# Patient Record
Sex: MALE | Race: WHITE | URBAN - METROPOLITAN AREA
[De-identification: names, ages, dates, MRNs, and addresses within clinical notes are randomized per-mention and may not be internally consistent; named-entity substitution may affect disease eponyms.]

---

## 2022-03-07 PROBLEM — Z00.00 ENCOUNTER FOR PREVENTIVE HEALTH EXAMINATION: Status: ACTIVE | Noted: 2022-03-07

## 2022-11-01 ENCOUNTER — EMERGENCY (EMERGENCY)
Facility: HOSPITAL | Age: 62
LOS: 0 days | Discharge: HOME | End: 2022-11-01
Attending: EMERGENCY MEDICINE
Payer: MEDICAID

## 2022-11-01 VITALS
HEART RATE: 90 BPM | HEIGHT: 70 IN | TEMPERATURE: 98 F | WEIGHT: 184.97 LBS | DIASTOLIC BLOOD PRESSURE: 69 MMHG | SYSTOLIC BLOOD PRESSURE: 110 MMHG | RESPIRATION RATE: 18 BRPM | OXYGEN SATURATION: 96 %

## 2022-11-01 VITALS
HEART RATE: 80 BPM | TEMPERATURE: 99 F | OXYGEN SATURATION: 95 % | SYSTOLIC BLOOD PRESSURE: 141 MMHG | RESPIRATION RATE: 18 BRPM | DIASTOLIC BLOOD PRESSURE: 80 MMHG

## 2022-11-01 DIAGNOSIS — Z90.49 ACQUIRED ABSENCE OF OTHER SPECIFIED PARTS OF DIGESTIVE TRACT: ICD-10-CM

## 2022-11-01 DIAGNOSIS — K57.30 DIVERTICULOSIS OF LARGE INTESTINE WITHOUT PERFORATION OR ABSCESS WITHOUT BLEEDING: ICD-10-CM

## 2022-11-01 DIAGNOSIS — K44.9 DIAPHRAGMATIC HERNIA WITHOUT OBSTRUCTION OR GANGRENE: ICD-10-CM

## 2022-11-01 DIAGNOSIS — M47.9 SPONDYLOSIS, UNSPECIFIED: ICD-10-CM

## 2022-11-01 DIAGNOSIS — Z79.4 LONG TERM (CURRENT) USE OF INSULIN: ICD-10-CM

## 2022-11-01 DIAGNOSIS — Z95.1 PRESENCE OF AORTOCORONARY BYPASS GRAFT: ICD-10-CM

## 2022-11-01 DIAGNOSIS — I51.7 CARDIOMEGALY: ICD-10-CM

## 2022-11-01 DIAGNOSIS — Z79.82 LONG TERM (CURRENT) USE OF ASPIRIN: ICD-10-CM

## 2022-11-01 DIAGNOSIS — L08.9 LOCAL INFECTION OF THE SKIN AND SUBCUTANEOUS TISSUE, UNSPECIFIED: ICD-10-CM

## 2022-11-01 DIAGNOSIS — I10 ESSENTIAL (PRIMARY) HYPERTENSION: ICD-10-CM

## 2022-11-01 DIAGNOSIS — N28.1 CYST OF KIDNEY, ACQUIRED: ICD-10-CM

## 2022-11-01 DIAGNOSIS — K40.90 UNILATERAL INGUINAL HERNIA, WITHOUT OBSTRUCTION OR GANGRENE, NOT SPECIFIED AS RECURRENT: ICD-10-CM

## 2022-11-01 DIAGNOSIS — E11.9 TYPE 2 DIABETES MELLITUS WITHOUT COMPLICATIONS: ICD-10-CM

## 2022-11-01 DIAGNOSIS — Z95.2 PRESENCE OF PROSTHETIC HEART VALVE: ICD-10-CM

## 2022-11-01 DIAGNOSIS — R11.2 NAUSEA WITH VOMITING, UNSPECIFIED: ICD-10-CM

## 2022-11-01 DIAGNOSIS — Z87.891 PERSONAL HISTORY OF NICOTINE DEPENDENCE: ICD-10-CM

## 2022-11-01 DIAGNOSIS — I25.10 ATHEROSCLEROTIC HEART DISEASE OF NATIVE CORONARY ARTERY WITHOUT ANGINA PECTORIS: ICD-10-CM

## 2022-11-01 DIAGNOSIS — R10.13 EPIGASTRIC PAIN: ICD-10-CM

## 2022-11-01 LAB
ALBUMIN SERPL ELPH-MCNC: 3.9 G/DL — SIGNIFICANT CHANGE UP (ref 3.3–5)
ALP SERPL-CCNC: 126 U/L — HIGH (ref 40–120)
ALT FLD-CCNC: 35 U/L — SIGNIFICANT CHANGE UP (ref 12–78)
ANION GAP SERPL CALC-SCNC: 7 MMOL/L — SIGNIFICANT CHANGE UP (ref 5–17)
APPEARANCE UR: CLEAR — SIGNIFICANT CHANGE UP
APTT BLD: 32.7 SEC — SIGNIFICANT CHANGE UP (ref 27.5–35.5)
AST SERPL-CCNC: 35 U/L — SIGNIFICANT CHANGE UP (ref 15–37)
BASOPHILS # BLD AUTO: 0.08 K/UL — SIGNIFICANT CHANGE UP (ref 0–0.2)
BASOPHILS NFR BLD AUTO: 0.6 % — SIGNIFICANT CHANGE UP (ref 0–2)
BILIRUB SERPL-MCNC: 0.4 MG/DL — SIGNIFICANT CHANGE UP (ref 0.2–1.2)
BILIRUB UR-MCNC: NEGATIVE — SIGNIFICANT CHANGE UP
BUN SERPL-MCNC: 29 MG/DL — HIGH (ref 7–23)
CALCIUM SERPL-MCNC: 9.7 MG/DL — SIGNIFICANT CHANGE UP (ref 8.5–10.1)
CHLORIDE SERPL-SCNC: 107 MMOL/L — SIGNIFICANT CHANGE UP (ref 96–108)
CO2 SERPL-SCNC: 25 MMOL/L — SIGNIFICANT CHANGE UP (ref 22–31)
COLOR SPEC: YELLOW — SIGNIFICANT CHANGE UP
CREAT SERPL-MCNC: 1.08 MG/DL — SIGNIFICANT CHANGE UP (ref 0.5–1.3)
DIFF PNL FLD: ABNORMAL
EGFR: 78 ML/MIN/1.73M2 — SIGNIFICANT CHANGE UP
EOSINOPHIL # BLD AUTO: 0.25 K/UL — SIGNIFICANT CHANGE UP (ref 0–0.5)
EOSINOPHIL NFR BLD AUTO: 1.9 % — SIGNIFICANT CHANGE UP (ref 0–6)
ETHANOL SERPL-MCNC: <10 MG/DL — SIGNIFICANT CHANGE UP (ref 0–10)
GLUCOSE SERPL-MCNC: 212 MG/DL — HIGH (ref 70–99)
GLUCOSE UR QL: NEGATIVE — SIGNIFICANT CHANGE UP
HCT VFR BLD CALC: 40.7 % — SIGNIFICANT CHANGE UP (ref 39–50)
HGB BLD-MCNC: 13.3 G/DL — SIGNIFICANT CHANGE UP (ref 13–17)
IMM GRANULOCYTES NFR BLD AUTO: 0.2 % — SIGNIFICANT CHANGE UP (ref 0–0.9)
INR BLD: 1.06 RATIO — SIGNIFICANT CHANGE UP (ref 0.88–1.16)
KETONES UR-MCNC: NEGATIVE — SIGNIFICANT CHANGE UP
LEUKOCYTE ESTERASE UR-ACNC: NEGATIVE — SIGNIFICANT CHANGE UP
LIDOCAIN IGE QN: 18 U/L — LOW (ref 73–393)
LYMPHOCYTES # BLD AUTO: 1.9 K/UL — SIGNIFICANT CHANGE UP (ref 1–3.3)
LYMPHOCYTES # BLD AUTO: 14.4 % — SIGNIFICANT CHANGE UP (ref 13–44)
MAGNESIUM SERPL-MCNC: 2 MG/DL — SIGNIFICANT CHANGE UP (ref 1.6–2.6)
MCHC RBC-ENTMCNC: 31.8 PG — SIGNIFICANT CHANGE UP (ref 27–34)
MCHC RBC-ENTMCNC: 32.7 GM/DL — SIGNIFICANT CHANGE UP (ref 32–36)
MCV RBC AUTO: 97.4 FL — SIGNIFICANT CHANGE UP (ref 80–100)
MONOCYTES # BLD AUTO: 0.7 K/UL — SIGNIFICANT CHANGE UP (ref 0–0.9)
MONOCYTES NFR BLD AUTO: 5.3 % — SIGNIFICANT CHANGE UP (ref 2–14)
NEUTROPHILS # BLD AUTO: 10.22 K/UL — HIGH (ref 1.8–7.4)
NEUTROPHILS NFR BLD AUTO: 77.6 % — HIGH (ref 43–77)
NITRITE UR-MCNC: NEGATIVE — SIGNIFICANT CHANGE UP
PH UR: 5 — SIGNIFICANT CHANGE UP (ref 5–8)
PLATELET # BLD AUTO: 255 K/UL — SIGNIFICANT CHANGE UP (ref 150–400)
POTASSIUM SERPL-MCNC: 5.7 MMOL/L — HIGH (ref 3.5–5.3)
POTASSIUM SERPL-SCNC: 5.7 MMOL/L — HIGH (ref 3.5–5.3)
PROT SERPL-MCNC: 8.3 GM/DL — SIGNIFICANT CHANGE UP (ref 6–8.3)
PROT UR-MCNC: 100
PROTHROM AB SERPL-ACNC: 12.3 SEC — SIGNIFICANT CHANGE UP (ref 10.5–13.4)
RBC # BLD: 4.18 M/UL — LOW (ref 4.2–5.8)
RBC # FLD: 13.2 % — SIGNIFICANT CHANGE UP (ref 10.3–14.5)
SODIUM SERPL-SCNC: 139 MMOL/L — SIGNIFICANT CHANGE UP (ref 135–145)
SP GR SPEC: 1.01 — SIGNIFICANT CHANGE UP (ref 1.01–1.02)
TROPONIN I, HIGH SENSITIVITY RESULT: 16.64 NG/L — SIGNIFICANT CHANGE UP
UROBILINOGEN FLD QL: 1
WBC # BLD: 13.18 K/UL — HIGH (ref 3.8–10.5)
WBC # FLD AUTO: 13.18 K/UL — HIGH (ref 3.8–10.5)

## 2022-11-01 PROCEDURE — 85025 COMPLETE CBC W/AUTO DIFF WBC: CPT

## 2022-11-01 PROCEDURE — 96374 THER/PROPH/DIAG INJ IV PUSH: CPT

## 2022-11-01 PROCEDURE — 99285 EMERGENCY DEPT VISIT HI MDM: CPT | Mod: 25

## 2022-11-01 PROCEDURE — 96376 TX/PRO/DX INJ SAME DRUG ADON: CPT

## 2022-11-01 PROCEDURE — 74177 CT ABD & PELVIS W/CONTRAST: CPT | Mod: 26,MA

## 2022-11-01 PROCEDURE — 96375 TX/PRO/DX INJ NEW DRUG ADDON: CPT

## 2022-11-01 PROCEDURE — 84484 ASSAY OF TROPONIN QUANT: CPT

## 2022-11-01 PROCEDURE — 80053 COMPREHEN METABOLIC PANEL: CPT

## 2022-11-01 PROCEDURE — 83690 ASSAY OF LIPASE: CPT

## 2022-11-01 PROCEDURE — 81001 URINALYSIS AUTO W/SCOPE: CPT

## 2022-11-01 PROCEDURE — 93971 EXTREMITY STUDY: CPT | Mod: RT

## 2022-11-01 PROCEDURE — 85610 PROTHROMBIN TIME: CPT

## 2022-11-01 PROCEDURE — 74177 CT ABD & PELVIS W/CONTRAST: CPT | Mod: MA

## 2022-11-01 PROCEDURE — 87086 URINE CULTURE/COLONY COUNT: CPT

## 2022-11-01 PROCEDURE — 99285 EMERGENCY DEPT VISIT HI MDM: CPT

## 2022-11-01 PROCEDURE — 83735 ASSAY OF MAGNESIUM: CPT

## 2022-11-01 PROCEDURE — 85730 THROMBOPLASTIN TIME PARTIAL: CPT

## 2022-11-01 PROCEDURE — 36415 COLL VENOUS BLD VENIPUNCTURE: CPT

## 2022-11-01 PROCEDURE — 80307 DRUG TEST PRSMV CHEM ANLYZR: CPT

## 2022-11-01 PROCEDURE — 93971 EXTREMITY STUDY: CPT | Mod: 26,RT

## 2022-11-01 PROCEDURE — 93005 ELECTROCARDIOGRAM TRACING: CPT

## 2022-11-01 PROCEDURE — 93010 ELECTROCARDIOGRAM REPORT: CPT

## 2022-11-01 RX ORDER — CEPHALEXIN 500 MG
1 CAPSULE ORAL
Qty: 28 | Refills: 0
Start: 2022-11-01

## 2022-11-01 RX ORDER — COLLAGENASE CLOSTRIDIUM HIST. 250 UNIT/G
0 OINTMENT (GRAM) TOPICAL
Qty: 0 | Refills: 0 | DISCHARGE

## 2022-11-01 RX ORDER — ONDANSETRON 8 MG/1
4 TABLET, FILM COATED ORAL ONCE
Refills: 0 | Status: COMPLETED | OUTPATIENT
Start: 2022-11-01 | End: 2022-11-01

## 2022-11-01 RX ORDER — ISOSORBIDE MONONITRATE 60 MG/1
0 TABLET, EXTENDED RELEASE ORAL
Qty: 0 | Refills: 0 | DISCHARGE

## 2022-11-01 RX ORDER — METOCLOPRAMIDE HCL 10 MG
0 TABLET ORAL
Qty: 0 | Refills: 0 | DISCHARGE

## 2022-11-01 RX ORDER — SODIUM CHLORIDE 9 MG/ML
1000 INJECTION INTRAMUSCULAR; INTRAVENOUS; SUBCUTANEOUS ONCE
Refills: 0 | Status: COMPLETED | OUTPATIENT
Start: 2022-11-01 | End: 2022-11-01

## 2022-11-01 RX ORDER — ONDANSETRON 8 MG/1
0 TABLET, FILM COATED ORAL
Qty: 0 | Refills: 0 | DISCHARGE

## 2022-11-01 RX ORDER — ASPIRIN/CALCIUM CARB/MAGNESIUM 324 MG
0 TABLET ORAL
Qty: 0 | Refills: 0 | DISCHARGE

## 2022-11-01 RX ORDER — INSULIN GLARGINE 100 [IU]/ML
0 INJECTION, SOLUTION SUBCUTANEOUS
Qty: 0 | Refills: 0 | DISCHARGE

## 2022-11-01 RX ORDER — MORPHINE SULFATE 50 MG/1
4 CAPSULE, EXTENDED RELEASE ORAL ONCE
Refills: 0 | Status: DISCONTINUED | OUTPATIENT
Start: 2022-11-01 | End: 2022-11-01

## 2022-11-01 RX ORDER — TAMSULOSIN HYDROCHLORIDE 0.4 MG/1
0 CAPSULE ORAL
Qty: 0 | Refills: 0 | DISCHARGE

## 2022-11-01 RX ORDER — HYDROMORPHONE HYDROCHLORIDE 2 MG/ML
1 INJECTION INTRAMUSCULAR; INTRAVENOUS; SUBCUTANEOUS ONCE
Refills: 0 | Status: DISCONTINUED | OUTPATIENT
Start: 2022-11-01 | End: 2022-11-01

## 2022-11-01 RX ORDER — LISINOPRIL 2.5 MG/1
0 TABLET ORAL
Qty: 0 | Refills: 0 | DISCHARGE

## 2022-11-01 RX ORDER — COLLAGENASE CLOSTRIDIUM HIST. 250 UNIT/G
1 OINTMENT (GRAM) TOPICAL
Qty: 0 | Refills: 0 | DISCHARGE

## 2022-11-01 RX ORDER — SIMVASTATIN 20 MG/1
0 TABLET, FILM COATED ORAL
Qty: 0 | Refills: 0 | DISCHARGE

## 2022-11-01 RX ORDER — SIMETHICONE 80 MG/1
0 TABLET, CHEWABLE ORAL
Qty: 0 | Refills: 0 | DISCHARGE

## 2022-11-01 RX ORDER — MAGNESIUM HYDROXIDE 400 MG/1
0 TABLET, CHEWABLE ORAL
Qty: 0 | Refills: 0 | DISCHARGE

## 2022-11-01 RX ORDER — INSULIN LISPRO 100/ML
0 VIAL (ML) SUBCUTANEOUS
Qty: 0 | Refills: 0 | DISCHARGE

## 2022-11-01 RX ORDER — ACETAMINOPHEN 500 MG
2 TABLET ORAL
Qty: 0 | Refills: 0 | DISCHARGE

## 2022-11-01 RX ADMIN — ONDANSETRON 4 MILLIGRAM(S): 8 TABLET, FILM COATED ORAL at 13:45

## 2022-11-01 RX ADMIN — SODIUM CHLORIDE 1000 MILLILITER(S): 9 INJECTION INTRAMUSCULAR; INTRAVENOUS; SUBCUTANEOUS at 16:22

## 2022-11-01 RX ADMIN — HYDROMORPHONE HYDROCHLORIDE 1 MILLIGRAM(S): 2 INJECTION INTRAMUSCULAR; INTRAVENOUS; SUBCUTANEOUS at 16:22

## 2022-11-01 RX ADMIN — HYDROMORPHONE HYDROCHLORIDE 1 MILLIGRAM(S): 2 INJECTION INTRAMUSCULAR; INTRAVENOUS; SUBCUTANEOUS at 13:45

## 2022-11-01 NOTE — ED ADULT TRIAGE NOTE - CHIEF COMPLAINT QUOTE
BIBEMS from [place], epigastric abdominal pain x3 weeks, + N/V, denies diarrhea. hx triple bypass, DM, HTN. denies CP at this time.

## 2022-11-01 NOTE — ED ADULT NURSE NOTE - BOWEL SOUNDS LUQ
Lactation consult completed.    Weight: 2930 g(Filed from Delivery Summary) (03/06/21 1944)    2930 g (03/06/21 2025)    0%     Mom states breastfeeding is going well. Mom feels her infant is latching, sucking and swallowing well. Reviewed verbally with mom positioning and latching of infant.    Mom is also supplementing with formula because she feels she doesn't have enough milk. Educated mom on supply and demand and how formula supplementation decreases breast milk supply. Educated mom on benefits of exclusive breastfeeding and discouraged further formula supplementation.      Hand expression taught and mom was surprised by colostrum supply and how easily it was expressed.     Mom's Medication reviewed yes    Breast pump yes    Infant's feeding log reviewed with no lactation concerns.    Breastfeeding plan of care:    Mom encouraged to continue to breastfeed infant every 2-3  hours from start of feedings or 8 to 12 times a day. Mom will offer both breast and continue to stimulate infant for more efficient feedings.     Breastfeeding educational material given and explained to Mom. Mom verbalized understanding of breastfeeding education. Mom has been encouraged  to call for any questions, concerns or assistance if needed.  All of mom's questions have been answered at this time.     20 mins       hypoactive

## 2022-11-01 NOTE — ED PROVIDER NOTE - OBJECTIVE STATEMENT
61 y/o male with PMHx of triple bypass, smoker, DM, HTN BIBEMS from [place] c/o intermittent gradually worsening epigastric abd pain x3 weeks, nausea and vomiting, acutely worsened since yesterday. No consistent precipitant reported. described as a sharp, stabbing pain. denies chest pain, SOB, fever. +Nausea and vomiting x1 each morning. Denies alcohol and drug use, lives in a rehab facility. Has a GI appt with Dr. Gabriel Nov 14. 61 y/o male with PMHx of triple bypass, smoker, DM, HTN BIBEMS from [place] c/o intermittent gradually worsening epigastric abd pain x3 weeks, nausea and vomiting, acutely worsened since yesterday, as well as redness to R anterior shin at site of abrasion. No consistent precipitant reported. described as a sharp, stabbing pain. denies chest pain, SOB, fever. +Nausea and vomiting x1 each morning. Denies alcohol and drug use, lives in a rehab facility. Has a GI appt with Dr. Gabriel Nov 14.

## 2022-11-01 NOTE — ED PROVIDER NOTE - PROVIDER TOKENS
PROVIDER:[TOKEN:[[id number]],FOLLOWUP:[Urgent]],PROVIDER:[TOKEN:[[id number]],FOLLOWUP:[Urgent]]

## 2022-11-01 NOTE — ED PROVIDER NOTE - PHYSICAL EXAMINATION
PA NOTE: GEN: AOX3, NAD. HEENT: Throat clear. Airway is patent. EYES: PERRLA. EOMI. Head: NC/AT. NECK: Supple, No JVD. FROM. C-spine non-tender. CV:S1S2, RRR, LUNGS: Non-labored breathing, no tachypnea. O2sat 100% RA. CTA b/l. No w/r/r. CHEST: Equal chest expansion and rise. No deformity. ABD: Soft, +Mild epigastric tenderness. No rebound, no guarding. No CVAT. EXT: No e/c/c. 2+ distal pulses. RLE: +Small open wound distal shin. DSD placed. Slight erythema distal right shin area. No warmth. No tenderness. 2+ distal pulses. LLE: s/p BKA. SKIN: No rashes. NEURO: No focal deficits. CN II-XII intact. FROM. 5/5 motor and sensory. ~Fred Ruiz PA-C

## 2022-11-01 NOTE — ED PROVIDER NOTE - PATIENT PORTAL LINK FT
You can access the FollowMyHealth Patient Portal offered by [facility] by registering at the following website: [email or web address]. By joining [facility]’s FollowMyHealth portal, you will also be able to view your health information using other applications (apps) compatible with our system.

## 2022-11-01 NOTE — ED PROVIDER NOTE - NSFOLLOWUPINSTRUCTIONS_ED_ALL_ED_FT
Abdominal Pain, Adult      Pain in the abdomen (abdominal pain) can be caused by many things. Often, abdominal pain is not serious and it gets better with no treatment or by being treated at home. However, sometimes abdominal pain is serious.    Your health care provider will ask questions about your medical history and do a physical exam to try to determine the cause of your abdominal pain.      Follow these instructions at home:    Medicines     •Take over-the-counter and prescription medicines only as told by your health care provider.      • Do not take a laxative unless told by your health care provider.        General instructions      •Watch your condition for any changes.      •Drink enough fluid to keep your urine pale yellow.      •Keep all follow-up visits as told by your health care provider. This is important.        Contact a health care provider if:    •Your abdominal pain changes or gets worse.      •You are not hungry or you lose weight without trying.      •You are constipated or have diarrhea for more than 2–3 days.      •You have pain when you urinate or have a bowel movement.      •Your abdominal pain wakes you up at night.      •Your pain gets worse with meals, after eating, or with certain foods.      •You are vomiting and cannot keep anything down.      •You have a fever.      •You have blood in your urine.        Get help right away if:    •Your pain does not go away as soon as your health care provider told you to expect.      •You cannot stop vomiting.      •Your pain is only in areas of the abdomen, such as the right side or the left lower portion of the abdomen. Pain on the right side could be caused by appendicitis.      •You have bloody or black stools, or stools that look like tar.      •You have severe pain, cramping, or bloating in your abdomen.    •You have signs of dehydration, such as:  •Dark urine, very little urine, or no urine.      •Cracked lips.      •Dry mouth.      •Sunken eyes.      •Sleepiness.      •Weakness.        •You have trouble breathing or chest pain.        Summary    •Often, abdominal pain is not serious and it gets better with no treatment or by being treated at home. However, sometimes abdominal pain is serious.      •Watch your condition for any changes.      •Take over-the-counter and prescription medicines only as told by your health care provider.      •Contact a health care provider if your abdominal pain changes or gets worse.      •Get help right away if you have severe pain, cramping, or bloating in your abdomen.      This information is not intended to replace advice given to you by your health care provider. Make sure you discuss any questions you have with your health care provider.                                                                                      Renal Mass       A renal mass is an abnormal growth in the kidney. It may be found while performing an MRI, CT scan, or ultrasound to evaluate other problems of the abdomen. A renal mass that is cancerous (malignant) may grow or spread quickly. Others are not cancerous (benign).    Renal masses include:•Tumors. These may be malignant or benign.  •The most common type of kidney cancer in adults is renal cell carcinoma. In children, the most common type of kidney cancer is Wilms tumor.      •The most common benign tumors of the kidney include renal adenomas, oncocytomas, and angiomyolipoma (AML).        •Cysts. These are fluid-filled sacs that form on or in the kidney.        What are the causes?    Certain types of cancers, infections, or injuries can cause a renal mass. It is not always known what causes a cyst to develop in or on the kidney.      What are the signs or symptoms?    Often, a renal mass does not cause any signs or symptoms; most kidney cysts do not cause symptoms.      How is this diagnosed?    Your health care provider may recommend tests to diagnose the cause of your renal mass. These tests may be done if a renal mass is found:  •Physical exam.      •Blood tests.      •Urine tests.      •Imaging tests, such as ultrasound, CT scan, or MRI.      •Biopsy. This is a small sample that is removed from the renal mass and tested in a lab.      The exact tests and how often they are done will depend on:  •The size and appearance of the renal mass.      •Risk factors or medical conditions that increase your risk for problems.      •Any symptoms associated with the renal mass, or concerns that you have about it.      Tests and physical exams may be done once, or they may be done regularly for a period of time. Tests and exams that are done regularly will help monitor whether the mass is growing and beginning to cause problems.      How is this treated?    Treatment is not always needed for this condition. Your health care provider may recommend careful monitoring and regular tests and exams. Treatment will depend on the cause of the mass.    Treatment for a cancerous renal mass may include surgical removal, chemotherapy, radiation, or immunotherapy.    Most kidney cysts do not need to be treated.      Follow these instructions at home:    What you need to do at home will depend on the cause of the mass. Follow the instructions that your health care provider gives to you. In general:  •Take over-the-counter and prescription medicines only as told by your health care provider.      •If you were prescribed an antibiotic medicine, take it as told by your health care provider. Do not stop taking the antibiotic even if you start to feel better.      •Follow any restrictions that are given to you by your health care provider.    •Keep all follow-up visits. This is important.  •You may need to see your health care provider once or twice a year to have CT scans and ultrasounds. These tests will show if your renal mass has changed or grown.          Contact a health care provider if you:    •Have pain in your side or back (flank pain).      •Have a fever.      •Feel full soon after eating.      •Have pain or swelling in the abdomen.      •Lose weight.        Get help right away if:    •Your pain gets worse.      •There is blood in your urine.      •You cannot urinate.      •You have chest pain.      •You have trouble breathing.      These symptoms may represent a serious problem that is an emergency. Do not wait to see if the symptoms will go away. Get medical help right away. Call your local emergency services (911 in the [place]       Summary    •A renal mass is an abnormal growth in the kidney. It may be cancerous (malignant) and grow or spread quickly, or it may not be cancerous (benign). Renal masses often do not have any signs or symptoms.      •Renal masses may be found while performing an MRI, CT scan, or ultrasound for other problems of the abdomen.      •Your health care provider may recommend that you have tests to diagnose the cause of your renal mass. These may include a physical exam, blood tests, urine tests, imaging, or a biopsy.      •Treatment is not always needed for this condition. Careful monitoring may be recommended.      This information is not intended to replace advice given to you by your health care provider. Make sure you discuss any questions you have with your health care provider.                                           Cellulitis, Adult       Cellulitis is a skin infection. The infected area is usually warm, red, swollen, and tender. This condition occurs most often in the arms and lower legs. The infection can travel to the muscles, blood, and underlying tissue and become serious. It is very important to get treated for this condition.      What are the causes?    Cellulitis is caused by bacteria. The bacteria enter through a break in the skin, such as a cut, burn, insect bite, open sore, or crack.      What increases the risk?    This condition is more likely to occur in people who:  •Have a weak body defense system (immune system).      •Have open wounds on the skin, such as cuts, burns, bites, and scrapes. Bacteria can enter the body through these open wounds.      •Are older than 60 years of age.      •Have diabetes.      •Have a type of long-lasting (chronic) liver disease (cirrhosis) or kidney disease.      •Are obese.    •Have a skin condition such as:  •Itchy rash (eczema).      •Slow movement of blood in the veins (venous stasis).      •Fluid buildup below the skin (edema).        •Have had radiation therapy.      •Use IV drugs.        What are the signs or symptoms?    Symptoms of this condition include:  •Redness, streaking, or spotting on the skin.      •Swollen area of the skin.      •Tenderness or pain when an area of the skin is touched.      •Warm skin.      •A fever.      •Chills.      •Blisters.        How is this diagnosed?    This condition is diagnosed based on a medical history and physical exam. You may also have tests, including:  •Blood tests.      •Imaging tests.        How is this treated?    Treatment for this condition may include:  •Medicines, such as antibiotic medicines or medicines to treat allergies (antihistamines).      •Supportive care, such as rest and application of cold or warm cloths (compresses) to the skin.      •Hospital care, if the condition is severe.      The infection usually starts to get better within 1–2 days of treatment.      Follow these instructions at home:     Medicines     •Take over-the-counter and prescription medicines only as told by your health care provider.      •If you were prescribed an antibiotic medicine, take it as told by your health care provider. Do not stop taking the antibiotic even if you start to feel better.      General instructions     •Drink enough fluid to keep your urine pale yellow.      • Do not touch or rub the infected area.      •Raise (elevate) the infected area above the level of your heart while you are sitting or lying down.      •Apply warm or cold compresses to the affected area as told by your health care provider.      •Keep all follow-up visits as told by your health care provider. This is important. These visits let your health care provider make sure a more serious infection is not developing.        Contact a health care provider if:    •You have a fever.      •Your symptoms do not begin to improve within 1–2 days of starting treatment.      •Your bone or joint underneath the infected area becomes painful after the skin has healed.      •Your infection returns in the same area or another area.      •You notice a swollen bump in the infected area.      •You develop new symptoms.      •You have a general ill feeling (malaise) with muscle aches and pains.        Get help right away if:    •Your symptoms get worse.      •You feel very sleepy.      •You develop vomiting or diarrhea that persists.      •You notice red streaks coming from the infected area.      •Your red area gets larger or turns dark in color.      These symptoms may represent a serious problem that is an emergency. Do not wait to see if the symptoms will go away. Get medical help right away. Call your local emergency services (911 in the [place] Do not drive yourself to the hospital.       Summary    •Cellulitis is a skin infection. This condition occurs most often in the arms and lower legs.      •Treatment for this condition may include medicines, such as antibiotic medicines or antihistamines.      •Take over-the-counter and prescription medicines only as told by your health care provider. If you were prescribed an antibiotic medicine, do not stop taking the antibiotic even if you start to feel better.      •Contact a health care provider if your symptoms do not begin to improve within 1–2 days of starting treatment or your symptoms get worse.      •Keep all follow-up visits as told by your health care provider. This is important. These visits let your health care provider make sure that a more serious infection is not developing.      This information is not intended to replace advice given to you by your health care provider. Make sure you discuss any questions you have with your health care provider.

## 2022-11-01 NOTE — ED PROVIDER NOTE - CARE PLAN
Principal Discharge DX:	Abdominal pain  Secondary Diagnosis:	Local skin infection  Secondary Diagnosis:	Renal cyst   1

## 2022-11-01 NOTE — ED PROVIDER NOTE - ATTENDING APP SHARED VISIT CONTRIBUTION OF CARE
ERASMO Lujan MD, ED Attending physician:  This was a shared visit with RAGHU.  I reviewed and verified the documentation and independently performed the documented history/exam/mdm.

## 2022-11-01 NOTE — ED PROVIDER NOTE - PROGRESS NOTE DETAILS
PA: Patient is a 61 y/o male with PMHx of CABG, DM, HTN BIBEMS from [place] c/o intermittent gradually worsening epigastric abd pain x3 weeks, nausea and vomiting since yesterday. DENIES chest pain, SOB, fever. Denies alcohol and drug use, lives in a rehab facility. Has a GI appt with Dr. Gabriel Nov 14. ~Fred Ruiz PA-C PA note: CT NEG for acute findings, All labwork/radiology results including incidental renal cyst discussed in detail with patient. Patient re-examined and re-evaluated. Patient feels much better at this time. ED evaluation, Diagnosis and management discussed with the patient in detail. Workup results discussed with ED attending, OK to dc home. Close GI, Vascular & PMD follow up encouraged, aftercare to assist with scheduling appointment ASAP. Strict ED return instructions discussed in detail and patient given the opportunity to ask any questions about their discharge diagnosis and instructions. Patient verbalized understanding. ~ Fred Ruiz PA-C

## 2022-11-01 NOTE — ED ADULT NURSE NOTE - OBJECTIVE STATEMENT
Patient states he has abdominal epigastric pain for 3 weeks.  Papers from [place] state he was sent for evaluation of right leg cellulitis.    Patient states he has vascular surgeon appointment tomorrow.

## 2022-11-01 NOTE — PHARMACOTHERAPY INTERVENTION NOTE - COMMENTS
Medication reconciliation completed.  Reviewed Medication list and confirmed med allergies with list from Care Facility "[facility]"; confirmed with Dr. First Medjosi.

## 2022-11-01 NOTE — ED PROVIDER NOTE - CARE PROVIDER_API CALL
Vic Hi (MD)  Gastroenterology; Internal Medicine  [address], [address]  [place] [address]  Phone: [phone]  Fax: [phone]  Follow Up Time: Urgent    Jean-Paul Varghese (DO)  Nephrology  [address], [address]  [place] [address]  Phone: [phone]  Fax: [phone]  Follow Up Time: Urgent

## 2022-11-01 NOTE — ED ADULT NURSE NOTE - CHIEF COMPLAINT QUOTE
BIBEMS from [place], epigastric abdominal pain x3 weeks, + N/V, denies diarrhea. hx triple bypass, DM, HTN. denies CP at this time.

## 2022-11-01 NOTE — ED PROVIDER NOTE - SKIN, MLM
+abrasions anterior right shin, no definite cellulitis +abrasions anterior right shin, + associated slight erythema but no definite cellulitis, no tactile warmth nor active discharge

## 2022-11-01 NOTE — ED ADULT NURSE REASSESSMENT NOTE - NS ED NURSE REASSESS COMMENT FT1
Received report from JEANNIE Shelton. Pt waiting for transport back to [facility]. VS stable. No s/s of distress.

## 2022-11-01 NOTE — ED ADULT NURSE REASSESSMENT NOTE - NS ED NURSE REASSESS COMMENT FT1
Patient to be discharged back to [facility].  Transportation being arranged at this time.  Will continue to monitor.

## 2022-11-01 NOTE — ED PROVIDER NOTE - CLINICAL SUMMARY MEDICAL DECISION MAKING FREE TEXT BOX
61 y/o white male with PMHx of triple bypass, smoker, DM, HTN BIBEMS from [facility] c/o intermittent gradually worsening epigastric abd pain x3 weeks, +nausea and +vomitingx1 QAM, acutely worsened since yesterday. +epigastric greater than mid abd tenderness, no guarding, rebound or mass. Plan for labs including lipase, urine, CT abd/pelvis, RLE venous doppler, IV fluid, IV pain medication. Observe and reassess. 63 y/o white male with PMHx of triple bypass, smoker, DM, HTN BIBEMS from [facility] c/o intermittent gradually worsening epigastric abd pain x3 weeks, +nausea and +vomitingx1 QAM, acutely worsened since yesterday. +epigastric greater than mid abd tenderness, no guarding, rebound or mass.  Also mild redness around non-acute R ant. shin abrasion.   Plan for labs including lipase, urine, CT abd/pelvis, RLE venous doppler, IV fluid, IV pain medication. Observe and reassess.

## 2022-11-03 LAB
CULTURE RESULTS: SIGNIFICANT CHANGE UP
SPECIMEN SOURCE: SIGNIFICANT CHANGE UP

## 2022-12-01 ENCOUNTER — INPATIENT (INPATIENT)
Facility: HOSPITAL | Age: 62
LOS: 25 days | Discharge: NURSING, REHAB OR LONG-TERM CARE | DRG: 253 | End: 2022-12-27
Attending: INTERNAL MEDICINE | Admitting: HOSPITALIST
Payer: MEDICARE

## 2022-12-01 VITALS
OXYGEN SATURATION: 90 % | SYSTOLIC BLOOD PRESSURE: 146 MMHG | HEART RATE: 125 BPM | RESPIRATION RATE: 17 BRPM | HEIGHT: 70 IN | TEMPERATURE: 101 F | DIASTOLIC BLOOD PRESSURE: 94 MMHG

## 2022-12-01 DIAGNOSIS — I34.2 NONRHEUMATIC MITRAL (VALVE) STENOSIS: ICD-10-CM

## 2022-12-01 DIAGNOSIS — Z79.891 LONG TERM (CURRENT) USE OF OPIATE ANALGESIC: ICD-10-CM

## 2022-12-01 DIAGNOSIS — L76.32 POSTPROCEDURAL HEMATOMA OF SKIN AND SUBCUTANEOUS TISSUE FOLLOWING OTHER PROCEDURE: ICD-10-CM

## 2022-12-01 DIAGNOSIS — I36.1 NONRHEUMATIC TRICUSPID (VALVE) INSUFFICIENCY: ICD-10-CM
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
## 2022-12-01 DIAGNOSIS — I27.20 PULMONARY HYPERTENSION, UNSPECIFIED: ICD-10-CM

## 2022-12-01 DIAGNOSIS — Z79.4 LONG TERM (CURRENT) USE OF INSULIN: ICD-10-CM

## 2022-12-01 DIAGNOSIS — Z89.512 ACQUIRED ABSENCE OF LEFT LEG BELOW KNEE: ICD-10-CM

## 2022-12-01 DIAGNOSIS — L03.115 CELLULITIS OF RIGHT LOWER LIMB: ICD-10-CM

## 2022-12-01 DIAGNOSIS — K21.9 GASTRO-ESOPHAGEAL REFLUX DISEASE WITHOUT ESOPHAGITIS: ICD-10-CM

## 2022-12-01 DIAGNOSIS — N17.9 ACUTE KIDNEY FAILURE, UNSPECIFIED: ICD-10-CM

## 2022-12-01 DIAGNOSIS — Z95.1 PRESENCE OF AORTOCORONARY BYPASS GRAFT: ICD-10-CM
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
## 2022-12-01 DIAGNOSIS — E11.621 TYPE 2 DIABETES MELLITUS WITH FOOT ULCER: ICD-10-CM

## 2022-12-01 DIAGNOSIS — E78.5 HYPERLIPIDEMIA, UNSPECIFIED: ICD-10-CM

## 2022-12-01 DIAGNOSIS — I25.10 ATHEROSCLEROTIC HEART DISEASE OF NATIVE CORONARY ARTERY WITHOUT ANGINA PECTORIS: ICD-10-CM

## 2022-12-01 DIAGNOSIS — I10 ESSENTIAL (PRIMARY) HYPERTENSION: ICD-10-CM

## 2022-12-01 DIAGNOSIS — E11.52 TYPE 2 DIABETES MELLITUS WITH DIABETIC PERIPHERAL ANGIOPATHY WITH GANGRENE: ICD-10-CM

## 2022-12-01 DIAGNOSIS — E11.65 TYPE 2 DIABETES MELLITUS WITH HYPERGLYCEMIA: ICD-10-CM

## 2022-12-01 DIAGNOSIS — Z79.82 LONG TERM (CURRENT) USE OF ASPIRIN: ICD-10-CM

## 2022-12-01 DIAGNOSIS — I24.8 OTHER FORMS OF ACUTE ISCHEMIC HEART DISEASE: ICD-10-CM

## 2022-12-01 DIAGNOSIS — N40.0 BENIGN PROSTATIC HYPERPLASIA WITHOUT LOWER URINARY TRACT SYMPTOMS: ICD-10-CM

## 2022-12-01 DIAGNOSIS — L13.8 OTHER SPECIFIED BULLOUS DISORDERS: ICD-10-CM

## 2022-12-01 DIAGNOSIS — K76.1 CHRONIC PASSIVE CONGESTION OF LIVER: ICD-10-CM

## 2022-12-01 DIAGNOSIS — E11.42 TYPE 2 DIABETES MELLITUS WITH DIABETIC POLYNEUROPATHY: ICD-10-CM

## 2022-12-01 DIAGNOSIS — R50.9 FEVER, UNSPECIFIED: ICD-10-CM

## 2022-12-01 LAB
ADD ON TEST-SPECIMEN IN LAB: SIGNIFICANT CHANGE UP
ADD ON TEST-SPECIMEN IN LAB: SIGNIFICANT CHANGE UP
ALBUMIN SERPL ELPH-MCNC: 3.1 G/DL — LOW (ref 3.3–5)
ALP SERPL-CCNC: 160 U/L — HIGH (ref 40–120)
ALT FLD-CCNC: 48 U/L — SIGNIFICANT CHANGE UP (ref 12–78)
ANION GAP SERPL CALC-SCNC: 6 MMOL/L — SIGNIFICANT CHANGE UP (ref 5–17)
APTT BLD: 34.9 SEC — SIGNIFICANT CHANGE UP (ref 27.5–35.5)
AST SERPL-CCNC: 30 U/L — SIGNIFICANT CHANGE UP (ref 15–37)
BASOPHILS # BLD AUTO: 0.04 K/UL — SIGNIFICANT CHANGE UP (ref 0–0.2)
BASOPHILS NFR BLD AUTO: 0.2 % — SIGNIFICANT CHANGE UP (ref 0–2)
BILIRUB SERPL-MCNC: 1 MG/DL — SIGNIFICANT CHANGE UP (ref 0.2–1.2)
BUN SERPL-MCNC: 23 MG/DL — SIGNIFICANT CHANGE UP (ref 7–23)
CALCIUM SERPL-MCNC: 9.2 MG/DL — SIGNIFICANT CHANGE UP (ref 8.5–10.1)
CHLORIDE SERPL-SCNC: 106 MMOL/L — SIGNIFICANT CHANGE UP (ref 96–108)
CO2 SERPL-SCNC: 27 MMOL/L — SIGNIFICANT CHANGE UP (ref 22–31)
CREAT SERPL-MCNC: 1 MG/DL — SIGNIFICANT CHANGE UP (ref 0.5–1.3)
EGFR: 86 ML/MIN/1.73M2 — SIGNIFICANT CHANGE UP
EOSINOPHIL # BLD AUTO: 0 K/UL — SIGNIFICANT CHANGE UP (ref 0–0.5)
EOSINOPHIL NFR BLD AUTO: 0 % — SIGNIFICANT CHANGE UP (ref 0–6)
GLUCOSE SERPL-MCNC: 100 MG/DL — HIGH (ref 70–99)
HCT VFR BLD CALC: 32.8 % — LOW (ref 39–50)
HGB BLD-MCNC: 10.9 G/DL — LOW (ref 13–17)
IMM GRANULOCYTES NFR BLD AUTO: 0.6 % — SIGNIFICANT CHANGE UP (ref 0–0.9)
INR BLD: 1.37 RATIO — HIGH (ref 0.88–1.16)
LACTATE SERPL-SCNC: 1.3 MMOL/L — SIGNIFICANT CHANGE UP (ref 0.7–2)
LIDOCAIN IGE QN: 21 U/L — LOW (ref 73–393)
LYMPHOCYTES # BLD AUTO: 0.77 K/UL — LOW (ref 1–3.3)
LYMPHOCYTES # BLD AUTO: 4.3 % — LOW (ref 13–44)
MCHC RBC-ENTMCNC: 32.5 PG — SIGNIFICANT CHANGE UP (ref 27–34)
MCHC RBC-ENTMCNC: 33.2 GM/DL — SIGNIFICANT CHANGE UP (ref 32–36)
MCV RBC AUTO: 97.9 FL — SIGNIFICANT CHANGE UP (ref 80–100)
MONOCYTES # BLD AUTO: 1.37 K/UL — HIGH (ref 0–0.9)
MONOCYTES NFR BLD AUTO: 7.7 % — SIGNIFICANT CHANGE UP (ref 2–14)
NEUTROPHILS # BLD AUTO: 15.48 K/UL — HIGH (ref 1.8–7.4)
NEUTROPHILS NFR BLD AUTO: 87.2 % — HIGH (ref 43–77)
PLATELET # BLD AUTO: 270 K/UL — SIGNIFICANT CHANGE UP (ref 150–400)
POTASSIUM SERPL-MCNC: 4.1 MMOL/L — SIGNIFICANT CHANGE UP (ref 3.5–5.3)
POTASSIUM SERPL-SCNC: 4.1 MMOL/L — SIGNIFICANT CHANGE UP (ref 3.5–5.3)
PROT SERPL-MCNC: 7.5 GM/DL — SIGNIFICANT CHANGE UP (ref 6–8.3)
PROTHROM AB SERPL-ACNC: 15.9 SEC — HIGH (ref 10.5–13.4)
RAPID RVP RESULT: SIGNIFICANT CHANGE UP
RBC # BLD: 3.35 M/UL — LOW (ref 4.2–5.8)
RBC # FLD: 13.2 % — SIGNIFICANT CHANGE UP (ref 10.3–14.5)
SARS-COV-2 RNA SPEC QL NAA+PROBE: SIGNIFICANT CHANGE UP
SODIUM SERPL-SCNC: 139 MMOL/L — SIGNIFICANT CHANGE UP (ref 135–145)
WBC # BLD: 17.77 K/UL — HIGH (ref 3.8–10.5)
WBC # FLD AUTO: 17.77 K/UL — HIGH (ref 3.8–10.5)

## 2022-12-01 PROCEDURE — C1887: CPT

## 2022-12-01 PROCEDURE — 85652 RBC SED RATE AUTOMATED: CPT

## 2022-12-01 PROCEDURE — 85025 COMPLETE CBC W/AUTO DIFF WBC: CPT

## 2022-12-01 PROCEDURE — 97162 PT EVAL MOD COMPLEX 30 MIN: CPT | Mod: GP

## 2022-12-01 PROCEDURE — 93017 CV STRESS TEST TRACING ONLY: CPT

## 2022-12-01 PROCEDURE — 93325 DOPPLER ECHO COLOR FLOW MAPG: CPT

## 2022-12-01 PROCEDURE — 99285 EMERGENCY DEPT VISIT HI MDM: CPT

## 2022-12-01 PROCEDURE — C1768: CPT

## 2022-12-01 PROCEDURE — 83036 HEMOGLOBIN GLYCOSYLATED A1C: CPT

## 2022-12-01 PROCEDURE — A9500: CPT

## 2022-12-01 PROCEDURE — 93880 EXTRACRANIAL BILAT STUDY: CPT

## 2022-12-01 PROCEDURE — 93306 TTE W/DOPPLER COMPLETE: CPT

## 2022-12-01 PROCEDURE — P9040: CPT

## 2022-12-01 PROCEDURE — 86901 BLOOD TYPING SEROLOGIC RH(D): CPT

## 2022-12-01 PROCEDURE — 71045 X-RAY EXAM CHEST 1 VIEW: CPT | Mod: 26

## 2022-12-01 PROCEDURE — 82803 BLOOD GASES ANY COMBINATION: CPT

## 2022-12-01 PROCEDURE — 71045 X-RAY EXAM CHEST 1 VIEW: CPT

## 2022-12-01 PROCEDURE — 93926 LOWER EXTREMITY STUDY: CPT | Mod: 26,RT

## 2022-12-01 PROCEDURE — 76000 FLUOROSCOPY <1 HR PHYS/QHP: CPT

## 2022-12-01 PROCEDURE — 36430 TRANSFUSION BLD/BLD COMPNT: CPT

## 2022-12-01 PROCEDURE — 80053 COMPREHEN METABOLIC PANEL: CPT

## 2022-12-01 PROCEDURE — 78452 HT MUSCLE IMAGE SPECT MULT: CPT

## 2022-12-01 PROCEDURE — 36415 COLL VENOUS BLD VENIPUNCTURE: CPT

## 2022-12-01 PROCEDURE — C1769: CPT

## 2022-12-01 PROCEDURE — C1760: CPT

## 2022-12-01 PROCEDURE — 73630 X-RAY EXAM OF FOOT: CPT | Mod: 26,RT

## 2022-12-01 PROCEDURE — 84100 ASSAY OF PHOSPHORUS: CPT

## 2022-12-01 PROCEDURE — 74177 CT ABD & PELVIS W/CONTRAST: CPT

## 2022-12-01 PROCEDURE — 93923 UPR/LXTR ART STDY 3+ LVLS: CPT

## 2022-12-01 PROCEDURE — 84484 ASSAY OF TROPONIN QUANT: CPT

## 2022-12-01 PROCEDURE — 93010 ELECTROCARDIOGRAM REPORT: CPT

## 2022-12-01 PROCEDURE — 93312 ECHO TRANSESOPHAGEAL: CPT

## 2022-12-01 PROCEDURE — 83880 ASSAY OF NATRIURETIC PEPTIDE: CPT

## 2022-12-01 PROCEDURE — 73718 MRI LOWER EXTREMITY W/O DYE: CPT | Mod: RT

## 2022-12-01 PROCEDURE — 73721 MRI JNT OF LWR EXTRE W/O DYE: CPT | Mod: RT

## 2022-12-01 PROCEDURE — 80048 BASIC METABOLIC PNL TOTAL CA: CPT

## 2022-12-01 PROCEDURE — 85610 PROTHROMBIN TIME: CPT

## 2022-12-01 PROCEDURE — 82962 GLUCOSE BLOOD TEST: CPT

## 2022-12-01 PROCEDURE — C1889: CPT

## 2022-12-01 PROCEDURE — 86923 COMPATIBILITY TEST ELECTRIC: CPT

## 2022-12-01 PROCEDURE — 86803 HEPATITIS C AB TEST: CPT

## 2022-12-01 PROCEDURE — 86850 RBC ANTIBODY SCREEN: CPT

## 2022-12-01 PROCEDURE — 86140 C-REACTIVE PROTEIN: CPT

## 2022-12-01 PROCEDURE — 93461 R&L HRT ART/VENTRICLE ANGIO: CPT

## 2022-12-01 PROCEDURE — 85027 COMPLETE CBC AUTOMATED: CPT

## 2022-12-01 PROCEDURE — 81001 URINALYSIS AUTO W/SCOPE: CPT

## 2022-12-01 PROCEDURE — 93970 EXTREMITY STUDY: CPT

## 2022-12-01 PROCEDURE — 93320 DOPPLER ECHO COMPLETE: CPT

## 2022-12-01 PROCEDURE — 74177 CT ABD & PELVIS W/CONTRAST: CPT | Mod: 26,MA

## 2022-12-01 PROCEDURE — 82810 BLOOD GASES O2 SAT ONLY: CPT

## 2022-12-01 PROCEDURE — 97530 THERAPEUTIC ACTIVITIES: CPT | Mod: GP

## 2022-12-01 PROCEDURE — P9016: CPT

## 2022-12-01 PROCEDURE — C1894: CPT

## 2022-12-01 PROCEDURE — 83735 ASSAY OF MAGNESIUM: CPT

## 2022-12-01 PROCEDURE — 87635 SARS-COV-2 COVID-19 AMP PRB: CPT

## 2022-12-01 PROCEDURE — 86900 BLOOD TYPING SEROLOGIC ABO: CPT

## 2022-12-01 PROCEDURE — 85730 THROMBOPLASTIN TIME PARTIAL: CPT

## 2022-12-01 RX ORDER — VANCOMYCIN HCL 1 G
1000 VIAL (EA) INTRAVENOUS ONCE
Refills: 0 | Status: COMPLETED | OUTPATIENT
Start: 2022-12-01 | End: 2022-12-01

## 2022-12-01 RX ORDER — ONDANSETRON 8 MG/1
4 TABLET, FILM COATED ORAL ONCE
Refills: 0 | Status: COMPLETED | OUTPATIENT
Start: 2022-12-01 | End: 2022-12-01

## 2022-12-01 RX ORDER — PIPERACILLIN AND TAZOBACTAM 4; .5 G/20ML; G/20ML
3.38 INJECTION, POWDER, LYOPHILIZED, FOR SOLUTION INTRAVENOUS ONCE
Refills: 0 | Status: COMPLETED | OUTPATIENT
Start: 2022-12-01 | End: 2022-12-01

## 2022-12-01 RX ORDER — ACETAMINOPHEN 500 MG
650 TABLET ORAL EVERY 6 HOURS
Refills: 0 | Status: DISCONTINUED | OUTPATIENT
Start: 2022-12-01 | End: 2022-12-27

## 2022-12-01 RX ORDER — ACETAMINOPHEN 500 MG
1000 TABLET ORAL ONCE
Refills: 0 | Status: COMPLETED | OUTPATIENT
Start: 2022-12-01 | End: 2022-12-01

## 2022-12-01 RX ORDER — HYDROMORPHONE HYDROCHLORIDE 2 MG/ML
1 INJECTION INTRAMUSCULAR; INTRAVENOUS; SUBCUTANEOUS ONCE
Refills: 0 | Status: DISCONTINUED | OUTPATIENT
Start: 2022-12-01 | End: 2022-12-01

## 2022-12-01 RX ORDER — SODIUM CHLORIDE 9 MG/ML
2200 INJECTION, SOLUTION INTRAVENOUS ONCE
Refills: 0 | Status: COMPLETED | OUTPATIENT
Start: 2022-12-01 | End: 2022-12-01

## 2022-12-01 RX ORDER — ASPIRIN/CALCIUM CARB/MAGNESIUM 324 MG
325 TABLET ORAL ONCE
Refills: 0 | Status: COMPLETED | OUTPATIENT
Start: 2022-12-01 | End: 2022-12-01

## 2022-12-01 RX ADMIN — Medication 325 MILLIGRAM(S): at 22:45

## 2022-12-01 RX ADMIN — ONDANSETRON 4 MILLIGRAM(S): 8 TABLET, FILM COATED ORAL at 20:31

## 2022-12-01 RX ADMIN — HYDROMORPHONE HYDROCHLORIDE 1 MILLIGRAM(S): 2 INJECTION INTRAMUSCULAR; INTRAVENOUS; SUBCUTANEOUS at 20:31

## 2022-12-01 RX ADMIN — SODIUM CHLORIDE 2200 MILLILITER(S): 9 INJECTION, SOLUTION INTRAVENOUS at 19:34

## 2022-12-01 RX ADMIN — PIPERACILLIN AND TAZOBACTAM 200 GRAM(S): 4; .5 INJECTION, POWDER, LYOPHILIZED, FOR SOLUTION INTRAVENOUS at 20:37

## 2022-12-01 RX ADMIN — Medication 1000 MILLIGRAM(S): at 19:34

## 2022-12-01 RX ADMIN — Medication 250 MILLIGRAM(S): at 22:44

## 2022-12-01 NOTE — ED PROVIDER NOTE - NSICDXPASTMEDICALHX_GEN_ALL_CORE_FT
PAST MEDICAL HISTORY:  DM (diabetes mellitus)     HTN (hypertension)       Anemia     GERD (gastroesophageal reflux disease)     S/P BKA (below knee amputation), left      PAST MEDICAL HISTORY:  DM (diabetes mellitus)     HTN (hypertension)       Anemia     CAD (coronary artery disease)     GERD (gastroesophageal reflux disease)     S/P BKA (below knee amputation), left

## 2022-12-01 NOTE — ED PROVIDER NOTE - OBJECTIVE STATEMENT
61 y/o male PMHx of BKA, DM, HTN, anemia, and GERD, Full code with molst BIBEMS from [facility] c/o severe abdominal pain since this morning. Pt reports vomiting. Pt denies diarrhea. Pt reports he has not been eating or drinking. 63 y/o male PMHx of BKA, DM, HTN, CAD, anemia, and GERD, Full code with molst BIBEMS from [facility] c/o severe abdominal pain since this morning. Pt reports vomiting. Pt denies diarrhea. Pt reports he has not been eating or drinking. 61 y/o male PMHx of BKA, DM, HTN, CAD, anemia, and GERD, Full code with molst BIBEMS from [facility] c/o severe abdominal pain since this morning. Pt reports vomiting. Pt denies diarrhea. Pt reports he has not been eating or drinking. Pt reports he has noticed redness of RLE over last week and black area on bottom of foot. States he was scheduled for angiogram tomorrow with Dr. Dickson. 63 y/o male PMHx of PVD s/p left BKA, DM, HTN, CAD, anemia, and GERD, Full code with molst BIBEMS from [facility] c/o severe abdominal pain since this morning. Pt reports vomiting. Pt denies diarrhea. Pt reports he has not been eating or drinking. Pt reports he has noticed redness of RLE over last week and black area on bottom of foot. States he was scheduled for angiogram tomorrow with Dr. Dickson who has been following patient for continued PVD of right lower extremity.

## 2022-12-01 NOTE — ED ADULT NURSE NOTE - NSICDXPASTMEDICALHX_GEN_ALL_CORE_FT
PAST MEDICAL HISTORY:  DM (diabetes mellitus)     HTN (hypertension)       Anemia     GERD (gastroesophageal reflux disease)     S/P BKA (below knee amputation), left

## 2022-12-01 NOTE — ED PROVIDER NOTE - PHYSICAL EXAMINATION
Physical Exam:  Gen: NAD, non-toxic appearing, able to ambulate without assistance  Head: NCAT  HEENT: EOMI, PEERLA, normal conjunctiva, tongue midline. +oral mucosa dry.  Lung: CTAB, no respiratory distress, no wheezes/rhonchi/rales B/L, speaking in full sentences  CV: +Tachycardic, regular rhythm. No murmurs, rubs or gallops, distal pulses 2+ b/l  Abd: soft, no distention, no guarding, no rigidity, no rebound tenderness. +epigastric TTP.  MSK: ROM normal in UE/LE. +LLE amputation  Skin: Warm, well perfused, no rash. +LLE venous stasis, dermatitis and erythema, cool to touch.  Psych: normal affect, calm Physical Exam:  Gen: NAD, non-toxic appearing, able to ambulate without assistance  Head: NCAT  HEENT: EOMI, PEERLA, normal conjunctiva, tongue midline. +oral mucosa dry.  Lung: CTAB, no respiratory distress, no wheezes/rhonchi/rales B/L, speaking in full sentences  CV: +Tachycardic, regular rhythm. No murmurs, rubs or gallops, distal pulses 2+ b/l  Abd: soft, no distention, no guarding, no rigidity, no rebound tenderness. +epigastric TTP.  MSK: ROM normal in UE/LE. +LLE amputation  Skin: Warm, well perfused, no rash. +RLE dermatitis and erythema, cool to touch. Necrotic area on plantar surface, no discharge.  Psych: normal affect, calm Physical Exam:  Gen: NAD, non-toxic appearing, able to ambulate without assistance  Head: NCAT  HEENT: EOMI, PEERLA, normal conjunctiva, tongue midline. +oral mucosa dry.  Lung: CTAB, no respiratory distress, no wheezes/rhonchi/rales B/L, speaking in full sentences  CV: +Tachycardic, regular rhythm. No murmurs, rubs or gallops, unable to palpate DP/PT RLE, femoral and popliteal palpated  Abd: soft, no distention, no guarding, no rigidity, no rebound tenderness. +epigastric TTP.  MSK: ROM normal in UE/LE. +LLE amputation, RLE unable to flex/extend toes (patient states is chronic 2/2 neuropathy)  Skin: Warm, well perfused, no rash. +RLE dermatitis and erythema, cool to touch. Necrotic area on plantar surface, no discharge  Psych: normal affect, calm

## 2022-12-01 NOTE — CONSULT NOTE ADULT - SUBJECTIVE AND OBJECTIVE BOX
Date of Consult: 12/1/22  Chief Complaint :63 y/o male PMHx of PVD s/p left BKA, DM, HTN, CAD, anemia, and GERD, Full code with molst BIBEMS from [facility] c/o severe abdominal pain since this morning. Pt reports vomiting. Pt denies diarrhea. Pt reports he has not been eating or drinking. Pt reports he has noticed redness of RLE over last week and black area on bottom of foot. States he was scheduled for angiogram tomorrow with Dr. Dickson who has been following patient for continued PVD of right lower extremity.  Podiatry consulted for unstagable wound to Right 4/5th metatarsal head. Patient denies any pain to the area. 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 Patient says he noticed redness/erythema to his right lower extremity. Patient also says that he has narrowing of his blood vessels to his right leg, and that there is decreased blood flow to his right foot.     REVIEW OF SYSTEMS: All other review of systems is negative unless indicated above    PMH: HTN (hypertension)    DM (diabetes mellitus)      PSH:    Allergies:No Known Allergies    MEDICATIONS  (STANDING):    MEDICATIONS  (PRN):      Vitals  T(F): 100.5 (12-01-22 @ 18:01), Max: 100.5 (12-01-22 @ 18:01)  HR: 110 (12-01-22 @ 19:39) (110 - 125)  BP: 134/75 (12-01-22 @ 19:39) (134/75 - 146/94)  RR: 18 (12-01-22 @ 19:39) (17 - 18)  SpO2: 95% (12-01-22 @ 19:39) (90% - 95%)  Wt(kg): --      Physical Exam:   Constitutiona: NAD, alert;  Derm:  Skin warm, dry and supple bilateral.     Erythema noted grossly to right foot and lower leg  Scaly skin noted grossly to right foot  Unstagable wound noted to the right 4th/5th metatarsal head measuring approximately 3x3 cm, no pus, no malodor, no pain on palpation, negative fluctance.   Vascular: Dorsalis Pedis and Posterior Tibial pulses non palpable.  Capillary re-fill time less then 3 seconds digits 1-5 bilateral.   Neuro: Protective sensation absent to the level of the digits bilateral.  MSK: Muscle strength 4/5 in all major muscle groups of Right lower extremity.  Below knee amputation to left lower extremity         Labs:                          10.9   17.77 )-----------( 270      ( 01 Dec 2022 19:11 )             32.8     WBC Trend  17.77<H> Date (12-01 @ 19:11)      Chem  12-01    139  |  106  |  23  ----------------------------<  100<H>  4.1   |  27  |  1.00    Ca    9.2      01 Dec 2022 19:11    TPro  7.5  /  Alb  3.1<L>  /  TBili  1.0  /  DBili  x   /  AST  30  /  ALT  48  /  AlkPhos  160<H>  12-01    Rad/EKG

## 2022-12-01 NOTE — ED PROVIDER NOTE - CLINICAL SUMMARY MEDICAL DECISION MAKING FREE TEXT BOX
Elevated WBC, ? cystitis on CT, pending UA.  Periportal edema, hepatic edema, hepatitis panel added.  US arterial RLE resulted, podiatry evaluated for necrotic eschar, and vascular surgery to consult.  Given ASA for demand ischemia in setting of infection, Vancomycin, Zosyn, admit to medicine.

## 2022-12-01 NOTE — CONSULT NOTE ADULT - ASSESSMENT
Assesment: 63 y/o male see in the ED for the following   - Right unstagable wound to the 4th/5th metatarsal head  - Erythema to right lower extremity potentially cellulitis  -Neuropathy to right foot  - Below knee amputation LLE  - PAD/PVD  -Difficulty with ambulation      P:   Chart reviewed and Patient evaluated; discussed treatment and plan with Dr. Jez Mauricio  Discussed diagnosis and treatment with patient  Stable unstagable wound at the 4th/5th metatarsal head, no pus, no malodor, no pain, will re-ascess tommorow am,   Applied  dry sterile dressing to right foot  X-rays reviewed : no soft tissue emphysema or osseous fx, awaiting official results  Continue with IV antibiotics As Per ID/ED/MED  Reccomend ID consult for RLE cellulitis  Reccomend Vascular surgery consult  Patient demonstrated verbal understanding of all interventions and tolerated interventions well without any complications.   Podiatry will follow while in house.

## 2022-12-01 NOTE — ED ADULT NURSE NOTE - OBJECTIVE STATEMENT
Pt brought in by ambulance from [facility] for abdominal pain. Pt states that he started having the abdominal pain this am. Pt states that it is epigastric in nature. pt with hx GERD. Last BM yesterday. Pt denies any urinary symptoms. HX: left BKA

## 2022-12-01 NOTE — ED ADULT NURSE REASSESSMENT NOTE - NS ED NURSE REASSESS COMMENT FT1
patient resting in bed on monitor offers no compliants at this time.  Podiatry at bedside, vital signs remain stable.

## 2022-12-01 NOTE — ED PROVIDER NOTE - PROGRESS NOTE DETAILS
Maura Tompkins:  Periportal edema, small b/l pleural effusions, cirrhotic appearance of liver, denies alcohol use. Elevated BMP to 15,000. 700 ccs given of lactated ringers. Will hold any additional fluids for now unless unstable. Maura Tompkins:  Spoke to podiatry. Given necrotic foot wound x1 week with hx of PVD, will evaluate X-rays and consult on pt as inpatient in the morning. Podiatry evaluated patient in ED, will follow.  US shows know flow in right PT or DP.  Will consult vascular surgery, plan admission medicine. Augusto Tompkins D.O.

## 2022-12-02 DIAGNOSIS — R77.8 OTHER SPECIFIED ABNORMALITIES OF PLASMA PROTEINS: ICD-10-CM

## 2022-12-02 DIAGNOSIS — I25.10 ATHEROSCLEROTIC HEART DISEASE OF NATIVE CORONARY ARTERY WITHOUT ANGINA PECTORIS: ICD-10-CM

## 2022-12-02 DIAGNOSIS — Z95.1 PRESENCE OF AORTOCORONARY BYPASS GRAFT: Chronic | ICD-10-CM

## 2022-12-02 DIAGNOSIS — I27.20 PULMONARY HYPERTENSION, UNSPECIFIED: ICD-10-CM

## 2022-12-02 DIAGNOSIS — Z89.619 ACQUIRED ABSENCE OF UNSPECIFIED LEG ABOVE KNEE: Chronic | ICD-10-CM

## 2022-12-02 DIAGNOSIS — I05.9 RHEUMATIC MITRAL VALVE DISEASE, UNSPECIFIED: ICD-10-CM

## 2022-12-02 PROBLEM — E11.9 TYPE 2 DIABETES MELLITUS WITHOUT COMPLICATIONS: Chronic | Status: ACTIVE | Noted: 2022-11-07

## 2022-12-02 PROBLEM — I10 ESSENTIAL (PRIMARY) HYPERTENSION: Chronic | Status: ACTIVE | Noted: 2022-11-07

## 2022-12-02 LAB
A1C WITH ESTIMATED AVERAGE GLUCOSE RESULT: 7.7 % — HIGH (ref 4–5.6)
ALBUMIN SERPL ELPH-MCNC: 2.7 G/DL — LOW (ref 3.3–5)
ALP SERPL-CCNC: 134 U/L — HIGH (ref 40–120)
ALT FLD-CCNC: 37 U/L — SIGNIFICANT CHANGE UP (ref 12–78)
ANION GAP SERPL CALC-SCNC: 8 MMOL/L — SIGNIFICANT CHANGE UP (ref 5–17)
APPEARANCE UR: CLEAR — SIGNIFICANT CHANGE UP
AST SERPL-CCNC: 18 U/L — SIGNIFICANT CHANGE UP (ref 15–37)
BILIRUB SERPL-MCNC: 0.8 MG/DL — SIGNIFICANT CHANGE UP (ref 0.2–1.2)
BILIRUB UR-MCNC: NEGATIVE — SIGNIFICANT CHANGE UP
BUN SERPL-MCNC: 31 MG/DL — HIGH (ref 7–23)
CALCIUM SERPL-MCNC: 9.3 MG/DL — SIGNIFICANT CHANGE UP (ref 8.5–10.1)
CHLORIDE SERPL-SCNC: 108 MMOL/L — SIGNIFICANT CHANGE UP (ref 96–108)
CO2 SERPL-SCNC: 24 MMOL/L — SIGNIFICANT CHANGE UP (ref 22–31)
COLOR SPEC: YELLOW — SIGNIFICANT CHANGE UP
CREAT SERPL-MCNC: 1.2 MG/DL — SIGNIFICANT CHANGE UP (ref 0.5–1.3)
DIFF PNL FLD: ABNORMAL
EGFR: 69 ML/MIN/1.73M2 — SIGNIFICANT CHANGE UP
ESTIMATED AVERAGE GLUCOSE: 174 MG/DL — HIGH (ref 68–114)
GLUCOSE SERPL-MCNC: 113 MG/DL — HIGH (ref 70–99)
GLUCOSE UR QL: NEGATIVE — SIGNIFICANT CHANGE UP
HAV IGM SER-ACNC: SIGNIFICANT CHANGE UP
HBV CORE IGM SER-ACNC: SIGNIFICANT CHANGE UP
HBV SURFACE AG SER-ACNC: SIGNIFICANT CHANGE UP
HCT VFR BLD CALC: 31.1 % — LOW (ref 39–50)
HCV AB S/CO SERPL IA: 0.11 S/CO — SIGNIFICANT CHANGE UP (ref 0–0.99)
HCV AB S/CO SERPL IA: 0.14 S/CO — SIGNIFICANT CHANGE UP (ref 0–0.99)
HCV AB SERPL-IMP: SIGNIFICANT CHANGE UP
HCV AB SERPL-IMP: SIGNIFICANT CHANGE UP
HGB BLD-MCNC: 10.1 G/DL — LOW (ref 13–17)
KETONES UR-MCNC: NEGATIVE — SIGNIFICANT CHANGE UP
LEUKOCYTE ESTERASE UR-ACNC: ABNORMAL
MCHC RBC-ENTMCNC: 32.5 GM/DL — SIGNIFICANT CHANGE UP (ref 32–36)
MCHC RBC-ENTMCNC: 32.8 PG — SIGNIFICANT CHANGE UP (ref 27–34)
MCV RBC AUTO: 101 FL — HIGH (ref 80–100)
NITRITE UR-MCNC: NEGATIVE — SIGNIFICANT CHANGE UP
PH UR: 5 — SIGNIFICANT CHANGE UP (ref 5–8)
PLATELET # BLD AUTO: 233 K/UL — SIGNIFICANT CHANGE UP (ref 150–400)
POTASSIUM SERPL-MCNC: 4.1 MMOL/L — SIGNIFICANT CHANGE UP (ref 3.5–5.3)
POTASSIUM SERPL-SCNC: 4.1 MMOL/L — SIGNIFICANT CHANGE UP (ref 3.5–5.3)
PROT SERPL-MCNC: 6.9 GM/DL — SIGNIFICANT CHANGE UP (ref 6–8.3)
PROT UR-MCNC: 150 MG/DL
RBC # BLD: 3.08 M/UL — LOW (ref 4.2–5.8)
RBC # FLD: 13.3 % — SIGNIFICANT CHANGE UP (ref 10.3–14.5)
SODIUM SERPL-SCNC: 140 MMOL/L — SIGNIFICANT CHANGE UP (ref 135–145)
SP GR SPEC: 1.02 — SIGNIFICANT CHANGE UP (ref 1.01–1.02)
TROPONIN I, HIGH SENSITIVITY RESULT: 201.3 NG/L — HIGH
TROPONIN I, HIGH SENSITIVITY RESULT: 364.23 NG/L — HIGH
UROBILINOGEN FLD QL: 1
WBC # BLD: 12.49 K/UL — HIGH (ref 3.8–10.5)
WBC # FLD AUTO: 12.49 K/UL — HIGH (ref 3.8–10.5)

## 2022-12-02 PROCEDURE — 12345: CPT | Mod: NC

## 2022-12-02 PROCEDURE — 99223 1ST HOSP IP/OBS HIGH 75: CPT

## 2022-12-02 PROCEDURE — 99222 1ST HOSP IP/OBS MODERATE 55: CPT

## 2022-12-02 PROCEDURE — 93306 TTE W/DOPPLER COMPLETE: CPT | Mod: 26

## 2022-12-02 RX ORDER — PANTOPRAZOLE SODIUM 20 MG/1
40 TABLET, DELAYED RELEASE ORAL
Refills: 0 | Status: DISCONTINUED | OUTPATIENT
Start: 2022-12-02 | End: 2022-12-20

## 2022-12-02 RX ORDER — LANOLIN ALCOHOL/MO/W.PET/CERES
3 CREAM (GRAM) TOPICAL AT BEDTIME
Refills: 0 | Status: DISCONTINUED | OUTPATIENT
Start: 2022-12-02 | End: 2022-12-27

## 2022-12-02 RX ORDER — PIPERACILLIN AND TAZOBACTAM 4; .5 G/20ML; G/20ML
3.38 INJECTION, POWDER, LYOPHILIZED, FOR SOLUTION INTRAVENOUS EVERY 8 HOURS
Refills: 0 | Status: COMPLETED | OUTPATIENT
Start: 2022-12-02 | End: 2022-12-08

## 2022-12-02 RX ORDER — GABAPENTIN 400 MG/1
1 CAPSULE ORAL
Qty: 0 | Refills: 0 | DISCHARGE

## 2022-12-02 RX ORDER — MAGNESIUM HYDROXIDE 400 MG/1
30 TABLET, CHEWABLE ORAL EVERY 8 HOURS
Refills: 0 | Status: DISCONTINUED | OUTPATIENT
Start: 2022-12-02 | End: 2022-12-27

## 2022-12-02 RX ORDER — INSULIN LISPRO 100/ML
10 VIAL (ML) SUBCUTANEOUS
Qty: 0 | Refills: 0 | DISCHARGE

## 2022-12-02 RX ORDER — SIMVASTATIN 20 MG/1
10 TABLET, FILM COATED ORAL AT BEDTIME
Refills: 0 | Status: DISCONTINUED | OUTPATIENT
Start: 2022-12-02 | End: 2022-12-27

## 2022-12-02 RX ORDER — GABAPENTIN 400 MG/1
600 CAPSULE ORAL AT BEDTIME
Refills: 0 | Status: DISCONTINUED | OUTPATIENT
Start: 2022-12-02 | End: 2022-12-27

## 2022-12-02 RX ORDER — GLUCAGON INJECTION, SOLUTION 0.5 MG/.1ML
1 INJECTION, SOLUTION SUBCUTANEOUS ONCE
Refills: 0 | Status: DISCONTINUED | OUTPATIENT
Start: 2022-12-02 | End: 2022-12-24

## 2022-12-02 RX ORDER — LISINOPRIL 2.5 MG/1
5 TABLET ORAL DAILY
Refills: 0 | Status: DISCONTINUED | OUTPATIENT
Start: 2022-12-02 | End: 2022-12-21

## 2022-12-02 RX ORDER — FAMOTIDINE 10 MG/ML
40 INJECTION INTRAVENOUS AT BEDTIME
Refills: 0 | Status: DISCONTINUED | OUTPATIENT
Start: 2022-12-02 | End: 2022-12-27

## 2022-12-02 RX ORDER — DEXTROSE 50 % IN WATER 50 %
25 SYRINGE (ML) INTRAVENOUS ONCE
Refills: 0 | Status: DISCONTINUED | OUTPATIENT
Start: 2022-12-02 | End: 2022-12-24

## 2022-12-02 RX ORDER — GABAPENTIN 400 MG/1
300 CAPSULE ORAL
Refills: 0 | Status: DISCONTINUED | OUTPATIENT
Start: 2022-12-02 | End: 2022-12-27

## 2022-12-02 RX ORDER — COLLAGENASE CLOSTRIDIUM HIST. 250 UNIT/G
1 OINTMENT (GRAM) TOPICAL
Qty: 0 | Refills: 0 | DISCHARGE

## 2022-12-02 RX ORDER — TAMSULOSIN HYDROCHLORIDE 0.4 MG/1
0.8 CAPSULE ORAL AT BEDTIME
Refills: 0 | Status: DISCONTINUED | OUTPATIENT
Start: 2022-12-02 | End: 2022-12-27

## 2022-12-02 RX ORDER — ONDANSETRON 8 MG/1
4 TABLET, FILM COATED ORAL EVERY 8 HOURS
Refills: 0 | Status: DISCONTINUED | OUTPATIENT
Start: 2022-12-02 | End: 2022-12-27

## 2022-12-02 RX ORDER — HYDROMORPHONE HYDROCHLORIDE 2 MG/ML
1 INJECTION INTRAMUSCULAR; INTRAVENOUS; SUBCUTANEOUS EVERY 4 HOURS
Refills: 0 | Status: DISCONTINUED | OUTPATIENT
Start: 2022-12-02 | End: 2022-12-05

## 2022-12-02 RX ORDER — INSULIN LISPRO 100/ML
VIAL (ML) SUBCUTANEOUS
Refills: 0 | Status: DISCONTINUED | OUTPATIENT
Start: 2022-12-02 | End: 2022-12-20

## 2022-12-02 RX ORDER — METOCLOPRAMIDE HCL 10 MG
10 TABLET ORAL DAILY
Refills: 0 | Status: DISCONTINUED | OUTPATIENT
Start: 2022-12-02 | End: 2022-12-27

## 2022-12-02 RX ORDER — DEXTROSE 50 % IN WATER 50 %
15 SYRINGE (ML) INTRAVENOUS ONCE
Refills: 0 | Status: DISCONTINUED | OUTPATIENT
Start: 2022-12-02 | End: 2022-12-24

## 2022-12-02 RX ORDER — ASPIRIN/CALCIUM CARB/MAGNESIUM 324 MG
81 TABLET ORAL DAILY
Refills: 0 | Status: DISCONTINUED | OUTPATIENT
Start: 2022-12-02 | End: 2022-12-27

## 2022-12-02 RX ORDER — ISOSORBIDE MONONITRATE 60 MG/1
30 TABLET, EXTENDED RELEASE ORAL DAILY
Refills: 0 | Status: DISCONTINUED | OUTPATIENT
Start: 2022-12-02 | End: 2022-12-27

## 2022-12-02 RX ORDER — INSULIN GLARGINE 100 [IU]/ML
30 INJECTION, SOLUTION SUBCUTANEOUS AT BEDTIME
Refills: 0 | Status: DISCONTINUED | OUTPATIENT
Start: 2022-12-02 | End: 2022-12-18

## 2022-12-02 RX ORDER — SENNA PLUS 8.6 MG/1
2 TABLET ORAL AT BEDTIME
Refills: 0 | Status: DISCONTINUED | OUTPATIENT
Start: 2022-12-02 | End: 2022-12-27

## 2022-12-02 RX ORDER — FAMOTIDINE 10 MG/ML
0 INJECTION INTRAVENOUS
Qty: 0 | Refills: 0 | DISCHARGE

## 2022-12-02 RX ORDER — SODIUM CHLORIDE 9 MG/ML
1000 INJECTION, SOLUTION INTRAVENOUS
Refills: 0 | Status: DISCONTINUED | OUTPATIENT
Start: 2022-12-02 | End: 2022-12-24

## 2022-12-02 RX ORDER — DEXTROSE 50 % IN WATER 50 %
12.5 SYRINGE (ML) INTRAVENOUS ONCE
Refills: 0 | Status: DISCONTINUED | OUTPATIENT
Start: 2022-12-02 | End: 2022-12-24

## 2022-12-02 RX ORDER — AMLODIPINE BESYLATE 2.5 MG/1
5 TABLET ORAL DAILY
Refills: 0 | Status: DISCONTINUED | OUTPATIENT
Start: 2022-12-02 | End: 2022-12-27

## 2022-12-02 RX ADMIN — HYDROMORPHONE HYDROCHLORIDE 1 MILLIGRAM(S): 2 INJECTION INTRAMUSCULAR; INTRAVENOUS; SUBCUTANEOUS at 23:50

## 2022-12-02 RX ADMIN — Medication 2: at 18:00

## 2022-12-02 RX ADMIN — Medication 1000 MILLIGRAM(S): at 00:18

## 2022-12-02 RX ADMIN — PIPERACILLIN AND TAZOBACTAM 25 GRAM(S): 4; .5 INJECTION, POWDER, LYOPHILIZED, FOR SOLUTION INTRAVENOUS at 23:13

## 2022-12-02 RX ADMIN — Medication 1 TABLET(S): at 11:14

## 2022-12-02 RX ADMIN — ISOSORBIDE MONONITRATE 30 MILLIGRAM(S): 60 TABLET, EXTENDED RELEASE ORAL at 11:15

## 2022-12-02 RX ADMIN — HYDROMORPHONE HYDROCHLORIDE 1 MILLIGRAM(S): 2 INJECTION INTRAMUSCULAR; INTRAVENOUS; SUBCUTANEOUS at 05:11

## 2022-12-02 RX ADMIN — PIPERACILLIN AND TAZOBACTAM 3.38 GRAM(S): 4; .5 INJECTION, POWDER, LYOPHILIZED, FOR SOLUTION INTRAVENOUS at 00:18

## 2022-12-02 RX ADMIN — PIPERACILLIN AND TAZOBACTAM 25 GRAM(S): 4; .5 INJECTION, POWDER, LYOPHILIZED, FOR SOLUTION INTRAVENOUS at 13:45

## 2022-12-02 RX ADMIN — GABAPENTIN 600 MILLIGRAM(S): 400 CAPSULE ORAL at 23:11

## 2022-12-02 RX ADMIN — HYDROMORPHONE HYDROCHLORIDE 1 MILLIGRAM(S): 2 INJECTION INTRAMUSCULAR; INTRAVENOUS; SUBCUTANEOUS at 08:53

## 2022-12-02 RX ADMIN — Medication 81 MILLIGRAM(S): at 11:15

## 2022-12-02 RX ADMIN — GABAPENTIN 300 MILLIGRAM(S): 400 CAPSULE ORAL at 13:45

## 2022-12-02 RX ADMIN — GABAPENTIN 300 MILLIGRAM(S): 400 CAPSULE ORAL at 05:11

## 2022-12-02 RX ADMIN — HYDROMORPHONE HYDROCHLORIDE 1 MILLIGRAM(S): 2 INJECTION INTRAMUSCULAR; INTRAVENOUS; SUBCUTANEOUS at 13:51

## 2022-12-02 RX ADMIN — FAMOTIDINE 40 MILLIGRAM(S): 10 INJECTION INTRAVENOUS at 23:12

## 2022-12-02 RX ADMIN — HYDROMORPHONE HYDROCHLORIDE 1 MILLIGRAM(S): 2 INJECTION INTRAMUSCULAR; INTRAVENOUS; SUBCUTANEOUS at 18:59

## 2022-12-02 RX ADMIN — AMLODIPINE BESYLATE 5 MILLIGRAM(S): 2.5 TABLET ORAL at 11:14

## 2022-12-02 RX ADMIN — PANTOPRAZOLE SODIUM 40 MILLIGRAM(S): 20 TABLET, DELAYED RELEASE ORAL at 11:15

## 2022-12-02 RX ADMIN — TAMSULOSIN HYDROCHLORIDE 0.8 MILLIGRAM(S): 0.4 CAPSULE ORAL at 23:12

## 2022-12-02 RX ADMIN — HYDROMORPHONE HYDROCHLORIDE 1 MILLIGRAM(S): 2 INJECTION INTRAMUSCULAR; INTRAVENOUS; SUBCUTANEOUS at 23:37

## 2022-12-02 RX ADMIN — SODIUM CHLORIDE 2200 MILLILITER(S): 9 INJECTION, SOLUTION INTRAVENOUS at 00:27

## 2022-12-02 RX ADMIN — PIPERACILLIN AND TAZOBACTAM 25 GRAM(S): 4; .5 INJECTION, POWDER, LYOPHILIZED, FOR SOLUTION INTRAVENOUS at 05:12

## 2022-12-02 RX ADMIN — HYDROMORPHONE HYDROCHLORIDE 1 MILLIGRAM(S): 2 INJECTION INTRAMUSCULAR; INTRAVENOUS; SUBCUTANEOUS at 00:18

## 2022-12-02 RX ADMIN — SIMVASTATIN 10 MILLIGRAM(S): 20 TABLET, FILM COATED ORAL at 23:12

## 2022-12-02 NOTE — PROGRESS NOTE ADULT - ATTENDING COMMENTS
I agree with the assessment and plan I agree with the assessment and plan  Patient notes blister forming at the area of concern, now stable eschar without undermining  Leukocytosis trending down  LE erythema secondary to PVD along with faint pedal pulses and eschar formation right submetatarsal area  No purulence, no ascending streaking erythema noted  Recommend vascular evaluation  No acute podiatric intervention at this time as wound is stable and wbc trending down. Imaging negative. Monitor demarcation of eschar and plan for outpatient debridement once cleared by vascular  Recommend outpatient [facility] follow up

## 2022-12-02 NOTE — PROGRESS NOTE ADULT - SUBJECTIVE AND OBJECTIVE BOX
Reason for Admission: Elevated troponin, PAD and Cellulitis  History of Present Illness:     Patient is a 61 y/o/m with extensive past medical history - [facility] due to severe epigastric area abdominal pain since yesterday morning. Patient reported vomiting. Patient denies diarrhea. He has no active pain during my evaluation. Patient reported he has not been eating or drinking regularly. He has also noticed redness of RLE over last week and black area on bottom of foot. Patient denies any chest pain, any SOB. He had 3 vessel CBAG done, in 2016. He also had Lower leg amputation done in Feb, 2022, since he was leaving in Rehab facility.       Medical progress: Feels better this am. Denies any HA, CP, SOB. Still with some abdominal pain (this is chronic in nature -  worse now that he has not been eating). Labs and vitals reviewed /  imaging reviewed.   Complaints: epigastric dyscomfort  State of mind: normal  - pending GI input.    REVIEW OF SYSTEMS:  General: NAD, hemodynamically stable   HEENT:  Eyes:  No visual loss, blurred vision   SKIN:  No rash or itching.  CARDIOVASCULAR:  No chest pain, chest pressure or chest discomfort. No palpitations or edema.  RESPIRATORY:  No shortness of breath, cough or sputum.  GASTROINTESTINAL:  No anorexia, nausea, vomiting or diarrhea. No abdominal pain or blood.  NEUROLOGICAL:  No headache, dizziness, syncope, paralysis, ataxia, numbness or tingling in the extremities. No change in bowel or bladder control.  MUSCULOSKELETAL:  (L) lower extremity amputation  HEMATOLOGIC:  No anemia, bleeding or bruising.  LYMPHATICS:  No enlarged nodes. No history of splenectomy.  ENDOCRINOLOGIC:  No reports of sweating, cold or heat intolerance. No polyuria or polydipsia.  ALLERGIES:  No history of asthma, hives, eczema or rhinitis.    Physical Exam:   GENERAL APPEARANCE: Very sick, deconditioned, frail, chronically sick  T(C): 36.7 (12-02-22 @ 04:31), Max: 38.1 (12-01-22 @ 18:01)  HR: 70 (12-02-22 @ 09:00) (62 - 125)  BP: 109/71 (12-02-22 @ 09:00) (109/71 - 161/98)  RR: 11 (12-02-22 @ 09:00) (10 - 19)  SpO2: 97% (12-02-22 @ 08:40) (90% - 98%)  HEENT:  Head is normocephalic    Skin:  Warm and dry without any rash   NECK:  Supple without lymphadenopathy.   HEART:  Regular rate and rhythm. normal S1 and S2, No M/R/G  LUNGS:  Good ins/exp effort, no W/R/R/C  ABDOMEN:  Soft, nontender, nondistended with good bowel sounds heard  EXTREMITIES:  (L) lower extremity above the knee amputation /  (R) foot non-healing ulcer /  redness / cool to touch.   NEUROLOGICAL:  no focal deficits /  normal mentation    Labs:  CBC Full  -  ( 02 Dec 2022 07:48 )  WBC Count : 12.49 K/uL  RBC Count : 3.08 M/uL  Hemoglobin : 10.1 g/dL  Hematocrit : 31.1 %  Platelet Count - Automated : 233 K/uL      PT/INR - ( 01 Dec 2022 19:11 )   PT: 15.9 sec;   INR: 1.37 ratio         PTT - ( 01 Dec 2022 19:11 )  PTT:34.9 sec    12-02    140  |  108  |  31<H>  ----------------------------<  113<H>  4.1   |  24  |  1.20    Ca    9.3      02 Dec 2022 07:48    TPro  6.9  /  Alb  2.7<L>  /  TBili  0.8  /  DBili  x   /  AST  18  /  ALT  37  /  AlkPhos  134<H>  12-02      ECHO:  Thick-walled appearance of the urinary bladder which may be related to   underdistention versus cystitis. Correlation with urinalysis is advised.    Bilateral pleural effusions.    Periportal edema, nonspecific. This may be related to IV fluid   resuscitation or other causes of third spacing of fluid. Hepatic edema   such as seen with acute hepatitis may also be considered.      1.1 cm left upper pole indeterminate renal lesion again appreciated.   Further correlation with targeted sonography versus renal protocol MRI or   CT can be obtained on a nonemergent basis.      # RLE Cellulitis  # Peripheral vascular disease   # Lower extremity wound  # Right unstagable wound to the 4th/5th metatarsal head  - Erythema to right lower extremity potentially cellulitis  - Neuropathy to right foot  - Below knee amputation LLE  - Arterial doppler: No appreciable flow detected in the posterior tibial and peroneal arteries.  - Difficulty with ambulation  - Started on IV zosyn / vancomycin  - follow ID and Podiatry consults    # Elevated troponin  - Currently no active chest pain /  no acute ischemic changes  - significant elevation of BNP  - CT: Bilateral pleural effusion  - pending echo read  - no s/s of volume overload now     # Cirrhosis of Liver  - Nodular contour of the inferior liver, which can be seen with cirrhosis. Correlate clinically.  - follow GI consult    # Renal lesion  -can follow up in outpatient clinic for this    # DM  - on ISS and lantus  - follow Dxt

## 2022-12-02 NOTE — CONSULT NOTE ADULT - ASSESSMENT
62 year old male with history GERD presenting with vomiting intermittent epigastric pain and cirrhosis on CT    Imp: GERD & etoh-cirrhosis    MELD 12    No overt bleeding. HH stable. Would recommend Carafate and PPI if no relief would recommend outpatient nonemergent EGD  Regarding cirrhosis- would recommend outpatient management surveillance, and screening with Dr. Anderson. Patient in agreement. 62 year old male with history GERD presenting with cellulitis and newly diagnosed cirrhosis on CT    Imp: GERD & etoh-cirrhosis    MELD 12    No overt bleeding. HH stable. LIkely AM gerd.  Would recommend Carafate and PPI if no relief would recommend outpatient nonemergent EGD which he needs for his cirrhosis anyway.   Regarding cirrhosis- would recommend outpatient management surveillance, and screening with Dr. Anderson. Patient in agreement.

## 2022-12-02 NOTE — H&P ADULT - ASSESSMENT
A/P:    1.  RLE Cellulitis  -started on IV Antibiotics  -follow cultures  -follow ID and Podiatry consults    2.  Peripheral vascular disease   -decreased pulse in Right foot and right leg  -follow vascular surgery consult    3.  Elevated troponin  Elevated BNP  Small pleural effusion   -no active chest pain  -no acute EKG change  -follow repeat troponin  -follow Echo  -follow cardiology consult  -no s/s of volume overload now     4.  Cirrhosis of Liver  -follow GI consult    5.  Renal lesion  -can follow up in outpatient clinic for this    6.  DM  -on ISS and lantus  -follow Dxt    7.  SCD for DVT ppx    8.  Code status: Full code. MOLST form is in the chart.

## 2022-12-02 NOTE — PROGRESS NOTE ADULT - ASSESSMENT
Assesment: 61 y/o male see in the ED for the following   - Right unstagable wound to the 4th/5th metatarsal head  - Erythema to right lower extremity potentially cellulitis  -Neuropathy to right foot  - Below knee amputation LLE  - PAD/PVD  -Difficulty with ambulation      P:   Chart reviewed and Patient evaluated; discussed treatment and plan with Dr. Jez Mauricio  Discussed diagnosis and treatment with patient  X-rays reviewed : no soft tissue emphysema or osseous fx, awaiting official results  WC: betadine paint and allyvan pad  Continue with IV antibiotics As Per ID/ED/MED  All additional care by Med appreciated  Recommend ID consult for RLE cellulitis  Recommend Vascular surgery consult  Stable unstageable wound at the plantar 4th/5th metatarsal head, no pus, no malodor, no pain. Wound related to poor circulation of R foot. Recommend for area to fully demarcate at this time.  Patient demonstrated verbal understanding of all interventions and tolerated interventions well without any complications.   Podiatry will follow while in house.   Assesment: 61 y/o male see in the ED for the following   - Right unstagable wound to the 4th/5th metatarsal head  - Erythema to RLE possible PAD vs Cellulitis   - Neuropathy to right foot  - Below knee amputation LLE  - PAD/PVD  -Difficulty with ambulation      P:   Chart reviewed and Patient evaluated; discussed treatment and plan with Dr. Jez Mauricio  Discussed diagnosis and treatment with patient  X-rays reviewed : no soft tissue emphysema or osseous fx, awaiting official results  WC: betadine paint and allyvan pad  Continue with IV antibiotics As Per ID/ED/MED  All additional care by Med appreciated  Recommend ID consult for RLE cellulitis  Recommend Vascular surgery consult  Stable unstageable wound at the plantar 4th/5th metatarsal head, no pus, no malodor, no pain. Wound related to poor circulation of R foot. Recommend for area to fully demarcate at this time.  Patient demonstrated verbal understanding of all interventions and tolerated interventions well without any complications.   Podiatry will follow while in house.

## 2022-12-02 NOTE — CONSULT NOTE ADULT - SUBJECTIVE AND OBJECTIVE BOX
Patient is a 62y old  Male who presents with a chief complaint of Elevated troponin, PAD and Cellulitis (02 Dec 2022 12:26)    HPI:  61 y/o Male with past medical history of cad, diabetes, hypertension, gerd, pvd, s/p left BKA, s/p CABG admitted from [facility] on 12/1 for evaluation of epigastric pain of about one day duration; also noted with right lower extremity redness and pain with ulcers on the anterior shin as well as the sole of the right foot. Patient is mostly a poor historian and history per medical record.       PMH: as above  PSH: as above  Meds: per reconciliation sheet, noted below  MEDICATIONS  (STANDING):  amLODIPine   Tablet 5 milliGRAM(s) Oral daily  aspirin  chewable 81 milliGRAM(s) Oral daily  dextrose 5%. 1000 milliLiter(s) (100 mL/Hr) IV Continuous <Continuous>  dextrose 5%. 1000 milliLiter(s) (50 mL/Hr) IV Continuous <Continuous>  dextrose 50% Injectable 25 Gram(s) IV Push once  dextrose 50% Injectable 12.5 Gram(s) IV Push once  dextrose 50% Injectable 25 Gram(s) IV Push once  famotidine    Tablet 40 milliGRAM(s) Oral at bedtime  gabapentin 300 milliGRAM(s) Oral two times a day  gabapentin 600 milliGRAM(s) Oral at bedtime  glucagon  Injectable 1 milliGRAM(s) IntraMuscular once  insulin glargine Injectable (LANTUS) 30 Unit(s) SubCutaneous at bedtime  insulin lispro (ADMELOG) corrective regimen sliding scale   SubCutaneous three times a day before meals  isosorbide   mononitrate ER Tablet (IMDUR) 30 milliGRAM(s) Oral daily  lisinopril 5 milliGRAM(s) Oral daily  metoclopramide 10 milliGRAM(s) Oral daily  multivitamin 1 Tablet(s) Oral daily  pantoprazole    Tablet 40 milliGRAM(s) Oral before breakfast  piperacillin/tazobactam IVPB.. 3.375 Gram(s) IV Intermittent every 8 hours  senna 2 Tablet(s) Oral at bedtime  simvastatin 10 milliGRAM(s) Oral at bedtime  tamsulosin 0.8 milliGRAM(s) Oral at bedtime    MEDICATIONS  (PRN):  acetaminophen     Tablet .. 650 milliGRAM(s) Oral every 6 hours PRN Temp greater or equal to 38C (100.4F), Mild Pain (1 - 3)  aluminum hydroxide/magnesium hydroxide/simethicone Suspension 30 milliLiter(s) Oral every 4 hours PRN Dyspepsia  dextrose Oral Gel 15 Gram(s) Oral once PRN Blood Glucose LESS THAN 70 milliGRAM(s)/deciliter  HYDROmorphone  Injectable 1 milliGRAM(s) IV Push every 4 hours PRN Severe Pain (7 - 10)  magnesium hydroxide Suspension 30 milliLiter(s) Oral every 8 hours PRN Constipation  melatonin 3 milliGRAM(s) Oral at bedtime PRN Insomnia  ondansetron Injectable 4 milliGRAM(s) IV Push every 8 hours PRN Nausea and/or Vomiting    Allergies    No Known Allergies    Intolerances      Social: no smoking, no alcohol, no illegal drugs; no recent travel, no exposure to TB  FAMILY HISTORY:  No pertinent family history in first degree relatives       ROS unable to obtain secondary to patient medical condition     Vital Signs Last 24 Hrs  T(C): 36.1 (02 Dec 2022 10:01), Max: 38.1 (01 Dec 2022 18:01)  T(F): 97 (02 Dec 2022 10:01), Max: 100.5 (01 Dec 2022 18:01)  HR: 73 (02 Dec 2022 12:00) (62 - 125)  BP: 112/71 (02 Dec 2022 12:00) (109/71 - 161/98)  BP(mean): 81 (02 Dec 2022 12:00) (78 - 81)  RR: 12 (02 Dec 2022 12:00) (10 - 19)  SpO2: 97% (02 Dec 2022 08:40) (90% - 98%)    Parameters below as of 02 Dec 2022 04:31  Patient On (Oxygen Delivery Method): room air      Daily Height in cm: 177.8 (01 Dec 2022 18:01)    Daily     PE:    Constitutional: frail looking  HEENT: NC/AT, EOMI, PERRLA, conjunctivae clear; ears and nose atraumatic; pharynx clear  Neck: supple; thyroid not palpable  Back: no tenderness  Respiratory: respiratory effort normal; clear to auscultation  Cardiovascular: S1S2 regular, no murmurs  Abdomen: soft, not tender, not distended, positive BS; no liver or spleen organomegaly  Genitourinary: no suprapubic tenderness  Musculoskeletal: no muscle tenderness, left BKA; right lower extremity with erythema from pretibia to foot with ulcers on anterior shin and on sole of the foot  Neurological/ Psychiatric: AxOx3, judgement and insight normal;  moving all extremities  Skin: no rashes; no palpable lesions    Labs: all available labs reviewed                        10.1 12.49 )-----------( 233      ( 02 Dec 2022 07:48 )             31.1     12-02    140  |  108  |  31<H>  ----------------------------<  113<H>  4.1   |  24  |  1.20    Ca    9.3      02 Dec 2022 07:48    TPro  6.9  /  Alb  2.7<L>  /  TBili  0.8  /  DBili  x   /  AST  18  /  ALT  37  /  AlkPhos  134<H>  12-02     LIVER FUNCTIONS - ( 02 Dec 2022 07:48 )  Alb: 2.7 g/dL / Pro: 6.9 gm/dL / ALK PHOS: 134 U/L / ALT: 37 U/L / AST: 18 U/L / GGT: x           < from: Xray Foot AP + Lateral + Oblique, Right (12.01.22 @ 22:01) >  ACC: [id number] EXAM:  XR FOOT COMP MIN 3 VIEWS RT                          PROCEDURE DATE:  12/01/2022          INTERPRETATION:  History: 62-year-old male, necrotic area in the foot.    Three views of the right foot without comparison demonstrate soft tissue   emphysema at the plantar aspect of the midfoot and calcaneus.    No gross cortical destruction, fracture or dislocation.    IMPRESSION:  Soft tissue emphysema at the plantar aspect of the mid foot/calcaneous.   If there is continued clinical concern follow-up MRI can be ordered    < end of copied text >        Radiology: all available radiological tests reviewed    Advanced directives addressed: full resuscitation Patient is a 62y old  Male who presents with a chief complaint of Elevated troponin, PAD and Cellulitis (02 Dec 2022 12:26)    HPI:  61 y/o Male with past medical history of cad, diabetes, hypertension, gerd, pvd, s/p left BKA, s/p CABG admitted from [facility] on 12/1 for evaluation of epigastric pain of about one day duration; also noted with right lower extremity redness and pain with ulcers on the anterior shin as well as the sole of the right foot. Patient is mostly a poor historian and history per medical record.       PMH: as above  PSH: as above  Meds: per reconciliation sheet, noted below  MEDICATIONS  (STANDING):  amLODIPine   Tablet 5 milliGRAM(s) Oral daily  aspirin  chewable 81 milliGRAM(s) Oral daily  dextrose 5%. 1000 milliLiter(s) (100 mL/Hr) IV Continuous <Continuous>  dextrose 5%. 1000 milliLiter(s) (50 mL/Hr) IV Continuous <Continuous>  dextrose 50% Injectable 25 Gram(s) IV Push once  dextrose 50% Injectable 12.5 Gram(s) IV Push once  dextrose 50% Injectable 25 Gram(s) IV Push once  famotidine    Tablet 40 milliGRAM(s) Oral at bedtime  gabapentin 300 milliGRAM(s) Oral two times a day  gabapentin 600 milliGRAM(s) Oral at bedtime  glucagon  Injectable 1 milliGRAM(s) IntraMuscular once  insulin glargine Injectable (LANTUS) 30 Unit(s) SubCutaneous at bedtime  insulin lispro (ADMELOG) corrective regimen sliding scale   SubCutaneous three times a day before meals  isosorbide   mononitrate ER Tablet (IMDUR) 30 milliGRAM(s) Oral daily  lisinopril 5 milliGRAM(s) Oral daily  metoclopramide 10 milliGRAM(s) Oral daily  multivitamin 1 Tablet(s) Oral daily  pantoprazole    Tablet 40 milliGRAM(s) Oral before breakfast  piperacillin/tazobactam IVPB.. 3.375 Gram(s) IV Intermittent every 8 hours  senna 2 Tablet(s) Oral at bedtime  simvastatin 10 milliGRAM(s) Oral at bedtime  tamsulosin 0.8 milliGRAM(s) Oral at bedtime    MEDICATIONS  (PRN):  acetaminophen     Tablet .. 650 milliGRAM(s) Oral every 6 hours PRN Temp greater or equal to 38C (100.4F), Mild Pain (1 - 3)  aluminum hydroxide/magnesium hydroxide/simethicone Suspension 30 milliLiter(s) Oral every 4 hours PRN Dyspepsia  dextrose Oral Gel 15 Gram(s) Oral once PRN Blood Glucose LESS THAN 70 milliGRAM(s)/deciliter  HYDROmorphone  Injectable 1 milliGRAM(s) IV Push every 4 hours PRN Severe Pain (7 - 10)  magnesium hydroxide Suspension 30 milliLiter(s) Oral every 8 hours PRN Constipation  melatonin 3 milliGRAM(s) Oral at bedtime PRN Insomnia  ondansetron Injectable 4 milliGRAM(s) IV Push every 8 hours PRN Nausea and/or Vomiting    Allergies    No Known Allergies    Intolerances      Social: no smoking, no alcohol, no illegal drugs; no recent travel, no exposure to TB  FAMILY HISTORY:  No pertinent family history in first degree relatives       ROS unable to obtain secondary to patient medical condition     Vital Signs Last 24 Hrs  T(C): 36.1 (02 Dec 2022 10:01), Max: 38.1 (01 Dec 2022 18:01)  T(F): 97 (02 Dec 2022 10:01), Max: 100.5 (01 Dec 2022 18:01)  HR: 73 (02 Dec 2022 12:00) (62 - 125)  BP: 112/71 (02 Dec 2022 12:00) (109/71 - 161/98)  BP(mean): 81 (02 Dec 2022 12:00) (78 - 81)  RR: 12 (02 Dec 2022 12:00) (10 - 19)  SpO2: 97% (02 Dec 2022 08:40) (90% - 98%)    Parameters below as of 02 Dec 2022 04:31  Patient On (Oxygen Delivery Method): room air      Daily Height in cm: 177.8 (01 Dec 2022 18:01)    Daily     PE:    Constitutional: frail looking  HEENT: NC/AT, EOMI, PERRLA, conjunctivae clear; ears and nose atraumatic; pharynx clear  Neck: supple; thyroid not palpable  Back: no tenderness  Respiratory: respiratory effort normal; clear to auscultation  Cardiovascular: S1S2 regular, no murmurs  Abdomen: soft, not tender, not distended, positive BS; no liver or spleen organomegaly  Genitourinary: no suprapubic tenderness  Musculoskeletal: no muscle tenderness, left BKA; right lower extremity with erythema from pretibia to foot with ulcers on anterior shin and on sole of the foot, eschar on sole of foot over fifth MTP  Neurological/ Psychiatric: AxOx3, judgement and insight normal;  moving all extremities  Skin: no rashes; no palpable lesions    Labs: all available labs reviewed                        10.1   12.49 )-----------( 233      ( 02 Dec 2022 07:48 )             31.1     12-02    140  |  108  |  31<H>  ----------------------------<  113<H>  4.1   |  24  |  1.20    Ca    9.3      02 Dec 2022 07:48    TPro  6.9  /  Alb  2.7<L>  /  TBili  0.8  /  DBili  x   /  AST  18  /  ALT  37  /  AlkPhos  134<H>  12-02     LIVER FUNCTIONS - ( 02 Dec 2022 07:48 )  Alb: 2.7 g/dL / Pro: 6.9 gm/dL / ALK PHOS: 134 U/L / ALT: 37 U/L / AST: 18 U/L / GGT: x           < from: Xray Foot AP + Lateral + Oblique, Right (12.01.22 @ 22:01) >  ACC: [id number] EXAM:  XR FOOT COMP MIN 3 VIEWS RT                          PROCEDURE DATE:  12/01/2022          INTERPRETATION:  History: 62-year-old male, necrotic area in the foot.    Three views of the right foot without comparison demonstrate soft tissue   emphysema at the plantar aspect of the midfoot and calcaneus.    No gross cortical destruction, fracture or dislocation.    IMPRESSION:  Soft tissue emphysema at the plantar aspect of the mid foot/calcaneous.   If there is continued clinical concern follow-up MRI can be ordered    < end of copied text >        Radiology: all available radiological tests reviewed    Advanced directives addressed: full resuscitation

## 2022-12-02 NOTE — CONSULT NOTE ADULT - SUBJECTIVE AND OBJECTIVE BOX
CHIEF COMPLAINT: Patient is a 62y old  Male who presents with a chief complaint of Elevated troponin, PAD and Cellulitis (02 Dec 2022 10:14)    HPI:  61 y/o man with a history of CAD s/p CABG and MV repair (2016 - [place]), HTN, DM, GERD, L BKA, who was transferred from [facility] for the evaluation of abdominal discomfort and cellulitis.  Cardiology consult requested for elevated troponin.  Mr. Roberts has no cardiac symptoms -- he has not been experiencing angina or dyspnea.  He walks during PT and denies exertional chest discomfort.  He does not see a cardiologist in the outpatient setting.      PAST MEDICAL & SURGICAL HISTORY:  HTN (hypertension)  DM (diabetes mellitus)  Anemia  GERD (gastroesophageal reflux disease)  S/P BKA (below knee amputation), left  CAD (coronary artery disease)  S/P CABG x 3  Status post amputation of leg    SOCIAL HISTORY:   Smoking: Nonsmoker    FAMILY HISTORY: No pertinent family history in first degree relatives    MEDICATIONS  (STANDING):  amLODIPine   Tablet 5 milliGRAM(s) Oral daily  aspirin  chewable 81 milliGRAM(s) Oral daily  famotidine    Tablet 40 milliGRAM(s) Oral at bedtime  gabapentin 300 milliGRAM(s) Oral two times a day  gabapentin 600 milliGRAM(s) Oral at bedtime  glucagon  Injectable 1 milliGRAM(s) IntraMuscular once  insulin glargine Injectable (LANTUS) 30 Unit(s) SubCutaneous at bedtime  insulin lispro (ADMELOG) corrective regimen sliding scale   SubCutaneous three times a day before meals  isosorbide   mononitrate ER Tablet (IMDUR) 30 milliGRAM(s) Oral daily  lisinopril 5 milliGRAM(s) Oral daily  metoclopramide 10 milliGRAM(s) Oral daily  multivitamin 1 Tablet(s) Oral daily  pantoprazole    Tablet 40 milliGRAM(s) Oral before breakfast  piperacillin/tazobactam IVPB.. 3.375 Gram(s) IV Intermittent every 8 hours  senna 2 Tablet(s) Oral at bedtime  simvastatin 10 milliGRAM(s) Oral at bedtime  tamsulosin 0.8 milliGRAM(s) Oral at bedtime    MEDICATIONS  (PRN):  acetaminophen     Tablet .. 650 milliGRAM(s) Oral every 6 hours PRN Temp greater or equal to 38C (100.4F), Mild Pain (1 - 3)  aluminum hydroxide/magnesium hydroxide/simethicone Suspension 30 milliLiter(s) Oral every 4 hours PRN Dyspepsia  dextrose Oral Gel 15 Gram(s) Oral once PRN Blood Glucose LESS THAN 70 milliGRAM(s)/deciliter  HYDROmorphone  Injectable 1 milliGRAM(s) IV Push every 4 hours PRN Severe Pain (7 - 10)  magnesium hydroxide Suspension 30 milliLiter(s) Oral every 8 hours PRN Constipation  melatonin 3 milliGRAM(s) Oral at bedtime PRN Insomnia  ondansetron Injectable 4 milliGRAM(s) IV Push every 8 hours PRN Nausea and/or Vomiting    Allergies:  No Known Allergies    REVIEW OF SYSTEMS:  CONSTITUTIONAL: No weakness  Eyes: No visual changes  NECK: No pain or stiffness  RESPIRATORY: No cough, wheezing, hemoptysis; No shortness of breath  CARDIOVASCULAR: No chest pain or palpitations  GASTROINTESTINAL: No abdominal pain. No nausea, vomiting, or hematemesis; No diarrhea or constipation. No melena or hematochezia.  GENITOURINARY: No dysuria, frequency or hematuria  NEUROLOGICAL: No numbness.  SKIN: + change in color of skin  All other review of systems is negative unless indicated above    VITAL SIGNS:   Vital Signs Last 24 Hrs  T(C): 36.7 (02 Dec 2022 04:31), Max: 38.1 (01 Dec 2022 18:01)  T(F): 98 (02 Dec 2022 04:31), Max: 100.5 (01 Dec 2022 18:01)  HR: 69 (02 Dec 2022 11:00) (62 - 125)  BP: 112/68 (02 Dec 2022 11:00) (109/71 - 161/98)  BP(mean): 78 (02 Dec 2022 11:00) (78 - 80)  RR: 14 (02 Dec 2022 11:00) (10 - 19)  SpO2: 97% (02 Dec 2022 08:40) (90% - 98%)    PHYSICAL EXAM:  Constitutional: NAD, awake and alert  HEENT:  EOMI,  Pupils round, No oral cyanosis.  Pulmonary: Non-labored, breath sounds are clear bilaterally, No wheezing, rales or rhonchi  Cardiovascular: S1 and S2, regular rate and rhythm, + Murmur  Gastrointestinal: Bowel Sounds present, soft, nontender.   Lymph: No peripheral edema. No cervical lymphadenopathy.  Neurological: Alert, no focal deficits  Skin: No rash.  R foot bandaged.  L BKA  Psych:  Mood & affect appropriate    LABS:                      10.9   17.77 )-----------( 270      ( 01 Dec 2022 19:11 )             32.8     140    |  108    |  31     ----------------------------<  113    4.1     |  24     |  1.20     PT/INR - ( 01 Dec 2022 19:11 )   PT: 15.9 sec;   INR: 1.37 ratio    PTT - ( 01 Dec 2022 19:11 )  PTT:34.9 sec    Pro Bnp [id number]    TroponinI hsT: 201.30, 364.23, 345.99    TTE Echo Complete w/o Contrast w/ Doppler (12.02.22 @ 08:46):   Endocardium is not well visualized, however, overall left ventricular systolic function appears normal. Mild concentric left ventricular hypertrophy. Septal flattening is seen; this finding is consistent with right heart pressure / volume overload. Estimated left ventricular ejection fraction is 55-60%.   The right ventricle exhibits mild dilation, mild diffuse hypokinesis, and mild depression of contractility.   Severe mitral stenosis. Mild mitral regurgitation.   Mild to moderate tricuspid valve regurgitation.   Severe pulmonary hypertension.    ECG: SR, nonspecific ST/T abn

## 2022-12-02 NOTE — CONSULT NOTE ADULT - SUBJECTIVE AND OBJECTIVE BOX
Patient is a 62y old  Male who presents with a chief complaint of Elevated troponin, PAD and Cellulitis    HPI:  This is a 62 year old male from [facility] due to severe epigastric area abdominal pain since yesterday morning. Patient reported vomiting. Patient denies diarrhea. He has no active pain during my evaluation. Patient reported he has not been eating or drinking regularly. He has also noticed redness of RLE over last week and black area on bottom of foot. Patient denies any chest pain, any SOB. He had 3 vessel CABG done, in 2016. He also had Lower leg amputation done in Feb 2022 and has been residing in rehab since.   Seen at bedside this morning. GI consulted regarding finding of cirrhosis on CT. Patient reports feeling comfortable now. Denies any abdominal pain, vomiting, epigastric pain. Does admit that in the morning upon awakening he finds he has epigastric discomfort with nausea and "feels unsettled." No aggravating factors identified. Alleviated with time "by lunch it's gone." Does have history GERD. Denies any s/s of GI bleeding including melena, hematochezia, or hematemesis. No recent EGD/colon. Regarding cirrhosis. Does admit to heavy etoh use in past. Has not had etoh in 10-15 years. Denies any hepatology management.     PAST MEDICAL & SURGICAL HISTORY:  HTN (hypertension)      DM (diabetes mellitus)      Anemia      GERD (gastroesophageal reflux disease)      S/P BKA (below knee amputation), left      CAD (coronary artery disease)      S/P CABG x 3      Status post amputation of leg    MEDICATIONS  (STANDING):  amLODIPine   Tablet 5 milliGRAM(s) Oral daily  aspirin  chewable 81 milliGRAM(s) Oral daily  dextrose 5%. 1000 milliLiter(s) (100 mL/Hr) IV Continuous <Continuous>  dextrose 5%. 1000 milliLiter(s) (50 mL/Hr) IV Continuous <Continuous>  dextrose 50% Injectable 25 Gram(s) IV Push once  dextrose 50% Injectable 12.5 Gram(s) IV Push once  dextrose 50% Injectable 25 Gram(s) IV Push once  famotidine    Tablet 40 milliGRAM(s) Oral at bedtime  gabapentin 300 milliGRAM(s) Oral two times a day  gabapentin 600 milliGRAM(s) Oral at bedtime  glucagon  Injectable 1 milliGRAM(s) IntraMuscular once  insulin glargine Injectable (LANTUS) 30 Unit(s) SubCutaneous at bedtime  insulin lispro (ADMELOG) corrective regimen sliding scale   SubCutaneous three times a day before meals  isosorbide   mononitrate ER Tablet (IMDUR) 30 milliGRAM(s) Oral daily  lisinopril 5 milliGRAM(s) Oral daily  metoclopramide 10 milliGRAM(s) Oral daily  multivitamin 1 Tablet(s) Oral daily  pantoprazole    Tablet 40 milliGRAM(s) Oral before breakfast  piperacillin/tazobactam IVPB.. 3.375 Gram(s) IV Intermittent every 8 hours  senna 2 Tablet(s) Oral at bedtime  simvastatin 10 milliGRAM(s) Oral at bedtime  tamsulosin 0.8 milliGRAM(s) Oral at bedtime    MEDICATIONS  (PRN):  acetaminophen     Tablet .. 650 milliGRAM(s) Oral every 6 hours PRN Temp greater or equal to 38C (100.4F), Mild Pain (1 - 3)  aluminum hydroxide/magnesium hydroxide/simethicone Suspension 30 milliLiter(s) Oral every 4 hours PRN Dyspepsia  dextrose Oral Gel 15 Gram(s) Oral once PRN Blood Glucose LESS THAN 70 milliGRAM(s)/deciliter  HYDROmorphone  Injectable 1 milliGRAM(s) IV Push every 4 hours PRN Severe Pain (7 - 10)  magnesium hydroxide Suspension 30 milliLiter(s) Oral every 8 hours PRN Constipation  melatonin 3 milliGRAM(s) Oral at bedtime PRN Insomnia  ondansetron Injectable 4 milliGRAM(s) IV Push every 8 hours PRN Nausea and/or Vomiting      Allergies    No Known Allergies    Intolerances        SOCIAL HISTORY:    FAMILY HISTORY:  No pertinent family history in first degree relatives    REVIEW OF SYSTEMS:    CONSTITUTIONAL: No weakness, fevers or chills  EYES/ENT: No visual changes;  No vertigo or throat pain   NECK: No pain or stiffness  RESPIRATORY: No cough, wheezing, hemoptysis; No shortness of breath  CARDIOVASCULAR: No chest pain or palpitations  GASTROINTESTINAL: See HPI  GENITOURINARY: No dysuria, frequency or hematuria  NEUROLOGICAL: No numbness or weakness  SKIN: No itching, burning, rashes, or lesions   PSYCH: Normal mood and affect  All other review of systems is negative unless indicated above.    Vital Signs Last 24 Hrs  T(C): 36.1 (02 Dec 2022 10:01), Max: 38.1 (01 Dec 2022 18:01)  T(F): 97 (02 Dec 2022 10:01), Max: 100.5 (01 Dec 2022 18:01)  HR: 73 (02 Dec 2022 12:00) (62 - 125)  BP: 112/71 (02 Dec 2022 12:00) (109/71 - 161/98)  BP(mean): 81 (02 Dec 2022 12:00) (78 - 81)  RR: 12 (02 Dec 2022 12:00) (10 - 19)  SpO2: 97% (02 Dec 2022 08:40) (90% - 98%)    Parameters below as of 02 Dec 2022 04:31  Patient On (Oxygen Delivery Method): room air        PHYSICAL EXAM:    Constitutional: No acute distress, well-developed, non-toxic appearing  HEENT: masked, good phonation, not icteric  Neck: supple, no lymphadenopathy  Respiratory: clear to ascultation bilaterally, no wheezing  Cardiovascular: S1 and S2, regular rate and rhythm, no murmurs rubs or gallops  Gastrointestinal: soft, non-tender, non-distended, +bowel sounds, no rebound or guarding, no surgical scars, no drains  Extremities: No peripheral edema, no cyanosis or clubbing  Vascular: 2+ peripheral pulses, no venous stasis  Neurological: A/O x 3, no focal deficits, no asterixis  Psychiatric: Normal mood, normal affect  Skin: No rashes, not jaundiced    LABS:                        10.1   12.49 )-----------( 233      ( 02 Dec 2022 07:48 )             31.1     12-02    140  |  108  |  31<H>  ----------------------------<  113<H>  4.1   |  24  |  1.20    Ca    9.3      02 Dec 2022 07:48    TPro  6.9  /  Alb  2.7<L>  /  TBili  0.8  /  DBili  x   /  AST  18  /  ALT  37  /  AlkPhos  134<H>  12-02    PT/INR - ( 01 Dec 2022 19:11 )   PT: 15.9 sec;   INR: 1.37 ratio         PTT - ( 01 Dec 2022 19:11 )  PTT:34.9 sec  LIVER FUNCTIONS - ( 02 Dec 2022 07:48 )  Alb: 2.7 g/dL / Pro: 6.9 gm/dL / ALK PHOS: 134 U/L / ALT: 37 U/L / AST: 18 U/L / GGT: x             RADIOLOGY & ADDITIONAL STUDIES:  Nodular contour of the inferior liver, which can be seen with cirrhosis.   Correlate clinically. Patient is a 62y old  Male who presents with a chief complaint of Elevated troponin, PAD and Cellulitis    62 year old man with CABG, LLE amputation, living in [facility], former ETOH last drink 10 years ago, admitted for cellulitis. Consulted for cirrhosis.     Patient states that he noticed redness and swelling over his RLE in the last week. It was worsening and painful so he was sent to the hospital. In terms of GI, currently has no abdominal pain. Does say that he feels nuasea and has some epigastric discomfort, not pain, in the mornings that go away by the afternoon. He denies any weight loss. Has a good appetite. No post prandial issues. No change in BM's. No overt signs of GI bleeding like hematemesis, melena, hematochezia. No history of IVDA. Last drink 10 years ago. Has never been told he has a liver issue. No hematemesis, melena, or hematochezia.       PAST MEDICAL & SURGICAL HISTORY:  HTN (hypertension)      DM (diabetes mellitus)      Anemia      GERD (gastroesophageal reflux disease)      S/P BKA (below knee amputation), left      CAD (coronary artery disease)      S/P CABG x 3          MEDICATIONS  (STANDING):  amLODIPine   Tablet 5 milliGRAM(s) Oral daily  aspirin  chewable 81 milliGRAM(s) Oral daily  dextrose 5%. 1000 milliLiter(s) (100 mL/Hr) IV Continuous <Continuous>  dextrose 5%. 1000 milliLiter(s) (50 mL/Hr) IV Continuous <Continuous>  dextrose 50% Injectable 25 Gram(s) IV Push once  dextrose 50% Injectable 12.5 Gram(s) IV Push once  dextrose 50% Injectable 25 Gram(s) IV Push once  famotidine    Tablet 40 milliGRAM(s) Oral at bedtime  gabapentin 300 milliGRAM(s) Oral two times a day  gabapentin 600 milliGRAM(s) Oral at bedtime  glucagon  Injectable 1 milliGRAM(s) IntraMuscular once  insulin glargine Injectable (LANTUS) 30 Unit(s) SubCutaneous at bedtime  insulin lispro (ADMELOG) corrective regimen sliding scale   SubCutaneous three times a day before meals  isosorbide   mononitrate ER Tablet (IMDUR) 30 milliGRAM(s) Oral daily  lisinopril 5 milliGRAM(s) Oral daily  metoclopramide 10 milliGRAM(s) Oral daily  multivitamin 1 Tablet(s) Oral daily  pantoprazole    Tablet 40 milliGRAM(s) Oral before breakfast  piperacillin/tazobactam IVPB.. 3.375 Gram(s) IV Intermittent every 8 hours  senna 2 Tablet(s) Oral at bedtime  simvastatin 10 milliGRAM(s) Oral at bedtime  tamsulosin 0.8 milliGRAM(s) Oral at bedtime    MEDICATIONS  (PRN):  acetaminophen     Tablet .. 650 milliGRAM(s) Oral every 6 hours PRN Temp greater or equal to 38C (100.4F), Mild Pain (1 - 3)  aluminum hydroxide/magnesium hydroxide/simethicone Suspension 30 milliLiter(s) Oral every 4 hours PRN Dyspepsia  dextrose Oral Gel 15 Gram(s) Oral once PRN Blood Glucose LESS THAN 70 milliGRAM(s)/deciliter  HYDROmorphone  Injectable 1 milliGRAM(s) IV Push every 4 hours PRN Severe Pain (7 - 10)  magnesium hydroxide Suspension 30 milliLiter(s) Oral every 8 hours PRN Constipation  melatonin 3 milliGRAM(s) Oral at bedtime PRN Insomnia  ondansetron Injectable 4 milliGRAM(s) IV Push every 8 hours PRN Nausea and/or Vomiting      Allergies    No Known Allergies    Intolerances      SOCIAL HISTORY:  no smoking, no drugs, former ETOH last drink 10 years ago    FAMILY HISTORY:  no colon or stomach cancer    REVIEW OF SYSTEMS:    CONSTITUTIONAL: No weakness, fevers or chills  EYES/ENT: No visual changes;  No vertigo or throat pain   NECK: No pain or stiffness  RESPIRATORY: No cough, wheezing, hemoptysis; No shortness of breath  CARDIOVASCULAR: No chest pain or palpitations  GASTROINTESTINAL: See HPI  GENITOURINARY: No dysuria, frequency or hematuria  NEUROLOGICAL: No numbness or weakness  SKIN: No itching, burning, rashes, or lesions   PSYCH: Normal mood and affect  All other review of systems is negative unless indicated above.    Vital Signs Last 24 Hrs  T(C): 36.1 (02 Dec 2022 10:01), Max: 38.1 (01 Dec 2022 18:01)  T(F): 97 (02 Dec 2022 10:01), Max: 100.5 (01 Dec 2022 18:01)  HR: 73 (02 Dec 2022 12:00) (62 - 125)  BP: 112/71 (02 Dec 2022 12:00) (109/71 - 161/98)  BP(mean): 81 (02 Dec 2022 12:00) (78 - 81)  RR: 12 (02 Dec 2022 12:00) (10 - 19)  SpO2: 97% (02 Dec 2022 08:40) (90% - 98%)    Parameters below as of 02 Dec 2022 04:31  Patient On (Oxygen Delivery Method): room air        PHYSICAL EXAM:    Constitutional: No acute distress, well-developed, non-toxic appearing  HEENT: masked, good phonation, not icteric  Neck: supple, no lymphadenopathy  Respiratory: clear to ascultation bilaterally, no wheezing  Cardiovascular: S1 and S2, regular rate and rhythm, no murmurs rubs or gallops  Gastrointestinal: soft, non-tender, non-distended, +bowel sounds, no rebound or guarding, no surgical scars, no drains  Extremities: No peripheral edema, no cyanosis or clubbing, L below knee ampuation, RLE cellulitis  Vascular: 2+ peripheral pulses, no venous stasis  Neurological: A/O x 3, no focal deficits, no asterixis  Psychiatric: Normal mood, normal affect  Skin: No rashes, not jaundiced    LABS:                        10.1   12.49 )-----------( 233      ( 02 Dec 2022 07:48 )             31.1     12-02    140  |  108  |  31<H>  ----------------------------<  113<H>  4.1   |  24  |  1.20    Ca    9.3      02 Dec 2022 07:48    TPro  6.9  /  Alb  2.7<L>  /  TBili  0.8  /  DBili  x   /  AST  18  /  ALT  37  /  AlkPhos  134<H>  12-02    PT/INR - ( 01 Dec 2022 19:11 )   PT: 15.9 sec;   INR: 1.37 ratio         PTT - ( 01 Dec 2022 19:11 )  PTT:34.9 sec  LIVER FUNCTIONS - ( 02 Dec 2022 07:48 )  Alb: 2.7 g/dL / Pro: 6.9 gm/dL / ALK PHOS: 134 U/L / ALT: 37 U/L / AST: 18 U/L / GGT: x             RADIOLOGY & ADDITIONAL STUDIES:  Nodular contour of the inferior liver, which can be seen with cirrhosis.   Correlate clinically.

## 2022-12-02 NOTE — H&P ADULT - HISTORY OF PRESENT ILLNESS
63 y/o Male BIBEMS from [facility] due to severe epigastric area abdominal pain since yesterday morning. Patient reported vomiting. Patient denies diarrhea. He has no active pain during my evaluation. Patient reported he has not been eating or drinking regularly. He has also noticed redness of RLE over last week and black area on bottom of foot. Patient denies any chest pain, any SOB. He had 3 vessel CBAG done, in 2016. He also had Lower leg amputation done in Feb, 2022, since he was leaving in Rehab facility.

## 2022-12-02 NOTE — CONSULT NOTE ADULT - PROBLEM SELECTOR RECOMMENDATION 9
Elevated troponin in the setting of infectious process, known CAD, and severe pulmonary HTN -- suspect non-ischemic myocardial injury.      * Patient can be transferred to a medical bed for continuation of present care.

## 2022-12-02 NOTE — CONSULT NOTE ADULT - PROBLEM SELECTOR RECOMMENDATION 4
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  Would check SPO2 with ambulation.

## 2022-12-02 NOTE — CONSULT NOTE ADULT - ASSESSMENT
62yoM with multiple comorbidities p/w RLE plantar foot wound    Plan:  Arterial duplex reviewed  Will plan for possible Angiogram next week; date TBD  Podiatry for local wound care  Medical management as per primary team    Plan discussed with Dr. Walters

## 2022-12-02 NOTE — PROGRESS NOTE ADULT - SUBJECTIVE AND OBJECTIVE BOX
Date of Service: 12/2/22  Chief Complaint :63 y/o male PMHx of PVD s/p left BKA, DM, HTN, CAD, anemia, and GERD, Full code with molst BIBEMS from [facility] c/o severe abdominal pain since this morning. Pt reports vomiting. Pt denies diarrhea. Pt reports he has not been eating or drinking. Pt reports he has noticed redness of RLE over last week and black area on bottom of foot. States he was scheduled for angiogram tomorrow with Dr. Dickson who has been following patient for continued PVD of right lower extremity.  Podiatry consulted for unstagable wound to Right 4/5th metatarsal head. Patient denies any pain to the area. 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 Patient says he noticed redness/erythema to his right lower extremity. Patient also says that he has narrowing of his blood vessels to his right leg, and that there is decreased blood flow to his right foot.     12/02/22: Pt seen by podiatry team with attending present. Patient resting comfortably at bedside, NAD and pleasant. No additional pedal complaints reported at this time.     REVIEW OF SYSTEMS: All other review of systems is negative unless indicated above    PMH: HTN (hypertension)    DM (diabetes mellitus)      PSH:    Allergies:No Known Allergies    MEDICATIONS  (STANDING):  amLODIPine   Tablet 5 milliGRAM(s) Oral daily  aspirin  chewable 81 milliGRAM(s) Oral daily  dextrose 5%. 1000 milliLiter(s) (100 mL/Hr) IV Continuous <Continuous>  dextrose 5%. 1000 milliLiter(s) (50 mL/Hr) IV Continuous <Continuous>  dextrose 50% Injectable 25 Gram(s) IV Push once  dextrose 50% Injectable 12.5 Gram(s) IV Push once  dextrose 50% Injectable 25 Gram(s) IV Push once  famotidine    Tablet 40 milliGRAM(s) Oral at bedtime  gabapentin 300 milliGRAM(s) Oral two times a day  gabapentin 600 milliGRAM(s) Oral at bedtime  glucagon  Injectable 1 milliGRAM(s) IntraMuscular once  insulin glargine Injectable (LANTUS) 30 Unit(s) SubCutaneous at bedtime  insulin lispro (ADMELOG) corrective regimen sliding scale   SubCutaneous three times a day before meals  isosorbide   mononitrate ER Tablet (IMDUR) 30 milliGRAM(s) Oral daily  lisinopril 5 milliGRAM(s) Oral daily  metoclopramide 10 milliGRAM(s) Oral daily  multivitamin 1 Tablet(s) Oral daily  pantoprazole    Tablet 40 milliGRAM(s) Oral before breakfast  piperacillin/tazobactam IVPB.. 3.375 Gram(s) IV Intermittent every 8 hours  senna 2 Tablet(s) Oral at bedtime  simvastatin 10 milliGRAM(s) Oral at bedtime  tamsulosin 0.8 milliGRAM(s) Oral at bedtime    MEDICATIONS  (PRN):  acetaminophen     Tablet .. 650 milliGRAM(s) Oral every 6 hours PRN Temp greater or equal to 38C (100.4F), Mild Pain (1 - 3)  aluminum hydroxide/magnesium hydroxide/simethicone Suspension 30 milliLiter(s) Oral every 4 hours PRN Dyspepsia  dextrose Oral Gel 15 Gram(s) Oral once PRN Blood Glucose LESS THAN 70 milliGRAM(s)/deciliter  HYDROmorphone  Injectable 1 milliGRAM(s) IV Push every 4 hours PRN Severe Pain (7 - 10)  magnesium hydroxide Suspension 30 milliLiter(s) Oral every 8 hours PRN Constipation  melatonin 3 milliGRAM(s) Oral at bedtime PRN Insomnia  ondansetron Injectable 4 milliGRAM(s) IV Push every 8 hours PRN Nausea and/or Vomiting      Vital Signs Last 24 Hrs  T(C): 36.7 (02 Dec 2022 04:31), Max: 38.1 (01 Dec 2022 18:01)  T(F): 98 (02 Dec 2022 04:31), Max: 100.5 (01 Dec 2022 18:01)  HR: 70 (02 Dec 2022 09:00) (62 - 125)  BP: 109/71 (02 Dec 2022 09:00) (109/71 - 161/98)  BP(mean): 80 (02 Dec 2022 09:00) (79 - 80)  RR: 11 (02 Dec 2022 09:00) (10 - 19)  SpO2: 97% (02 Dec 2022 08:40) (90% - 98%)    Parameters below as of 02 Dec 2022 04:31  Patient On (Oxygen Delivery Method): room air          Physical Exam:   Constitutiona: NAD, alert;  Derm:  Skin warm, dry and supple bilateral.     Erythema noted grossly to right foot and lower leg  Scaly skin noted grossly to right foot  Unstagable wound noted to the right 4th/5th metatarsal head measuring approximately 3x3 cm, no pus, no malodor, no pain on palpation, negative fluctance. Slight indication of bluish hue discoloration towards Right hallux noted.   Vascular: Dorsalis Pedis and Posterior Tibial pulses non palpable.  Capillary re-fill time less then 3 seconds digits 1-5 bilateral.   Neuro: Protective sensation absent to the level of the digits bilateral.  MSK: Muscle strength 4/5 in all major muscle groups of Right lower extremity.  Below knee amputation to left lower extremity         Labs:                        10.1   12.49 )-----------( 233      ( 02 Dec 2022 07:48 )             31.1     12-02    140  |  108  |  31<H>  ----------------------------<  113<H>  4.1   |  24  |  1.20    Ca    9.3      02 Dec 2022 07:48    TPro  6.9  /  Alb  2.7<L>  /  TBili  0.8  /  DBili  x   /  AST  18  /  ALT  37  /  AlkPhos  134<H>  12-02      Rad/EKG

## 2022-12-02 NOTE — CONSULT NOTE ADULT - ASSESSMENT
61 y/o Male with past medical history of cad, diabetes, hypertension, gerd, pvd, s/p left BKA, s/p CABG admitted from snf on 12/1 for evaluation of epigastric pain of about one day duration; also noted with right lower extremity redness and pain with ulcers on the anterior shin as well as the sole of the right foot. Patient is mostly a poor historian and history per medical record.     1. Patient admitted with right lower extremity cellulitis and PVD; also noted with leukocytosis most likely reactive to infection  - follow up cultures   - serial cbc and monitor temperature   - reviewed prior medical records to evaluate for resistant or atypical pathogens   - iv hydration and supportive care   - wound care per podiatry  - given the XRay results consideration for MRI  - will continue the zosyn as ordered  - will add doxycycline to cover resistant pathogens  - podiatry evaluation in progress  2. other issues: per medicine

## 2022-12-02 NOTE — H&P ADULT - NSICDXPASTMEDICALHX_GEN_ALL_CORE_FT
PAST MEDICAL HISTORY:  Anemia     CAD (coronary artery disease)     DM (diabetes mellitus)     GERD (gastroesophageal reflux disease)     HTN (hypertension)     S/P BKA (below knee amputation), left

## 2022-12-02 NOTE — ED ADULT NURSE REASSESSMENT NOTE - NS ED NURSE REASSESS COMMENT FT1
patient awaiting bed upstairs, Pt c/o right foot pain associated with abdominal pain awaiting orders to be verified by pharmacy

## 2022-12-02 NOTE — CONSULT NOTE ADULT - NS ATTEND AMEND GEN_ALL_CORE FT
62 year old man with CABG, LLE amputation, living in [facility], former ETOH last drink 10 years ago, admitted for cellulitis. Consulted for cirrhosis.     Outpatient work up and management of cirrhosis. Will arrange appointment with Dr. Anderson.   For Am symptoms, can start PPI, carafate. If not helping can change to PM PPI.   Will need outpatient EGD either way.

## 2022-12-02 NOTE — PHARMACOTHERAPY INTERVENTION NOTE - COMMENTS
Medication history complete, reviewed medications with provided med list from  [facility] and confirmed with doctor first med hx.

## 2022-12-02 NOTE — H&P ADULT - NSHPPHYSICALEXAM_GEN_ALL_CORE
T(C): 36.7 (12-02-22 @ 04:31), Max: 38.1 (12-01-22 @ 18:01)  HR: 62 (12-02-22 @ 04:31) (62 - 125)  BP: 161/98 (12-02-22 @ 04:31) (134/75 - 161/98)  RR: 16 (12-02-22 @ 04:31) (16 - 18)  SpO2: 98% (12-02-22 @ 04:31) (90% - 98%)    CONSTITUTIONAL: Well groomed, no apparent distress  EYES: PERRLA and symmetric, EOMI, No conjunctival or scleral injection, non-icteric  ENMT: Oral mucosa with moist membranes. Normal dentition; no pharyngeal injection or exudates             NECK: Supple, symmetric and without tracheal deviation   RESP: No respiratory distress, no use of accessory muscles; CTA b/l, no WRR  CV: RRR, +S1S2, no MRG; no JVD; no peripheral edema  GI: Soft, NT, ND, no rebound, no guarding; no palpable masses; no hepatosplenomegaly; no hernia palpated  LYMPH: No cervical LAD or tenderness; no axillary LAD or tenderness; no inguinal LAD or tenderness  MSK: LLE amputated, RLE: decrease pulse in Right foot and right leg. Erythema noted grossly to right foot and lower leg.   NEURO: CN II-XII intact; normal reflexes in upper and lower extremities, sensation intact in upper and lower extremities b/l to light touch   PSYCH: Appropriate insight/judgment; A+O x 3, mood and affect appropriate, recent/remote memory intact

## 2022-12-02 NOTE — CONSULT NOTE ADULT - SUBJECTIVE AND OBJECTIVE BOX
63 y/o Male BIBEMS from [facility] due to severe epigastric area abdominal pain since yesterday morning. Patient reported vomiting. Patient denies diarrhea. He has no active pain during my evaluation. Patient reported he has not been eating or drinking regularly. He has also noticed redness of RLE over last week and black area on bottom of foot. Patient denies any chest pain, any SOB. He had 3 vessel CBAG done, in 2016. He also had Lower leg amputation done in Feb, 2022, since he was leaving in Rehab facility.     Vitals:  T(C): 36.3 (12-02 @ 20:30), Max: 36.7 (12-01 @ 23:38)  HR: 66 (12-02 @ 20:30) (62 - 93)  BP: 123/73 (12-02 @ 20:30) (109/71 - 161/98)  RR: 18 (12-02 @ 20:30) (10 - 24)  SpO2: 94% (12-02 @ 20:30) (94% - 98%)    12-02 @ 07:01  -  12-02 @ 22:53  --------------------------------------------------------  IN:    IV PiggyBack: 100 mL    Oral Fluid: 1080 mL  Total IN: 1180 mL    OUT:    Voided (mL): 600 mL  Total OUT: 600 mL    Total NET: 580 mL          Physical Exam:  General: AAOx3, Well developed, NAD  Chest: Normal respiratory effort  Heart: RRR  Abdomen: Soft, NTND, no masses  Neuro/Psych: No localized deficits. Normal speech, normal tone  Skin: Normal, no rashes, no lesions noted.   Extremities: S/p LLE BKA; RLE Warm, small wounds in variable stages of healing. Plantar foot wound with eschar under 4th/5th digit.Palpable femoral/popliteal; Non palpable DP/PT, Dopplerable DP, non dopplerable PT    12-02 @ 07:48                    10.1  CBC: 12.49>)-------(<233                     31.1                 140 | 108 | 31    CMP:  ----------------------< 113               4.1 | 24 | 1.20                      Ca:9.3  Phos:-  Mg:-               0.8|      |18        LFTs:  ------|134|-----             -|      |-  12-01 @ 19:11                    10.9  CBC: 17.77>)-------(<270                     32.8                 139 | 106 | 23    CMP:  ----------------------< 100               4.1 | 27 | 1.00                      Ca:9.2  Phos:-  Mg:-               1.0|      |30        LFTs:  ------|160|-----             -|      |-      Current Inpatient Medications:  acetaminophen     Tablet .. 650 milliGRAM(s) Oral every 6 hours PRN  aluminum hydroxide/magnesium hydroxide/simethicone Suspension 30 milliLiter(s) Oral every 4 hours PRN  amLODIPine   Tablet 5 milliGRAM(s) Oral daily  aspirin  chewable 81 milliGRAM(s) Oral daily  < from: US Duplex Arterial Lower Ext Ltd, Right (12.01.22 @ 21:40) >    ACC: [id number] EXAM:  US DPLX LWR EXT ARTS LTD RT                          PROCEDURE DATE:  12/01/2022          INTERPRETATION:  History:  Right lower extremity erythema    Technique: Gray scale, color Doppler ultrasound, and spectral analysis of   the right lower extremity arteries    Comparison: None    Findings: Benign-appearing hyperplastic lymph node in the left inguinal   area.    Atherosclerotic plaque is seen along the length of the right lower   extremity arteries.    RIGHT:  Common femoral artery: Patent with normal velocities and triphasic   waveforms.  Proximal superficial femoral artery: Patent with normal velocities and   triphasic waveforms.  Mid superficial femoral artery: Patent with normal velocities and   biphasic waveforms.  Distal superficial femoral artery: Patent with normal velocities and   biphasic waveforms.  Popliteal artery: Patent with normal velocities and biphasic waveforms.  Anterior tibial artery: Patent with monophasic waveforms.  Posterior tibial artery: No appreciable flow detected.  Peroneal artery: No appreciable flow detected.  Dorsalis pedis artery: Not visualized.    IMPRESSION:    No appreciable flow detected in the posterior tibial and peroneal   arteries.    --- End of Report ---            DARION PEREZ MD; Attending Radiologist  This document has been electronically signed. Dec  1 2022 11:05PM    < end of copied text >  dextrose 5%. 1000 milliLiter(s) (100 mL/Hr) IV Continuous <Continuous>  dextrose 5%. 1000 milliLiter(s) (50 mL/Hr) IV Continuous <Continuous>  dextrose 50% Injectable 25 Gram(s) IV Push once  dextrose 50% Injectable 12.5 Gram(s) IV Push once  dextrose 50% Injectable 25 Gram(s) IV Push once  dextrose Oral Gel 15 Gram(s) Oral once PRN  famotidine    Tablet 40 milliGRAM(s) Oral at bedtime  gabapentin 300 milliGRAM(s) Oral two times a day  gabapentin 600 milliGRAM(s) Oral at bedtime  glucagon  Injectable 1 milliGRAM(s) IntraMuscular once  HYDROmorphone  Injectable 1 milliGRAM(s) IV Push every 4 hours PRN  insulin glargine Injectable (LANTUS) 30 Unit(s) SubCutaneous at bedtime  insulin lispro (ADMELOG) corrective regimen sliding scale   SubCutaneous three times a day before meals  isosorbide   mononitrate ER Tablet (IMDUR) 30 milliGRAM(s) Oral daily  lisinopril 5 milliGRAM(s) Oral daily  magnesium hydroxide Suspension 30 milliLiter(s) Oral every 8 hours PRN  melatonin 3 milliGRAM(s) Oral at bedtime PRN  metoclopramide 10 milliGRAM(s) Oral daily  multivitamin 1 Tablet(s) Oral daily  ondansetron Injectable 4 milliGRAM(s) IV Push every 8 hours PRN  pantoprazole    Tablet 40 milliGRAM(s) Oral before breakfast  piperacillin/tazobactam IVPB.. 3.375 Gram(s) IV Intermittent every 8 hours  senna 2 Tablet(s) Oral at bedtime  simvastatin 10 milliGRAM(s) Oral at bedtime  tamsulosin 0.8 milliGRAM(s) Oral at bedtime

## 2022-12-02 NOTE — CONSULT NOTE ADULT - PROBLEM SELECTOR RECOMMENDATION 3
Past MV repair but now with severely elevated transmitral gradient -- can pursue work-up once recovered from infectious process; patient denies symptoms at present functional status; old op-report from [facility] would be helpful for future management.

## 2022-12-03 LAB
ANION GAP SERPL CALC-SCNC: 5 MMOL/L — SIGNIFICANT CHANGE UP (ref 5–17)
BILIRUB SERPL-MCNC: 0.5 MG/DL — SIGNIFICANT CHANGE UP (ref 0.2–1.2)
BUN SERPL-MCNC: 38 MG/DL — HIGH (ref 7–23)
CALCIUM SERPL-MCNC: 8.7 MG/DL — SIGNIFICANT CHANGE UP (ref 8.5–10.1)
CHLORIDE SERPL-SCNC: 106 MMOL/L — SIGNIFICANT CHANGE UP (ref 96–108)
CO2 SERPL-SCNC: 27 MMOL/L — SIGNIFICANT CHANGE UP (ref 22–31)
CREAT SERPL-MCNC: 1.23 MG/DL — SIGNIFICANT CHANGE UP (ref 0.5–1.3)
EGFR: 67 ML/MIN/1.73M2 — SIGNIFICANT CHANGE UP
GLUCOSE SERPL-MCNC: 138 MG/DL — HIGH (ref 70–99)
HCT VFR BLD CALC: 30.4 % — LOW (ref 39–50)
HGB BLD-MCNC: 9.8 G/DL — LOW (ref 13–17)
INR BLD: 1.18 RATIO — HIGH (ref 0.88–1.16)
MCHC RBC-ENTMCNC: 32.2 GM/DL — SIGNIFICANT CHANGE UP (ref 32–36)
MCHC RBC-ENTMCNC: 32.2 PG — SIGNIFICANT CHANGE UP (ref 27–34)
MCV RBC AUTO: 100 FL — SIGNIFICANT CHANGE UP (ref 80–100)
MELD SCORE WITH DIALYSIS: 22 POINTS — SIGNIFICANT CHANGE UP
MELD SCORE WITHOUT DIALYSIS: 10 POINTS — SIGNIFICANT CHANGE UP
PLATELET # BLD AUTO: 247 K/UL — SIGNIFICANT CHANGE UP (ref 150–400)
POTASSIUM SERPL-MCNC: 3.9 MMOL/L — SIGNIFICANT CHANGE UP (ref 3.5–5.3)
POTASSIUM SERPL-SCNC: 3.9 MMOL/L — SIGNIFICANT CHANGE UP (ref 3.5–5.3)
PROTHROM AB SERPL-ACNC: 13.7 SEC — HIGH (ref 10.5–13.4)
RBC # BLD: 3.04 M/UL — LOW (ref 4.2–5.8)
RBC # FLD: 13.1 % — SIGNIFICANT CHANGE UP (ref 10.3–14.5)
SODIUM SERPL-SCNC: 138 MMOL/L — SIGNIFICANT CHANGE UP (ref 135–145)
WBC # BLD: 10.86 K/UL — HIGH (ref 3.8–10.5)
WBC # FLD AUTO: 10.86 K/UL — HIGH (ref 3.8–10.5)

## 2022-12-03 PROCEDURE — 73718 MRI LOWER EXTREMITY W/O DYE: CPT | Mod: 26,RT

## 2022-12-03 PROCEDURE — 99254 IP/OBS CNSLTJ NEW/EST MOD 60: CPT

## 2022-12-03 PROCEDURE — 99233 SBSQ HOSP IP/OBS HIGH 50: CPT

## 2022-12-03 PROCEDURE — 73721 MRI JNT OF LWR EXTRE W/O DYE: CPT | Mod: 26,RT

## 2022-12-03 RX ORDER — SUCRALFATE 1 G
1 TABLET ORAL
Refills: 0 | Status: DISCONTINUED | OUTPATIENT
Start: 2022-12-03 | End: 2022-12-27

## 2022-12-03 RX ADMIN — PANTOPRAZOLE SODIUM 40 MILLIGRAM(S): 20 TABLET, DELAYED RELEASE ORAL at 06:31

## 2022-12-03 RX ADMIN — TAMSULOSIN HYDROCHLORIDE 0.8 MILLIGRAM(S): 0.4 CAPSULE ORAL at 21:38

## 2022-12-03 RX ADMIN — PIPERACILLIN AND TAZOBACTAM 25 GRAM(S): 4; .5 INJECTION, POWDER, LYOPHILIZED, FOR SOLUTION INTRAVENOUS at 06:31

## 2022-12-03 RX ADMIN — SIMVASTATIN 10 MILLIGRAM(S): 20 TABLET, FILM COATED ORAL at 21:37

## 2022-12-03 RX ADMIN — Medication 10 MILLIGRAM(S): at 11:03

## 2022-12-03 RX ADMIN — Medication 650 MILLIGRAM(S): at 02:00

## 2022-12-03 RX ADMIN — GABAPENTIN 300 MILLIGRAM(S): 400 CAPSULE ORAL at 06:31

## 2022-12-03 RX ADMIN — Medication 1 TABLET(S): at 11:02

## 2022-12-03 RX ADMIN — Medication 650 MILLIGRAM(S): at 01:00

## 2022-12-03 RX ADMIN — Medication 1 GRAM(S): at 11:03

## 2022-12-03 RX ADMIN — HYDROMORPHONE HYDROCHLORIDE 1 MILLIGRAM(S): 2 INJECTION INTRAMUSCULAR; INTRAVENOUS; SUBCUTANEOUS at 07:45

## 2022-12-03 RX ADMIN — SENNA PLUS 2 TABLET(S): 8.6 TABLET ORAL at 21:37

## 2022-12-03 RX ADMIN — INSULIN GLARGINE 30 UNIT(S): 100 INJECTION, SOLUTION SUBCUTANEOUS at 21:38

## 2022-12-03 RX ADMIN — HYDROMORPHONE HYDROCHLORIDE 1 MILLIGRAM(S): 2 INJECTION INTRAMUSCULAR; INTRAVENOUS; SUBCUTANEOUS at 16:46

## 2022-12-03 RX ADMIN — Medication 100 MILLIGRAM(S): at 21:36

## 2022-12-03 RX ADMIN — PIPERACILLIN AND TAZOBACTAM 25 GRAM(S): 4; .5 INJECTION, POWDER, LYOPHILIZED, FOR SOLUTION INTRAVENOUS at 13:52

## 2022-12-03 RX ADMIN — HYDROMORPHONE HYDROCHLORIDE 1 MILLIGRAM(S): 2 INJECTION INTRAMUSCULAR; INTRAVENOUS; SUBCUTANEOUS at 15:46

## 2022-12-03 RX ADMIN — HYDROMORPHONE HYDROCHLORIDE 1 MILLIGRAM(S): 2 INJECTION INTRAMUSCULAR; INTRAVENOUS; SUBCUTANEOUS at 06:51

## 2022-12-03 RX ADMIN — Medication 81 MILLIGRAM(S): at 11:02

## 2022-12-03 RX ADMIN — HYDROMORPHONE HYDROCHLORIDE 1 MILLIGRAM(S): 2 INJECTION INTRAMUSCULAR; INTRAVENOUS; SUBCUTANEOUS at 20:03

## 2022-12-03 RX ADMIN — INSULIN GLARGINE 30 UNIT(S): 100 INJECTION, SOLUTION SUBCUTANEOUS at 00:56

## 2022-12-03 RX ADMIN — HYDROMORPHONE HYDROCHLORIDE 1 MILLIGRAM(S): 2 INJECTION INTRAMUSCULAR; INTRAVENOUS; SUBCUTANEOUS at 12:01

## 2022-12-03 RX ADMIN — Medication 1 GRAM(S): at 23:05

## 2022-12-03 RX ADMIN — Medication 100 MILLIGRAM(S): at 11:02

## 2022-12-03 RX ADMIN — LISINOPRIL 5 MILLIGRAM(S): 2.5 TABLET ORAL at 11:02

## 2022-12-03 RX ADMIN — Medication 4: at 17:10

## 2022-12-03 RX ADMIN — FAMOTIDINE 40 MILLIGRAM(S): 10 INJECTION INTRAVENOUS at 21:36

## 2022-12-03 RX ADMIN — HYDROMORPHONE HYDROCHLORIDE 1 MILLIGRAM(S): 2 INJECTION INTRAMUSCULAR; INTRAVENOUS; SUBCUTANEOUS at 20:20

## 2022-12-03 RX ADMIN — Medication 1 GRAM(S): at 17:10

## 2022-12-03 RX ADMIN — GABAPENTIN 600 MILLIGRAM(S): 400 CAPSULE ORAL at 21:36

## 2022-12-03 RX ADMIN — HYDROMORPHONE HYDROCHLORIDE 1 MILLIGRAM(S): 2 INJECTION INTRAMUSCULAR; INTRAVENOUS; SUBCUTANEOUS at 11:01

## 2022-12-03 RX ADMIN — ISOSORBIDE MONONITRATE 30 MILLIGRAM(S): 60 TABLET, EXTENDED RELEASE ORAL at 11:02

## 2022-12-03 RX ADMIN — GABAPENTIN 300 MILLIGRAM(S): 400 CAPSULE ORAL at 13:51

## 2022-12-03 RX ADMIN — PIPERACILLIN AND TAZOBACTAM 25 GRAM(S): 4; .5 INJECTION, POWDER, LYOPHILIZED, FOR SOLUTION INTRAVENOUS at 21:38

## 2022-12-03 RX ADMIN — AMLODIPINE BESYLATE 5 MILLIGRAM(S): 2.5 TABLET ORAL at 11:02

## 2022-12-03 RX ADMIN — Medication 2: at 12:35

## 2022-12-03 NOTE — PROGRESS NOTE ADULT - SUBJECTIVE AND OBJECTIVE BOX
Date of Service: 12/3/22  Chief Complaint :63 y/o male PMHx of PVD s/p left BKA, DM, HTN, CAD, anemia, and GERD, Full code with molst BIBEMS from [facility] c/o severe abdominal pain since this morning. Pt reports vomiting. Pt denies diarrhea. Pt reports he has not been eating or drinking. Pt reports he has noticed redness of RLE over last week and black area on bottom of foot. States he was scheduled for angiogram tomorrow with Dr. Dickson who has been following patient for continued PVD of right lower extremity.  Podiatry consulted for unstagable wound to Right 4/5th metatarsal head. Patient denies any pain to the area. 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 Patient says he noticed redness/erythema to his right lower extremity. Patient also says that he has narrowing of his blood vessels to his right leg, and that there is decreased blood flow to his right foot.     12/03/22: Pt seen by podiatry team. Patient resting comfortably at bedside, NAD and pleasant. No additional pedal complaints reported at this time.     REVIEW OF SYSTEMS: All other review of systems is negative unless indicated above    PMH: HTN (hypertension)    DM (diabetes mellitus)      PSH:    Allergies:No Known Allergies    MEDICATIONS  (STANDING):  amLODIPine   Tablet 5 milliGRAM(s) Oral daily  aspirin  chewable 81 milliGRAM(s) Oral daily  dextrose 5%. 1000 milliLiter(s) (100 mL/Hr) IV Continuous <Continuous>  dextrose 5%. 1000 milliLiter(s) (50 mL/Hr) IV Continuous <Continuous>  dextrose 50% Injectable 25 Gram(s) IV Push once  dextrose 50% Injectable 12.5 Gram(s) IV Push once  dextrose 50% Injectable 25 Gram(s) IV Push once  doxycycline monohydrate Capsule 100 milliGRAM(s) Oral every 12 hours  famotidine    Tablet 40 milliGRAM(s) Oral at bedtime  gabapentin 300 milliGRAM(s) Oral two times a day  gabapentin 600 milliGRAM(s) Oral at bedtime  glucagon  Injectable 1 milliGRAM(s) IntraMuscular once  insulin glargine Injectable (LANTUS) 30 Unit(s) SubCutaneous at bedtime  insulin lispro (ADMELOG) corrective regimen sliding scale   SubCutaneous three times a day before meals  isosorbide   mononitrate ER Tablet (IMDUR) 30 milliGRAM(s) Oral daily  lisinopril 5 milliGRAM(s) Oral daily  metoclopramide 10 milliGRAM(s) Oral daily  multivitamin 1 Tablet(s) Oral daily  pantoprazole    Tablet 40 milliGRAM(s) Oral before breakfast  piperacillin/tazobactam IVPB.. 3.375 Gram(s) IV Intermittent every 8 hours  senna 2 Tablet(s) Oral at bedtime  simvastatin 10 milliGRAM(s) Oral at bedtime  sucralfate suspension 1 Gram(s) Oral four times a day  tamsulosin 0.8 milliGRAM(s) Oral at bedtime    MEDICATIONS  (PRN):  acetaminophen     Tablet .. 650 milliGRAM(s) Oral every 6 hours PRN Temp greater or equal to 38C (100.4F), Mild Pain (1 - 3)  aluminum hydroxide/magnesium hydroxide/simethicone Suspension 30 milliLiter(s) Oral every 4 hours PRN Dyspepsia  dextrose Oral Gel 15 Gram(s) Oral once PRN Blood Glucose LESS THAN 70 milliGRAM(s)/deciliter  HYDROmorphone  Injectable 1 milliGRAM(s) IV Push every 4 hours PRN Severe Pain (7 - 10)  magnesium hydroxide Suspension 30 milliLiter(s) Oral every 8 hours PRN Constipation  melatonin 3 milliGRAM(s) Oral at bedtime PRN Insomnia  ondansetron Injectable 4 milliGRAM(s) IV Push every 8 hours PRN Nausea and/or Vomiting      Vital Signs Last 24 Hrs  T(C): 36.7 (03 Dec 2022 10:20), Max: 37.4 (03 Dec 2022 01:00)  T(F): 98.1 (03 Dec 2022 10:20), Max: 99.4 (03 Dec 2022 01:00)  HR: 79 (03 Dec 2022 10:20) (66 - 127)  BP: 138/79 (03 Dec 2022 10:20) (123/73 - 142/82)  BP(mean): 87 (02 Dec 2022 18:00) (84 - 87)  RR: 18 (03 Dec 2022 10:20) (18 - 24)  SpO2: 99% (03 Dec 2022 10:20) (94% - 99%)    Parameters below as of 03 Dec 2022 10:20  Patient On (Oxygen Delivery Method): room air      Physical Exam:   Constitutiona: NAD, alert;  Derm:  Skin warm, dry and supple bilateral.     Erythema noted grossly to right foot and lower leg  Scaly skin noted grossly to right foot  Unstagable wound noted to the right 4th/5th metatarsal head measuring approximately 3x3 cm, no pus, no malodor, no pain on palpation, negative fluctance. Slight indication of bluish hue discoloration towards Right hallux noted.   Vascular: Dorsalis Pedis and Posterior Tibial pulses non palpable.  Capillary re-fill time less then 3 seconds digits 1-5 bilateral.   Neuro: Protective sensation absent to the level of the digits bilateral.  MSK: Muscle strength 4/5 in all major muscle groups of Right lower extremity.  Below knee amputation to left lower extremity         Labs:                        9.8    10.86 )-----------( 247      ( 03 Dec 2022 07:04 )             30.4     12-03    138  |  106  |  38<H>  ----------------------------<  138<H>  3.9   |  27  |  1.23    Ca    8.7      03 Dec 2022 07:04    TPro  x   /  Alb  x   /  TBili  0.5  /  DBili  x   /  AST  x   /  ALT  x   /  AlkPhos  x   12-03        Rad/EKG     < from: Xray Foot AP + Lateral + Oblique, Right (12.01.22 @ 22:01) >  INTERPRETATION:  History: 62-year-old male, necrotic area in the foot.    Three views of the right foot without comparison demonstrate soft tissue   emphysema at the plantar aspect of the midfoot and calcaneus.    No gross cortical destruction, fracture or dislocation.    IMPRESSION:  Soft tissue emphysema at the plantar aspect of the mid foot/calcaneous.   If there is continued clinical concern follow-up MRI can be ordered    < end of copied text >  < from: MR Foot No Cont, Right (12.03.22 @ 12:13) >  IMPRESSION:  1.  There is no evidence for osteomyelitis.    < end of copied text >  < from: US Duplex Arterial Lower Ext Ltd, Right (12.01.22 @ 21:40) >  IMPRESSION:    No appreciable flow detected in the posterior tibial and peroneal   arteries.    < end of copied text >

## 2022-12-03 NOTE — PROGRESS NOTE ADULT - ASSESSMENT
Assesment: 63 y/o male see in the ED for the following   - Right eschar wound to the 4th/5th submetatarsal head, stable  - Erythema to RLE possible PAD vs Cellulitis   - Neuropathy to right foot  - Below knee amputation LLE  - PAD/PVD  - Difficulty with ambulation      P:   Chart reviewed and Patient evaluated; discussed treatment and plan with Dr. Jez Mauricio  Discussed diagnosis and treatment with patient  X-rays reviewed : no soft tissue emphysema or osseous fx, awaiting official results  WC: betadine paint and allyvan pad  Continue with IV antibiotics As Per ID/MED  All additional care by Med appreciated  ID consult and recommendations appreciated.  Vascular consult appreciated: Will plan for possible Angiogram next week; date TBD.  Pt notes blister forming at plantar R foot, now stable unstageable wound at the plantar 4th/5th metatarsal head, no pus, no malodor, no pain. Wound related to poor circulation of R foot. Recommend for area to fully demarcate at this time.  Erythema to RLE 2/2 PVD.  Podiatry will follow with wound care. However, recommend outpatient follow up at [facility].   Patient demonstrated verbal understanding of all interventions and tolerated interventions well without any complications.   Podiatry will follow while in house.   Assesment: 61 y/o male see in the ED for the following   - Right eschar wound to the 4th/5th submetatarsal head, stable  - Erythema to RLE possible PAD vs Cellulitis   - Neuropathy to right foot  - Below knee amputation LLE  - PAD/PVD  - Difficulty with ambulation      P:   Chart reviewed and Patient evaluated; discussed treatment and plan with Dr. Jez Mauricio  Discussed diagnosis and treatment with patient  X-rays reviewed : no soft tissue emphysema or osseous fx, awaiting official results  WC: betadine paint and allyvan pad  Continue with IV antibiotics As Per ID/MED  All additional care by Med appreciated  ID consult and recommendations appreciated.  Vascular consult appreciated: Will plan for possible Angiogram next week; date TBD.  Pt notes blister forming at plantar R foot, now stable unstageable wound at the plantar 4th/5th metatarsal head, no pus, no malodor, no pain. Wound related to poor circulation of R foot. Recommend for area to fully demarcate at this time.  Erythema to RLE 2/2 PVD.  No acute podiatric intervention at this time as wound is stable. Podiatry will monitor demarcation of eschar and plan for outpatient debridement once cleared by vascular. Outpatient follow up at [facility] recommended.   Patient demonstrated verbal understanding of all interventions and tolerated interventions well without any complications.   Podiatry will follow while in house.   Assesment: 61 y/o male see in the ED for the following   - Right eschar wound to the 4th/5th submetatarsal head, stable  - Erythema to RLE possible PAD vs Cellulitis   - Neuropathy to right foot  - Below knee amputation LLE  - PAD/PVD  - Difficulty with ambulation      P:   Chart reviewed and Patient evaluated; discussed treatment and plan with Dr. Jez Mauricio  Discussed diagnosis and treatment with patient  X-rays reviewed : no soft tissue emphysema or osseous fx, awaiting official results  WC: betadine paint and allyvan pad  Continue with IV antibiotics As Per ID/MED  All additional care by Med appreciated  ID consult and recommendations appreciated.  Vascular consult appreciated: Will plan for possible Angiogram next week; date TBD.  Pt notes blister forming at plantar R foot, now stable eschar at the plantar 4th/5th metatarsal head, no pus, no malodor, no pain. Wound related to poor circulation of R foot. Recommend for area to fully demarcate at this time.  Erythema to RLE 2/2 PVD.  No acute podiatric intervention at this time as wound is stable. Podiatry will monitor demarcation of eschar and plan for outpatient debridement once cleared by vascular. Outpatient follow up at [facility] recommended.   Patient demonstrated verbal understanding of all interventions and tolerated interventions well without any complications.   Podiatry will follow while in house.

## 2022-12-03 NOTE — PROGRESS NOTE ADULT - SUBJECTIVE AND OBJECTIVE BOX
Chief Complaint: RLE swelling and redness, epigastric discomfort    Interval Hx: Patient seen and examined earlier today. No new complaints. No significant changes. Continues to have RLE swelling and redness, slightly improved compared to presentation. On empiric antibiotics. Workup ongoing to r/o osteomyelitis and to assess for extent of peripheral vascular disease in that extremity. As for his epigastric discomfort, it is mild to moderate, started on carafate today, continued on PPI, anticipated that he will eventually need an upper endoscopy to evaluate that further and also as routine appropriate testing in the setting of his cirrhosis.     ROS: Multi system review is comprehensively negative x 10 systems except as above    Vitals:  T(F): 98.1 (03 Dec 2022 10:20), Max: 99.4 (03 Dec 2022 01:00)  HR: 79 (03 Dec 2022 10:20) (66 - 127)  BP: 138/79 (03 Dec 2022 10:20) (123/73 - 142/82)  RR: 18 (03 Dec 2022 10:20) (18 - 18)  SpO2: 99% (03 Dec 2022 10:20) (94% - 99%)    Exam:  Gen: No acute distress  HEENT: NCAT PERRL EOMI MMM  Neck: Supple no JVD no LAD  CVS: s1 s2 normal RRR  Chest: Normal resp effort, lungs CTA B/L  Abd: +Bs, soft NT ND   Ext: L BKA. RLE with mild erythema from upper shin down to foot, small superficial wounds on shin, large open wound on plantar surface of foot at the foot pad.   Skin: As described in Ext exam. Some additional flaking skin on R foot  Neuro: A+OX3, no focal deficits, sensation intake to RLE  Mood: Calm, pleasant    Labs:                        9.8    10.86 )-----------( 247                   30.4       138  |  106  |  38  ----------------------<  138  3.9   |  27  |  1.23    Ca    8.7          Troponin 364 --> 201    Urinalysis   Color: Yellow / Appearance: Clear / S.020 / pH: x  Gluc: x / Ketone: Negative  / Bili: Negative / Urobili: 1   Blood: x / Protein: 150 mg/dL / Nitrite: Negative   Leuk Esterase: Trace / RBC: 6-10 /HPF / WBC 6-10 /HPF   Sq Epi: x / Non Sq Epi: Few / Bacteria: Negative    Micro:  Bld cx x 2 : Negative to date  COVID, Flu, RSV PCR : Negative    Imaging:  MRI RLE, R foot : There is no evidence for osteomyelitis.    RLE arterial duplex : No appreciable flow detected in the posterior tibial and peroneal   arteries.    R foot XR : Soft tissue emphysema at the plantar aspect of the mid foot/calcaneous.     Cardiac Testing:  TTE :  Endocardium is not well visualized, however, overall left ventricular systolic function appears normal. Mild concentric left ventricular hypertrophy. Septal flattening is seen; this finding is consistent with right heart pressure / volume overload. Estimated left ventricular ejection fraction is 55-60%. The right ventricle exhibits mild dilation, mild diffuse hypokinesis, and mild depression of contractility. Severe mitral stenosis. Mild mitral regurgitation. Mild to moderate tricuspid valve regurgitation. Severe pulmonary hypertension.    Meds:  MEDICATIONS  (STANDING):  amLODIPine   Tablet 5 milliGRAM(s) Oral daily  aspirin  chewable 81 milliGRAM(s) Oral daily  doxycycline monohydrate Capsule 100 milliGRAM(s) Oral every 12 hours  famotidine    Tablet 40 milliGRAM(s) Oral at bedtime  gabapentin 300 milliGRAM(s) Oral two times a day  gabapentin 600 milliGRAM(s) Oral at bedtime  insulin glargine Injectable (LANTUS) 30 Unit(s) SubCutaneous at bedtime  insulin lispro (ADMELOG) corrective regimen sliding scale   SubCutaneous three times a day before meals  isosorbide   mononitrate ER Tablet (IMDUR) 30 milliGRAM(s) Oral daily  lisinopril 5 milliGRAM(s) Oral daily  metoclopramide 10 milliGRAM(s) Oral daily  multivitamin 1 Tablet(s) Oral daily  pantoprazole    Tablet 40 milliGRAM(s) Oral before breakfast  piperacillin/tazobactam IVPB.. 3.375 Gram(s) IV Intermittent every 8 hours  senna 2 Tablet(s) Oral at bedtime  simvastatin 10 milliGRAM(s) Oral at bedtime  sucralfate suspension 1 Gram(s) Oral four times a day  tamsulosin 0.8 milliGRAM(s) Oral at bedtime    MEDICATIONS  (PRN):  acetaminophen     Tablet .. 650 milliGRAM(s) Oral every 6 hours PRN Temp greater or equal to 38C (100.4F), Mild Pain (1 - 3)  aluminum hydroxide/magnesium hydroxide/simethicone Suspension 30 milliLiter(s) Oral every 4 hours PRN Dyspepsia  dextrose Oral Gel 15 Gram(s) Oral once PRN Blood Glucose LESS THAN 70 milliGRAM(s)/deciliter  HYDROmorphone  Injectable 1 milliGRAM(s) IV Push every 4 hours PRN Severe Pain (7 - 10)  magnesium hydroxide Suspension 30 milliLiter(s) Oral every 8 hours PRN Constipation  melatonin 3 milliGRAM(s) Oral at bedtime PRN Insomnia  ondansetron Injectable 4 milliGRAM(s) IV Push every 8 hours PRN Nausea and/or Vomiting

## 2022-12-03 NOTE — PROGRESS NOTE ADULT - ASSESSMENT
61 yo man with DM, HTN, CAD, 3v CABG, hx of PAD, L BKA, GERD, past alcohol use disorder, cirrhosis, chronic anemia, sent from [place] for worsening RLE swelling and redness, also complaints of epigastric discomfort. In the ED, exam was consistent with cellulitis of the RLE, non healing wound on the plantar surface of the R foot. Elevated inflammatory markers. Patient was placed on antibiotics and admitted to Medicine.     RLE skin and soft tissue infection in setting of non healing R foot ulcer, DM and PAD  Appreciate input from Podiatry, Vascular Surgery and ID. MRI RLE today negative for osteomyelitis. Circulation to the RLE is compromised as suggested by RLE arterial duplex. May not have appropriate healing and benefit of antibiotics if circulation is poor.   - Continue on empiric antibiotics for now  - For possible angiogram this coming week, f/u with Vascular Surgery  - Appreciate input from Podiatry. Blister forming at plantar R foot, now stable eschar at the plantar 4th/5th metatarsal head, no pus, no malodor, no pain. Wound related to poor circulation of R foot. No acute podiatric intervention at this time as wound is stable. Podiatry will monitor demarcation of eschar and plan for outpatient debridement once cleared by vascular. Outpatient follow up at [facility] recommended.     Epigastric discomfort  DDx GERD, esophagitis, gastritis, peptic ulcer. Appreciate input from GI. Started on carafate. Continued on PPI. Patient will eventually need EGD, timing TBD.   - Continue carafate and PPI  - EGD planning,     HTN  BP controlled  - Continue amlodipine and lisinopril and imdur    CAD  Remote hx of CABG. No angina or CHF sx.  - Continue aspirin, statin, imdur    Elevated troponin  Appreciate input from Cardiology. Likely myocardial demand ischemia without infarction. No angina or CHF sx.   - Continue medical management    Mitral valve disease  Past MV repair but now with severely elevated transmitral gradient -- can pursue work-up once recovered from infectious process; patient denies symptoms at present functional status; old op-report from [place] would be helpful for future management.  - Try to obtain old op report    Pulmonary HTN  Severe pulmonary HTN - possibly from MV disease; no prior TTE and no outpatient cardiac care.  - Check SPO2 with ambulation.    DM  a1c 7.7. Currently on lantus and lispro here  - Continue lantus and lispro  - Monitor glucose tid ac and hs    BPH  Stable  - Continue Flomax

## 2022-12-04 LAB
ANION GAP SERPL CALC-SCNC: 5 MMOL/L — SIGNIFICANT CHANGE UP (ref 5–17)
BASOPHILS # BLD AUTO: 0.04 K/UL — SIGNIFICANT CHANGE UP (ref 0–0.2)
BASOPHILS NFR BLD AUTO: 0.4 % — SIGNIFICANT CHANGE UP (ref 0–2)
BUN SERPL-MCNC: 39 MG/DL — HIGH (ref 7–23)
CALCIUM SERPL-MCNC: 8.6 MG/DL — SIGNIFICANT CHANGE UP (ref 8.5–10.1)
CHLORIDE SERPL-SCNC: 110 MMOL/L — HIGH (ref 96–108)
CO2 SERPL-SCNC: 24 MMOL/L — SIGNIFICANT CHANGE UP (ref 22–31)
CREAT SERPL-MCNC: 1.34 MG/DL — HIGH (ref 0.5–1.3)
CRP SERPL-MCNC: 71 MG/L — HIGH
EGFR: 60 ML/MIN/1.73M2 — SIGNIFICANT CHANGE UP
EOSINOPHIL # BLD AUTO: 0.2 K/UL — SIGNIFICANT CHANGE UP (ref 0–0.5)
EOSINOPHIL NFR BLD AUTO: 2.1 % — SIGNIFICANT CHANGE UP (ref 0–6)
ERYTHROCYTE [SEDIMENTATION RATE] IN BLOOD: 89 MM/HR — HIGH (ref 0–20)
GLUCOSE SERPL-MCNC: 176 MG/DL — HIGH (ref 70–99)
HCT VFR BLD CALC: 29.6 % — LOW (ref 39–50)
HGB BLD-MCNC: 9.5 G/DL — LOW (ref 13–17)
IMM GRANULOCYTES NFR BLD AUTO: 0.3 % — SIGNIFICANT CHANGE UP (ref 0–0.9)
LYMPHOCYTES # BLD AUTO: 1.36 K/UL — SIGNIFICANT CHANGE UP (ref 1–3.3)
LYMPHOCYTES # BLD AUTO: 14.6 % — SIGNIFICANT CHANGE UP (ref 13–44)
MCHC RBC-ENTMCNC: 32 PG — SIGNIFICANT CHANGE UP (ref 27–34)
MCHC RBC-ENTMCNC: 32.1 GM/DL — SIGNIFICANT CHANGE UP (ref 32–36)
MCV RBC AUTO: 99.7 FL — SIGNIFICANT CHANGE UP (ref 80–100)
MONOCYTES # BLD AUTO: 1.06 K/UL — HIGH (ref 0–0.9)
MONOCYTES NFR BLD AUTO: 11.4 % — SIGNIFICANT CHANGE UP (ref 2–14)
NEUTROPHILS # BLD AUTO: 6.62 K/UL — SIGNIFICANT CHANGE UP (ref 1.8–7.4)
NEUTROPHILS NFR BLD AUTO: 71.2 % — SIGNIFICANT CHANGE UP (ref 43–77)
PLATELET # BLD AUTO: 284 K/UL — SIGNIFICANT CHANGE UP (ref 150–400)
POTASSIUM SERPL-MCNC: 4 MMOL/L — SIGNIFICANT CHANGE UP (ref 3.5–5.3)
POTASSIUM SERPL-SCNC: 4 MMOL/L — SIGNIFICANT CHANGE UP (ref 3.5–5.3)
RBC # BLD: 2.97 M/UL — LOW (ref 4.2–5.8)
RBC # FLD: 13.1 % — SIGNIFICANT CHANGE UP (ref 10.3–14.5)
SODIUM SERPL-SCNC: 139 MMOL/L — SIGNIFICANT CHANGE UP (ref 135–145)
WBC # BLD: 9.31 K/UL — SIGNIFICANT CHANGE UP (ref 3.8–10.5)
WBC # FLD AUTO: 9.31 K/UL — SIGNIFICANT CHANGE UP (ref 3.8–10.5)

## 2022-12-04 PROCEDURE — 99233 SBSQ HOSP IP/OBS HIGH 50: CPT

## 2022-12-04 PROCEDURE — 99232 SBSQ HOSP IP/OBS MODERATE 35: CPT

## 2022-12-04 RX ORDER — ENOXAPARIN SODIUM 100 MG/ML
40 INJECTION SUBCUTANEOUS EVERY 24 HOURS
Refills: 0 | Status: DISCONTINUED | OUTPATIENT
Start: 2022-12-04 | End: 2022-12-05

## 2022-12-04 RX ORDER — SODIUM CHLORIDE 9 MG/ML
1000 INJECTION INTRAMUSCULAR; INTRAVENOUS; SUBCUTANEOUS
Refills: 0 | Status: DISCONTINUED | OUTPATIENT
Start: 2022-12-04 | End: 2022-12-08

## 2022-12-04 RX ADMIN — SODIUM CHLORIDE 100 MILLILITER(S): 9 INJECTION INTRAMUSCULAR; INTRAVENOUS; SUBCUTANEOUS at 13:10

## 2022-12-04 RX ADMIN — HYDROMORPHONE HYDROCHLORIDE 1 MILLIGRAM(S): 2 INJECTION INTRAMUSCULAR; INTRAVENOUS; SUBCUTANEOUS at 17:06

## 2022-12-04 RX ADMIN — FAMOTIDINE 40 MILLIGRAM(S): 10 INJECTION INTRAVENOUS at 21:37

## 2022-12-04 RX ADMIN — GABAPENTIN 600 MILLIGRAM(S): 400 CAPSULE ORAL at 21:37

## 2022-12-04 RX ADMIN — HYDROMORPHONE HYDROCHLORIDE 1 MILLIGRAM(S): 2 INJECTION INTRAMUSCULAR; INTRAVENOUS; SUBCUTANEOUS at 21:36

## 2022-12-04 RX ADMIN — LISINOPRIL 5 MILLIGRAM(S): 2.5 TABLET ORAL at 10:53

## 2022-12-04 RX ADMIN — Medication 1 TABLET(S): at 10:53

## 2022-12-04 RX ADMIN — Medication 2: at 12:17

## 2022-12-04 RX ADMIN — PIPERACILLIN AND TAZOBACTAM 25 GRAM(S): 4; .5 INJECTION, POWDER, LYOPHILIZED, FOR SOLUTION INTRAVENOUS at 05:48

## 2022-12-04 RX ADMIN — Medication 100 MILLIGRAM(S): at 21:38

## 2022-12-04 RX ADMIN — INSULIN GLARGINE 30 UNIT(S): 100 INJECTION, SOLUTION SUBCUTANEOUS at 21:36

## 2022-12-04 RX ADMIN — Medication 1 GRAM(S): at 17:03

## 2022-12-04 RX ADMIN — HYDROMORPHONE HYDROCHLORIDE 1 MILLIGRAM(S): 2 INJECTION INTRAMUSCULAR; INTRAVENOUS; SUBCUTANEOUS at 21:50

## 2022-12-04 RX ADMIN — HYDROMORPHONE HYDROCHLORIDE 1 MILLIGRAM(S): 2 INJECTION INTRAMUSCULAR; INTRAVENOUS; SUBCUTANEOUS at 11:51

## 2022-12-04 RX ADMIN — Medication 1 GRAM(S): at 10:52

## 2022-12-04 RX ADMIN — Medication 1 GRAM(S): at 05:48

## 2022-12-04 RX ADMIN — Medication 2: at 17:06

## 2022-12-04 RX ADMIN — SIMVASTATIN 10 MILLIGRAM(S): 20 TABLET, FILM COATED ORAL at 21:37

## 2022-12-04 RX ADMIN — HYDROMORPHONE HYDROCHLORIDE 1 MILLIGRAM(S): 2 INJECTION INTRAMUSCULAR; INTRAVENOUS; SUBCUTANEOUS at 06:00

## 2022-12-04 RX ADMIN — PANTOPRAZOLE SODIUM 40 MILLIGRAM(S): 20 TABLET, DELAYED RELEASE ORAL at 05:47

## 2022-12-04 RX ADMIN — Medication 81 MILLIGRAM(S): at 10:52

## 2022-12-04 RX ADMIN — HYDROMORPHONE HYDROCHLORIDE 1 MILLIGRAM(S): 2 INJECTION INTRAMUSCULAR; INTRAVENOUS; SUBCUTANEOUS at 05:47

## 2022-12-04 RX ADMIN — GABAPENTIN 300 MILLIGRAM(S): 400 CAPSULE ORAL at 05:47

## 2022-12-04 RX ADMIN — ENOXAPARIN SODIUM 40 MILLIGRAM(S): 100 INJECTION SUBCUTANEOUS at 17:03

## 2022-12-04 RX ADMIN — GABAPENTIN 300 MILLIGRAM(S): 400 CAPSULE ORAL at 13:10

## 2022-12-04 RX ADMIN — ISOSORBIDE MONONITRATE 30 MILLIGRAM(S): 60 TABLET, EXTENDED RELEASE ORAL at 10:52

## 2022-12-04 RX ADMIN — HYDROMORPHONE HYDROCHLORIDE 1 MILLIGRAM(S): 2 INJECTION INTRAMUSCULAR; INTRAVENOUS; SUBCUTANEOUS at 18:05

## 2022-12-04 RX ADMIN — HYDROMORPHONE HYDROCHLORIDE 1 MILLIGRAM(S): 2 INJECTION INTRAMUSCULAR; INTRAVENOUS; SUBCUTANEOUS at 10:51

## 2022-12-04 RX ADMIN — Medication 10 MILLIGRAM(S): at 10:53

## 2022-12-04 RX ADMIN — Medication 100 MILLIGRAM(S): at 10:53

## 2022-12-04 RX ADMIN — AMLODIPINE BESYLATE 5 MILLIGRAM(S): 2.5 TABLET ORAL at 10:52

## 2022-12-04 RX ADMIN — PIPERACILLIN AND TAZOBACTAM 25 GRAM(S): 4; .5 INJECTION, POWDER, LYOPHILIZED, FOR SOLUTION INTRAVENOUS at 21:35

## 2022-12-04 RX ADMIN — TAMSULOSIN HYDROCHLORIDE 0.8 MILLIGRAM(S): 0.4 CAPSULE ORAL at 21:38

## 2022-12-04 RX ADMIN — Medication 2: at 08:20

## 2022-12-04 RX ADMIN — PIPERACILLIN AND TAZOBACTAM 25 GRAM(S): 4; .5 INJECTION, POWDER, LYOPHILIZED, FOR SOLUTION INTRAVENOUS at 13:10

## 2022-12-04 NOTE — PROGRESS NOTE ADULT - SUBJECTIVE AND OBJECTIVE BOX
CHIEF COMPLAINT: Patient is a 62y old  Male who presents with a chief complaint of Elevated troponin, PAD and Cellulitis (02 Dec 2022 10:14)    HPI:  61 y/o man with a history of CAD s/p CABG and MV repair (2016 - [place]), HTN, DM, GERD, L BKA, who was transferred from [facility] for the evaluation of abdominal discomfort and cellulitis.  Cardiology consult requested for elevated troponin.  Mr. Roberts has no cardiac symptoms -- he has not been experiencing angina or dyspnea.  He walks during PT and denies exertional chest discomfort.  He does not see a cardiologist in the outpatient setting.    12/4/22 Comfortable in bed with CC of SOB No CP Tele SR     PAST MEDICAL & SURGICAL HISTORY:  HTN (hypertension)  DM (diabetes mellitus)  Anemia  GERD (gastroesophageal reflux disease)  S/P BKA (below knee amputation), left  CAD (coronary artery disease)  S/P CABG x 3  Status post amputation of leg    SOCIAL HISTORY:   Smoking: Nonsmoker    FAMILY HISTORY: No pertinent family history in first degree relatives    MEDICATIONS  (STANDING):  amLODIPine   Tablet 5 milliGRAM(s) Oral daily  aspirin  chewable 81 milliGRAM(s) Oral daily  dextrose 5%. 1000 milliLiter(s) (100 mL/Hr) IV Continuous <Continuous>  dextrose 5%. 1000 milliLiter(s) (50 mL/Hr) IV Continuous <Continuous>  dextrose 50% Injectable 25 Gram(s) IV Push once  dextrose 50% Injectable 12.5 Gram(s) IV Push once  dextrose 50% Injectable 25 Gram(s) IV Push once  doxycycline monohydrate Capsule 100 milliGRAM(s) Oral every 12 hours  famotidine    Tablet 40 milliGRAM(s) Oral at bedtime  gabapentin 300 milliGRAM(s) Oral two times a day  gabapentin 600 milliGRAM(s) Oral at bedtime  glucagon  Injectable 1 milliGRAM(s) IntraMuscular once  insulin glargine Injectable (LANTUS) 30 Unit(s) SubCutaneous at bedtime  insulin lispro (ADMELOG) corrective regimen sliding scale   SubCutaneous three times a day before meals  isosorbide   mononitrate ER Tablet (IMDUR) 30 milliGRAM(s) Oral daily  lisinopril 5 milliGRAM(s) Oral daily  metoclopramide 10 milliGRAM(s) Oral daily  multivitamin 1 Tablet(s) Oral daily  pantoprazole    Tablet 40 milliGRAM(s) Oral before breakfast  piperacillin/tazobactam IVPB.. 3.375 Gram(s) IV Intermittent every 8 hours  senna 2 Tablet(s) Oral at bedtime  simvastatin 10 milliGRAM(s) Oral at bedtime  sucralfate suspension 1 Gram(s) Oral four times a day  tamsulosin 0.8 milliGRAM(s) Oral at bedtime    MEDICATIONS  (PRN):  acetaminophen     Tablet .. 650 milliGRAM(s) Oral every 6 hours PRN Temp greater or equal to 38C (100.4F), Mild Pain (1 - 3)  aluminum hydroxide/magnesium hydroxide/simethicone Suspension 30 milliLiter(s) Oral every 4 hours PRN Dyspepsia  dextrose Oral Gel 15 Gram(s) Oral once PRN Blood Glucose LESS THAN 70 milliGRAM(s)/deciliter  HYDROmorphone  Injectable 1 milliGRAM(s) IV Push every 4 hours PRN Severe Pain (7 - 10)  magnesium hydroxide Suspension 30 milliLiter(s) Oral every 8 hours PRN Constipation  melatonin 3 milliGRAM(s) Oral at bedtime PRN Insomnia  ondansetron Injectable 4 milliGRAM(s) IV Push every 8 hours PRN Nausea and/or Vomiting      Allergies:  No Known Allergies    Vital Signs Last 24 Hrs  T(C): 36.9 (04 Dec 2022 08:07), Max: 37 (03 Dec 2022 19:53)  T(F): 98.5 (04 Dec 2022 08:07), Max: 98.6 (03 Dec 2022 19:53)  HR: 85 (04 Dec 2022 08:07) (79 - 85)  BP: 141/79 (04 Dec 2022 08:07) (116/55 - 141/79)  BP(mean): 70 (03 Dec 2022 19:53) (70 - 70)  RR: 18 (04 Dec 2022 08:07) (18 - 18)  SpO2: 99% (04 Dec 2022 08:07) (98% - 99%)    Parameters below as of 04 Dec 2022 08:07  Patient On (Oxygen Delivery Method): room air      PHYSICAL EXAM:  Constitutional: NAD, awake and alert  HEENT:  EOMI,  Pupils round, No oral cyanosis.  Pulmonary: Non-labored, breath sounds are clear bilaterally  Cardiovascular: S1 and S2, regular rate and rhythm, + Murmur  Gastrointestinal: Bowel Sounds present, soft, nontender.   Lymph: 1+ RLE Edema L BKA  Neurological: Alert, no focal deficits  Psych:  Mood & affect appropriate    LABS:                      10.9   17.77 )-----------( 270      ( 01 Dec 2022 19:11 )             32.8     140    |  108    |  31     ----------------------------<  113    4.1     |  24     |  1.20     PT/INR - ( 01 Dec 2022 19:11 )   PT: 15.9 sec;   INR: 1.37 ratio    PTT - ( 01 Dec 2022 19:11 )  PTT:34.9 sec    Pro Bnp [id number]    TroponinI hsT: 201.30, 364.23, 345.99    TTE Echo Complete w/o Contrast w/ Doppler (12.02.22 @ 08:46):   Endocardium is not well visualized, however, overall left ventricular systolic function appears normal. Mild concentric left ventricular hypertrophy. Septal flattening is seen; this finding is consistent with right heart pressure / volume overload. Estimated left ventricular ejection fraction is 55-60%.   The right ventricle exhibits mild dilation, mild diffuse hypokinesis, and mild depression of contractility.   Severe mitral stenosis. Mild mitral regurgitation.   Mild to moderate tricuspid valve regurgitation.   Severe pulmonary hypertension.    ECG: SR, nonspecific ST/T abn

## 2022-12-04 NOTE — PROGRESS NOTE ADULT - SUBJECTIVE AND OBJECTIVE BOX
Chief Complaint: RLE swelling and redness, epigastric discomfort    Interval Hx: Patient seen and examined earlier today. No new complaints. No significant changes. Continues to have RLE swelling and redness, slightly improved compared to presentation. On empiric antibiotics. MRI negative for osteomyelitis. Workup ongoing to further evaluate the extent of his peripheral vascular disease in that extremity. As for his epigastric discomfort, it is mild to moderate, being treated with Carafate and PPI. Anticipated that he will eventually need an upper endoscopy to evaluate that epigastric discomfort and also as routine appropriate testing in the setting of his cirrhosis.     ROS: Multi system review is comprehensively negative x 10 systems except as above    Vitals:  T(F): 98.5 (04 Dec 2022 08:07), Max: 98.6 (03 Dec 2022 19:53)  HR: 85 (04 Dec 2022 08:07) (79 - 85)  BP: 141/79 (04 Dec 2022 08:07) (116/55 - 141/79)  RR: 18 (04 Dec 2022 08:07) (18 - 18)  SpO2: 99% (04 Dec 2022 08:07) (98% - 99%) on room air    Exam:  Gen: No acute distress  HEENT: NCAT PERRL EOMI MMM  Neck: Supple no JVD no LAD  CVS: S1 S2 normal RRR  Chest: Normal resp effort, lungs CTA B/L  Abd: +BS, soft NT ND   Ext: L BKA. RLE with mild erythema from upper shin down to foot, small superficial wounds on shin, large open wound on plantar surface of foot at the foot pad.   Skin: As described in extremity exam. Some additional flaking skin on R foot  Neuro: A+OX3, no focal deficits, sensation intake to RLE  Mood: Calm, pleasant    Labs:                              9.5    9.31 )-----------( 284              29.6       139  |  110  |  39  -----------------------< 176  4.0   |   24   |  1.34    Ca 8.6    PT: 13.7 sec;   INR: 1.18 ratio      Troponin 364 --> 201   Lactate 1.3     A1c 7.7      ESR 89    Urinalysis 12/2: Clear, ket-, prot 150, N-, LE trace, RBC 6-10, WBC 6-10, bact-    Micro:  Bld cx x 2 12/1: Negative to date  COVID, Flu, RSV PCR 12/1: Negative    Imaging:  MRI RLE, R foot 12/2: There is no evidence for osteomyelitis.    RLE arterial duplex 12/1: No appreciable flow detected in the posterior tibial and peroneal arteries.    R foot XR 12/1: Soft tissue emphysema at the plantar aspect of the mid foot/calcaneous.     Cardiac Testing:  TTE 12/2:  Endocardium is not well visualized, however, overall left ventricular systolic function appears normal. Mild concentric left ventricular hypertrophy. Septal flattening is seen; this finding is consistent with right heart pressure / volume overload. Estimated left ventricular ejection fraction is 55-60%. The right ventricle exhibits mild dilation, mild diffuse hypokinesis, and mild depression of contractility. Severe mitral stenosis. Mild mitral regurgitation. Mild to moderate tricuspid valve regurgitation. Severe pulmonary hypertension.    Meds:  MEDICATIONS  (STANDING):  amLODIPine   Tablet 5 milliGRAM(s) Oral daily  aspirin  chewable 81 milliGRAM(s) Oral daily  doxycycline monohydrate Capsule 100 milliGRAM(s) Oral every 12 hours  enoxaparin Injectable 40 milliGRAM(s) SubCutaneous every 24 hours  famotidine    Tablet 40 milliGRAM(s) Oral at bedtime  gabapentin 300 milliGRAM(s) Oral two times a day  gabapentin 600 milliGRAM(s) Oral at bedtime  insulin glargine Injectable (LANTUS) 30 Unit(s) SubCutaneous at bedtime  insulin lispro (ADMELOG) corrective regimen sliding scale   SubCutaneous three times a day before meals  isosorbide   mononitrate ER Tablet (IMDUR) 30 milliGRAM(s) Oral daily  lisinopril 5 milliGRAM(s) Oral daily  metoclopramide 10 milliGRAM(s) Oral daily  multivitamin 1 Tablet(s) Oral daily  pantoprazole    Tablet 40 milliGRAM(s) Oral before breakfast  piperacillin/tazobactam IVPB.. 3.375 Gram(s) IV Intermittent every 8 hours  senna 2 Tablet(s) Oral at bedtime  simvastatin 10 milliGRAM(s) Oral at bedtime  sodium chloride 0.9%. 1000 milliLiter(s) (100 mL/Hr) IV Continuous <Continuous>  sucralfate suspension 1 Gram(s) Oral four times a day  tamsulosin 0.8 milliGRAM(s) Oral at bedtime    MEDICATIONS  (PRN):  acetaminophen     Tablet .. 650 milliGRAM(s) Oral every 6 hours PRN Temp greater or equal to 38C (100.4F), Mild Pain (1 - 3)  aluminum hydroxide/magnesium hydroxide/simethicone Suspension 30 milliLiter(s) Oral every 4 hours PRN Dyspepsia  dextrose Oral Gel 15 Gram(s) Oral once PRN Blood Glucose LESS THAN 70 milliGRAM(s)/deciliter  HYDROmorphone  Injectable 1 milliGRAM(s) IV Push every 4 hours PRN Severe Pain (7 - 10)  magnesium hydroxide Suspension 30 milliLiter(s) Oral every 8 hours PRN Constipation  melatonin 3 milliGRAM(s) Oral at bedtime PRN Insomnia  ondansetron Injectable 4 milliGRAM(s) IV Push every 8 hours PRN Nausea and/or Vomiting

## 2022-12-04 NOTE — PROGRESS NOTE ADULT - PROBLEM SELECTOR PLAN 3
Past MV repair but now with severely elevated transmitral gradient, severe PHTN  pursue work-up once recovered from infectious process with Structural Heart Disease consult Dr Wilson  R&L heart Cath/ALEX Past MV repair but now with severely elevated transmitral gradient, severe PHTN  pursue work-up once recovered from infectious process with Structural Heart Disease consult Dr Wilson -- will eventually need R&L heart Cath/ALEX

## 2022-12-04 NOTE — PROGRESS NOTE ADULT - ASSESSMENT
61 yo man with DM, HTN, CAD, 3v CABG, hx of PAD, L BKA, GERD, past alcohol use disorder, cirrhosis, chronic anemia, sent from [place] for worsening RLE swelling and redness, also complaints of epigastric discomfort. In the ED, exam was consistent with cellulitis of the RLE, non healing wound on the plantar surface of the R foot. Elevated inflammatory markers. Patient was placed on antibiotics and admitted to Medicine.     RLE skin and soft tissue infection in setting of non healing R foot ulcer, DM and PAD  Appreciate input from Podiatry, Vascular Surgery and ID. MRI RLE negative for osteomyelitis. Circulation to the RLE is compromised as suggested by RLE arterial duplex. May not have appropriate healing and benefit of antibiotics if circulation is poor.   - Continue on empiric antibiotics for now  - For LE angiogram this coming week, f/u with Vascular Surgery  - Appreciate input from Podiatry. Blister forming at plantar R foot, now stable eschar at the plantar 4th/5th metatarsal head, no pus, no malodor, no pain. Wound related to poor circulation of R foot. No acute podiatric intervention at this time as wound is stable. Podiatry will monitor demarcation of eschar and plan for outpatient debridement once cleared by vascular. Outpatient follow up at [facility] recommended.     Epigastric discomfort  DDx GERD, esophagitis, gastritis, peptic ulcer. Appreciate input from GI. Started on carafate. PPI. Patient will eventually need EGD, timing TBD.   - Continue Carafate and PPI  - EGD planning,     HTN  BP controlled  - Continue amlodipine and lisinopril and Imdur    CAD  Remote hx of CABG. No angina or CHF sx.  - Continue aspirin, statin, Imdur    Elevated troponin  Appreciate input from Cardiology. Likely myocardial demand ischemia without infarction. No angina or CHF sx. LVEF WNL.   - Continue medical management    Mitral valve disease  Past MV repair but now with severely elevated transmitral gradient, severe pHTN. Plan to pursue work-up once recovered from infectious process.   - F/u with Structural Heart Disease Dr. Wilson  - Will likely eventually need L and R heart cath, ALEX    Pulmonary HTN  Severe pulmonary HTN - possibly from MV disease; no prior TTE and no outpatient cardiac care available for reviewed.  - Check SPO2 with ambulation.    DM  A1c 7.7. Currently on Lantus and Lispro here  - Continue Lantus and Lispro  - Monitor glucose TID AC and HS    BPH  Stable  - Continue Flomax      Code Status: Full  DVT px: LMWH  Dispo: To be determined pending further clinical assessment 61 yo man with DM, HTN, CAD, 3v CABG, hx of PAD, L BKA, GERD, past alcohol use disorder, cirrhosis, chronic anemia, sent from [place] for worsening RLE swelling and redness, also complaints of epigastric discomfort. In the ED, exam was consistent with cellulitis of the RLE, non healing wound on the plantar surface of the R foot. Elevated inflammatory markers. Patient was placed on antibiotics and admitted to Medicine.     RLE skin and soft tissue infection in setting of non healing R foot ulcer, DM and PAD  Appreciate input from Podiatry, Vascular Surgery and ID. MRI RLE negative for osteomyelitis. Circulation to the RLE is compromised as suggested by RLE arterial duplex. May not have appropriate healing and benefit of antibiotics if circulation is poor.   - Continue on empiric antibiotics for now  - Continue aspirin, statin  - For LE angiogram this coming week, f/u with Vascular Surgery  - Appreciate input from Podiatry. Blister forming at plantar R foot, now stable eschar at the plantar 4th/5th metatarsal head, no pus, no malodor, no pain. Wound related to poor circulation of R foot. No acute podiatric intervention at this time as wound is stable. Podiatry will monitor demarcation of eschar and plan for outpatient debridement once cleared by vascular. Outpatient follow up at [facility] recommended.     Epigastric discomfort  DDx GERD, esophagitis, gastritis, peptic ulcer. Appreciate input from GI. Started on carafate. PPI. Patient will eventually need EGD, timing TBD.   - Continue Carafate and PPI  - EGD planning,     HTN  BP controlled  - Continue amlodipine and lisinopril and Imdur    CAD  Remote hx of CABG. No angina or CHF sx.  - Continue aspirin, statin, Imdur    Elevated troponin  Appreciate input from Cardiology. Likely myocardial demand ischemia without infarction. No angina or CHF sx. LVEF WNL.   - Continue medical management    Mitral valve disease  Past MV repair but now with severely elevated transmitral gradient, severe pHTN. Plan to pursue work-up once recovered from infectious process.   - F/u with Structural Heart Disease Dr. Wilson  - Will likely eventually need L and R heart cath, ALEX    Pulmonary HTN  Severe pulmonary HTN - possibly from MV disease; no prior TTE and no outpatient cardiac care available for reviewed.  - Check SPO2 with ambulation.    DM  A1c 7.7. Currently on Lantus and Lispro here  - Continue Lantus and Lispro  - Monitor glucose TID AC and HS    BPH  Stable  - Continue Flomax      Code Status: Full  DVT px: LMWH  Dispo: To be determined pending further clinical assessment

## 2022-12-04 NOTE — PROGRESS NOTE ADULT - ASSESSMENT
62yoM with multiple comorbidities p/w RLE plantar foot wound    Plan:  Arterial duplex reviewed  Will plan for possible Angiogram this week; date TBD  Podiatry for local wound care  Medical management as per primary team    Plan discussed with Dr. Walters

## 2022-12-04 NOTE — PROGRESS NOTE ADULT - SUBJECTIVE AND OBJECTIVE BOX
SURGERY DAILY PROGRESS NOTE:     Subjective:  Patient seen and examined at bedside during am rounds. AVSS. Denies any fevers, chills, n/v/d, chest pain or shortness of breath    Objective:    MEDICATIONS  (STANDING):  amLODIPine   Tablet 5 milliGRAM(s) Oral daily  aspirin  chewable 81 milliGRAM(s) Oral daily  dextrose 5%. 1000 milliLiter(s) (50 mL/Hr) IV Continuous <Continuous>  dextrose 5%. 1000 milliLiter(s) (100 mL/Hr) IV Continuous <Continuous>  dextrose 50% Injectable 25 Gram(s) IV Push once  dextrose 50% Injectable 12.5 Gram(s) IV Push once  dextrose 50% Injectable 25 Gram(s) IV Push once  doxycycline monohydrate Capsule 100 milliGRAM(s) Oral every 12 hours  famotidine    Tablet 40 milliGRAM(s) Oral at bedtime  gabapentin 300 milliGRAM(s) Oral two times a day  gabapentin 600 milliGRAM(s) Oral at bedtime  glucagon  Injectable 1 milliGRAM(s) IntraMuscular once  insulin glargine Injectable (LANTUS) 30 Unit(s) SubCutaneous at bedtime  insulin lispro (ADMELOG) corrective regimen sliding scale   SubCutaneous three times a day before meals  isosorbide   mononitrate ER Tablet (IMDUR) 30 milliGRAM(s) Oral daily  lisinopril 5 milliGRAM(s) Oral daily  metoclopramide 10 milliGRAM(s) Oral daily  multivitamin 1 Tablet(s) Oral daily  pantoprazole    Tablet 40 milliGRAM(s) Oral before breakfast  piperacillin/tazobactam IVPB.. 3.375 Gram(s) IV Intermittent every 8 hours  senna 2 Tablet(s) Oral at bedtime  simvastatin 10 milliGRAM(s) Oral at bedtime  sucralfate suspension 1 Gram(s) Oral four times a day  tamsulosin 0.8 milliGRAM(s) Oral at bedtime    MEDICATIONS  (PRN):  acetaminophen     Tablet .. 650 milliGRAM(s) Oral every 6 hours PRN Temp greater or equal to 38C (100.4F), Mild Pain (1 - 3)  aluminum hydroxide/magnesium hydroxide/simethicone Suspension 30 milliLiter(s) Oral every 4 hours PRN Dyspepsia  dextrose Oral Gel 15 Gram(s) Oral once PRN Blood Glucose LESS THAN 70 milliGRAM(s)/deciliter  HYDROmorphone  Injectable 1 milliGRAM(s) IV Push every 4 hours PRN Severe Pain (7 - 10)  magnesium hydroxide Suspension 30 milliLiter(s) Oral every 8 hours PRN Constipation  melatonin 3 milliGRAM(s) Oral at bedtime PRN Insomnia  ondansetron Injectable 4 milliGRAM(s) IV Push every 8 hours PRN Nausea and/or Vomiting      Vital Signs Last 24 Hrs  T(C): 36.9 (04 Dec 2022 08:07), Max: 37 (03 Dec 2022 19:53)  T(F): 98.5 (04 Dec 2022 08:07), Max: 98.6 (03 Dec 2022 19:53)  HR: 85 (04 Dec 2022 08:07) (79 - 85)  BP: 141/79 (04 Dec 2022 08:07) (116/55 - 141/79)  BP(mean): 70 (03 Dec 2022 19:53) (70 - 70)  RR: 18 (04 Dec 2022 08:07) (18 - 18)  SpO2: 99% (04 Dec 2022 08:07) (98% - 99%)    Parameters below as of 04 Dec 2022 08:07  Patient On (Oxygen Delivery Method): room air          PHYSICAL EXAM   General: AAOx3, Well developed, NAD  Chest: Normal respiratory effort  Heart: RRR  Abdomen: Soft, NTND, no masses  Neuro/Psych: No localized deficits. Normal speech, normal tone  Skin: Normal, no rashes, no lesions noted.   Extremities: S/p LLE BKA; RLE Warm, small wounds in variable stages of healing. Plantar foot wound with eschar under 4th/5th digit.Palpable femoral/popliteal; Non palpable DP/PT, Dopplerable DP, non dopplerable PT      I&O's Detail    03 Dec 2022 07:01  -  04 Dec 2022 07:00  --------------------------------------------------------  IN:  Total IN: 0 mL    OUT:    Voided (mL): 250 mL  Total OUT: 250 mL    Total NET: -250 mL      04 Dec 2022 07:01  -  04 Dec 2022 11:05  --------------------------------------------------------  IN:  Total IN: 0 mL    OUT:    Voided (mL): 100 mL  Total OUT: 100 mL    Total NET: -100 mL          Daily     Daily Weight in k.9 (04 Dec 2022 05:37)    LABS:                        9.5    9.31  )-----------( 284      ( 04 Dec 2022 06:57 )             29.6     12    139  |  110<H>  |  39<H>  ----------------------------<  176<H>  4.0   |  24  |  1.34<H>    Ca    8.6      04 Dec 2022 06:57    TPro  x   /  Alb  x   /  TBili  0.5  /  DBili  x   /  AST  x   /  ALT  x   /  AlkPhos  x   1203    PT/INR - ( 03 Dec 2022 07:04 )   PT: 13.7 sec;   INR: 1.18 ratio           Urinalysis Basic - ( 02 Dec 2022 16:00 )    Color: Yellow / Appearance: Clear / S.020 / pH: x  Gluc: x / Ketone: Negative  / Bili: Negative / Urobili: 1   Blood: x / Protein: 150 mg/dL / Nitrite: Negative   Leuk Esterase: Trace / RBC: 6-10 /HPF / WBC 6-10 /HPF   Sq Epi: x / Non Sq Epi: Few / Bacteria: Negative        RADIOLOGY & ADDITIONAL STUDIES:  < from: MR Lower Ext Non-joint No Cont, Right (22 @ 12:22) >  SOFT TISSUES    Musculature:  There is mild generalized muscle atrophy and increased T2   signal of the lower leg musculature. More severe atrophy involves the   intrinsic muscles of the foot. Changes suggest diabetic neuropathy.    Subcutaneous Tissues:  Mild subcutaneous edema is present. Cutaneous   wound appears to be present along the plantar lateral forefoot.    NEUROVASCULAR STRUCTURES    Tarsal Tunnel:  Preserved.    IMPRESSION:  1.  There is no evidence for osteomyelitis.      ASSESSMENT/PLAN:

## 2022-12-04 NOTE — PROGRESS NOTE ADULT - SUBJECTIVE AND OBJECTIVE BOX
Date of Service: 12/4/22  Chief Complaint :61 y/o male PMHx of PVD s/p left BKA, DM, HTN, CAD, anemia, and GERD, Full code with molst BIBEMS from [facility] c/o severe abdominal pain since this morning. Pt reports vomiting. Pt denies diarrhea. Pt reports he has not been eating or drinking. Pt reports he has noticed redness of RLE over last week and black area on bottom of foot. States he was scheduled for angiogram tomorrow with Dr. Dickson who has been following patient for continued PVD of right lower extremity.  Podiatry consulted for unstagable wound to Right 4/5th metatarsal head. Patient denies any pain to the area. 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 Patient says he noticed redness/erythema to his right lower extremity. Patient also says that he has narrowing of his blood vessels to his right leg, and that there is decreased blood flow to his right foot.     12/04/22: Pt seen by podiatry team. Patient resting comfortably at bedside, NAD and pleasant. No additional pedal complaints reported at this time.     REVIEW OF SYSTEMS: All other review of systems is negative unless indicated above    PMH: HTN (hypertension)    DM (diabetes mellitus)      PSH:    Allergies:No Known Allergies    MEDICATIONS  (STANDING):  amLODIPine   Tablet 5 milliGRAM(s) Oral daily  aspirin  chewable 81 milliGRAM(s) Oral daily  dextrose 5%. 1000 milliLiter(s) (100 mL/Hr) IV Continuous <Continuous>  dextrose 5%. 1000 milliLiter(s) (50 mL/Hr) IV Continuous <Continuous>  dextrose 50% Injectable 25 Gram(s) IV Push once  dextrose 50% Injectable 12.5 Gram(s) IV Push once  dextrose 50% Injectable 25 Gram(s) IV Push once  doxycycline monohydrate Capsule 100 milliGRAM(s) Oral every 12 hours  famotidine    Tablet 40 milliGRAM(s) Oral at bedtime  gabapentin 300 milliGRAM(s) Oral two times a day  gabapentin 600 milliGRAM(s) Oral at bedtime  glucagon  Injectable 1 milliGRAM(s) IntraMuscular once  insulin glargine Injectable (LANTUS) 30 Unit(s) SubCutaneous at bedtime  insulin lispro (ADMELOG) corrective regimen sliding scale   SubCutaneous three times a day before meals  isosorbide   mononitrate ER Tablet (IMDUR) 30 milliGRAM(s) Oral daily  lisinopril 5 milliGRAM(s) Oral daily  metoclopramide 10 milliGRAM(s) Oral daily  multivitamin 1 Tablet(s) Oral daily  pantoprazole    Tablet 40 milliGRAM(s) Oral before breakfast  piperacillin/tazobactam IVPB.. 3.375 Gram(s) IV Intermittent every 8 hours  senna 2 Tablet(s) Oral at bedtime  simvastatin 10 milliGRAM(s) Oral at bedtime  sucralfate suspension 1 Gram(s) Oral four times a day  tamsulosin 0.8 milliGRAM(s) Oral at bedtime    MEDICATIONS  (PRN):  acetaminophen     Tablet .. 650 milliGRAM(s) Oral every 6 hours PRN Temp greater or equal to 38C (100.4F), Mild Pain (1 - 3)  aluminum hydroxide/magnesium hydroxide/simethicone Suspension 30 milliLiter(s) Oral every 4 hours PRN Dyspepsia  dextrose Oral Gel 15 Gram(s) Oral once PRN Blood Glucose LESS THAN 70 milliGRAM(s)/deciliter  HYDROmorphone  Injectable 1 milliGRAM(s) IV Push every 4 hours PRN Severe Pain (7 - 10)  magnesium hydroxide Suspension 30 milliLiter(s) Oral every 8 hours PRN Constipation  melatonin 3 milliGRAM(s) Oral at bedtime PRN Insomnia  ondansetron Injectable 4 milliGRAM(s) IV Push every 8 hours PRN Nausea and/or Vomiting      Vital Signs Last 24 Hrs  T(C): 36.9 (04 Dec 2022 08:07), Max: 37 (03 Dec 2022 19:53)  T(F): 98.5 (04 Dec 2022 08:07), Max: 98.6 (03 Dec 2022 19:53)  HR: 85 (04 Dec 2022 08:07) (79 - 85)  BP: 141/79 (04 Dec 2022 08:07) (116/55 - 141/79)  BP(mean): 70 (03 Dec 2022 19:53) (70 - 70)  RR: 18 (04 Dec 2022 08:07) (18 - 18)  SpO2: 99% (04 Dec 2022 08:07) (98% - 99%)    Parameters below as of 04 Dec 2022 08:07  Patient On (Oxygen Delivery Method): room air          Physical Exam:   Constitutiona: NAD, alert;  Derm:  Skin warm, dry and supple bilateral.     Erythema noted grossly to right foot and lower leg  Scaly skin noted grossly to right foot  Dry eschar wound noted to the right 4th/5th metatarsal head measuring approximately 3x3 cm, no pus, no malodor, no pain on palpation, negative fluctuance. Indication of bluish cyanotic discoloration from Right mid leg to Right foot digits 1-5. Dry small eschar lesions to Right distal toes 2-3.    Vascular: Dorsalis Pedis and Posterior Tibial pulses non palpable.  Capillary re-fill time less then 3 seconds digits 1-5 bilateral.   Neuro: Protective sensation absent to the level of the digits bilateral.  MSK: Muscle strength 4/5 in all major muscle groups of Right lower extremity.  Below knee amputation to left lower extremity         Labs:                        9.5    9.31  )-----------( 284      ( 04 Dec 2022 06:57 )             29.6     12-04    139  |  110<H>  |  39<H>  ----------------------------<  176<H>  4.0   |  24  |  1.34<H>    Ca    8.6      04 Dec 2022 06:57    TPro  x   /  Alb  x   /  TBili  0.5  /  DBili  x   /  AST  x   /  ALT  x   /  AlkPhos  x   12-03      Rad/EKG     < from: Xray Foot AP + Lateral + Oblique, Right (12.01.22 @ 22:01) >  INTERPRETATION:  History: 62-year-old male, necrotic area in the foot.    Three views of the right foot without comparison demonstrate soft tissue   emphysema at the plantar aspect of the midfoot and calcaneus.    No gross cortical destruction, fracture or dislocation.    IMPRESSION:  Soft tissue emphysema at the plantar aspect of the mid foot/calcaneous.   If there is continued clinical concern follow-up MRI can be ordered    < end of copied text >  < from: MR Foot No Cont, Right (12.03.22 @ 12:13) >  IMPRESSION:  1.  There is no evidence for osteomyelitis.    < end of copied text >  < from: US Duplex Arterial Lower Ext Ltd, Right (12.01.22 @ 21:40) >  IMPRESSION:    No appreciable flow detected in the posterior tibial and peroneal   arteries.    < end of copied text >

## 2022-12-04 NOTE — PROGRESS NOTE ADULT - ASSESSMENT
Assesment: 61 y/o male see in the ED for the following   - Right eschar wound to the 4th/5th submetatarsal head, stable  - Erythema with cyanotic appearance to RLE possible PAD  - Neuropathy to right foot  - Below knee amputation LLE  - PAD/PVD  - Difficulty with ambulation      P:   Chart reviewed and Patient evaluated; discussed treatment and plan with Dr. Jez Mauricio  Discussed diagnosis and treatment with patient  X-rays reviewed : no soft tissue emphysema or osseous fx, awaiting official results  WC: betadine paint and allyvan pad  Continue with IV antibiotics As Per ID/MED  All additional care by Med appreciated  ID consult and recommendations appreciated.  Vascular consult appreciated: Will plan for possible Angiogram next week; date TBD.  Pt notes blister forming at plantar R foot, now stable eschar at the plantar 4th/5th metatarsal head, no pus, no malodor, no pain. Wound related to poor circulation of R foot. Recommend for area to fully demarcate at this time.  Erythema to RLE 2/2 PVD.  No acute podiatric intervention at this time as dry eschar wound is stable. Podiatry will monitor demarcation of eschar and plan for outpatient debridement once cleared by vascular. Outpatient follow up at [facility] recommended.   Patient demonstrated verbal understanding of all interventions and tolerated interventions well without any complications.   Podiatry will follow while in house.

## 2022-12-04 NOTE — PROGRESS NOTE ADULT - PROBLEM SELECTOR PLAN 1
Elevated troponin in the setting of infectious process, known CAD, and severe pulmonary HTN  Troponin trending down  Patient W/O CP  Echo NL LV FX EF 55%  -- suspect non-ischemic myocardial injury.  Can D/C tele

## 2022-12-05 PROCEDURE — 93923 UPR/LXTR ART STDY 3+ LVLS: CPT | Mod: 26

## 2022-12-05 PROCEDURE — 99232 SBSQ HOSP IP/OBS MODERATE 35: CPT

## 2022-12-05 PROCEDURE — 99233 SBSQ HOSP IP/OBS HIGH 50: CPT

## 2022-12-05 RX ORDER — FUROSEMIDE 40 MG
40 TABLET ORAL DAILY
Refills: 0 | Status: DISCONTINUED | OUTPATIENT
Start: 2022-12-05 | End: 2022-12-18

## 2022-12-05 RX ORDER — ENOXAPARIN SODIUM 100 MG/ML
40 INJECTION SUBCUTANEOUS EVERY 24 HOURS
Refills: 0 | Status: DISCONTINUED | OUTPATIENT
Start: 2022-12-07 | End: 2022-12-19

## 2022-12-05 RX ORDER — HYDROMORPHONE HYDROCHLORIDE 2 MG/ML
1 INJECTION INTRAMUSCULAR; INTRAVENOUS; SUBCUTANEOUS EVERY 4 HOURS
Refills: 0 | Status: DISCONTINUED | OUTPATIENT
Start: 2022-12-05 | End: 2022-12-11

## 2022-12-05 RX ORDER — POLYETHYLENE GLYCOL 3350 17 G/17G
17 POWDER, FOR SOLUTION ORAL DAILY
Refills: 0 | Status: DISCONTINUED | OUTPATIENT
Start: 2022-12-05 | End: 2022-12-27

## 2022-12-05 RX ORDER — ACETAMINOPHEN 500 MG
975 TABLET ORAL EVERY 8 HOURS
Refills: 0 | Status: COMPLETED | OUTPATIENT
Start: 2022-12-05 | End: 2022-12-05

## 2022-12-05 RX ORDER — HYDROMORPHONE HYDROCHLORIDE 2 MG/ML
1.5 INJECTION INTRAMUSCULAR; INTRAVENOUS; SUBCUTANEOUS EVERY 4 HOURS
Refills: 0 | Status: DISCONTINUED | OUTPATIENT
Start: 2022-12-05 | End: 2022-12-06

## 2022-12-05 RX ADMIN — Medication 1 GRAM(S): at 16:55

## 2022-12-05 RX ADMIN — Medication 1 GRAM(S): at 05:12

## 2022-12-05 RX ADMIN — Medication 975 MILLIGRAM(S): at 12:36

## 2022-12-05 RX ADMIN — GABAPENTIN 600 MILLIGRAM(S): 400 CAPSULE ORAL at 21:59

## 2022-12-05 RX ADMIN — Medication 10 MILLIGRAM(S): at 10:00

## 2022-12-05 RX ADMIN — Medication 1 GRAM(S): at 00:25

## 2022-12-05 RX ADMIN — Medication 100 MILLIGRAM(S): at 10:00

## 2022-12-05 RX ADMIN — HYDROMORPHONE HYDROCHLORIDE 1 MILLIGRAM(S): 2 INJECTION INTRAMUSCULAR; INTRAVENOUS; SUBCUTANEOUS at 05:07

## 2022-12-05 RX ADMIN — HYDROMORPHONE HYDROCHLORIDE 1 MILLIGRAM(S): 2 INJECTION INTRAMUSCULAR; INTRAVENOUS; SUBCUTANEOUS at 22:56

## 2022-12-05 RX ADMIN — SIMVASTATIN 10 MILLIGRAM(S): 20 TABLET, FILM COATED ORAL at 22:00

## 2022-12-05 RX ADMIN — PANTOPRAZOLE SODIUM 40 MILLIGRAM(S): 20 TABLET, DELAYED RELEASE ORAL at 05:11

## 2022-12-05 RX ADMIN — PIPERACILLIN AND TAZOBACTAM 25 GRAM(S): 4; .5 INJECTION, POWDER, LYOPHILIZED, FOR SOLUTION INTRAVENOUS at 13:01

## 2022-12-05 RX ADMIN — Medication 2: at 16:57

## 2022-12-05 RX ADMIN — Medication 30 MILLILITER(S): at 05:25

## 2022-12-05 RX ADMIN — Medication 1 GRAM(S): at 10:44

## 2022-12-05 RX ADMIN — GABAPENTIN 300 MILLIGRAM(S): 400 CAPSULE ORAL at 13:01

## 2022-12-05 RX ADMIN — GABAPENTIN 300 MILLIGRAM(S): 400 CAPSULE ORAL at 05:11

## 2022-12-05 RX ADMIN — ISOSORBIDE MONONITRATE 30 MILLIGRAM(S): 60 TABLET, EXTENDED RELEASE ORAL at 10:00

## 2022-12-05 RX ADMIN — INSULIN GLARGINE 30 UNIT(S): 100 INJECTION, SOLUTION SUBCUTANEOUS at 22:03

## 2022-12-05 RX ADMIN — AMLODIPINE BESYLATE 5 MILLIGRAM(S): 2.5 TABLET ORAL at 10:00

## 2022-12-05 RX ADMIN — Medication 1 TABLET(S): at 09:59

## 2022-12-05 RX ADMIN — HYDROMORPHONE HYDROCHLORIDE 1.5 MILLIGRAM(S): 2 INJECTION INTRAMUSCULAR; INTRAVENOUS; SUBCUTANEOUS at 14:56

## 2022-12-05 RX ADMIN — HYDROMORPHONE HYDROCHLORIDE 1 MILLIGRAM(S): 2 INJECTION INTRAMUSCULAR; INTRAVENOUS; SUBCUTANEOUS at 10:50

## 2022-12-05 RX ADMIN — TAMSULOSIN HYDROCHLORIDE 0.8 MILLIGRAM(S): 0.4 CAPSULE ORAL at 22:02

## 2022-12-05 RX ADMIN — LISINOPRIL 5 MILLIGRAM(S): 2.5 TABLET ORAL at 09:59

## 2022-12-05 RX ADMIN — Medication 100 MILLIGRAM(S): at 22:01

## 2022-12-05 RX ADMIN — PIPERACILLIN AND TAZOBACTAM 25 GRAM(S): 4; .5 INJECTION, POWDER, LYOPHILIZED, FOR SOLUTION INTRAVENOUS at 05:12

## 2022-12-05 RX ADMIN — HYDROMORPHONE HYDROCHLORIDE 1 MILLIGRAM(S): 2 INJECTION INTRAMUSCULAR; INTRAVENOUS; SUBCUTANEOUS at 05:30

## 2022-12-05 RX ADMIN — Medication 975 MILLIGRAM(S): at 12:28

## 2022-12-05 RX ADMIN — ENOXAPARIN SODIUM 40 MILLIGRAM(S): 100 INJECTION SUBCUTANEOUS at 16:55

## 2022-12-05 RX ADMIN — PIPERACILLIN AND TAZOBACTAM 25 GRAM(S): 4; .5 INJECTION, POWDER, LYOPHILIZED, FOR SOLUTION INTRAVENOUS at 21:59

## 2022-12-05 RX ADMIN — Medication 975 MILLIGRAM(S): at 22:05

## 2022-12-05 RX ADMIN — HYDROMORPHONE HYDROCHLORIDE 1.5 MILLIGRAM(S): 2 INJECTION INTRAMUSCULAR; INTRAVENOUS; SUBCUTANEOUS at 13:56

## 2022-12-05 RX ADMIN — Medication 1 GRAM(S): at 22:59

## 2022-12-05 RX ADMIN — Medication 81 MILLIGRAM(S): at 09:59

## 2022-12-05 RX ADMIN — HYDROMORPHONE HYDROCHLORIDE 1 MILLIGRAM(S): 2 INJECTION INTRAMUSCULAR; INTRAVENOUS; SUBCUTANEOUS at 09:57

## 2022-12-05 RX ADMIN — FAMOTIDINE 40 MILLIGRAM(S): 10 INJECTION INTRAVENOUS at 22:02

## 2022-12-05 NOTE — PROGRESS NOTE ADULT - ASSESSMENT
61 y/o Male with past medical history of cad, diabetes, hypertension, gerd, pvd, s/p left BKA, s/p CABG admitted from [facility] on 12/1 for evaluation of epigastric pain of about one day duration; also noted with right lower extremity redness and pain with ulcers on the anterior shin as well as the sole of the right foot. Patient is mostly a poor historian and history per medical record.     1. Patient admitted with right lower extremity cellulitis and PVD; also noted with leukocytosis most likely reactive to infection  - follow up cultures   - serial cbc and monitor temperature   - reviewed prior medical records to evaluate for resistant or atypical pathogens   - iv hydration and supportive care   - wound care per podiatry  - given the XRay results consideration for MRI  - day #4 zosyn and doxycycline  - tolerating antibiotics without rashes or side effects   - podiatry evaluation in progress  2. other issues: per medicine

## 2022-12-05 NOTE — PROGRESS NOTE ADULT - SUBJECTIVE AND OBJECTIVE BOX
Date of service: 12-05-22 @ 16:28    Patient lying in bed; afebrile, somnolent        ROS: unable to obtain secondary to patient medical condition     MEDICATIONS  (STANDING):  acetaminophen     Tablet .. 975 milliGRAM(s) Oral every 8 hours  amLODIPine   Tablet 5 milliGRAM(s) Oral daily  aspirin  chewable 81 milliGRAM(s) Oral daily  dextrose 5%. 1000 milliLiter(s) (50 mL/Hr) IV Continuous <Continuous>  dextrose 5%. 1000 milliLiter(s) (100 mL/Hr) IV Continuous <Continuous>  dextrose 50% Injectable 25 Gram(s) IV Push once  dextrose 50% Injectable 12.5 Gram(s) IV Push once  dextrose 50% Injectable 25 Gram(s) IV Push once  doxycycline monohydrate Capsule 100 milliGRAM(s) Oral every 12 hours  enoxaparin Injectable 40 milliGRAM(s) SubCutaneous every 24 hours  famotidine    Tablet 40 milliGRAM(s) Oral at bedtime  furosemide    Tablet 40 milliGRAM(s) Oral daily  gabapentin 300 milliGRAM(s) Oral two times a day  gabapentin 600 milliGRAM(s) Oral at bedtime  glucagon  Injectable 1 milliGRAM(s) IntraMuscular once  insulin glargine Injectable (LANTUS) 30 Unit(s) SubCutaneous at bedtime  insulin lispro (ADMELOG) corrective regimen sliding scale   SubCutaneous three times a day before meals  isosorbide   mononitrate ER Tablet (IMDUR) 30 milliGRAM(s) Oral daily  lisinopril 5 milliGRAM(s) Oral daily  metoclopramide 10 milliGRAM(s) Oral daily  multivitamin 1 Tablet(s) Oral daily  pantoprazole    Tablet 40 milliGRAM(s) Oral before breakfast  piperacillin/tazobactam IVPB.. 3.375 Gram(s) IV Intermittent every 8 hours  polyethylene glycol 3350 17 Gram(s) Oral daily  senna 2 Tablet(s) Oral at bedtime  simvastatin 10 milliGRAM(s) Oral at bedtime  sodium chloride 0.9%. 1000 milliLiter(s) (100 mL/Hr) IV Continuous <Continuous>  sucralfate suspension 1 Gram(s) Oral four times a day  tamsulosin 0.8 milliGRAM(s) Oral at bedtime    MEDICATIONS  (PRN):  acetaminophen     Tablet .. 650 milliGRAM(s) Oral every 6 hours PRN Temp greater or equal to 38C (100.4F), Mild Pain (1 - 3)  aluminum hydroxide/magnesium hydroxide/simethicone Suspension 30 milliLiter(s) Oral every 4 hours PRN Dyspepsia  dextrose Oral Gel 15 Gram(s) Oral once PRN Blood Glucose LESS THAN 70 milliGRAM(s)/deciliter  HYDROmorphone  Injectable 1 milliGRAM(s) IV Push every 4 hours PRN Moderate Pain (4 - 6)  HYDROmorphone  Injectable 1.5 milliGRAM(s) IV Push every 4 hours PRN Severe Pain (7 - 10)  magnesium hydroxide Suspension 30 milliLiter(s) Oral every 8 hours PRN Constipation  melatonin 3 milliGRAM(s) Oral at bedtime PRN Insomnia  ondansetron Injectable 4 milliGRAM(s) IV Push every 8 hours PRN Nausea and/or Vomiting      Vital Signs Last 24 Hrs  T(C): 36.9 (05 Dec 2022 13:00), Max: 37.1 (04 Dec 2022 21:28)  T(F): 98.5 (05 Dec 2022 13:00), Max: 98.7 (04 Dec 2022 21:28)  HR: 79 (05 Dec 2022 13:00) (79 - 84)  BP: 137/64 (05 Dec 2022 13:00) (120/67 - 148/69)  BP(mean): --  RR: 18 (05 Dec 2022 13:00) (18 - 18)  SpO2: 97% (05 Dec 2022 13:00) (96% - 97%)    Parameters below as of 05 Dec 2022 13:00  Patient On (Oxygen Delivery Method): room air            Physical Exam:          Constitutional: frail looking  HEENT: NC/AT, EOMI, PERRLA, conjunctivae clear; ears and nose atraumatic; pharynx clear  Neck: supple; thyroid not palpable  Back: no tenderness  Respiratory: respiratory effort normal; clear to auscultation  Cardiovascular: S1S2 regular, no murmurs  Abdomen: soft, not tender, not distended, positive BS; no liver or spleen organomegaly  Genitourinary: no suprapubic tenderness  Musculoskeletal: no muscle tenderness, left BKA; right lower extremity with erythema from pretibia to foot with ulcers on anterior shin and on sole of the foot, eschar on sole of foot over fifth MTP  Neurological/ Psychiatric: AxOx3, judgement and insight normal;  moving all extremities  Skin: no rashes; no palpable lesions    Labs: all available labs reviewed                         Labs:                        9.5    9.31  )-----------( 284      ( 04 Dec 2022 06:57 )             29.6     12-04    139  |  110<H>  |  39<H>  ----------------------------<  176<H>  4.0   |  24  |  1.34<H>    Ca    8.6      04 Dec 2022 06:57             Cultures:       Culture - Blood (collected 12-01-22 @ 20:13)  Source: .Blood None  Preliminary Report (12-03-22 @ 02:03):    No growth to date.    Culture - Blood (collected 12-01-22 @ 19:11)  Source: .Blood None  Preliminary Report (12-03-22 @ 02:03):    No growth to date.      C-Reactive Protein, Serum: 71 mg/L (12-04-22 @ 06:57)  C-Reactive Protein, Serum: 127 mg/L (12-01-22 @ 21:45)            < from: Xray Foot AP + Lateral + Oblique, Right (12.01.22 @ 22:01) >  ACC: [id number] EXAM:  XR FOOT COMP MIN 3 VIEWS RT                          PROCEDURE DATE:  12/01/2022          INTERPRETATION:  History: 62-year-old male, necrotic area in the foot.    Three views of the right foot without comparison demonstrate soft tissue   emphysema at the plantar aspect of the midfoot and calcaneus.    No gross cortical destruction, fracture or dislocation.    IMPRESSION:  Soft tissue emphysema at the plantar aspect of the mid foot/calcaneous.   If there is continued clinical concern follow-up MRI can be ordered    < end of copied text >        Radiology: all available radiological tests reviewed    Advanced directives addressed: full resuscitation

## 2022-12-05 NOTE — PROGRESS NOTE ADULT - SUBJECTIVE AND OBJECTIVE BOX
CHIEF COMPLAINT: Patient is a 62y old  Male who presents with a chief complaint of Elevated troponin, PAD and Cellulitis (02 Dec 2022 10:14)    HPI:  63 y/o man with a history of CAD s/p CABG and MV repair (2016 - [place]), HTN, DM, GERD, L BKA, who was transferred from [facility] for the evaluation of abdominal discomfort and cellulitis.  Cardiology consult requested for elevated troponin.  Mr. Roberts has no cardiac symptoms -- he has not been experiencing angina or dyspnea.  He walks during PT and denies exertional chest discomfort.  He does not see a cardiologist in the outpatient setting.    12/4/22 Comfortable in bed with CC of SOB No CP Tele SR   12/5/22 Semirecumbent in bed no CP HR 70-80 BPM    PAST MEDICAL & SURGICAL HISTORY:  HTN (hypertension)  DM (diabetes mellitus)  Anemia  GERD (gastroesophageal reflux disease)  S/P BKA (below knee amputation), left  CAD (coronary artery disease)  S/P CABG x 3  Status post amputation of leg    SOCIAL HISTORY:   Smoking: Nonsmoker    FAMILY HISTORY: No pertinent family history in first degree relatives      MEDICATIONS  (STANDING):  amLODIPine   Tablet 5 milliGRAM(s) Oral daily  aspirin  chewable 81 milliGRAM(s) Oral daily  dextrose 5%. 1000 milliLiter(s) (50 mL/Hr) IV Continuous <Continuous>  dextrose 5%. 1000 milliLiter(s) (100 mL/Hr) IV Continuous <Continuous>  dextrose 50% Injectable 25 Gram(s) IV Push once  dextrose 50% Injectable 12.5 Gram(s) IV Push once  dextrose 50% Injectable 25 Gram(s) IV Push once  doxycycline monohydrate Capsule 100 milliGRAM(s) Oral every 12 hours  enoxaparin Injectable 40 milliGRAM(s) SubCutaneous every 24 hours  famotidine    Tablet 40 milliGRAM(s) Oral at bedtime  gabapentin 300 milliGRAM(s) Oral two times a day  gabapentin 600 milliGRAM(s) Oral at bedtime  glucagon  Injectable 1 milliGRAM(s) IntraMuscular once  insulin glargine Injectable (LANTUS) 30 Unit(s) SubCutaneous at bedtime  insulin lispro (ADMELOG) corrective regimen sliding scale   SubCutaneous three times a day before meals  isosorbide   mononitrate ER Tablet (IMDUR) 30 milliGRAM(s) Oral daily  lisinopril 5 milliGRAM(s) Oral daily  metoclopramide 10 milliGRAM(s) Oral daily  multivitamin 1 Tablet(s) Oral daily  pantoprazole    Tablet 40 milliGRAM(s) Oral before breakfast  piperacillin/tazobactam IVPB.. 3.375 Gram(s) IV Intermittent every 8 hours  senna 2 Tablet(s) Oral at bedtime  simvastatin 10 milliGRAM(s) Oral at bedtime  sodium chloride 0.9%. 1000 milliLiter(s) (100 mL/Hr) IV Continuous <Continuous>  sucralfate suspension 1 Gram(s) Oral four times a day  tamsulosin 0.8 milliGRAM(s) Oral at bedtime    MEDICATIONS  (PRN):  acetaminophen     Tablet .. 650 milliGRAM(s) Oral every 6 hours PRN Temp greater or equal to 38C (100.4F), Mild Pain (1 - 3)  aluminum hydroxide/magnesium hydroxide/simethicone Suspension 30 milliLiter(s) Oral every 4 hours PRN Dyspepsia  dextrose Oral Gel 15 Gram(s) Oral once PRN Blood Glucose LESS THAN 70 milliGRAM(s)/deciliter  HYDROmorphone  Injectable 1 milliGRAM(s) IV Push every 4 hours PRN Severe Pain (7 - 10)  magnesium hydroxide Suspension 30 milliLiter(s) Oral every 8 hours PRN Constipation  melatonin 3 milliGRAM(s) Oral at bedtime PRN Insomnia  ondansetron Injectable 4 milliGRAM(s) IV Push every 8 hours PRN Nausea and/or Vomiting        Allergies:  No Known Allergies    Vital Signs Last 24 Hrs  T(C): 37.1 (04 Dec 2022 21:28), Max: 37.1 (04 Dec 2022 21:28)  T(F): 98.7 (04 Dec 2022 21:28), Max: 98.7 (04 Dec 2022 21:28)  HR: 83 (05 Dec 2022 05:02) (74 - 84)  BP: 148/69 (05 Dec 2022 05:02) (120/67 - 148/69)  BP(mean): --  RR: 18 (04 Dec 2022 21:28) (18 - 18)  SpO2: 96% (04 Dec 2022 21:28) (96% - 97%)    Parameters below as of 04 Dec 2022 21:28  Patient On (Oxygen Delivery Method): room air      PHYSICAL EXAM:  Constitutional: NAD, awake and alert  HEENT:  EOMI,  Pupils round, No oral cyanosis.  Pulmonary: Non-labored, breath sounds are clear bilaterally  Cardiovascular: S1 and S2, regular rate and rhythm, + Murmur  Gastrointestinal: Bowel Sounds present, soft, nontender.   Lymph: 1+ RLE Edema L BKA  Neurological: Alert, no focal deficits  Psych:  Mood & affect appropriate    LABS:                      10.9   17.77 )-----------( 270      ( 01 Dec 2022 19:11 )             32.8     140    |  108    |  31     ----------------------------<  113    4.1     |  24     |  1.20     PT/INR - ( 01 Dec 2022 19:11 )   PT: 15.9 sec;   INR: 1.37 ratio    PTT - ( 01 Dec 2022 19:11 )  PTT:34.9 sec    Pro Bnp [id number]    TroponinI hsT: 201.30, 364.23, 345.99    TTE Echo Complete w/o Contrast w/ Doppler (12.02.22 @ 08:46):   Endocardium is not well visualized, however, overall left ventricular systolic function appears normal. Mild concentric left ventricular hypertrophy. Septal flattening is seen; this finding is consistent with right heart pressure / volume overload. Estimated left ventricular ejection fraction is 55-60%.   The right ventricle exhibits mild dilation, mild diffuse hypokinesis, and mild depression of contractility.   Severe mitral stenosis. Mild mitral regurgitation.   Mild to moderate tricuspid valve regurgitation.   Severe pulmonary hypertension.    ECG: SR, nonspecific ST/T abn

## 2022-12-05 NOTE — PROGRESS NOTE ADULT - PROBLEM SELECTOR PLAN 3
Past MV repair but now with severely elevated transmitral gradient, severe PHTN  pursue work-up once recovered from infectious process with Structural Heart Disease consult Dr Wilson -- will eventually need R&L heart Cath/ALEX Past MV repair but now with severely elevated transmitral gradient, severe PHTN  pursue work-up once recovered from infectious process with Structural Heart Disease consult Dr Wilson -- will eventually need R&L heart Cath/ALEX  Weight increased + JVD will begin Lasix 40 MG QD  Daily I&O/Weight trend renal values Past MV repair but now with severely elevated transmitral gradient, severe PHTN,   pursue work-up once recovered from infectious process with Structural Heart Disease consult Dr Wilson -- will eventually need R&L heart Cath/ALEX  Weight increased, Prior BNP elevated will give lasix/ monitor daily weight/trend renal values

## 2022-12-05 NOTE — PROGRESS NOTE ADULT - ASSESSMENT
A 62 year old male with history of PVD, IHD, MV repair. admitted with right leg cellulitis and was found to have significant stenosis of his RLE vasculature  Needs a right LE angiogram    Plan:  Keep NPO tonight after midnight for angio tomorrow  Please obtain preop labs including type and screen and updated covid test  Patient added on for tomorrow at 08:45 am    Plan discussed with Dr Walters

## 2022-12-05 NOTE — PROGRESS NOTE ADULT - ASSESSMENT
63 yo man with DM, HTN, CAD, 3v CABG, hx of PAD, L BKA, GERD, past alcohol use disorder, cirrhosis, chronic anemia, sent from [place] for worsening RLE swelling and redness, also complaints of epigastric discomfort. In the ED, exam was consistent with cellulitis of the RLE, non healing wound on the plantar surface of the R foot. Elevated inflammatory markers. Patient was placed on antibiotics and admitted to Medicine.     RLE skin and soft tissue infection in setting of non healing R foot ulcer, DM and PAD  Appreciate input from Podiatry, Vascular Surgery and ID. MRI RLE negative for osteomyelitis. Circulation to the RLE is compromised as suggested by RLE arterial duplex. May not have appropriate healing and benefit of antibiotics if circulation is poor.   - Continue on empiric antibiotics for now  - Continue aspirin, statin  - Patient is in acceptable medical condition for LE angiogram tomorrow with Vascular Surgery. No need for further cardiac testing prior to the angiogram but he will need further cardiac testing prior to hospital discharge (see below)  - Appreciate input from Podiatry. Blister forming at plantar R foot, now stable eschar at the plantar 4th/5th metatarsal head, no pus, no malodor, no pain. Wound related to poor circulation of R foot. No acute podiatric intervention at this time as wound is stable. Podiatry will monitor demarcation of eschar and plan for outpatient debridement once cleared by vascular. Outpatient follow up at [facility] recommended.     Epigastric discomfort  DDx GERD, esophagitis, gastritis, peptic ulcer. Appreciate input from GI. Started on Carafate. PPI. Patient will eventually need EGD, timing TBD.   - Continue Carafate and PPI  - EGD planning,     HTN  BP controlled  - Continue amlodipine and lisinopril and Imdur    CAD  Remote hx of CABG. No angina or CHF sx.  - Continue aspirin, statin, Imdur    Elevated troponin  Appreciate input from Cardiology. Likely myocardial demand ischemia without infarction. No angina or CHF sx. LVEF WNL.   - Continue medical management    Mitral valve disease  Past MV repair but now with severely elevated transmitral gradient, severe pHTN. Plan to pursue work-up once recovered from infectious process.  Will likely eventually need L and R heart cath, ALEX, possibly prior to hospital discharge   - Will f/u with Structural Heart Disease Dr. Wilson    Pulmonary HTN  Severe pulmonary HTN - possibly from MV disease; no prior TTE and no outpatient cardiac care available for reviewed.  - Check SPO2 with ambulation once clinically improved    DM  A1c 7.7. Currently on Lantus and Lispro here  - Continue Lantus and Lispro  - Monitor glucose TID AC and HS    BPH  Stable  - Continue Flomax      Code Status: Full  DVT px: LMWH  Dispo: To be determined pending further clinical assessment

## 2022-12-05 NOTE — PROGRESS NOTE ADULT - SUBJECTIVE AND OBJECTIVE BOX
Date of Service: 12/5/22  Chief Complaint :63 y/o male PMHx of PVD s/p left BKA, DM, HTN, CAD, anemia, and GERD, Full code with mol BIBEMS from [facility] c/o severe abdominal pain since this morning. Pt reports vomiting. Pt denies diarrhea. Pt reports he has not been eating or drinking. Pt reports he has noticed redness of RLE over last week and black area on bottom of foot. States he was scheduled for angiogram tomorrow with Dr. Dickson who has been following patient for continued PVD of right lower extremity.  Podiatry consulted for unstagable wound to Right 4/5th metatarsal head. Patient denies any pain to the area. 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 Patient says he noticed redness/erythema to his right lower extremity. Patient also says that he has narrowing of his blood vessels to his right leg, and that there is decreased blood flow to his right foot.     12/5/22: Pt seen by podiatry team. Patient does not report any additional pedal complaints reported at this time.     REVIEW OF SYSTEMS: All other review of systems is negative unless indicated above    PMH: HTN (hypertension)    DM (diabetes mellitus)      PSH:    Allergies:No Known Allergies    MEDICATIONS  (STANDING):  amLODIPine   Tablet 5 milliGRAM(s) Oral daily  aspirin  chewable 81 milliGRAM(s) Oral daily  dextrose 5%. 1000 milliLiter(s) (100 mL/Hr) IV Continuous <Continuous>  dextrose 5%. 1000 milliLiter(s) (50 mL/Hr) IV Continuous <Continuous>  dextrose 50% Injectable 25 Gram(s) IV Push once  dextrose 50% Injectable 12.5 Gram(s) IV Push once  dextrose 50% Injectable 25 Gram(s) IV Push once  doxycycline monohydrate Capsule 100 milliGRAM(s) Oral every 12 hours  enoxaparin Injectable 40 milliGRAM(s) SubCutaneous every 24 hours  famotidine    Tablet 40 milliGRAM(s) Oral at bedtime  gabapentin 300 milliGRAM(s) Oral two times a day  gabapentin 600 milliGRAM(s) Oral at bedtime  glucagon  Injectable 1 milliGRAM(s) IntraMuscular once  insulin glargine Injectable (LANTUS) 30 Unit(s) SubCutaneous at bedtime  insulin lispro (ADMELOG) corrective regimen sliding scale   SubCutaneous three times a day before meals  isosorbide   mononitrate ER Tablet (IMDUR) 30 milliGRAM(s) Oral daily  lisinopril 5 milliGRAM(s) Oral daily  metoclopramide 10 milliGRAM(s) Oral daily  multivitamin 1 Tablet(s) Oral daily  pantoprazole    Tablet 40 milliGRAM(s) Oral before breakfast  piperacillin/tazobactam IVPB.. 3.375 Gram(s) IV Intermittent every 8 hours  senna 2 Tablet(s) Oral at bedtime  simvastatin 10 milliGRAM(s) Oral at bedtime  sodium chloride 0.9%. 1000 milliLiter(s) (100 mL/Hr) IV Continuous <Continuous>  sucralfate suspension 1 Gram(s) Oral four times a day  tamsulosin 0.8 milliGRAM(s) Oral at bedtime    MEDICATIONS  (PRN):  acetaminophen     Tablet .. 650 milliGRAM(s) Oral every 6 hours PRN Temp greater or equal to 38C (100.4F), Mild Pain (1 - 3)  aluminum hydroxide/magnesium hydroxide/simethicone Suspension 30 milliLiter(s) Oral every 4 hours PRN Dyspepsia  dextrose Oral Gel 15 Gram(s) Oral once PRN Blood Glucose LESS THAN 70 milliGRAM(s)/deciliter  HYDROmorphone  Injectable 1 milliGRAM(s) IV Push every 4 hours PRN Severe Pain (7 - 10)  magnesium hydroxide Suspension 30 milliLiter(s) Oral every 8 hours PRN Constipation  melatonin 3 milliGRAM(s) Oral at bedtime PRN Insomnia  ondansetron Injectable 4 milliGRAM(s) IV Push every 8 hours PRN Nausea and/or Vomiting       Vital Signs Last 24 Hrs  T(C): 37.1 (04 Dec 2022 21:28), Max: 37.1 (04 Dec 2022 21:28)  T(F): 98.7 (04 Dec 2022 21:28), Max: 98.7 (04 Dec 2022 21:28)  HR: 83 (05 Dec 2022 05:02) (74 - 84)  BP: 148/69 (05 Dec 2022 05:02) (120/67 - 148/69)  BP(mean): --  RR: 18 (04 Dec 2022 21:28) (18 - 18)  SpO2: 96% (04 Dec 2022 21:28) (96% - 97%)    Parameters below as of 04 Dec 2022 21:28  Patient On (Oxygen Delivery Method): room air     Physical Exam:   Constitutiona: NAD, alert;  Derm:  Skin warm, dry and supple bilateral.     Erythema noted grossly to right foot and lower leg  Scaly skin noted grossly to right foot  Dry eschar wound noted to the right 4th/5th metatarsal head measuring approximately 3x3 cm, no pus, no malodor, no pain on palpation, negative fluctuance. Indication of bluish cyanotic discoloration from Right mid leg to Right foot digits 1-5. Dry small eschar lesions to Right distal toes 2-3.    Vascular: Dorsalis Pedis and Posterior Tibial pulses non palpable.  Capillary re-fill time less then 3 seconds digits 1-5 bilateral.   Neuro: Protective sensation absent to the level of the digits bilateral.  MSK: Muscle strength 4/5 in all major muscle groups of Right lower extremity.  Below knee amputation to left lower extremity         Labs:                        9.5    9.31  )-----------( 284      ( 04 Dec 2022 06:57 )             29.6     12-04    139  |  110<H>  |  39<H>  ----------------------------<  176<H>  4.0   |  24  |  1.34<H>    Ca    8.6      04 Dec 2022 06:57    TPro  x   /  Alb  x   /  TBili  0.5  /  DBili  x   /  AST  x   /  ALT  x   /  AlkPhos  x   12-03      Rad/EKG     < from: Xray Foot AP + Lateral + Oblique, Right (12.01.22 @ 22:01) >  INTERPRETATION:  History: 62-year-old male, necrotic area in the foot.    Three views of the right foot without comparison demonstrate soft tissue   emphysema at the plantar aspect of the midfoot and calcaneus.    No gross cortical destruction, fracture or dislocation.    IMPRESSION:  Soft tissue emphysema at the plantar aspect of the mid foot/calcaneous.   If there is continued clinical concern follow-up MRI can be ordered    < end of copied text >  < from: MR Foot No Cont, Right (12.03.22 @ 12:13) >  IMPRESSION:  1.  There is no evidence for osteomyelitis.    < end of copied text >  < from: US Duplex Arterial Lower Ext Ltd, Right (12.01.22 @ 21:40) >  IMPRESSION:    No appreciable flow detected in the posterior tibial and peroneal   arteries.    < end of copied text >

## 2022-12-05 NOTE — PROGRESS NOTE ADULT - SUBJECTIVE AND OBJECTIVE BOX
Chief Complaint: RLE swelling and redness, epigastric discomfort    Interval Hx: Patient seen and examined earlier today. No new complaints. No significant changes. Continues to have RLE swelling and redness, slightly improved compared to presentation. Continues to have RLE pain complaints, only partially relieved with hydromorphone 1mg IV q4h prn. He remains on empiric antibiotics. MRI negative for osteomyelitis. Workup ongoing to further evaluate the extent of his peripheral vascular disease in that extremity. He is planned for LE angiogram tomorrow. As for his epigastric discomfort, it is mild-to-moderate, being treated with Carafate and PPI. Anticipated that he will eventually need an upper endoscopy to evaluate that epigastric discomfort and also as routine appropriate testing in the setting of his cirrhosis.     ROS: Multi system review is comprehensively negative x 10 systems except as above    Vitals:  T(F): 98.5 (05 Dec 2022 13:00), Max: 98.7 (04 Dec 2022 21:28)  HR: 79 (05 Dec 2022 13:00) (79 - 84)  BP: 137/64 (05 Dec 2022 13:00) (120/67 - 148/69)  RR: 18 (05 Dec 2022 13:00) (18 - 18)  SpO2: 97% (05 Dec 2022 13:00) (96% - 97%) on room air    Exam:  Gen: No acute distress  HEENT: NCAT PERRL EOMI MMM  Neck: Supple no JVD no LAD  CVS: S1 S2 normal RRR  Chest: Normal resp effort, lungs CTA B/L  Abd: +BS, soft NT ND   Ext: L BKA. RLE with mild erythema from upper shin down to foot, small superficial wounds on shin, large open wound on plantar surface of foot at the foot pad.   Skin: As described in extremity exam. Some additional flaking skin on R foot  Neuro: A+OX3, no focal deficits, sensation intake to RLE  Mood: Calm, pleasant    Labs:                              9.5    9.31 )-----------( 284              29.6       139  |  110  |  39  -----------------------< 176  4.0   |   24   |  1.34    Ca 8.6    PT: 13.7 sec;   INR: 1.18 ratio      Troponin 364 --> 201   Lactate 1.3     A1c 7.7      ESR 89    Urinalysis 12/2: Clear, ket-, prot 150, N-, LE trace, RBC 6-10, WBC 6-10, bact-    Micro:  Bld cx x 2 12/1: Negative to date  COVID, Flu, RSV PCR 12/1: Negative    Imaging:  MRI RLE, R foot 12/2: There is no evidence for osteomyelitis.    RLE arterial duplex 12/1: No appreciable flow detected in the posterior tibial and peroneal arteries.    R foot XR 12/1: Soft tissue emphysema at the plantar aspect of the mid foot/calcaneous.     Cardiac Testing:  TTE 12/2:  Endocardium is not well visualized, however, overall left ventricular systolic function appears normal. Mild concentric left ventricular hypertrophy. Septal flattening is seen; this finding is consistent with right heart pressure / volume overload. Estimated left ventricular ejection fraction is 55-60%. The right ventricle exhibits mild dilation, mild diffuse hypokinesis, and mild depression of contractility. Severe mitral stenosis. Mild mitral regurgitation. Mild to moderate tricuspid valve regurgitation. Severe pulmonary hypertension.    Meds:  MEDICATIONS  (STANDING):  acetaminophen     Tablet .. 975 milliGRAM(s) Oral every 8 hours  amLODIPine   Tablet 5 milliGRAM(s) Oral daily  aspirin  chewable 81 milliGRAM(s) Oral daily  doxycycline monohydrate Capsule 100 milliGRAM(s) Oral every 12 hours  enoxaparin Injectable 40 milliGRAM(s) SubCutaneous every 24 hours  famotidine    Tablet 40 milliGRAM(s) Oral at bedtime  furosemide    Tablet 40 milliGRAM(s) Oral daily  gabapentin 300 milliGRAM(s) Oral two times a day  gabapentin 600 milliGRAM(s) Oral at bedtime  insulin glargine Injectable (LANTUS) 30 Unit(s) SubCutaneous at bedtime  insulin lispro (ADMELOG) corrective regimen sliding scale   SubCutaneous three times a day before meals  isosorbide   mononitrate ER Tablet (IMDUR) 30 milliGRAM(s) Oral daily  lisinopril 5 milliGRAM(s) Oral daily  metoclopramide 10 milliGRAM(s) Oral daily  multivitamin 1 Tablet(s) Oral daily  pantoprazole    Tablet 40 milliGRAM(s) Oral before breakfast  piperacillin/tazobactam IVPB.. 3.375 Gram(s) IV Intermittent every 8 hours  polyethylene glycol 3350 17 Gram(s) Oral daily  senna 2 Tablet(s) Oral at bedtime  simvastatin 10 milliGRAM(s) Oral at bedtime  sodium chloride 0.9%. 1000 milliLiter(s) (100 mL/Hr) IV Continuous <Continuous>  sucralfate suspension 1 Gram(s) Oral four times a day  tamsulosin 0.8 milliGRAM(s) Oral at bedtime    MEDICATIONS  (PRN):  acetaminophen     Tablet .. 650 milliGRAM(s) Oral every 6 hours PRN Temp greater or equal to 38C (100.4F), Mild Pain (1 - 3)  aluminum hydroxide/magnesium hydroxide/simethicone Suspension 30 milliLiter(s) Oral every 4 hours PRN Dyspepsia  dextrose Oral Gel 15 Gram(s) Oral once PRN Blood Glucose LESS THAN 70 milliGRAM(s)/deciliter  HYDROmorphone  Injectable 1 milliGRAM(s) IV Push every 4 hours PRN Moderate Pain (4 - 6)  HYDROmorphone  Injectable 1.5 milliGRAM(s) IV Push every 4 hours PRN Severe Pain (7 - 10)  magnesium hydroxide Suspension 30 milliLiter(s) Oral every 8 hours PRN Constipation  melatonin 3 milliGRAM(s) Oral at bedtime PRN Insomnia  ondansetron Injectable 4 milliGRAM(s) IV Push every 8 hours PRN Nausea and/or Vomiting

## 2022-12-05 NOTE — PROGRESS NOTE ADULT - ASSESSMENT
Assesment: 63 y/o male see in the ED for the following   - Right eschar wound to the 4th/5th submetatarsal head, stable  - Erythema with cyanotic appearance to RLE possible PAD  - Neuropathy to right foot  - Below knee amputation LLE  - PAD/PVD  - Difficulty with ambulation      Plan:   Chart reviewed and Patient evaluated; discussed treatment and plan with Dr. Jez Mauricio  Discussed diagnosis and treatment with patient  X-rays reviewed : no soft tissue emphysema or osseous fx   WC: betadine paint and allyvan pad  Continue with IV antibiotics As Per ID/MED  All additional care by Med appreciated  ID consult and recommendations appreciated.  Vascular consult appreciated: Will plan for possible Angiogram next week; date TBD.  Pt notes blister forming at plantar R foot, now stable eschar at the plantar 4th/5th metatarsal head, no pus, no malodor, no pain. Wound related to poor circulation of R foot. Recommend for area to fully demarcate at this time.  Erythema to RLE 2/2 PVD.  MRI shows no evidence of osteomyelitis  No acute podiatric intervention at this time as dry eschar wound is stable. Podiatry will monitor demarcation of eschar and plan for outpatient debridement once cleared by vascular.   Outpatient follow up at [facility] recommended.   Patient demonstrated verbal understanding of all interventions and tolerated interventions well without any complications.   Podiatry will follow while in house.

## 2022-12-05 NOTE — PROGRESS NOTE ADULT - SUBJECTIVE AND OBJECTIVE BOX
Patient was seen and examined at the bedside this morning  No acute events overnight  Pain is better controlled  No nausea or vomiting  Tolerating diet     O/E:  T(C): 36.9 (12-05-22 @ 13:00), Max: 37.1 (12-04-22 @ 21:28)  HR: 79 (12-05-22 @ 13:00) (74 - 84)  BP: 137/64 (12-05-22 @ 13:00) (120/67 - 148/69)  RR: 18 (12-05-22 @ 13:00) (18 - 18)  SpO2: 97% (12-05-22 @ 13:00) (96% - 97%)  Alert, conscious, oriented  No pallor, jaundice or cyanosis  Not in pain or distress  Chest clear, equal air entry bilaterally  Abdomen soft and lax, no tenderness  Extremities: No swelling or tenderness. Right leg less erythematous and less swollen    12-04-22 @ 07:01  -  12-05-22 @ 07:00  --------------------------------------------------------  IN: 0 mL / OUT: 500 mL / NET: -500 mL    12-05-22 @ 07:01  -  12-05-22 @ 13:42  --------------------------------------------------------  IN: 0 mL / OUT: 350 mL / NET: -350 mL                            9.5    9.31  )-----------( 284      ( 04 Dec 2022 06:57 )             29.6   12-04    139  |  110<H>  |  39<H>  ----------------------------<  176<H>  4.0   |  24  |  1.34<H>    Ca    8.6      04 Dec 2022 06:57    MEDICATIONS  (STANDING):  acetaminophen     Tablet .. 975 milliGRAM(s) Oral every 8 hours  amLODIPine   Tablet 5 milliGRAM(s) Oral daily  aspirin  chewable 81 milliGRAM(s) Oral daily  dextrose 5%. 1000 milliLiter(s) (50 mL/Hr) IV Continuous <Continuous>  dextrose 5%. 1000 milliLiter(s) (100 mL/Hr) IV Continuous <Continuous>  dextrose 50% Injectable 25 Gram(s) IV Push once  dextrose 50% Injectable 12.5 Gram(s) IV Push once  dextrose 50% Injectable 25 Gram(s) IV Push once  doxycycline monohydrate Capsule 100 milliGRAM(s) Oral every 12 hours  enoxaparin Injectable 40 milliGRAM(s) SubCutaneous every 24 hours  famotidine    Tablet 40 milliGRAM(s) Oral at bedtime  gabapentin 300 milliGRAM(s) Oral two times a day  gabapentin 600 milliGRAM(s) Oral at bedtime  glucagon  Injectable 1 milliGRAM(s) IntraMuscular once  insulin glargine Injectable (LANTUS) 30 Unit(s) SubCutaneous at bedtime  insulin lispro (ADMELOG) corrective regimen sliding scale   SubCutaneous three times a day before meals  isosorbide   mononitrate ER Tablet (IMDUR) 30 milliGRAM(s) Oral daily  lisinopril 5 milliGRAM(s) Oral daily  metoclopramide 10 milliGRAM(s) Oral daily  multivitamin 1 Tablet(s) Oral daily  pantoprazole    Tablet 40 milliGRAM(s) Oral before breakfast  piperacillin/tazobactam IVPB.. 3.375 Gram(s) IV Intermittent every 8 hours  polyethylene glycol 3350 17 Gram(s) Oral daily  senna 2 Tablet(s) Oral at bedtime  simvastatin 10 milliGRAM(s) Oral at bedtime  sodium chloride 0.9%. 1000 milliLiter(s) (100 mL/Hr) IV Continuous <Continuous>  sucralfate suspension 1 Gram(s) Oral four times a day  tamsulosin 0.8 milliGRAM(s) Oral at bedtime    MEDICATIONS  (PRN):  acetaminophen     Tablet .. 650 milliGRAM(s) Oral every 6 hours PRN Temp greater or equal to 38C (100.4F), Mild Pain (1 - 3)  aluminum hydroxide/magnesium hydroxide/simethicone Suspension 30 milliLiter(s) Oral every 4 hours PRN Dyspepsia  dextrose Oral Gel 15 Gram(s) Oral once PRN Blood Glucose LESS THAN 70 milliGRAM(s)/deciliter  HYDROmorphone  Injectable 0.8 milliGRAM(s) IV Push every 4 hours PRN Moderate Pain (4 - 6)  HYDROmorphone  Injectable 1.4 milliGRAM(s) IV Push every 4 hours PRN Severe Pain (7 - 10)  magnesium hydroxide Suspension 30 milliLiter(s) Oral every 8 hours PRN Constipation  melatonin 3 milliGRAM(s) Oral at bedtime PRN Insomnia  ondansetron Injectable 4 milliGRAM(s) IV Push every 8 hours PRN Nausea and/or Vomiting

## 2022-12-06 ENCOUNTER — APPOINTMENT (OUTPATIENT)
Dept: VASCULAR SURGERY | Facility: HOSPITAL | Age: 62
End: 2022-12-06
Payer: MEDICAID

## 2022-12-06 ENCOUNTER — TRANSCRIPTION ENCOUNTER (OUTPATIENT)
Age: 62
End: 2022-12-06

## 2022-12-06 LAB
ANION GAP SERPL CALC-SCNC: 5 MMOL/L — SIGNIFICANT CHANGE UP (ref 5–17)
BASOPHILS # BLD AUTO: 0.06 K/UL — SIGNIFICANT CHANGE UP (ref 0–0.2)
BASOPHILS NFR BLD AUTO: 0.5 % — SIGNIFICANT CHANGE UP (ref 0–2)
BUN SERPL-MCNC: 33 MG/DL — HIGH (ref 7–23)
CALCIUM SERPL-MCNC: 9.2 MG/DL — SIGNIFICANT CHANGE UP (ref 8.5–10.1)
CHLORIDE SERPL-SCNC: 112 MMOL/L — HIGH (ref 96–108)
CO2 SERPL-SCNC: 22 MMOL/L — SIGNIFICANT CHANGE UP (ref 22–31)
CREAT SERPL-MCNC: 1.29 MG/DL — SIGNIFICANT CHANGE UP (ref 0.5–1.3)
EGFR: 63 ML/MIN/1.73M2 — SIGNIFICANT CHANGE UP
EOSINOPHIL # BLD AUTO: 0.24 K/UL — SIGNIFICANT CHANGE UP (ref 0–0.5)
EOSINOPHIL NFR BLD AUTO: 1.8 % — SIGNIFICANT CHANGE UP (ref 0–6)
GLUCOSE SERPL-MCNC: 156 MG/DL — HIGH (ref 70–99)
HCT VFR BLD CALC: 32.6 % — LOW (ref 39–50)
HGB BLD-MCNC: 10.4 G/DL — LOW (ref 13–17)
IMM GRANULOCYTES NFR BLD AUTO: 0.7 % — SIGNIFICANT CHANGE UP (ref 0–0.9)
LYMPHOCYTES # BLD AUTO: 1.56 K/UL — SIGNIFICANT CHANGE UP (ref 1–3.3)
LYMPHOCYTES # BLD AUTO: 11.7 % — LOW (ref 13–44)
MCHC RBC-ENTMCNC: 31.9 GM/DL — LOW (ref 32–36)
MCHC RBC-ENTMCNC: 32.9 PG — SIGNIFICANT CHANGE UP (ref 27–34)
MCV RBC AUTO: 103.2 FL — HIGH (ref 80–100)
MONOCYTES # BLD AUTO: 1.28 K/UL — HIGH (ref 0–0.9)
MONOCYTES NFR BLD AUTO: 9.6 % — SIGNIFICANT CHANGE UP (ref 2–14)
NEUTROPHILS # BLD AUTO: 10.1 K/UL — HIGH (ref 1.8–7.4)
NEUTROPHILS NFR BLD AUTO: 75.7 % — SIGNIFICANT CHANGE UP (ref 43–77)
PLATELET # BLD AUTO: 302 K/UL — SIGNIFICANT CHANGE UP (ref 150–400)
POTASSIUM SERPL-MCNC: 3.8 MMOL/L — SIGNIFICANT CHANGE UP (ref 3.5–5.3)
POTASSIUM SERPL-SCNC: 3.8 MMOL/L — SIGNIFICANT CHANGE UP (ref 3.5–5.3)
RBC # BLD: 3.16 M/UL — LOW (ref 4.2–5.8)
RBC # FLD: 13.2 % — SIGNIFICANT CHANGE UP (ref 10.3–14.5)
SARS-COV-2 RNA SPEC QL NAA+PROBE: SIGNIFICANT CHANGE UP
SODIUM SERPL-SCNC: 139 MMOL/L — SIGNIFICANT CHANGE UP (ref 135–145)
WBC # BLD: 13.33 K/UL — HIGH (ref 3.8–10.5)
WBC # FLD AUTO: 13.33 K/UL — HIGH (ref 3.8–10.5)

## 2022-12-06 PROCEDURE — 99223 1ST HOSP IP/OBS HIGH 75: CPT

## 2022-12-06 PROCEDURE — 99232 SBSQ HOSP IP/OBS MODERATE 35: CPT

## 2022-12-06 PROCEDURE — XXXXX: CPT | Mod: 1L

## 2022-12-06 RX ORDER — HYDROMORPHONE HYDROCHLORIDE 2 MG/ML
1.5 INJECTION INTRAMUSCULAR; INTRAVENOUS; SUBCUTANEOUS EVERY 4 HOURS
Refills: 0 | Status: DISCONTINUED | OUTPATIENT
Start: 2022-12-06 | End: 2022-12-13

## 2022-12-06 RX ORDER — SODIUM CHLORIDE 9 MG/ML
1000 INJECTION INTRAMUSCULAR; INTRAVENOUS; SUBCUTANEOUS
Refills: 0 | Status: DISCONTINUED | OUTPATIENT
Start: 2022-12-06 | End: 2022-12-08

## 2022-12-06 RX ORDER — HYDROMORPHONE HYDROCHLORIDE 2 MG/ML
0.5 INJECTION INTRAMUSCULAR; INTRAVENOUS; SUBCUTANEOUS
Refills: 0 | Status: DISCONTINUED | OUTPATIENT
Start: 2022-12-06 | End: 2022-12-06

## 2022-12-06 RX ORDER — ONDANSETRON 8 MG/1
4 TABLET, FILM COATED ORAL ONCE
Refills: 0 | Status: DISCONTINUED | OUTPATIENT
Start: 2022-12-06 | End: 2022-12-06

## 2022-12-06 RX ADMIN — Medication 1 TABLET(S): at 13:58

## 2022-12-06 RX ADMIN — LISINOPRIL 5 MILLIGRAM(S): 2.5 TABLET ORAL at 13:58

## 2022-12-06 RX ADMIN — Medication 81 MILLIGRAM(S): at 13:57

## 2022-12-06 RX ADMIN — GABAPENTIN 300 MILLIGRAM(S): 400 CAPSULE ORAL at 14:10

## 2022-12-06 RX ADMIN — Medication 10 MILLIGRAM(S): at 14:10

## 2022-12-06 RX ADMIN — ISOSORBIDE MONONITRATE 30 MILLIGRAM(S): 60 TABLET, EXTENDED RELEASE ORAL at 13:59

## 2022-12-06 RX ADMIN — Medication 100 MILLIGRAM(S): at 21:29

## 2022-12-06 RX ADMIN — FAMOTIDINE 40 MILLIGRAM(S): 10 INJECTION INTRAVENOUS at 21:27

## 2022-12-06 RX ADMIN — HYDROMORPHONE HYDROCHLORIDE 1.5 MILLIGRAM(S): 2 INJECTION INTRAMUSCULAR; INTRAVENOUS; SUBCUTANEOUS at 20:41

## 2022-12-06 RX ADMIN — Medication 1 GRAM(S): at 15:04

## 2022-12-06 RX ADMIN — INSULIN GLARGINE 30 UNIT(S): 100 INJECTION, SOLUTION SUBCUTANEOUS at 21:28

## 2022-12-06 RX ADMIN — Medication 1 GRAM(S): at 21:27

## 2022-12-06 RX ADMIN — GABAPENTIN 300 MILLIGRAM(S): 400 CAPSULE ORAL at 06:42

## 2022-12-06 RX ADMIN — Medication 2: at 06:41

## 2022-12-06 RX ADMIN — HYDROMORPHONE HYDROCHLORIDE 0.5 MILLIGRAM(S): 2 INJECTION INTRAMUSCULAR; INTRAVENOUS; SUBCUTANEOUS at 11:06

## 2022-12-06 RX ADMIN — HYDROMORPHONE HYDROCHLORIDE 1.5 MILLIGRAM(S): 2 INJECTION INTRAMUSCULAR; INTRAVENOUS; SUBCUTANEOUS at 15:25

## 2022-12-06 RX ADMIN — PANTOPRAZOLE SODIUM 40 MILLIGRAM(S): 20 TABLET, DELAYED RELEASE ORAL at 06:42

## 2022-12-06 RX ADMIN — HYDROMORPHONE HYDROCHLORIDE 1.5 MILLIGRAM(S): 2 INJECTION INTRAMUSCULAR; INTRAVENOUS; SUBCUTANEOUS at 22:51

## 2022-12-06 RX ADMIN — HYDROMORPHONE HYDROCHLORIDE 0.5 MILLIGRAM(S): 2 INJECTION INTRAMUSCULAR; INTRAVENOUS; SUBCUTANEOUS at 10:36

## 2022-12-06 RX ADMIN — GABAPENTIN 600 MILLIGRAM(S): 400 CAPSULE ORAL at 21:27

## 2022-12-06 RX ADMIN — PIPERACILLIN AND TAZOBACTAM 25 GRAM(S): 4; .5 INJECTION, POWDER, LYOPHILIZED, FOR SOLUTION INTRAVENOUS at 14:10

## 2022-12-06 RX ADMIN — SODIUM CHLORIDE 100 MILLILITER(S): 9 INJECTION INTRAMUSCULAR; INTRAVENOUS; SUBCUTANEOUS at 14:55

## 2022-12-06 RX ADMIN — HYDROMORPHONE HYDROCHLORIDE 1.5 MILLIGRAM(S): 2 INJECTION INTRAMUSCULAR; INTRAVENOUS; SUBCUTANEOUS at 14:50

## 2022-12-06 RX ADMIN — Medication 1 GRAM(S): at 18:00

## 2022-12-06 RX ADMIN — TAMSULOSIN HYDROCHLORIDE 0.8 MILLIGRAM(S): 0.4 CAPSULE ORAL at 21:27

## 2022-12-06 RX ADMIN — PIPERACILLIN AND TAZOBACTAM 25 GRAM(S): 4; .5 INJECTION, POWDER, LYOPHILIZED, FOR SOLUTION INTRAVENOUS at 21:26

## 2022-12-06 RX ADMIN — AMLODIPINE BESYLATE 5 MILLIGRAM(S): 2.5 TABLET ORAL at 13:58

## 2022-12-06 RX ADMIN — Medication 40 MILLIGRAM(S): at 13:58

## 2022-12-06 RX ADMIN — SIMVASTATIN 10 MILLIGRAM(S): 20 TABLET, FILM COATED ORAL at 21:29

## 2022-12-06 RX ADMIN — SENNA PLUS 2 TABLET(S): 8.6 TABLET ORAL at 21:27

## 2022-12-06 RX ADMIN — Medication 3 MILLIGRAM(S): at 21:27

## 2022-12-06 RX ADMIN — HYDROMORPHONE HYDROCHLORIDE 0.5 MILLIGRAM(S): 2 INJECTION INTRAMUSCULAR; INTRAVENOUS; SUBCUTANEOUS at 11:45

## 2022-12-06 RX ADMIN — Medication 100 MILLIGRAM(S): at 13:57

## 2022-12-06 NOTE — CONSULT NOTE ADULT - SUBJECTIVE AND OBJECTIVE BOX
Date of Consult:    CHIEF COMPLAINT:    HISTORY OF PRESENT ILLNESS:      Allergies    No Known Allergies    Intolerances    	    MEDICATIONS:  amLODIPine   Tablet 5 milliGRAM(s) Oral daily  aspirin  chewable 81 milliGRAM(s) Oral daily  furosemide    Tablet 40 milliGRAM(s) Oral daily  isosorbide   mononitrate ER Tablet (IMDUR) 30 milliGRAM(s) Oral daily  lisinopril 5 milliGRAM(s) Oral daily    doxycycline monohydrate Capsule 100 milliGRAM(s) Oral every 12 hours  piperacillin/tazobactam IVPB.. 3.375 Gram(s) IV Intermittent every 8 hours      acetaminophen     Tablet .. 650 milliGRAM(s) Oral every 6 hours PRN  gabapentin 300 milliGRAM(s) Oral two times a day  gabapentin 600 milliGRAM(s) Oral at bedtime  HYDROmorphone  Injectable 1 milliGRAM(s) IV Push every 4 hours PRN  HYDROmorphone  Injectable 1.5 milliGRAM(s) IV Push every 4 hours PRN  melatonin 3 milliGRAM(s) Oral at bedtime PRN  ondansetron Injectable 4 milliGRAM(s) IV Push every 8 hours PRN    aluminum hydroxide/magnesium hydroxide/simethicone Suspension 30 milliLiter(s) Oral every 4 hours PRN  famotidine    Tablet 40 milliGRAM(s) Oral at bedtime  magnesium hydroxide Suspension 30 milliLiter(s) Oral every 8 hours PRN  metoclopramide 10 milliGRAM(s) Oral daily  pantoprazole    Tablet 40 milliGRAM(s) Oral before breakfast  polyethylene glycol 3350 17 Gram(s) Oral daily  senna 2 Tablet(s) Oral at bedtime  sucralfate suspension 1 Gram(s) Oral four times a day    dextrose 50% Injectable 25 Gram(s) IV Push once  dextrose 50% Injectable 12.5 Gram(s) IV Push once  dextrose 50% Injectable 25 Gram(s) IV Push once  dextrose Oral Gel 15 Gram(s) Oral once PRN  glucagon  Injectable 1 milliGRAM(s) IntraMuscular once  insulin glargine Injectable (LANTUS) 30 Unit(s) SubCutaneous at bedtime  insulin lispro (ADMELOG) corrective regimen sliding scale   SubCutaneous three times a day before meals  simvastatin 10 milliGRAM(s) Oral at bedtime    dextrose 5%. 1000 milliLiter(s) IV Continuous <Continuous>  dextrose 5%. 1000 milliLiter(s) IV Continuous <Continuous>  multivitamin 1 Tablet(s) Oral daily  sodium chloride 0.9%. 1000 milliLiter(s) IV Continuous <Continuous>  sodium chloride 0.9%. 1000 milliLiter(s) IV Continuous <Continuous>  tamsulosin 0.8 milliGRAM(s) Oral at bedtime      PAST MEDICAL & SURGICAL HISTORY:  HTN (hypertension)      DM (diabetes mellitus)      Anemia      GERD (gastroesophageal reflux disease)      S/P BKA (below knee amputation), left      CAD (coronary artery disease)      S/P CABG x 3      Status post amputation of leg          FAMILY HISTORY:  No pertinent family history in first degree relatives        SOCIAL HISTORY:    [ ] Non-smoker  [ ] Smoker  [ ] Alcohol      REVIEW OF SYSTEMS:  See HPI. Otherwise, 10 point ROS done and otherwise negative.    PHYSICAL EXAM:  T(C): 37.2 (12-06-22 @ 16:11), Max: 37.2 (12-06-22 @ 16:11)  HR: 72 (12-06-22 @ 16:11) (69 - 82)  BP: 111/56 (12-06-22 @ 16:11) (106/53 - 151/75)  RR: 18 (12-06-22 @ 16:11) (14 - 19)  SpO2: 98% (12-06-22 @ 16:11) (95% - 100%)  Wt(kg): --  I&O's Summary    05 Dec 2022 07:01  -  06 Dec 2022 07:00  --------------------------------------------------------  IN: 0 mL / OUT: 350 mL / NET: -350 mL    06 Dec 2022 07:01  -  06 Dec 2022 18:00  --------------------------------------------------------  IN: 600 mL / OUT: 0 mL / NET: 600 mL        Appearance: Normal	  HEENT:   Normal oral mucosa, PERRL, EOMI	  Lymphatic: No lymphadenopathy  Cardiovascular: Normal S1 S2, No JVD, No murmurs, No edema  Respiratory: Lungs clear to auscultation	  Psychiatry: A & O x 3, Mood & affect appropriate  Gastrointestinal:  Soft, Non-tender, + BS	  Skin: No rashes, No ecchymoses, No cyanosis	  Neurologic: Non-focal  Extremities: Normal range of motion, No clubbing, cyanosis or edema  Vascular: Peripheral pulses palpable 2+ bilaterally        LABS:	 	    CBC Full  -  ( 06 Dec 2022 07:50 )  WBC Count : 13.33 K/uL  Hemoglobin : 10.4 g/dL  Hematocrit : 32.6 %  Platelet Count - Automated : 302 K/uL  Mean Cell Volume : 103.2 fl  Mean Cell Hemoglobin : 32.9 pg  Mean Cell Hemoglobin Concentration : 31.9 gm/dL  Auto Neutrophil # : 10.10 K/uL  Auto Lymphocyte # : 1.56 K/uL  Auto Monocyte # : 1.28 K/uL  Auto Eosinophil # : 0.24 K/uL  Auto Basophil # : 0.06 K/uL  Auto Neutrophil % : 75.7 %  Auto Lymphocyte % : 11.7 %  Auto Monocyte % : 9.6 %  Auto Eosinophil % : 1.8 %  Auto Basophil % : 0.5 %    12-06    139  |  112<H>  |  33<H>  ----------------------------<  156<H>  3.8   |  22  |  1.29    Ca    9.2      06 Dec 2022 07:50        proBNP:   Lipid Profile:   HgA1c:   TSH:       CARDIAC MARKERS:            TELEMETRY: 	    ECG:  	  RADIOLOGY:  OTHER: 	    PREVIOUS DIAGNOSTIC TESTING:    [ ] Echocardiogram:  [ ]  Catheterization:  [ ] Stress Test:  	  	  ASSESSMENT/PLAN: 	    Germán Wilson MD     Date of Consult: 12/6/2022    CHIEF COMPLAINT: shortness of breath     HISTORY OF PRESENT ILLNESS:  63 y/o man with a history of CAD s/p CABG and MV repair (2016 - [place]), HTN, DM, GERD, L BKA, who was transferred from rehab for the evaluation of abdominal discomfort and cellulitis.  He was found to have severe mitral stenosis for which structural heart service was consulted. He denies any angina but does have dyspnea on exertion which has been limiting his physical therapy. He is post R leg angiogram which showed poor distal run off which is same leg as his cellulitis.     TTE reviewed with MV gradient of 13-14 with thicken leaflets, high calcium burden and mild MR.     Allergies    No Known Allergies    Intolerances    	    MEDICATIONS:  amLODIPine   Tablet 5 milliGRAM(s) Oral daily  aspirin  chewable 81 milliGRAM(s) Oral daily  furosemide    Tablet 40 milliGRAM(s) Oral daily  isosorbide   mononitrate ER Tablet (IMDUR) 30 milliGRAM(s) Oral daily  lisinopril 5 milliGRAM(s) Oral daily  doxycycline monohydrate Capsule 100 milliGRAM(s) Oral every 12 hours  piperacillin/tazobactam IVPB.. 3.375 Gram(s) IV Intermittent every 8 hours  acetaminophen     Tablet .. 650 milliGRAM(s) Oral every 6 hours PRN  gabapentin 300 milliGRAM(s) Oral two times a day  gabapentin 600 milliGRAM(s) Oral at bedtime  HYDROmorphone  Injectable 1 milliGRAM(s) IV Push every 4 hours PRN  HYDROmorphone  Injectable 1.5 milliGRAM(s) IV Push every 4 hours PRN  melatonin 3 milliGRAM(s) Oral at bedtime PRN  ondansetron Injectable 4 milliGRAM(s) IV Push every 8 hours PRN    aluminum hydroxide/magnesium hydroxide/simethicone Suspension 30 milliLiter(s) Oral every 4 hours PRN  famotidine    Tablet 40 milliGRAM(s) Oral at bedtime  magnesium hydroxide Suspension 30 milliLiter(s) Oral every 8 hours PRN  metoclopramide 10 milliGRAM(s) Oral daily  pantoprazole    Tablet 40 milliGRAM(s) Oral before breakfast  polyethylene glycol 3350 17 Gram(s) Oral daily  senna 2 Tablet(s) Oral at bedtime  sucralfate suspension 1 Gram(s) Oral four times a day  dextrose 50% Injectable 25 Gram(s) IV Push once  dextrose 50% Injectable 12.5 Gram(s) IV Push once  dextrose 50% Injectable 25 Gram(s) IV Push once  dextrose Oral Gel 15 Gram(s) Oral once PRN  glucagon  Injectable 1 milliGRAM(s) IntraMuscular once  insulin glargine Injectable (LANTUS) 30 Unit(s) SubCutaneous at bedtime  insulin lispro (ADMELOG) corrective regimen sliding scale   SubCutaneous three times a day before meals  simvastatin 10 milliGRAM(s) Oral at bedtime  dextrose 5%. 1000 milliLiter(s) IV Continuous <Continuous>  dextrose 5%. 1000 milliLiter(s) IV Continuous <Continuous>  multivitamin 1 Tablet(s) Oral daily  sodium chloride 0.9%. 1000 milliLiter(s) IV Continuous <Continuous>  sodium chloride 0.9%. 1000 milliLiter(s) IV Continuous <Continuous>  tamsulosin 0.8 milliGRAM(s) Oral at bedtime      PAST MEDICAL & SURGICAL HISTORY:  HTN (hypertension)  DM (diabetes mellitus)  Anemia  GERD (gastroesophageal reflux disease)  S/P BKA (below knee amputation), left  CAD (coronary artery disease)  S/P CABG x 3  Status post amputation of leg    FAMILY HISTORY:  No pertinent family history in first degree relatives      SOCIAL HISTORY:    [ ] Non-smoker  [ ] Smoker  [ ] Alcohol      REVIEW OF SYSTEMS:  See HPI. Otherwise, 10 point ROS done and otherwise negative.    PHYSICAL EXAM:  T(C): 37.2 (12-06-22 @ 16:11), Max: 37.2 (12-06-22 @ 16:11)  HR: 72 (12-06-22 @ 16:11) (69 - 82)  BP: 111/56 (12-06-22 @ 16:11) (106/53 - 151/75)  RR: 18 (12-06-22 @ 16:11) (14 - 19)  SpO2: 98% (12-06-22 @ 16:11) (95% - 100%)  Wt(kg): --  I&O's Summary    05 Dec 2022 07:01  -  06 Dec 2022 07:00  --------------------------------------------------------  IN: 0 mL / OUT: 350 mL / NET: -350 mL    06 Dec 2022 07:01  -  06 Dec 2022 18:00  --------------------------------------------------------  IN: 600 mL / OUT: 0 mL / NET: 600 mL        Appearance: Normal	  HEENT:   Normal oral mucosa, PERRL, EOMI	  Lymphatic: No lymphadenopathy  Cardiovascular: Normal S1 S2, No JVD, No murmurs, No edema  Respiratory: Lungs clear to auscultation	  Psychiatry: A & O x 3, Mood & affect appropriate  Gastrointestinal:  Soft, Non-tender, + BS	  Skin: No rashes, No ecchymoses, No cyanosis	  Neurologic: Non-focal  Extremities: Normal range of motion, No clubbing, cyanosis or edema  Vascular: Peripheral pulses palpable 2+ bilaterally      LABS:	 	    CBC Full  -  ( 06 Dec 2022 07:50 )  WBC Count : 13.33 K/uL  Hemoglobin : 10.4 g/dL  Hematocrit : 32.6 %  Platelet Count - Automated : 302 K/uL  Mean Cell Volume : 103.2 fl  Mean Cell Hemoglobin : 32.9 pg  Mean Cell Hemoglobin Concentration : 31.9 gm/dL  Auto Neutrophil # : 10.10 K/uL  Auto Lymphocyte # : 1.56 K/uL  Auto Monocyte # : 1.28 K/uL  Auto Eosinophil # : 0.24 K/uL  Auto Basophil # : 0.06 K/uL  Auto Neutrophil % : 75.7 %  Auto Lymphocyte % : 11.7 %  Auto Monocyte % : 9.6 %  Auto Eosinophil % : 1.8 %  Auto Basophil % : 0.5 %    12-06    139  |  112<H>  |  33<H>  ----------------------------<  156<H>  3.8   |  22  |  1.29    Ca    9.2      06 Dec 2022 07:50        proBNP:   Lipid Profile:   HgA1c:   TSH:       CARDIAC MARKERS:    TELEMETRY: 	    ECG:  	  RADIOLOGY:  OTHER: 	    PREVIOUS DIAGNOSTIC TESTING:    [ ] Echocardiogram:  [ ]  Catheterization:  [ ] Stress Test:  	  	  ASSESSMENT/PLAN: 	   Date of Consult: 12/6/2022    CHIEF COMPLAINT: shortness of breath     HISTORY OF PRESENT ILLNESS:  63 y/o man with a history of CAD s/p CABG and MV repair (2016 - [place]), HTN, DM, GERD, L BKA, who was transferred from rehab for the evaluation of abdominal discomfort and cellulitis.  He was found to have severe mitral stenosis for which structural heart service was consulted. He denies any angina but does have dyspnea on exertion which has been limiting his physical therapy. He is post R leg angiogram which showed poor distal run off which is same leg as his cellulitis.     TTE reviewed with MV gradient of 13-14 with thicken leaflets, high calcium burden and mild MR.     Allergies    No Known Allergies    Intolerances      MEDICATIONS:  amLODIPine   Tablet 5 milliGRAM(s) Oral daily  aspirin  chewable 81 milliGRAM(s) Oral daily  furosemide    Tablet 40 milliGRAM(s) Oral daily  isosorbide   mononitrate ER Tablet (IMDUR) 30 milliGRAM(s) Oral daily  lisinopril 5 milliGRAM(s) Oral daily  doxycycline monohydrate Capsule 100 milliGRAM(s) Oral every 12 hours  piperacillin/tazobactam IVPB.. 3.375 Gram(s) IV Intermittent every 8 hours  acetaminophen     Tablet .. 650 milliGRAM(s) Oral every 6 hours PRN  gabapentin 300 milliGRAM(s) Oral two times a day  gabapentin 600 milliGRAM(s) Oral at bedtime  HYDROmorphone  Injectable 1 milliGRAM(s) IV Push every 4 hours PRN  HYDROmorphone  Injectable 1.5 milliGRAM(s) IV Push every 4 hours PRN  melatonin 3 milliGRAM(s) Oral at bedtime PRN  ondansetron Injectable 4 milliGRAM(s) IV Push every 8 hours PRN    aluminum hydroxide/magnesium hydroxide/simethicone Suspension 30 milliLiter(s) Oral every 4 hours PRN  famotidine    Tablet 40 milliGRAM(s) Oral at bedtime  magnesium hydroxide Suspension 30 milliLiter(s) Oral every 8 hours PRN  metoclopramide 10 milliGRAM(s) Oral daily  pantoprazole    Tablet 40 milliGRAM(s) Oral before breakfast  polyethylene glycol 3350 17 Gram(s) Oral daily  senna 2 Tablet(s) Oral at bedtime  sucralfate suspension 1 Gram(s) Oral four times a day  dextrose 50% Injectable 25 Gram(s) IV Push once  dextrose 50% Injectable 12.5 Gram(s) IV Push once  dextrose 50% Injectable 25 Gram(s) IV Push once  dextrose Oral Gel 15 Gram(s) Oral once PRN  glucagon  Injectable 1 milliGRAM(s) IntraMuscular once  insulin glargine Injectable (LANTUS) 30 Unit(s) SubCutaneous at bedtime  insulin lispro (ADMELOG) corrective regimen sliding scale   SubCutaneous three times a day before meals  simvastatin 10 milliGRAM(s) Oral at bedtime  dextrose 5%. 1000 milliLiter(s) IV Continuous <Continuous>  dextrose 5%. 1000 milliLiter(s) IV Continuous <Continuous>  multivitamin 1 Tablet(s) Oral daily  sodium chloride 0.9%. 1000 milliLiter(s) IV Continuous <Continuous>  sodium chloride 0.9%. 1000 milliLiter(s) IV Continuous <Continuous>  tamsulosin 0.8 milliGRAM(s) Oral at bedtime      PAST MEDICAL & SURGICAL HISTORY:  HTN (hypertension)  DM (diabetes mellitus)  Anemia  GERD (gastroesophageal reflux disease)  S/P BKA (below knee amputation), left  CAD (coronary artery disease)  S/P CABG x 3  Status post amputation of leg    FAMILY HISTORY:  No pertinent family history in first degree relatives      SOCIAL HISTORY:    [ ] Non-smoker  [ ] Smoker  [ ] Alcohol      REVIEW OF SYSTEMS:  See HPI. Otherwise, 10 point ROS done and otherwise negative.    PHYSICAL EXAM:  T(C): 37.2 (12-06-22 @ 16:11), Max: 37.2 (12-06-22 @ 16:11)  HR: 72 (12-06-22 @ 16:11) (69 - 82)  BP: 111/56 (12-06-22 @ 16:11) (106/53 - 151/75)  RR: 18 (12-06-22 @ 16:11) (14 - 19)  SpO2: 98% (12-06-22 @ 16:11) (95% - 100%)  Wt(kg): --  I&O's Summary    05 Dec 2022 07:01  -  06 Dec 2022 07:00  --------------------------------------------------------  IN: 0 mL / OUT: 350 mL / NET: -350 mL    06 Dec 2022 07:01  -  06 Dec 2022 18:00  --------------------------------------------------------  IN: 600 mL / OUT: 0 mL / NET: 600 mL        Appearance: Normal	  HEENT:   Normal oral mucosa, PERRL, EOMI	  Lymphatic: No lymphadenopathy  Cardiovascular: Normal S1 S2, No JVD, No murmurs, No edema  Respiratory: Lungs clear to auscultation	  Psychiatry: A & O x 3, Mood & affect appropriate  Gastrointestinal:  Soft, Non-tender, + BS	  Skin: No rashes, No ecchymoses, No cyanosis	  Neurologic: Non-focal  Extremities: Normal range of motion, No clubbing, cyanosis or edema  Vascular: Peripheral pulses palpable 2+ bilaterally      LABS:	 	    CBC Full  -  ( 06 Dec 2022 07:50 )  WBC Count : 13.33 K/uL  Hemoglobin : 10.4 g/dL  Hematocrit : 32.6 %  Platelet Count - Automated : 302 K/uL  Mean Cell Volume : 103.2 fl  Mean Cell Hemoglobin : 32.9 pg  Mean Cell Hemoglobin Concentration : 31.9 gm/dL  Auto Neutrophil # : 10.10 K/uL  Auto Lymphocyte # : 1.56 K/uL  Auto Monocyte # : 1.28 K/uL  Auto Eosinophil # : 0.24 K/uL  Auto Basophil # : 0.06 K/uL  Auto Neutrophil % : 75.7 %  Auto Lymphocyte % : 11.7 %  Auto Monocyte % : 9.6 %  Auto Eosinophil % : 1.8 %  Auto Basophil % : 0.5 %    12-06    139  |  112<H>  |  33<H>  ----------------------------<  156<H>  3.8   |  22  |  1.29    Ca    9.2      06 Dec 2022 07:50        proBNP:   Lipid Profile:   HgA1c:   TSH:       CARDIAC MARKERS:    TELEMETRY: 	    ECG:  	  RADIOLOGY:  OTHER: 	    PREVIOUS DIAGNOSTIC TESTING:    [ ] Echocardiogram:     Endocardium is not well visualized, however, overall left ventricular   systolic function appears normal. Mild concentric left ventricular   hypertrophy. Septal flattening is seen; this finding is consistent with   right heart pressure / volume overload. Estimated left ventricular   ejection fraction is 55-60%.   The right ventricle exhibits mild dilation, mild diffuse hypokinesis, and   mild depression of contractility.   Severe mitral stenosis. Mild mitral regurgitation.   Mild to moderate tricuspid valve regurgitation.   Severe pulmonary hypertension.  [ ]  Catheterization:  [ ] Stress Test:  	  	  ASSESSMENT/PLAN:

## 2022-12-06 NOTE — PROGRESS NOTE ADULT - ASSESSMENT
63 yo man with DM, HTN, CAD, 3v CABG, hx of PAD, L BKA, GERD, past alcohol use disorder, cirrhosis, chronic anemia, sent from [place] for worsening RLE swelling and redness, also complaints of epigastric discomfort. In the ED, exam was consistent with cellulitis of the RLE, non healing wound on the plantar surface of the R foot. Elevated inflammatory markers. Patient was placed on antibiotics and admitted to Medicine.     RLE skin and soft tissue infection in setting of non healing R foot ulcer, DM and PAD  Appreciate input from Podiatry, Vascular Surgery and ID. MRI RLE negative for osteomyelitis. Circulation to the RLE is compromised as suggested by RLE arterial duplex. May not have appropriate healing and benefit of antibiotics if circulation is poor.   - Continue on empiric antibiotics for now  - Continue aspirin, statin  -RLE angio today  - Appreciate input from Podiatry. Blister forming at plantar R foot, now stable eschar at the plantar 4th/5th metatarsal head, no pus, no malodor, no pain. Wound related to poor circulation of R foot. No acute podiatric intervention at this time as wound is stable. Podiatry will monitor demarcation of eschar and plan for outpatient debridement once cleared by vascular. Outpatient follow up at [facility] recommended.     Epigastric discomfort  DDx GERD, esophagitis, gastritis, peptic ulcer. Appreciate input from GI. Started on Carafate. PPI. Patient will eventually need EGD, timing TBD.   - Continue Carafate and PPI  - EGD planning,     HTN  BP controlled  - Continue amlodipine and lisinopril and Imdur    CAD  Remote hx of CABG. No angina or CHF sx.  - Continue aspirin, statin, Imdur    Elevated troponin  Appreciate input from Cardiology. Likely myocardial demand ischemia without infarction. No angina or CHF sx. LVEF WNL.   - Continue medical management    Mitral valve disease  Past MV repair but now with severely elevated transmitral gradient, severe pHTN. Plan to pursue work-up once recovered from infectious process.  Will likely eventually need L and R heart cath, ALEX, possibly prior to hospital discharge   - Will f/u with [facility] Dr. Wilson    Pulmonary HTN  Severe pulmonary HTN - possibly from MV disease; no prior TTE and no outpatient cardiac care available for reviewed.  - Check SPO2 with ambulation once clinically improved    DM  A1c 7.7. Currently on Lantus and Lispro here  - Continue Lantus and Lispro  - Monitor glucose TID AC and HS    BPH  Stable  - Continue Flomax      Code Status: Full  DVT px: LMWH  Dispo: To be determined pending further clinical assessment

## 2022-12-06 NOTE — PROGRESS NOTE ADULT - SUBJECTIVE AND OBJECTIVE BOX
Chief Complaint: RLE swelling and redness, epigastric discomfort    Interval Hx: Patient seen and examined earlier today. No new complaints. No significant changes. Continues to have RLE swelling and redness, slightly improved compared to presentation. Continues to have RLE pain complaints, only partially relieved with hydromorphone 1mg IV q4h prn. He remains on empiric antibiotics. MRI negative for osteomyelitis. Workup ongoing to further evaluate the extent of his peripheral vascular disease in that extremity. He is planned for LE angiogram tomorrow. As for his epigastric discomfort, it is mild-to-moderate, being treated with Carafate and PPI. Anticipated that he will eventually need an upper endoscopy to evaluate that epigastric discomfort and also as routine appropriate testing in the setting of his cirrhosis.   12/06/22: RLE angio today. No new issues.    ROS: Multi system review is comprehensively negative x 10 systems except as above    Vitals:    Vital Signs Last 24 Hrs  T(C): 36.8 (06 Dec 2022 13:22), Max: 36.8 (06 Dec 2022 07:35)  T(F): 98.3 (06 Dec 2022 13:22), Max: 98.3 (06 Dec 2022 07:35)  HR: 75 (06 Dec 2022 13:22) (69 - 82)  BP: 135/66 (06 Dec 2022 13:22) (106/53 - 151/75)  BP(mean): --  RR: 19 (06 Dec 2022 13:22) (14 - 19)  SpO2: 95% (06 Dec 2022 13:22) (95% - 100%)    Parameters below as of 06 Dec 2022 13:22  Patient On (Oxygen Delivery Method): room air        Exam:  Gen: No acute distress  HEENT: NCAT PERRL EOMI MMM  Neck: Supple no JVD no LAD  CVS: S1 S2 normal RRR  Chest: Normal resp effort, lungs CTA B/L  Abd: +BS, soft NT ND   Ext: L BKA. RLE with mild erythema from upper shin down to foot, small superficial wounds on shin, large open wound on plantar surface of foot at the foot pad.   Skin: As described in extremity exam. Some additional flaking skin on R foot  Neuro: A+OX3, no focal deficits, sensation intake to RLE  Mood: Calm, pleasant    Labs:                                                  10.4   13.33 )-----------( 302      ( 06 Dec 2022 07:50 )             32.6     06 Dec 2022 07:50    139    |  112    |  33     ----------------------------<  156    3.8     |  22     |  1.29     Ca    9.2        06 Dec 2022 07:50          CAPILLARY BLOOD GLUCOSE      POCT Blood Glucose.: 127 mg/dL (06 Dec 2022 10:20)  POCT Blood Glucose.: 176 mg/dL (06 Dec 2022 06:39)  POCT Blood Glucose.: 146 mg/dL (05 Dec 2022 21:11)  POCT Blood Glucose.: 152 mg/dL (05 Dec 2022 16:55)            Micro:  Bld cx x 2 12/1: Negative to date  COVID, Flu, RSV PCR 12/1: Negative    Imaging:  MRI RLE, R foot 12/2: There is no evidence for osteomyelitis.    RLE arterial duplex 12/1: No appreciable flow detected in the posterior tibial and peroneal arteries.    R foot XR 12/1: Soft tissue emphysema at the plantar aspect of the mid foot/calcaneous.     Cardiac Testing:  TTE 12/2:  Endocardium is not well visualized, however, overall left ventricular systolic function appears normal. Mild concentric left ventricular hypertrophy. Septal flattening is seen; this finding is consistent with right heart pressure / volume overload. Estimated left ventricular ejection fraction is 55-60%. The right ventricle exhibits mild dilation, mild diffuse hypokinesis, and mild depression of contractility. Severe mitral stenosis. Mild mitral regurgitation. Mild to moderate tricuspid valve regurgitation. Severe pulmonary hypertension.    Meds:  MEDICATIONS  (STANDING):  acetaminophen     Tablet .. 975 milliGRAM(s) Oral every 8 hours  amLODIPine   Tablet 5 milliGRAM(s) Oral daily  aspirin  chewable 81 milliGRAM(s) Oral daily  doxycycline monohydrate Capsule 100 milliGRAM(s) Oral every 12 hours  enoxaparin Injectable 40 milliGRAM(s) SubCutaneous every 24 hours  famotidine    Tablet 40 milliGRAM(s) Oral at bedtime  furosemide    Tablet 40 milliGRAM(s) Oral daily  gabapentin 300 milliGRAM(s) Oral two times a day  gabapentin 600 milliGRAM(s) Oral at bedtime  insulin glargine Injectable (LANTUS) 30 Unit(s) SubCutaneous at bedtime  insulin lispro (ADMELOG) corrective regimen sliding scale   SubCutaneous three times a day before meals  isosorbide   mononitrate ER Tablet (IMDUR) 30 milliGRAM(s) Oral daily  lisinopril 5 milliGRAM(s) Oral daily  metoclopramide 10 milliGRAM(s) Oral daily  multivitamin 1 Tablet(s) Oral daily  pantoprazole    Tablet 40 milliGRAM(s) Oral before breakfast  piperacillin/tazobactam IVPB.. 3.375 Gram(s) IV Intermittent every 8 hours  polyethylene glycol 3350 17 Gram(s) Oral daily  senna 2 Tablet(s) Oral at bedtime  simvastatin 10 milliGRAM(s) Oral at bedtime  sodium chloride 0.9%. 1000 milliLiter(s) (100 mL/Hr) IV Continuous <Continuous>  sucralfate suspension 1 Gram(s) Oral four times a day  tamsulosin 0.8 milliGRAM(s) Oral at bedtime    MEDICATIONS  (PRN):  acetaminophen     Tablet .. 650 milliGRAM(s) Oral every 6 hours PRN Temp greater or equal to 38C (100.4F), Mild Pain (1 - 3)  aluminum hydroxide/magnesium hydroxide/simethicone Suspension 30 milliLiter(s) Oral every 4 hours PRN Dyspepsia  dextrose Oral Gel 15 Gram(s) Oral once PRN Blood Glucose LESS THAN 70 milliGRAM(s)/deciliter  HYDROmorphone  Injectable 1 milliGRAM(s) IV Push every 4 hours PRN Moderate Pain (4 - 6)  HYDROmorphone  Injectable 1.5 milliGRAM(s) IV Push every 4 hours PRN Severe Pain (7 - 10)  magnesium hydroxide Suspension 30 milliLiter(s) Oral every 8 hours PRN Constipation  melatonin 3 milliGRAM(s) Oral at bedtime PRN Insomnia  ondansetron Injectable 4 milliGRAM(s) IV Push every 8 hours PRN Nausea and/or Vomiting

## 2022-12-06 NOTE — PROGRESS NOTE ADULT - SUBJECTIVE AND OBJECTIVE BOX
CHIEF COMPLAINT: Patient is a 62y old  Male who presents with a chief complaint of Elevated troponin, PAD and Cellulitis (02 Dec 2022 10:14)    HPI:  63 y/o man with a history of CAD s/p CABG and MV repair (2016 - [place]), HTN, DM, GERD, L BKA, who was transferred from [facility] for the evaluation of abdominal discomfort and cellulitis.  Cardiology consult requested for elevated troponin.  Mr. Roberts has no cardiac symptoms -- he has not been experiencing angina or dyspnea.  He walks during PT and denies exertional chest discomfort.  He does not see a cardiologist in the outpatient setting.    12/4/22 Comfortable in bed with CC of SOB No CP Tele SR   12/5/22 Semirecumbent in bed no CP HR 70-80 BPM    12/6/22 patient is feeling ok , did have RLE angiogram today results pending  no chest pain or shortness of breath at rest    PAST MEDICAL & SURGICAL HISTORY:  HTN (hypertension)  DM (diabetes mellitus)  Anemia  GERD (gastroesophageal reflux disease)  S/P BKA (below knee amputation), left  CAD (coronary artery disease)  S/P CABG x 3  Status post amputation of leg    SOCIAL HISTORY:   Smoking: Nonsmoker    FAMILY HISTORY: No pertinent family history in first degree relatives    MEDICATIONS  (STANDING):  amLODIPine   Tablet 5 milliGRAM(s) Oral daily  aspirin  chewable 81 milliGRAM(s) Oral daily  dextrose 5%. 1000 milliLiter(s) (100 mL/Hr) IV Continuous <Continuous>  dextrose 5%. 1000 milliLiter(s) (50 mL/Hr) IV Continuous <Continuous>  dextrose 50% Injectable 25 Gram(s) IV Push once  dextrose 50% Injectable 12.5 Gram(s) IV Push once  dextrose 50% Injectable 25 Gram(s) IV Push once  doxycycline monohydrate Capsule 100 milliGRAM(s) Oral every 12 hours  famotidine    Tablet 40 milliGRAM(s) Oral at bedtime  furosemide    Tablet 40 milliGRAM(s) Oral daily  gabapentin 300 milliGRAM(s) Oral two times a day  gabapentin 600 milliGRAM(s) Oral at bedtime  glucagon  Injectable 1 milliGRAM(s) IntraMuscular once  insulin glargine Injectable (LANTUS) 30 Unit(s) SubCutaneous at bedtime  insulin lispro (ADMELOG) corrective regimen sliding scale   SubCutaneous three times a day before meals  isosorbide   mononitrate ER Tablet (IMDUR) 30 milliGRAM(s) Oral daily  lisinopril 5 milliGRAM(s) Oral daily  metoclopramide 10 milliGRAM(s) Oral daily  multivitamin 1 Tablet(s) Oral daily  pantoprazole    Tablet 40 milliGRAM(s) Oral before breakfast  piperacillin/tazobactam IVPB.. 3.375 Gram(s) IV Intermittent every 8 hours  polyethylene glycol 3350 17 Gram(s) Oral daily  senna 2 Tablet(s) Oral at bedtime  simvastatin 10 milliGRAM(s) Oral at bedtime  sodium chloride 0.9%. 1000 milliLiter(s) (100 mL/Hr) IV Continuous <Continuous>  sodium chloride 0.9%. 1000 milliLiter(s) (100 mL/Hr) IV Continuous <Continuous>  sucralfate suspension 1 Gram(s) Oral four times a day  tamsulosin 0.8 milliGRAM(s) Oral at bedtime    MEDICATIONS  (PRN):  acetaminophen     Tablet .. 650 milliGRAM(s) Oral every 6 hours PRN Temp greater or equal to 38C (100.4F), Mild Pain (1 - 3)  aluminum hydroxide/magnesium hydroxide/simethicone Suspension 30 milliLiter(s) Oral every 4 hours PRN Dyspepsia  dextrose Oral Gel 15 Gram(s) Oral once PRN Blood Glucose LESS THAN 70 milliGRAM(s)/deciliter  HYDROmorphone  Injectable 1 milliGRAM(s) IV Push every 4 hours PRN Moderate Pain (4 - 6)  HYDROmorphone  Injectable 1.5 milliGRAM(s) IV Push every 4 hours PRN Severe Pain (7 - 10)  HYDROmorphone  Injectable 0.5 milliGRAM(s) IV Push every 10 minutes PRN Moderate Pain (4 - 6)  magnesium hydroxide Suspension 30 milliLiter(s) Oral every 8 hours PRN Constipation  melatonin 3 milliGRAM(s) Oral at bedtime PRN Insomnia  ondansetron Injectable 4 milliGRAM(s) IV Push every 8 hours PRN Nausea and/or Vomiting  ondansetron Injectable 4 milliGRAM(s) IV Push once PRN Nausea and/or Vomiting      Vital Signs Last 24 Hrs  T(C): 36.7 (06 Dec 2022 11:30), Max: 36.8 (06 Dec 2022 07:35)  T(F): 98.1 (06 Dec 2022 11:30), Max: 98.3 (06 Dec 2022 07:35)  HR: 70 (06 Dec 2022 11:45) (69 - 82)  BP: 130/66 (06 Dec 2022 11:45) (106/53 - 151/75)  BP(mean): --  RR: 18 (06 Dec 2022 11:45) (14 - 18)  SpO2: 99% (06 Dec 2022 11:45) (95% - 100%)    Parameters below as of 06 Dec 2022 11:30  Patient On (Oxygen Delivery Method): nasal cannula  O2 Flow (L/min): 2      I&O's Summary    05 Dec 2022 07:01  -  06 Dec 2022 07:00  --------------------------------------------------------  IN: 0 mL / OUT: 350 mL / NET: -350 mL    06 Dec 2022 07:01  -  06 Dec 2022 13:04  --------------------------------------------------------  IN: 600 mL / OUT: 0 mL / NET: 600 mL        PHYSICAL EXAM:  Constitutional: NAD, awake and alert  HEENT:  EOMI,  Pupils round, No oral cyanosis.  Pulmonary: Non-labored, breath sounds are clear bilaterally  Cardiovascular: S1 and S2, regular rate and rhythm, + Murmur  Gastrointestinal: Bowel Sounds present, soft, nontender.   Lymph: 1+ RLE Edema L BKA  Neurological: Alert, no focal deficits  Psych:  Mood & affect appropriate    LABS:                                     10.4   13.33 )-----------( 302      ( 06 Dec 2022 07:50 )             32.6     12-06    139  |  112<H>  |  33<H>  ----------------------------<  156<H>  3.8   |  22  |  1.29    Ca    9.2      06 Dec 2022 07:50      TroponinI hsT: 201.30, 364.23, 345.99    TTE Echo Complete w/o Contrast w/ Doppler (12.02.22 @ 08:46):   Endocardium is not well visualized, however, overall left ventricular systolic function appears normal. Mild concentric left ventricular hypertrophy. Septal flattening is seen; this finding is consistent with right heart pressure / volume overload. Estimated left ventricular ejection fraction is 55-60%.   The right ventricle exhibits mild dilation, mild diffuse hypokinesis, and mild depression of contractility.   Severe mitral stenosis. Mild mitral regurgitation.   Mild to moderate tricuspid valve regurgitation.   Severe pulmonary hypertension.    ECG: SR, nonspecific ST/T abn

## 2022-12-06 NOTE — CONSULT NOTE ADULT - ASSESSMENT
61 y/o man with a history of CAD s/p CABG and MV repair (2016 - [place]), HTN, DM, GERD, L BKA,  61 y/o man with a history of CAD s/p CABG and MV repair (2016 - [place]), HTN, DM, GERD, L BKA, who has severe mitral stenosis with high gradient at normal HR. This is post repair may be related to annuloplasty size vs. pannus ingrowth, leaflet fibrosis, and/or leaflet calcification. He is stable at rest and given his poor leg circulation and cellulitis, that will be the mainstay of primary focus for now. Once clear from an infectious standpoint, I agree with his candidacy for mitral valve intervention by structural heart. He will eventually need ALEX/L & R once infection has abated with vascular issues addressed. In meantime, would obtain operative report from [place] to detail valve repair as well as CABG anatomy. Findings relayed to patient and patient's cardiology team. Structural heart to follow.     Thank you for allowing me to participate in the care of your patient. Feel free to contact me if any clinical issues arise via contact information below or MS Teams.    Germán Wilson MD, FACC, [facility]   Director, Interventional Cardiology, [address], [address] [place] [address], [address] Office: [phone]  [place] Office: [phone]  Email: [email or web address]

## 2022-12-06 NOTE — PROGRESS NOTE ADULT - PROBLEM SELECTOR PLAN 3
Past MV repair but now with severely elevated transmitral gradient, severe PHTN,   pursue work-up once recovered from infectious process with Structural Heart Disease consult Dr Wilson -- will eventually need R&L heart Cath/ALEX      Weight increased, Prior BNP elevated will give lasix/ monitor daily weight/trend renal values

## 2022-12-06 NOTE — PROGRESS NOTE ADULT - PROBLEM SELECTOR PLAN 1
Elevated troponin in the setting of infectious process, known CAD, and severe pulmonary HTN  Troponin trending down  Patient W/O CP  Echo NL LV FX EF 55%  -- suspect non-ischemic myocardial injury.

## 2022-12-07 LAB
ANION GAP SERPL CALC-SCNC: 2 MMOL/L — LOW (ref 5–17)
BUN SERPL-MCNC: 25 MG/DL — HIGH (ref 7–23)
CALCIUM SERPL-MCNC: 9 MG/DL — SIGNIFICANT CHANGE UP (ref 8.5–10.1)
CHLORIDE SERPL-SCNC: 113 MMOL/L — HIGH (ref 96–108)
CO2 SERPL-SCNC: 26 MMOL/L — SIGNIFICANT CHANGE UP (ref 22–31)
CREAT SERPL-MCNC: 1.16 MG/DL — SIGNIFICANT CHANGE UP (ref 0.5–1.3)
CULTURE RESULTS: SIGNIFICANT CHANGE UP
CULTURE RESULTS: SIGNIFICANT CHANGE UP
EGFR: 72 ML/MIN/1.73M2 — SIGNIFICANT CHANGE UP
GLUCOSE SERPL-MCNC: 117 MG/DL — HIGH (ref 70–99)
HCT VFR BLD CALC: 28.4 % — LOW (ref 39–50)
HGB BLD-MCNC: 8.9 G/DL — LOW (ref 13–17)
MCHC RBC-ENTMCNC: 31.3 GM/DL — LOW (ref 32–36)
MCHC RBC-ENTMCNC: 31.6 PG — SIGNIFICANT CHANGE UP (ref 27–34)
MCV RBC AUTO: 100.7 FL — HIGH (ref 80–100)
PLATELET # BLD AUTO: 341 K/UL — SIGNIFICANT CHANGE UP (ref 150–400)
POTASSIUM SERPL-MCNC: 4.2 MMOL/L — SIGNIFICANT CHANGE UP (ref 3.5–5.3)
POTASSIUM SERPL-SCNC: 4.2 MMOL/L — SIGNIFICANT CHANGE UP (ref 3.5–5.3)
RBC # BLD: 2.82 M/UL — LOW (ref 4.2–5.8)
RBC # FLD: 13.3 % — SIGNIFICANT CHANGE UP (ref 10.3–14.5)
SODIUM SERPL-SCNC: 141 MMOL/L — SIGNIFICANT CHANGE UP (ref 135–145)
SPECIMEN SOURCE: SIGNIFICANT CHANGE UP
SPECIMEN SOURCE: SIGNIFICANT CHANGE UP
WBC # BLD: 15.27 K/UL — HIGH (ref 3.8–10.5)
WBC # FLD AUTO: 15.27 K/UL — HIGH (ref 3.8–10.5)

## 2022-12-07 PROCEDURE — 99232 SBSQ HOSP IP/OBS MODERATE 35: CPT

## 2022-12-07 PROCEDURE — 71045 X-RAY EXAM CHEST 1 VIEW: CPT | Mod: 26

## 2022-12-07 RX ORDER — HYDROMORPHONE HYDROCHLORIDE 2 MG/ML
0.5 INJECTION INTRAMUSCULAR; INTRAVENOUS; SUBCUTANEOUS ONCE
Refills: 0 | Status: DISCONTINUED | OUTPATIENT
Start: 2022-12-07 | End: 2022-12-07

## 2022-12-07 RX ADMIN — HYDROMORPHONE HYDROCHLORIDE 0.5 MILLIGRAM(S): 2 INJECTION INTRAMUSCULAR; INTRAVENOUS; SUBCUTANEOUS at 13:53

## 2022-12-07 RX ADMIN — Medication 2: at 11:49

## 2022-12-07 RX ADMIN — Medication 100 MILLIGRAM(S): at 10:43

## 2022-12-07 RX ADMIN — Medication 1 GRAM(S): at 17:04

## 2022-12-07 RX ADMIN — SENNA PLUS 2 TABLET(S): 8.6 TABLET ORAL at 21:46

## 2022-12-07 RX ADMIN — ENOXAPARIN SODIUM 40 MILLIGRAM(S): 100 INJECTION SUBCUTANEOUS at 10:44

## 2022-12-07 RX ADMIN — PIPERACILLIN AND TAZOBACTAM 25 GRAM(S): 4; .5 INJECTION, POWDER, LYOPHILIZED, FOR SOLUTION INTRAVENOUS at 05:40

## 2022-12-07 RX ADMIN — HYDROMORPHONE HYDROCHLORIDE 1.5 MILLIGRAM(S): 2 INJECTION INTRAMUSCULAR; INTRAVENOUS; SUBCUTANEOUS at 17:36

## 2022-12-07 RX ADMIN — PIPERACILLIN AND TAZOBACTAM 25 GRAM(S): 4; .5 INJECTION, POWDER, LYOPHILIZED, FOR SOLUTION INTRAVENOUS at 13:23

## 2022-12-07 RX ADMIN — HYDROMORPHONE HYDROCHLORIDE 1 MILLIGRAM(S): 2 INJECTION INTRAMUSCULAR; INTRAVENOUS; SUBCUTANEOUS at 13:20

## 2022-12-07 RX ADMIN — HYDROMORPHONE HYDROCHLORIDE 1.5 MILLIGRAM(S): 2 INJECTION INTRAMUSCULAR; INTRAVENOUS; SUBCUTANEOUS at 22:08

## 2022-12-07 RX ADMIN — LISINOPRIL 5 MILLIGRAM(S): 2.5 TABLET ORAL at 10:42

## 2022-12-07 RX ADMIN — TAMSULOSIN HYDROCHLORIDE 0.8 MILLIGRAM(S): 0.4 CAPSULE ORAL at 21:47

## 2022-12-07 RX ADMIN — HYDROMORPHONE HYDROCHLORIDE 1 MILLIGRAM(S): 2 INJECTION INTRAMUSCULAR; INTRAVENOUS; SUBCUTANEOUS at 09:10

## 2022-12-07 RX ADMIN — Medication 40 MILLIGRAM(S): at 10:42

## 2022-12-07 RX ADMIN — Medication 2: at 17:01

## 2022-12-07 RX ADMIN — HYDROMORPHONE HYDROCHLORIDE 1 MILLIGRAM(S): 2 INJECTION INTRAMUSCULAR; INTRAVENOUS; SUBCUTANEOUS at 04:15

## 2022-12-07 RX ADMIN — HYDROMORPHONE HYDROCHLORIDE 0.5 MILLIGRAM(S): 2 INJECTION INTRAMUSCULAR; INTRAVENOUS; SUBCUTANEOUS at 16:41

## 2022-12-07 RX ADMIN — HYDROMORPHONE HYDROCHLORIDE 1 MILLIGRAM(S): 2 INJECTION INTRAMUSCULAR; INTRAVENOUS; SUBCUTANEOUS at 08:28

## 2022-12-07 RX ADMIN — Medication 100 MILLIGRAM(S): at 21:47

## 2022-12-07 RX ADMIN — Medication 10 MILLIGRAM(S): at 10:51

## 2022-12-07 RX ADMIN — GABAPENTIN 600 MILLIGRAM(S): 400 CAPSULE ORAL at 21:47

## 2022-12-07 RX ADMIN — FAMOTIDINE 40 MILLIGRAM(S): 10 INJECTION INTRAVENOUS at 21:47

## 2022-12-07 RX ADMIN — GABAPENTIN 300 MILLIGRAM(S): 400 CAPSULE ORAL at 05:41

## 2022-12-07 RX ADMIN — Medication 81 MILLIGRAM(S): at 10:41

## 2022-12-07 RX ADMIN — SODIUM CHLORIDE 100 MILLILITER(S): 9 INJECTION INTRAMUSCULAR; INTRAVENOUS; SUBCUTANEOUS at 17:05

## 2022-12-07 RX ADMIN — Medication 1 GRAM(S): at 21:48

## 2022-12-07 RX ADMIN — GABAPENTIN 300 MILLIGRAM(S): 400 CAPSULE ORAL at 13:21

## 2022-12-07 RX ADMIN — PANTOPRAZOLE SODIUM 40 MILLIGRAM(S): 20 TABLET, DELAYED RELEASE ORAL at 05:41

## 2022-12-07 RX ADMIN — PIPERACILLIN AND TAZOBACTAM 25 GRAM(S): 4; .5 INJECTION, POWDER, LYOPHILIZED, FOR SOLUTION INTRAVENOUS at 21:48

## 2022-12-07 RX ADMIN — SODIUM CHLORIDE 100 MILLILITER(S): 9 INJECTION INTRAMUSCULAR; INTRAVENOUS; SUBCUTANEOUS at 05:40

## 2022-12-07 RX ADMIN — Medication 1 GRAM(S): at 05:41

## 2022-12-07 RX ADMIN — HYDROMORPHONE HYDROCHLORIDE 1.5 MILLIGRAM(S): 2 INJECTION INTRAMUSCULAR; INTRAVENOUS; SUBCUTANEOUS at 18:00

## 2022-12-07 RX ADMIN — HYDROMORPHONE HYDROCHLORIDE 1.5 MILLIGRAM(S): 2 INJECTION INTRAMUSCULAR; INTRAVENOUS; SUBCUTANEOUS at 22:47

## 2022-12-07 RX ADMIN — HYDROMORPHONE HYDROCHLORIDE 1 MILLIGRAM(S): 2 INJECTION INTRAMUSCULAR; INTRAVENOUS; SUBCUTANEOUS at 14:00

## 2022-12-07 RX ADMIN — SIMVASTATIN 10 MILLIGRAM(S): 20 TABLET, FILM COATED ORAL at 21:47

## 2022-12-07 RX ADMIN — AMLODIPINE BESYLATE 5 MILLIGRAM(S): 2.5 TABLET ORAL at 10:42

## 2022-12-07 RX ADMIN — Medication 1 GRAM(S): at 11:53

## 2022-12-07 RX ADMIN — INSULIN GLARGINE 30 UNIT(S): 100 INJECTION, SOLUTION SUBCUTANEOUS at 21:46

## 2022-12-07 RX ADMIN — Medication 1 TABLET(S): at 10:41

## 2022-12-07 RX ADMIN — HYDROMORPHONE HYDROCHLORIDE 1 MILLIGRAM(S): 2 INJECTION INTRAMUSCULAR; INTRAVENOUS; SUBCUTANEOUS at 04:30

## 2022-12-07 RX ADMIN — ISOSORBIDE MONONITRATE 30 MILLIGRAM(S): 60 TABLET, EXTENDED RELEASE ORAL at 10:42

## 2022-12-07 NOTE — PROGRESS NOTE ADULT - SUBJECTIVE AND OBJECTIVE BOX
Date of Service: 12/7/22  Chief Complaint :61 y/o male PMHx of PVD s/p left BKA, DM, HTN, CAD, anemia, and GERD, Full code with molst BIBEMS from [facility] c/o severe abdominal pain since this morning. Pt reports vomiting. Pt denies diarrhea. Pt reports he has not been eating or drinking. Pt reports he has noticed redness of RLE over last week and black area on bottom of foot. States he was scheduled for angiogram tomorrow with Dr. Dickson who has been following patient for continued PVD of right lower extremity.  Podiatry consulted for unstagable wound to Right 4/5th metatarsal head. Patient denies any pain to the area. 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 Patient says he noticed redness/erythema to his right lower extremity. Patient also says that he has narrowing of his blood vessels to his right leg, and that there is decreased blood flow to his right foot.     12/7/22: Pt seen by podiatry team. Patient does not report any additional pedal complaints reported at this time.         PMH: HTN (hypertension)    DM (diabetes mellitus)      PSH:    Allergies:No Known Allergies    MEDICATIONS  (STANDING):  amLODIPine   Tablet 5 milliGRAM(s) Oral daily  aspirin  chewable 81 milliGRAM(s) Oral daily  dextrose 5%. 1000 milliLiter(s) (100 mL/Hr) IV Continuous <Continuous>  dextrose 5%. 1000 milliLiter(s) (50 mL/Hr) IV Continuous <Continuous>  dextrose 50% Injectable 25 Gram(s) IV Push once  dextrose 50% Injectable 12.5 Gram(s) IV Push once  dextrose 50% Injectable 25 Gram(s) IV Push once  doxycycline monohydrate Capsule 100 milliGRAM(s) Oral every 12 hours  enoxaparin Injectable 40 milliGRAM(s) SubCutaneous every 24 hours  famotidine    Tablet 40 milliGRAM(s) Oral at bedtime  furosemide    Tablet 40 milliGRAM(s) Oral daily  gabapentin 300 milliGRAM(s) Oral two times a day  gabapentin 600 milliGRAM(s) Oral at bedtime  glucagon  Injectable 1 milliGRAM(s) IntraMuscular once  insulin glargine Injectable (LANTUS) 30 Unit(s) SubCutaneous at bedtime  insulin lispro (ADMELOG) corrective regimen sliding scale   SubCutaneous three times a day before meals  isosorbide   mononitrate ER Tablet (IMDUR) 30 milliGRAM(s) Oral daily  lisinopril 5 milliGRAM(s) Oral daily  metoclopramide 10 milliGRAM(s) Oral daily  multivitamin 1 Tablet(s) Oral daily  pantoprazole    Tablet 40 milliGRAM(s) Oral before breakfast  piperacillin/tazobactam IVPB.. 3.375 Gram(s) IV Intermittent every 8 hours  polyethylene glycol 3350 17 Gram(s) Oral daily  senna 2 Tablet(s) Oral at bedtime  simvastatin 10 milliGRAM(s) Oral at bedtime  sodium chloride 0.9%. 1000 milliLiter(s) (100 mL/Hr) IV Continuous <Continuous>  sodium chloride 0.9%. 1000 milliLiter(s) (100 mL/Hr) IV Continuous <Continuous>  sucralfate suspension 1 Gram(s) Oral four times a day  tamsulosin 0.8 milliGRAM(s) Oral at bedtime    MEDICATIONS  (PRN):  acetaminophen     Tablet .. 650 milliGRAM(s) Oral every 6 hours PRN Temp greater or equal to 38C (100.4F), Mild Pain (1 - 3)  aluminum hydroxide/magnesium hydroxide/simethicone Suspension 30 milliLiter(s) Oral every 4 hours PRN Dyspepsia  dextrose Oral Gel 15 Gram(s) Oral once PRN Blood Glucose LESS THAN 70 milliGRAM(s)/deciliter  HYDROmorphone  Injectable 1.5 milliGRAM(s) IV Push every 4 hours PRN Severe Pain (7 - 10)  HYDROmorphone  Injectable 1 milliGRAM(s) IV Push every 4 hours PRN Moderate Pain (4 - 6)  magnesium hydroxide Suspension 30 milliLiter(s) Oral every 8 hours PRN Constipation  melatonin 3 milliGRAM(s) Oral at bedtime PRN Insomnia  ondansetron Injectable 4 milliGRAM(s) IV Push every 8 hours PRN Nausea and/or Vomiting         Vital Signs Last 24 Hrs  T(C): 37.3 (06 Dec 2022 20:44), Max: 37.3 (06 Dec 2022 20:44)  T(F): 99.2 (06 Dec 2022 20:44), Max: 99.2 (06 Dec 2022 20:44)  HR: 80 (06 Dec 2022 20:44) (69 - 80)  BP: 120/59 (06 Dec 2022 20:44) (111/56 - 151/75)  BP(mean): 75 (06 Dec 2022 20:44) (75 - 75)  RR: 16 (06 Dec 2022 20:44) (14 - 19)  SpO2: 96% (06 Dec 2022 20:44) (95% - 100%)    Parameters below as of 06 Dec 2022 20:44  Patient On (Oxygen Delivery Method): room air         Physical Exam:   Constitutiona: NAD, alert;  Derm:  Skin warm, dry and supple bilateral.     Erythema noted grossly to right foot and lower leg  Scaly skin noted grossly to right foot  Dry eschar wound noted to the right 4th/5th metatarsal head measuring approximately 3x3 cm, no pus, no malodor, no pain on palpation, negative fluctuance. Indication of bluish cyanotic discoloration from Right mid leg to Right foot digits 1-5. Dry small eschar lesions to Right distal toes 2-3.    Vascular: Dorsalis Pedis and Posterior Tibial pulses non palpable.  Capillary re-fill time less then 3 seconds digits 1-5 bilateral.   Neuro: Protective sensation absent to the level of the digits bilateral.  MSK: Muscle strength 4/5 in all major muscle groups of Right lower extremity.  Below knee amputation to left lower extremity         Labs:                        8.9    15.27 )-----------( 341      ( 07 Dec 2022 07:42 )             28.4     12-06    139  |  112<H>  |  33<H>  ----------------------------<  156<H>  3.8   |  22  |  1.29    Ca    9.2      06 Dec 2022 07:50                       Rad/EKG     < from: Xray Foot AP + Lateral + Oblique, Right (12.01.22 @ 22:01) >  INTERPRETATION:  History: 62-year-old male, necrotic area in the foot.    Three views of the right foot without comparison demonstrate soft tissue   emphysema at the plantar aspect of the midfoot and calcaneus.    No gross cortical destruction, fracture or dislocation.    IMPRESSION:  Soft tissue emphysema at the plantar aspect of the mid foot/calcaneous.   If there is continued clinical concern follow-up MRI can be ordered    < end of copied text >  < from: MR Foot No Cont, Right (12.03.22 @ 12:13) >  IMPRESSION:  1.  There is no evidence for osteomyelitis.    < end of copied text >  < from: US Duplex Arterial Lower Ext Ltd, Right (12.01.22 @ 21:40) >  IMPRESSION:    No appreciable flow detected in the posterior tibial and peroneal   arteries.    < end of copied text >   Date of Service: 12/8/22  Chief Complaint :61 y/o male PMHx of PVD s/p left BKA, DM, HTN, CAD, anemia, and GERD, Full code with molst BIBEMS from [facility] c/o severe abdominal pain since this morning. Pt reports vomiting. Pt denies diarrhea. Pt reports he has not been eating or drinking. Pt reports he has noticed redness of RLE over last week and black area on bottom of foot. States he was scheduled for angiogram tomorrow with Dr. Dickson who has been following patient for continued PVD of right lower extremity.  Podiatry consulted for unstagable wound to Right 4/5th metatarsal head. Patient denies any pain to the area. 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 Patient says he noticed redness/erythema to his right lower extremity. Patient also says that he has narrowing of his blood vessels to his right leg, and that there is decreased blood flow to his right foot.     12/8/22: Pt seen by podiatry team w/ attending present. Patient does not report any additional pedal complaints reported at this time.         PMH: HTN (hypertension)    DM (diabetes mellitus)      PSH:    Allergies:No Known Allergies    MEDICATIONS  (STANDING):  amLODIPine   Tablet 5 milliGRAM(s) Oral daily  aspirin  chewable 81 milliGRAM(s) Oral daily  dextrose 5%. 1000 milliLiter(s) (100 mL/Hr) IV Continuous <Continuous>  dextrose 5%. 1000 milliLiter(s) (50 mL/Hr) IV Continuous <Continuous>  dextrose 50% Injectable 25 Gram(s) IV Push once  dextrose 50% Injectable 12.5 Gram(s) IV Push once  dextrose 50% Injectable 25 Gram(s) IV Push once  doxycycline monohydrate Capsule 100 milliGRAM(s) Oral every 12 hours  enoxaparin Injectable 40 milliGRAM(s) SubCutaneous every 24 hours  famotidine    Tablet 40 milliGRAM(s) Oral at bedtime  furosemide    Tablet 40 milliGRAM(s) Oral daily  gabapentin 300 milliGRAM(s) Oral two times a day  gabapentin 600 milliGRAM(s) Oral at bedtime  glucagon  Injectable 1 milliGRAM(s) IntraMuscular once  insulin glargine Injectable (LANTUS) 30 Unit(s) SubCutaneous at bedtime  insulin lispro (ADMELOG) corrective regimen sliding scale   SubCutaneous three times a day before meals  isosorbide   mononitrate ER Tablet (IMDUR) 30 milliGRAM(s) Oral daily  lisinopril 5 milliGRAM(s) Oral daily  metoclopramide 10 milliGRAM(s) Oral daily  multivitamin 1 Tablet(s) Oral daily  pantoprazole    Tablet 40 milliGRAM(s) Oral before breakfast  piperacillin/tazobactam IVPB.. 3.375 Gram(s) IV Intermittent every 8 hours  polyethylene glycol 3350 17 Gram(s) Oral daily  senna 2 Tablet(s) Oral at bedtime  simvastatin 10 milliGRAM(s) Oral at bedtime  sodium chloride 0.9%. 1000 milliLiter(s) (100 mL/Hr) IV Continuous <Continuous>  sodium chloride 0.9%. 1000 milliLiter(s) (100 mL/Hr) IV Continuous <Continuous>  sucralfate suspension 1 Gram(s) Oral four times a day  tamsulosin 0.8 milliGRAM(s) Oral at bedtime    MEDICATIONS  (PRN):  acetaminophen     Tablet .. 650 milliGRAM(s) Oral every 6 hours PRN Temp greater or equal to 38C (100.4F), Mild Pain (1 - 3)  aluminum hydroxide/magnesium hydroxide/simethicone Suspension 30 milliLiter(s) Oral every 4 hours PRN Dyspepsia  dextrose Oral Gel 15 Gram(s) Oral once PRN Blood Glucose LESS THAN 70 milliGRAM(s)/deciliter  HYDROmorphone  Injectable 1.5 milliGRAM(s) IV Push every 4 hours PRN Severe Pain (7 - 10)  HYDROmorphone  Injectable 1 milliGRAM(s) IV Push every 4 hours PRN Moderate Pain (4 - 6)  magnesium hydroxide Suspension 30 milliLiter(s) Oral every 8 hours PRN Constipation  melatonin 3 milliGRAM(s) Oral at bedtime PRN Insomnia  ondansetron Injectable 4 milliGRAM(s) IV Push every 8 hours PRN Nausea and/or Vomiting         Vital Signs Last 24 Hrs  T(C): 37.3 (06 Dec 2022 20:44), Max: 37.3 (06 Dec 2022 20:44)  T(F): 99.2 (06 Dec 2022 20:44), Max: 99.2 (06 Dec 2022 20:44)  HR: 80 (06 Dec 2022 20:44) (69 - 80)  BP: 120/59 (06 Dec 2022 20:44) (111/56 - 151/75)  BP(mean): 75 (06 Dec 2022 20:44) (75 - 75)  RR: 16 (06 Dec 2022 20:44) (14 - 19)  SpO2: 96% (06 Dec 2022 20:44) (95% - 100%)    Parameters below as of 06 Dec 2022 20:44  Patient On (Oxygen Delivery Method): room air         Physical Exam:   Constitutiona: NAD, alert;  Derm:  Skin warm, dry and supple bilateral.     Erythema noted grossly to right foot and lower leg  Scaly skin noted grossly to right foot  Dry eschar wound noted to the right 4th/5th metatarsal head measuring approximately 3x3 cm, no pus, no malodor, no pain on palpation, negative fluctuance. Indication of bluish cyanotic discoloration from Right mid leg to Right foot digits 1-5. Dry small eschar lesions to Right distal toes 2-3.    Vascular: Dorsalis Pedis and Posterior Tibial pulses non palpable.  Capillary re-fill time less then 3 seconds digits 1-5 bilateral.   Neuro: Protective sensation absent to the level of the digits bilateral.  MSK: Muscle strength 4/5 in all major muscle groups of Right lower extremity.  Below knee amputation to left lower extremity         Labs:                        8.9    15.27 )-----------( 341      ( 07 Dec 2022 07:42 )             28.4     12-06    139  |  112<H>  |  33<H>  ----------------------------<  156<H>  3.8   |  22  |  1.29    Ca    9.2      06 Dec 2022 07:50                       Rad/EKG     < from: Xray Foot AP + Lateral + Oblique, Right (12.01.22 @ 22:01) >  INTERPRETATION:  History: 62-year-old male, necrotic area in the foot.    Three views of the right foot without comparison demonstrate soft tissue   emphysema at the plantar aspect of the midfoot and calcaneus.    No gross cortical destruction, fracture or dislocation.    IMPRESSION:  Soft tissue emphysema at the plantar aspect of the mid foot/calcaneous.   If there is continued clinical concern follow-up MRI can be ordered    < end of copied text >  < from: MR Foot No Cont, Right (12.03.22 @ 12:13) >  IMPRESSION:  1.  There is no evidence for osteomyelitis.    < end of copied text >  < from: US Duplex Arterial Lower Ext Ltd, Right (12.01.22 @ 21:40) >  IMPRESSION:    No appreciable flow detected in the posterior tibial and peroneal   arteries.    < end of copied text >

## 2022-12-07 NOTE — PROGRESS NOTE ADULT - PROBLEM SELECTOR PLAN 3
Past MV repair but now with severely elevated transmitral gradient, severe PHTN,   pursue work-up once recovered from infectious process with Structural Heart Disease Dr Wilosn will eventually need R&L heart Cath/ALEX    Weight increased, Prior BNP elevated will continue  lasix/ monitor daily weight/trend renal values  Repeat BNP and obtain CXR  Daily weights ordered

## 2022-12-07 NOTE — PROGRESS NOTE ADULT - ASSESSMENT
A 62 year old male with history of PVD, IHD, MV repair. admitted with right leg cellulitis and was found to have significant stenosis of his RLE vasculature    Plan:  s/p POD 1 RLE angio. Pt will need femoral distal bypass   Pt needs medical and cardio clearance for OR procedure of femoral distal bypass, TBD pending clearances.   Podiatry for local wound care  Medical management as per primary team    Plan discussed with Dr Walters

## 2022-12-07 NOTE — PROGRESS NOTE ADULT - SUBJECTIVE AND OBJECTIVE BOX
CHIEF COMPLAINT: Patient is a 62y old  Male who presents with a chief complaint of Elevated troponin, PAD and Cellulitis (02 Dec 2022 10:14)    HPI:  61 y/o man with a history of CAD s/p CABG and MV repair (2016 - [place]), HTN, DM, GERD, L BKA, who was transferred from [facility] for the evaluation of abdominal discomfort and cellulitis.  Cardiology consult requested for elevated troponin.  Mr. Roberts has no cardiac symptoms -- he has not been experiencing angina or dyspnea.  He walks during PT and denies exertional chest discomfort.  He does not see a cardiologist in the outpatient setting.    12/4/22 Comfortable in bed with CC of SOB No CP Tele SR   12/5/22 Semirecumbent in bed no CP HR 70-80 BPM    12/6/22 patient is feeling ok , did have RLE angiogram today results pending  no chest pain or shortness of breath at rest  12/7/22 feeling well no new complaints No CP/SOB    PAST MEDICAL & SURGICAL HISTORY:  HTN (hypertension)  DM (diabetes mellitus)  Anemia  GERD (gastroesophageal reflux disease)  S/P BKA (below knee amputation), left  CAD (coronary artery disease)  S/P CABG x 3  Status post amputation of leg    SOCIAL HISTORY:   Smoking: Nonsmoker    FAMILY HISTORY: No pertinent family history in first degree relatives    MEDICATIONS  (STANDING):  amLODIPine   Tablet 5 milliGRAM(s) Oral daily  aspirin  chewable 81 milliGRAM(s) Oral daily  dextrose 5%. 1000 milliLiter(s) (50 mL/Hr) IV Continuous <Continuous>  dextrose 5%. 1000 milliLiter(s) (100 mL/Hr) IV Continuous <Continuous>  dextrose 50% Injectable 25 Gram(s) IV Push once  dextrose 50% Injectable 12.5 Gram(s) IV Push once  dextrose 50% Injectable 25 Gram(s) IV Push once  doxycycline monohydrate Capsule 100 milliGRAM(s) Oral every 12 hours  enoxaparin Injectable 40 milliGRAM(s) SubCutaneous every 24 hours  famotidine    Tablet 40 milliGRAM(s) Oral at bedtime  furosemide    Tablet 40 milliGRAM(s) Oral daily  gabapentin 300 milliGRAM(s) Oral two times a day  gabapentin 600 milliGRAM(s) Oral at bedtime  glucagon  Injectable 1 milliGRAM(s) IntraMuscular once  insulin glargine Injectable (LANTUS) 30 Unit(s) SubCutaneous at bedtime  insulin lispro (ADMELOG) corrective regimen sliding scale   SubCutaneous three times a day before meals  isosorbide   mononitrate ER Tablet (IMDUR) 30 milliGRAM(s) Oral daily  lisinopril 5 milliGRAM(s) Oral daily  metoclopramide 10 milliGRAM(s) Oral daily  multivitamin 1 Tablet(s) Oral daily  pantoprazole    Tablet 40 milliGRAM(s) Oral before breakfast  piperacillin/tazobactam IVPB.. 3.375 Gram(s) IV Intermittent every 8 hours  polyethylene glycol 3350 17 Gram(s) Oral daily  senna 2 Tablet(s) Oral at bedtime  simvastatin 10 milliGRAM(s) Oral at bedtime  sodium chloride 0.9%. 1000 milliLiter(s) (100 mL/Hr) IV Continuous <Continuous>  sodium chloride 0.9%. 1000 milliLiter(s) (100 mL/Hr) IV Continuous <Continuous>  sucralfate suspension 1 Gram(s) Oral four times a day  tamsulosin 0.8 milliGRAM(s) Oral at bedtime    MEDICATIONS  (PRN):  acetaminophen     Tablet .. 650 milliGRAM(s) Oral every 6 hours PRN Temp greater or equal to 38C (100.4F), Mild Pain (1 - 3)  aluminum hydroxide/magnesium hydroxide/simethicone Suspension 30 milliLiter(s) Oral every 4 hours PRN Dyspepsia  dextrose Oral Gel 15 Gram(s) Oral once PRN Blood Glucose LESS THAN 70 milliGRAM(s)/deciliter  HYDROmorphone  Injectable 1 milliGRAM(s) IV Push every 4 hours PRN Moderate Pain (4 - 6)  HYDROmorphone  Injectable 1.5 milliGRAM(s) IV Push every 4 hours PRN Severe Pain (7 - 10)  magnesium hydroxide Suspension 30 milliLiter(s) Oral every 8 hours PRN Constipation  melatonin 3 milliGRAM(s) Oral at bedtime PRN Insomnia  ondansetron Injectable 4 milliGRAM(s) IV Push every 8 hours PRN Nausea and/or Vomiting        Vital Signs Last 24 Hrs  T(C): 36.8 (07 Dec 2022 08:58), Max: 37.3 (06 Dec 2022 20:44)  T(F): 98.3 (07 Dec 2022 08:58), Max: 99.2 (06 Dec 2022 20:44)  HR: 75 (07 Dec 2022 08:58) (75 - 80)  BP: 124/55 (07 Dec 2022 08:58) (120/59 - 124/55)  BP(mean): 75 (06 Dec 2022 20:44) (75 - 75)  RR: 18 (07 Dec 2022 08:58) (16 - 18)  SpO2: 96% (07 Dec 2022 08:58) (96% - 96%)    Parameters below as of 07 Dec 2022 08:58  Patient On (Oxygen Delivery Method): room air              PHYSICAL EXAM:  Constitutional: NAD, awake and alert  HEENT:  EOMI,  Pupils round, No oral cyanosis.  Pulmonary: Non-labored, breath sounds are clear bilaterally  Cardiovascular: S1 and S2, regular, + Murmur  Gastrointestinal: Abd soft, nontender.   Lymph: 1+ RLE Edema L BKA  Neurological: Alert, no focal deficits  Psych:  Mood & affect appropriate    LABS:                                     10.4   13.33 )-----------( 302      ( 06 Dec 2022 07:50 )             32.6     12-06    139  |  112<H>  |  33<H>  ----------------------------<  156<H>  3.8   |  22  |  1.29    Ca    9.2      06 Dec 2022 07:50      TroponinI hsT: 201.30, 364.23, 345.99    TTE Echo Complete w/o Contrast w/ Doppler (12.02.22 @ 08:46):   Endocardium is not well visualized, however, overall left ventricular systolic function appears normal. Mild concentric left ventricular hypertrophy. Septal flattening is seen; this finding is consistent with right heart pressure / volume overload. Estimated left ventricular ejection fraction is 55-60%.   The right ventricle exhibits mild dilation, mild diffuse hypokinesis, and mild depression of contractility.   Severe mitral stenosis. Mild mitral regurgitation.   Mild to moderate tricuspid valve regurgitation.   Severe pulmonary hypertension.    ECG: SR, nonspecific ST/T abn

## 2022-12-07 NOTE — PROGRESS NOTE ADULT - SUBJECTIVE AND OBJECTIVE BOX
Chief Complaint: RLE swelling and redness, epigastric discomfort    Interval Hx: Patient seen and examined earlier today. No new complaints. No significant changes. Continues to have RLE swelling and redness, slightly improved compared to presentation. Continues to have RLE pain complaints, only partially relieved with hydromorphone 1mg IV q4h prn. He remains on empiric antibiotics. MRI negative for osteomyelitis. Workup ongoing to further evaluate the extent of his peripheral vascular disease in that extremity. He is planned for LE angiogram tomorrow. As for his epigastric discomfort, it is mild-to-moderate, being treated with Carafate and PPI. Anticipated that he will eventually need an upper endoscopy to evaluate that epigastric discomfort and also as routine appropriate testing in the setting of his cirrhosis.   12/06/22: RLE angio today. No new issues.  12/07/22: Patient seen and examined. Complaining of right LE pain. Discussed with patient regarding management plan.     ROS: Multi system review is comprehensively negative x 10 systems except as above    Vitals:    Vital Signs Last 24 Hrs  T(C): 36.8 (07 Dec 2022 08:58), Max: 37.3 (06 Dec 2022 20:44)  T(F): 98.3 (07 Dec 2022 08:58), Max: 99.2 (06 Dec 2022 20:44)  HR: 75 (07 Dec 2022 08:58) (72 - 80)  BP: 124/55 (07 Dec 2022 08:58) (111/56 - 124/55)  BP(mean): 75 (06 Dec 2022 20:44) (75 - 75)  RR: 18 (07 Dec 2022 08:58) (16 - 18)  SpO2: 96% (07 Dec 2022 08:58) (96% - 98%)    Parameters below as of 07 Dec 2022 08:58  Patient On (Oxygen Delivery Method): room air            Exam:  Gen: No acute distress  HEENT: NCAT PERRL EOMI MMM  Neck: Supple no JVD no LAD  CVS: S1 S2 normal RRR  Chest: Normal resp effort, lungs CTA B/L  Abd: +BS, soft NT ND   Ext: L BKA. RLE with mild erythema from upper shin down to foot, small superficial wounds on shin, large open wound on plantar surface of foot at the foot pad.   Skin: As described in extremity exam. Some additional flaking skin on R foot  Neuro: A+OX3, no focal deficits, sensation intake to RLE  Mood: Calm, pleasant        Labs:                                                           8.9    15.27 )-----------( 341      ( 07 Dec 2022 07:42 )             28.4     07 Dec 2022 07:42    141    |  113    |  25     ----------------------------<  117    4.2     |  26     |  1.16     Ca    9.0        07 Dec 2022 07:42          CAPILLARY BLOOD GLUCOSE      POCT Blood Glucose.: 157 mg/dL (07 Dec 2022 11:36)  POCT Blood Glucose.: 121 mg/dL (07 Dec 2022 08:05)  POCT Blood Glucose.: 188 mg/dL (06 Dec 2022 20:49)  POCT Blood Glucose.: 150 mg/dL (06 Dec 2022 16:59)                Micro:  Bld cx x 2 12/1: Negative to date  COVID, Flu, RSV PCR 12/1: Negative    Imaging:  MRI RLE, R foot 12/2: There is no evidence for osteomyelitis.    RLE arterial duplex 12/1: No appreciable flow detected in the posterior tibial and peroneal arteries.    R foot XR 12/1: Soft tissue emphysema at the plantar aspect of the mid foot/calcaneous.     Cardiac Testing:  TTE 12/2:  Endocardium is not well visualized, however, overall left ventricular systolic function appears normal. Mild concentric left ventricular hypertrophy. Septal flattening is seen; this finding is consistent with right heart pressure / volume overload. Estimated left ventricular ejection fraction is 55-60%. The right ventricle exhibits mild dilation, mild diffuse hypokinesis, and mild depression of contractility. Severe mitral stenosis. Mild mitral regurgitation. Mild to moderate tricuspid valve regurgitation. Severe pulmonary hypertension.    Meds:  MEDICATIONS  (STANDING):  acetaminophen     Tablet .. 975 milliGRAM(s) Oral every 8 hours  amLODIPine   Tablet 5 milliGRAM(s) Oral daily  aspirin  chewable 81 milliGRAM(s) Oral daily  doxycycline monohydrate Capsule 100 milliGRAM(s) Oral every 12 hours  enoxaparin Injectable 40 milliGRAM(s) SubCutaneous every 24 hours  famotidine    Tablet 40 milliGRAM(s) Oral at bedtime  furosemide    Tablet 40 milliGRAM(s) Oral daily  gabapentin 300 milliGRAM(s) Oral two times a day  gabapentin 600 milliGRAM(s) Oral at bedtime  insulin glargine Injectable (LANTUS) 30 Unit(s) SubCutaneous at bedtime  insulin lispro (ADMELOG) corrective regimen sliding scale   SubCutaneous three times a day before meals  isosorbide   mononitrate ER Tablet (IMDUR) 30 milliGRAM(s) Oral daily  lisinopril 5 milliGRAM(s) Oral daily  metoclopramide 10 milliGRAM(s) Oral daily  multivitamin 1 Tablet(s) Oral daily  pantoprazole    Tablet 40 milliGRAM(s) Oral before breakfast  piperacillin/tazobactam IVPB.. 3.375 Gram(s) IV Intermittent every 8 hours  polyethylene glycol 3350 17 Gram(s) Oral daily  senna 2 Tablet(s) Oral at bedtime  simvastatin 10 milliGRAM(s) Oral at bedtime  sodium chloride 0.9%. 1000 milliLiter(s) (100 mL/Hr) IV Continuous <Continuous>  sucralfate suspension 1 Gram(s) Oral four times a day  tamsulosin 0.8 milliGRAM(s) Oral at bedtime    MEDICATIONS  (PRN):  acetaminophen     Tablet .. 650 milliGRAM(s) Oral every 6 hours PRN Temp greater or equal to 38C (100.4F), Mild Pain (1 - 3)  aluminum hydroxide/magnesium hydroxide/simethicone Suspension 30 milliLiter(s) Oral every 4 hours PRN Dyspepsia  dextrose Oral Gel 15 Gram(s) Oral once PRN Blood Glucose LESS THAN 70 milliGRAM(s)/deciliter  HYDROmorphone  Injectable 1 milliGRAM(s) IV Push every 4 hours PRN Moderate Pain (4 - 6)  HYDROmorphone  Injectable 1.5 milliGRAM(s) IV Push every 4 hours PRN Severe Pain (7 - 10)  magnesium hydroxide Suspension 30 milliLiter(s) Oral every 8 hours PRN Constipation  melatonin 3 milliGRAM(s) Oral at bedtime PRN Insomnia  ondansetron Injectable 4 milliGRAM(s) IV Push every 8 hours PRN Nausea and/or Vomiting

## 2022-12-07 NOTE — PROGRESS NOTE ADULT - ASSESSMENT
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 Jez Mauricio  Discussed diagnosis and treatment with patient  X-rays reviewed : no soft tissue emphysema or osseous fx   WC: betadine paint and allyvan pad  Continue with IV antibiotics As Per ID/MED  All additional care by Med appreciated  ID consult and recommendations appreciated.  Vascular consult appreciated: Angiogram perfomred  Pt notes blister forming at plantar R foot, now stable eschar at the plantar 4th/5th metatarsal head, no pus, no malodor, no pain. Wound related to poor circulation of R foot. Recommend for area to fully demarcate at this time.  Erythema to RLE 2/2 PVD.  MRI shows no evidence of osteomyelitis    No acute podiatric intervention at this time as dry eschar wound is stable. Podiatry will monitor demarcation of eschar and plan for outpatient debridement once cleared by vascular.   Outpatient follow up at [facility] recommended.   WBAT to R foot w/ sx shoe.     Patient demonstrated verbal understanding of all interventions and tolerated interventions well without any complications.   Stable from podiatric standpoint for dc, please dc when stable from all other specialities.       Wound care instructions to be applied daily to right foot  - Carefully remove dressings to right foot   - Apply betadine paint to right foot at the 4th/5th metatarsal heads   - Apply sterile gauze to area, and wrap with kerlix and ace.   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 Jez Mauricio  Discussed diagnosis and treatment with patient  X-rays reviewed : no soft tissue emphysema or osseous fx   WC: betadine paint and allyvan pad  Continue with IV antibiotics As Per ID/MED  All additional care by Med appreciated  ID consult and recommendations appreciated.  Vascular consult appreciated: Angiogram perfomred  Pt notes blister forming at plantar R foot, now stable eschar at the plantar 4th/5th metatarsal head, no pus, no malodor, no pain. Wound related to poor circulation of R foot. Recommend for area to fully demarcate at this time.  Erythema to RLE 2/2 PVD.  MRI shows no evidence of osteomyelitis    No acute podiatric intervention at this time as dry eschar wound is stable. Podiatry will monitor demarcation of eschar and plan for outpatient debridement once cleared by vascular.   Outpatient follow up at [facility] recommended.   WBAT to R foot w/ sx shoe.     Patient demonstrated verbal understanding of all interventions and tolerated interventions well without any complications.   Stable from podiatric standpoint for dc, please dc when stable from all other specialities.       Wound care instructions to be applied daily to right foot  - Carefully remove dressings to right foot   - Apply betadine paint to right foot at the 4th/5th metatarsal heads   - Apply sterile gauze to area, and wrap with kerlix and ace.   Thanks

## 2022-12-07 NOTE — PROGRESS NOTE ADULT - SUBJECTIVE AND OBJECTIVE BOX
Patient was seen and examined at the bedside this morning  No acute events overnight  Pain is better controlled  No nausea or vomiting  Tolerating diet     O/E:  Vital Signs Last 24 Hrs  T(C): 37.3 (06 Dec 2022 20:44), Max: 37.3 (06 Dec 2022 20:44)  T(F): 99.2 (06 Dec 2022 20:44), Max: 99.2 (06 Dec 2022 20:44)  HR: 80 (06 Dec 2022 20:44) (69 - 80)  BP: 120/59 (06 Dec 2022 20:44) (111/56 - 151/75)  BP(mean): 75 (06 Dec 2022 20:44) (75 - 75)  RR: 16 (06 Dec 2022 20:44) (14 - 19)  SpO2: 96% (06 Dec 2022 20:44) (95% - 100%)    Parameters below as of 06 Dec 2022 20:44  Patient On (Oxygen Delivery Method): room air      Alert, conscious, oriented  No pallor, jaundice or cyanosis  Not in pain or distress  Chest clear, equal air entry bilaterally  Abdomen soft and lax, no tenderness  Extremities: No swelling or tenderness. Right leg less erythematous and less swollen    I&O's Summary    06 Dec 2022 07:01  -  07 Dec 2022 07:00  --------------------------------------------------------  IN: 600 mL / OUT: 0 mL / NET: 600 mL                          8.9    15.27 )-----------( 341      ( 07 Dec 2022 07:42 )             28.4     12-06    139  |  112<H>  |  33<H>  ----------------------------<  156<H>  3.8   |  22  |  1.29    Ca    9.2      06 Dec 2022 07:50      MEDICATIONS  (STANDING):  amLODIPine   Tablet 5 milliGRAM(s) Oral daily  aspirin  chewable 81 milliGRAM(s) Oral daily  dextrose 5%. 1000 milliLiter(s) (100 mL/Hr) IV Continuous <Continuous>  dextrose 5%. 1000 milliLiter(s) (50 mL/Hr) IV Continuous <Continuous>  dextrose 50% Injectable 25 Gram(s) IV Push once  dextrose 50% Injectable 12.5 Gram(s) IV Push once  dextrose 50% Injectable 25 Gram(s) IV Push once  doxycycline monohydrate Capsule 100 milliGRAM(s) Oral every 12 hours  enoxaparin Injectable 40 milliGRAM(s) SubCutaneous every 24 hours  famotidine    Tablet 40 milliGRAM(s) Oral at bedtime  furosemide    Tablet 40 milliGRAM(s) Oral daily  gabapentin 300 milliGRAM(s) Oral two times a day  gabapentin 600 milliGRAM(s) Oral at bedtime  glucagon  Injectable 1 milliGRAM(s) IntraMuscular once  insulin glargine Injectable (LANTUS) 30 Unit(s) SubCutaneous at bedtime  insulin lispro (ADMELOG) corrective regimen sliding scale   SubCutaneous three times a day before meals  isosorbide   mononitrate ER Tablet (IMDUR) 30 milliGRAM(s) Oral daily  lisinopril 5 milliGRAM(s) Oral daily  metoclopramide 10 milliGRAM(s) Oral daily  multivitamin 1 Tablet(s) Oral daily  pantoprazole    Tablet 40 milliGRAM(s) Oral before breakfast  piperacillin/tazobactam IVPB.. 3.375 Gram(s) IV Intermittent every 8 hours  polyethylene glycol 3350 17 Gram(s) Oral daily  senna 2 Tablet(s) Oral at bedtime  simvastatin 10 milliGRAM(s) Oral at bedtime  sodium chloride 0.9%. 1000 milliLiter(s) (100 mL/Hr) IV Continuous <Continuous>  sodium chloride 0.9%. 1000 milliLiter(s) (100 mL/Hr) IV Continuous <Continuous>  sucralfate suspension 1 Gram(s) Oral four times a day  tamsulosin 0.8 milliGRAM(s) Oral at bedtime    MEDICATIONS  (PRN):  acetaminophen     Tablet .. 650 milliGRAM(s) Oral every 6 hours PRN Temp greater or equal to 38C (100.4F), Mild Pain (1 - 3)  aluminum hydroxide/magnesium hydroxide/simethicone Suspension 30 milliLiter(s) Oral every 4 hours PRN Dyspepsia  dextrose Oral Gel 15 Gram(s) Oral once PRN Blood Glucose LESS THAN 70 milliGRAM(s)/deciliter  HYDROmorphone  Injectable 1.5 milliGRAM(s) IV Push every 4 hours PRN Severe Pain (7 - 10)  HYDROmorphone  Injectable 1 milliGRAM(s) IV Push every 4 hours PRN Moderate Pain (4 - 6)  magnesium hydroxide Suspension 30 milliLiter(s) Oral every 8 hours PRN Constipation  melatonin 3 milliGRAM(s) Oral at bedtime PRN Insomnia  ondansetron Injectable 4 milliGRAM(s) IV Push every 8 hours PRN Nausea and/or Vomiting

## 2022-12-07 NOTE — PROGRESS NOTE ADULT - ASSESSMENT
61 yo man with DM, HTN, CAD, 3v CABG, hx of PAD, L BKA, GERD, past alcohol use disorder, cirrhosis, chronic anemia, sent from [place] for worsening RLE swelling and redness, also complaints of epigastric discomfort. In the ED, exam was consistent with cellulitis of the RLE, non healing wound on the plantar surface of the R foot. Elevated inflammatory markers. Patient was placed on antibiotics and admitted to Medicine.     RLE skin and soft tissue infection in setting of non healing R foot ulcer, DM and PAD  Appreciate input from Podiatry, Vascular Surgery and ID. MRI RLE negative for osteomyelitis. Circulation to the RLE is compromised as suggested by RLE arterial duplex. May not have appropriate healing and benefit of antibiotics if circulation is poor.   - Continue on empiric antibiotics for now  - Continue aspirin, statin  -S/P RLE angio yesterday. Will need distal femoral bypass as per vascular    Epigastric discomfort  DDx GERD, esophagitis, gastritis, peptic ulcer. Appreciate input from GI. Started on Carafate. PPI. Patient will eventually need EGD, timing TBD.   - Continue Carafate and PPI  - EGD planning,     HTN  BP controlled  - Continue amlodipine and lisinopril and Imdur    CAD  Remote hx of CABG. No angina or CHF sx.  - Continue aspirin, statin, Imdur    Elevated troponin  Appreciate input from Cardiology. Likely myocardial demand ischemia without infarction. No angina or CHF sx. LVEF WNL.   - Continue medical management    Mitral valve disease  Past MV repair but now with severely elevated transmitral gradient, severe pHTN. Plan to pursue work-up once recovered from infectious process.  Will likely eventually need L and R heart cath, LAEX, possibly prior to hospital discharge   - Follow Dr. Wilson recommendation. Will need left and right heart cath    Pulmonary HTN  Severe pulmonary HTN - possibly from MV disease; no prior TTE and no outpatient cardiac care available for reviewed.  - Check SPO2 with ambulation once clinically improved    DM  A1c 7.7. Currently on Lantus and Lispro here  - Continue Lantus and Lispro  - Monitor glucose TID AC and HS    BPH  Stable  - Continue Flomax      Code Status: Full  DVT px: LMWH  Dispo: To be determined pending further clinical assessment

## 2022-12-08 LAB
ANION GAP SERPL CALC-SCNC: 3 MMOL/L — LOW (ref 5–17)
BUN SERPL-MCNC: 26 MG/DL — HIGH (ref 7–23)
CALCIUM SERPL-MCNC: 9.3 MG/DL — SIGNIFICANT CHANGE UP (ref 8.5–10.1)
CHLORIDE SERPL-SCNC: 112 MMOL/L — HIGH (ref 96–108)
CO2 SERPL-SCNC: 25 MMOL/L — SIGNIFICANT CHANGE UP (ref 22–31)
CREAT SERPL-MCNC: 1.12 MG/DL — SIGNIFICANT CHANGE UP (ref 0.5–1.3)
EGFR: 75 ML/MIN/1.73M2 — SIGNIFICANT CHANGE UP
GLUCOSE SERPL-MCNC: 122 MG/DL — HIGH (ref 70–99)
HCT VFR BLD CALC: 29.4 % — LOW (ref 39–50)
HGB BLD-MCNC: 9 G/DL — LOW (ref 13–17)
MCHC RBC-ENTMCNC: 30.6 GM/DL — LOW (ref 32–36)
MCHC RBC-ENTMCNC: 31.4 PG — SIGNIFICANT CHANGE UP (ref 27–34)
MCV RBC AUTO: 102.4 FL — HIGH (ref 80–100)
PLATELET # BLD AUTO: 360 K/UL — SIGNIFICANT CHANGE UP (ref 150–400)
POTASSIUM SERPL-MCNC: 4.1 MMOL/L — SIGNIFICANT CHANGE UP (ref 3.5–5.3)
POTASSIUM SERPL-SCNC: 4.1 MMOL/L — SIGNIFICANT CHANGE UP (ref 3.5–5.3)
RBC # BLD: 2.87 M/UL — LOW (ref 4.2–5.8)
RBC # FLD: 13.3 % — SIGNIFICANT CHANGE UP (ref 10.3–14.5)
SODIUM SERPL-SCNC: 140 MMOL/L — SIGNIFICANT CHANGE UP (ref 135–145)
WBC # BLD: 14.85 K/UL — HIGH (ref 3.8–10.5)
WBC # FLD AUTO: 14.85 K/UL — HIGH (ref 3.8–10.5)

## 2022-12-08 PROCEDURE — 99232 SBSQ HOSP IP/OBS MODERATE 35: CPT

## 2022-12-08 RX ADMIN — Medication 1 GRAM(S): at 21:32

## 2022-12-08 RX ADMIN — Medication 100 MILLIGRAM(S): at 09:11

## 2022-12-08 RX ADMIN — Medication 1 GRAM(S): at 11:50

## 2022-12-08 RX ADMIN — INSULIN GLARGINE 30 UNIT(S): 100 INJECTION, SOLUTION SUBCUTANEOUS at 21:32

## 2022-12-08 RX ADMIN — Medication 4: at 11:21

## 2022-12-08 RX ADMIN — PIPERACILLIN AND TAZOBACTAM 25 GRAM(S): 4; .5 INJECTION, POWDER, LYOPHILIZED, FOR SOLUTION INTRAVENOUS at 05:32

## 2022-12-08 RX ADMIN — HYDROMORPHONE HYDROCHLORIDE 1.5 MILLIGRAM(S): 2 INJECTION INTRAMUSCULAR; INTRAVENOUS; SUBCUTANEOUS at 13:45

## 2022-12-08 RX ADMIN — HYDROMORPHONE HYDROCHLORIDE 1.5 MILLIGRAM(S): 2 INJECTION INTRAMUSCULAR; INTRAVENOUS; SUBCUTANEOUS at 17:42

## 2022-12-08 RX ADMIN — POLYETHYLENE GLYCOL 3350 17 GRAM(S): 17 POWDER, FOR SOLUTION ORAL at 09:14

## 2022-12-08 RX ADMIN — HYDROMORPHONE HYDROCHLORIDE 1.5 MILLIGRAM(S): 2 INJECTION INTRAMUSCULAR; INTRAVENOUS; SUBCUTANEOUS at 05:30

## 2022-12-08 RX ADMIN — Medication 1 GRAM(S): at 05:33

## 2022-12-08 RX ADMIN — HYDROMORPHONE HYDROCHLORIDE 1.5 MILLIGRAM(S): 2 INJECTION INTRAMUSCULAR; INTRAVENOUS; SUBCUTANEOUS at 23:41

## 2022-12-08 RX ADMIN — Medication 1 TABLET(S): at 09:13

## 2022-12-08 RX ADMIN — HYDROMORPHONE HYDROCHLORIDE 1.5 MILLIGRAM(S): 2 INJECTION INTRAMUSCULAR; INTRAVENOUS; SUBCUTANEOUS at 12:59

## 2022-12-08 RX ADMIN — HYDROMORPHONE HYDROCHLORIDE 1.5 MILLIGRAM(S): 2 INJECTION INTRAMUSCULAR; INTRAVENOUS; SUBCUTANEOUS at 17:25

## 2022-12-08 RX ADMIN — HYDROMORPHONE HYDROCHLORIDE 1.5 MILLIGRAM(S): 2 INJECTION INTRAMUSCULAR; INTRAVENOUS; SUBCUTANEOUS at 21:39

## 2022-12-08 RX ADMIN — SIMVASTATIN 10 MILLIGRAM(S): 20 TABLET, FILM COATED ORAL at 21:31

## 2022-12-08 RX ADMIN — TAMSULOSIN HYDROCHLORIDE 0.8 MILLIGRAM(S): 0.4 CAPSULE ORAL at 21:31

## 2022-12-08 RX ADMIN — Medication 1 GRAM(S): at 17:19

## 2022-12-08 RX ADMIN — PIPERACILLIN AND TAZOBACTAM 25 GRAM(S): 4; .5 INJECTION, POWDER, LYOPHILIZED, FOR SOLUTION INTRAVENOUS at 14:39

## 2022-12-08 RX ADMIN — AMLODIPINE BESYLATE 5 MILLIGRAM(S): 2.5 TABLET ORAL at 09:09

## 2022-12-08 RX ADMIN — GABAPENTIN 300 MILLIGRAM(S): 400 CAPSULE ORAL at 14:39

## 2022-12-08 RX ADMIN — PIPERACILLIN AND TAZOBACTAM 25 GRAM(S): 4; .5 INJECTION, POWDER, LYOPHILIZED, FOR SOLUTION INTRAVENOUS at 21:34

## 2022-12-08 RX ADMIN — HYDROMORPHONE HYDROCHLORIDE 1.5 MILLIGRAM(S): 2 INJECTION INTRAMUSCULAR; INTRAVENOUS; SUBCUTANEOUS at 04:06

## 2022-12-08 RX ADMIN — GABAPENTIN 600 MILLIGRAM(S): 400 CAPSULE ORAL at 21:31

## 2022-12-08 RX ADMIN — Medication 3 MILLIGRAM(S): at 21:38

## 2022-12-08 RX ADMIN — Medication 40 MILLIGRAM(S): at 09:09

## 2022-12-08 RX ADMIN — Medication 100 MILLIGRAM(S): at 21:31

## 2022-12-08 RX ADMIN — ENOXAPARIN SODIUM 40 MILLIGRAM(S): 100 INJECTION SUBCUTANEOUS at 09:33

## 2022-12-08 RX ADMIN — LISINOPRIL 5 MILLIGRAM(S): 2.5 TABLET ORAL at 09:11

## 2022-12-08 RX ADMIN — Medication 81 MILLIGRAM(S): at 09:09

## 2022-12-08 RX ADMIN — HYDROMORPHONE HYDROCHLORIDE 1.5 MILLIGRAM(S): 2 INJECTION INTRAMUSCULAR; INTRAVENOUS; SUBCUTANEOUS at 09:07

## 2022-12-08 RX ADMIN — GABAPENTIN 300 MILLIGRAM(S): 400 CAPSULE ORAL at 05:33

## 2022-12-08 RX ADMIN — HYDROMORPHONE HYDROCHLORIDE 1.5 MILLIGRAM(S): 2 INJECTION INTRAMUSCULAR; INTRAVENOUS; SUBCUTANEOUS at 09:40

## 2022-12-08 RX ADMIN — PANTOPRAZOLE SODIUM 40 MILLIGRAM(S): 20 TABLET, DELAYED RELEASE ORAL at 05:33

## 2022-12-08 RX ADMIN — ISOSORBIDE MONONITRATE 30 MILLIGRAM(S): 60 TABLET, EXTENDED RELEASE ORAL at 09:11

## 2022-12-08 RX ADMIN — Medication 10 MILLIGRAM(S): at 09:14

## 2022-12-08 RX ADMIN — SENNA PLUS 2 TABLET(S): 8.6 TABLET ORAL at 21:30

## 2022-12-08 RX ADMIN — FAMOTIDINE 40 MILLIGRAM(S): 10 INJECTION INTRAVENOUS at 21:31

## 2022-12-08 NOTE — PROGRESS NOTE ADULT - SUBJECTIVE AND OBJECTIVE BOX
Date of service: 12-08-22 @ 18:09      Patient lying in bed; afebrile      ROS: no fever or chills; denies dizziness, no HA, no SOB or cough, no abdominal pain, no diarrhea or constipation; no dysuria, no urinary frequency, no legs pain, no rashes    MEDICATIONS  (STANDING):  amLODIPine   Tablet 5 milliGRAM(s) Oral daily  aspirin  chewable 81 milliGRAM(s) Oral daily  dextrose 5%. 1000 milliLiter(s) (100 mL/Hr) IV Continuous <Continuous>  dextrose 5%. 1000 milliLiter(s) (50 mL/Hr) IV Continuous <Continuous>  dextrose 50% Injectable 25 Gram(s) IV Push once  dextrose 50% Injectable 12.5 Gram(s) IV Push once  dextrose 50% Injectable 25 Gram(s) IV Push once  doxycycline monohydrate Capsule 100 milliGRAM(s) Oral every 12 hours  enoxaparin Injectable 40 milliGRAM(s) SubCutaneous every 24 hours  famotidine    Tablet 40 milliGRAM(s) Oral at bedtime  furosemide    Tablet 40 milliGRAM(s) Oral daily  gabapentin 300 milliGRAM(s) Oral two times a day  gabapentin 600 milliGRAM(s) Oral at bedtime  glucagon  Injectable 1 milliGRAM(s) IntraMuscular once  insulin glargine Injectable (LANTUS) 30 Unit(s) SubCutaneous at bedtime  insulin lispro (ADMELOG) corrective regimen sliding scale   SubCutaneous three times a day before meals  isosorbide   mononitrate ER Tablet (IMDUR) 30 milliGRAM(s) Oral daily  lisinopril 5 milliGRAM(s) Oral daily  metoclopramide 10 milliGRAM(s) Oral daily  multivitamin 1 Tablet(s) Oral daily  pantoprazole    Tablet 40 milliGRAM(s) Oral before breakfast  piperacillin/tazobactam IVPB.. 3.375 Gram(s) IV Intermittent every 8 hours  polyethylene glycol 3350 17 Gram(s) Oral daily  senna 2 Tablet(s) Oral at bedtime  simvastatin 10 milliGRAM(s) Oral at bedtime  sucralfate suspension 1 Gram(s) Oral four times a day  tamsulosin 0.8 milliGRAM(s) Oral at bedtime    MEDICATIONS  (PRN):  acetaminophen     Tablet .. 650 milliGRAM(s) Oral every 6 hours PRN Temp greater or equal to 38C (100.4F), Mild Pain (1 - 3)  aluminum hydroxide/magnesium hydroxide/simethicone Suspension 30 milliLiter(s) Oral every 4 hours PRN Dyspepsia  dextrose Oral Gel 15 Gram(s) Oral once PRN Blood Glucose LESS THAN 70 milliGRAM(s)/deciliter  HYDROmorphone  Injectable 1 milliGRAM(s) IV Push every 4 hours PRN Moderate Pain (4 - 6)  HYDROmorphone  Injectable 1.5 milliGRAM(s) IV Push every 4 hours PRN Severe Pain (7 - 10)  magnesium hydroxide Suspension 30 milliLiter(s) Oral every 8 hours PRN Constipation  melatonin 3 milliGRAM(s) Oral at bedtime PRN Insomnia  ondansetron Injectable 4 milliGRAM(s) IV Push every 8 hours PRN Nausea and/or Vomiting      Vital Signs Last 24 Hrs  T(C): 36.6 (08 Dec 2022 16:16), Max: 37.3 (07 Dec 2022 23:47)  T(F): 97.9 (08 Dec 2022 16:16), Max: 99.2 (07 Dec 2022 23:47)  HR: 81 (08 Dec 2022 16:16) (71 - 81)  BP: 114/80 (08 Dec 2022 16:16) (112/56 - 122/63)  BP(mean): --  RR: 18 (08 Dec 2022 16:16) (18 - 18)  SpO2: 99% (08 Dec 2022 16:16) (97% - 99%)    Parameters below as of 08 Dec 2022 16:16  Patient On (Oxygen Delivery Method): room air            Physical Exam:          Physical Exam:          Constitutional: frail looking  HEENT: NC/AT, EOMI, PERRLA, conjunctivae clear; ears and nose atraumatic; pharynx clear  Neck: supple; thyroid not palpable  Back: no tenderness  Respiratory: respiratory effort normal; clear to auscultation  Cardiovascular: S1S2 regular, no murmurs  Abdomen: soft, not tender, not distended, positive BS; no liver or spleen organomegaly  Genitourinary: no suprapubic tenderness  Musculoskeletal: no muscle tenderness, left BKA; right lower extremity with erythema from pretibia to foot with ulcers on anterior shin and on sole of the foot, eschar on sole of foot over fifth MTP  Neurological/ Psychiatric: AxOx3, judgement and insight normal;  moving all extremities  Skin: no rashes; no palpable lesions    Labs: all available labs reviewed                       Labs:                        9.0    14.85 )-----------( 360      ( 08 Dec 2022 09:03 )             29.4     12-08    140  |  112<H>  |  26<H>  ----------------------------<  122<H>  4.1   |  25  |  1.12    Ca    9.3      08 Dec 2022 09:03             Cultures:       Culture - Blood (collected 12-01-22 @ 20:13)  Source: .Blood None  Final Report (12-07-22 @ 02:00):    No Growth Final    Culture - Blood (collected 12-01-22 @ 19:11)  Source: .Blood None  Final Report (12-07-22 @ 02:00):    No Growth Final      C-Reactive Protein, Serum: 71 mg/L (12-04-22 @ 06:57)  C-Reactive Protein, Serum: 127 mg/L (12-01-22 @ 21:45)            < from: Xray Foot AP + Lateral + Oblique, Right (12.01.22 @ 22:01) >  ACC: [id number] EXAM:  XR FOOT COMP MIN 3 VIEWS RT                          PROCEDURE DATE:  12/01/2022          INTERPRETATION:  History: 62-year-old male, necrotic area in the foot.    Three views of the right foot without comparison demonstrate soft tissue   emphysema at the plantar aspect of the midfoot and calcaneus.    No gross cortical destruction, fracture or dislocation.    IMPRESSION:  Soft tissue emphysema at the plantar aspect of the mid foot/calcaneous.   If there is continued clinical concern follow-up MRI can be ordered    < end of copied text >        Radiology: all available radiological tests reviewed    Advanced directives addressed: full resuscitation

## 2022-12-08 NOTE — PROGRESS NOTE ADULT - SUBJECTIVE AND OBJECTIVE BOX
CHIEF COMPLAINT: Patient is a 62y old  Male who presents with a chief complaint of Elevated troponin, PAD and Cellulitis (02 Dec 2022 10:14)    HPI:  61 y/o man with a history of CAD s/p CABG and MV repair (2016 - [place]), HTN, DM, GERD, L BKA, who was transferred from [facility] for the evaluation of abdominal discomfort and cellulitis.  Cardiology consult requested for elevated troponin.  Mr. Roberts has no cardiac symptoms -- he has not been experiencing angina or dyspnea.  He walks during PT and denies exertional chest discomfort.  He does not see a cardiologist in the outpatient setting.    12/4/22 Comfortable in bed with CC of SOB No CP Tele SR   12/5/22 Semirecumbent in bed no CP HR 70-80 BPM    12/6/22 patient is feeling ok , did have RLE angiogram today results pending  no chest pain or shortness of breath at rest  12/7/22 feeling well no new complaints No CP/SOB  12/8/22: No events overnight, denies chest pain, dyspnea.    PAST MEDICAL & SURGICAL HISTORY:  HTN (hypertension)  DM (diabetes mellitus)  Anemia  GERD (gastroesophageal reflux disease)  S/P BKA (below knee amputation), left  CAD (coronary artery disease)  S/P CABG x 3  Status post amputation of leg    SOCIAL HISTORY:   Smoking: Nonsmoker    FAMILY HISTORY: No pertinent family history in first degree relatives      Medications:  acetaminophen     Tablet .. 650 milliGRAM(s) Oral every 6 hours PRN  aluminum hydroxide/magnesium hydroxide/simethicone Suspension 30 milliLiter(s) Oral every 4 hours PRN  amLODIPine   Tablet 5 milliGRAM(s) Oral daily  aspirin  chewable 81 milliGRAM(s) Oral daily  dextrose 5%. 1000 milliLiter(s) IV Continuous <Continuous>  dextrose 5%. 1000 milliLiter(s) IV Continuous <Continuous>  dextrose 50% Injectable 25 Gram(s) IV Push once  dextrose 50% Injectable 12.5 Gram(s) IV Push once  dextrose 50% Injectable 25 Gram(s) IV Push once  dextrose Oral Gel 15 Gram(s) Oral once PRN  doxycycline monohydrate Capsule 100 milliGRAM(s) Oral every 12 hours  enoxaparin Injectable 40 milliGRAM(s) SubCutaneous every 24 hours  famotidine    Tablet 40 milliGRAM(s) Oral at bedtime  furosemide    Tablet 40 milliGRAM(s) Oral daily  gabapentin 300 milliGRAM(s) Oral two times a day  gabapentin 600 milliGRAM(s) Oral at bedtime  glucagon  Injectable 1 milliGRAM(s) IntraMuscular once  HYDROmorphone  Injectable 1 milliGRAM(s) IV Push every 4 hours PRN  HYDROmorphone  Injectable 1.5 milliGRAM(s) IV Push every 4 hours PRN  insulin glargine Injectable (LANTUS) 30 Unit(s) SubCutaneous at bedtime  insulin lispro (ADMELOG) corrective regimen sliding scale   SubCutaneous three times a day before meals  isosorbide   mononitrate ER Tablet (IMDUR) 30 milliGRAM(s) Oral daily  lisinopril 5 milliGRAM(s) Oral daily  magnesium hydroxide Suspension 30 milliLiter(s) Oral every 8 hours PRN  melatonin 3 milliGRAM(s) Oral at bedtime PRN  metoclopramide 10 milliGRAM(s) Oral daily  multivitamin 1 Tablet(s) Oral daily  ondansetron Injectable 4 milliGRAM(s) IV Push every 8 hours PRN  pantoprazole    Tablet 40 milliGRAM(s) Oral before breakfast  piperacillin/tazobactam IVPB.. 3.375 Gram(s) IV Intermittent every 8 hours  polyethylene glycol 3350 17 Gram(s) Oral daily  senna 2 Tablet(s) Oral at bedtime  simvastatin 10 milliGRAM(s) Oral at bedtime  sodium chloride 0.9%. 1000 milliLiter(s) IV Continuous <Continuous>  sodium chloride 0.9%. 1000 milliLiter(s) IV Continuous <Continuous>  sucralfate suspension 1 Gram(s) Oral four times a day  tamsulosin 0.8 milliGRAM(s) Oral at bedtime      Vitals:  Vital Signs Last 24 Hours  T(C): 36.8 (12-08-22 @ 08:01), Max: 37.6 (12-07-22 @ 17:16)  HR: 71 (12-08-22 @ 08:01) (71 - 80)  BP: 122/63 (12-08-22 @ 08:01) (112/56 - 122/63)  RR: 18 (12-08-22 @ 08:01) (18 - 18)  SpO2: 97% (12-08-22 @ 08:01) (92% - 97%)        I&O's Summary    General: No distress. Comfortable.  HEENT: EOM intact.  Neck: Neck supple. JVP 14-16cm . No masses  Chest: Clear to auscultation bilaterally  CV: Normal S1 and S2. Diastolic Murmur noted No rub, or gallops. Radial pulses normal.  Abdomen: Soft, non-distended, non-tender  Skin: No rashes or skin breakdown  Extremities: L BKA, RLE 1+ Edema with erythema   Neurology: Alert and oriented times three. Sensation intact  Psych: Affect normal    Labs:                        9.0    14.85 )-----------( 360      ( 08 Dec 2022 09:03 )             29.4     12-08    140  |  112<H>  |  26<H>  ----------------------------<  122<H>  4.1   |  25  |  1.12    Ca    9.3      08 Dec 2022 09:03            Serum Pro-Brain Natriuretic Peptide: 3796 pg/mL (12-07 @ 07:42)  Serum Pro-Brain Natriuretic Peptide: 13089 pg/mL (12-01 @ 20:07)      TroponinI hsT: 201.30, 364.23, 345.99    TTE Echo Complete w/o Contrast w/ Doppler (12.02.22 @ 08:46):   Endocardium is not well visualized, however, overall left ventricular systolic function appears normal. Mild concentric left ventricular hypertrophy. Septal flattening is seen; this finding is consistent with right heart pressure / volume overload. Estimated left ventricular ejection fraction is 55-60%.   The right ventricle exhibits mild dilation, mild diffuse hypokinesis, and mild depression of contractility.   Severe mitral stenosis. Mild mitral regurgitation.   Mild to moderate tricuspid valve regurgitation.   Severe pulmonary hypertension.    ECG: SR, nonspecific ST/T abn       REASON FOR VISIT: Pre-op cardiac exam    HPI:  61 y/o man with a history of CAD s/p CABG and MV repair (2016 - [place]), HTN, DM, GERD, L BKA, who was transferred from [facility] for the evaluation of abdominal discomfort and cellulitis.  Cardiology consult requested for elevated troponin.  Mr. Roberts has no cardiac symptoms -- he has not been experiencing angina or dyspnea.  He walks during PT and denies exertional chest discomfort.  He does not see a cardiologist in the outpatient setting.      12/4/22 Comfortable in bed with CC of SOB No CP Tele SR   12/5/22 Semirecumbent in bed no CP HR 70-80 BPM  12/6/22 patient is feeling ok , did have RLE angiogram today results pending  no chest pain or shortness of breath at rest  12/7/22 feeling well no new complaints No CP/SOB  12/8/22: No events overnight, denies chest pain, dyspnea.    PAST MEDICAL & SURGICAL HISTORY:  HTN (hypertension)  DM (diabetes mellitus)  Anemia  GERD (gastroesophageal reflux disease)  S/P BKA (below knee amputation), left  CAD (coronary artery disease)  S/P CABG x 3  Status post amputation of leg    SOCIAL HISTORY:   Smoking: Nonsmoker    FAMILY HISTORY: No pertinent family history in first degree relatives      Medications:  acetaminophen     Tablet .. 650 milliGRAM(s) Oral every 6 hours PRN  aluminum hydroxide/magnesium hydroxide/simethicone Suspension 30 milliLiter(s) Oral every 4 hours PRN  amLODIPine   Tablet 5 milliGRAM(s) Oral daily  aspirin  chewable 81 milliGRAM(s) Oral daily  dextrose 5%. 1000 milliLiter(s) IV Continuous <Continuous>  dextrose 5%. 1000 milliLiter(s) IV Continuous <Continuous>  dextrose 50% Injectable 25 Gram(s) IV Push once  dextrose 50% Injectable 12.5 Gram(s) IV Push once  dextrose 50% Injectable 25 Gram(s) IV Push once  dextrose Oral Gel 15 Gram(s) Oral once PRN  doxycycline monohydrate Capsule 100 milliGRAM(s) Oral every 12 hours  enoxaparin Injectable 40 milliGRAM(s) SubCutaneous every 24 hours  famotidine    Tablet 40 milliGRAM(s) Oral at bedtime  furosemide    Tablet 40 milliGRAM(s) Oral daily  gabapentin 300 milliGRAM(s) Oral two times a day  gabapentin 600 milliGRAM(s) Oral at bedtime  glucagon  Injectable 1 milliGRAM(s) IntraMuscular once  HYDROmorphone  Injectable 1 milliGRAM(s) IV Push every 4 hours PRN  HYDROmorphone  Injectable 1.5 milliGRAM(s) IV Push every 4 hours PRN  insulin glargine Injectable (LANTUS) 30 Unit(s) SubCutaneous at bedtime  insulin lispro (ADMELOG) corrective regimen sliding scale   SubCutaneous three times a day before meals  isosorbide   mononitrate ER Tablet (IMDUR) 30 milliGRAM(s) Oral daily  lisinopril 5 milliGRAM(s) Oral daily  magnesium hydroxide Suspension 30 milliLiter(s) Oral every 8 hours PRN  melatonin 3 milliGRAM(s) Oral at bedtime PRN  metoclopramide 10 milliGRAM(s) Oral daily  multivitamin 1 Tablet(s) Oral daily  ondansetron Injectable 4 milliGRAM(s) IV Push every 8 hours PRN  pantoprazole    Tablet 40 milliGRAM(s) Oral before breakfast  piperacillin/tazobactam IVPB.. 3.375 Gram(s) IV Intermittent every 8 hours  polyethylene glycol 3350 17 Gram(s) Oral daily  senna 2 Tablet(s) Oral at bedtime  simvastatin 10 milliGRAM(s) Oral at bedtime  sodium chloride 0.9%. 1000 milliLiter(s) IV Continuous <Continuous>  sodium chloride 0.9%. 1000 milliLiter(s) IV Continuous <Continuous>  sucralfate suspension 1 Gram(s) Oral four times a day  tamsulosin 0.8 milliGRAM(s) Oral at bedtime      Vitals:  Vital Signs Last 24 Hours  T(C): 36.8 (12-08-22 @ 08:01), Max: 37.6 (12-07-22 @ 17:16)  HR: 71 (12-08-22 @ 08:01) (71 - 80)  BP: 122/63 (12-08-22 @ 08:01) (112/56 - 122/63)  RR: 18 (12-08-22 @ 08:01) (18 - 18)  SpO2: 97% (12-08-22 @ 08:01) (92% - 97%)        I&O's Summary    General: No distress. Comfortable.  HEENT: EOM intact.  Neck: Neck supple. JVP 14-16cm . No masses  Chest: Clear to auscultation bilaterally  CV: Normal S1 and S2. Diastolic Murmur noted No rub, or gallops. Radial pulses normal.  Abdomen: Soft, non-distended, non-tender  Skin: No rashes or skin breakdown  Extremities: L BKA, RLE 1+ Edema with erythema   Neurology: Alert and oriented times three. Sensation intact  Psych: Affect normal    Labs:                        9.0    14.85 )-----------( 360      ( 08 Dec 2022 09:03 )             29.4     12-08    140  |  112<H>  |  26<H>  ----------------------------<  122<H>  4.1   |  25  |  1.12    Ca    9.3      08 Dec 2022 09:03      Serum Pro-Brain Natriuretic Peptide: 3796 pg/mL (12-07 @ 07:42)  Serum Pro-Brain Natriuretic Peptide: 71925 pg/mL (12-01 @ 20:07)    TroponinI hsT: 201.30, 364.23, 345.99    TTE Echo Complete w/o Contrast w/ Doppler (12.02.22 @ 08:46):   Endocardium is not well visualized, however, overall left ventricular systolic function appears normal. Mild concentric left ventricular hypertrophy. Septal flattening is seen; this finding is consistent with right heart pressure / volume overload. Estimated left ventricular ejection fraction is 55-60%.   The right ventricle exhibits mild dilation, mild diffuse hypokinesis, and mild depression of contractility.   Severe mitral stenosis. Mild mitral regurgitation.   Mild to moderate tricuspid valve regurgitation.   Severe pulmonary hypertension.    ECG: SR, nonspecific ST/T abn

## 2022-12-08 NOTE — PROGRESS NOTE ADULT - SUBJECTIVE AND OBJECTIVE BOX
Chief Complaint: RLE swelling and redness, epigastric discomfort    Interval Hx: Patient seen and examined earlier today. No new complaints. No significant changes. Continues to have RLE swelling and redness, slightly improved compared to presentation. Continues to have RLE pain complaints, only partially relieved with hydromorphone 1mg IV q4h prn. He remains on empiric antibiotics. MRI negative for osteomyelitis. Workup ongoing to further evaluate the extent of his peripheral vascular disease in that extremity. He is planned for LE angiogram tomorrow. As for his epigastric discomfort, it is mild-to-moderate, being treated with Carafate and PPI. Anticipated that he will eventually need an upper endoscopy to evaluate that epigastric discomfort and also as routine appropriate testing in the setting of his cirrhosis.   12/06/22: RLE angio today. No new issues.  12/07/22: Patient seen and examined. Complaining of right LE pain. Discussed with patient regarding management plan.   12/8: Sitting in chair, comfortable, offers no complaints. Denies fever, chills, n, v.      ROS: Multi system review is comprehensively negative x 10 systems except as above    Vitals:    Vital Signs Last 24 Hrs  T(C): 36.8 (08 Dec 2022 08:01), Max: 37.6 (07 Dec 2022 17:16)  T(F): 98.2 (08 Dec 2022 08:01), Max: 99.7 (07 Dec 2022 17:16)  HR: 71 (08 Dec 2022 08:01) (71 - 80)  BP: 122/63 (08 Dec 2022 08:01) (112/56 - 122/63)  BP(mean): --  RR: 18 (08 Dec 2022 08:01) (18 - 18)  SpO2: 97% (08 Dec 2022 08:01) (92% - 97%)    Parameters below as of 08 Dec 2022 08:01  Patient On (Oxygen Delivery Method): room air          Exam:  Gen: No acute distress  HEENT: NCAT PERRL EOMI MMM  Neck: Supple no JVD no LAD  CVS: S1 S2 normal RRR  Chest: Normal resp effort, lungs CTA B/L  Abd: +BS, soft NT ND   Ext: L BKA. RLE with mild erythema from upper shin down to foot, small superficial wounds on shin, large open wound on plantar surface of foot at the foot pad.   Skin: As described in extremity exam. Some additional flaking skin on R foot  Neuro: A+OX3, no focal deficits, sensation intake to RLE  Mood: Calm, pleasant        Labs:                                                           9.0    14.85 )-----------( 360      ( 08 Dec 2022 09:03 )             29.4     12-08    140  |  112<H>  |  26<H>  ----------------------------<  122<H>  4.1   |  25  |  1.12    Ca    9.3      08 Dec 2022 09:03      CAPILLARY BLOOD GLUCOSE      POCT Blood Glucose.: 207 mg/dL (08 Dec 2022 11:14)  POCT Blood Glucose.: 117 mg/dL (08 Dec 2022 07:29)  POCT Blood Glucose.: 175 mg/dL (07 Dec 2022 21:15)  POCT Blood Glucose.: 163 mg/dL (07 Dec 2022 16:37)          Micro:  Bld cx x 2 12/1: Negative to date  COVID, Flu, RSV PCR 12/1: Negative    Imaging:  MRI RLE, R foot 12/2: There is no evidence for osteomyelitis.    RLE arterial duplex 12/1: No appreciable flow detected in the posterior tibial and peroneal arteries.    R foot XR 12/1: Soft tissue emphysema at the plantar aspect of the mid foot/calcaneous.     Cardiac Testing:  TTE 12/2:  Endocardium is not well visualized, however, overall left ventricular systolic function appears normal. Mild concentric left ventricular hypertrophy. Septal flattening is seen; this finding is consistent with right heart pressure / volume overload. Estimated left ventricular ejection fraction is 55-60%. The right ventricle exhibits mild dilation, mild diffuse hypokinesis, and mild depression of contractility. Severe mitral stenosis. Mild mitral regurgitation. Mild to moderate tricuspid valve regurgitation. Severe pulmonary hypertension.    Meds:  MEDICATIONS  (STANDING):  amLODIPine   Tablet 5 milliGRAM(s) Oral daily  aspirin  chewable 81 milliGRAM(s) Oral daily  dextrose 5%. 1000 milliLiter(s) (100 mL/Hr) IV Continuous <Continuous>  dextrose 5%. 1000 milliLiter(s) (50 mL/Hr) IV Continuous <Continuous>  dextrose 50% Injectable 25 Gram(s) IV Push once  dextrose 50% Injectable 12.5 Gram(s) IV Push once  dextrose 50% Injectable 25 Gram(s) IV Push once  doxycycline monohydrate Capsule 100 milliGRAM(s) Oral every 12 hours  enoxaparin Injectable 40 milliGRAM(s) SubCutaneous every 24 hours  famotidine    Tablet 40 milliGRAM(s) Oral at bedtime  furosemide    Tablet 40 milliGRAM(s) Oral daily  gabapentin 300 milliGRAM(s) Oral two times a day  gabapentin 600 milliGRAM(s) Oral at bedtime  glucagon  Injectable 1 milliGRAM(s) IntraMuscular once  insulin glargine Injectable (LANTUS) 30 Unit(s) SubCutaneous at bedtime  insulin lispro (ADMELOG) corrective regimen sliding scale   SubCutaneous three times a day before meals  isosorbide   mononitrate ER Tablet (IMDUR) 30 milliGRAM(s) Oral daily  lisinopril 5 milliGRAM(s) Oral daily  metoclopramide 10 milliGRAM(s) Oral daily  multivitamin 1 Tablet(s) Oral daily  pantoprazole    Tablet 40 milliGRAM(s) Oral before breakfast  piperacillin/tazobactam IVPB.. 3.375 Gram(s) IV Intermittent every 8 hours  polyethylene glycol 3350 17 Gram(s) Oral daily  senna 2 Tablet(s) Oral at bedtime  simvastatin 10 milliGRAM(s) Oral at bedtime  sucralfate suspension 1 Gram(s) Oral four times a day  tamsulosin 0.8 milliGRAM(s) Oral at bedtime    MEDICATIONS  (PRN):  acetaminophen     Tablet .. 650 milliGRAM(s) Oral every 6 hours PRN Temp greater or equal to 38C (100.4F), Mild Pain (1 - 3)  aluminum hydroxide/magnesium hydroxide/simethicone Suspension 30 milliLiter(s) Oral every 4 hours PRN Dyspepsia  dextrose Oral Gel 15 Gram(s) Oral once PRN Blood Glucose LESS THAN 70 milliGRAM(s)/deciliter  HYDROmorphone  Injectable 1 milliGRAM(s) IV Push every 4 hours PRN Moderate Pain (4 - 6)  HYDROmorphone  Injectable 1.5 milliGRAM(s) IV Push every 4 hours PRN Severe Pain (7 - 10)  magnesium hydroxide Suspension 30 milliLiter(s) Oral every 8 hours PRN Constipation  melatonin 3 milliGRAM(s) Oral at bedtime PRN Insomnia  ondansetron Injectable 4 milliGRAM(s) IV Push every 8 hours PRN Nausea and/or Vomiting          Assessment and Plan:  61 yo man with DM, HTN, CAD, 3v CABG, hx of PAD, L BKA, GERD, past alcohol use disorder, cirrhosis, chronic anemia, sent from [facility] for worsening RLE swelling and redness, also complaints of epigastric discomfort. In the ED, exam was consistent with cellulitis of the RLE, non healing wound on the plantar surface of the R foot. Elevated inflammatory markers. Patient was placed on antibiotics and admitted to Medicine.     RLE skin and soft tissue infection in setting of non healing R foot ulcer due to/as a consequence of  type 2 DM and PAD:  -MRI RLE negative for osteomyelitis.   -S/P RLE angio, per vascular --> would benefit from distal femoral bypass surgery of extremity once medically cleared   -Continue on empiric antibiotics per ID with zosyn, and doxycycline   -Continue aspirin, statin      CAD /  Hx of CABG / demand ischemia /  severe mitral stenosis / severe pulmonary HTN / HTN:  -echo: EF: 55-60%, severe Mitral stenosis, severe pulmonary HTN  -chest xray 12/7: vascular congestion  -stop IVFs  -c/w lasix  -monitor I/O  -Continue aspirin, statin, Imdur  -Continue amlodipine, lisinopril and Imdur  -cardiac work up ongoing by cardiology: stress test pending,  +/- right and left heart cath/ALEX  -Follow Dr. Wilson recommendation.      Epigastric discomfort / dyspepsia:  -Per GI --> Carafate,  PPI.   -Eventual EGD, treat empirically for now         DM2:  -A1c 7.  - Continue Lantus and Lispro  - Monitor glucose TID AC and HS    BPH: Stable  - Continue Flomax      Code Status: Full  DVT px: LMWH  Dispo: To be determined pending further clinical assessment

## 2022-12-08 NOTE — PROGRESS NOTE ADULT - PROBLEM SELECTOR PLAN 1
Elevated troponin in the setting of infectious process, known CAD, and severe pulmonary HTN  Troponin trending down  Will arrange Nuclear Stress test for tomorrow in order to assess for cardiac clearance prior to planned Fem-Pop Bypass   Patient W/O CP  Echo NL LV FX EF 55%  -- suspect non-ischemic myocardial injury.

## 2022-12-08 NOTE — PROGRESS NOTE ADULT - SUBJECTIVE AND OBJECTIVE BOX
Patient was seen and examined at the bedside this morning  No acute events overnight  Pain is better controlled  No nausea or vomiting  Tolerating diet     O/E:  Vital Signs Last 24 Hrs  T(C): 36.8 (08 Dec 2022 08:01), Max: 37.6 (07 Dec 2022 17:16)  T(F): 98.2 (08 Dec 2022 08:01), Max: 99.7 (07 Dec 2022 17:16)  HR: 71 (08 Dec 2022 08:01) (71 - 80)  BP: 122/63 (08 Dec 2022 08:01) (112/56 - 122/63)  BP(mean): --  RR: 18 (08 Dec 2022 08:01) (18 - 18)  SpO2: 97% (08 Dec 2022 08:01) (92% - 97%)    Parameters below as of 08 Dec 2022 08:01  Patient On (Oxygen Delivery Method): room air      Alert, conscious, oriented  No pallor, jaundice or cyanosis  Not in pain or distress  Chest clear, equal air entry bilaterally  Abdomen soft and lax, no tenderness  Extremities: No swelling or tenderness. Right leg less erythematous and less swollen    I&O's Summary                            9.0    14.85 )-----------( 360      ( 08 Dec 2022 09:03 )             29.4     12-07    141  |  113<H>  |  25<H>  ----------------------------<  117<H>  4.2   |  26  |  1.16    Ca    9.0      07 Dec 2022 07:42        MEDICATIONS  (STANDING):  amLODIPine   Tablet 5 milliGRAM(s) Oral daily  aspirin  chewable 81 milliGRAM(s) Oral daily  dextrose 5%. 1000 milliLiter(s) (50 mL/Hr) IV Continuous <Continuous>  dextrose 5%. 1000 milliLiter(s) (100 mL/Hr) IV Continuous <Continuous>  dextrose 50% Injectable 25 Gram(s) IV Push once  dextrose 50% Injectable 12.5 Gram(s) IV Push once  dextrose 50% Injectable 25 Gram(s) IV Push once  doxycycline monohydrate Capsule 100 milliGRAM(s) Oral every 12 hours  enoxaparin Injectable 40 milliGRAM(s) SubCutaneous every 24 hours  famotidine    Tablet 40 milliGRAM(s) Oral at bedtime  furosemide    Tablet 40 milliGRAM(s) Oral daily  gabapentin 300 milliGRAM(s) Oral two times a day  gabapentin 600 milliGRAM(s) Oral at bedtime  glucagon  Injectable 1 milliGRAM(s) IntraMuscular once  insulin glargine Injectable (LANTUS) 30 Unit(s) SubCutaneous at bedtime  insulin lispro (ADMELOG) corrective regimen sliding scale   SubCutaneous three times a day before meals  isosorbide   mononitrate ER Tablet (IMDUR) 30 milliGRAM(s) Oral daily  lisinopril 5 milliGRAM(s) Oral daily  metoclopramide 10 milliGRAM(s) Oral daily  multivitamin 1 Tablet(s) Oral daily  pantoprazole    Tablet 40 milliGRAM(s) Oral before breakfast  piperacillin/tazobactam IVPB.. 3.375 Gram(s) IV Intermittent every 8 hours  polyethylene glycol 3350 17 Gram(s) Oral daily  senna 2 Tablet(s) Oral at bedtime  simvastatin 10 milliGRAM(s) Oral at bedtime  sodium chloride 0.9%. 1000 milliLiter(s) (100 mL/Hr) IV Continuous <Continuous>  sodium chloride 0.9%. 1000 milliLiter(s) (100 mL/Hr) IV Continuous <Continuous>  sucralfate suspension 1 Gram(s) Oral four times a day  tamsulosin 0.8 milliGRAM(s) Oral at bedtime    MEDICATIONS  (PRN):  acetaminophen     Tablet .. 650 milliGRAM(s) Oral every 6 hours PRN Temp greater or equal to 38C (100.4F), Mild Pain (1 - 3)  aluminum hydroxide/magnesium hydroxide/simethicone Suspension 30 milliLiter(s) Oral every 4 hours PRN Dyspepsia  dextrose Oral Gel 15 Gram(s) Oral once PRN Blood Glucose LESS THAN 70 milliGRAM(s)/deciliter  HYDROmorphone  Injectable 1 milliGRAM(s) IV Push every 4 hours PRN Moderate Pain (4 - 6)  HYDROmorphone  Injectable 1.5 milliGRAM(s) IV Push every 4 hours PRN Severe Pain (7 - 10)  magnesium hydroxide Suspension 30 milliLiter(s) Oral every 8 hours PRN Constipation  melatonin 3 milliGRAM(s) Oral at bedtime PRN Insomnia  ondansetron Injectable 4 milliGRAM(s) IV Push every 8 hours PRN Nausea and/or Vomiting

## 2022-12-08 NOTE — PROGRESS NOTE ADULT - PROBLEM SELECTOR PLAN 3
Past MV repair but now with severely elevated transmitral gradient, severe PHTN,   pursue work-up once recovered from infectious process with Structural Heart Disease Dr Wilson will eventually need R&L heart Cath/ALEX  BNP Improving, continue diuretics, further GDMT to be assessed later, after vasc intervention.   Daily standing weights   Strict I & Os Past MV repair but now with severely elevated transmitral gradient, severe PHTN,   pursue work-up once recovered from infectious process with Structural Heart Disease Dr Wilson will eventually need R&L heart Cath/ALEX  Operative records from [facility] requested   BNP Improving, continue diuretics, further GDMT to be assessed later, after vasc intervention.   Daily standing weights   Strict I & Os

## 2022-12-08 NOTE — PROGRESS NOTE ADULT - ASSESSMENT
A 62 year old male with history of PVD, IHD, MV repair. admitted with right leg cellulitis and was found to have significant stenosis of his RLE vasculature    Plan:  s/p POD 2 RLE angio. Pt will need femoral distal bypass   Pt needs medical and cardio clearance for OR procedure of femoral distal bypass, TBD pending clearances.   Podiatry for local wound care  Medical management as per primary team    Plan discussed with Dr Walters

## 2022-12-08 NOTE — PROGRESS NOTE ADULT - ASSESSMENT
63 y/o Male with past medical history of cad, diabetes, hypertension, gerd, pvd, s/p left BKA, s/p CABG admitted from [facility] on 12/1 for evaluation of epigastric pain of about one day duration; also noted with right lower extremity redness and pain with ulcers on the anterior shin as well as the sole of the right foot. Patient is mostly a poor historian and history per medical record.     1. Patient admitted with right lower extremity cellulitis and PVD; also noted with leukocytosis most likely reactive to infection  - follow up cultures   - serial cbc and monitor temperature   - reviewed prior medical records to evaluate for resistant or atypical pathogens   - iv hydration and supportive care   - wound care per podiatry  - given the XRay results consideration for MRI  - day #7 zosyn and doxycycline  - tolerating antibiotics without rashes or side effects   - podiatry evaluation in progress  - for vascular intervention  2. other issues: per medicine

## 2022-12-09 LAB
ANION GAP SERPL CALC-SCNC: 5 MMOL/L — SIGNIFICANT CHANGE UP (ref 5–17)
BUN SERPL-MCNC: 24 MG/DL — HIGH (ref 7–23)
CALCIUM SERPL-MCNC: 9.2 MG/DL — SIGNIFICANT CHANGE UP (ref 8.5–10.1)
CHLORIDE SERPL-SCNC: 113 MMOL/L — HIGH (ref 96–108)
CO2 SERPL-SCNC: 23 MMOL/L — SIGNIFICANT CHANGE UP (ref 22–31)
CREAT SERPL-MCNC: 1 MG/DL — SIGNIFICANT CHANGE UP (ref 0.5–1.3)
EGFR: 86 ML/MIN/1.73M2 — SIGNIFICANT CHANGE UP
GLUCOSE SERPL-MCNC: 117 MG/DL — HIGH (ref 70–99)
HCT VFR BLD CALC: 25.8 % — LOW (ref 39–50)
HGB BLD-MCNC: 8.2 G/DL — LOW (ref 13–17)
MCHC RBC-ENTMCNC: 31.8 GM/DL — LOW (ref 32–36)
MCHC RBC-ENTMCNC: 31.8 PG — SIGNIFICANT CHANGE UP (ref 27–34)
MCV RBC AUTO: 100 FL — SIGNIFICANT CHANGE UP (ref 80–100)
NT-PROBNP SERPL-SCNC: 4088 PG/ML — HIGH (ref 0–125)
PLATELET # BLD AUTO: 353 K/UL — SIGNIFICANT CHANGE UP (ref 150–400)
POTASSIUM SERPL-MCNC: 3.7 MMOL/L — SIGNIFICANT CHANGE UP (ref 3.5–5.3)
POTASSIUM SERPL-SCNC: 3.7 MMOL/L — SIGNIFICANT CHANGE UP (ref 3.5–5.3)
RBC # BLD: 2.58 M/UL — LOW (ref 4.2–5.8)
RBC # FLD: 13.4 % — SIGNIFICANT CHANGE UP (ref 10.3–14.5)
SODIUM SERPL-SCNC: 141 MMOL/L — SIGNIFICANT CHANGE UP (ref 135–145)
WBC # BLD: 18.64 K/UL — HIGH (ref 3.8–10.5)
WBC # FLD AUTO: 18.64 K/UL — HIGH (ref 3.8–10.5)

## 2022-12-09 PROCEDURE — 99232 SBSQ HOSP IP/OBS MODERATE 35: CPT

## 2022-12-09 PROCEDURE — 93018 CV STRESS TEST I&R ONLY: CPT

## 2022-12-09 PROCEDURE — 93016 CV STRESS TEST SUPVJ ONLY: CPT

## 2022-12-09 PROCEDURE — 78452 HT MUSCLE IMAGE SPECT MULT: CPT | Mod: 26

## 2022-12-09 RX ORDER — REGADENOSON 0.08 MG/ML
0.4 INJECTION, SOLUTION INTRAVENOUS ONCE
Refills: 0 | Status: COMPLETED | OUTPATIENT
Start: 2022-12-09 | End: 2022-12-09

## 2022-12-09 RX ORDER — PIPERACILLIN AND TAZOBACTAM 4; .5 G/20ML; G/20ML
3.38 INJECTION, POWDER, LYOPHILIZED, FOR SOLUTION INTRAVENOUS EVERY 8 HOURS
Refills: 0 | Status: DISCONTINUED | OUTPATIENT
Start: 2022-12-09 | End: 2022-12-20

## 2022-12-09 RX ADMIN — Medication 40 MILLIGRAM(S): at 10:45

## 2022-12-09 RX ADMIN — GABAPENTIN 300 MILLIGRAM(S): 400 CAPSULE ORAL at 04:36

## 2022-12-09 RX ADMIN — Medication 0: at 05:51

## 2022-12-09 RX ADMIN — AMLODIPINE BESYLATE 5 MILLIGRAM(S): 2.5 TABLET ORAL at 10:45

## 2022-12-09 RX ADMIN — ISOSORBIDE MONONITRATE 30 MILLIGRAM(S): 60 TABLET, EXTENDED RELEASE ORAL at 10:46

## 2022-12-09 RX ADMIN — LISINOPRIL 5 MILLIGRAM(S): 2.5 TABLET ORAL at 10:46

## 2022-12-09 RX ADMIN — REGADENOSON 0.4 MILLIGRAM(S): 0.08 INJECTION, SOLUTION INTRAVENOUS at 09:25

## 2022-12-09 RX ADMIN — ENOXAPARIN SODIUM 40 MILLIGRAM(S): 100 INJECTION SUBCUTANEOUS at 10:46

## 2022-12-09 RX ADMIN — HYDROMORPHONE HYDROCHLORIDE 1 MILLIGRAM(S): 2 INJECTION INTRAMUSCULAR; INTRAVENOUS; SUBCUTANEOUS at 16:04

## 2022-12-09 RX ADMIN — Medication 100 MILLIGRAM(S): at 10:46

## 2022-12-09 RX ADMIN — INSULIN GLARGINE 30 UNIT(S): 100 INJECTION, SOLUTION SUBCUTANEOUS at 19:59

## 2022-12-09 RX ADMIN — Medication 1 GRAM(S): at 13:59

## 2022-12-09 RX ADMIN — HYDROMORPHONE HYDROCHLORIDE 1 MILLIGRAM(S): 2 INJECTION INTRAMUSCULAR; INTRAVENOUS; SUBCUTANEOUS at 19:59

## 2022-12-09 RX ADMIN — Medication 1 TABLET(S): at 10:45

## 2022-12-09 RX ADMIN — Medication 100 MILLIGRAM(S): at 20:02

## 2022-12-09 RX ADMIN — HYDROMORPHONE HYDROCHLORIDE 1 MILLIGRAM(S): 2 INJECTION INTRAMUSCULAR; INTRAVENOUS; SUBCUTANEOUS at 21:25

## 2022-12-09 RX ADMIN — HYDROMORPHONE HYDROCHLORIDE 1 MILLIGRAM(S): 2 INJECTION INTRAMUSCULAR; INTRAVENOUS; SUBCUTANEOUS at 06:25

## 2022-12-09 RX ADMIN — Medication 1 GRAM(S): at 17:16

## 2022-12-09 RX ADMIN — Medication 1 GRAM(S): at 20:05

## 2022-12-09 RX ADMIN — HYDROMORPHONE HYDROCHLORIDE 1 MILLIGRAM(S): 2 INJECTION INTRAMUSCULAR; INTRAVENOUS; SUBCUTANEOUS at 01:13

## 2022-12-09 RX ADMIN — HYDROMORPHONE HYDROCHLORIDE 1 MILLIGRAM(S): 2 INJECTION INTRAMUSCULAR; INTRAVENOUS; SUBCUTANEOUS at 11:03

## 2022-12-09 RX ADMIN — HYDROMORPHONE HYDROCHLORIDE 1 MILLIGRAM(S): 2 INJECTION INTRAMUSCULAR; INTRAVENOUS; SUBCUTANEOUS at 10:48

## 2022-12-09 RX ADMIN — FAMOTIDINE 40 MILLIGRAM(S): 10 INJECTION INTRAVENOUS at 20:01

## 2022-12-09 RX ADMIN — TAMSULOSIN HYDROCHLORIDE 0.8 MILLIGRAM(S): 0.4 CAPSULE ORAL at 20:01

## 2022-12-09 RX ADMIN — PIPERACILLIN AND TAZOBACTAM 25 GRAM(S): 4; .5 INJECTION, POWDER, LYOPHILIZED, FOR SOLUTION INTRAVENOUS at 15:59

## 2022-12-09 RX ADMIN — HYDROMORPHONE HYDROCHLORIDE 1 MILLIGRAM(S): 2 INJECTION INTRAMUSCULAR; INTRAVENOUS; SUBCUTANEOUS at 05:38

## 2022-12-09 RX ADMIN — SIMVASTATIN 10 MILLIGRAM(S): 20 TABLET, FILM COATED ORAL at 20:02

## 2022-12-09 RX ADMIN — Medication 650 MILLIGRAM(S): at 05:07

## 2022-12-09 RX ADMIN — HYDROMORPHONE HYDROCHLORIDE 1 MILLIGRAM(S): 2 INJECTION INTRAMUSCULAR; INTRAVENOUS; SUBCUTANEOUS at 02:19

## 2022-12-09 RX ADMIN — Medication 10 MILLIGRAM(S): at 10:49

## 2022-12-09 RX ADMIN — GABAPENTIN 300 MILLIGRAM(S): 400 CAPSULE ORAL at 14:00

## 2022-12-09 RX ADMIN — Medication 81 MILLIGRAM(S): at 10:45

## 2022-12-09 RX ADMIN — GABAPENTIN 600 MILLIGRAM(S): 400 CAPSULE ORAL at 20:01

## 2022-12-09 RX ADMIN — HYDROMORPHONE HYDROCHLORIDE 1 MILLIGRAM(S): 2 INJECTION INTRAMUSCULAR; INTRAVENOUS; SUBCUTANEOUS at 16:19

## 2022-12-09 RX ADMIN — Medication 650 MILLIGRAM(S): at 04:36

## 2022-12-09 NOTE — PROGRESS NOTE ADULT - SUBJECTIVE AND OBJECTIVE BOX
Date of Service: 12/9/22  Chief Complaint :61 y/o male PMHx of PVD s/p left BKA, DM, HTN, CAD, anemia, and GERD, Full code with molst BIBEMS from [facility] c/o severe abdominal pain since this morning. Pt reports vomiting. Pt denies diarrhea. Pt reports he has not been eating or drinking. Pt reports he has noticed redness of RLE over last week and black area on bottom of foot. States he was scheduled for angiogram tomorrow with Dr. Dickson who has been following patient for continued PVD of right lower extremity.  Podiatry consulted for unstagable wound to Right 4/5th metatarsal head. Patient denies any pain to the area. 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 Patient says he noticed redness/erythema to his right lower extremity. Patient also says that he has narrowing of his blood vessels to his right leg, and that there is decreased blood flow to his right foot.     12/9/22: Pt seen by podiatry team w/ attending present. Patient does not report any additional pedal complaints reported at this time.         PMH: HTN (hypertension)    DM (diabetes mellitus)      PSH:    Allergies:No Known Allergies    MEDICATIONS  (STANDING):  amLODIPine   Tablet 5 milliGRAM(s) Oral daily  aspirin  chewable 81 milliGRAM(s) Oral daily  dextrose 5%. 1000 milliLiter(s) (50 mL/Hr) IV Continuous <Continuous>  dextrose 5%. 1000 milliLiter(s) (100 mL/Hr) IV Continuous <Continuous>  dextrose 50% Injectable 25 Gram(s) IV Push once  dextrose 50% Injectable 12.5 Gram(s) IV Push once  dextrose 50% Injectable 25 Gram(s) IV Push once  doxycycline monohydrate Capsule 100 milliGRAM(s) Oral every 12 hours  enoxaparin Injectable 40 milliGRAM(s) SubCutaneous every 24 hours  famotidine    Tablet 40 milliGRAM(s) Oral at bedtime  furosemide    Tablet 40 milliGRAM(s) Oral daily  gabapentin 300 milliGRAM(s) Oral two times a day  gabapentin 600 milliGRAM(s) Oral at bedtime  glucagon  Injectable 1 milliGRAM(s) IntraMuscular once  insulin glargine Injectable (LANTUS) 30 Unit(s) SubCutaneous at bedtime  insulin lispro (ADMELOG) corrective regimen sliding scale   SubCutaneous three times a day before meals  isosorbide   mononitrate ER Tablet (IMDUR) 30 milliGRAM(s) Oral daily  lisinopril 5 milliGRAM(s) Oral daily  metoclopramide 10 milliGRAM(s) Oral daily  multivitamin 1 Tablet(s) Oral daily  pantoprazole    Tablet 40 milliGRAM(s) Oral before breakfast  polyethylene glycol 3350 17 Gram(s) Oral daily  senna 2 Tablet(s) Oral at bedtime  simvastatin 10 milliGRAM(s) Oral at bedtime  sucralfate suspension 1 Gram(s) Oral four times a day  tamsulosin 0.8 milliGRAM(s) Oral at bedtime    MEDICATIONS  (PRN):  acetaminophen     Tablet .. 650 milliGRAM(s) Oral every 6 hours PRN Temp greater or equal to 38C (100.4F), Mild Pain (1 - 3)  aluminum hydroxide/magnesium hydroxide/simethicone Suspension 30 milliLiter(s) Oral every 4 hours PRN Dyspepsia  dextrose Oral Gel 15 Gram(s) Oral once PRN Blood Glucose LESS THAN 70 milliGRAM(s)/deciliter  HYDROmorphone  Injectable 1 milliGRAM(s) IV Push every 4 hours PRN Moderate Pain (4 - 6)  HYDROmorphone  Injectable 1.5 milliGRAM(s) IV Push every 4 hours PRN Severe Pain (7 - 10)  magnesium hydroxide Suspension 30 milliLiter(s) Oral every 8 hours PRN Constipation  melatonin 3 milliGRAM(s) Oral at bedtime PRN Insomnia  ondansetron Injectable 4 milliGRAM(s) IV Push every 8 hours PRN Nausea and/or Vomiting         Vital Signs Last 24 Hrs  Vital Signs Last 24 Hrs  T(C): 36.7 (09 Dec 2022 08:23), Max: 36.8 (08 Dec 2022 23:18)  T(F): 98 (09 Dec 2022 08:23), Max: 98.3 (08 Dec 2022 23:18)  HR: 79 (09 Dec 2022 08:23) (79 - 89)  BP: 144/75 (09 Dec 2022 08:23) (114/80 - 144/75)  BP(mean): --  RR: 18 (09 Dec 2022 08:23) (18 - 18)  SpO2: 100% (09 Dec 2022 08:23) (97% - 100%)    Parameters below as of 09 Dec 2022 08:23  Patient On (Oxygen Delivery Method): room air             Physical Exam:   Constitutiona: NAD, alert;  Derm:  Skin warm, dry and supple bilateral.     Erythema noted grossly to right foot and lower leg  Scaly skin noted grossly to right foot  Dry eschar wound noted to the right 4th/5th metatarsal head measuring approximately 3x3 cm, no pus, no malodor, no pain on palpation, negative fluctuance. Indication of bluish cyanotic discoloration from Right mid leg to Right foot digits 1-5. Dry small eschar lesions to Right distal toes 2-3.    Vascular: Dorsalis Pedis and Posterior Tibial pulses non palpable.  Capillary re-fill time less then 3 seconds digits 1-5 bilateral.   Neuro: Protective sensation absent to the level of the digits bilateral.  MSK: Muscle strength 4/5 in all major muscle groups of Right lower extremity.  Below knee amputation to left lower extremity         Labs:                                   9.0    14.85 )-----------( 360      ( 08 Dec 2022 09:03 )             29.4     12-08    140  |  112<H>  |  26<H>  ----------------------------<  122<H>  4.1   |  25  |  1.12    Ca    9.3      08 Dec 2022 09:03              Rad/EKG     < from: Xray Foot AP + Lateral + Oblique, Right (12.01.22 @ 22:01) >  INTERPRETATION:  History: 62-year-old male, necrotic area in the foot.    Three views of the right foot without comparison demonstrate soft tissue   emphysema at the plantar aspect of the midfoot and calcaneus.    No gross cortical destruction, fracture or dislocation.    IMPRESSION:  Soft tissue emphysema at the plantar aspect of the mid foot/calcaneous.   If there is continued clinical concern follow-up MRI can be ordered    < end of copied text >  < from: MR Foot No Cont, Right (12.03.22 @ 12:13) >  IMPRESSION:  1.  There is no evidence for osteomyelitis.    < end of copied text >  < from: US Duplex Arterial Lower Ext Ltd, Right (12.01.22 @ 21:40) >  IMPRESSION:    No appreciable flow detected in the posterior tibial and peroneal   arteries.    < end of copied text >

## 2022-12-09 NOTE — PROGRESS NOTE ADULT - ASSESSMENT
A 62 year old male with history of PVD, IHD, MV repair. admitted with right leg cellulitis and was found to have significant stenosis of his RLE vasculature    Plan:  s/p POD 3 RLE angio. Pt will need femoral distal bypass   Pt needs medical and cardio clearance for OR procedure of femoral distal bypass, TBD pending clearances.   Podiatry for local wound care  Medical management as per primary team    Plan discussed with Dr Walters

## 2022-12-09 NOTE — PROGRESS NOTE ADULT - ASSESSMENT
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 Jez Mauricio  Discussed diagnosis and treatment with patient  X-rays reviewed : no soft tissue emphysema or osseous fx   WC: betadine paint and allyvan pad  Continue with IV antibiotics As Per ID/MED  All additional care by Med appreciated  ID consult and recommendations appreciated.  Vascular consult appreciated: Angiogram perfomred  Pt notes blister forming at plantar R foot, now stable eschar at the plantar 4th/5th metatarsal head, no pus, no malodor, no pain. Wound related to poor circulation of R foot. Recommend for area to fully demarcate at this time.  Erythema to RLE 2/2 PVD.  MRI shows no evidence of osteomyelitis    No acute podiatric intervention at this time as dry eschar wound is stable. Podiatry will monitor demarcation of eschar and plan for outpatient debridement once cleared by vascular.   Outpatient follow up at [facility] recommended.   WBAT to R foot w/ sx shoe.     Patient demonstrated verbal understanding of all interventions and tolerated interventions well without any complications.   Stable from podiatric standpoint for dc, please dc when stable from all other specialities.       Wound care instructions to be applied daily to right foot  - Carefully remove dressings to right foot   - Apply betadine paint to right foot at the 4th/5th metatarsal heads   - Apply sterile gauze to area, and wrap with kerlix and ace.   Thanks

## 2022-12-09 NOTE — PROGRESS NOTE ADULT - SUBJECTIVE AND OBJECTIVE BOX
Patient was seen and examined at the bedside this morning  No acute events overnight  Pain is better controlled  No nausea or vomiting  Tolerating diet     O/E:  Vital Signs Last 24 Hrs  T(C): 36.8 (08 Dec 2022 23:18), Max: 36.8 (08 Dec 2022 08:01)  T(F): 98.3 (08 Dec 2022 23:18), Max: 98.3 (08 Dec 2022 23:18)  HR: 89 (08 Dec 2022 23:18) (71 - 89)  BP: 128/65 (08 Dec 2022 23:18) (114/80 - 128/65)  BP(mean): --  RR: 18 (08 Dec 2022 23:18) (18 - 18)  SpO2: 97% (08 Dec 2022 23:18) (97% - 99%)    Parameters below as of 08 Dec 2022 23:18  Patient On (Oxygen Delivery Method): room air        Alert, conscious, oriented  No pallor, jaundice or cyanosis  Not in pain or distress  Chest clear, equal air entry bilaterally  Abdomen soft and lax, no tenderness  Extremities: No swelling or tenderness. Right leg less erythematous and less swollen    I&O's Summary                            9.0    14.85 )-----------( 360      ( 08 Dec 2022 09:03 )             29.4     12-08    140  |  112<H>  |  26<H>  ----------------------------<  122<H>  4.1   |  25  |  1.12    Ca    9.3      08 Dec 2022 09:03        MEDICATIONS  (STANDING):  amLODIPine   Tablet 5 milliGRAM(s) Oral daily  aspirin  chewable 81 milliGRAM(s) Oral daily  dextrose 5%. 1000 milliLiter(s) (50 mL/Hr) IV Continuous <Continuous>  dextrose 5%. 1000 milliLiter(s) (100 mL/Hr) IV Continuous <Continuous>  dextrose 50% Injectable 25 Gram(s) IV Push once  dextrose 50% Injectable 12.5 Gram(s) IV Push once  dextrose 50% Injectable 25 Gram(s) IV Push once  doxycycline monohydrate Capsule 100 milliGRAM(s) Oral every 12 hours  enoxaparin Injectable 40 milliGRAM(s) SubCutaneous every 24 hours  famotidine    Tablet 40 milliGRAM(s) Oral at bedtime  furosemide    Tablet 40 milliGRAM(s) Oral daily  gabapentin 300 milliGRAM(s) Oral two times a day  gabapentin 600 milliGRAM(s) Oral at bedtime  glucagon  Injectable 1 milliGRAM(s) IntraMuscular once  insulin glargine Injectable (LANTUS) 30 Unit(s) SubCutaneous at bedtime  insulin lispro (ADMELOG) corrective regimen sliding scale   SubCutaneous three times a day before meals  isosorbide   mononitrate ER Tablet (IMDUR) 30 milliGRAM(s) Oral daily  lisinopril 5 milliGRAM(s) Oral daily  metoclopramide 10 milliGRAM(s) Oral daily  multivitamin 1 Tablet(s) Oral daily  pantoprazole    Tablet 40 milliGRAM(s) Oral before breakfast  polyethylene glycol 3350 17 Gram(s) Oral daily  senna 2 Tablet(s) Oral at bedtime  simvastatin 10 milliGRAM(s) Oral at bedtime  sucralfate suspension 1 Gram(s) Oral four times a day  tamsulosin 0.8 milliGRAM(s) Oral at bedtime    MEDICATIONS  (PRN):  acetaminophen     Tablet .. 650 milliGRAM(s) Oral every 6 hours PRN Temp greater or equal to 38C (100.4F), Mild Pain (1 - 3)  aluminum hydroxide/magnesium hydroxide/simethicone Suspension 30 milliLiter(s) Oral every 4 hours PRN Dyspepsia  dextrose Oral Gel 15 Gram(s) Oral once PRN Blood Glucose LESS THAN 70 milliGRAM(s)/deciliter  HYDROmorphone  Injectable 1 milliGRAM(s) IV Push every 4 hours PRN Moderate Pain (4 - 6)  HYDROmorphone  Injectable 1.5 milliGRAM(s) IV Push every 4 hours PRN Severe Pain (7 - 10)  magnesium hydroxide Suspension 30 milliLiter(s) Oral every 8 hours PRN Constipation  melatonin 3 milliGRAM(s) Oral at bedtime PRN Insomnia  ondansetron Injectable 4 milliGRAM(s) IV Push every 8 hours PRN Nausea and/or Vomiting

## 2022-12-09 NOTE — PROGRESS NOTE ADULT - SUBJECTIVE AND OBJECTIVE BOX
Chief Complaint: RLE swelling and redness, epigastric discomfort    Interval Hx: Patient seen and examined earlier today. No new complaints. No significant changes. Continues to have RLE swelling and redness, slightly improved compared to presentation. Continues to have RLE pain complaints, only partially relieved with hydromorphone 1mg IV q4h prn. He remains on empiric antibiotics. MRI negative for osteomyelitis. Workup ongoing to further evaluate the extent of his peripheral vascular disease in that extremity. He is planned for LE angiogram tomorrow. As for his epigastric discomfort, it is mild-to-moderate, being treated with Carafate and PPI. Anticipated that he will eventually need an upper endoscopy to evaluate that epigastric discomfort and also as routine appropriate testing in the setting of his cirrhosis.   12/06/22: RLE angio today. No new issues.  12/07/22: Patient seen and examined. Complaining of right LE pain. Discussed with patient regarding management plan.   12/8: Sitting in chair, comfortable, offers no complaints. Denies fever, chills, n, v.  12/09/22: Patient seen and examined. Nuclear stress test today      ROS: Multi system review is comprehensively negative x 10 systems except as above    Vitals:      Vital Signs Last 24 Hrs  T(C): 36.7 (09 Dec 2022 08:23), Max: 36.8 (08 Dec 2022 23:18)  T(F): 98 (09 Dec 2022 08:23), Max: 98.3 (08 Dec 2022 23:18)  HR: 79 (09 Dec 2022 08:23) (79 - 89)  BP: 144/75 (09 Dec 2022 08:23) (114/80 - 144/75)  BP(mean): --  RR: 18 (09 Dec 2022 08:23) (18 - 18)  SpO2: 100% (09 Dec 2022 08:23) (97% - 100%)    Parameters below as of 09 Dec 2022 08:23  Patient On (Oxygen Delivery Method): room air              Exam:  Gen: No acute distress  HEENT: NCAT PERRL EOMI MMM  Neck: Supple no JVD no LAD  CVS: S1 S2 normal RRR  Chest: Normal resp effort, lungs CTA B/L  Abd: +BS, soft NT ND   Ext: L BKA. RLE with mild erythema from upper shin down to foot, small superficial wounds on shin, large open wound on plantar surface of foot at the foot pad.   Skin: As described in extremity exam. Some additional flaking skin on R foot  Neuro: A+OX3, no focal deficits, sensation intake to RLE  Mood: Calm, pleasant        Labs:                                                                      8.2    18.64 )-----------( 353      ( 09 Dec 2022 11:41 )             25.8     09 Dec 2022 11:41    141    |  113    |  24     ----------------------------<  117    3.7     |  23     |  1.00     Ca    9.2        09 Dec 2022 11:41          CAPILLARY BLOOD GLUCOSE      POCT Blood Glucose.: 115 mg/dL (09 Dec 2022 12:23)  POCT Blood Glucose.: 144 mg/dL (09 Dec 2022 05:48)  POCT Blood Glucose.: 167 mg/dL (08 Dec 2022 21:29)  POCT Blood Glucose.: 150 mg/dL (08 Dec 2022 16:42)            Micro:  Bld cx x 2 12/1: Negative to date  COVID, Flu, RSV PCR 12/1: Negative    Imaging:  MRI RLE, R foot 12/2: There is no evidence for osteomyelitis.    RLE arterial duplex 12/1: No appreciable flow detected in the posterior tibial and peroneal arteries.    R foot XR 12/1: Soft tissue emphysema at the plantar aspect of the mid foot/calcaneous.     Cardiac Testing:  TTE 12/2:  Endocardium is not well visualized, however, overall left ventricular systolic function appears normal. Mild concentric left ventricular hypertrophy. Septal flattening is seen; this finding is consistent with right heart pressure / volume overload. Estimated left ventricular ejection fraction is 55-60%. The right ventricle exhibits mild dilation, mild diffuse hypokinesis, and mild depression of contractility. Severe mitral stenosis. Mild mitral regurgitation. Mild to moderate tricuspid valve regurgitation. Severe pulmonary hypertension.    Meds:  MEDICATIONS  (STANDING):  amLODIPine   Tablet 5 milliGRAM(s) Oral daily  aspirin  chewable 81 milliGRAM(s) Oral daily  dextrose 5%. 1000 milliLiter(s) (100 mL/Hr) IV Continuous <Continuous>  dextrose 5%. 1000 milliLiter(s) (50 mL/Hr) IV Continuous <Continuous>  dextrose 50% Injectable 25 Gram(s) IV Push once  dextrose 50% Injectable 12.5 Gram(s) IV Push once  dextrose 50% Injectable 25 Gram(s) IV Push once  doxycycline monohydrate Capsule 100 milliGRAM(s) Oral every 12 hours  enoxaparin Injectable 40 milliGRAM(s) SubCutaneous every 24 hours  famotidine    Tablet 40 milliGRAM(s) Oral at bedtime  furosemide    Tablet 40 milliGRAM(s) Oral daily  gabapentin 300 milliGRAM(s) Oral two times a day  gabapentin 600 milliGRAM(s) Oral at bedtime  glucagon  Injectable 1 milliGRAM(s) IntraMuscular once  insulin glargine Injectable (LANTUS) 30 Unit(s) SubCutaneous at bedtime  insulin lispro (ADMELOG) corrective regimen sliding scale   SubCutaneous three times a day before meals  isosorbide   mononitrate ER Tablet (IMDUR) 30 milliGRAM(s) Oral daily  lisinopril 5 milliGRAM(s) Oral daily  metoclopramide 10 milliGRAM(s) Oral daily  multivitamin 1 Tablet(s) Oral daily  pantoprazole    Tablet 40 milliGRAM(s) Oral before breakfast  piperacillin/tazobactam IVPB.. 3.375 Gram(s) IV Intermittent every 8 hours  polyethylene glycol 3350 17 Gram(s) Oral daily  senna 2 Tablet(s) Oral at bedtime  simvastatin 10 milliGRAM(s) Oral at bedtime  sucralfate suspension 1 Gram(s) Oral four times a day  tamsulosin 0.8 milliGRAM(s) Oral at bedtime    MEDICATIONS  (PRN):  acetaminophen     Tablet .. 650 milliGRAM(s) Oral every 6 hours PRN Temp greater or equal to 38C (100.4F), Mild Pain (1 - 3)  aluminum hydroxide/magnesium hydroxide/simethicone Suspension 30 milliLiter(s) Oral every 4 hours PRN Dyspepsia  dextrose Oral Gel 15 Gram(s) Oral once PRN Blood Glucose LESS THAN 70 milliGRAM(s)/deciliter  HYDROmorphone  Injectable 1 milliGRAM(s) IV Push every 4 hours PRN Moderate Pain (4 - 6)  HYDROmorphone  Injectable 1.5 milliGRAM(s) IV Push every 4 hours PRN Severe Pain (7 - 10)  magnesium hydroxide Suspension 30 milliLiter(s) Oral every 8 hours PRN Constipation  melatonin 3 milliGRAM(s) Oral at bedtime PRN Insomnia  ondansetron Injectable 4 milliGRAM(s) IV Push every 8 hours PRN Nausea and/or Vomiting          Assessment and Plan:  63 yo man with DM, HTN, CAD, 3v CABG, hx of PAD, L BKA, GERD, past alcohol use disorder, cirrhosis, chronic anemia, sent from [facility] for worsening RLE swelling and redness, also complaints of epigastric discomfort. In the ED, exam was consistent with cellulitis of the RLE, non healing wound on the plantar surface of the R foot. Elevated inflammatory markers. Patient was placed on antibiotics and admitted to Medicine.     RLE skin and soft tissue infection in setting of non healing R foot ulcer due to/as a consequence of  type 2 DM and PAD:  -MRI RLE negative for osteomyelitis.   -S/P RLE angio, per vascular --> would benefit from distal femoral bypass surgery of extremity once medically cleared   -Continue on empiric antibiotics per ID with zosyn, and doxycycline   -Continue aspirin, statin      CAD /  Hx of CABG / demand ischemia /  severe mitral stenosis / severe pulmonary HTN / HTN:  -echo: EF: 55-60%, severe Mitral stenosis, severe pulmonary HTN  -chest xray 12/7: vascular congestion  -stop IVFs  -c/w lasix  -monitor I/O  -Continue aspirin, statin, Imdur  -Continue amlodipine, lisinopril and Imdur  -cardiac work up ongoing by cardiology: nuclear stress test today, no ischemia  +/- right and left heart cath/ALEX  -Follow Dr. Wilson recommendation.      Epigastric discomfort / dyspepsia:  -Per GI --> Carafate,  PPI.   -Eventual EGD, treat empirically for now         DM2:  -A1c 7.  - Continue Lantus and Lispro  - Monitor glucose TID AC and HS    BPH: Stable  - Continue Flomax      Code Status: Full  DVT px: LMWH  Dispo: To be determined pending further clinical assessment

## 2022-12-10 LAB
HCT VFR BLD CALC: 29.3 % — LOW (ref 39–50)
HGB BLD-MCNC: 9.3 G/DL — LOW (ref 13–17)
MCHC RBC-ENTMCNC: 31.7 GM/DL — LOW (ref 32–36)
MCHC RBC-ENTMCNC: 31.7 PG — SIGNIFICANT CHANGE UP (ref 27–34)
MCV RBC AUTO: 100 FL — SIGNIFICANT CHANGE UP (ref 80–100)
PLATELET # BLD AUTO: 384 K/UL — SIGNIFICANT CHANGE UP (ref 150–400)
RBC # BLD: 2.93 M/UL — LOW (ref 4.2–5.8)
RBC # FLD: 13.3 % — SIGNIFICANT CHANGE UP (ref 10.3–14.5)
WBC # BLD: 15.81 K/UL — HIGH (ref 3.8–10.5)
WBC # FLD AUTO: 15.81 K/UL — HIGH (ref 3.8–10.5)

## 2022-12-10 PROCEDURE — 99232 SBSQ HOSP IP/OBS MODERATE 35: CPT

## 2022-12-10 RX ADMIN — GABAPENTIN 300 MILLIGRAM(S): 400 CAPSULE ORAL at 13:01

## 2022-12-10 RX ADMIN — HYDROMORPHONE HYDROCHLORIDE 1 MILLIGRAM(S): 2 INJECTION INTRAMUSCULAR; INTRAVENOUS; SUBCUTANEOUS at 01:04

## 2022-12-10 RX ADMIN — HYDROMORPHONE HYDROCHLORIDE 1.5 MILLIGRAM(S): 2 INJECTION INTRAMUSCULAR; INTRAVENOUS; SUBCUTANEOUS at 21:28

## 2022-12-10 RX ADMIN — SIMVASTATIN 10 MILLIGRAM(S): 20 TABLET, FILM COATED ORAL at 21:27

## 2022-12-10 RX ADMIN — Medication 1 GRAM(S): at 21:29

## 2022-12-10 RX ADMIN — HYDROMORPHONE HYDROCHLORIDE 1 MILLIGRAM(S): 2 INJECTION INTRAMUSCULAR; INTRAVENOUS; SUBCUTANEOUS at 08:54

## 2022-12-10 RX ADMIN — FAMOTIDINE 40 MILLIGRAM(S): 10 INJECTION INTRAVENOUS at 21:27

## 2022-12-10 RX ADMIN — HYDROMORPHONE HYDROCHLORIDE 1 MILLIGRAM(S): 2 INJECTION INTRAMUSCULAR; INTRAVENOUS; SUBCUTANEOUS at 08:39

## 2022-12-10 RX ADMIN — AMLODIPINE BESYLATE 5 MILLIGRAM(S): 2.5 TABLET ORAL at 08:43

## 2022-12-10 RX ADMIN — HYDROMORPHONE HYDROCHLORIDE 1 MILLIGRAM(S): 2 INJECTION INTRAMUSCULAR; INTRAVENOUS; SUBCUTANEOUS at 12:56

## 2022-12-10 RX ADMIN — PIPERACILLIN AND TAZOBACTAM 25 GRAM(S): 4; .5 INJECTION, POWDER, LYOPHILIZED, FOR SOLUTION INTRAVENOUS at 13:01

## 2022-12-10 RX ADMIN — PIPERACILLIN AND TAZOBACTAM 25 GRAM(S): 4; .5 INJECTION, POWDER, LYOPHILIZED, FOR SOLUTION INTRAVENOUS at 21:26

## 2022-12-10 RX ADMIN — INSULIN GLARGINE 30 UNIT(S): 100 INJECTION, SOLUTION SUBCUTANEOUS at 21:27

## 2022-12-10 RX ADMIN — Medication 1 GRAM(S): at 17:01

## 2022-12-10 RX ADMIN — HYDROMORPHONE HYDROCHLORIDE 1 MILLIGRAM(S): 2 INJECTION INTRAMUSCULAR; INTRAVENOUS; SUBCUTANEOUS at 05:37

## 2022-12-10 RX ADMIN — Medication 10 MILLIGRAM(S): at 08:46

## 2022-12-10 RX ADMIN — GABAPENTIN 300 MILLIGRAM(S): 400 CAPSULE ORAL at 04:44

## 2022-12-10 RX ADMIN — SENNA PLUS 2 TABLET(S): 8.6 TABLET ORAL at 21:27

## 2022-12-10 RX ADMIN — Medication 3 MILLIGRAM(S): at 21:27

## 2022-12-10 RX ADMIN — ENOXAPARIN SODIUM 40 MILLIGRAM(S): 100 INJECTION SUBCUTANEOUS at 11:12

## 2022-12-10 RX ADMIN — LISINOPRIL 5 MILLIGRAM(S): 2.5 TABLET ORAL at 08:44

## 2022-12-10 RX ADMIN — HYDROMORPHONE HYDROCHLORIDE 1 MILLIGRAM(S): 2 INJECTION INTRAMUSCULAR; INTRAVENOUS; SUBCUTANEOUS at 17:17

## 2022-12-10 RX ADMIN — Medication 81 MILLIGRAM(S): at 08:43

## 2022-12-10 RX ADMIN — Medication 1 TABLET(S): at 08:43

## 2022-12-10 RX ADMIN — HYDROMORPHONE HYDROCHLORIDE 1 MILLIGRAM(S): 2 INJECTION INTRAMUSCULAR; INTRAVENOUS; SUBCUTANEOUS at 17:02

## 2022-12-10 RX ADMIN — HYDROMORPHONE HYDROCHLORIDE 1 MILLIGRAM(S): 2 INJECTION INTRAMUSCULAR; INTRAVENOUS; SUBCUTANEOUS at 04:44

## 2022-12-10 RX ADMIN — TAMSULOSIN HYDROCHLORIDE 0.8 MILLIGRAM(S): 0.4 CAPSULE ORAL at 21:27

## 2022-12-10 RX ADMIN — HYDROMORPHONE HYDROCHLORIDE 1 MILLIGRAM(S): 2 INJECTION INTRAMUSCULAR; INTRAVENOUS; SUBCUTANEOUS at 13:11

## 2022-12-10 RX ADMIN — HYDROMORPHONE HYDROCHLORIDE 1.5 MILLIGRAM(S): 2 INJECTION INTRAMUSCULAR; INTRAVENOUS; SUBCUTANEOUS at 22:00

## 2022-12-10 RX ADMIN — PANTOPRAZOLE SODIUM 40 MILLIGRAM(S): 20 TABLET, DELAYED RELEASE ORAL at 04:45

## 2022-12-10 RX ADMIN — Medication 1 GRAM(S): at 11:12

## 2022-12-10 RX ADMIN — Medication 100 MILLIGRAM(S): at 08:44

## 2022-12-10 RX ADMIN — HYDROMORPHONE HYDROCHLORIDE 1 MILLIGRAM(S): 2 INJECTION INTRAMUSCULAR; INTRAVENOUS; SUBCUTANEOUS at 00:08

## 2022-12-10 RX ADMIN — Medication 100 MILLIGRAM(S): at 21:27

## 2022-12-10 RX ADMIN — GABAPENTIN 600 MILLIGRAM(S): 400 CAPSULE ORAL at 21:28

## 2022-12-10 RX ADMIN — ISOSORBIDE MONONITRATE 30 MILLIGRAM(S): 60 TABLET, EXTENDED RELEASE ORAL at 08:44

## 2022-12-10 RX ADMIN — Medication 1 GRAM(S): at 04:44

## 2022-12-10 RX ADMIN — Medication 40 MILLIGRAM(S): at 08:43

## 2022-12-10 RX ADMIN — PIPERACILLIN AND TAZOBACTAM 25 GRAM(S): 4; .5 INJECTION, POWDER, LYOPHILIZED, FOR SOLUTION INTRAVENOUS at 04:44

## 2022-12-10 NOTE — PROGRESS NOTE ADULT - SUBJECTIVE AND OBJECTIVE BOX
Patient was seen and examined at the bedside this morning  No acute events overnight    Vital Signs Last 24 Hrs  T(C): 36.8 (10 Dec 2022 08:36), Max: 37.2 (09 Dec 2022 16:06)  T(F): 98.3 (10 Dec 2022 08:36), Max: 98.9 (09 Dec 2022 16:06)  HR: 81 (10 Dec 2022 08:36) (81 - 94)  BP: 154/72 (10 Dec 2022 08:36) (150/63 - 154/72)  BP(mean): --  RR: 18 (10 Dec 2022 08:36) (18 - 18)  SpO2: 100% (10 Dec 2022 08:36) (95% - 100%)    Parameters below as of 10 Dec 2022 08:36  Patient On (Oxygen Delivery Method): room air                            8.2    18.64 )-----------( 353      ( 09 Dec 2022 11:41 )             25.8     12-09    141  |  113<H>  |  24<H>  ----------------------------<  117<H>  3.7   |  23  |  1.00    Ca    9.2      09 Dec 2022 11:41      I&O's Summary        Alert, conscious, oriented  No pallor, jaundice or cyanosis  Not in pain or distress  Chest clear, equal air entry bilaterally  Abdomen soft and lax, no tenderness  Extremities: No swelling or tenderness. Right leg less erythematous and less swollen

## 2022-12-10 NOTE — PROGRESS NOTE ADULT - ASSESSMENT
A 62 year old male with history of PVD, IHD, MV repair. admitted with right leg cellulitis and was found to have significant stenosis of his RLE vasculature    Plan:  s/p RLE angio. Pt will need femoral distal bypass   Pt needs medical and cardio clearance for OR procedure of femoral distal bypass, TBD pending clearances.   Podiatry for local wound care  Medical management as per primary team    Plan discussed with Dr Walters

## 2022-12-10 NOTE — PROGRESS NOTE ADULT - SUBJECTIVE AND OBJECTIVE BOX
Chief Complaint: RLE swelling and redness, epigastric discomfort    Interval Hx: Patient seen and examined earlier today. No new complaints. No significant changes. Continues to have RLE swelling and redness, slightly improved compared to presentation. Continues to have RLE pain complaints, only partially relieved with hydromorphone 1mg IV q4h prn. He remains on empiric antibiotics. MRI negative for osteomyelitis. Workup ongoing to further evaluate the extent of his peripheral vascular disease in that extremity. He is planned for LE angiogram tomorrow. As for his epigastric discomfort, it is mild-to-moderate, being treated with Carafate and PPI. Anticipated that he will eventually need an upper endoscopy to evaluate that epigastric discomfort and also as routine appropriate testing in the setting of his cirrhosis.   12/06/22: RLE angio today. No new issues.  12/07/22: Patient seen and examined. Complaining of right LE pain. Discussed with patient regarding management plan.   12/8: Sitting in chair, comfortable, offers no complaints. Denies fever, chills, n, v.  12/09/22: Patient seen and examined. Nuclear stress test today  12/10/22: No new issues.       ROS: Multi system review is comprehensively negative x 10 systems except as above    Vitals:        Vital Signs Last 24 Hrs  T(C): 36.8 (10 Dec 2022 08:36), Max: 37.2 (09 Dec 2022 16:06)  T(F): 98.3 (10 Dec 2022 08:36), Max: 98.9 (09 Dec 2022 16:06)  HR: 81 (10 Dec 2022 08:36) (81 - 94)  BP: 154/72 (10 Dec 2022 08:36) (150/63 - 154/72)  BP(mean): --  RR: 18 (10 Dec 2022 08:36) (18 - 18)  SpO2: 100% (10 Dec 2022 08:36) (95% - 100%)    Parameters below as of 10 Dec 2022 08:36  Patient On (Oxygen Delivery Method): room air            Exam:  Gen: No acute distress  HEENT: NCAT PERRL EOMI MMM  Neck: Supple no JVD no LAD  CVS: S1 S2 normal RRR  Chest: Normal resp effort, lungs CTA B/L  Abd: +BS, soft NT ND   Ext: L BKA. RLE with mild erythema from upper shin down to foot, small superficial wounds on shin, large open wound on plantar surface of foot at the foot pad.   Skin: As described in extremity exam. Some additional flaking skin on R foot  Neuro: A+OX3, no focal deficits, sensation intake to RLE  Mood: Calm, pleasant        Labs:                                                            9.3    15.81 )-----------( 384      ( 10 Dec 2022 08:58 )             29.3     09 Dec 2022 11:41    141    |  113    |  24     ----------------------------<  117    3.7     |  23     |  1.00     Ca    9.2        09 Dec 2022 11:41          CAPILLARY BLOOD GLUCOSE      POCT Blood Glucose.: 90 mg/dL (10 Dec 2022 11:09)  POCT Blood Glucose.: 105 mg/dL (10 Dec 2022 07:41)  POCT Blood Glucose.: 141 mg/dL (09 Dec 2022 19:53)  POCT Blood Glucose.: 144 mg/dL (09 Dec 2022 17:14)  POCT Blood Glucose.: 144 mg/dL (09 Dec 2022 17:06)  POCT Blood Glucose.: 115 mg/dL (09 Dec 2022 12:23)            Micro:  Bld cx x 2 12/1: Negative to date  COVID, Flu, RSV PCR 12/1: Negative    Imaging:  MRI RLE, R foot 12/2: There is no evidence for osteomyelitis.    RLE arterial duplex 12/1: No appreciable flow detected in the posterior tibial and peroneal arteries.    R foot XR 12/1: Soft tissue emphysema at the plantar aspect of the mid foot/calcaneous.     Cardiac Testing:  TTE 12/2:  Endocardium is not well visualized, however, overall left ventricular systolic function appears normal. Mild concentric left ventricular hypertrophy. Septal flattening is seen; this finding is consistent with right heart pressure / volume overload. Estimated left ventricular ejection fraction is 55-60%. The right ventricle exhibits mild dilation, mild diffuse hypokinesis, and mild depression of contractility. Severe mitral stenosis. Mild mitral regurgitation. Mild to moderate tricuspid valve regurgitation. Severe pulmonary hypertension.    Meds:  MEDICATIONS  (STANDING):  amLODIPine   Tablet 5 milliGRAM(s) Oral daily  aspirin  chewable 81 milliGRAM(s) Oral daily  dextrose 5%. 1000 milliLiter(s) (100 mL/Hr) IV Continuous <Continuous>  dextrose 5%. 1000 milliLiter(s) (50 mL/Hr) IV Continuous <Continuous>  dextrose 50% Injectable 25 Gram(s) IV Push once  dextrose 50% Injectable 12.5 Gram(s) IV Push once  dextrose 50% Injectable 25 Gram(s) IV Push once  doxycycline monohydrate Capsule 100 milliGRAM(s) Oral every 12 hours  enoxaparin Injectable 40 milliGRAM(s) SubCutaneous every 24 hours  famotidine    Tablet 40 milliGRAM(s) Oral at bedtime  furosemide    Tablet 40 milliGRAM(s) Oral daily  gabapentin 300 milliGRAM(s) Oral two times a day  gabapentin 600 milliGRAM(s) Oral at bedtime  glucagon  Injectable 1 milliGRAM(s) IntraMuscular once  insulin glargine Injectable (LANTUS) 30 Unit(s) SubCutaneous at bedtime  insulin lispro (ADMELOG) corrective regimen sliding scale   SubCutaneous three times a day before meals  isosorbide   mononitrate ER Tablet (IMDUR) 30 milliGRAM(s) Oral daily  lisinopril 5 milliGRAM(s) Oral daily  metoclopramide 10 milliGRAM(s) Oral daily  multivitamin 1 Tablet(s) Oral daily  pantoprazole    Tablet 40 milliGRAM(s) Oral before breakfast  piperacillin/tazobactam IVPB.. 3.375 Gram(s) IV Intermittent every 8 hours  polyethylene glycol 3350 17 Gram(s) Oral daily  senna 2 Tablet(s) Oral at bedtime  simvastatin 10 milliGRAM(s) Oral at bedtime  sucralfate suspension 1 Gram(s) Oral four times a day  tamsulosin 0.8 milliGRAM(s) Oral at bedtime    MEDICATIONS  (PRN):  acetaminophen     Tablet .. 650 milliGRAM(s) Oral every 6 hours PRN Temp greater or equal to 38C (100.4F), Mild Pain (1 - 3)  aluminum hydroxide/magnesium hydroxide/simethicone Suspension 30 milliLiter(s) Oral every 4 hours PRN Dyspepsia  dextrose Oral Gel 15 Gram(s) Oral once PRN Blood Glucose LESS THAN 70 milliGRAM(s)/deciliter  HYDROmorphone  Injectable 1 milliGRAM(s) IV Push every 4 hours PRN Moderate Pain (4 - 6)  HYDROmorphone  Injectable 1.5 milliGRAM(s) IV Push every 4 hours PRN Severe Pain (7 - 10)  magnesium hydroxide Suspension 30 milliLiter(s) Oral every 8 hours PRN Constipation  melatonin 3 milliGRAM(s) Oral at bedtime PRN Insomnia  ondansetron Injectable 4 milliGRAM(s) IV Push every 8 hours PRN Nausea and/or Vomiting          Assessment and Plan:  63 yo man with DM, HTN, CAD, 3v CABG, hx of PAD, L BKA, GERD, past alcohol use disorder, cirrhosis, chronic anemia, sent from [place] for worsening RLE swelling and redness, also complaints of epigastric discomfort. In the ED, exam was consistent with cellulitis of the RLE, non healing wound on the plantar surface of the R foot. Elevated inflammatory markers. Patient was placed on antibiotics and admitted to Medicine.     RLE skin and soft tissue infection in setting of non healing R foot ulcer due to/as a consequence of  type 2 DM and PAD:  -MRI RLE negative for osteomyelitis.   -S/P RLE angio, per vascular --> would benefit from distal femoral bypass surgery of extremity once medically cleared   -Continue on empiric antibiotics per ID with zosyn, and doxycycline   -Continue aspirin, statin      CAD /  Hx of CABG / demand ischemia /  severe mitral stenosis / severe pulmonary HTN / HTN:  -echo: EF: 55-60%, severe Mitral stenosis, severe pulmonary HTN  -chest xray 12/7: vascular congestion  -stop IVFs  -c/w lasix  -monitor I/O  -Continue aspirin, statin, Imdur  -Continue amlodipine, lisinopril and Imdur  -cardiac work up ongoing by cardiology: nuclear stress test today, no ischemia  +/- right and left heart cath/ALEX-will discuss with cardiology regarding clearance  for bypass  -Follow Dr. Wilson recommendation.      Epigastric discomfort / dyspepsia:  -Per GI --> Carafate,  PPI.   -Eventual EGD, treat empirically for now         DM2:  -A1c 7.  - Continue Lantus and Lispro  - Monitor glucose TID AC and HS    BPH: Stable  - Continue Flomax      Code Status: Full  DVT px: LMWH  Dispo: To be determined pending further clinical assessment

## 2022-12-11 PROCEDURE — 99232 SBSQ HOSP IP/OBS MODERATE 35: CPT

## 2022-12-11 RX ADMIN — Medication 100 MILLIGRAM(S): at 09:19

## 2022-12-11 RX ADMIN — Medication 1 TABLET(S): at 09:23

## 2022-12-11 RX ADMIN — PANTOPRAZOLE SODIUM 40 MILLIGRAM(S): 20 TABLET, DELAYED RELEASE ORAL at 06:20

## 2022-12-11 RX ADMIN — INSULIN GLARGINE 30 UNIT(S): 100 INJECTION, SOLUTION SUBCUTANEOUS at 20:52

## 2022-12-11 RX ADMIN — AMLODIPINE BESYLATE 5 MILLIGRAM(S): 2.5 TABLET ORAL at 09:20

## 2022-12-11 RX ADMIN — Medication 3 MILLIGRAM(S): at 22:19

## 2022-12-11 RX ADMIN — Medication 1 GRAM(S): at 18:21

## 2022-12-11 RX ADMIN — GABAPENTIN 300 MILLIGRAM(S): 400 CAPSULE ORAL at 06:19

## 2022-12-11 RX ADMIN — TAMSULOSIN HYDROCHLORIDE 0.8 MILLIGRAM(S): 0.4 CAPSULE ORAL at 22:19

## 2022-12-11 RX ADMIN — SIMVASTATIN 10 MILLIGRAM(S): 20 TABLET, FILM COATED ORAL at 22:19

## 2022-12-11 RX ADMIN — FAMOTIDINE 40 MILLIGRAM(S): 10 INJECTION INTRAVENOUS at 22:19

## 2022-12-11 RX ADMIN — LISINOPRIL 5 MILLIGRAM(S): 2.5 TABLET ORAL at 09:20

## 2022-12-11 RX ADMIN — HYDROMORPHONE HYDROCHLORIDE 1 MILLIGRAM(S): 2 INJECTION INTRAMUSCULAR; INTRAVENOUS; SUBCUTANEOUS at 02:10

## 2022-12-11 RX ADMIN — Medication 81 MILLIGRAM(S): at 09:19

## 2022-12-11 RX ADMIN — ENOXAPARIN SODIUM 40 MILLIGRAM(S): 100 INJECTION SUBCUTANEOUS at 10:28

## 2022-12-11 RX ADMIN — HYDROMORPHONE HYDROCHLORIDE 1.5 MILLIGRAM(S): 2 INJECTION INTRAMUSCULAR; INTRAVENOUS; SUBCUTANEOUS at 10:28

## 2022-12-11 RX ADMIN — HYDROMORPHONE HYDROCHLORIDE 1 MILLIGRAM(S): 2 INJECTION INTRAMUSCULAR; INTRAVENOUS; SUBCUTANEOUS at 02:30

## 2022-12-11 RX ADMIN — Medication 1 GRAM(S): at 06:19

## 2022-12-11 RX ADMIN — ISOSORBIDE MONONITRATE 30 MILLIGRAM(S): 60 TABLET, EXTENDED RELEASE ORAL at 09:19

## 2022-12-11 RX ADMIN — HYDROMORPHONE HYDROCHLORIDE 1.5 MILLIGRAM(S): 2 INJECTION INTRAMUSCULAR; INTRAVENOUS; SUBCUTANEOUS at 18:19

## 2022-12-11 RX ADMIN — Medication 10 MILLIGRAM(S): at 09:25

## 2022-12-11 RX ADMIN — Medication 1 GRAM(S): at 12:09

## 2022-12-11 RX ADMIN — Medication 2: at 16:55

## 2022-12-11 RX ADMIN — HYDROMORPHONE HYDROCHLORIDE 1.5 MILLIGRAM(S): 2 INJECTION INTRAMUSCULAR; INTRAVENOUS; SUBCUTANEOUS at 14:24

## 2022-12-11 RX ADMIN — Medication 2: at 12:09

## 2022-12-11 RX ADMIN — GABAPENTIN 300 MILLIGRAM(S): 400 CAPSULE ORAL at 14:26

## 2022-12-11 RX ADMIN — PIPERACILLIN AND TAZOBACTAM 25 GRAM(S): 4; .5 INJECTION, POWDER, LYOPHILIZED, FOR SOLUTION INTRAVENOUS at 14:26

## 2022-12-11 RX ADMIN — SENNA PLUS 2 TABLET(S): 8.6 TABLET ORAL at 22:18

## 2022-12-11 RX ADMIN — Medication 1 GRAM(S): at 22:19

## 2022-12-11 RX ADMIN — GABAPENTIN 600 MILLIGRAM(S): 400 CAPSULE ORAL at 22:19

## 2022-12-11 RX ADMIN — POLYETHYLENE GLYCOL 3350 17 GRAM(S): 17 POWDER, FOR SOLUTION ORAL at 09:26

## 2022-12-11 RX ADMIN — Medication 40 MILLIGRAM(S): at 09:21

## 2022-12-11 RX ADMIN — PIPERACILLIN AND TAZOBACTAM 25 GRAM(S): 4; .5 INJECTION, POWDER, LYOPHILIZED, FOR SOLUTION INTRAVENOUS at 06:19

## 2022-12-11 RX ADMIN — Medication 100 MILLIGRAM(S): at 22:18

## 2022-12-11 NOTE — PROGRESS NOTE ADULT - SUBJECTIVE AND OBJECTIVE BOX
Patient was seen and examined at the bedside this morning  No acute events overnight      Vitals:  T(C): 36.4 (12-11 @ 09:58), Max: 36.7 (12-10 @ 15:35)  HR: 72 (12-11 @ 09:58) (72 - 81)  BP: 134/67 (12-11 @ 09:58) (134/57 - 141/62)  RR: 18 (12-11 @ 09:58) (17 - 18)  SpO2: 95% (12-11 @ 09:58) (94% - 95%)    12-10 @ 07:01  -  12-11 @ 07:00  --------------------------------------------------------  IN:    IV PiggyBack: 100 mL  Total IN: 100 mL    OUT:  Total OUT: 0 mL    Total NET: 100 mL          Physical Exam:  Alert, conscious, oriented  No pallor, jaundice or cyanosis  Not in pain or distress  Chest clear, equal air entry bilaterally  Abdomen soft and lax, no tenderness  Extremities: No swelling or tenderness. Right leg less erythematous and less swollen        12-10 @ 08:58                    9.3  CBC: 15.81>)-------(<384                     29.3                 - | - | -    CMP:  ----------------------< -               - | - | -                      Ca:-  Phos:-  Mg:-               -|      |-        LFTs:  ------|-|-----             -|      |-      Current Inpatient Medications:  acetaminophen     Tablet .. 650 milliGRAM(s) Oral every 6 hours PRN  aluminum hydroxide/magnesium hydroxide/simethicone Suspension 30 milliLiter(s) Oral every 4 hours PRN  amLODIPine   Tablet 5 milliGRAM(s) Oral daily  aspirin  chewable 81 milliGRAM(s) Oral daily  dextrose 5%. 1000 milliLiter(s) (100 mL/Hr) IV Continuous <Continuous>  dextrose 5%. 1000 milliLiter(s) (50 mL/Hr) IV Continuous <Continuous>  dextrose 50% Injectable 25 Gram(s) IV Push once  dextrose 50% Injectable 12.5 Gram(s) IV Push once  dextrose 50% Injectable 25 Gram(s) IV Push once  dextrose Oral Gel 15 Gram(s) Oral once PRN  doxycycline monohydrate Capsule 100 milliGRAM(s) Oral every 12 hours  enoxaparin Injectable 40 milliGRAM(s) SubCutaneous every 24 hours  famotidine    Tablet 40 milliGRAM(s) Oral at bedtime  furosemide    Tablet 40 milliGRAM(s) Oral daily  gabapentin 300 milliGRAM(s) Oral two times a day  gabapentin 600 milliGRAM(s) Oral at bedtime  glucagon  Injectable 1 milliGRAM(s) IntraMuscular once  HYDROmorphone  Injectable 1 milliGRAM(s) IV Push every 4 hours PRN  HYDROmorphone  Injectable 1.5 milliGRAM(s) IV Push every 4 hours PRN  insulin glargine Injectable (LANTUS) 30 Unit(s) SubCutaneous at bedtime  insulin lispro (ADMELOG) corrective regimen sliding scale   SubCutaneous three times a day before meals  isosorbide   mononitrate ER Tablet (IMDUR) 30 milliGRAM(s) Oral daily  lisinopril 5 milliGRAM(s) Oral daily  magnesium hydroxide Suspension 30 milliLiter(s) Oral every 8 hours PRN  melatonin 3 milliGRAM(s) Oral at bedtime PRN  metoclopramide 10 milliGRAM(s) Oral daily  multivitamin 1 Tablet(s) Oral daily  ondansetron Injectable 4 milliGRAM(s) IV Push every 8 hours PRN  pantoprazole    Tablet 40 milliGRAM(s) Oral before breakfast  piperacillin/tazobactam IVPB.. 3.375 Gram(s) IV Intermittent every 8 hours  polyethylene glycol 3350 17 Gram(s) Oral daily  senna 2 Tablet(s) Oral at bedtime  simvastatin 10 milliGRAM(s) Oral at bedtime  sucralfate suspension 1 Gram(s) Oral four times a day  tamsulosin 0.8 milliGRAM(s) Oral at bedtime

## 2022-12-11 NOTE — PROGRESS NOTE ADULT - ASSESSMENT
62 year old male with history of PVD, IHD, MV repair. admitted with right leg cellulitis and was found to have significant stenosis of his RLE vasculature    Plan:  s/p RLE angio. Pt will need femoral distal bypass   Pt needs medical and cardio clearance for femoral to distal bypass, date TBD, pending clearances.   Podiatry for local wound care  Medical management as per primary team    Plan discussed with Dr Jacobo

## 2022-12-12 LAB
ANION GAP SERPL CALC-SCNC: 6 MMOL/L — SIGNIFICANT CHANGE UP (ref 5–17)
BUN SERPL-MCNC: 19 MG/DL — SIGNIFICANT CHANGE UP (ref 7–23)
CALCIUM SERPL-MCNC: 9.3 MG/DL — SIGNIFICANT CHANGE UP (ref 8.5–10.1)
CHLORIDE SERPL-SCNC: 107 MMOL/L — SIGNIFICANT CHANGE UP (ref 96–108)
CO2 SERPL-SCNC: 28 MMOL/L — SIGNIFICANT CHANGE UP (ref 22–31)
CREAT SERPL-MCNC: 0.85 MG/DL — SIGNIFICANT CHANGE UP (ref 0.5–1.3)
EGFR: 99 ML/MIN/1.73M2 — SIGNIFICANT CHANGE UP
GLUCOSE SERPL-MCNC: 97 MG/DL — SIGNIFICANT CHANGE UP (ref 70–99)
HCT VFR BLD CALC: 28.5 % — LOW (ref 39–50)
HGB BLD-MCNC: 9 G/DL — LOW (ref 13–17)
MCHC RBC-ENTMCNC: 31.5 PG — SIGNIFICANT CHANGE UP (ref 27–34)
MCHC RBC-ENTMCNC: 31.6 GM/DL — LOW (ref 32–36)
MCV RBC AUTO: 99.7 FL — SIGNIFICANT CHANGE UP (ref 80–100)
PLATELET # BLD AUTO: 406 K/UL — HIGH (ref 150–400)
POTASSIUM SERPL-MCNC: 3.2 MMOL/L — LOW (ref 3.5–5.3)
POTASSIUM SERPL-SCNC: 3.2 MMOL/L — LOW (ref 3.5–5.3)
RBC # BLD: 2.86 M/UL — LOW (ref 4.2–5.8)
RBC # FLD: 13.2 % — SIGNIFICANT CHANGE UP (ref 10.3–14.5)
SODIUM SERPL-SCNC: 141 MMOL/L — SIGNIFICANT CHANGE UP (ref 135–145)
WBC # BLD: 18.77 K/UL — HIGH (ref 3.8–10.5)
WBC # FLD AUTO: 18.77 K/UL — HIGH (ref 3.8–10.5)

## 2022-12-12 PROCEDURE — 99232 SBSQ HOSP IP/OBS MODERATE 35: CPT

## 2022-12-12 RX ORDER — POTASSIUM CHLORIDE 20 MEQ
40 PACKET (EA) ORAL EVERY 4 HOURS
Refills: 0 | Status: COMPLETED | OUTPATIENT
Start: 2022-12-12 | End: 2022-12-12

## 2022-12-12 RX ADMIN — Medication 40 MILLIEQUIVALENT(S): at 18:02

## 2022-12-12 RX ADMIN — Medication 40 MILLIGRAM(S): at 11:24

## 2022-12-12 RX ADMIN — Medication 100 MILLIGRAM(S): at 11:22

## 2022-12-12 RX ADMIN — Medication 10 MILLIGRAM(S): at 11:27

## 2022-12-12 RX ADMIN — Medication 1 GRAM(S): at 12:47

## 2022-12-12 RX ADMIN — POLYETHYLENE GLYCOL 3350 17 GRAM(S): 17 POWDER, FOR SOLUTION ORAL at 11:19

## 2022-12-12 RX ADMIN — HYDROMORPHONE HYDROCHLORIDE 1.5 MILLIGRAM(S): 2 INJECTION INTRAMUSCULAR; INTRAVENOUS; SUBCUTANEOUS at 15:47

## 2022-12-12 RX ADMIN — FAMOTIDINE 40 MILLIGRAM(S): 10 INJECTION INTRAVENOUS at 21:48

## 2022-12-12 RX ADMIN — AMLODIPINE BESYLATE 5 MILLIGRAM(S): 2.5 TABLET ORAL at 11:20

## 2022-12-12 RX ADMIN — HYDROMORPHONE HYDROCHLORIDE 1.5 MILLIGRAM(S): 2 INJECTION INTRAMUSCULAR; INTRAVENOUS; SUBCUTANEOUS at 11:16

## 2022-12-12 RX ADMIN — Medication 1 GRAM(S): at 05:45

## 2022-12-12 RX ADMIN — ENOXAPARIN SODIUM 40 MILLIGRAM(S): 100 INJECTION SUBCUTANEOUS at 11:22

## 2022-12-12 RX ADMIN — Medication 1 TABLET(S): at 11:21

## 2022-12-12 RX ADMIN — HYDROMORPHONE HYDROCHLORIDE 1.5 MILLIGRAM(S): 2 INJECTION INTRAMUSCULAR; INTRAVENOUS; SUBCUTANEOUS at 19:57

## 2022-12-12 RX ADMIN — ISOSORBIDE MONONITRATE 30 MILLIGRAM(S): 60 TABLET, EXTENDED RELEASE ORAL at 11:24

## 2022-12-12 RX ADMIN — Medication 40 MILLIEQUIVALENT(S): at 21:49

## 2022-12-12 RX ADMIN — HYDROMORPHONE HYDROCHLORIDE 1.5 MILLIGRAM(S): 2 INJECTION INTRAMUSCULAR; INTRAVENOUS; SUBCUTANEOUS at 11:46

## 2022-12-12 RX ADMIN — GABAPENTIN 600 MILLIGRAM(S): 400 CAPSULE ORAL at 21:49

## 2022-12-12 RX ADMIN — GABAPENTIN 300 MILLIGRAM(S): 400 CAPSULE ORAL at 05:45

## 2022-12-12 RX ADMIN — LISINOPRIL 5 MILLIGRAM(S): 2.5 TABLET ORAL at 11:23

## 2022-12-12 RX ADMIN — Medication 81 MILLIGRAM(S): at 11:20

## 2022-12-12 RX ADMIN — HYDROMORPHONE HYDROCHLORIDE 1.5 MILLIGRAM(S): 2 INJECTION INTRAMUSCULAR; INTRAVENOUS; SUBCUTANEOUS at 19:20

## 2022-12-12 RX ADMIN — Medication 1 GRAM(S): at 18:02

## 2022-12-12 RX ADMIN — SIMVASTATIN 10 MILLIGRAM(S): 20 TABLET, FILM COATED ORAL at 21:51

## 2022-12-12 RX ADMIN — GABAPENTIN 300 MILLIGRAM(S): 400 CAPSULE ORAL at 14:58

## 2022-12-12 RX ADMIN — Medication 100 MILLIGRAM(S): at 21:50

## 2022-12-12 RX ADMIN — SENNA PLUS 2 TABLET(S): 8.6 TABLET ORAL at 21:49

## 2022-12-12 RX ADMIN — PANTOPRAZOLE SODIUM 40 MILLIGRAM(S): 20 TABLET, DELAYED RELEASE ORAL at 05:45

## 2022-12-12 RX ADMIN — PIPERACILLIN AND TAZOBACTAM 25 GRAM(S): 4; .5 INJECTION, POWDER, LYOPHILIZED, FOR SOLUTION INTRAVENOUS at 11:39

## 2022-12-12 RX ADMIN — TAMSULOSIN HYDROCHLORIDE 0.8 MILLIGRAM(S): 0.4 CAPSULE ORAL at 21:49

## 2022-12-12 RX ADMIN — HYDROMORPHONE HYDROCHLORIDE 1.5 MILLIGRAM(S): 2 INJECTION INTRAMUSCULAR; INTRAVENOUS; SUBCUTANEOUS at 15:17

## 2022-12-12 NOTE — PROGRESS NOTE ADULT - ASSESSMENT
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 Jez Mauricio  Discussed diagnosis and treatment with patient  X-rays reviewed : no soft tissue emphysema or osseous fx   WC: betadine paint and allyvan pad  Continue with IV antibiotics As Per ID/MED  All additional care by Med appreciated  ID consult and recommendations appreciated.  Vascular consult appreciated: Angiogram perfomred  Pt notes blister forming at plantar R foot, now stable eschar at the plantar 4th/5th metatarsal head, no pus, no malodor, no pain. Wound related to poor circulation of R foot. Recommend for area to fully demarcate at this time.  Erythema to RLE 2/2 PVD.  MRI shows no evidence of osteomyelitis    No acute podiatric intervention at this time as dry eschar wound is stable. Plan for outpatient debridement once cleared by vascular. Pt to follow up at [facility] as outpatient recommended. Podiatry will sign off at this time. Please re-consult as needed. Thank you.   WBAT to R foot w/ sx shoe.     Patient demonstrated verbal understanding of all interventions and tolerated interventions well without any complications.   Stable from podiatric standpoint for dc, please dc when stable from all other specialities.       Wound care instructions to be applied daily to right foot  - Carefully remove dressings to right foot   - Apply betadine paint to right foot at the 4th/5th metatarsal heads   - Apply sterile gauze to area, and wrap with kerlix and ace.   Thanks

## 2022-12-12 NOTE — PROGRESS NOTE ADULT - PROBLEM SELECTOR PLAN 3
Past MV repair but now with severely elevated transmitral gradient, severe PHTN,   pursue work-up once recovered from infectious process with Structural Heart Disease Dr Wilson will eventually need R&L heart Cath/ALEX  Operative records from [facility] requested Dec 9th.  BNP Improving, continue diuretics, further GDMT to be assessed later, after vasc intervention.

## 2022-12-12 NOTE — PROGRESS NOTE ADULT - SUBJECTIVE AND OBJECTIVE BOX
Chief Complaint: RLE swelling and redness, epigastric discomfort    Interval Hx: Patient seen and examined earlier today. No new complaints. No significant changes. Continues to have RLE swelling and redness, slightly improved compared to presentation. Continues to have RLE pain complaints, only partially relieved with hydromorphone 1mg IV q4h prn. He remains on empiric antibiotics. MRI negative for osteomyelitis. Workup ongoing to further evaluate the extent of his peripheral vascular disease in that extremity. He is planned for LE angiogram tomorrow. As for his epigastric discomfort, it is mild-to-moderate, being treated with Carafate and PPI. Anticipated that he will eventually need an upper endoscopy to evaluate that epigastric discomfort and also as routine appropriate testing in the setting of his cirrhosis.   12/06/22: RLE angio today. No new issues.  12/07/22: Patient seen and examined. Complaining of right LE pain. Discussed with patient regarding management plan.   12/8: Sitting in chair, comfortable, offers no complaints. Denies fever, chills, n, v.  12/09/22: Patient seen and examined. Nuclear stress test today  12/10/22: No new issues.   12/11/22: Sitting in chair, feels better. No new issues  12/12/22: Discussed with Dr Hubbard for cardiac clearance. No new issues. Waiting for RLE femoral bypass.       ROS: Multi system review is comprehensively negative x 10 systems except as above    Vitals:        Vital Signs Last 24 Hrs  T(C): 36.9 (12 Dec 2022 07:50), Max: 37.4 (11 Dec 2022 22:52)  T(F): 98.5 (12 Dec 2022 07:50), Max: 99.3 (11 Dec 2022 22:52)  HR: 75 (12 Dec 2022 07:50) (75 - 80)  BP: 144/58 (12 Dec 2022 07:50) (133/62 - 152/69)  BP(mean): 80 (11 Dec 2022 22:52) (80 - 80)  RR: 17 (12 Dec 2022 07:50) (16 - 18)  SpO2: 97% (12 Dec 2022 07:50) (94% - 97%)    Parameters below as of 12 Dec 2022 07:50  Patient On (Oxygen Delivery Method): room air        Exam:  Gen: No acute distress  HEENT: NCAT PERRL EOMI MMM  Neck: Supple no JVD no LAD  CVS: S1 S2 normal RRR  Chest: Normal resp effort, lungs CTA B/L  Abd: +BS, soft NT ND   Ext: L BKA. RLE with mild erythema from upper shin down to foot, small superficial wounds on shin, large open wound on plantar surface of foot at the foot pad.   Skin: As described in extremity exam. Some additional flaking skin on R foot  Neuro: A+OX3, no focal deficits, sensation intake to RLE  Mood: Calm, pleasant        Labs:                                                     9.0    18.77 )-----------( 406      ( 12 Dec 2022 08:07 )             28.5     12 Dec 2022 08:07    141    |  107    |  19     ----------------------------<  97     3.2     |  28     |  0.85     Ca    9.3        12 Dec 2022 08:07          CAPILLARY BLOOD GLUCOSE      POCT Blood Glucose.: 86 mg/dL (12 Dec 2022 11:47)  POCT Blood Glucose.: 90 mg/dL (12 Dec 2022 09:04)  POCT Blood Glucose.: 122 mg/dL (11 Dec 2022 20:49)  POCT Blood Glucose.: 185 mg/dL (11 Dec 2022 16:35)          Micro:  Bld cx x 2 12/1: Negative to date  COVID, Flu, RSV PCR 12/1: Negative    Imaging:  MRI RLE, R foot 12/2: There is no evidence for osteomyelitis.    RLE arterial duplex 12/1: No appreciable flow detected in the posterior tibial and peroneal arteries.    R foot XR 12/1: Soft tissue emphysema at the plantar aspect of the mid foot/calcaneous.     Cardiac Testing:  TTE 12/2:  Endocardium is not well visualized, however, overall left ventricular systolic function appears normal. Mild concentric left ventricular hypertrophy. Septal flattening is seen; this finding is consistent with right heart pressure / volume overload. Estimated left ventricular ejection fraction is 55-60%. The right ventricle exhibits mild dilation, mild diffuse hypokinesis, and mild depression of contractility. Severe mitral stenosis. Mild mitral regurgitation. Mild to moderate tricuspid valve regurgitation. Severe pulmonary hypertension.    Meds:  MEDICATIONS  (STANDING):  amLODIPine   Tablet 5 milliGRAM(s) Oral daily  aspirin  chewable 81 milliGRAM(s) Oral daily  dextrose 5%. 1000 milliLiter(s) (100 mL/Hr) IV Continuous <Continuous>  dextrose 5%. 1000 milliLiter(s) (50 mL/Hr) IV Continuous <Continuous>  dextrose 50% Injectable 25 Gram(s) IV Push once  dextrose 50% Injectable 12.5 Gram(s) IV Push once  dextrose 50% Injectable 25 Gram(s) IV Push once  doxycycline monohydrate Capsule 100 milliGRAM(s) Oral every 12 hours  enoxaparin Injectable 40 milliGRAM(s) SubCutaneous every 24 hours  famotidine    Tablet 40 milliGRAM(s) Oral at bedtime  furosemide    Tablet 40 milliGRAM(s) Oral daily  gabapentin 300 milliGRAM(s) Oral two times a day  gabapentin 600 milliGRAM(s) Oral at bedtime  glucagon  Injectable 1 milliGRAM(s) IntraMuscular once  insulin glargine Injectable (LANTUS) 30 Unit(s) SubCutaneous at bedtime  insulin lispro (ADMELOG) corrective regimen sliding scale   SubCutaneous three times a day before meals  isosorbide   mononitrate ER Tablet (IMDUR) 30 milliGRAM(s) Oral daily  lisinopril 5 milliGRAM(s) Oral daily  metoclopramide 10 milliGRAM(s) Oral daily  multivitamin 1 Tablet(s) Oral daily  pantoprazole    Tablet 40 milliGRAM(s) Oral before breakfast  piperacillin/tazobactam IVPB.. 3.375 Gram(s) IV Intermittent every 8 hours  polyethylene glycol 3350 17 Gram(s) Oral daily  senna 2 Tablet(s) Oral at bedtime  simvastatin 10 milliGRAM(s) Oral at bedtime  sucralfate suspension 1 Gram(s) Oral four times a day  tamsulosin 0.8 milliGRAM(s) Oral at bedtime    MEDICATIONS  (PRN):  acetaminophen     Tablet .. 650 milliGRAM(s) Oral every 6 hours PRN Temp greater or equal to 38C (100.4F), Mild Pain (1 - 3)  aluminum hydroxide/magnesium hydroxide/simethicone Suspension 30 milliLiter(s) Oral every 4 hours PRN Dyspepsia  dextrose Oral Gel 15 Gram(s) Oral once PRN Blood Glucose LESS THAN 70 milliGRAM(s)/deciliter  HYDROmorphone  Injectable 1 milliGRAM(s) IV Push every 4 hours PRN Moderate Pain (4 - 6)  HYDROmorphone  Injectable 1.5 milliGRAM(s) IV Push every 4 hours PRN Severe Pain (7 - 10)  magnesium hydroxide Suspension 30 milliLiter(s) Oral every 8 hours PRN Constipation  melatonin 3 milliGRAM(s) Oral at bedtime PRN Insomnia  ondansetron Injectable 4 milliGRAM(s) IV Push every 8 hours PRN Nausea and/or Vomiting          Assessment and Plan:  61 yo man with DM, HTN, CAD, 3v CABG, hx of PAD, L BKA, GERD, past alcohol use disorder, cirrhosis, chronic anemia, sent from [place] for worsening RLE swelling and redness, also complaints of epigastric discomfort. In the ED, exam was consistent with cellulitis of the RLE, non healing wound on the plantar surface of the R foot. Elevated inflammatory markers. Patient was placed on antibiotics and admitted to Medicine.     RLE skin and soft tissue infection in setting of non healing R foot ulcer due to/as a consequence of  type 2 DM and PAD:  -MRI RLE negative for osteomyelitis.   -S/P RLE angio, per vascular --> would benefit from distal femoral bypass surgery of extremity once cleared by cardiology. Discussed with Dr Hubbard, he will see the patient today  -Continue on empiric antibiotics per ID with zosyn, and doxycycline   -Continue aspirin, statin      CAD /  Hx of CABG / demand ischemia /  severe mitral stenosis / severe pulmonary HTN / HTN:  -echo: EF: 55-60%, severe Mitral stenosis, severe pulmonary HTN  -chest xray 12/7: vascular congestion  -stop IVFs  -c/w lasix  -monitor I/O  -Continue aspirin, statin, Imdur  -Continue amlodipine, lisinopril and Imdur  -cardiac work up ongoing by cardiology: nuclear stress test today, no ischemia  +/- right and left heart cath/ALEX-will discuss with cardiology regarding clearance  for bypass  -Follow Dr. Wilson recommendation.      Epigastric discomfort / dyspepsia:  -Per GI --> Carafate,  PPI.   -Eventual EGD, treat empirically for now         DM2:  -A1c 7.  - Continue Lantus and Lispro  - Monitor glucose TID AC and HS    BPH: Stable  - Continue Flomax      Code Status: Full  DVT px: LMWH  Dispo: To be determined pending further clinical assessment

## 2022-12-12 NOTE — PROGRESS NOTE ADULT - SUBJECTIVE AND OBJECTIVE BOX
Date of Service: 12/12/22  Chief Complaint :61 y/o male PMHx of PVD s/p left BKA, DM, HTN, CAD, anemia, and GERD, Full code with molst BIBEMS from [facility] c/o severe abdominal pain since this morning. Pt reports vomiting. Pt denies diarrhea. Pt reports he has not been eating or drinking. Pt reports he has noticed redness of RLE over last week and black area on bottom of foot. States he was scheduled for angiogram tomorrow with Dr. Dickson who has been following patient for continued PVD of right lower extremity.  Podiatry consulted for unstagable wound to Right 4/5th metatarsal head. Patient denies any pain to the area. 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 Patient says he noticed redness/erythema to his right lower extremity. Patient also says that he has narrowing of his blood vessels to his right leg, and that there is decreased blood flow to his right foot.     12/12/22: Pt seen by podiatry team. Pt resting comfortably at bedside, NAD, pleasant. Patient does not report any additional pedal complaints reported at this time.     PMH: HTN (hypertension)    DM (diabetes mellitus)      PSH:    Allergies:No Known Allergies    MEDICATIONS  (STANDING):  amLODIPine   Tablet 5 milliGRAM(s) Oral daily  aspirin  chewable 81 milliGRAM(s) Oral daily  dextrose 5%. 1000 milliLiter(s) (100 mL/Hr) IV Continuous <Continuous>  dextrose 5%. 1000 milliLiter(s) (50 mL/Hr) IV Continuous <Continuous>  dextrose 50% Injectable 25 Gram(s) IV Push once  dextrose 50% Injectable 12.5 Gram(s) IV Push once  dextrose 50% Injectable 25 Gram(s) IV Push once  doxycycline monohydrate Capsule 100 milliGRAM(s) Oral every 12 hours  enoxaparin Injectable 40 milliGRAM(s) SubCutaneous every 24 hours  famotidine    Tablet 40 milliGRAM(s) Oral at bedtime  furosemide    Tablet 40 milliGRAM(s) Oral daily  gabapentin 600 milliGRAM(s) Oral at bedtime  gabapentin 300 milliGRAM(s) Oral two times a day  glucagon  Injectable 1 milliGRAM(s) IntraMuscular once  insulin glargine Injectable (LANTUS) 30 Unit(s) SubCutaneous at bedtime  insulin lispro (ADMELOG) corrective regimen sliding scale   SubCutaneous three times a day before meals  isosorbide   mononitrate ER Tablet (IMDUR) 30 milliGRAM(s) Oral daily  lisinopril 5 milliGRAM(s) Oral daily  metoclopramide 10 milliGRAM(s) Oral daily  multivitamin 1 Tablet(s) Oral daily  pantoprazole    Tablet 40 milliGRAM(s) Oral before breakfast  piperacillin/tazobactam IVPB.. 3.375 Gram(s) IV Intermittent every 8 hours  polyethylene glycol 3350 17 Gram(s) Oral daily  senna 2 Tablet(s) Oral at bedtime  simvastatin 10 milliGRAM(s) Oral at bedtime  sucralfate suspension 1 Gram(s) Oral four times a day  tamsulosin 0.8 milliGRAM(s) Oral at bedtime    MEDICATIONS  (PRN):  acetaminophen     Tablet .. 650 milliGRAM(s) Oral every 6 hours PRN Temp greater or equal to 38C (100.4F), Mild Pain (1 - 3)  aluminum hydroxide/magnesium hydroxide/simethicone Suspension 30 milliLiter(s) Oral every 4 hours PRN Dyspepsia  dextrose Oral Gel 15 Gram(s) Oral once PRN Blood Glucose LESS THAN 70 milliGRAM(s)/deciliter  HYDROmorphone  Injectable 1 milliGRAM(s) IV Push every 4 hours PRN Moderate Pain (4 - 6)  HYDROmorphone  Injectable 1.5 milliGRAM(s) IV Push every 4 hours PRN Severe Pain (7 - 10)  magnesium hydroxide Suspension 30 milliLiter(s) Oral every 8 hours PRN Constipation  melatonin 3 milliGRAM(s) Oral at bedtime PRN Insomnia  ondansetron Injectable 4 milliGRAM(s) IV Push every 8 hours PRN Nausea and/or Vomiting    Vital Signs Last 24 Hrs  T(C): 37.4 (11 Dec 2022 22:52), Max: 37.4 (11 Dec 2022 22:52)  T(F): 99.3 (11 Dec 2022 22:52), Max: 99.3 (11 Dec 2022 22:52)  HR: 75 (11 Dec 2022 22:52) (72 - 80)  BP: 133/62 (11 Dec 2022 22:52) (133/62 - 152/69)  BP(mean): 80 (11 Dec 2022 22:52) (80 - 80)  RR: 16 (11 Dec 2022 22:52) (16 - 18)  SpO2: 96% (11 Dec 2022 22:52) (94% - 96%)    Parameters below as of 11 Dec 2022 22:52  Patient On (Oxygen Delivery Method): room air         Physical Exam:   Constitutiona: NAD, alert;  Derm:  Skin warm, dry and supple bilateral.     Erythema noted grossly to right foot and lower leg  Scaly skin noted grossly to right foot  Dry eschar wound noted to the right 4th/5th metatarsal head measuring approximately 3x3 cm, no pus, no malodor, no pain on palpation, negative fluctuance. Indication of bluish cyanotic discoloration from Right mid leg to Right foot digits 1-5. Dry small eschar lesions to Right distal toes 2-3.    Vascular: Dorsalis Pedis and Posterior Tibial pulses non palpable.  Capillary re-fill time less then 3 seconds digits 1-5 bilateral.   Neuro: Protective sensation absent to the level of the digits bilateral.  MSK: Muscle strength 4/5 in all major muscle groups of Right lower extremity.  Below knee amputation to left lower extremity         Labs:                                              9.0    18.77 )-----------( 406      ( 12 Dec 2022 08:07 )             28.5     12-12    141  |  107  |  19  ----------------------------<  97  3.2<L>   |  28  |  0.85    Ca    9.3      12 Dec 2022 08:07                  Rad/EKG     < from: Xray Foot AP + Lateral + Oblique, Right (12.01.22 @ 22:01) >  INTERPRETATION:  History: 62-year-old male, necrotic area in the foot.    Three views of the right foot without comparison demonstrate soft tissue   emphysema at the plantar aspect of the midfoot and calcaneus.    No gross cortical destruction, fracture or dislocation.    IMPRESSION:  Soft tissue emphysema at the plantar aspect of the mid foot/calcaneous.   If there is continued clinical concern follow-up MRI can be ordered    < end of copied text >  < from: MR Foot No Cont, Right (12.03.22 @ 12:13) >  IMPRESSION:  1.  There is no evidence for osteomyelitis.    < end of copied text >  < from: US Duplex Arterial Lower Ext Ltd, Right (12.01.22 @ 21:40) >  IMPRESSION:    No appreciable flow detected in the posterior tibial and peroneal   arteries.    < end of copied text >

## 2022-12-12 NOTE — PROGRESS NOTE ADULT - PROBLEM SELECTOR PLAN 1
Elevated troponin of 300 in the setting of infectious process, known CAD, and severe pulmonary HTN.  Stress w/ no ischemia normal EF no RWMA.  May proceed w/ surgery w/o further cardiac testing.  Echo NL LV FX EF 55%.    May proceed w/ surgery w/o further cardiac testing:  Elevated risk for cardiac events but acceptable for surgery & pt is optimized  from cardiac standpoint.    Will arrange Nuclear Stress test for tomorrow in order to assess for cardiac clearance prior to planned Fem-Pop Bypass   Patient W/O CP Elevated troponin of 300 in the setting of infectious process, known CAD, and severe pulmonary HTN.  Stress w/ no ischemia normal EF no RWMA.  May proceed w/ surgery w/o further cardiac testing.  Echo NL LV FX EF 55%.    May proceed w/ surgery w/o further cardiac testing:  Elevated risk for cardiac events but acceptable for surgery & pt is optimized  from cardiac standpoint.

## 2022-12-12 NOTE — PROGRESS NOTE ADULT - SUBJECTIVE AND OBJECTIVE BOX
REASON FOR VISIT: Pre-op cardiac exam    HPI:  61 y/o man with a history of CAD s/p CABG and MV repair (2016 - [place]), HTN, DM, GERD, L BKA, who was transferred from [facility] for the evaluation of abdominal discomfort and cellulitis.  Cardiology consult requested for elevated troponin.  Mr. Roberts has no cardiac symptoms -- he has not been experiencing angina or dyspnea.  He walks during PT and denies exertional chest discomfort.  He does not see a cardiologist in the outpatient setting.      12/4/22 Comfortable in bed with CC of SOB No CP Tele SR   12/5/22 Semirecumbent in bed no CP HR 70-80 BPM  12/6/22 patient is feeling ok , did have RLE angiogram today results pending  no chest pain or shortness of breath at rest  12/7/22 feeling well no new complaints No CP/SOB  12/8/22: No events overnight, denies chest pain, dyspnea.  12/9/'22: no complaints.    MEDICATIONS:  OUTPATIENT  Home Medications:  amLODIPine 5 mg oral tablet: 1 tab(s) orally once a day (02 Dec 2022 00:25)  aspirin 81 mg oral capsule: 1  orally once a day (02 Dec 2022 00:25)  famotidine 20 mg oral tablet: 2 tablets at bedtime   ***on hold from 12/01/2022 16:36 to 12/2/2020 16:35 (02 Dec 2022 00:25)  gabapentin 300 mg oral capsule: 1 cap(s) orally 2 times a day  ***on hold from 12/01/2022 16:36 to 12/2/2020 16:35 (02 Dec 2022 00:25)  gabapentin 600 mg oral tablet: 1 tab(s) orally once a day (in the evening)  ***on hold from 12/01/22 16:36 to 12/02/2022 16:35*** (02 Dec 2022 00:25)  insulin lispro: 18 unit(s) subcutaneous 3 times a day (before meals)  ***on hold from 12/01/2022 16:36 to 12/2/2020 16:35 (02 Dec 2022 00:25)  insulin lispro 100 units/mL subcutaneous solution: subcutaneous 3 times a day (before meals) per sliding scale:  150-200 = 2 units  201-250 = 4 units  251-300 = 6 units  301-350 = 8 units  351-400 = 10 units  ***on hold from 12/01/2022 16:36 to 12/2/2020 16:35***     (02 Dec 2022 00:25)  isosorbide mononitrate 30 mg oral tablet, extended release: 1 tab(s) orally once a day (02 Dec 2022 00:25)  Lantus Solostar Pen: 30 unit(s) subcutaneous once a day (at bedtime  ***on hold from 12/01/2022 16:36 to 12/2/2020 16:35 (02 Dec 2022 00:25)  lisinopril 5 mg oral tablet: 1 tab(s) orally once a day  ***on hold from 12/01/2022 16:36 to 12/2/2020 16:35*** (02 Dec 2022 00:25)  metoclopramide 10 mg oral tablet: 1 tab(s) orally once a day (02 Dec 2022 00:25)  Milk of Magnesia 8% oral suspension: 30 milliliter(s) orally every 8 hours, As Needed  ***on hold from 12/01/2022 16:36 to 12/2/2020 16:35*** (02 Dec 2022 00:25)  multivitamin: 1 tab(s) orally once a day  ***on hold from 12/01/2022 16:36 to 12/2/2020 10:26*** (02 Dec 2022 00:25)  ondansetron 4 mg oral disintegrating strip: 1 each orally every 8 hours, As Needed (02 Dec 2022 00:25)  oxyCODONE 5 mg oral tablet: 1 tab(s) orally every 6 hours, As Needed  ***on hold from 12/01/2022 16:36 to 12/2/2020 16:35*** (02 Dec 2022 00:25)  Senna 8.6 mg oral tablet: 1 tab(s) orally once a day (at bedtime) (02 Dec 2022 00:25)  simethicone 125 mg oral tablet: 1 tab(s) orally every 6 hours, As Needed  ***on hold from 12/01/2022 16:36 to 12/2/2020 16:35*** (02 Dec 2022 00:25)  simvastatin 10 mg oral tablet: 1 tab(s) orally once a day (at bedtime) (02 Dec 2022 00:25)  tamsulosin 0.4 mg oral capsule: 2 cap(s) orally once a day (in the evening) with dinner  ***on hold from 12/01/2022 16:36 to 12/2/2020 16:35 (02 Dec 2022 00:25)  Tylenol 325 mg oral tablet: 2 tab(s) orally every 6 hours, As Needed  ******on hold from 12/01/2022 16:36 to 12/2/2020 16:35*** (02 Dec 2022 00:25)      INPATIENT  MEDICATIONS  (STANDING):  amLODIPine   Tablet 5 milliGRAM(s) Oral daily  aspirin  chewable 81 milliGRAM(s) Oral daily  dextrose 5%. 1000 milliLiter(s) (100 mL/Hr) IV Continuous <Continuous>  dextrose 5%. 1000 milliLiter(s) (50 mL/Hr) IV Continuous <Continuous>  dextrose 50% Injectable 25 Gram(s) IV Push once  dextrose 50% Injectable 12.5 Gram(s) IV Push once  dextrose 50% Injectable 25 Gram(s) IV Push once  doxycycline monohydrate Capsule 100 milliGRAM(s) Oral every 12 hours  enoxaparin Injectable 40 milliGRAM(s) SubCutaneous every 24 hours  famotidine    Tablet 40 milliGRAM(s) Oral at bedtime  furosemide    Tablet 40 milliGRAM(s) Oral daily  gabapentin 300 milliGRAM(s) Oral two times a day  gabapentin 600 milliGRAM(s) Oral at bedtime  glucagon  Injectable 1 milliGRAM(s) IntraMuscular once  insulin glargine Injectable (LANTUS) 30 Unit(s) SubCutaneous at bedtime  insulin lispro (ADMELOG) corrective regimen sliding scale   SubCutaneous three times a day before meals  isosorbide   mononitrate ER Tablet (IMDUR) 30 milliGRAM(s) Oral daily  lisinopril 5 milliGRAM(s) Oral daily  metoclopramide 10 milliGRAM(s) Oral daily  multivitamin 1 Tablet(s) Oral daily  pantoprazole    Tablet 40 milliGRAM(s) Oral before breakfast  piperacillin/tazobactam IVPB.. 3.375 Gram(s) IV Intermittent every 8 hours  polyethylene glycol 3350 17 Gram(s) Oral daily  potassium chloride    Tablet ER 40 milliEquivalent(s) Oral every 4 hours  senna 2 Tablet(s) Oral at bedtime  simvastatin 10 milliGRAM(s) Oral at bedtime  sucralfate suspension 1 Gram(s) Oral four times a day  tamsulosin 0.8 milliGRAM(s) Oral at bedtime    MEDICATIONS  (PRN):  acetaminophen     Tablet .. 650 milliGRAM(s) Oral every 6 hours PRN Temp greater or equal to 38C (100.4F), Mild Pain (1 - 3)  aluminum hydroxide/magnesium hydroxide/simethicone Suspension 30 milliLiter(s) Oral every 4 hours PRN Dyspepsia  dextrose Oral Gel 15 Gram(s) Oral once PRN Blood Glucose LESS THAN 70 milliGRAM(s)/deciliter  HYDROmorphone  Injectable 1 milliGRAM(s) IV Push every 4 hours PRN Moderate Pain (4 - 6)  HYDROmorphone  Injectable 1.5 milliGRAM(s) IV Push every 4 hours PRN Severe Pain (7 - 10)  magnesium hydroxide Suspension 30 milliLiter(s) Oral every 8 hours PRN Constipation  melatonin 3 milliGRAM(s) Oral at bedtime PRN Insomnia  ondansetron Injectable 4 milliGRAM(s) IV Push every 8 hours PRN Nausea and/or Vomiting          Vital Signs Last 24 Hrs  T(C): 36.8 (12 Dec 2022 15:53), Max: 37.4 (11 Dec 2022 22:52)  T(F): 98.3 (12 Dec 2022 15:53), Max: 99.3 (11 Dec 2022 22:52)  HR: 75 (12 Dec 2022 19:10) (72 - 75)  BP: 138/68 (12 Dec 2022 19:10) (126/59 - 144/58)  BP(mean): 78 (12 Dec 2022 15:53) (75 - 80)  RR: 19 (12 Dec 2022 19:10) (16 - 19)  SpO2: 95% (12 Dec 2022 19:10) (95% - 97%)    Parameters below as of 12 Dec 2022 19:10  Patient On (Oxygen Delivery Method): room air    Daily     Daily I&O's Summary    12 Dec 2022 07:01  -  12 Dec 2022 20:10  --------------------------------------------------------  IN: 0 mL / OUT: 200 mL / NET: -200 mL        I&O's Detail    12 Dec 2022 07:01  -  12 Dec 2022 20:10  --------------------------------------------------------  IN:  Total IN: 0 mL    OUT:    Voided (mL): 200 mL  Total OUT: 200 mL    Total NET: -200 mL          I&O's Summary    12 Dec 2022 07:01  -  12 Dec 2022 20:10  --------------------------------------------------------  IN: 0 mL / OUT: 200 mL / NET: -200 mL        PHYSICAL EXAM:    General: No distress. Comfortable.  HEENT: EOM intact.  Neck: Neck supple. JVP 14-16cm . No masses  Chest: Clear to auscultation bilaterally  CV: Normal S1 and S2. Diastolic Murmur noted No rub, or gallops. Radial pulses normal.  Abdomen: Soft, non-distended, non-tender  Skin: No rashes or skin breakdown  Extremities: L BKA, RLE 1+ Edema with erythema   Neurology: Alert and oriented times three. Sensation intact  Psych: Affect normal      ===============================  ===============================  LABS:                         9.0    18.77 )-----------( 406      ( 12 Dec 2022 08:07 )             28.5                         9.3    15.81 )-----------( 384      ( 10 Dec 2022 08:58 )             29.3     12 Dec 2022 08:07    141    |  107    |  19     ----------------------------<  97     3.2     |  28     |  0.85     Ca    9.3        12 Dec 2022 08:07        ===============================  ===============================  CARDIAC BIOMARKERS:  BNP  Serum Pro-Brain Natriuretic Peptide: 4088 pg/mL *H* [0 - 125] (12-09-22 @ 11:41)  Serum Pro-Brain Natriuretic Peptide: 3796 pg/mL *H* [0 - 125] (12-07-22 @ 07:42)  Serum Pro-Brain Natriuretic Peptide: 96069 pg/mL *H* [0 - 125] (12-01-22 @ 20:07)      TROPONIN  Troponin I, High Sensitivity Result: 201.30 ng/L *H* (12-02-22 @ 07:48)  Troponin I, High Sensitivity Result: 364.23 ng/L *H* (12-01-22 @ 23:50)  Troponin I, High Sensitivity Result: 345.99 ng/L *H* (12-01-22 @ 20:07)  Troponin I, High Sensitivity Result: 16.64 ng/L (11-01-22 @ 12:58)        ===============================  ===============================    TroponinI hsT: 201.30, 364.23, 345.99    TTE Echo Complete w/o Contrast w/ Doppler (12.02.22 @ 08:46):   Endocardium is not well visualized, however, overall left ventricular systolic function appears normal. Mild concentric left ventricular hypertrophy. Septal flattening is seen; this finding is consistent with right heart pressure / volume overload. Estimated left ventricular ejection fraction is 55-60%.   The right ventricle exhibits mild dilation, mild diffuse hypokinesis, and mild depression of contractility.   Severe mitral stenosis. Mild mitral regurgitation.   Mild to moderate tricuspid valve regurgitation.   Severe pulmonary hypertension.    ECG: SR, nonspecific ST/T abn    < from: NM Nuclear Stress Pharmacologic Multiple (12.09.22 @ 10:50) >    IMPRESSION: Normal SPECT Myocardial Perfusion Imaging.    No evidence of reversible or fixed perfusion defects.    Normal left ventricular contractility with an ejection fraction of 73%   (Normal: 50% or greater).    No regional wall motion abnormalities.    Please refer to cardiac stress test report for dosage of Regadenoson   administered, EKG findings and symptoms during the procedure.    --- End of Report ---            ALY KRISHNA MD; Attending Nuclear Medicine  This document has been electronically signed. Dec  9 2022 12:03PM    < end of copied text >      ===============================    Manjinder Hubbard M.D.  Cardiology, [facility]  Cell: [phone]  Offices:    ([facility] Office)  [phone] ([facility] Office)

## 2022-12-13 LAB
BUN SERPL-MCNC: 15 MG/DL — SIGNIFICANT CHANGE UP (ref 7–23)
CALCIUM SERPL-MCNC: 9.4 MG/DL — SIGNIFICANT CHANGE UP (ref 8.5–10.1)
CHLORIDE SERPL-SCNC: 108 MMOL/L — SIGNIFICANT CHANGE UP (ref 96–108)
CO2 SERPL-SCNC: 31 MMOL/L — SIGNIFICANT CHANGE UP (ref 22–31)
CREAT SERPL-MCNC: 0.83 MG/DL — SIGNIFICANT CHANGE UP (ref 0.5–1.3)
EGFR: 100 ML/MIN/1.73M2 — SIGNIFICANT CHANGE UP
GLUCOSE SERPL-MCNC: 135 MG/DL — HIGH (ref 70–99)
HCT VFR BLD CALC: 28.8 % — LOW (ref 39–50)
HGB BLD-MCNC: 9.1 G/DL — LOW (ref 13–17)
MCHC RBC-ENTMCNC: 31.4 PG — SIGNIFICANT CHANGE UP (ref 27–34)
MCHC RBC-ENTMCNC: 31.6 GM/DL — LOW (ref 32–36)
MCV RBC AUTO: 99.3 FL — SIGNIFICANT CHANGE UP (ref 80–100)
PLATELET # BLD AUTO: 407 K/UL — HIGH (ref 150–400)
POTASSIUM SERPL-MCNC: 4.1 MMOL/L — SIGNIFICANT CHANGE UP (ref 3.5–5.3)
POTASSIUM SERPL-SCNC: 4.1 MMOL/L — SIGNIFICANT CHANGE UP (ref 3.5–5.3)
RBC # BLD: 2.9 M/UL — LOW (ref 4.2–5.8)
RBC # FLD: 13.2 % — SIGNIFICANT CHANGE UP (ref 10.3–14.5)
SODIUM SERPL-SCNC: 140 MMOL/L — SIGNIFICANT CHANGE UP (ref 135–145)
WBC # BLD: 17.94 K/UL — HIGH (ref 3.8–10.5)
WBC # FLD AUTO: 17.94 K/UL — HIGH (ref 3.8–10.5)

## 2022-12-13 PROCEDURE — 99232 SBSQ HOSP IP/OBS MODERATE 35: CPT

## 2022-12-13 RX ADMIN — PIPERACILLIN AND TAZOBACTAM 25 GRAM(S): 4; .5 INJECTION, POWDER, LYOPHILIZED, FOR SOLUTION INTRAVENOUS at 18:11

## 2022-12-13 RX ADMIN — Medication 10 MILLIGRAM(S): at 13:35

## 2022-12-13 RX ADMIN — AMLODIPINE BESYLATE 5 MILLIGRAM(S): 2.5 TABLET ORAL at 09:03

## 2022-12-13 RX ADMIN — Medication 1 GRAM(S): at 09:04

## 2022-12-13 RX ADMIN — FAMOTIDINE 40 MILLIGRAM(S): 10 INJECTION INTRAVENOUS at 22:04

## 2022-12-13 RX ADMIN — Medication 81 MILLIGRAM(S): at 09:02

## 2022-12-13 RX ADMIN — Medication 8: at 17:21

## 2022-12-13 RX ADMIN — LISINOPRIL 5 MILLIGRAM(S): 2.5 TABLET ORAL at 09:04

## 2022-12-13 RX ADMIN — PANTOPRAZOLE SODIUM 40 MILLIGRAM(S): 20 TABLET, DELAYED RELEASE ORAL at 05:16

## 2022-12-13 RX ADMIN — HYDROMORPHONE HYDROCHLORIDE 1.5 MILLIGRAM(S): 2 INJECTION INTRAMUSCULAR; INTRAVENOUS; SUBCUTANEOUS at 00:15

## 2022-12-13 RX ADMIN — TAMSULOSIN HYDROCHLORIDE 0.8 MILLIGRAM(S): 0.4 CAPSULE ORAL at 22:04

## 2022-12-13 RX ADMIN — HYDROMORPHONE HYDROCHLORIDE 1.5 MILLIGRAM(S): 2 INJECTION INTRAMUSCULAR; INTRAVENOUS; SUBCUTANEOUS at 22:03

## 2022-12-13 RX ADMIN — HYDROMORPHONE HYDROCHLORIDE 1.5 MILLIGRAM(S): 2 INJECTION INTRAMUSCULAR; INTRAVENOUS; SUBCUTANEOUS at 09:01

## 2022-12-13 RX ADMIN — POLYETHYLENE GLYCOL 3350 17 GRAM(S): 17 POWDER, FOR SOLUTION ORAL at 09:02

## 2022-12-13 RX ADMIN — Medication 40 MILLIGRAM(S): at 09:03

## 2022-12-13 RX ADMIN — HYDROMORPHONE HYDROCHLORIDE 1.5 MILLIGRAM(S): 2 INJECTION INTRAMUSCULAR; INTRAVENOUS; SUBCUTANEOUS at 14:00

## 2022-12-13 RX ADMIN — HYDROMORPHONE HYDROCHLORIDE 1.5 MILLIGRAM(S): 2 INJECTION INTRAMUSCULAR; INTRAVENOUS; SUBCUTANEOUS at 13:46

## 2022-12-13 RX ADMIN — Medication 1 GRAM(S): at 17:22

## 2022-12-13 RX ADMIN — Medication 1 GRAM(S): at 05:16

## 2022-12-13 RX ADMIN — PIPERACILLIN AND TAZOBACTAM 25 GRAM(S): 4; .5 INJECTION, POWDER, LYOPHILIZED, FOR SOLUTION INTRAVENOUS at 23:33

## 2022-12-13 RX ADMIN — ENOXAPARIN SODIUM 40 MILLIGRAM(S): 100 INJECTION SUBCUTANEOUS at 09:01

## 2022-12-13 RX ADMIN — INSULIN GLARGINE 30 UNIT(S): 100 INJECTION, SOLUTION SUBCUTANEOUS at 22:03

## 2022-12-13 RX ADMIN — Medication 1 TABLET(S): at 09:03

## 2022-12-13 RX ADMIN — HYDROMORPHONE HYDROCHLORIDE 1.5 MILLIGRAM(S): 2 INJECTION INTRAMUSCULAR; INTRAVENOUS; SUBCUTANEOUS at 18:08

## 2022-12-13 RX ADMIN — SIMVASTATIN 10 MILLIGRAM(S): 20 TABLET, FILM COATED ORAL at 22:05

## 2022-12-13 RX ADMIN — Medication 1 GRAM(S): at 00:15

## 2022-12-13 RX ADMIN — HYDROMORPHONE HYDROCHLORIDE 1.5 MILLIGRAM(S): 2 INJECTION INTRAMUSCULAR; INTRAVENOUS; SUBCUTANEOUS at 18:20

## 2022-12-13 RX ADMIN — Medication 1 GRAM(S): at 23:33

## 2022-12-13 RX ADMIN — HYDROMORPHONE HYDROCHLORIDE 1.5 MILLIGRAM(S): 2 INJECTION INTRAMUSCULAR; INTRAVENOUS; SUBCUTANEOUS at 04:29

## 2022-12-13 RX ADMIN — HYDROMORPHONE HYDROCHLORIDE 1.5 MILLIGRAM(S): 2 INJECTION INTRAMUSCULAR; INTRAVENOUS; SUBCUTANEOUS at 09:15

## 2022-12-13 RX ADMIN — GABAPENTIN 600 MILLIGRAM(S): 400 CAPSULE ORAL at 22:04

## 2022-12-13 RX ADMIN — PIPERACILLIN AND TAZOBACTAM 25 GRAM(S): 4; .5 INJECTION, POWDER, LYOPHILIZED, FOR SOLUTION INTRAVENOUS at 10:45

## 2022-12-13 RX ADMIN — ISOSORBIDE MONONITRATE 30 MILLIGRAM(S): 60 TABLET, EXTENDED RELEASE ORAL at 09:04

## 2022-12-13 RX ADMIN — Medication 100 MILLIGRAM(S): at 22:05

## 2022-12-13 RX ADMIN — Medication 100 MILLIGRAM(S): at 09:07

## 2022-12-13 RX ADMIN — GABAPENTIN 300 MILLIGRAM(S): 400 CAPSULE ORAL at 13:46

## 2022-12-13 RX ADMIN — GABAPENTIN 300 MILLIGRAM(S): 400 CAPSULE ORAL at 05:16

## 2022-12-13 RX ADMIN — SENNA PLUS 2 TABLET(S): 8.6 TABLET ORAL at 22:04

## 2022-12-13 RX ADMIN — PIPERACILLIN AND TAZOBACTAM 25 GRAM(S): 4; .5 INJECTION, POWDER, LYOPHILIZED, FOR SOLUTION INTRAVENOUS at 00:15

## 2022-12-13 NOTE — PROGRESS NOTE ADULT - SUBJECTIVE AND OBJECTIVE BOX
Patient was seen and examined at the bedside this morning  No acute events overnight    Vital Signs Last 24 Hrs  T(C): 36.4 (13 Dec 2022 08:13), Max: 36.8 (12 Dec 2022 15:53)  T(F): 97.5 (13 Dec 2022 08:13), Max: 98.3 (12 Dec 2022 15:53)  HR: 75 (13 Dec 2022 08:13) (72 - 83)  BP: 131/66 (13 Dec 2022 08:13) (124/72 - 138/68)  BP(mean): 78 (12 Dec 2022 15:53) (75 - 78)  RR: 18 (13 Dec 2022 08:13) (18 - 19)  SpO2: 99% (13 Dec 2022 08:13) (95% - 99%)    Parameters below as of 13 Dec 2022 08:13  Patient On (Oxygen Delivery Method): room air                            9.1    17.94 )-----------( 407      ( 13 Dec 2022 09:47 )             28.8     12-13    140  |  108  |  15  ----------------------------<  135<H>  4.1   |  31  |  0.83    Ca    9.4      13 Dec 2022 09:47      I&O's Summary    12 Dec 2022 07:01  -  13 Dec 2022 07:00  --------------------------------------------------------  IN: 0 mL / OUT: 201 mL / NET: -201 mL          Physical Exam:  Alert, conscious, oriented  No pallor, jaundice or cyanosis  Not in pain or distress  Chest clear, equal air entry bilaterally  Abdomen soft and lax, no tenderness  Extremities: No swelling or tenderness. Right leg less erythematous and less swollen

## 2022-12-13 NOTE — PROGRESS NOTE ADULT - SUBJECTIVE AND OBJECTIVE BOX
Chief Complaint: RLE swelling and redness, epigastric discomfort    Interval Hx: Patient seen and examined earlier today. No new complaints. No significant changes. Continues to have RLE swelling and redness, slightly improved compared to presentation. Continues to have RLE pain complaints, only partially relieved with hydromorphone 1mg IV q4h prn. He remains on empiric antibiotics. MRI negative for osteomyelitis. Workup ongoing to further evaluate the extent of his peripheral vascular disease in that extremity. He is planned for LE angiogram tomorrow. As for his epigastric discomfort, it is mild-to-moderate, being treated with Carafate and PPI. Anticipated that he will eventually need an upper endoscopy to evaluate that epigastric discomfort and also as routine appropriate testing in the setting of his cirrhosis.   12/06/22: RLE angio today. No new issues.  12/07/22: Patient seen and examined. Complaining of right LE pain. Discussed with patient regarding management plan.   12/8: Sitting in chair, comfortable, offers no complaints. Denies fever, chills, n, v.  12/09/22: Patient seen and examined. Nuclear stress test today  12/10/22: No new issues.   12/11/22: Sitting in chair, feels better. No new issues  12/12/22: Discussed with Dr Hubbard for cardiac clearance. No new issues. Waiting for RLE femoral bypass.  12/13/22: No new complaints. Discussed with vascular, possible bypass on Friday or next Monday. Discussed with patient.        ROS: Multi system review is comprehensively negative x 10 systems except as above    Vitals:        Vital Signs Last 24 Hrs  T(C): 36.4 (13 Dec 2022 08:13), Max: 36.8 (12 Dec 2022 15:53)  T(F): 97.5 (13 Dec 2022 08:13), Max: 98.3 (12 Dec 2022 15:53)  HR: 75 (13 Dec 2022 08:13) (72 - 83)  BP: 131/66 (13 Dec 2022 08:13) (124/72 - 138/68)  BP(mean): 78 (12 Dec 2022 15:53) (75 - 78)  RR: 18 (13 Dec 2022 08:13) (18 - 19)  SpO2: 99% (13 Dec 2022 08:13) (95% - 99%)    Parameters below as of 13 Dec 2022 08:13  Patient On (Oxygen Delivery Method): room air            Exam:  Gen: No acute distress  HEENT: NCAT PERRL EOMI MMM  Neck: Supple no JVD no LAD  CVS: S1 S2 normal RRR  Chest: Normal resp effort, lungs CTA B/L  Abd: +BS, soft NT ND   Ext: L BKA. RLE with mild erythema from upper shin down to foot, small superficial wounds on shin, large open wound on plantar surface of foot at the foot pad.   Skin: As described in extremity exam. Some additional flaking skin on R foot  Neuro: A+OX3, no focal deficits, sensation intake to RLE  Mood: Calm, pleasant        Labs:                                                     9.1    17.94 )-----------( 407      ( 13 Dec 2022 09:47 )             28.8     13 Dec 2022 09:47    140    |  108    |  15     ----------------------------<  135    4.1     |  31     |  0.83     Ca    9.4        13 Dec 2022 09:47          CAPILLARY BLOOD GLUCOSE      POCT Blood Glucose.: 140 mg/dL (13 Dec 2022 12:04)  POCT Blood Glucose.: 134 mg/dL (13 Dec 2022 07:54)  POCT Blood Glucose.: 113 mg/dL (12 Dec 2022 21:04)  POCT Blood Glucose.: 142 mg/dL (12 Dec 2022 18:16)          Micro:  Bld cx x 2 12/1: Negative to date  COVID, Flu, RSV PCR 12/1: Negative    Imaging:  MRI RLE, R foot 12/2: There is no evidence for osteomyelitis.    RLE arterial duplex 12/1: No appreciable flow detected in the posterior tibial and peroneal arteries.    R foot XR 12/1: Soft tissue emphysema at the plantar aspect of the mid foot/calcaneous.     Cardiac Testing:  TTE 12/2:  Endocardium is not well visualized, however, overall left ventricular systolic function appears normal. Mild concentric left ventricular hypertrophy. Septal flattening is seen; this finding is consistent with right heart pressure / volume overload. Estimated left ventricular ejection fraction is 55-60%. The right ventricle exhibits mild dilation, mild diffuse hypokinesis, and mild depression of contractility. Severe mitral stenosis. Mild mitral regurgitation. Mild to moderate tricuspid valve regurgitation. Severe pulmonary hypertension.    Meds:  MEDICATIONS  (STANDING):  amLODIPine   Tablet 5 milliGRAM(s) Oral daily  aspirin  chewable 81 milliGRAM(s) Oral daily  dextrose 5%. 1000 milliLiter(s) (100 mL/Hr) IV Continuous <Continuous>  dextrose 5%. 1000 milliLiter(s) (50 mL/Hr) IV Continuous <Continuous>  dextrose 50% Injectable 25 Gram(s) IV Push once  dextrose 50% Injectable 12.5 Gram(s) IV Push once  dextrose 50% Injectable 25 Gram(s) IV Push once  doxycycline monohydrate Capsule 100 milliGRAM(s) Oral every 12 hours  enoxaparin Injectable 40 milliGRAM(s) SubCutaneous every 24 hours  famotidine    Tablet 40 milliGRAM(s) Oral at bedtime  furosemide    Tablet 40 milliGRAM(s) Oral daily  gabapentin 300 milliGRAM(s) Oral two times a day  gabapentin 600 milliGRAM(s) Oral at bedtime  glucagon  Injectable 1 milliGRAM(s) IntraMuscular once  insulin glargine Injectable (LANTUS) 30 Unit(s) SubCutaneous at bedtime  insulin lispro (ADMELOG) corrective regimen sliding scale   SubCutaneous three times a day before meals  isosorbide   mononitrate ER Tablet (IMDUR) 30 milliGRAM(s) Oral daily  lisinopril 5 milliGRAM(s) Oral daily  metoclopramide 10 milliGRAM(s) Oral daily  multivitamin 1 Tablet(s) Oral daily  pantoprazole    Tablet 40 milliGRAM(s) Oral before breakfast  piperacillin/tazobactam IVPB.. 3.375 Gram(s) IV Intermittent every 8 hours  polyethylene glycol 3350 17 Gram(s) Oral daily  senna 2 Tablet(s) Oral at bedtime  simvastatin 10 milliGRAM(s) Oral at bedtime  sucralfate suspension 1 Gram(s) Oral four times a day  tamsulosin 0.8 milliGRAM(s) Oral at bedtime    MEDICATIONS  (PRN):  acetaminophen     Tablet .. 650 milliGRAM(s) Oral every 6 hours PRN Temp greater or equal to 38C (100.4F), Mild Pain (1 - 3)  aluminum hydroxide/magnesium hydroxide/simethicone Suspension 30 milliLiter(s) Oral every 4 hours PRN Dyspepsia  dextrose Oral Gel 15 Gram(s) Oral once PRN Blood Glucose LESS THAN 70 milliGRAM(s)/deciliter  HYDROmorphone  Injectable 1 milliGRAM(s) IV Push every 4 hours PRN Moderate Pain (4 - 6)  HYDROmorphone  Injectable 1.5 milliGRAM(s) IV Push every 4 hours PRN Severe Pain (7 - 10)  magnesium hydroxide Suspension 30 milliLiter(s) Oral every 8 hours PRN Constipation  melatonin 3 milliGRAM(s) Oral at bedtime PRN Insomnia  ondansetron Injectable 4 milliGRAM(s) IV Push every 8 hours PRN Nausea and/or Vomiting          Assessment and Plan:  63 yo man with DM, HTN, CAD, 3v CABG, hx of PAD, L BKA, GERD, past alcohol use disorder, cirrhosis, chronic anemia, sent from [facility] for worsening RLE swelling and redness, also complaints of epigastric discomfort. In the ED, exam was consistent with cellulitis of the RLE, non healing wound on the plantar surface of the R foot. Elevated inflammatory markers. Patient was placed on antibiotics and admitted to Medicine.     RLE skin and soft tissue infection in setting of non healing R foot ulcer due to/as a consequence of  type 2 DM and PAD:  -MRI RLE negative for osteomyelitis.   -S/P RLE angio, per vascular --> would benefit from distal femoral bypass surgery. Patient is cleared by cardiology. Medically stable and optimized for RLE bypass  -Continue on empiric antibiotics per ID with zosyn, and doxycycline   -Continue aspirin, statin      CAD /  Hx of CABG / demand ischemia /  severe mitral stenosis / severe pulmonary HTN / HTN:  -echo: EF: 55-60%, severe Mitral stenosis, severe pulmonary HTN  -chest xray 12/7: vascular congestion  -c/w lasix  -monitor I/O  -Continue aspirin, statin, Imdur  -Continue amlodipine, lisinopril and Imdur  +/- right and left heart cath-after bypass as per cardiology      Epigastric discomfort / dyspepsia: improving  -Per GI --> Carafate,  PPI.   -Eventual EGD, treat empirically for now     DM2:  -A1c 7.  - Continue Lantus and Lispro  - Monitor glucose TID AC and HS    BPH: Stable  - Continue Flomax      Code Status: Full  DVT px: LMWH  Dispo: To be determined pending further clinical assessment

## 2022-12-13 NOTE — PROGRESS NOTE ADULT - ASSESSMENT
62 year old male with history of PVD, IHD, MV repair. admitted with right leg cellulitis and was found to have significant stenosis of his RLE vasculature    Plan:  s/p RLE angio. Pt will need femoral distal bypass   medical and cardiac clearances appreciated  will plan for bypass either Friday or monday  Podiatry for local wound care  Medical management as per primary team    Plan discussed with Dr Bhardwaj

## 2022-12-14 PROCEDURE — 99232 SBSQ HOSP IP/OBS MODERATE 35: CPT

## 2022-12-14 RX ORDER — HYDROMORPHONE HYDROCHLORIDE 2 MG/ML
1 INJECTION INTRAMUSCULAR; INTRAVENOUS; SUBCUTANEOUS EVERY 4 HOURS
Refills: 0 | Status: DISCONTINUED | OUTPATIENT
Start: 2022-12-14 | End: 2022-12-15

## 2022-12-14 RX ORDER — HYDROMORPHONE HYDROCHLORIDE 2 MG/ML
1 INJECTION INTRAMUSCULAR; INTRAVENOUS; SUBCUTANEOUS ONCE
Refills: 0 | Status: DISCONTINUED | OUTPATIENT
Start: 2022-12-14 | End: 2022-12-14

## 2022-12-14 RX ORDER — HYDROMORPHONE HYDROCHLORIDE 2 MG/ML
1.5 INJECTION INTRAMUSCULAR; INTRAVENOUS; SUBCUTANEOUS EVERY 4 HOURS
Refills: 0 | Status: DISCONTINUED | OUTPATIENT
Start: 2022-12-14 | End: 2022-12-15

## 2022-12-14 RX ADMIN — Medication 100 MILLIGRAM(S): at 21:32

## 2022-12-14 RX ADMIN — Medication 1 GRAM(S): at 23:48

## 2022-12-14 RX ADMIN — ENOXAPARIN SODIUM 40 MILLIGRAM(S): 100 INJECTION SUBCUTANEOUS at 11:12

## 2022-12-14 RX ADMIN — GABAPENTIN 300 MILLIGRAM(S): 400 CAPSULE ORAL at 06:14

## 2022-12-14 RX ADMIN — GABAPENTIN 300 MILLIGRAM(S): 400 CAPSULE ORAL at 14:24

## 2022-12-14 RX ADMIN — HYDROMORPHONE HYDROCHLORIDE 1.5 MILLIGRAM(S): 2 INJECTION INTRAMUSCULAR; INTRAVENOUS; SUBCUTANEOUS at 18:12

## 2022-12-14 RX ADMIN — Medication 1 GRAM(S): at 11:12

## 2022-12-14 RX ADMIN — HYDROMORPHONE HYDROCHLORIDE 1.5 MILLIGRAM(S): 2 INJECTION INTRAMUSCULAR; INTRAVENOUS; SUBCUTANEOUS at 10:13

## 2022-12-14 RX ADMIN — Medication 4: at 17:30

## 2022-12-14 RX ADMIN — Medication 1 GRAM(S): at 06:13

## 2022-12-14 RX ADMIN — Medication 100 MILLIGRAM(S): at 10:14

## 2022-12-14 RX ADMIN — SENNA PLUS 2 TABLET(S): 8.6 TABLET ORAL at 21:30

## 2022-12-14 RX ADMIN — PIPERACILLIN AND TAZOBACTAM 25 GRAM(S): 4; .5 INJECTION, POWDER, LYOPHILIZED, FOR SOLUTION INTRAVENOUS at 11:12

## 2022-12-14 RX ADMIN — POLYETHYLENE GLYCOL 3350 17 GRAM(S): 17 POWDER, FOR SOLUTION ORAL at 11:11

## 2022-12-14 RX ADMIN — ISOSORBIDE MONONITRATE 30 MILLIGRAM(S): 60 TABLET, EXTENDED RELEASE ORAL at 10:14

## 2022-12-14 RX ADMIN — Medication 40 MILLIGRAM(S): at 10:13

## 2022-12-14 RX ADMIN — HYDROMORPHONE HYDROCHLORIDE 1 MILLIGRAM(S): 2 INJECTION INTRAMUSCULAR; INTRAVENOUS; SUBCUTANEOUS at 06:14

## 2022-12-14 RX ADMIN — PIPERACILLIN AND TAZOBACTAM 25 GRAM(S): 4; .5 INJECTION, POWDER, LYOPHILIZED, FOR SOLUTION INTRAVENOUS at 23:48

## 2022-12-14 RX ADMIN — Medication 3 MILLIGRAM(S): at 21:31

## 2022-12-14 RX ADMIN — Medication 1 TABLET(S): at 11:11

## 2022-12-14 RX ADMIN — HYDROMORPHONE HYDROCHLORIDE 1.5 MILLIGRAM(S): 2 INJECTION INTRAMUSCULAR; INTRAVENOUS; SUBCUTANEOUS at 14:24

## 2022-12-14 RX ADMIN — Medication 10 MILLIGRAM(S): at 14:24

## 2022-12-14 RX ADMIN — INSULIN GLARGINE 30 UNIT(S): 100 INJECTION, SOLUTION SUBCUTANEOUS at 22:16

## 2022-12-14 RX ADMIN — HYDROMORPHONE HYDROCHLORIDE 1.5 MILLIGRAM(S): 2 INJECTION INTRAMUSCULAR; INTRAVENOUS; SUBCUTANEOUS at 23:58

## 2022-12-14 RX ADMIN — HYDROMORPHONE HYDROCHLORIDE 1 MILLIGRAM(S): 2 INJECTION INTRAMUSCULAR; INTRAVENOUS; SUBCUTANEOUS at 02:20

## 2022-12-14 RX ADMIN — GABAPENTIN 600 MILLIGRAM(S): 400 CAPSULE ORAL at 21:31

## 2022-12-14 RX ADMIN — PANTOPRAZOLE SODIUM 40 MILLIGRAM(S): 20 TABLET, DELAYED RELEASE ORAL at 06:14

## 2022-12-14 RX ADMIN — TAMSULOSIN HYDROCHLORIDE 0.8 MILLIGRAM(S): 0.4 CAPSULE ORAL at 21:36

## 2022-12-14 RX ADMIN — LISINOPRIL 5 MILLIGRAM(S): 2.5 TABLET ORAL at 10:14

## 2022-12-14 RX ADMIN — PIPERACILLIN AND TAZOBACTAM 25 GRAM(S): 4; .5 INJECTION, POWDER, LYOPHILIZED, FOR SOLUTION INTRAVENOUS at 18:12

## 2022-12-14 RX ADMIN — Medication 81 MILLIGRAM(S): at 10:13

## 2022-12-14 RX ADMIN — HYDROMORPHONE HYDROCHLORIDE 1.5 MILLIGRAM(S): 2 INJECTION INTRAMUSCULAR; INTRAVENOUS; SUBCUTANEOUS at 22:50

## 2022-12-14 RX ADMIN — FAMOTIDINE 40 MILLIGRAM(S): 10 INJECTION INTRAVENOUS at 21:30

## 2022-12-14 RX ADMIN — Medication 1 GRAM(S): at 18:15

## 2022-12-14 RX ADMIN — SIMVASTATIN 10 MILLIGRAM(S): 20 TABLET, FILM COATED ORAL at 21:31

## 2022-12-14 RX ADMIN — AMLODIPINE BESYLATE 5 MILLIGRAM(S): 2.5 TABLET ORAL at 10:13

## 2022-12-14 NOTE — PROGRESS NOTE ADULT - SUBJECTIVE AND OBJECTIVE BOX
Chief Complaint: RLE swelling and redness, epigastric discomfort    Interval Hx: Patient seen and examined earlier today. No new complaints. No significant changes. Continues to have RLE swelling and redness, slightly improved compared to presentation. Continues to have RLE pain complaints, only partially relieved with hydromorphone 1mg IV q4h prn. He remains on empiric antibiotics. MRI negative for osteomyelitis. Workup ongoing to further evaluate the extent of his peripheral vascular disease in that extremity. He is planned for LE angiogram tomorrow. As for his epigastric discomfort, it is mild-to-moderate, being treated with Carafate and PPI. Anticipated that he will eventually need an upper endoscopy to evaluate that epigastric discomfort and also as routine appropriate testing in the setting of his cirrhosis.   12/06/22: RLE angio today. No new issues.  12/07/22: Patient seen and examined. Complaining of right LE pain. Discussed with patient regarding management plan.   12/8: Sitting in chair, comfortable, offers no complaints. Denies fever, chills, n, v.  12/09/22: Patient seen and examined. Nuclear stress test today  12/10/22: No new issues.   12/11/22: Sitting in chair, feels better. No new issues  12/12/22: Discussed with Dr Hubbard for cardiac clearance. No new issues. Waiting for RLE femoral bypass.  12/13/22: No new complaints. Discussed with vascular, possible bypass on Friday or next Monday. Discussed with patient.    12/1/4/22: No new issues.       ROS: Multi system review is comprehensively negative x 10 systems except as above    Vitals:        Vital Signs Last 24 Hrs  T(C): 36.8 (14 Dec 2022 08:10), Max: 36.8 (13 Dec 2022 23:41)  T(F): 98.3 (14 Dec 2022 08:10), Max: 98.3 (13 Dec 2022 23:41)  HR: 76 (14 Dec 2022 08:10) (73 - 79)  BP: 133/63 (14 Dec 2022 08:10) (114/57 - 133/63)  BP(mean): --  RR: 18 (14 Dec 2022 08:10) (18 - 18)  SpO2: 95% (14 Dec 2022 08:10) (95% - 96%)    Parameters below as of 14 Dec 2022 08:10  Patient On (Oxygen Delivery Method): room air            Exam:  Gen: No acute distress  HEENT: NCAT PERRL EOMI MMM  Neck: Supple no JVD no LAD  CVS: S1 S2 normal RRR  Chest: Normal resp effort, lungs CTA B/L  Abd: +BS, soft NT ND   Ext: L BKA. RLE with mild erythema from upper shin down to foot, small superficial wounds on shin, large open wound on plantar surface of foot at the foot pad.   Skin: As described in extremity exam. Some additional flaking skin on R foot  Neuro: A+OX3, no focal deficits, sensation intake to RLE  Mood: Calm, pleasant        Labs:                                                     9.1    17.94 )-----------( 407      ( 13 Dec 2022 09:47 )             28.8     13 Dec 2022 09:47    140    |  108    |  15     ----------------------------<  135    4.1     |  31     |  0.83     Ca    9.4        13 Dec 2022 09:47          CAPILLARY BLOOD GLUCOSE      POCT Blood Glucose.: 140 mg/dL (13 Dec 2022 12:04)  POCT Blood Glucose.: 134 mg/dL (13 Dec 2022 07:54)  POCT Blood Glucose.: 113 mg/dL (12 Dec 2022 21:04)  POCT Blood Glucose.: 142 mg/dL (12 Dec 2022 18:16)          Micro:  Bld cx x 2 12/1: Negative to date  COVID, Flu, RSV PCR 12/1: Negative    Imaging:  MRI RLE, R foot 12/2: There is no evidence for osteomyelitis.    RLE arterial duplex 12/1: No appreciable flow detected in the posterior tibial and peroneal arteries.    R foot XR 12/1: Soft tissue emphysema at the plantar aspect of the mid foot/calcaneous.     Cardiac Testing:  TTE 12/2:  Endocardium is not well visualized, however, overall left ventricular systolic function appears normal. Mild concentric left ventricular hypertrophy. Septal flattening is seen; this finding is consistent with right heart pressure / volume overload. Estimated left ventricular ejection fraction is 55-60%. The right ventricle exhibits mild dilation, mild diffuse hypokinesis, and mild depression of contractility. Severe mitral stenosis. Mild mitral regurgitation. Mild to moderate tricuspid valve regurgitation. Severe pulmonary hypertension.    Meds:  MEDICATIONS  (STANDING):  amLODIPine   Tablet 5 milliGRAM(s) Oral daily  aspirin  chewable 81 milliGRAM(s) Oral daily  dextrose 5%. 1000 milliLiter(s) (100 mL/Hr) IV Continuous <Continuous>  dextrose 5%. 1000 milliLiter(s) (50 mL/Hr) IV Continuous <Continuous>  dextrose 50% Injectable 25 Gram(s) IV Push once  dextrose 50% Injectable 12.5 Gram(s) IV Push once  dextrose 50% Injectable 25 Gram(s) IV Push once  doxycycline monohydrate Capsule 100 milliGRAM(s) Oral every 12 hours  enoxaparin Injectable 40 milliGRAM(s) SubCutaneous every 24 hours  famotidine    Tablet 40 milliGRAM(s) Oral at bedtime  furosemide    Tablet 40 milliGRAM(s) Oral daily  gabapentin 300 milliGRAM(s) Oral two times a day  gabapentin 600 milliGRAM(s) Oral at bedtime  glucagon  Injectable 1 milliGRAM(s) IntraMuscular once  insulin glargine Injectable (LANTUS) 30 Unit(s) SubCutaneous at bedtime  insulin lispro (ADMELOG) corrective regimen sliding scale   SubCutaneous three times a day before meals  isosorbide   mononitrate ER Tablet (IMDUR) 30 milliGRAM(s) Oral daily  lisinopril 5 milliGRAM(s) Oral daily  metoclopramide 10 milliGRAM(s) Oral daily  multivitamin 1 Tablet(s) Oral daily  pantoprazole    Tablet 40 milliGRAM(s) Oral before breakfast  piperacillin/tazobactam IVPB.. 3.375 Gram(s) IV Intermittent every 8 hours  polyethylene glycol 3350 17 Gram(s) Oral daily  senna 2 Tablet(s) Oral at bedtime  simvastatin 10 milliGRAM(s) Oral at bedtime  sucralfate suspension 1 Gram(s) Oral four times a day  tamsulosin 0.8 milliGRAM(s) Oral at bedtime    MEDICATIONS  (PRN):  acetaminophen     Tablet .. 650 milliGRAM(s) Oral every 6 hours PRN Temp greater or equal to 38C (100.4F), Mild Pain (1 - 3)  aluminum hydroxide/magnesium hydroxide/simethicone Suspension 30 milliLiter(s) Oral every 4 hours PRN Dyspepsia  dextrose Oral Gel 15 Gram(s) Oral once PRN Blood Glucose LESS THAN 70 milliGRAM(s)/deciliter  HYDROmorphone  Injectable 1 milliGRAM(s) IV Push every 4 hours PRN Moderate Pain (4 - 6)  HYDROmorphone  Injectable 1.5 milliGRAM(s) IV Push every 4 hours PRN Severe Pain (7 - 10)  magnesium hydroxide Suspension 30 milliLiter(s) Oral every 8 hours PRN Constipation  melatonin 3 milliGRAM(s) Oral at bedtime PRN Insomnia  ondansetron Injectable 4 milliGRAM(s) IV Push every 8 hours PRN Nausea and/or Vomiting          Assessment and Plan:  63 yo man with DM, HTN, CAD, 3v CABG, hx of PAD, L BKA, GERD, past alcohol use disorder, cirrhosis, chronic anemia, sent from [facility] for worsening RLE swelling and redness, also complaints of epigastric discomfort. In the ED, exam was consistent with cellulitis of the RLE, non healing wound on the plantar surface of the R foot. Elevated inflammatory markers. Patient was placed on antibiotics and admitted to Medicine.     RLE skin and soft tissue infection in setting of non healing R foot ulcer due to/as a consequence of  type 2 DM and PAD:  -MRI RLE negative for osteomyelitis.   -S/P RLE angio, per vascular --> would benefit from distal femoral bypass surgery. Patient is cleared by cardiology. Medically stable and optimized for RLE bypass  -Continue on empiric antibiotics per ID with zosyn, and doxycycline   -Continue aspirin, statin      CAD /  Hx of CABG / demand ischemia /  severe mitral stenosis / severe pulmonary HTN / HTN:  -echo: EF: 55-60%, severe Mitral stenosis, severe pulmonary HTN  -chest xray 12/7: vascular congestion  -c/w lasix  -monitor I/O  -Continue aspirin, statin, Imdur  -Continue amlodipine, lisinopril and Imdur  +/- right and left heart cath-after bypass as per cardiology      Epigastric discomfort / dyspepsia: improving  -Per GI --> Carafate,  PPI.   -Eventual EGD, treat empirically for now     DM2:  -A1c 7.  - Continue Lantus and Lispro  - Monitor glucose TID AC and HS    BPH: Stable  - Continue Flomax      Code Status: Full  DVT px: LMWH  Dispo: RLE bypass on Friday

## 2022-12-14 NOTE — PROGRESS NOTE ADULT - SUBJECTIVE AND OBJECTIVE BOX
Patient was seen and examined at the bedside this morning  No acute events overnight    Vital Signs Last 24 Hrs  T(C): 36.8 (14 Dec 2022 08:10), Max: 36.8 (13 Dec 2022 23:41)  T(F): 98.3 (14 Dec 2022 08:10), Max: 98.3 (13 Dec 2022 23:41)  HR: 76 (14 Dec 2022 08:10) (73 - 79)  BP: 133/63 (14 Dec 2022 08:10) (114/57 - 133/63)  BP(mean): --  RR: 18 (14 Dec 2022 08:10) (18 - 18)  SpO2: 95% (14 Dec 2022 08:10) (95% - 96%)    Parameters below as of 14 Dec 2022 08:10  Patient On (Oxygen Delivery Method): room air                            9.1    17.94 )-----------( 407      ( 13 Dec 2022 09:47 )             28.8     12-13    140  |  108  |  15  ----------------------------<  135<H>  4.1   |  31  |  0.83    Ca    9.4      13 Dec 2022 09:47      I&O's Summary        Physical Exam:  Alert, conscious, oriented  No pallor, jaundice or cyanosis  Not in pain or distress  Chest clear, equal air entry bilaterally  Abdomen soft and lax, no tenderness  Extremities: No swelling or tenderness. Right leg less erythematous and less swollen

## 2022-12-15 PROBLEM — I25.10 ATHEROSCLEROTIC HEART DISEASE OF NATIVE CORONARY ARTERY WITHOUT ANGINA PECTORIS: Chronic | Status: ACTIVE | Noted: 2022-12-01

## 2022-12-15 PROBLEM — K21.9 GASTRO-ESOPHAGEAL REFLUX DISEASE WITHOUT ESOPHAGITIS: Chronic | Status: ACTIVE | Noted: 2022-12-01

## 2022-12-15 PROBLEM — D64.9 ANEMIA, UNSPECIFIED: Chronic | Status: ACTIVE | Noted: 2022-12-01

## 2022-12-15 PROBLEM — Z89.512 ACQUIRED ABSENCE OF LEFT LEG BELOW KNEE: Chronic | Status: ACTIVE | Noted: 2022-12-01

## 2022-12-15 PROCEDURE — 99232 SBSQ HOSP IP/OBS MODERATE 35: CPT

## 2022-12-15 RX ORDER — HYDROMORPHONE HYDROCHLORIDE 2 MG/ML
2 INJECTION INTRAMUSCULAR; INTRAVENOUS; SUBCUTANEOUS EVERY 4 HOURS
Refills: 0 | Status: DISCONTINUED | OUTPATIENT
Start: 2022-12-15 | End: 2022-12-20

## 2022-12-15 RX ORDER — HYDROMORPHONE HYDROCHLORIDE 2 MG/ML
0.5 INJECTION INTRAMUSCULAR; INTRAVENOUS; SUBCUTANEOUS ONCE
Refills: 0 | Status: DISCONTINUED | OUTPATIENT
Start: 2022-12-15 | End: 2022-12-15

## 2022-12-15 RX ADMIN — INSULIN GLARGINE 30 UNIT(S): 100 INJECTION, SOLUTION SUBCUTANEOUS at 21:43

## 2022-12-15 RX ADMIN — Medication 40 MILLIGRAM(S): at 11:24

## 2022-12-15 RX ADMIN — GABAPENTIN 300 MILLIGRAM(S): 400 CAPSULE ORAL at 14:22

## 2022-12-15 RX ADMIN — TAMSULOSIN HYDROCHLORIDE 0.8 MILLIGRAM(S): 0.4 CAPSULE ORAL at 21:43

## 2022-12-15 RX ADMIN — ENOXAPARIN SODIUM 40 MILLIGRAM(S): 100 INJECTION SUBCUTANEOUS at 11:25

## 2022-12-15 RX ADMIN — PIPERACILLIN AND TAZOBACTAM 25 GRAM(S): 4; .5 INJECTION, POWDER, LYOPHILIZED, FOR SOLUTION INTRAVENOUS at 16:00

## 2022-12-15 RX ADMIN — HYDROMORPHONE HYDROCHLORIDE 2 MILLIGRAM(S): 2 INJECTION INTRAMUSCULAR; INTRAVENOUS; SUBCUTANEOUS at 21:41

## 2022-12-15 RX ADMIN — LISINOPRIL 5 MILLIGRAM(S): 2.5 TABLET ORAL at 11:25

## 2022-12-15 RX ADMIN — HYDROMORPHONE HYDROCHLORIDE 1.5 MILLIGRAM(S): 2 INJECTION INTRAMUSCULAR; INTRAVENOUS; SUBCUTANEOUS at 11:55

## 2022-12-15 RX ADMIN — GABAPENTIN 300 MILLIGRAM(S): 400 CAPSULE ORAL at 05:47

## 2022-12-15 RX ADMIN — HYDROMORPHONE HYDROCHLORIDE 1.5 MILLIGRAM(S): 2 INJECTION INTRAMUSCULAR; INTRAVENOUS; SUBCUTANEOUS at 11:23

## 2022-12-15 RX ADMIN — HYDROMORPHONE HYDROCHLORIDE 2 MILLIGRAM(S): 2 INJECTION INTRAMUSCULAR; INTRAVENOUS; SUBCUTANEOUS at 15:43

## 2022-12-15 RX ADMIN — Medication 1 GRAM(S): at 05:47

## 2022-12-15 RX ADMIN — HYDROMORPHONE HYDROCHLORIDE 2 MILLIGRAM(S): 2 INJECTION INTRAMUSCULAR; INTRAVENOUS; SUBCUTANEOUS at 16:00

## 2022-12-15 RX ADMIN — PIPERACILLIN AND TAZOBACTAM 25 GRAM(S): 4; .5 INJECTION, POWDER, LYOPHILIZED, FOR SOLUTION INTRAVENOUS at 23:29

## 2022-12-15 RX ADMIN — GABAPENTIN 600 MILLIGRAM(S): 400 CAPSULE ORAL at 21:43

## 2022-12-15 RX ADMIN — PANTOPRAZOLE SODIUM 40 MILLIGRAM(S): 20 TABLET, DELAYED RELEASE ORAL at 05:47

## 2022-12-15 RX ADMIN — Medication 4: at 11:26

## 2022-12-15 RX ADMIN — SENNA PLUS 2 TABLET(S): 8.6 TABLET ORAL at 21:44

## 2022-12-15 RX ADMIN — FAMOTIDINE 40 MILLIGRAM(S): 10 INJECTION INTRAVENOUS at 21:43

## 2022-12-15 RX ADMIN — POLYETHYLENE GLYCOL 3350 17 GRAM(S): 17 POWDER, FOR SOLUTION ORAL at 11:35

## 2022-12-15 RX ADMIN — Medication 1 TABLET(S): at 11:40

## 2022-12-15 RX ADMIN — PIPERACILLIN AND TAZOBACTAM 25 GRAM(S): 4; .5 INJECTION, POWDER, LYOPHILIZED, FOR SOLUTION INTRAVENOUS at 10:08

## 2022-12-15 RX ADMIN — Medication 1 GRAM(S): at 11:24

## 2022-12-15 RX ADMIN — Medication 1 GRAM(S): at 23:30

## 2022-12-15 RX ADMIN — SIMVASTATIN 10 MILLIGRAM(S): 20 TABLET, FILM COATED ORAL at 21:43

## 2022-12-15 RX ADMIN — HYDROMORPHONE HYDROCHLORIDE 1.5 MILLIGRAM(S): 2 INJECTION INTRAMUSCULAR; INTRAVENOUS; SUBCUTANEOUS at 04:07

## 2022-12-15 RX ADMIN — HYDROMORPHONE HYDROCHLORIDE 0.5 MILLIGRAM(S): 2 INJECTION INTRAMUSCULAR; INTRAVENOUS; SUBCUTANEOUS at 12:00

## 2022-12-15 RX ADMIN — Medication 81 MILLIGRAM(S): at 11:24

## 2022-12-15 RX ADMIN — Medication 100 MILLIGRAM(S): at 21:43

## 2022-12-15 RX ADMIN — Medication 100 MILLIGRAM(S): at 11:25

## 2022-12-15 RX ADMIN — Medication 1 GRAM(S): at 19:52

## 2022-12-15 RX ADMIN — AMLODIPINE BESYLATE 5 MILLIGRAM(S): 2.5 TABLET ORAL at 11:24

## 2022-12-15 RX ADMIN — HYDROMORPHONE HYDROCHLORIDE 2 MILLIGRAM(S): 2 INJECTION INTRAMUSCULAR; INTRAVENOUS; SUBCUTANEOUS at 22:00

## 2022-12-15 RX ADMIN — ISOSORBIDE MONONITRATE 30 MILLIGRAM(S): 60 TABLET, EXTENDED RELEASE ORAL at 11:25

## 2022-12-15 RX ADMIN — Medication 2: at 16:41

## 2022-12-15 RX ADMIN — Medication 10 MILLIGRAM(S): at 11:29

## 2022-12-15 RX ADMIN — HYDROMORPHONE HYDROCHLORIDE 0.5 MILLIGRAM(S): 2 INJECTION INTRAMUSCULAR; INTRAVENOUS; SUBCUTANEOUS at 12:30

## 2022-12-15 NOTE — PROGRESS NOTE ADULT - SUBJECTIVE AND OBJECTIVE BOX
Chief Complaint: RLE swelling and redness, epigastric discomfort    Interval Hx: Patient seen and examined earlier today. No new complaints. No significant changes. Continues to have RLE swelling and redness, slightly improved compared to presentation. Continues to have RLE pain complaints, only partially relieved with hydromorphone 1mg IV q4h prn. He remains on empiric antibiotics. MRI negative for osteomyelitis. Workup ongoing to further evaluate the extent of his peripheral vascular disease in that extremity. He is planned for LE angiogram tomorrow. As for his epigastric discomfort, it is mild-to-moderate, being treated with Carafate and PPI. Anticipated that he will eventually need an upper endoscopy to evaluate that epigastric discomfort and also as routine appropriate testing in the setting of his cirrhosis.   12/06/22: RLE angio today. No new issues.  12/07/22: Patient seen and examined. Complaining of right LE pain. Discussed with patient regarding management plan.   12/8: Sitting in chair, comfortable, offers no complaints. Denies fever, chills, n, v.  12/09/22: Patient seen and examined. Nuclear stress test today  12/10/22: No new issues.   12/11/22: Sitting in chair, feels better. No new issues  12/12/22: Discussed with Dr Hubbard for cardiac clearance. No new issues. Waiting for RLE femoral bypass.  12/13/22: No new complaints. Discussed with vascular, possible bypass on Friday or next Monday. Discussed with patient.    12/1/4/22: No new issues.   12/15/22: Complaining of pain. Scheduled for RLE bypass on Monday      ROS: Multi system review is comprehensively negative x 10 systems except as above    Vitals:        Vital Signs Last 24 Hrs  T(C): 36.5 (15 Dec 2022 08:36), Max: 36.8 (14 Dec 2022 15:15)  T(F): 97.7 (15 Dec 2022 08:36), Max: 98.3 (14 Dec 2022 15:15)  HR: 77 (15 Dec 2022 11:15) (72 - 80)  BP: 138/70 (15 Dec 2022 11:15) (132/66 - 138/70)  BP(mean): --  RR: 19 (15 Dec 2022 11:15) (18 - 19)  SpO2: 96% (15 Dec 2022 11:15) (95% - 97%)    Parameters below as of 15 Dec 2022 11:15  Patient On (Oxygen Delivery Method): room air          Exam:    Gen: No acute distress  HEENT: NCAT PERRL EOMI MMM  Neck: Supple no JVD no LAD  CVS: S1 S2 normal RRR  Chest: Normal resp effort, lungs CTA B/L  Abd: +BS, soft NT ND   Ext: L BKA. RLE with mild erythema from upper shin down to foot, small superficial wounds on shin, large open wound on plantar surface of foot at the foot pad.   Skin: As described in extremity exam. Some additional flaking skin on R foot  Neuro: A+OX3, no focal deficits, sensation intake to RLE  Mood: Calm, pleasant        Labs:                              CAPILLARY BLOOD GLUCOSE      POCT Blood Glucose.: 205 mg/dL (15 Dec 2022 11:21)  POCT Blood Glucose.: 207 mg/dL (15 Dec 2022 08:31)  POCT Blood Glucose.: 228 mg/dL (14 Dec 2022 21:25)  POCT Blood Glucose.: 208 mg/dL (14 Dec 2022 17:13)        Micro:  Bld cx x 2 12/1: Negative to date  COVID, Flu, RSV PCR 12/1: Negative    Imaging:  MRI RLE, R foot 12/2: There is no evidence for osteomyelitis.    RLE arterial duplex 12/1: No appreciable flow detected in the posterior tibial and peroneal arteries.    R foot XR 12/1: Soft tissue emphysema at the plantar aspect of the mid foot/calcaneous.     Cardiac Testing:  TTE 12/2:  Endocardium is not well visualized, however, overall left ventricular systolic function appears normal. Mild concentric left ventricular hypertrophy. Septal flattening is seen; this finding is consistent with right heart pressure / volume overload. Estimated left ventricular ejection fraction is 55-60%. The right ventricle exhibits mild dilation, mild diffuse hypokinesis, and mild depression of contractility. Severe mitral stenosis. Mild mitral regurgitation. Mild to moderate tricuspid valve regurgitation. Severe pulmonary hypertension.    Meds:  MEDICATIONS  (STANDING):  amLODIPine   Tablet 5 milliGRAM(s) Oral daily  aspirin  chewable 81 milliGRAM(s) Oral daily  dextrose 5%. 1000 milliLiter(s) (100 mL/Hr) IV Continuous <Continuous>  dextrose 5%. 1000 milliLiter(s) (50 mL/Hr) IV Continuous <Continuous>  dextrose 50% Injectable 25 Gram(s) IV Push once  dextrose 50% Injectable 12.5 Gram(s) IV Push once  dextrose 50% Injectable 25 Gram(s) IV Push once  doxycycline monohydrate Capsule 100 milliGRAM(s) Oral every 12 hours  enoxaparin Injectable 40 milliGRAM(s) SubCutaneous every 24 hours  famotidine    Tablet 40 milliGRAM(s) Oral at bedtime  furosemide    Tablet 40 milliGRAM(s) Oral daily  gabapentin 300 milliGRAM(s) Oral two times a day  gabapentin 600 milliGRAM(s) Oral at bedtime  glucagon  Injectable 1 milliGRAM(s) IntraMuscular once  insulin glargine Injectable (LANTUS) 30 Unit(s) SubCutaneous at bedtime  insulin lispro (ADMELOG) corrective regimen sliding scale   SubCutaneous three times a day before meals  isosorbide   mononitrate ER Tablet (IMDUR) 30 milliGRAM(s) Oral daily  lisinopril 5 milliGRAM(s) Oral daily  metoclopramide 10 milliGRAM(s) Oral daily  multivitamin 1 Tablet(s) Oral daily  pantoprazole    Tablet 40 milliGRAM(s) Oral before breakfast  piperacillin/tazobactam IVPB.. 3.375 Gram(s) IV Intermittent every 8 hours  polyethylene glycol 3350 17 Gram(s) Oral daily  senna 2 Tablet(s) Oral at bedtime  simvastatin 10 milliGRAM(s) Oral at bedtime  sucralfate suspension 1 Gram(s) Oral four times a day  tamsulosin 0.8 milliGRAM(s) Oral at bedtime    MEDICATIONS  (PRN):  acetaminophen     Tablet .. 650 milliGRAM(s) Oral every 6 hours PRN Temp greater or equal to 38C (100.4F), Mild Pain (1 - 3)  aluminum hydroxide/magnesium hydroxide/simethicone Suspension 30 milliLiter(s) Oral every 4 hours PRN Dyspepsia  dextrose Oral Gel 15 Gram(s) Oral once PRN Blood Glucose LESS THAN 70 milliGRAM(s)/deciliter  HYDROmorphone  Injectable 1 milliGRAM(s) IV Push every 4 hours PRN Moderate Pain (4 - 6)  HYDROmorphone  Injectable 1.5 milliGRAM(s) IV Push every 4 hours PRN Severe Pain (7 - 10)  magnesium hydroxide Suspension 30 milliLiter(s) Oral every 8 hours PRN Constipation  melatonin 3 milliGRAM(s) Oral at bedtime PRN Insomnia  ondansetron Injectable 4 milliGRAM(s) IV Push every 8 hours PRN Nausea and/or Vomiting          Assessment and Plan:  61 yo man with DM, HTN, CAD, 3v CABG, hx of PAD, L BKA, GERD, past alcohol use disorder, cirrhosis, chronic anemia, sent from [place] for worsening RLE swelling and redness, also complaints of epigastric discomfort. In the ED, exam was consistent with cellulitis of the RLE, non healing wound on the plantar surface of the R foot. Elevated inflammatory markers. Patient was placed on antibiotics and admitted to Medicine.     RLE skin and soft tissue infection in setting of non healing R foot ulcer due to/as a consequence of  type 2 DM and PAD:  -MRI RLE negative for osteomyelitis.   -S/P RLE angio, per vascular --> would benefit from distal femoral bypass surgery. Patient is cleared by cardiology. Medically stable and optimized for RLE bypass  -Continue on empiric antibiotics per ID with zosyn, and doxycycline   -Continue aspirin, statin  -Pain meds    CAD /  Hx of CABG / demand ischemia /  severe mitral stenosis / severe pulmonary HTN / HTN:  -echo: EF: 55-60%, severe Mitral stenosis, severe pulmonary HTN  -chest xray 12/7: vascular congestion  -c/w lasix  -monitor I/O  -Continue aspirin, statin, Imdur  -Continue amlodipine, lisinopril and Imdur  +/- right and left heart cath-after bypass as per cardiology      Epigastric discomfort / dyspepsia: improving  -Per GI --> Carafate,  PPI.   -Eventual EGD, treat empirically for now     DM2:  -A1c 7.  - Continue Lantus and Lispro  - Monitor glucose TID AC and HS    BPH: Stable  - Continue Flomax      Code Status: Full  DVT px: LMWH  Dispo: RLE bypass on Monday

## 2022-12-15 NOTE — PROGRESS NOTE ADULT - ASSESSMENT
62 year old male with history of PVD, IHD, MV repair. admitted with right leg cellulitis and was found to have significant stenosis of his RLE vasculature    Plan:  s/p RLE angio. Pt will need femoral distal bypass   medical and cardiac clearances appreciated  Scheduled for RLE bypass on Monday  Preop the patient accordingly: Sunday: labs at 5 am Monday, NPO aftermidnight,   Podiatry for local wound care  Medical management as per primary team    Plan discussed with Dr Bhardwaj

## 2022-12-15 NOTE — PROGRESS NOTE ADULT - SUBJECTIVE AND OBJECTIVE BOX
SURGERY DAILY PROGRESS NOTE:     Subjective:  Patient seen and examined at bedside during am rounds. AVSS. No acute events overnight    Objective:    MEDICATIONS  (STANDING):  amLODIPine   Tablet 5 milliGRAM(s) Oral daily  aspirin  chewable 81 milliGRAM(s) Oral daily  dextrose 5%. 1000 milliLiter(s) (50 mL/Hr) IV Continuous <Continuous>  dextrose 5%. 1000 milliLiter(s) (100 mL/Hr) IV Continuous <Continuous>  dextrose 50% Injectable 25 Gram(s) IV Push once  dextrose 50% Injectable 12.5 Gram(s) IV Push once  dextrose 50% Injectable 25 Gram(s) IV Push once  doxycycline monohydrate Capsule 100 milliGRAM(s) Oral every 12 hours  enoxaparin Injectable 40 milliGRAM(s) SubCutaneous every 24 hours  famotidine    Tablet 40 milliGRAM(s) Oral at bedtime  furosemide    Tablet 40 milliGRAM(s) Oral daily  gabapentin 300 milliGRAM(s) Oral two times a day  gabapentin 600 milliGRAM(s) Oral at bedtime  glucagon  Injectable 1 milliGRAM(s) IntraMuscular once  insulin glargine Injectable (LANTUS) 30 Unit(s) SubCutaneous at bedtime  insulin lispro (ADMELOG) corrective regimen sliding scale   SubCutaneous three times a day before meals  isosorbide   mononitrate ER Tablet (IMDUR) 30 milliGRAM(s) Oral daily  lisinopril 5 milliGRAM(s) Oral daily  metoclopramide 10 milliGRAM(s) Oral daily  multivitamin 1 Tablet(s) Oral daily  pantoprazole    Tablet 40 milliGRAM(s) Oral before breakfast  piperacillin/tazobactam IVPB.. 3.375 Gram(s) IV Intermittent every 8 hours  polyethylene glycol 3350 17 Gram(s) Oral daily  senna 2 Tablet(s) Oral at bedtime  simvastatin 10 milliGRAM(s) Oral at bedtime  sucralfate suspension 1 Gram(s) Oral four times a day  tamsulosin 0.8 milliGRAM(s) Oral at bedtime    MEDICATIONS  (PRN):  acetaminophen     Tablet .. 650 milliGRAM(s) Oral every 6 hours PRN Temp greater or equal to 38C (100.4F), Mild Pain (1 - 3)  aluminum hydroxide/magnesium hydroxide/simethicone Suspension 30 milliLiter(s) Oral every 4 hours PRN Dyspepsia  dextrose Oral Gel 15 Gram(s) Oral once PRN Blood Glucose LESS THAN 70 milliGRAM(s)/deciliter  HYDROmorphone  Injectable 1.5 milliGRAM(s) IV Push every 4 hours PRN Severe Pain (7 - 10)  HYDROmorphone  Injectable 1 milliGRAM(s) IV Push every 4 hours PRN Moderate Pain (4 - 6)  magnesium hydroxide Suspension 30 milliLiter(s) Oral every 8 hours PRN Constipation  melatonin 3 milliGRAM(s) Oral at bedtime PRN Insomnia  ondansetron Injectable 4 milliGRAM(s) IV Push every 8 hours PRN Nausea and/or Vomiting      Vital Signs Last 24 Hrs  T(C): 36.5 (15 Dec 2022 08:36), Max: 36.8 (14 Dec 2022 15:15)  T(F): 97.7 (15 Dec 2022 08:36), Max: 98.3 (14 Dec 2022 15:15)  HR: 80 (15 Dec 2022 08:36) (72 - 80)  BP: 132/66 (15 Dec 2022 08:36) (132/66 - 135/71)  BP(mean): --  RR: 18 (15 Dec 2022 08:36) (18 - 18)  SpO2: 95% (15 Dec 2022 08:36) (95% - 97%)    Parameters below as of 15 Dec 2022 08:36  Patient On (Oxygen Delivery Method): room air          PHYSICAL EXAM   Gen: well-appearing, in no acute distress  CV: pulse regularly present   Resp: airway patent, non-labored breathing  Abd: soft, NTND  Extremities: RLE less erythematous and swollen

## 2022-12-16 LAB
ANION GAP SERPL CALC-SCNC: 8 MMOL/L — SIGNIFICANT CHANGE UP (ref 5–17)
BUN SERPL-MCNC: 24 MG/DL — HIGH (ref 7–23)
CALCIUM SERPL-MCNC: 9.6 MG/DL — SIGNIFICANT CHANGE UP (ref 8.5–10.1)
CHLORIDE SERPL-SCNC: 108 MMOL/L — SIGNIFICANT CHANGE UP (ref 96–108)
CO2 SERPL-SCNC: 25 MMOL/L — SIGNIFICANT CHANGE UP (ref 22–31)
CREAT SERPL-MCNC: 1.05 MG/DL — SIGNIFICANT CHANGE UP (ref 0.5–1.3)
EGFR: 81 ML/MIN/1.73M2 — SIGNIFICANT CHANGE UP
GLUCOSE SERPL-MCNC: 153 MG/DL — HIGH (ref 70–99)
HCT VFR BLD CALC: 27.7 % — LOW (ref 39–50)
HGB BLD-MCNC: 8.8 G/DL — LOW (ref 13–17)
MCHC RBC-ENTMCNC: 31.8 GM/DL — LOW (ref 32–36)
MCHC RBC-ENTMCNC: 32 PG — SIGNIFICANT CHANGE UP (ref 27–34)
MCV RBC AUTO: 100.7 FL — HIGH (ref 80–100)
PLATELET # BLD AUTO: 431 K/UL — HIGH (ref 150–400)
POTASSIUM SERPL-MCNC: 3.8 MMOL/L — SIGNIFICANT CHANGE UP (ref 3.5–5.3)
POTASSIUM SERPL-SCNC: 3.8 MMOL/L — SIGNIFICANT CHANGE UP (ref 3.5–5.3)
RBC # BLD: 2.75 M/UL — LOW (ref 4.2–5.8)
RBC # FLD: 13.6 % — SIGNIFICANT CHANGE UP (ref 10.3–14.5)
SARS-COV-2 RNA SPEC QL NAA+PROBE: SIGNIFICANT CHANGE UP
SODIUM SERPL-SCNC: 141 MMOL/L — SIGNIFICANT CHANGE UP (ref 135–145)
WBC # BLD: 18.72 K/UL — HIGH (ref 3.8–10.5)
WBC # FLD AUTO: 18.72 K/UL — HIGH (ref 3.8–10.5)

## 2022-12-16 PROCEDURE — 99232 SBSQ HOSP IP/OBS MODERATE 35: CPT

## 2022-12-16 PROCEDURE — 93970 EXTREMITY STUDY: CPT | Mod: 26

## 2022-12-16 RX ADMIN — HYDROMORPHONE HYDROCHLORIDE 2 MILLIGRAM(S): 2 INJECTION INTRAMUSCULAR; INTRAVENOUS; SUBCUTANEOUS at 04:37

## 2022-12-16 RX ADMIN — INSULIN GLARGINE 30 UNIT(S): 100 INJECTION, SOLUTION SUBCUTANEOUS at 22:12

## 2022-12-16 RX ADMIN — ENOXAPARIN SODIUM 40 MILLIGRAM(S): 100 INJECTION SUBCUTANEOUS at 10:31

## 2022-12-16 RX ADMIN — TAMSULOSIN HYDROCHLORIDE 0.8 MILLIGRAM(S): 0.4 CAPSULE ORAL at 22:13

## 2022-12-16 RX ADMIN — ISOSORBIDE MONONITRATE 30 MILLIGRAM(S): 60 TABLET, EXTENDED RELEASE ORAL at 10:32

## 2022-12-16 RX ADMIN — GABAPENTIN 300 MILLIGRAM(S): 400 CAPSULE ORAL at 14:49

## 2022-12-16 RX ADMIN — SENNA PLUS 2 TABLET(S): 8.6 TABLET ORAL at 22:14

## 2022-12-16 RX ADMIN — PANTOPRAZOLE SODIUM 40 MILLIGRAM(S): 20 TABLET, DELAYED RELEASE ORAL at 04:37

## 2022-12-16 RX ADMIN — Medication 1 TABLET(S): at 10:32

## 2022-12-16 RX ADMIN — Medication 81 MILLIGRAM(S): at 10:32

## 2022-12-16 RX ADMIN — FAMOTIDINE 40 MILLIGRAM(S): 10 INJECTION INTRAVENOUS at 22:14

## 2022-12-16 RX ADMIN — HYDROMORPHONE HYDROCHLORIDE 2 MILLIGRAM(S): 2 INJECTION INTRAMUSCULAR; INTRAVENOUS; SUBCUTANEOUS at 05:07

## 2022-12-16 RX ADMIN — AMLODIPINE BESYLATE 5 MILLIGRAM(S): 2.5 TABLET ORAL at 10:32

## 2022-12-16 RX ADMIN — PIPERACILLIN AND TAZOBACTAM 25 GRAM(S): 4; .5 INJECTION, POWDER, LYOPHILIZED, FOR SOLUTION INTRAVENOUS at 10:31

## 2022-12-16 RX ADMIN — Medication 100 MILLIGRAM(S): at 22:14

## 2022-12-16 RX ADMIN — SIMVASTATIN 10 MILLIGRAM(S): 20 TABLET, FILM COATED ORAL at 22:13

## 2022-12-16 RX ADMIN — Medication 1 GRAM(S): at 11:18

## 2022-12-16 RX ADMIN — Medication 10 MILLIGRAM(S): at 10:32

## 2022-12-16 RX ADMIN — Medication 1 GRAM(S): at 22:13

## 2022-12-16 RX ADMIN — HYDROMORPHONE HYDROCHLORIDE 2 MILLIGRAM(S): 2 INJECTION INTRAMUSCULAR; INTRAVENOUS; SUBCUTANEOUS at 14:49

## 2022-12-16 RX ADMIN — PIPERACILLIN AND TAZOBACTAM 25 GRAM(S): 4; .5 INJECTION, POWDER, LYOPHILIZED, FOR SOLUTION INTRAVENOUS at 22:17

## 2022-12-16 RX ADMIN — Medication 1 GRAM(S): at 18:47

## 2022-12-16 RX ADMIN — HYDROMORPHONE HYDROCHLORIDE 2 MILLIGRAM(S): 2 INJECTION INTRAMUSCULAR; INTRAVENOUS; SUBCUTANEOUS at 14:55

## 2022-12-16 RX ADMIN — HYDROMORPHONE HYDROCHLORIDE 2 MILLIGRAM(S): 2 INJECTION INTRAMUSCULAR; INTRAVENOUS; SUBCUTANEOUS at 22:16

## 2022-12-16 RX ADMIN — Medication 100 MILLIGRAM(S): at 10:32

## 2022-12-16 RX ADMIN — GABAPENTIN 300 MILLIGRAM(S): 400 CAPSULE ORAL at 04:37

## 2022-12-16 RX ADMIN — HYDROMORPHONE HYDROCHLORIDE 2 MILLIGRAM(S): 2 INJECTION INTRAMUSCULAR; INTRAVENOUS; SUBCUTANEOUS at 18:48

## 2022-12-16 RX ADMIN — Medication 40 MILLIGRAM(S): at 10:39

## 2022-12-16 RX ADMIN — HYDROMORPHONE HYDROCHLORIDE 2 MILLIGRAM(S): 2 INJECTION INTRAMUSCULAR; INTRAVENOUS; SUBCUTANEOUS at 11:55

## 2022-12-16 RX ADMIN — PIPERACILLIN AND TAZOBACTAM 25 GRAM(S): 4; .5 INJECTION, POWDER, LYOPHILIZED, FOR SOLUTION INTRAVENOUS at 18:47

## 2022-12-16 RX ADMIN — LISINOPRIL 5 MILLIGRAM(S): 2.5 TABLET ORAL at 10:32

## 2022-12-16 RX ADMIN — Medication 1 GRAM(S): at 04:37

## 2022-12-16 RX ADMIN — HYDROMORPHONE HYDROCHLORIDE 2 MILLIGRAM(S): 2 INJECTION INTRAMUSCULAR; INTRAVENOUS; SUBCUTANEOUS at 10:40

## 2022-12-16 RX ADMIN — GABAPENTIN 600 MILLIGRAM(S): 400 CAPSULE ORAL at 22:14

## 2022-12-16 RX ADMIN — Medication 3 MILLIGRAM(S): at 22:15

## 2022-12-16 NOTE — PROGRESS NOTE ADULT - SUBJECTIVE AND OBJECTIVE BOX
Chief Complaint: RLE swelling and redness, epigastric discomfort    Interval Hx: Patient seen and examined earlier today. No new complaints. No significant changes. Continues to have RLE swelling and redness, slightly improved compared to presentation. Continues to have RLE pain complaints, only partially relieved with hydromorphone 1mg IV q4h prn. He remains on empiric antibiotics. MRI negative for osteomyelitis. Workup ongoing to further evaluate the extent of his peripheral vascular disease in that extremity. He is planned for LE angiogram tomorrow. As for his epigastric discomfort, it is mild-to-moderate, being treated with Carafate and PPI. Anticipated that he will eventually need an upper endoscopy to evaluate that epigastric discomfort and also as routine appropriate testing in the setting of his cirrhosis.   12/06/22: RLE angio today. No new issues.  12/07/22: Patient seen and examined. Complaining of right LE pain. Discussed with patient regarding management plan.   12/8: Sitting in chair, comfortable, offers no complaints. Denies fever, chills, n, v.  12/09/22: Patient seen and examined. Nuclear stress test today  12/10/22: No new issues.   12/11/22: Sitting in chair, feels better. No new issues  12/12/22: Discussed with Dr Hubbard for cardiac clearance. No new issues. Waiting for RLE femoral bypass.  12/13/22: No new complaints. Discussed with vascular, possible bypass on Friday or next Monday. Discussed with patient.    12/1/4/22: No new issues.   12/15/22: Complaining of pain. Scheduled for RLE bypass on Monday 12/16:  States has pain in his affected foot, otherwise comfortable, awaiting surgical intervention.  Tolerating antibiotics.      ROS: Multi system review is comprehensively negative x 10 systems except as above    Vitals:    Vital Signs Last 24 Hrs  T(C): 36.9 (16 Dec 2022 09:21), Max: 36.9 (16 Dec 2022 09:21)  T(F): 98.4 (16 Dec 2022 09:21), Max: 98.4 (16 Dec 2022 09:21)  HR: 79 (16 Dec 2022 09:21) (79 - 87)  BP: 138/68 (16 Dec 2022 09:21) (119/68 - 156/73)  BP(mean): --  RR: 18 (16 Dec 2022 09:21) (18 - 18)  SpO2: 95% (16 Dec 2022 09:21) (93% - 97%)    Parameters below as of 16 Dec 2022 09:21  Patient On (Oxygen Delivery Method): room air        Exam:    Gen: No acute distress  HEENT: NCAT PERRL EOMI MMM  Neck: Supple no JVD no LAD  CVS: S1 S2 normal RRR  Chest: Normal resp effort, lungs CTA B/L  Abd: +BS, soft NT ND   Ext: L BKA. RLE with mild erythema from upper shin down to foot, small superficial wounds on shin, large open wound on plantar surface of foot at the foot pad.   Skin: As described in extremity exam. Some additional flaking skin on R foot  Neuro: A+OX3, no focal deficits, sensation intake to RLE  Mood: Calm, pleasant        Labs:                                                      8.8    18.72 )-----------( 431      ( 16 Dec 2022 07:45 )             27.7     12-16    141  |  108  |  24<H>  ----------------------------<  153<H>  3.8   |  25  |  1.05    Ca    9.6      16 Dec 2022 07:45      CAPILLARY BLOOD GLUCOSE      POCT Blood Glucose.: 129 mg/dL (16 Dec 2022 11:00)  POCT Blood Glucose.: 143 mg/dL (16 Dec 2022 08:30)  POCT Blood Glucose.: 165 mg/dL (15 Dec 2022 21:24)  POCT Blood Glucose.: 171 mg/dL (15 Dec 2022 15:52)        Micro:  Bld cx x 2 12/1: Negative to date  COVID, Flu, RSV PCR 12/1: Negative    Imaging:  MRI RLE, R foot 12/2: There is no evidence for osteomyelitis.    RLE arterial duplex 12/1: No appreciable flow detected in the posterior tibial and peroneal arteries.    R foot XR 12/1: Soft tissue emphysema at the plantar aspect of the mid foot/calcaneous.     Cardiac Testing:  TTE 12/2:  Endocardium is not well visualized, however, overall left ventricular systolic function appears normal. Mild concentric left ventricular hypertrophy. Septal flattening is seen; this finding is consistent with right heart pressure / volume overload. Estimated left ventricular ejection fraction is 55-60%. The right ventricle exhibits mild dilation, mild diffuse hypokinesis, and mild depression of contractility. Severe mitral stenosis. Mild mitral regurgitation. Mild to moderate tricuspid valve regurgitation. Severe pulmonary hypertension.    Meds:  MEDICATIONS  (STANDING):  amLODIPine   Tablet 5 milliGRAM(s) Oral daily  aspirin  chewable 81 milliGRAM(s) Oral daily  dextrose 5%. 1000 milliLiter(s) (100 mL/Hr) IV Continuous <Continuous>  dextrose 5%. 1000 milliLiter(s) (50 mL/Hr) IV Continuous <Continuous>  dextrose 50% Injectable 25 Gram(s) IV Push once  dextrose 50% Injectable 12.5 Gram(s) IV Push once  dextrose 50% Injectable 25 Gram(s) IV Push once  doxycycline monohydrate Capsule 100 milliGRAM(s) Oral every 12 hours  enoxaparin Injectable 40 milliGRAM(s) SubCutaneous every 24 hours  famotidine    Tablet 40 milliGRAM(s) Oral at bedtime  furosemide    Tablet 40 milliGRAM(s) Oral daily  gabapentin 300 milliGRAM(s) Oral two times a day  gabapentin 600 milliGRAM(s) Oral at bedtime  glucagon  Injectable 1 milliGRAM(s) IntraMuscular once  insulin glargine Injectable (LANTUS) 30 Unit(s) SubCutaneous at bedtime  insulin lispro (ADMELOG) corrective regimen sliding scale   SubCutaneous three times a day before meals  isosorbide   mononitrate ER Tablet (IMDUR) 30 milliGRAM(s) Oral daily  lisinopril 5 milliGRAM(s) Oral daily  metoclopramide 10 milliGRAM(s) Oral daily  multivitamin 1 Tablet(s) Oral daily  pantoprazole    Tablet 40 milliGRAM(s) Oral before breakfast  piperacillin/tazobactam IVPB.. 3.375 Gram(s) IV Intermittent every 8 hours  polyethylene glycol 3350 17 Gram(s) Oral daily  senna 2 Tablet(s) Oral at bedtime  simvastatin 10 milliGRAM(s) Oral at bedtime  sucralfate suspension 1 Gram(s) Oral four times a day  tamsulosin 0.8 milliGRAM(s) Oral at bedtime    MEDICATIONS  (PRN):  acetaminophen     Tablet .. 650 milliGRAM(s) Oral every 6 hours PRN Temp greater or equal to 38C (100.4F), Mild Pain (1 - 3)  aluminum hydroxide/magnesium hydroxide/simethicone Suspension 30 milliLiter(s) Oral every 4 hours PRN Dyspepsia  dextrose Oral Gel 15 Gram(s) Oral once PRN Blood Glucose LESS THAN 70 milliGRAM(s)/deciliter  HYDROmorphone  Injectable 2 milliGRAM(s) IV Push every 4 hours PRN Severe Pain (7 - 10)  magnesium hydroxide Suspension 30 milliLiter(s) Oral every 8 hours PRN Constipation  melatonin 3 milliGRAM(s) Oral at bedtime PRN Insomnia  ondansetron Injectable 4 milliGRAM(s) IV Push every 8 hours PRN Nausea and/or Vomiting          Assessment and Plan:  63 yo man with DM, HTN, CAD, 3v CABG, hx of PAD, L BKA, GERD, past alcohol use disorder, cirrhosis, chronic anemia, sent from [place] for worsening RLE swelling and redness, also complaints of epigastric discomfort. In the ED, exam was consistent with cellulitis of the RLE, non healing wound on the plantar surface of the R foot. Elevated inflammatory markers. Patient was placed on antibiotics and admitted to Medicine.     RLE skin and soft tissue infection in setting of non healing R foot ulcer due to/as a consequence of  type 2 DM and PAD:  -MRI RLE negative for osteomyelitis.   -S/P RLE angio, per vascular --> would benefit from distal femoral bypass surgery. Patient is cleared by cardiology. Medically stable and optimized for RLE bypass  -Continue on empiric antibiotics per ID with zosyn, and doxycycline   -Continue aspirin, statin  -Pain meds    CAD /  Hx of CABG / demand ischemia /  severe mitral stenosis / severe pulmonary HTN / HTN:  -echo: EF: 55-60%, severe Mitral stenosis, severe pulmonary HTN  -chest xray 12/7: vascular congestion  -c/w lasix  -monitor I/O  -Continue aspirin, statin, Imdur  -Continue amlodipine, lisinopril and Imdur  +/- right and left heart cath-after bypass as per cardiology      Epigastric discomfort / dyspepsia: improving  -Per GI --> Carafate,  PPI.   -Eventual EGD, treat empirically for now     DM2:  -A1c 7.  - Continue Lantus and Lispro  - Monitor glucose TID AC and HS    BPH: Stable  - Continue Flomax      Code Status: Full  DVT px: LMWH  Dispo: RLE bypass on Monday

## 2022-12-16 NOTE — PROGRESS NOTE ADULT - SUBJECTIVE AND OBJECTIVE BOX
SURGERY DAILY PROGRESS NOTE:     Subjective:  Patient seen and examined this AM at bedside. No acute events overnight and patient resting comfortably. Denies fever/chills, shortness of breath, chest pain. VS reviewed    Objective:    MEDICATIONS  (STANDING):  amLODIPine   Tablet 5 milliGRAM(s) Oral daily  aspirin  chewable 81 milliGRAM(s) Oral daily  dextrose 5%. 1000 milliLiter(s) (100 mL/Hr) IV Continuous <Continuous>  dextrose 5%. 1000 milliLiter(s) (50 mL/Hr) IV Continuous <Continuous>  dextrose 50% Injectable 25 Gram(s) IV Push once  dextrose 50% Injectable 12.5 Gram(s) IV Push once  dextrose 50% Injectable 25 Gram(s) IV Push once  doxycycline monohydrate Capsule 100 milliGRAM(s) Oral every 12 hours  enoxaparin Injectable 40 milliGRAM(s) SubCutaneous every 24 hours  famotidine    Tablet 40 milliGRAM(s) Oral at bedtime  furosemide    Tablet 40 milliGRAM(s) Oral daily  gabapentin 600 milliGRAM(s) Oral at bedtime  gabapentin 300 milliGRAM(s) Oral two times a day  glucagon  Injectable 1 milliGRAM(s) IntraMuscular once  insulin glargine Injectable (LANTUS) 30 Unit(s) SubCutaneous at bedtime  insulin lispro (ADMELOG) corrective regimen sliding scale   SubCutaneous three times a day before meals  isosorbide   mononitrate ER Tablet (IMDUR) 30 milliGRAM(s) Oral daily  lisinopril 5 milliGRAM(s) Oral daily  metoclopramide 10 milliGRAM(s) Oral daily  multivitamin 1 Tablet(s) Oral daily  pantoprazole    Tablet 40 milliGRAM(s) Oral before breakfast  piperacillin/tazobactam IVPB.. 3.375 Gram(s) IV Intermittent every 8 hours  polyethylene glycol 3350 17 Gram(s) Oral daily  senna 2 Tablet(s) Oral at bedtime  simvastatin 10 milliGRAM(s) Oral at bedtime  sucralfate suspension 1 Gram(s) Oral four times a day  tamsulosin 0.8 milliGRAM(s) Oral at bedtime    MEDICATIONS  (PRN):  acetaminophen     Tablet .. 650 milliGRAM(s) Oral every 6 hours PRN Temp greater or equal to 38C (100.4F), Mild Pain (1 - 3)  aluminum hydroxide/magnesium hydroxide/simethicone Suspension 30 milliLiter(s) Oral every 4 hours PRN Dyspepsia  dextrose Oral Gel 15 Gram(s) Oral once PRN Blood Glucose LESS THAN 70 milliGRAM(s)/deciliter  HYDROmorphone  Injectable 2 milliGRAM(s) IV Push every 4 hours PRN Severe Pain (7 - 10)  magnesium hydroxide Suspension 30 milliLiter(s) Oral every 8 hours PRN Constipation  melatonin 3 milliGRAM(s) Oral at bedtime PRN Insomnia  ondansetron Injectable 4 milliGRAM(s) IV Push every 8 hours PRN Nausea and/or Vomiting      Vital Signs Last 24 Hrs  T(C): 36.5 (16 Dec 2022 04:27), Max: 36.8 (15 Dec 2022 21:49)  T(F): 97.7 (16 Dec 2022 04:27), Max: 98.2 (15 Dec 2022 21:49)  HR: 87 (16 Dec 2022 04:27) (77 - 87)  BP: 137/67 (16 Dec 2022 04:27) (119/68 - 156/73)  BP(mean): --  RR: 18 (16 Dec 2022 04:27) (18 - 19)  SpO2: 93% (16 Dec 2022 04:27) (93% - 97%)    Parameters below as of 16 Dec 2022 04:27  Patient On (Oxygen Delivery Method): room air          PHYSICAL EXAM   Gen: well-appearing, in no acute distress  CV: pulse regularly present   Resp: airway patent, non-labored breathing  Abd: soft, NTND  Extremities: RLE less erythematous and swollen        I&O's Detail    15 Dec 2022 07:01  -  16 Dec 2022 07:00  --------------------------------------------------------  IN:  Total IN: 0 mL    OUT:    Voided (mL): 850 mL  Total OUT: 850 mL    Total NET: -850 mL          Daily     Daily     LABS:                        8.8    18.72 )-----------( 431      ( 16 Dec 2022 07:45 )             27.7     12-16    141  |  108  |  24<H>  ----------------------------<  153<H>  3.8   |  25  |  1.05    Ca    9.6      16 Dec 2022 07:45            RADIOLOGY & ADDITIONAL STUDIES:

## 2022-12-16 NOTE — PROGRESS NOTE ADULT - ASSESSMENT
62 year old male with history of PVD, IHD, MV repair. admitted with right leg cellulitis and was found to have significant stenosis of his RLE vasculature    Plan:  s/p RLE angio. Pt will need femoral distal bypass   medical and cardiac clearances appreciated  Vein mapping  Scheduled for RLE bypass on Monday  Preop the patient accordingly: Sunday: labs at 5 am Monday, NPO aftermidnight,   Podiatry for local wound care  Medical management as per primary team    Plan discussed with Dr Bhardwaj

## 2022-12-17 PROCEDURE — 99232 SBSQ HOSP IP/OBS MODERATE 35: CPT

## 2022-12-17 RX ADMIN — Medication 100 MILLIGRAM(S): at 10:30

## 2022-12-17 RX ADMIN — ISOSORBIDE MONONITRATE 30 MILLIGRAM(S): 60 TABLET, EXTENDED RELEASE ORAL at 10:30

## 2022-12-17 RX ADMIN — LISINOPRIL 5 MILLIGRAM(S): 2.5 TABLET ORAL at 10:31

## 2022-12-17 RX ADMIN — POLYETHYLENE GLYCOL 3350 17 GRAM(S): 17 POWDER, FOR SOLUTION ORAL at 10:36

## 2022-12-17 RX ADMIN — Medication 1 GRAM(S): at 12:46

## 2022-12-17 RX ADMIN — PANTOPRAZOLE SODIUM 40 MILLIGRAM(S): 20 TABLET, DELAYED RELEASE ORAL at 06:31

## 2022-12-17 RX ADMIN — HYDROMORPHONE HYDROCHLORIDE 2 MILLIGRAM(S): 2 INJECTION INTRAMUSCULAR; INTRAVENOUS; SUBCUTANEOUS at 02:45

## 2022-12-17 RX ADMIN — PIPERACILLIN AND TAZOBACTAM 25 GRAM(S): 4; .5 INJECTION, POWDER, LYOPHILIZED, FOR SOLUTION INTRAVENOUS at 21:57

## 2022-12-17 RX ADMIN — HYDROMORPHONE HYDROCHLORIDE 2 MILLIGRAM(S): 2 INJECTION INTRAMUSCULAR; INTRAVENOUS; SUBCUTANEOUS at 15:31

## 2022-12-17 RX ADMIN — HYDROMORPHONE HYDROCHLORIDE 2 MILLIGRAM(S): 2 INJECTION INTRAMUSCULAR; INTRAVENOUS; SUBCUTANEOUS at 06:47

## 2022-12-17 RX ADMIN — GABAPENTIN 300 MILLIGRAM(S): 400 CAPSULE ORAL at 06:31

## 2022-12-17 RX ADMIN — PIPERACILLIN AND TAZOBACTAM 25 GRAM(S): 4; .5 INJECTION, POWDER, LYOPHILIZED, FOR SOLUTION INTRAVENOUS at 06:43

## 2022-12-17 RX ADMIN — AMLODIPINE BESYLATE 5 MILLIGRAM(S): 2.5 TABLET ORAL at 10:42

## 2022-12-17 RX ADMIN — GABAPENTIN 300 MILLIGRAM(S): 400 CAPSULE ORAL at 15:35

## 2022-12-17 RX ADMIN — SENNA PLUS 2 TABLET(S): 8.6 TABLET ORAL at 21:56

## 2022-12-17 RX ADMIN — Medication 1 GRAM(S): at 17:26

## 2022-12-17 RX ADMIN — Medication 40 MILLIGRAM(S): at 10:36

## 2022-12-17 RX ADMIN — PIPERACILLIN AND TAZOBACTAM 25 GRAM(S): 4; .5 INJECTION, POWDER, LYOPHILIZED, FOR SOLUTION INTRAVENOUS at 15:36

## 2022-12-17 RX ADMIN — Medication 1 GRAM(S): at 21:56

## 2022-12-17 RX ADMIN — INSULIN GLARGINE 30 UNIT(S): 100 INJECTION, SOLUTION SUBCUTANEOUS at 21:57

## 2022-12-17 RX ADMIN — HYDROMORPHONE HYDROCHLORIDE 2 MILLIGRAM(S): 2 INJECTION INTRAMUSCULAR; INTRAVENOUS; SUBCUTANEOUS at 20:14

## 2022-12-17 RX ADMIN — SIMVASTATIN 10 MILLIGRAM(S): 20 TABLET, FILM COATED ORAL at 21:56

## 2022-12-17 RX ADMIN — FAMOTIDINE 40 MILLIGRAM(S): 10 INJECTION INTRAVENOUS at 21:56

## 2022-12-17 RX ADMIN — Medication 1 TABLET(S): at 10:36

## 2022-12-17 RX ADMIN — Medication 1 GRAM(S): at 06:31

## 2022-12-17 RX ADMIN — GABAPENTIN 600 MILLIGRAM(S): 400 CAPSULE ORAL at 21:56

## 2022-12-17 RX ADMIN — HYDROMORPHONE HYDROCHLORIDE 2 MILLIGRAM(S): 2 INJECTION INTRAMUSCULAR; INTRAVENOUS; SUBCUTANEOUS at 02:24

## 2022-12-17 RX ADMIN — ENOXAPARIN SODIUM 40 MILLIGRAM(S): 100 INJECTION SUBCUTANEOUS at 10:36

## 2022-12-17 RX ADMIN — HYDROMORPHONE HYDROCHLORIDE 2 MILLIGRAM(S): 2 INJECTION INTRAMUSCULAR; INTRAVENOUS; SUBCUTANEOUS at 10:37

## 2022-12-17 RX ADMIN — TAMSULOSIN HYDROCHLORIDE 0.8 MILLIGRAM(S): 0.4 CAPSULE ORAL at 21:56

## 2022-12-17 RX ADMIN — Medication 100 MILLIGRAM(S): at 21:56

## 2022-12-17 RX ADMIN — Medication 10 MILLIGRAM(S): at 10:42

## 2022-12-17 RX ADMIN — HYDROMORPHONE HYDROCHLORIDE 2 MILLIGRAM(S): 2 INJECTION INTRAMUSCULAR; INTRAVENOUS; SUBCUTANEOUS at 06:30

## 2022-12-17 RX ADMIN — Medication 81 MILLIGRAM(S): at 10:36

## 2022-12-17 NOTE — PROGRESS NOTE ADULT - ASSESSMENT
62 year old male with history of PVD, IHD, MV repair. admitted with right leg cellulitis and was found to have significant stenosis of his RLE vasculature    Plan:  s/p RLE angio  medical and cardiac clearances appreciated  Vein mapping appreciated  Scheduled for RLE bypass on Monday  Preop the patient accordingly: Sunday: labs at 5 am Monday, NPO aftermidnight,   Podiatry for local wound care  Medical management as per primary team    Plan discussed with Dr Bhardwaj

## 2022-12-17 NOTE — PROGRESS NOTE ADULT - SUBJECTIVE AND OBJECTIVE BOX
Chief Complaint: RLE swelling and redness, epigastric discomfort    Interval Hx: Patient seen and examined earlier today. No new complaints. No significant changes. Continues to have RLE swelling and redness, slightly improved compared to presentation. Continues to have RLE pain complaints, only partially relieved with hydromorphone 1mg IV q4h prn. He remains on empiric antibiotics. MRI negative for osteomyelitis. Workup ongoing to further evaluate the extent of his peripheral vascular disease in that extremity. He is planned for LE angiogram tomorrow. As for his epigastric discomfort, it is mild-to-moderate, being treated with Carafate and PPI. Anticipated that he will eventually need an upper endoscopy to evaluate that epigastric discomfort and also as routine appropriate testing in the setting of his cirrhosis.   12/06/22: RLE angio today. No new issues.  12/07/22: Patient seen and examined. Complaining of right LE pain. Discussed with patient regarding management plan.   12/8: Sitting in chair, comfortable, offers no complaints. Denies fever, chills, n, v.  12/09/22: Patient seen and examined. Nuclear stress test today  12/10/22: No new issues.   12/11/22: Sitting in chair, feels better. No new issues  12/12/22: Discussed with Dr Hubbard for cardiac clearance. No new issues. Waiting for RLE femoral bypass.  12/13/22: No new complaints. Discussed with vascular, possible bypass on Friday or next Monday. Discussed with patient.    12/1/4/22: No new issues.   12/15/22: Complaining of pain. Scheduled for RLE bypass on Monday 12/16:  States has pain in his affected foot, otherwise comfortable, awaiting surgical intervention.  Tolerating antibiotics.  12/17: No new events, appears comfortable.    ROS: Multi system review is comprehensively negative x 10 systems except as above    Vitals:    Vital Signs Last 24 Hrs  T(C): 36.4 (17 Dec 2022 08:44), Max: 37 (16 Dec 2022 23:55)  T(F): 97.5 (17 Dec 2022 08:44), Max: 98.6 (16 Dec 2022 23:55)  HR: 70 (17 Dec 2022 08:44) (70 - 85)  BP: 134/63 (17 Dec 2022 08:44) (108/58 - 144/68)  BP(mean): --  RR: 19 (17 Dec 2022 08:44) (18 - 19)  SpO2: 98% (17 Dec 2022 08:44) (94% - 98%)    Parameters below as of 17 Dec 2022 08:44  Patient On (Oxygen Delivery Method): room air            Exam:    Gen: No acute distress  HEENT: NCAT PERRL EOMI MMM  Neck: Supple no JVD no LAD  CVS: S1 S2 normal RRR  Chest: Normal resp effort, lungs CTA B/L  Abd: +BS, soft NT ND   Ext: L BKA. RLE with mild erythema from upper shin down to foot, small superficial wounds on shin, large open wound on plantar surface of foot at the foot pad.   Skin: As described in extremity exam. Some additional flaking skin on R foot  Neuro: A+OX3, no focal deficits, sensation intake to RLE  Mood: Calm, pleasant        Labs:                                                        8.8    18.72 )-----------( 431      ( 16 Dec 2022 07:45 )             27.7     12-16    141  |  108  |  24<H>  ----------------------------<  153<H>  3.8   |  25  |  1.05    Ca    9.6      16 Dec 2022 07:45      CAPILLARY BLOOD GLUCOSE      POCT Blood Glucose.: 154 mg/dL (17 Dec 2022 12:45)  POCT Blood Glucose.: 100 mg/dL (17 Dec 2022 08:34)  POCT Blood Glucose.: 132 mg/dL (16 Dec 2022 22:09)  POCT Blood Glucose.: 101 mg/dL (16 Dec 2022 17:03)                  Micro:  Bld cx x 2 12/1: Negative to date  COVID, Flu, RSV PCR 12/1: Negative    Imaging:  MRI RLE, R foot 12/2: There is no evidence for osteomyelitis.    RLE arterial duplex 12/1: No appreciable flow detected in the posterior tibial and peroneal arteries.    R foot XR 12/1: Soft tissue emphysema at the plantar aspect of the mid foot/calcaneous.     Cardiac Testing:  TTE 12/2:  Endocardium is not well visualized, however, overall left ventricular systolic function appears normal. Mild concentric left ventricular hypertrophy. Septal flattening is seen; this finding is consistent with right heart pressure / volume overload. Estimated left ventricular ejection fraction is 55-60%. The right ventricle exhibits mild dilation, mild diffuse hypokinesis, and mild depression of contractility. Severe mitral stenosis. Mild mitral regurgitation. Mild to moderate tricuspid valve regurgitation. Severe pulmonary hypertension.    Meds:  MEDICATIONS  (STANDING):  amLODIPine   Tablet 5 milliGRAM(s) Oral daily  aspirin  chewable 81 milliGRAM(s) Oral daily  dextrose 5%. 1000 milliLiter(s) (100 mL/Hr) IV Continuous <Continuous>  dextrose 5%. 1000 milliLiter(s) (50 mL/Hr) IV Continuous <Continuous>  dextrose 50% Injectable 25 Gram(s) IV Push once  dextrose 50% Injectable 12.5 Gram(s) IV Push once  dextrose 50% Injectable 25 Gram(s) IV Push once  doxycycline monohydrate Capsule 100 milliGRAM(s) Oral every 12 hours  enoxaparin Injectable 40 milliGRAM(s) SubCutaneous every 24 hours  famotidine    Tablet 40 milliGRAM(s) Oral at bedtime  furosemide    Tablet 40 milliGRAM(s) Oral daily  gabapentin 300 milliGRAM(s) Oral two times a day  gabapentin 600 milliGRAM(s) Oral at bedtime  glucagon  Injectable 1 milliGRAM(s) IntraMuscular once  insulin glargine Injectable (LANTUS) 30 Unit(s) SubCutaneous at bedtime  insulin lispro (ADMELOG) corrective regimen sliding scale   SubCutaneous three times a day before meals  isosorbide   mononitrate ER Tablet (IMDUR) 30 milliGRAM(s) Oral daily  lisinopril 5 milliGRAM(s) Oral daily  metoclopramide 10 milliGRAM(s) Oral daily  multivitamin 1 Tablet(s) Oral daily  pantoprazole    Tablet 40 milliGRAM(s) Oral before breakfast  piperacillin/tazobactam IVPB.. 3.375 Gram(s) IV Intermittent every 8 hours  polyethylene glycol 3350 17 Gram(s) Oral daily  senna 2 Tablet(s) Oral at bedtime  simvastatin 10 milliGRAM(s) Oral at bedtime  sucralfate suspension 1 Gram(s) Oral four times a day  tamsulosin 0.8 milliGRAM(s) Oral at bedtime    MEDICATIONS  (PRN):  acetaminophen     Tablet .. 650 milliGRAM(s) Oral every 6 hours PRN Temp greater or equal to 38C (100.4F), Mild Pain (1 - 3)  aluminum hydroxide/magnesium hydroxide/simethicone Suspension 30 milliLiter(s) Oral every 4 hours PRN Dyspepsia  dextrose Oral Gel 15 Gram(s) Oral once PRN Blood Glucose LESS THAN 70 milliGRAM(s)/deciliter  HYDROmorphone  Injectable 2 milliGRAM(s) IV Push every 4 hours PRN Severe Pain (7 - 10)  magnesium hydroxide Suspension 30 milliLiter(s) Oral every 8 hours PRN Constipation  melatonin 3 milliGRAM(s) Oral at bedtime PRN Insomnia  ondansetron Injectable 4 milliGRAM(s) IV Push every 8 hours PRN Nausea and/or Vomiting        Assessment and Plan:  63 yo man with DM, HTN, CAD, 3v CABG, hx of PAD, L BKA, GERD, past alcohol use disorder, cirrhosis, chronic anemia, sent from [place] for worsening RLE swelling and redness, also complaints of epigastric discomfort. In the ED, exam was consistent with cellulitis of the RLE, non healing wound on the plantar surface of the R foot. Elevated inflammatory markers. Patient was placed on antibiotics and admitted to Medicine.     RLE skin and soft tissue infection in setting of non healing R foot ulcer due to/as a consequence of  type 2 DM and PAD:  -MRI RLE negative for osteomyelitis.   -S/P RLE angio, per vascular --> would benefit from distal femoral bypass surgery. Patient is cleared by cardiology. Medically stable and optimized for RLE bypass  -Continue on empiric antibiotics per ID with zosyn, and doxycycline   -Continue aspirin, statin  -Pain meds    CAD /  Hx of CABG / demand ischemia /  severe mitral stenosis / severe pulmonary HTN / HTN:  -echo: EF: 55-60%, severe Mitral stenosis, severe pulmonary HTN  -chest xray 12/7: vascular congestion  -c/w lasix  -monitor I/O  -Continue aspirin, statin, Imdur  -Continue amlodipine, lisinopril and Imdur  +/- right and left heart cath-after bypass as per cardiology      Epigastric discomfort / dyspepsia: improving  -Per GI --> Carafate,  PPI.   -Eventual EGD, treat empirically for now     DM2:  -A1c 7.  - Continue Lantus and Lispro  - Monitor glucose TID AC and HS    BPH: Stable  - Continue Flomax      Code Status: Full  DVT px: LMWH  Dispo: RLE bypass on Monday

## 2022-12-17 NOTE — PROGRESS NOTE ADULT - SUBJECTIVE AND OBJECTIVE BOX
SURGERY DAILY PROGRESS NOTE:     Subjective:  Patient seen and examined this AM at bedside. No acute events overnight and patient resting comfortably. Denies fever/chills, shortness of breath, chest pain. VS reviewed    Objective:    MEDICATIONS  (STANDING):  amLODIPine   Tablet 5 milliGRAM(s) Oral daily  aspirin  chewable 81 milliGRAM(s) Oral daily  dextrose 5%. 1000 milliLiter(s) (50 mL/Hr) IV Continuous <Continuous>  dextrose 5%. 1000 milliLiter(s) (100 mL/Hr) IV Continuous <Continuous>  dextrose 50% Injectable 25 Gram(s) IV Push once  dextrose 50% Injectable 12.5 Gram(s) IV Push once  dextrose 50% Injectable 25 Gram(s) IV Push once  doxycycline monohydrate Capsule 100 milliGRAM(s) Oral every 12 hours  enoxaparin Injectable 40 milliGRAM(s) SubCutaneous every 24 hours  famotidine    Tablet 40 milliGRAM(s) Oral at bedtime  furosemide    Tablet 40 milliGRAM(s) Oral daily  gabapentin 300 milliGRAM(s) Oral two times a day  gabapentin 600 milliGRAM(s) Oral at bedtime  glucagon  Injectable 1 milliGRAM(s) IntraMuscular once  insulin glargine Injectable (LANTUS) 30 Unit(s) SubCutaneous at bedtime  insulin lispro (ADMELOG) corrective regimen sliding scale   SubCutaneous three times a day before meals  isosorbide   mononitrate ER Tablet (IMDUR) 30 milliGRAM(s) Oral daily  lisinopril 5 milliGRAM(s) Oral daily  metoclopramide 10 milliGRAM(s) Oral daily  multivitamin 1 Tablet(s) Oral daily  pantoprazole    Tablet 40 milliGRAM(s) Oral before breakfast  piperacillin/tazobactam IVPB.. 3.375 Gram(s) IV Intermittent every 8 hours  polyethylene glycol 3350 17 Gram(s) Oral daily  senna 2 Tablet(s) Oral at bedtime  simvastatin 10 milliGRAM(s) Oral at bedtime  sucralfate suspension 1 Gram(s) Oral four times a day  tamsulosin 0.8 milliGRAM(s) Oral at bedtime    MEDICATIONS  (PRN):  acetaminophen     Tablet .. 650 milliGRAM(s) Oral every 6 hours PRN Temp greater or equal to 38C (100.4F), Mild Pain (1 - 3)  aluminum hydroxide/magnesium hydroxide/simethicone Suspension 30 milliLiter(s) Oral every 4 hours PRN Dyspepsia  dextrose Oral Gel 15 Gram(s) Oral once PRN Blood Glucose LESS THAN 70 milliGRAM(s)/deciliter  HYDROmorphone  Injectable 2 milliGRAM(s) IV Push every 4 hours PRN Severe Pain (7 - 10)  magnesium hydroxide Suspension 30 milliLiter(s) Oral every 8 hours PRN Constipation  melatonin 3 milliGRAM(s) Oral at bedtime PRN Insomnia  ondansetron Injectable 4 milliGRAM(s) IV Push every 8 hours PRN Nausea and/or Vomiting      Vital Signs Last 24 Hrs  T(C): 37 (16 Dec 2022 23:55), Max: 37 (16 Dec 2022 23:55)  T(F): 98.6 (16 Dec 2022 23:55), Max: 98.6 (16 Dec 2022 23:55)  HR: 75 (16 Dec 2022 23:55) (75 - 85)  BP: 108/58 (16 Dec 2022 23:55) (108/58 - 144/68)  BP(mean): --  RR: 18 (16 Dec 2022 23:55) (18 - 18)  SpO2: 95% (16 Dec 2022 23:55) (94% - 95%)    Parameters below as of 16 Dec 2022 23:55  Patient On (Oxygen Delivery Method): room air          PHYSICAL EXAM   Gen: well-appearing, in no acute distress  CV: pulse regularly present   Resp: airway patent, non-labored breathing  Abd: soft, NTND  Extremities: RLE less erythematous and swollen      I&O's Detail    15 Dec 2022 07:01  -  16 Dec 2022 07:00  --------------------------------------------------------  IN:  Total IN: 0 mL    OUT:    Voided (mL): 850 mL  Total OUT: 850 mL    Total NET: -850 mL          Daily     Daily     LABS:                        8.8    18.72 )-----------( 431      ( 16 Dec 2022 07:45 )             27.7     12-16    141  |  108  |  24<H>  ----------------------------<  153<H>  3.8   |  25  |  1.05    Ca    9.6      16 Dec 2022 07:45            RADIOLOGY & ADDITIONAL STUDIES:

## 2022-12-18 PROCEDURE — 99233 SBSQ HOSP IP/OBS HIGH 50: CPT

## 2022-12-18 RX ORDER — SODIUM CHLORIDE 9 MG/ML
1000 INJECTION, SOLUTION INTRAVENOUS
Refills: 0 | Status: DISCONTINUED | OUTPATIENT
Start: 2022-12-19 | End: 2022-12-24

## 2022-12-18 RX ORDER — INSULIN GLARGINE 100 [IU]/ML
15 INJECTION, SOLUTION SUBCUTANEOUS AT BEDTIME
Refills: 0 | Status: DISCONTINUED | OUTPATIENT
Start: 2022-12-18 | End: 2022-12-24

## 2022-12-18 RX ADMIN — SENNA PLUS 2 TABLET(S): 8.6 TABLET ORAL at 21:47

## 2022-12-18 RX ADMIN — PIPERACILLIN AND TAZOBACTAM 25 GRAM(S): 4; .5 INJECTION, POWDER, LYOPHILIZED, FOR SOLUTION INTRAVENOUS at 09:43

## 2022-12-18 RX ADMIN — Medication 81 MILLIGRAM(S): at 09:36

## 2022-12-18 RX ADMIN — GABAPENTIN 300 MILLIGRAM(S): 400 CAPSULE ORAL at 05:21

## 2022-12-18 RX ADMIN — Medication 100 MILLIGRAM(S): at 21:47

## 2022-12-18 RX ADMIN — GABAPENTIN 300 MILLIGRAM(S): 400 CAPSULE ORAL at 13:30

## 2022-12-18 RX ADMIN — FAMOTIDINE 40 MILLIGRAM(S): 10 INJECTION INTRAVENOUS at 21:47

## 2022-12-18 RX ADMIN — PIPERACILLIN AND TAZOBACTAM 25 GRAM(S): 4; .5 INJECTION, POWDER, LYOPHILIZED, FOR SOLUTION INTRAVENOUS at 17:41

## 2022-12-18 RX ADMIN — Medication 1 GRAM(S): at 05:21

## 2022-12-18 RX ADMIN — ENOXAPARIN SODIUM 40 MILLIGRAM(S): 100 INJECTION SUBCUTANEOUS at 09:44

## 2022-12-18 RX ADMIN — AMLODIPINE BESYLATE 5 MILLIGRAM(S): 2.5 TABLET ORAL at 09:37

## 2022-12-18 RX ADMIN — HYDROMORPHONE HYDROCHLORIDE 2 MILLIGRAM(S): 2 INJECTION INTRAMUSCULAR; INTRAVENOUS; SUBCUTANEOUS at 22:05

## 2022-12-18 RX ADMIN — HYDROMORPHONE HYDROCHLORIDE 2 MILLIGRAM(S): 2 INJECTION INTRAMUSCULAR; INTRAVENOUS; SUBCUTANEOUS at 21:47

## 2022-12-18 RX ADMIN — HYDROMORPHONE HYDROCHLORIDE 2 MILLIGRAM(S): 2 INJECTION INTRAMUSCULAR; INTRAVENOUS; SUBCUTANEOUS at 13:30

## 2022-12-18 RX ADMIN — Medication 2: at 12:43

## 2022-12-18 RX ADMIN — Medication 40 MILLIGRAM(S): at 09:37

## 2022-12-18 RX ADMIN — GABAPENTIN 600 MILLIGRAM(S): 400 CAPSULE ORAL at 21:47

## 2022-12-18 RX ADMIN — INSULIN GLARGINE 15 UNIT(S): 100 INJECTION, SOLUTION SUBCUTANEOUS at 21:46

## 2022-12-18 RX ADMIN — HYDROMORPHONE HYDROCHLORIDE 2 MILLIGRAM(S): 2 INJECTION INTRAMUSCULAR; INTRAVENOUS; SUBCUTANEOUS at 17:41

## 2022-12-18 RX ADMIN — PANTOPRAZOLE SODIUM 40 MILLIGRAM(S): 20 TABLET, DELAYED RELEASE ORAL at 05:21

## 2022-12-18 RX ADMIN — HYDROMORPHONE HYDROCHLORIDE 2 MILLIGRAM(S): 2 INJECTION INTRAMUSCULAR; INTRAVENOUS; SUBCUTANEOUS at 05:21

## 2022-12-18 RX ADMIN — LISINOPRIL 5 MILLIGRAM(S): 2.5 TABLET ORAL at 09:37

## 2022-12-18 RX ADMIN — Medication 1 GRAM(S): at 12:35

## 2022-12-18 RX ADMIN — ISOSORBIDE MONONITRATE 30 MILLIGRAM(S): 60 TABLET, EXTENDED RELEASE ORAL at 09:38

## 2022-12-18 RX ADMIN — Medication 10 MILLIGRAM(S): at 09:37

## 2022-12-18 RX ADMIN — Medication 1 GRAM(S): at 17:42

## 2022-12-18 RX ADMIN — POLYETHYLENE GLYCOL 3350 17 GRAM(S): 17 POWDER, FOR SOLUTION ORAL at 09:37

## 2022-12-18 RX ADMIN — TAMSULOSIN HYDROCHLORIDE 0.8 MILLIGRAM(S): 0.4 CAPSULE ORAL at 21:47

## 2022-12-18 RX ADMIN — HYDROMORPHONE HYDROCHLORIDE 2 MILLIGRAM(S): 2 INJECTION INTRAMUSCULAR; INTRAVENOUS; SUBCUTANEOUS at 09:36

## 2022-12-18 RX ADMIN — Medication 1 TABLET(S): at 09:36

## 2022-12-18 RX ADMIN — Medication 100 MILLIGRAM(S): at 09:37

## 2022-12-18 RX ADMIN — HYDROMORPHONE HYDROCHLORIDE 2 MILLIGRAM(S): 2 INJECTION INTRAMUSCULAR; INTRAVENOUS; SUBCUTANEOUS at 00:25

## 2022-12-18 RX ADMIN — SIMVASTATIN 10 MILLIGRAM(S): 20 TABLET, FILM COATED ORAL at 21:47

## 2022-12-18 NOTE — PROGRESS NOTE ADULT - PROVIDER SPECIALTY LIST ADULT
Hospitalist Dupixent Counseling: I discussed with the patient the risks of dupilumab including but not limited to eye infection and irritation, cold sores, injection site reactions, worsening of asthma, allergic reactions and increased risk of parasitic infection.  Live vaccines should be avoided while taking dupilumab. Dupilumab will also interact with certain medications such as warfarin and cyclosporine. The patient understands that monitoring is required and they must alert us or the primary physician if symptoms of infection or other concerning signs are noted.

## 2022-12-18 NOTE — PROGRESS NOTE ADULT - SUBJECTIVE AND OBJECTIVE BOX
Chief Complaint: RLE swelling and redness, epigastric discomfort    Interval Hx: Patient seen and examined earlier today. No new complaints. No significant changes. Continues to have RLE swelling and redness, slightly improved compared to presentation. Continues to have RLE pain complaints, only partially relieved with hydromorphone 1mg IV q4h prn. He remains on empiric antibiotics. MRI negative for osteomyelitis. Workup ongoing to further evaluate the extent of his peripheral vascular disease in that extremity. He is planned for LE angiogram tomorrow. As for his epigastric discomfort, it is mild-to-moderate, being treated with Carafate and PPI. Anticipated that he will eventually need an upper endoscopy to evaluate that epigastric discomfort and also as routine appropriate testing in the setting of his cirrhosis.   12/06/22: RLE angio today. No new issues.  12/07/22: Patient seen and examined. Complaining of right LE pain. Discussed with patient regarding management plan.   12/8: Sitting in chair, comfortable, offers no complaints. Denies fever, chills, n, v.  12/09/22: Patient seen and examined. Nuclear stress test today  12/10/22: No new issues.   12/11/22: Sitting in chair, feels better. No new issues  12/12/22: Discussed with Dr Hubbard for cardiac clearance. No new issues. Waiting for RLE femoral bypass.  12/13/22: No new complaints. Discussed with vascular, possible bypass on Friday or next Monday. Discussed with patient.    12/1/4/22: No new issues.   12/15/22: Complaining of pain. Scheduled for RLE bypass on Monday 12/16:  States has pain in his affected foot, otherwise comfortable, awaiting surgical intervention.  Tolerating antibiotics.  12/17: No new events, appears comfortable.  12/18: Status quo, awaiting surgery for tomorrow.  No fever, chills, n, v.      ROS: Multi system review is comprehensively negative x 10 systems except as above    Vitals:    Vital Signs Last 24 Hrs  T(C): 36.3 (18 Dec 2022 08:05), Max: 36.8 (17 Dec 2022 16:34)  T(F): 97.4 (18 Dec 2022 08:05), Max: 98.3 (17 Dec 2022 16:34)  HR: 74 (18 Dec 2022 08:05) (74 - 90)  BP: 156/71 (18 Dec 2022 08:05) (141/63 - 156/71)  BP(mean): --  RR: 18 (18 Dec 2022 08:05) (18 - 19)  SpO2: 97% (18 Dec 2022 08:05) (95% - 98%)    Parameters below as of 18 Dec 2022 08:05  Patient On (Oxygen Delivery Method): room air            Exam:    Gen: No acute distress  HEENT: NCAT PERRL EOMI MMM  Neck: Supple no JVD no LAD  CVS: S1 S2 normal RRR  Chest: Normal resp effort, lungs CTA B/L  Abd: +BS, soft NT ND   Ext: L BKA. RLE with mild erythema from upper shin down to foot, small superficial wounds on shin, large open wound on plantar surface of foot at the foot pad.   Skin: As described in extremity exam. Some additional flaking skin on R foot  Neuro: A+OX3, no focal deficits, sensation intake to RLE  Mood: Calm, pleasant        Labs:                               CAPILLARY BLOOD GLUCOSE      POCT Blood Glucose.: 164 mg/dL (17 Dec 2022 21:29)  POCT Blood Glucose.: 138 mg/dL (17 Dec 2022 17:48)  POCT Blood Glucose.: 154 mg/dL (17 Dec 2022 12:45)            Micro:  Bld cx x 2 12/1: Negative to date  COVID, Flu, RSV PCR 12/1: Negative    Imaging:  MRI RLE, R foot 12/2: There is no evidence for osteomyelitis.    RLE arterial duplex 12/1: No appreciable flow detected in the posterior tibial and peroneal arteries.    R foot XR 12/1: Soft tissue emphysema at the plantar aspect of the mid foot/calcaneous.     Cardiac Testing:  TTE 12/2:  Endocardium is not well visualized, however, overall left ventricular systolic function appears normal. Mild concentric left ventricular hypertrophy. Septal flattening is seen; this finding is consistent with right heart pressure / volume overload. Estimated left ventricular ejection fraction is 55-60%. The right ventricle exhibits mild dilation, mild diffuse hypokinesis, and mild depression of contractility. Severe mitral stenosis. Mild mitral regurgitation. Mild to moderate tricuspid valve regurgitation. Severe pulmonary hypertension.    Meds:  MEDICATIONS  (STANDING):  amLODIPine   Tablet 5 milliGRAM(s) Oral daily  aspirin  chewable 81 milliGRAM(s) Oral daily  dextrose 5%. 1000 milliLiter(s) (100 mL/Hr) IV Continuous <Continuous>  dextrose 5%. 1000 milliLiter(s) (50 mL/Hr) IV Continuous <Continuous>  dextrose 50% Injectable 25 Gram(s) IV Push once  dextrose 50% Injectable 12.5 Gram(s) IV Push once  dextrose 50% Injectable 25 Gram(s) IV Push once  doxycycline monohydrate Capsule 100 milliGRAM(s) Oral every 12 hours  enoxaparin Injectable 40 milliGRAM(s) SubCutaneous every 24 hours  famotidine    Tablet 40 milliGRAM(s) Oral at bedtime  gabapentin 300 milliGRAM(s) Oral two times a day  gabapentin 600 milliGRAM(s) Oral at bedtime  glucagon  Injectable 1 milliGRAM(s) IntraMuscular once  insulin glargine Injectable (LANTUS) 15 Unit(s) SubCutaneous at bedtime  insulin lispro (ADMELOG) corrective regimen sliding scale   SubCutaneous three times a day before meals  isosorbide   mononitrate ER Tablet (IMDUR) 30 milliGRAM(s) Oral daily  lisinopril 5 milliGRAM(s) Oral daily  metoclopramide 10 milliGRAM(s) Oral daily  multivitamin 1 Tablet(s) Oral daily  pantoprazole    Tablet 40 milliGRAM(s) Oral before breakfast  piperacillin/tazobactam IVPB.. 3.375 Gram(s) IV Intermittent every 8 hours  polyethylene glycol 3350 17 Gram(s) Oral daily  senna 2 Tablet(s) Oral at bedtime  simvastatin 10 milliGRAM(s) Oral at bedtime  sucralfate suspension 1 Gram(s) Oral four times a day  tamsulosin 0.8 milliGRAM(s) Oral at bedtime    MEDICATIONS  (PRN):  acetaminophen     Tablet .. 650 milliGRAM(s) Oral every 6 hours PRN Temp greater or equal to 38C (100.4F), Mild Pain (1 - 3)  aluminum hydroxide/magnesium hydroxide/simethicone Suspension 30 milliLiter(s) Oral every 4 hours PRN Dyspepsia  dextrose Oral Gel 15 Gram(s) Oral once PRN Blood Glucose LESS THAN 70 milliGRAM(s)/deciliter  HYDROmorphone  Injectable 2 milliGRAM(s) IV Push every 4 hours PRN Severe Pain (7 - 10)  magnesium hydroxide Suspension 30 milliLiter(s) Oral every 8 hours PRN Constipation  melatonin 3 milliGRAM(s) Oral at bedtime PRN Insomnia  ondansetron Injectable 4 milliGRAM(s) IV Push every 8 hours PRN Nausea and/or Vomiting        Assessment and Plan:  63 yo man with DM, HTN, CAD, 3v CABG, hx of PAD, L BKA, GERD, past alcohol use disorder, cirrhosis, chronic anemia, sent from [place] for worsening RLE swelling and redness, also complaints of epigastric discomfort. In the ED, exam was consistent with cellulitis of the RLE, non healing wound on the plantar surface of the R foot. Elevated inflammatory markers. Patient was placed on antibiotics and admitted to Medicine.     RLE skin and soft tissue infection in setting of non healing R foot ulcer due to/as a consequence of  type 2 DM and PAD:  -MRI RLE negative for osteomyelitis.   -S/P RLE angio, per vascular --> would benefit from distal femoral bypass surgery. Patient is cleared by cardiology. Medically stable and optimized for RLE bypass  -Continue on empiric antibiotics per ID with zosyn, and doxycycline   -Continue aspirin, statin  -Pain meds    CAD /  Hx of CABG / demand ischemia /  severe mitral stenosis / severe pulmonary HTN / HTN:  -echo: EF: 55-60%, severe Mitral stenosis, severe pulmonary HTN  -chest xray 12/7: vascular congestion  -hold lasix for tomorrow as pt will be NPO for surgery  -Continue aspirin, statin, Imdur  -Continue amlodipine, lisinopril and Imdur  +/- right and left heart cath-after bypass as per cardiology      Epigastric discomfort / dyspepsia: improving  -Per GI --> Carafate,  PPI.   -Eventual EGD, treat empirically for now       DM2:  -A1c 7.  - half lantus 30u to 15u tonight due to NPO for surgery tomorrow  - light IVF hydration tomorrow while NPO for surgery   - RAISS    BPH: Stable  - Continue Flomax      Code Status: Full  DVT px: LMWH  Dispo: RLE bypass on Monday. AM labs.

## 2022-12-18 NOTE — PROGRESS NOTE ADULT - SUBJECTIVE AND OBJECTIVE BOX
SURGERY DAILY PROGRESS NOTE:     Subjective:  Patient seen and examined at bedside during am rounds. AVSS. Denies any fevers, chills, n/v/d, chest pain or shortness of breath    Objective:    MEDICATIONS  (STANDING):  amLODIPine   Tablet 5 milliGRAM(s) Oral daily  aspirin  chewable 81 milliGRAM(s) Oral daily  dextrose 5%. 1000 milliLiter(s) (100 mL/Hr) IV Continuous <Continuous>  dextrose 5%. 1000 milliLiter(s) (50 mL/Hr) IV Continuous <Continuous>  dextrose 50% Injectable 25 Gram(s) IV Push once  dextrose 50% Injectable 12.5 Gram(s) IV Push once  dextrose 50% Injectable 25 Gram(s) IV Push once  doxycycline monohydrate Capsule 100 milliGRAM(s) Oral every 12 hours  enoxaparin Injectable 40 milliGRAM(s) SubCutaneous every 24 hours  famotidine    Tablet 40 milliGRAM(s) Oral at bedtime  gabapentin 300 milliGRAM(s) Oral two times a day  gabapentin 600 milliGRAM(s) Oral at bedtime  glucagon  Injectable 1 milliGRAM(s) IntraMuscular once  insulin glargine Injectable (LANTUS) 15 Unit(s) SubCutaneous at bedtime  insulin lispro (ADMELOG) corrective regimen sliding scale   SubCutaneous three times a day before meals  isosorbide   mononitrate ER Tablet (IMDUR) 30 milliGRAM(s) Oral daily  lisinopril 5 milliGRAM(s) Oral daily  metoclopramide 10 milliGRAM(s) Oral daily  multivitamin 1 Tablet(s) Oral daily  pantoprazole    Tablet 40 milliGRAM(s) Oral before breakfast  piperacillin/tazobactam IVPB.. 3.375 Gram(s) IV Intermittent every 8 hours  polyethylene glycol 3350 17 Gram(s) Oral daily  senna 2 Tablet(s) Oral at bedtime  simvastatin 10 milliGRAM(s) Oral at bedtime  sucralfate suspension 1 Gram(s) Oral four times a day  tamsulosin 0.8 milliGRAM(s) Oral at bedtime    MEDICATIONS  (PRN):  acetaminophen     Tablet .. 650 milliGRAM(s) Oral every 6 hours PRN Temp greater or equal to 38C (100.4F), Mild Pain (1 - 3)  aluminum hydroxide/magnesium hydroxide/simethicone Suspension 30 milliLiter(s) Oral every 4 hours PRN Dyspepsia  dextrose Oral Gel 15 Gram(s) Oral once PRN Blood Glucose LESS THAN 70 milliGRAM(s)/deciliter  HYDROmorphone  Injectable 2 milliGRAM(s) IV Push every 4 hours PRN Severe Pain (7 - 10)  magnesium hydroxide Suspension 30 milliLiter(s) Oral every 8 hours PRN Constipation  melatonin 3 milliGRAM(s) Oral at bedtime PRN Insomnia  ondansetron Injectable 4 milliGRAM(s) IV Push every 8 hours PRN Nausea and/or Vomiting      Vital Signs Last 24 Hrs  T(C): 36.3 (18 Dec 2022 08:05), Max: 36.4 (18 Dec 2022 00:17)  T(F): 97.4 (18 Dec 2022 08:05), Max: 97.5 (18 Dec 2022 00:17)  HR: 74 (18 Dec 2022 08:05) (74 - 90)  BP: 156/71 (18 Dec 2022 08:05) (150/64 - 156/71)  BP(mean): --  RR: 18 (18 Dec 2022 08:05) (18 - 19)  SpO2: 97% (18 Dec 2022 08:05) (95% - 97%)    Parameters below as of 18 Dec 2022 08:05  Patient On (Oxygen Delivery Method): room air

## 2022-12-19 ENCOUNTER — TRANSCRIPTION ENCOUNTER (OUTPATIENT)
Age: 62
End: 2022-12-19

## 2022-12-19 ENCOUNTER — APPOINTMENT (OUTPATIENT)
Dept: VASCULAR SURGERY | Facility: HOSPITAL | Age: 62
End: 2022-12-19

## 2022-12-19 DIAGNOSIS — T14.8XXA OTHER INJURY OF UNSPECIFIED BODY REGION, INITIAL ENCOUNTER: ICD-10-CM

## 2022-12-19 LAB
ALBUMIN SERPL ELPH-MCNC: 2.5 G/DL — LOW (ref 3.3–5)
ALBUMIN SERPL ELPH-MCNC: 2.7 G/DL — LOW (ref 3.3–5)
ALP SERPL-CCNC: 166 U/L — HIGH (ref 40–120)
ALP SERPL-CCNC: 181 U/L — HIGH (ref 40–120)
ALT FLD-CCNC: 14 U/L — SIGNIFICANT CHANGE UP (ref 12–78)
ALT FLD-CCNC: 15 U/L — SIGNIFICANT CHANGE UP (ref 12–78)
ANION GAP SERPL CALC-SCNC: 6 MMOL/L — SIGNIFICANT CHANGE UP (ref 5–17)
ANION GAP SERPL CALC-SCNC: 9 MMOL/L — SIGNIFICANT CHANGE UP (ref 5–17)
APTT BLD: 25.5 SEC — LOW (ref 27.5–35.5)
AST SERPL-CCNC: 13 U/L — LOW (ref 15–37)
AST SERPL-CCNC: 9 U/L — LOW (ref 15–37)
BASOPHILS # BLD AUTO: 0.04 K/UL — SIGNIFICANT CHANGE UP (ref 0–0.2)
BASOPHILS NFR BLD AUTO: 0.2 % — SIGNIFICANT CHANGE UP (ref 0–2)
BILIRUB SERPL-MCNC: 0.6 MG/DL — SIGNIFICANT CHANGE UP (ref 0.2–1.2)
BILIRUB SERPL-MCNC: 0.7 MG/DL — SIGNIFICANT CHANGE UP (ref 0.2–1.2)
BUN SERPL-MCNC: 28 MG/DL — HIGH (ref 7–23)
BUN SERPL-MCNC: 28 MG/DL — HIGH (ref 7–23)
CALCIUM SERPL-MCNC: 9.3 MG/DL — SIGNIFICANT CHANGE UP (ref 8.5–10.1)
CALCIUM SERPL-MCNC: 9.5 MG/DL — SIGNIFICANT CHANGE UP (ref 8.5–10.1)
CHLORIDE SERPL-SCNC: 105 MMOL/L — SIGNIFICANT CHANGE UP (ref 96–108)
CHLORIDE SERPL-SCNC: 107 MMOL/L — SIGNIFICANT CHANGE UP (ref 96–108)
CO2 SERPL-SCNC: 29 MMOL/L — SIGNIFICANT CHANGE UP (ref 22–31)
CO2 SERPL-SCNC: 30 MMOL/L — SIGNIFICANT CHANGE UP (ref 22–31)
CREAT SERPL-MCNC: 1.07 MG/DL — SIGNIFICANT CHANGE UP (ref 0.5–1.3)
CREAT SERPL-MCNC: 1.08 MG/DL — SIGNIFICANT CHANGE UP (ref 0.5–1.3)
EGFR: 78 ML/MIN/1.73M2 — SIGNIFICANT CHANGE UP
EGFR: 79 ML/MIN/1.73M2 — SIGNIFICANT CHANGE UP
EOSINOPHIL # BLD AUTO: 0.03 K/UL — SIGNIFICANT CHANGE UP (ref 0–0.5)
EOSINOPHIL NFR BLD AUTO: 0.1 % — SIGNIFICANT CHANGE UP (ref 0–6)
GLUCOSE SERPL-MCNC: 164 MG/DL — HIGH (ref 70–99)
GLUCOSE SERPL-MCNC: 197 MG/DL — HIGH (ref 70–99)
HCT VFR BLD CALC: 28 % — LOW (ref 39–50)
HCT VFR BLD CALC: 28.5 % — LOW (ref 39–50)
HGB BLD-MCNC: 9 G/DL — LOW (ref 13–17)
HGB BLD-MCNC: 9.1 G/DL — LOW (ref 13–17)
IMM GRANULOCYTES NFR BLD AUTO: 0.7 % — SIGNIFICANT CHANGE UP (ref 0–0.9)
INR BLD: 1.14 RATIO — SIGNIFICANT CHANGE UP (ref 0.88–1.16)
INR BLD: 1.24 RATIO — HIGH (ref 0.88–1.16)
LYMPHOCYTES # BLD AUTO: 0.46 K/UL — LOW (ref 1–3.3)
LYMPHOCYTES # BLD AUTO: 2.1 % — LOW (ref 13–44)
MAGNESIUM SERPL-MCNC: 1.7 MG/DL — SIGNIFICANT CHANGE UP (ref 1.6–2.6)
MCHC RBC-ENTMCNC: 31.2 PG — SIGNIFICANT CHANGE UP (ref 27–34)
MCHC RBC-ENTMCNC: 31.8 PG — SIGNIFICANT CHANGE UP (ref 27–34)
MCHC RBC-ENTMCNC: 31.9 GM/DL — LOW (ref 32–36)
MCHC RBC-ENTMCNC: 32.1 GM/DL — SIGNIFICANT CHANGE UP (ref 32–36)
MCV RBC AUTO: 97.6 FL — SIGNIFICANT CHANGE UP (ref 80–100)
MCV RBC AUTO: 98.9 FL — SIGNIFICANT CHANGE UP (ref 80–100)
MONOCYTES # BLD AUTO: 0.21 K/UL — SIGNIFICANT CHANGE UP (ref 0–0.9)
MONOCYTES NFR BLD AUTO: 1 % — LOW (ref 2–14)
NEUTROPHILS # BLD AUTO: 20.75 K/UL — HIGH (ref 1.8–7.4)
NEUTROPHILS NFR BLD AUTO: 95.9 % — HIGH (ref 43–77)
PHOSPHATE SERPL-MCNC: 3.4 MG/DL — SIGNIFICANT CHANGE UP (ref 2.5–4.5)
PLATELET # BLD AUTO: 381 K/UL — SIGNIFICANT CHANGE UP (ref 150–400)
PLATELET # BLD AUTO: 400 K/UL — SIGNIFICANT CHANGE UP (ref 150–400)
POTASSIUM SERPL-MCNC: 3.4 MMOL/L — LOW (ref 3.5–5.3)
POTASSIUM SERPL-MCNC: 3.4 MMOL/L — LOW (ref 3.5–5.3)
POTASSIUM SERPL-SCNC: 3.4 MMOL/L — LOW (ref 3.5–5.3)
POTASSIUM SERPL-SCNC: 3.4 MMOL/L — LOW (ref 3.5–5.3)
PROT SERPL-MCNC: 7.1 GM/DL — SIGNIFICANT CHANGE UP (ref 6–8.3)
PROT SERPL-MCNC: 7.6 GM/DL — SIGNIFICANT CHANGE UP (ref 6–8.3)
PROTHROM AB SERPL-ACNC: 13.3 SEC — SIGNIFICANT CHANGE UP (ref 10.5–13.4)
PROTHROM AB SERPL-ACNC: 14.4 SEC — HIGH (ref 10.5–13.4)
RBC # BLD: 2.83 M/UL — LOW (ref 4.2–5.8)
RBC # BLD: 2.92 M/UL — LOW (ref 4.2–5.8)
RBC # FLD: 13.2 % — SIGNIFICANT CHANGE UP (ref 10.3–14.5)
RBC # FLD: 13.3 % — SIGNIFICANT CHANGE UP (ref 10.3–14.5)
SODIUM SERPL-SCNC: 143 MMOL/L — SIGNIFICANT CHANGE UP (ref 135–145)
SODIUM SERPL-SCNC: 143 MMOL/L — SIGNIFICANT CHANGE UP (ref 135–145)
WBC # BLD: 13.04 K/UL — HIGH (ref 3.8–10.5)
WBC # BLD: 21.64 K/UL — HIGH (ref 3.8–10.5)
WBC # FLD AUTO: 13.04 K/UL — HIGH (ref 3.8–10.5)
WBC # FLD AUTO: 21.64 K/UL — HIGH (ref 3.8–10.5)

## 2022-12-19 PROCEDURE — 99233 SBSQ HOSP IP/OBS HIGH 50: CPT

## 2022-12-19 PROCEDURE — 35666 BPG FEM-ANT TIB PST TIB/PRNL: CPT | Mod: RT

## 2022-12-19 RX ORDER — POTASSIUM CHLORIDE 20 MEQ
10 PACKET (EA) ORAL ONCE
Refills: 0 | Status: COMPLETED | OUTPATIENT
Start: 2022-12-19 | End: 2022-12-19

## 2022-12-19 RX ORDER — ASPIRIN/CALCIUM CARB/MAGNESIUM 324 MG
81 TABLET ORAL ONCE
Refills: 0 | Status: DISCONTINUED | OUTPATIENT
Start: 2022-12-19 | End: 2022-12-27

## 2022-12-19 RX ORDER — SODIUM CHLORIDE 9 MG/ML
1000 INJECTION, SOLUTION INTRAVENOUS
Refills: 0 | Status: DISCONTINUED | OUTPATIENT
Start: 2022-12-19 | End: 2022-12-19

## 2022-12-19 RX ORDER — CLOPIDOGREL BISULFATE 75 MG/1
75 TABLET, FILM COATED ORAL DAILY
Refills: 0 | Status: DISCONTINUED | OUTPATIENT
Start: 2022-12-21 | End: 2022-12-27

## 2022-12-19 RX ORDER — POTASSIUM CHLORIDE 20 MEQ
40 PACKET (EA) ORAL ONCE
Refills: 0 | Status: COMPLETED | OUTPATIENT
Start: 2022-12-19 | End: 2022-12-19

## 2022-12-19 RX ORDER — FENTANYL CITRATE 50 UG/ML
50 INJECTION INTRAVENOUS
Refills: 0 | Status: DISCONTINUED | OUTPATIENT
Start: 2022-12-19 | End: 2022-12-19

## 2022-12-19 RX ORDER — HEPARIN SODIUM 5000 [USP'U]/ML
5000 INJECTION INTRAVENOUS; SUBCUTANEOUS EVERY 8 HOURS
Refills: 0 | Status: DISCONTINUED | OUTPATIENT
Start: 2022-12-19 | End: 2022-12-27

## 2022-12-19 RX ORDER — ONDANSETRON 8 MG/1
4 TABLET, FILM COATED ORAL ONCE
Refills: 0 | Status: DISCONTINUED | OUTPATIENT
Start: 2022-12-19 | End: 2022-12-19

## 2022-12-19 RX ORDER — OXYCODONE HYDROCHLORIDE 5 MG/1
5 TABLET ORAL ONCE
Refills: 0 | Status: DISCONTINUED | OUTPATIENT
Start: 2022-12-19 | End: 2022-12-19

## 2022-12-19 RX ORDER — SODIUM CHLORIDE 9 MG/ML
3 INJECTION INTRAMUSCULAR; INTRAVENOUS; SUBCUTANEOUS EVERY 8 HOURS
Refills: 0 | Status: DISCONTINUED | OUTPATIENT
Start: 2022-12-19 | End: 2022-12-27

## 2022-12-19 RX ORDER — CLOPIDOGREL BISULFATE 75 MG/1
300 TABLET, FILM COATED ORAL DAILY
Refills: 0 | Status: COMPLETED | OUTPATIENT
Start: 2022-12-20 | End: 2022-12-20

## 2022-12-19 RX ADMIN — Medication 2: at 05:51

## 2022-12-19 RX ADMIN — HYDROMORPHONE HYDROCHLORIDE 2 MILLIGRAM(S): 2 INJECTION INTRAMUSCULAR; INTRAVENOUS; SUBCUTANEOUS at 03:15

## 2022-12-19 RX ADMIN — SODIUM CHLORIDE 3 MILLILITER(S): 9 INJECTION INTRAMUSCULAR; INTRAVENOUS; SUBCUTANEOUS at 23:26

## 2022-12-19 RX ADMIN — SODIUM CHLORIDE 55 MILLILITER(S): 9 INJECTION, SOLUTION INTRAVENOUS at 05:12

## 2022-12-19 RX ADMIN — FAMOTIDINE 40 MILLIGRAM(S): 10 INJECTION INTRAVENOUS at 21:20

## 2022-12-19 RX ADMIN — TAMSULOSIN HYDROCHLORIDE 0.8 MILLIGRAM(S): 0.4 CAPSULE ORAL at 21:19

## 2022-12-19 RX ADMIN — HYDROMORPHONE HYDROCHLORIDE 2 MILLIGRAM(S): 2 INJECTION INTRAMUSCULAR; INTRAVENOUS; SUBCUTANEOUS at 10:23

## 2022-12-19 RX ADMIN — SENNA PLUS 2 TABLET(S): 8.6 TABLET ORAL at 21:20

## 2022-12-19 RX ADMIN — HYDROMORPHONE HYDROCHLORIDE 2 MILLIGRAM(S): 2 INJECTION INTRAMUSCULAR; INTRAVENOUS; SUBCUTANEOUS at 21:18

## 2022-12-19 RX ADMIN — ONDANSETRON 4 MILLIGRAM(S): 8 TABLET, FILM COATED ORAL at 08:24

## 2022-12-19 RX ADMIN — SIMVASTATIN 10 MILLIGRAM(S): 20 TABLET, FILM COATED ORAL at 22:41

## 2022-12-19 RX ADMIN — HYDROMORPHONE HYDROCHLORIDE 2 MILLIGRAM(S): 2 INJECTION INTRAMUSCULAR; INTRAVENOUS; SUBCUTANEOUS at 06:55

## 2022-12-19 RX ADMIN — FENTANYL CITRATE 50 MICROGRAM(S): 50 INJECTION INTRAVENOUS at 17:10

## 2022-12-19 RX ADMIN — GABAPENTIN 600 MILLIGRAM(S): 400 CAPSULE ORAL at 21:20

## 2022-12-19 RX ADMIN — AMLODIPINE BESYLATE 5 MILLIGRAM(S): 2.5 TABLET ORAL at 10:22

## 2022-12-19 RX ADMIN — Medication 40 MILLIEQUIVALENT(S): at 21:19

## 2022-12-19 RX ADMIN — PIPERACILLIN AND TAZOBACTAM 25 GRAM(S): 4; .5 INJECTION, POWDER, LYOPHILIZED, FOR SOLUTION INTRAVENOUS at 08:24

## 2022-12-19 RX ADMIN — Medication 100 MILLIEQUIVALENT(S): at 19:00

## 2022-12-19 RX ADMIN — HYDROMORPHONE HYDROCHLORIDE 2 MILLIGRAM(S): 2 INJECTION INTRAMUSCULAR; INTRAVENOUS; SUBCUTANEOUS at 03:03

## 2022-12-19 RX ADMIN — FENTANYL CITRATE 50 MICROGRAM(S): 50 INJECTION INTRAVENOUS at 17:05

## 2022-12-19 RX ADMIN — PIPERACILLIN AND TAZOBACTAM 25 GRAM(S): 4; .5 INJECTION, POWDER, LYOPHILIZED, FOR SOLUTION INTRAVENOUS at 00:14

## 2022-12-19 RX ADMIN — HEPARIN SODIUM 5000 UNIT(S): 5000 INJECTION INTRAVENOUS; SUBCUTANEOUS at 21:18

## 2022-12-19 RX ADMIN — LISINOPRIL 5 MILLIGRAM(S): 2.5 TABLET ORAL at 10:21

## 2022-12-19 RX ADMIN — Medication 100 MILLIGRAM(S): at 21:19

## 2022-12-19 RX ADMIN — FENTANYL CITRATE 50 MICROGRAM(S): 50 INJECTION INTRAVENOUS at 18:21

## 2022-12-19 RX ADMIN — PANTOPRAZOLE SODIUM 40 MILLIGRAM(S): 20 TABLET, DELAYED RELEASE ORAL at 05:11

## 2022-12-19 RX ADMIN — GABAPENTIN 300 MILLIGRAM(S): 400 CAPSULE ORAL at 05:11

## 2022-12-19 RX ADMIN — PIPERACILLIN AND TAZOBACTAM 25 GRAM(S): 4; .5 INJECTION, POWDER, LYOPHILIZED, FOR SOLUTION INTRAVENOUS at 23:38

## 2022-12-19 RX ADMIN — Medication 100 MILLIEQUIVALENT(S): at 22:42

## 2022-12-19 RX ADMIN — INSULIN GLARGINE 15 UNIT(S): 100 INJECTION, SOLUTION SUBCUTANEOUS at 23:38

## 2022-12-19 NOTE — PROGRESS NOTE ADULT - SUBJECTIVE AND OBJECTIVE BOX
Chief Complaint: RLE swelling and redness, epigastric discomfort    Interval Hx: Patient seen and examined earlier today. No new complaints. No significant changes. Continues to have RLE swelling and redness, slightly improved compared to presentation. Continues to have RLE pain complaints, only partially relieved with hydromorphone 1mg IV q4h prn. He remains on empiric antibiotics. MRI negative for osteomyelitis. Workup ongoing to further evaluate the extent of his peripheral vascular disease in that extremity. He is planned for LE angiogram tomorrow. As for his epigastric discomfort, it is mild-to-moderate, being treated with Carafate and PPI. Anticipated that he will eventually need an upper endoscopy to evaluate that epigastric discomfort and also as routine appropriate testing in the setting of his cirrhosis.   12/06/22: RLE angio today. No new issues.  12/07/22: Patient seen and examined. Complaining of right LE pain. Discussed with patient regarding management plan.   12/8: Sitting in chair, comfortable, offers no complaints. Denies fever, chills, n, v.  12/09/22: Patient seen and examined. Nuclear stress test today  12/10/22: No new issues.   12/11/22: Sitting in chair, feels better. No new issues  12/12/22: Discussed with Dr Hubbard for cardiac clearance. No new issues. Waiting for RLE femoral bypass.  12/13/22: No new complaints. Discussed with vascular, possible bypass on Friday or next Monday. Discussed with patient.    12/1/4/22: No new issues.   12/15/22: Complaining of pain. Scheduled for RLE bypass on Monday 12/16:  States has pain in his affected foot, otherwise comfortable, awaiting surgical intervention.  Tolerating antibiotics.  12/17: No new events, appears comfortable.  12/18: Status quo, awaiting surgery for tomorrow.  No fever, chills, n, v.  12/19: Pt awake, alert, awaiting bypass surgery.  Denies fever, chills, N, V, abd pain, CP, SOB.    ROS: Multi system review is comprehensively negative x 10 systems except as above    Vitals:  Vital Signs Last 24 Hrs  T(C): 37.1 (19 Dec 2022 08:10), Max: 37.1 (19 Dec 2022 00:02)  T(F): 98.7 (19 Dec 2022 08:10), Max: 98.7 (19 Dec 2022 00:02)  HR: 95 (19 Dec 2022 08:10) (84 - 95)  BP: 167/79 (19 Dec 2022 08:10) (143/62 - 167/79)  BP(mean): --  RR: 18 (19 Dec 2022 08:10) (16 - 18)  SpO2: 96% (19 Dec 2022 08:10) (93% - 96%)    Parameters below as of 19 Dec 2022 08:10  Patient On (Oxygen Delivery Method): room air          Exam:    Gen: No acute distress  HEENT: NCAT PERRL EOMI MMM  Neck: Supple no JVD no LAD  CVS: S1 S2 normal RRR  Chest: Normal resp effort, lungs CTA B/L  Abd: +BS, soft NT ND   Ext: L BKA. RLE with mild erythema from upper shin down to foot, small superficial wounds on shin, large open wound on plantar surface of foot at the foot pad.   Skin: As described in extremity exam. Some additional flaking skin on R foot  Neuro: A+OX3, no focal deficits, sensation intake to RLE  Mood: Calm, pleasant        Labs:                                                   9.1    13.04 )-----------( 400      ( 19 Dec 2022 05:36 )             28.5     12-19    143  |  107  |  28<H>  ----------------------------<  164<H>  3.4<L>   |  30  |  1.07    Ca    9.5      19 Dec 2022 05:36  Phos  3.4     12-19  Mg     1.7     12-19    TPro  7.6  /  Alb  2.7<L>  /  TBili  0.6  /  DBili  x   /  AST  9<L>  /  ALT  14  /  AlkPhos  181<H>  12-19    CAPILLARY BLOOD GLUCOSE      POCT Blood Glucose.: 157 mg/dL (19 Dec 2022 05:48)  POCT Blood Glucose.: 194 mg/dL (18 Dec 2022 21:42)  POCT Blood Glucose.: 166 mg/dL (18 Dec 2022 17:51)    LIVER FUNCTIONS - ( 19 Dec 2022 05:36 )  Alb: 2.7 g/dL / Pro: 7.6 gm/dL / ALK PHOS: 181 U/L / ALT: 14 U/L / AST: 9 U/L / GGT: x           PT/INR - ( 19 Dec 2022 05:36 )   PT: 13.3 sec;   INR: 1.14 ratio         PTT - ( 19 Dec 2022 05:36 )  PTT:25.5 sec          Micro:  Bld cx x 2 12/1: Negative to date  COVID, Flu, RSV PCR 12/1: Negative    Imaging:  MRI RLE, R foot 12/2: There is no evidence for osteomyelitis.    RLE arterial duplex 12/1: No appreciable flow detected in the posterior tibial and peroneal arteries.    R foot XR 12/1: Soft tissue emphysema at the plantar aspect of the mid foot/calcaneous.     Cardiac Testing:  TTE 12/2:  Endocardium is not well visualized, however, overall left ventricular systolic function appears normal. Mild concentric left ventricular hypertrophy. Septal flattening is seen; this finding is consistent with right heart pressure / volume overload. Estimated left ventricular ejection fraction is 55-60%. The right ventricle exhibits mild dilation, mild diffuse hypokinesis, and mild depression of contractility. Severe mitral stenosis. Mild mitral regurgitation. Mild to moderate tricuspid valve regurgitation. Severe pulmonary hypertension.    Meds:  MEDICATIONS  (STANDING):  amLODIPine   Tablet 5 milliGRAM(s) Oral daily  aspirin  chewable 81 milliGRAM(s) Oral daily  dextrose 5% + sodium chloride 0.45%. 1000 milliLiter(s) (55 mL/Hr) IV Continuous <Continuous>  dextrose 5%. 1000 milliLiter(s) (50 mL/Hr) IV Continuous <Continuous>  dextrose 5%. 1000 milliLiter(s) (100 mL/Hr) IV Continuous <Continuous>  dextrose 50% Injectable 25 Gram(s) IV Push once  dextrose 50% Injectable 12.5 Gram(s) IV Push once  dextrose 50% Injectable 25 Gram(s) IV Push once  doxycycline monohydrate Capsule 100 milliGRAM(s) Oral every 12 hours  enoxaparin Injectable 40 milliGRAM(s) SubCutaneous every 24 hours  famotidine    Tablet 40 milliGRAM(s) Oral at bedtime  gabapentin 300 milliGRAM(s) Oral two times a day  gabapentin 600 milliGRAM(s) Oral at bedtime  glucagon  Injectable 1 milliGRAM(s) IntraMuscular once  insulin glargine Injectable (LANTUS) 15 Unit(s) SubCutaneous at bedtime  insulin lispro (ADMELOG) corrective regimen sliding scale   SubCutaneous three times a day before meals  isosorbide   mononitrate ER Tablet (IMDUR) 30 milliGRAM(s) Oral daily  lisinopril 5 milliGRAM(s) Oral daily  metoclopramide 10 milliGRAM(s) Oral daily  multivitamin 1 Tablet(s) Oral daily  pantoprazole    Tablet 40 milliGRAM(s) Oral before breakfast  piperacillin/tazobactam IVPB.. 3.375 Gram(s) IV Intermittent every 8 hours  polyethylene glycol 3350 17 Gram(s) Oral daily  potassium chloride  10 mEq/100 mL IVPB 10 milliEquivalent(s) IV Intermittent once  senna 2 Tablet(s) Oral at bedtime  simvastatin 10 milliGRAM(s) Oral at bedtime  sucralfate suspension 1 Gram(s) Oral four times a day  tamsulosin 0.8 milliGRAM(s) Oral at bedtime    MEDICATIONS  (PRN):  acetaminophen     Tablet .. 650 milliGRAM(s) Oral every 6 hours PRN Temp greater or equal to 38C (100.4F), Mild Pain (1 - 3)  aluminum hydroxide/magnesium hydroxide/simethicone Suspension 30 milliLiter(s) Oral every 4 hours PRN Dyspepsia  dextrose Oral Gel 15 Gram(s) Oral once PRN Blood Glucose LESS THAN 70 milliGRAM(s)/deciliter  HYDROmorphone  Injectable 2 milliGRAM(s) IV Push every 4 hours PRN Severe Pain (7 - 10)  magnesium hydroxide Suspension 30 milliLiter(s) Oral every 8 hours PRN Constipation  melatonin 3 milliGRAM(s) Oral at bedtime PRN Insomnia  ondansetron Injectable 4 milliGRAM(s) IV Push every 8 hours PRN Nausea and/or Vomiting        Assessment and Plan:  63 yo man with DM, HTN, CAD, 3v CABG, hx of PAD, L BKA, GERD, past alcohol use disorder, cirrhosis, chronic anemia, sent from [place] for worsening RLE swelling and redness, also complaints of epigastric discomfort. In the ED, exam was consistent with cellulitis of the RLE, non healing wound on the plantar surface of the R foot. Elevated inflammatory markers. Patient was placed on antibiotics and admitted to Medicine.     RLE skin and soft tissue infection in setting of non healing R foot ulcer due to/as a consequence of  type 2 DM and PAD:  -MRI RLE negative for osteomyelitis.   -S/P RLE angio, per vascular --> would benefit from distal femoral bypass surgery. Patient is cleared by cardiology. Medically stable and optimized for RLE bypass  -Continue on empiric antibiotics per ID with zosyn, and doxycycline   -Continue aspirin, statin  -Pain meds    CAD /  Hx of CABG / demand ischemia /  severe mitral stenosis / severe pulmonary HTN / HTN:  -echo: EF: 55-60%, severe Mitral stenosis, severe pulmonary HTN  -chest xray 12/7: vascular congestion  -hold lasix for surgery  -Continue aspirin, statin, Imdur  -Continue amlodipine, lisinopril and Imdur  +/- right and left heart cath-after bypass as per cardiology      Epigastric discomfort / dyspepsia: improving  -Per GI --> Carafate,  PPI.   -Eventual EGD, treat empirically for now       DM2:  - A1c 7.  - on half lantus 30u to 15u due to NPO for surgery today  - light IVF hydration pre surgery with d51/2 NS  - RAISS      BPH: Stable  - Continue Flomax      Code Status: Full  DVT px: LMWH  Dispo: RLE bypass today

## 2022-12-19 NOTE — PROGRESS NOTE ADULT - ASSESSMENT
62 year old male with history of PVD, IHD, MV repair. admitted with right leg cellulitis and was found to have significant stenosis of his RLE vasculature    Plan:  s/p RLE angio  medical and cardiac clearances appreciated  Vein mapping appreciated  Scheduled for RLE bypass on Monday  Preop the patient accordingly: Sunday: labs at 5 am Monday, NPO aftermidnight,   Podiatry for local wound care  Medical management as per primary team    Plan discussed with Dr Bhardwaj   62 year old male with history of PVD, IHD, MV repair. admitted with right leg cellulitis and was found to have significant stenosis of his RLE vasculature    Plan:  s/p RLE angio  medical and cardiac clearances appreciated  Vein mapping appreciated  OR today for RLE bypass   Podiatry for local wound care  Medical management as per primary team    Plan discussed with Dr Bhardwaj

## 2022-12-19 NOTE — PROGRESS NOTE ADULT - SUBJECTIVE AND OBJECTIVE BOX
SURGERY DAILY PROGRESS NOTE:     Subjective:  Patient seen and examined at bedside during am rounds. AVSS. Denies any fevers, chills, n/v/d, chest pain or shortness of breath    Objective:  Vitals:  T(C): 37.1 ( @ 08:10), Max: 37.1 ( @ 00:02)  HR: 95 ( @ 08:10) (84 - 95)  BP: 167/79 ( @ 08:10) (143/62 - 167/79)  RR: 18 ( @ 08:10) (16 - 18)  SpO2: 96% ( @ 08:10) (93% - 96%)      PHYSICAL EXAM   Gen: well-appearing, in no acute distress  CV: pulse regularly present   Resp: airway patent, non-labored breathing  Abd: soft, NTND  Extremities: RLE less erythematous and swollen     @ 05:36                    9.1  CBC: 13.04>)-------(<400                     28.5                 143 | 107 | 28    CMP:  ----------------------< 164               3.4 | 30 | 1.07                      Ca:9.5  Phos:3.4  M.7               0.6|      |9        LFTs:  ------|181|-----             -|      |-      Current Inpatient Medications:  acetaminophen     Tablet .. 650 milliGRAM(s) Oral every 6 hours PRN  aluminum hydroxide/magnesium hydroxide/simethicone Suspension 30 milliLiter(s) Oral every 4 hours PRN  amLODIPine   Tablet 5 milliGRAM(s) Oral daily  aspirin  chewable 81 milliGRAM(s) Oral daily  dextrose 5% + sodium chloride 0.45%. 1000 milliLiter(s) (55 mL/Hr) IV Continuous <Continuous>  dextrose 5%. 1000 milliLiter(s) (100 mL/Hr) IV Continuous <Continuous>  dextrose 5%. 1000 milliLiter(s) (50 mL/Hr) IV Continuous <Continuous>  dextrose 50% Injectable 25 Gram(s) IV Push once  dextrose 50% Injectable 12.5 Gram(s) IV Push once  dextrose 50% Injectable 25 Gram(s) IV Push once  dextrose Oral Gel 15 Gram(s) Oral once PRN  doxycycline monohydrate Capsule 100 milliGRAM(s) Oral every 12 hours  enoxaparin Injectable 40 milliGRAM(s) SubCutaneous every 24 hours  famotidine    Tablet 40 milliGRAM(s) Oral at bedtime  gabapentin 300 milliGRAM(s) Oral two times a day  gabapentin 600 milliGRAM(s) Oral at bedtime  glucagon  Injectable 1 milliGRAM(s) IntraMuscular once  HYDROmorphone  Injectable 2 milliGRAM(s) IV Push every 4 hours PRN  insulin glargine Injectable (LANTUS) 15 Unit(s) SubCutaneous at bedtime  insulin lispro (ADMELOG) corrective regimen sliding scale   SubCutaneous three times a day before meals  isosorbide   mononitrate ER Tablet (IMDUR) 30 milliGRAM(s) Oral daily  lisinopril 5 milliGRAM(s) Oral daily  magnesium hydroxide Suspension 30 milliLiter(s) Oral every 8 hours PRN  melatonin 3 milliGRAM(s) Oral at bedtime PRN  metoclopramide 10 milliGRAM(s) Oral daily  multivitamin 1 Tablet(s) Oral daily  ondansetron Injectable 4 milliGRAM(s) IV Push every 8 hours PRN  pantoprazole    Tablet 40 milliGRAM(s) Oral before breakfast  piperacillin/tazobactam IVPB.. 3.375 Gram(s) IV Intermittent every 8 hours  polyethylene glycol 3350 17 Gram(s) Oral daily  potassium chloride  10 mEq/100 mL IVPB 10 milliEquivalent(s) IV Intermittent once  senna 2 Tablet(s) Oral at bedtime  simvastatin 10 milliGRAM(s) Oral at bedtime  sucralfate suspension 1 Gram(s) Oral four times a day  tamsulosin 0.8 milliGRAM(s) Oral at bedtime

## 2022-12-19 NOTE — CONSULT NOTE ADULT - CONSULT REASON
Unstagable wound Right foot
Evaluation of PAD
S/p RLE femoral to anterior tibial bypass
cellulitis
cirrhosis
Asked to see patient for elevated troponin
mitral stenosis

## 2022-12-19 NOTE — CONSULT NOTE ADULT - SUBJECTIVE AND OBJECTIVE BOX
Patient is a 61 y/o male who presents with a chief complaint of epigastric pain (19 Dec 2022 15:55)    BRIEF HOSPITAL COURSE: 61 y/o M with PMHx HTN, DM type 2, GERD, s/p BKA, CAD s/p 3v CABG, and anemia who presented to ER on 12/1 complaining of epigastric pain, vomiting, and right lower extremity redness. Pt was admitted to medicine with right lower extremity cellulitis. Work-up revealed     Events last 24 hours: ***    PAST MEDICAL & SURGICAL HISTORY:  HTN (hypertension)  DM (diabetes mellitus)  Anemia  GERD (gastroesophageal reflux disease)  S/P BKA (below knee amputation), left  CAD (coronary artery disease)  S/P CABG x 3  Status post amputation of leg    Allergies  No Known Allergies    Intolerances    FAMILY HISTORY:  No pertinent family history in first degree relatives    SOCIAL HISTORY:     Review of Systems:  CONSTITUTIONAL: No fever, chills, or fatigue.  EYES: No eye pain, visual disturbances, or discharge.  ENMT:  No difficulty hearing, tinnitus, or vertigo. No sinus or throat pain.  NECK: No pain or stiffness.  RESPIRATORY: No shortness of breath, cough, or wheezing.  CARDIOVASCULAR: No chest pain, palpitations, dizziness, or leg swelling.  GASTROINTESTINAL: + epigastric pain. No nausea, vomiting, diarrhea, or constipation. No hematemesis, melena, or hematochezia.  GENITOURINARY: No dysuria, frequency, hematuria, or incontinence.  NEUROLOGICAL: No headaches, memory loss, loss of strength, numbness, or tremors.  SKIN: No itching, burning, rashes, or lesions.  MUSCULOSKELETAL: + right lower extremity pain. No joint pain or swelling. No muscle or back pain.  PSYCHIATRIC: No depression, anxiety, mood swings, or difficulty sleeping.    Medications:  doxycycline monohydrate Capsule 100 milliGRAM(s) Oral every 12 hours  piperacillin/tazobactam IVPB.. 3.375 Gram(s) IV Intermittent every 8 hours  amLODIPine   Tablet 5 milliGRAM(s) Oral daily  isosorbide   mononitrate ER Tablet (IMDUR) 30 milliGRAM(s) Oral daily  lisinopril 5 milliGRAM(s) Oral daily  acetaminophen     Tablet .. 650 milliGRAM(s) Oral every 6 hours PRN  gabapentin 300 milliGRAM(s) Oral two times a day  gabapentin 600 milliGRAM(s) Oral at bedtime  HYDROmorphone  Injectable 2 milliGRAM(s) IV Push every 4 hours PRN  melatonin 3 milliGRAM(s) Oral at bedtime PRN  ondansetron Injectable 4 milliGRAM(s) IV Push every 8 hours PRN  aspirin  chewable 81 milliGRAM(s) Oral daily  aspirin  chewable 81 milliGRAM(s) Oral once  heparin   Injectable 5000 Unit(s) SubCutaneous every 8 hours  aluminum hydroxide/magnesium hydroxide/simethicone Suspension 30 milliLiter(s) Oral every 4 hours PRN  famotidine    Tablet 40 milliGRAM(s) Oral at bedtime  magnesium hydroxide Suspension 30 milliLiter(s) Oral every 8 hours PRN  metoclopramide 10 milliGRAM(s) Oral daily  pantoprazole    Tablet 40 milliGRAM(s) Oral before breakfast  polyethylene glycol 3350 17 Gram(s) Oral daily  senna 2 Tablet(s) Oral at bedtime  sucralfate suspension 1 Gram(s) Oral four times a day  tamsulosin 0.8 milliGRAM(s) Oral at bedtime  dextrose 50% Injectable 25 Gram(s) IV Push once  dextrose 50% Injectable 12.5 Gram(s) IV Push once  dextrose 50% Injectable 25 Gram(s) IV Push once  dextrose Oral Gel 15 Gram(s) Oral once PRN  glucagon  Injectable 1 milliGRAM(s) IntraMuscular once  insulin glargine Injectable (LANTUS) 15 Unit(s) SubCutaneous at bedtime  insulin lispro (ADMELOG) corrective regimen sliding scale   SubCutaneous three times a day before meals  simvastatin 10 milliGRAM(s) Oral at bedtime  dextrose 5% + sodium chloride 0.45%. 1000 milliLiter(s) IV Continuous <Continuous>  dextrose 5%. 1000 milliLiter(s) IV Continuous <Continuous>  dextrose 5%. 1000 milliLiter(s) IV Continuous <Continuous>  multivitamin 1 Tablet(s) Oral daily  potassium chloride  10 mEq/100 mL IVPB 10 milliEquivalent(s) IV Intermittent once  sodium chloride 0.9% lock flush 3 milliLiter(s) IV Push every 8 hours    ICU Vital Signs Last 24 Hrs  T(C): 36.4 (19 Dec 2022 20:30), Max: 37.1 (19 Dec 2022 00:02)  T(F): 97.5 (19 Dec 2022 20:30), Max: 98.7 (19 Dec 2022 00:02)  HR: 94 (19 Dec 2022 20:30) (84 - 98)  BP: 148/53 (19 Dec 2022 20:30) (121/53 - 167/79)  BP(mean): 78 (19 Dec 2022 20:30) (78 - 78)  ABP: 125/53 (19 Dec 2022 19:30) (120/53 - 141/67)  ABP(mean): --  RR: 17 (19 Dec 2022 20:30) (14 - 18)  SpO2: 100% (19 Dec 2022 20:30) (94% - 100%)    O2 Parameters below as of 19 Dec 2022 20:30  Patient On (Oxygen Delivery Method): nasal cannula  O2 Flow (L/min): 2    Vital Signs Last 24 Hrs  T(C): 36.4 (19 Dec 2022 20:30), Max: 37.1 (19 Dec 2022 00:02)  T(F): 97.5 (19 Dec 2022 20:30), Max: 98.7 (19 Dec 2022 00:02)  HR: 94 (19 Dec 2022 20:30) (84 - 98)  BP: 148/53 (19 Dec 2022 20:30) (121/53 - 167/79)  BP(mean): 78 (19 Dec 2022 20:30) (78 - 78)  RR: 17 (19 Dec 2022 20:30) (14 - 18)  SpO2: 100% (19 Dec 2022 20:30) (94% - 100%)    Parameters below as of 19 Dec 2022 20:30  Patient On (Oxygen Delivery Method): nasal cannula  O2 Flow (L/min): 2    I&O's Detail    19 Dec 2022 07:01  -  19 Dec 2022 21:41  --------------------------------------------------------  IN:    dextrose 5% + sodium chloride 0.45%: 165 mL    Other (mL): 1200 mL  Total IN: 1365 mL    OUT:    Indwelling Catheter - Urethral (mL): 1320 mL    Stool (mL): 1 mL  Total OUT: 1321 mL    Total NET: 44 mL    LABS:                        9.0    21.64 )-----------( 381      ( 19 Dec 2022 18:10 )             28.0     12-19    143  |  105  |  28<H>  ----------------------------<  197<H>  3.4<L>   |  29  |  1.08    Ca    9.3      19 Dec 2022 18:10  Phos  3.4     12-19  Mg     1.7     12-19    TPro  7.1  /  Alb  2.5<L>  /  TBili  0.7  /  DBili  x   /  AST  13<L>  /  ALT  15  /  AlkPhos  166<H>  12-19    CAPILLARY BLOOD GLUCOSE    POCT Blood Glucose.: 238 mg/dL (19 Dec 2022 21:34)    PT/INR - ( 19 Dec 2022 18:10 )   PT: 14.4 sec;   INR: 1.24 ratio    PTT - ( 19 Dec 2022 05:36 )  PTT:25.5 sec    CULTURES:    Physical Examination:    General: Well appearing, lying in bed in NAD.    HEENT: Pupils equal, reactive to light. Symmetric. No scleral icterus or injection.    PULM: Clear to auscultation B/L. No wheezes, rales, or rhonchi appreciated. No significant sputum production or increased respiratory effort.    NECK: Supple, no lymphadenopathy, trachea midline.    CVS: Regular rate and rhythm, no murmurs appreciated, +s1/s2.    ABD: Soft, nondistended, nontender, normoactive bowel sounds.    EXT: No edema, nontender.    SKIN: Warm and well perfused, no rashes noted.    NEURO: Alert, oriented, interactive, nonfocal.    RADIOLOGY:  < from: US Duplex Venous Lower Ext Complete, Bilateral (12.16.22 @ 08:50) >  ACC: [id number] EXAM:  US DPLX LWR EXT VEINS COMPL BI                          PROCEDURE DATE:  12/16/2022      INTERPRETATION:  CLINICAL INFORMATION: Needs right lower extremity   bypass. Vein mapping for vein harvest. Left BKA.    COMPARISON: None available.    TECHNIQUE: Duplex sonography of the BILATERAL LOWER extremity veins for   the specific purpose of pre-operative vein mapping.    FINDINGS:    Edema of the superficial soft tissues of the lower extremities limits   evaluation.    Right GSV:  CFV-GSV junction: 0.8 cm  GSV thigh prox: 0.4 cm  thigh mid: limited  thigh distal: limited  knee prox: limited  knee distal: limited  calf prox: 0.2 cm  calf mid: limited  calf distal: 0.1 cm  ankle: limited due to dressing    Left GSV:  CFV-GSV junction: 1.0 cm  GSV thigh prox: 0.6 cm  thigh mid: limited  thigh distal: limited  knee prox: limited  knee distal: limited  BKA    IMPRESSION:    Limited lower extremity vein mapping due to soft tissue edema.   Measurements obtained by technologist as above.    --- End of Report ---    EZEKIEL REYES M.D., ATTENDING RADIOLOGIST  This document has been electronically signed. Dec 16 2022 10:02AM    < end of copied text >   Patient is a 61 y/o male who presents with a chief complaint of epigastric pain (19 Dec 2022 15:55)    BRIEF HOSPITAL COURSE: 61 y/o M with PMHx HTN, DM type 2, GERD, s/p BKA, CAD s/p 3v CABG, and anemia who presented to ER on 12/1 complaining of epigastric pain, vomiting, and right lower extremity redness. Pt was admitted to medicine with right lower extremity cellulitis. Work-up revealed significant stenosis of right lower extremity vasculature.    Events last 24 hours: S/p right lower extremity femoral to anterior tibial bypass with PTFE graft--POD 0. Transferred to SICU post procedure. Pt seen and examined at the bedside. Complaining of right lower extremity pain and epigastric pain.    PAST MEDICAL & SURGICAL HISTORY:  HTN (hypertension)  DM (diabetes mellitus)  Anemia  GERD (gastroesophageal reflux disease)  S/P BKA (below knee amputation), left  CAD (coronary artery disease)  S/P CABG x 3  Status post amputation of leg    Allergies  No Known Allergies    Intolerances    FAMILY HISTORY:  No pertinent family history in first degree relatives    SOCIAL HISTORY:   Hx of tobacco abuse (4-5 cigarettes/day).    Review of Systems:  CONSTITUTIONAL: No fever, chills, or fatigue.  EYES: No eye pain, visual disturbances, or discharge.  ENMT:  No difficulty hearing, tinnitus, or vertigo. No sinus or throat pain.  NECK: No pain or stiffness.  RESPIRATORY: No shortness of breath, cough, or wheezing.  CARDIOVASCULAR: No chest pain, palpitations, dizziness, or leg swelling.  GASTROINTESTINAL: + epigastric pain. No nausea, vomiting, diarrhea, or constipation. No hematemesis, melena, or hematochezia.  GENITOURINARY: No dysuria, frequency, hematuria, or incontinence.  NEUROLOGICAL: No headaches, memory loss, loss of strength, numbness, or tremors.  SKIN: No itching, burning, rashes, or lesions.  MUSCULOSKELETAL: + right lower extremity pain. No joint pain or swelling. No muscle or back pain.  PSYCHIATRIC: No depression, anxiety, mood swings, or difficulty sleeping.    Medications:  doxycycline monohydrate Capsule 100 milliGRAM(s) Oral every 12 hours  piperacillin/tazobactam IVPB.. 3.375 Gram(s) IV Intermittent every 8 hours  amLODIPine   Tablet 5 milliGRAM(s) Oral daily  isosorbide   mononitrate ER Tablet (IMDUR) 30 milliGRAM(s) Oral daily  lisinopril 5 milliGRAM(s) Oral daily  acetaminophen     Tablet .. 650 milliGRAM(s) Oral every 6 hours PRN  gabapentin 300 milliGRAM(s) Oral two times a day  gabapentin 600 milliGRAM(s) Oral at bedtime  HYDROmorphone  Injectable 2 milliGRAM(s) IV Push every 4 hours PRN  melatonin 3 milliGRAM(s) Oral at bedtime PRN  ondansetron Injectable 4 milliGRAM(s) IV Push every 8 hours PRN  aspirin  chewable 81 milliGRAM(s) Oral daily  aspirin  chewable 81 milliGRAM(s) Oral once  heparin   Injectable 5000 Unit(s) SubCutaneous every 8 hours  aluminum hydroxide/magnesium hydroxide/simethicone Suspension 30 milliLiter(s) Oral every 4 hours PRN  famotidine    Tablet 40 milliGRAM(s) Oral at bedtime  magnesium hydroxide Suspension 30 milliLiter(s) Oral every 8 hours PRN  metoclopramide 10 milliGRAM(s) Oral daily  pantoprazole    Tablet 40 milliGRAM(s) Oral before breakfast  polyethylene glycol 3350 17 Gram(s) Oral daily  senna 2 Tablet(s) Oral at bedtime  sucralfate suspension 1 Gram(s) Oral four times a day  tamsulosin 0.8 milliGRAM(s) Oral at bedtime  dextrose 50% Injectable 25 Gram(s) IV Push once  dextrose 50% Injectable 12.5 Gram(s) IV Push once  dextrose 50% Injectable 25 Gram(s) IV Push once  dextrose Oral Gel 15 Gram(s) Oral once PRN  glucagon  Injectable 1 milliGRAM(s) IntraMuscular once  insulin glargine Injectable (LANTUS) 15 Unit(s) SubCutaneous at bedtime  insulin lispro (ADMELOG) corrective regimen sliding scale   SubCutaneous three times a day before meals  simvastatin 10 milliGRAM(s) Oral at bedtime  dextrose 5% + sodium chloride 0.45%. 1000 milliLiter(s) IV Continuous <Continuous>  dextrose 5%. 1000 milliLiter(s) IV Continuous <Continuous>  dextrose 5%. 1000 milliLiter(s) IV Continuous <Continuous>  multivitamin 1 Tablet(s) Oral daily  potassium chloride  10 mEq/100 mL IVPB 10 milliEquivalent(s) IV Intermittent once  sodium chloride 0.9% lock flush 3 milliLiter(s) IV Push every 8 hours    ICU Vital Signs Last 24 Hrs  T(C): 36.4 (19 Dec 2022 20:30), Max: 37.1 (19 Dec 2022 00:02)  T(F): 97.5 (19 Dec 2022 20:30), Max: 98.7 (19 Dec 2022 00:02)  HR: 94 (19 Dec 2022 20:30) (84 - 98)  BP: 148/53 (19 Dec 2022 20:30) (121/53 - 167/79)  BP(mean): 78 (19 Dec 2022 20:30) (78 - 78)  ABP: 125/53 (19 Dec 2022 19:30) (120/53 - 141/67)  ABP(mean): --  RR: 17 (19 Dec 2022 20:30) (14 - 18)  SpO2: 100% (19 Dec 2022 20:30) (94% - 100%)    O2 Parameters below as of 19 Dec 2022 20:30  Patient On (Oxygen Delivery Method): nasal cannula  O2 Flow (L/min): 2    Vital Signs Last 24 Hrs  T(C): 36.4 (19 Dec 2022 20:30), Max: 37.1 (19 Dec 2022 00:02)  T(F): 97.5 (19 Dec 2022 20:30), Max: 98.7 (19 Dec 2022 00:02)  HR: 94 (19 Dec 2022 20:30) (84 - 98)  BP: 148/53 (19 Dec 2022 20:30) (121/53 - 167/79)  BP(mean): 78 (19 Dec 2022 20:30) (78 - 78)  RR: 17 (19 Dec 2022 20:30) (14 - 18)  SpO2: 100% (19 Dec 2022 20:30) (94% - 100%)    Parameters below as of 19 Dec 2022 20:30  Patient On (Oxygen Delivery Method): nasal cannula  O2 Flow (L/min): 2    I&O's Detail    19 Dec 2022 07:01  -  19 Dec 2022 21:41  --------------------------------------------------------  IN:    dextrose 5% + sodium chloride 0.45%: 165 mL    Other (mL): 1200 mL  Total IN: 1365 mL    OUT:    Indwelling Catheter - Urethral (mL): 1320 mL    Stool (mL): 1 mL  Total OUT: 1321 mL    Total NET: 44 mL    LABS:                        9.0    21.64 )-----------( 381      ( 19 Dec 2022 18:10 )             28.0     12-19    143  |  105  |  28<H>  ----------------------------<  197<H>  3.4<L>   |  29  |  1.08    Ca    9.3      19 Dec 2022 18:10  Phos  3.4     12-19  Mg     1.7     12-19    TPro  7.1  /  Alb  2.5<L>  /  TBili  0.7  /  DBili  x   /  AST  13<L>  /  ALT  15  /  AlkPhos  166<H>  12-19    CAPILLARY BLOOD GLUCOSE    POCT Blood Glucose.: 238 mg/dL (19 Dec 2022 21:34)    PT/INR - ( 19 Dec 2022 18:10 )   PT: 14.4 sec;   INR: 1.24 ratio    PTT - ( 19 Dec 2022 05:36 )  PTT:25.5 sec    CULTURES:    Physical Examination:    General: Well appearing, lying in bed in NAD.    HEENT: Pupils equal, reactive to light. Symmetric. No scleral icterus or injection.    PULM: Clear to auscultation B/L. No wheezes, rales, or rhonchi appreciated. No significant sputum production or increased respiratory effort.    NECK: Supple, no lymphadenopathy, trachea midline.    CVS: Regular rate and rhythm, no murmurs appreciated, +s1/s2.    ABD: Soft, nondistended, nontender, normoactive bowel sounds.    EXT: No edema, nontender.    SKIN: Warm and well perfused, no rashes noted.    NEURO: Alert, oriented, interactive, nonfocal.    RADIOLOGY:  < from: US Duplex Venous Lower Ext Complete, Bilateral (12.16.22 @ 08:50) >  ACC: [id number] EXAM:  US DPLX LWR EXT VEINS COMPL BI                          PROCEDURE DATE:  12/16/2022      INTERPRETATION:  CLINICAL INFORMATION: Needs right lower extremity   bypass. Vein mapping for vein harvest. Left BKA.    COMPARISON: None available.    TECHNIQUE: Duplex sonography of the BILATERAL LOWER extremity veins for   the specific purpose of pre-operative vein mapping.    FINDINGS:    Edema of the superficial soft tissues of the lower extremities limits   evaluation.    Right GSV:  CFV-GSV junction: 0.8 cm  GSV thigh prox: 0.4 cm  thigh mid: limited  thigh distal: limited  knee prox: limited  knee distal: limited  calf prox: 0.2 cm  calf mid: limited  calf distal: 0.1 cm  ankle: limited due to dressing    Left GSV:  CFV-GSV junction: 1.0 cm  GSV thigh prox: 0.6 cm  thigh mid: limited  thigh distal: limited  knee prox: limited  knee distal: limited  BKA    IMPRESSION:    Limited lower extremity vein mapping due to soft tissue edema.   Measurements obtained by technologist as above.    --- End of Report ---    EZEKIEL REYES M.D., ATTENDING RADIOLOGIST  This document has been electronically signed. Dec 16 2022 10:02AM    < end of copied text >   Patient is a 61 y/o male who presents with a chief complaint of epigastric pain (19 Dec 2022 15:55)    BRIEF HOSPITAL COURSE: 61 y/o M with PMHx HTN, DM type 2, GERD, s/p BKA, CAD s/p 3v CABG, and anemia who presented to ER on 12/1 complaining of epigastric pain, vomiting, and right lower extremity redness. Pt was admitted to medicine with right lower extremity cellulitis. Work-up revealed significant stenosis of right lower extremity vasculature.    Events last 24 hours: S/p right lower extremity femoral to anterior tibial bypass with PTFE graft--POD 0. Transferred to SICU post procedure. Pt seen and examined at the bedside. Complaining of right lower extremity pain and epigastric pain.    PAST MEDICAL & SURGICAL HISTORY:  HTN (hypertension)  DM (diabetes mellitus)  Anemia  GERD (gastroesophageal reflux disease)  S/P BKA (below knee amputation), left  CAD (coronary artery disease)  S/P CABG x 3  Status post amputation of leg    Allergies  No Known Allergies    Intolerances    FAMILY HISTORY:  No pertinent family history in first degree relatives    SOCIAL HISTORY:   Hx of tobacco abuse (4-5 cigarettes/day).    Review of Systems:  CONSTITUTIONAL: No fever, chills, or fatigue.  EYES: No eye pain, visual disturbances, or discharge.  ENMT:  No difficulty hearing, tinnitus, or vertigo. No sinus or throat pain.  NECK: No pain or stiffness.  RESPIRATORY: No shortness of breath, cough, or wheezing.  CARDIOVASCULAR: No chest pain, palpitations, dizziness, or leg swelling.  GASTROINTESTINAL: + epigastric pain. No nausea, vomiting, diarrhea, or constipation. No hematemesis, melena, or hematochezia.  GENITOURINARY: No dysuria, frequency, hematuria, or incontinence.  NEUROLOGICAL: No headaches, memory loss, loss of strength, numbness, or tremors.  SKIN: No itching, burning, rashes, or lesions.  MUSCULOSKELETAL: + right lower extremity pain. No joint pain or swelling. No muscle or back pain.  PSYCHIATRIC: No depression, anxiety, mood swings, or difficulty sleeping.    Medications:  doxycycline monohydrate Capsule 100 milliGRAM(s) Oral every 12 hours  piperacillin/tazobactam IVPB.. 3.375 Gram(s) IV Intermittent every 8 hours  amLODIPine   Tablet 5 milliGRAM(s) Oral daily  isosorbide   mononitrate ER Tablet (IMDUR) 30 milliGRAM(s) Oral daily  lisinopril 5 milliGRAM(s) Oral daily  acetaminophen     Tablet .. 650 milliGRAM(s) Oral every 6 hours PRN  gabapentin 300 milliGRAM(s) Oral two times a day  gabapentin 600 milliGRAM(s) Oral at bedtime  HYDROmorphone  Injectable 2 milliGRAM(s) IV Push every 4 hours PRN  melatonin 3 milliGRAM(s) Oral at bedtime PRN  ondansetron Injectable 4 milliGRAM(s) IV Push every 8 hours PRN  aspirin  chewable 81 milliGRAM(s) Oral daily  aspirin  chewable 81 milliGRAM(s) Oral once  heparin   Injectable 5000 Unit(s) SubCutaneous every 8 hours  aluminum hydroxide/magnesium hydroxide/simethicone Suspension 30 milliLiter(s) Oral every 4 hours PRN  famotidine    Tablet 40 milliGRAM(s) Oral at bedtime  magnesium hydroxide Suspension 30 milliLiter(s) Oral every 8 hours PRN  metoclopramide 10 milliGRAM(s) Oral daily  pantoprazole    Tablet 40 milliGRAM(s) Oral before breakfast  polyethylene glycol 3350 17 Gram(s) Oral daily  senna 2 Tablet(s) Oral at bedtime  sucralfate suspension 1 Gram(s) Oral four times a day  tamsulosin 0.8 milliGRAM(s) Oral at bedtime  dextrose 50% Injectable 25 Gram(s) IV Push once  dextrose 50% Injectable 12.5 Gram(s) IV Push once  dextrose 50% Injectable 25 Gram(s) IV Push once  dextrose Oral Gel 15 Gram(s) Oral once PRN  glucagon  Injectable 1 milliGRAM(s) IntraMuscular once  insulin glargine Injectable (LANTUS) 15 Unit(s) SubCutaneous at bedtime  insulin lispro (ADMELOG) corrective regimen sliding scale   SubCutaneous three times a day before meals  simvastatin 10 milliGRAM(s) Oral at bedtime  dextrose 5% + sodium chloride 0.45%. 1000 milliLiter(s) IV Continuous <Continuous>  dextrose 5%. 1000 milliLiter(s) IV Continuous <Continuous>  dextrose 5%. 1000 milliLiter(s) IV Continuous <Continuous>  multivitamin 1 Tablet(s) Oral daily  potassium chloride  10 mEq/100 mL IVPB 10 milliEquivalent(s) IV Intermittent once  sodium chloride 0.9% lock flush 3 milliLiter(s) IV Push every 8 hours    ICU Vital Signs Last 24 Hrs  T(C): 36.4 (19 Dec 2022 20:30), Max: 37.1 (19 Dec 2022 00:02)  T(F): 97.5 (19 Dec 2022 20:30), Max: 98.7 (19 Dec 2022 00:02)  HR: 94 (19 Dec 2022 20:30) (84 - 98)  BP: 148/53 (19 Dec 2022 20:30) (121/53 - 167/79)  BP(mean): 78 (19 Dec 2022 20:30) (78 - 78)  ABP: 125/53 (19 Dec 2022 19:30) (120/53 - 141/67)  ABP(mean): --  RR: 17 (19 Dec 2022 20:30) (14 - 18)  SpO2: 100% (19 Dec 2022 20:30) (94% - 100%)    O2 Parameters below as of 19 Dec 2022 20:30  Patient On (Oxygen Delivery Method): nasal cannula  O2 Flow (L/min): 2    Vital Signs Last 24 Hrs  T(C): 36.4 (19 Dec 2022 20:30), Max: 37.1 (19 Dec 2022 00:02)  T(F): 97.5 (19 Dec 2022 20:30), Max: 98.7 (19 Dec 2022 00:02)  HR: 94 (19 Dec 2022 20:30) (84 - 98)  BP: 148/53 (19 Dec 2022 20:30) (121/53 - 167/79)  BP(mean): 78 (19 Dec 2022 20:30) (78 - 78)  RR: 17 (19 Dec 2022 20:30) (14 - 18)  SpO2: 100% (19 Dec 2022 20:30) (94% - 100%)    Parameters below as of 19 Dec 2022 20:30  Patient On (Oxygen Delivery Method): nasal cannula  O2 Flow (L/min): 2    I&O's Detail    19 Dec 2022 07:01  -  19 Dec 2022 21:41  --------------------------------------------------------  IN:    dextrose 5% + sodium chloride 0.45%: 165 mL    Other (mL): 1200 mL  Total IN: 1365 mL    OUT:    Indwelling Catheter - Urethral (mL): 1320 mL    Stool (mL): 1 mL  Total OUT: 1321 mL    Total NET: 44 mL    LABS:                        9.0    21.64 )-----------( 381      ( 19 Dec 2022 18:10 )             28.0     12-19    143  |  105  |  28<H>  ----------------------------<  197<H>  3.4<L>   |  29  |  1.08    Ca    9.3      19 Dec 2022 18:10  Phos  3.4     12-19  Mg     1.7     12-19    TPro  7.1  /  Alb  2.5<L>  /  TBili  0.7  /  DBili  x   /  AST  13<L>  /  ALT  15  /  AlkPhos  166<H>  12-19    CAPILLARY BLOOD GLUCOSE    POCT Blood Glucose.: 238 mg/dL (19 Dec 2022 21:34)    PT/INR - ( 19 Dec 2022 18:10 )   PT: 14.4 sec;   INR: 1.24 ratio    PTT - ( 19 Dec 2022 05:36 )  PTT:25.5 sec    CULTURES:    Physical Examination:    General: In no acute distress.    HEENT: Pupils equal, reactive to light. Symmetric. No scleral icterus or injection.    PULM: Clear to auscultation b/l.    NECK: Supple, no lymphadenopathy, trachea midline.    CVS: Regular rate and rhythm, no murmurs appreciated, +s1/s2.    ABD: Soft, nondistended, nontender, normoactive bowel sounds.    EXT: No edema, nontender.    SKIN: Right lower extremity wrapped with ACE bandage.    NEURO: Alert, oriented, interactive, nonfocal.    RADIOLOGY:  < from: US Duplex Venous Lower Ext Complete, Bilateral (12.16.22 @ 08:50) >  ACC: [id number] EXAM:  US DPLX LWR EXT VEINS COMPL BI                          PROCEDURE DATE:  12/16/2022      INTERPRETATION:  CLINICAL INFORMATION: Needs right lower extremity   bypass. Vein mapping for vein harvest. Left BKA.    COMPARISON: None available.    TECHNIQUE: Duplex sonography of the BILATERAL LOWER extremity veins for   the specific purpose of pre-operative vein mapping.    FINDINGS:    Edema of the superficial soft tissues of the lower extremities limits   evaluation.    Right GSV:  CFV-GSV junction: 0.8 cm  GSV thigh prox: 0.4 cm  thigh mid: limited  thigh distal: limited  knee prox: limited  knee distal: limited  calf prox: 0.2 cm  calf mid: limited  calf distal: 0.1 cm  ankle: limited due to dressing    Left GSV:  CFV-GSV junction: 1.0 cm  GSV thigh prox: 0.6 cm  thigh mid: limited  thigh distal: limited  knee prox: limited  knee distal: limited  BKA    IMPRESSION:    Limited lower extremity vein mapping due to soft tissue edema.   Measurements obtained by technologist as above.    --- End of Report ---    EZEKIEL REYES M.D., ATTENDING RADIOLOGIST  This document has been electronically signed. Dec 16 2022 10:02AM    < end of copied text >

## 2022-12-19 NOTE — CONSULT NOTE ADULT - CONSULT REQUESTED DATE/TIME
01-Dec-2022 21:58
02-Dec-2022 04:53
19-Dec-2022 19:00
02-Dec-2022 14:27
02-Dec-2022 15:04
06-Dec-2022 22:53
02-Dec-2022 12:27

## 2022-12-19 NOTE — BRIEF OPERATIVE NOTE - NSICDXBRIEFPROCEDURE_GEN_ALL_CORE_FT
PROCEDURES:  Angiogram, lower extremity, unilateral 06-Dec-2022 10:25:00 Angiogram of Dulce Maria Tenorio  
PROCEDURES:  Creation of bypass from right femoral artery to distal tibial artery 19-Dec-2022 18:04:43  Iggy Mattson

## 2022-12-19 NOTE — CONSULT NOTE ADULT - REASON FOR ADMISSION
Elevated troponin, PAD and Cellulitis
Elevated troponin, PAD and Cellulitis
Epigastric pain
Elevated troponin, PAD and Cellulitis

## 2022-12-19 NOTE — BRIEF OPERATIVE NOTE - NSICDXBRIEFPREOP_GEN_ALL_CORE_FT
PRE-OP DIAGNOSIS:  PVD (peripheral vascular disease) 06-Dec-2022 10:25:59 Stenosis of RLE Dulce Maria Alfredo  Nonhealing nonsurgical wound 19-Dec-2022 18:06:14  Iggy Mattson  
PRE-OP DIAGNOSIS:  PVD (peripheral vascular disease) 06-Dec-2022 10:25:59 Stenosis of RLE Dulce Maria Alfredo

## 2022-12-19 NOTE — CONSULT NOTE ADULT - PROVIDER SPECIALTY LIST ADULT
Structural Heart
Podiatry
Critical Care
Vascular Surgery
Gastroenterology
Infectious Disease
Cardiology

## 2022-12-20 LAB
ANION GAP SERPL CALC-SCNC: 9 MMOL/L — SIGNIFICANT CHANGE UP (ref 5–17)
BUN SERPL-MCNC: 27 MG/DL — HIGH (ref 7–23)
CALCIUM SERPL-MCNC: 9.2 MG/DL — SIGNIFICANT CHANGE UP (ref 8.5–10.1)
CHLORIDE SERPL-SCNC: 108 MMOL/L — SIGNIFICANT CHANGE UP (ref 96–108)
CO2 SERPL-SCNC: 24 MMOL/L — SIGNIFICANT CHANGE UP (ref 22–31)
CREAT SERPL-MCNC: 1.19 MG/DL — SIGNIFICANT CHANGE UP (ref 0.5–1.3)
EGFR: 70 ML/MIN/1.73M2 — SIGNIFICANT CHANGE UP
GLUCOSE SERPL-MCNC: 252 MG/DL — HIGH (ref 70–99)
HCT VFR BLD CALC: 21.2 % — LOW (ref 39–50)
HCT VFR BLD CALC: 25.2 % — LOW (ref 39–50)
HGB BLD-MCNC: 6.7 G/DL — CRITICAL LOW (ref 13–17)
HGB BLD-MCNC: 7.9 G/DL — LOW (ref 13–17)
MAGNESIUM SERPL-MCNC: 1.7 MG/DL — SIGNIFICANT CHANGE UP (ref 1.6–2.6)
MCHC RBC-ENTMCNC: 31.2 PG — SIGNIFICANT CHANGE UP (ref 27–34)
MCHC RBC-ENTMCNC: 31.3 GM/DL — LOW (ref 32–36)
MCHC RBC-ENTMCNC: 31.6 GM/DL — LOW (ref 32–36)
MCHC RBC-ENTMCNC: 31.6 PG — SIGNIFICANT CHANGE UP (ref 27–34)
MCV RBC AUTO: 100 FL — SIGNIFICANT CHANGE UP (ref 80–100)
MCV RBC AUTO: 99.6 FL — SIGNIFICANT CHANGE UP (ref 80–100)
PHOSPHATE SERPL-MCNC: 3.7 MG/DL — SIGNIFICANT CHANGE UP (ref 2.5–4.5)
PLATELET # BLD AUTO: 328 K/UL — SIGNIFICANT CHANGE UP (ref 150–400)
PLATELET # BLD AUTO: 355 K/UL — SIGNIFICANT CHANGE UP (ref 150–400)
POTASSIUM SERPL-MCNC: 4.2 MMOL/L — SIGNIFICANT CHANGE UP (ref 3.5–5.3)
POTASSIUM SERPL-SCNC: 4.2 MMOL/L — SIGNIFICANT CHANGE UP (ref 3.5–5.3)
RBC # BLD: 2.12 M/UL — LOW (ref 4.2–5.8)
RBC # BLD: 2.53 M/UL — LOW (ref 4.2–5.8)
RBC # FLD: 13.5 % — SIGNIFICANT CHANGE UP (ref 10.3–14.5)
RBC # FLD: 13.5 % — SIGNIFICANT CHANGE UP (ref 10.3–14.5)
SODIUM SERPL-SCNC: 141 MMOL/L — SIGNIFICANT CHANGE UP (ref 135–145)
WBC # BLD: 16.27 K/UL — HIGH (ref 3.8–10.5)
WBC # BLD: 17.74 K/UL — HIGH (ref 3.8–10.5)
WBC # FLD AUTO: 16.27 K/UL — HIGH (ref 3.8–10.5)
WBC # FLD AUTO: 17.74 K/UL — HIGH (ref 3.8–10.5)

## 2022-12-20 PROCEDURE — 99233 SBSQ HOSP IP/OBS HIGH 50: CPT

## 2022-12-20 RX ORDER — OXYCODONE AND ACETAMINOPHEN 5; 325 MG/1; MG/1
1 TABLET ORAL EVERY 4 HOURS
Refills: 0 | Status: DISCONTINUED | OUTPATIENT
Start: 2022-12-20 | End: 2022-12-20

## 2022-12-20 RX ORDER — INSULIN LISPRO 100/ML
VIAL (ML) SUBCUTANEOUS
Refills: 0 | Status: DISCONTINUED | OUTPATIENT
Start: 2022-12-20 | End: 2022-12-27

## 2022-12-20 RX ORDER — HYDROMORPHONE HYDROCHLORIDE 2 MG/ML
1 INJECTION INTRAMUSCULAR; INTRAVENOUS; SUBCUTANEOUS EVERY 4 HOURS
Refills: 0 | Status: DISCONTINUED | OUTPATIENT
Start: 2022-12-20 | End: 2022-12-22

## 2022-12-20 RX ORDER — OXYCODONE HYDROCHLORIDE 5 MG/1
10 TABLET ORAL EVERY 6 HOURS
Refills: 0 | Status: DISCONTINUED | OUTPATIENT
Start: 2022-12-20 | End: 2022-12-22

## 2022-12-20 RX ORDER — SODIUM CHLORIDE 9 MG/ML
1000 INJECTION INTRAMUSCULAR; INTRAVENOUS; SUBCUTANEOUS ONCE
Refills: 0 | Status: COMPLETED | OUTPATIENT
Start: 2022-12-20 | End: 2022-12-20

## 2022-12-20 RX ORDER — PANTOPRAZOLE SODIUM 20 MG/1
40 TABLET, DELAYED RELEASE ORAL EVERY 12 HOURS
Refills: 0 | Status: DISCONTINUED | OUTPATIENT
Start: 2022-12-20 | End: 2022-12-27

## 2022-12-20 RX ORDER — OXYCODONE AND ACETAMINOPHEN 5; 325 MG/1; MG/1
2 TABLET ORAL EVERY 6 HOURS
Refills: 0 | Status: DISCONTINUED | OUTPATIENT
Start: 2022-12-20 | End: 2022-12-20

## 2022-12-20 RX ORDER — OXYCODONE HYDROCHLORIDE 5 MG/1
5 TABLET ORAL EVERY 4 HOURS
Refills: 0 | Status: DISCONTINUED | OUTPATIENT
Start: 2022-12-20 | End: 2022-12-27

## 2022-12-20 RX ADMIN — PANTOPRAZOLE SODIUM 40 MILLIGRAM(S): 20 TABLET, DELAYED RELEASE ORAL at 23:06

## 2022-12-20 RX ADMIN — HYDROMORPHONE HYDROCHLORIDE 1 MILLIGRAM(S): 2 INJECTION INTRAMUSCULAR; INTRAVENOUS; SUBCUTANEOUS at 12:50

## 2022-12-20 RX ADMIN — SODIUM CHLORIDE 1000 MILLILITER(S): 9 INJECTION INTRAMUSCULAR; INTRAVENOUS; SUBCUTANEOUS at 19:14

## 2022-12-20 RX ADMIN — OXYCODONE HYDROCHLORIDE 10 MILLIGRAM(S): 5 TABLET ORAL at 16:06

## 2022-12-20 RX ADMIN — PIPERACILLIN AND TAZOBACTAM 25 GRAM(S): 4; .5 INJECTION, POWDER, LYOPHILIZED, FOR SOLUTION INTRAVENOUS at 08:44

## 2022-12-20 RX ADMIN — SODIUM CHLORIDE 125 MILLILITER(S): 9 INJECTION, SOLUTION INTRAVENOUS at 16:08

## 2022-12-20 RX ADMIN — PANTOPRAZOLE SODIUM 40 MILLIGRAM(S): 20 TABLET, DELAYED RELEASE ORAL at 08:15

## 2022-12-20 RX ADMIN — Medication 2: at 18:14

## 2022-12-20 RX ADMIN — SODIUM CHLORIDE 125 MILLILITER(S): 9 INJECTION, SOLUTION INTRAVENOUS at 20:33

## 2022-12-20 RX ADMIN — Medication 2: at 23:13

## 2022-12-20 RX ADMIN — Medication 1 GRAM(S): at 17:35

## 2022-12-20 RX ADMIN — SODIUM CHLORIDE 125 MILLILITER(S): 9 INJECTION, SOLUTION INTRAVENOUS at 02:03

## 2022-12-20 RX ADMIN — Medication 6: at 12:07

## 2022-12-20 RX ADMIN — Medication 1 GRAM(S): at 05:19

## 2022-12-20 RX ADMIN — SODIUM CHLORIDE 3 MILLILITER(S): 9 INJECTION INTRAMUSCULAR; INTRAVENOUS; SUBCUTANEOUS at 22:00

## 2022-12-20 RX ADMIN — HEPARIN SODIUM 5000 UNIT(S): 5000 INJECTION INTRAVENOUS; SUBCUTANEOUS at 05:19

## 2022-12-20 RX ADMIN — Medication 1 GRAM(S): at 12:08

## 2022-12-20 RX ADMIN — Medication 10 MILLIGRAM(S): at 10:00

## 2022-12-20 RX ADMIN — GABAPENTIN 300 MILLIGRAM(S): 400 CAPSULE ORAL at 13:24

## 2022-12-20 RX ADMIN — HYDROMORPHONE HYDROCHLORIDE 2 MILLIGRAM(S): 2 INJECTION INTRAMUSCULAR; INTRAVENOUS; SUBCUTANEOUS at 02:03

## 2022-12-20 RX ADMIN — HYDROMORPHONE HYDROCHLORIDE 1 MILLIGRAM(S): 2 INJECTION INTRAMUSCULAR; INTRAVENOUS; SUBCUTANEOUS at 21:00

## 2022-12-20 RX ADMIN — INSULIN GLARGINE 15 UNIT(S): 100 INJECTION, SOLUTION SUBCUTANEOUS at 23:12

## 2022-12-20 RX ADMIN — ISOSORBIDE MONONITRATE 30 MILLIGRAM(S): 60 TABLET, EXTENDED RELEASE ORAL at 10:00

## 2022-12-20 RX ADMIN — HYDROMORPHONE HYDROCHLORIDE 1 MILLIGRAM(S): 2 INJECTION INTRAMUSCULAR; INTRAVENOUS; SUBCUTANEOUS at 20:33

## 2022-12-20 RX ADMIN — HYDROMORPHONE HYDROCHLORIDE 1 MILLIGRAM(S): 2 INJECTION INTRAMUSCULAR; INTRAVENOUS; SUBCUTANEOUS at 12:18

## 2022-12-20 RX ADMIN — Medication 100 MILLIGRAM(S): at 23:05

## 2022-12-20 RX ADMIN — Medication 6: at 08:44

## 2022-12-20 RX ADMIN — TAMSULOSIN HYDROCHLORIDE 0.8 MILLIGRAM(S): 0.4 CAPSULE ORAL at 23:05

## 2022-12-20 RX ADMIN — HEPARIN SODIUM 5000 UNIT(S): 5000 INJECTION INTRAVENOUS; SUBCUTANEOUS at 23:06

## 2022-12-20 RX ADMIN — CLOPIDOGREL BISULFATE 300 MILLIGRAM(S): 75 TABLET, FILM COATED ORAL at 12:07

## 2022-12-20 RX ADMIN — GABAPENTIN 600 MILLIGRAM(S): 400 CAPSULE ORAL at 23:05

## 2022-12-20 RX ADMIN — Medication 1 GRAM(S): at 00:14

## 2022-12-20 RX ADMIN — SIMVASTATIN 10 MILLIGRAM(S): 20 TABLET, FILM COATED ORAL at 23:04

## 2022-12-20 RX ADMIN — OXYCODONE HYDROCHLORIDE 10 MILLIGRAM(S): 5 TABLET ORAL at 16:45

## 2022-12-20 RX ADMIN — FAMOTIDINE 40 MILLIGRAM(S): 10 INJECTION INTRAVENOUS at 23:05

## 2022-12-20 RX ADMIN — HYDROMORPHONE HYDROCHLORIDE 2 MILLIGRAM(S): 2 INJECTION INTRAMUSCULAR; INTRAVENOUS; SUBCUTANEOUS at 08:15

## 2022-12-20 RX ADMIN — Medication 1 TABLET(S): at 10:00

## 2022-12-20 RX ADMIN — SODIUM CHLORIDE 3 MILLILITER(S): 9 INJECTION INTRAMUSCULAR; INTRAVENOUS; SUBCUTANEOUS at 05:23

## 2022-12-20 RX ADMIN — Medication 100 MILLIGRAM(S): at 09:59

## 2022-12-20 RX ADMIN — Medication 81 MILLIGRAM(S): at 09:59

## 2022-12-20 RX ADMIN — GABAPENTIN 300 MILLIGRAM(S): 400 CAPSULE ORAL at 05:19

## 2022-12-20 RX ADMIN — HEPARIN SODIUM 5000 UNIT(S): 5000 INJECTION INTRAVENOUS; SUBCUTANEOUS at 13:24

## 2022-12-20 RX ADMIN — SENNA PLUS 2 TABLET(S): 8.6 TABLET ORAL at 23:06

## 2022-12-20 NOTE — PROGRESS NOTE ADULT - ASSESSMENT
62 year old male with history of PVD, IHD, MV repair. admitted with right leg cellulitis and was found to have significant stenosis of his RLE vasculature    Plan:  s/p RLE angio  medical and cardiac clearances appreciated  Vein mapping appreciated  POD 1 s/p Bypass from R femoral artery to distal tibial artery  PT  Medical management as per primary team  F/u at [facility] outpatient with podiatry  Plan discussed with Dr Bhardwaj   62 year old male with history of PVD, IHD, MV repair. admitted with right leg cellulitis and was found to have significant stenosis of his RLE vasculature    Plan:  s/p RLE angio  medical and cardiac clearances appreciated  Vein mapping appreciated  POD 1 s/p Bypass from R femoral artery to distal tibial artery  dc freed  PT  Medical management as per primary team  F/u at [facility] outpatient with podiatry  Plan discussed with Dr Bhardwaj   62 year old male with history of PVD, IHD, MV repair. admitted with right leg cellulitis and was found to have significant stenosis of his RLE vasculature    Plan:  s/p RLE angio  medical and cardiac clearances appreciated  Vein mapping appreciated  POD 1 s/p Bypass from R femoral artery to distal tibial artery  Medical management as per primary team  F/u at [facility] outpatient with podiatry  Plan discussed with Dr Bhardwaj

## 2022-12-20 NOTE — PROGRESS NOTE ADULT - ASSESSMENT
61 y/o Male with past medical history of cad, diabetes, hypertension, gerd, pvd, s/p left BKA, s/p CABG admitted from snf on 12/1 for evaluation of epigastric pain of about one day duration; also noted with right lower extremity redness and pain with ulcers on the anterior shin as well as the sole of the right foot. Patient is mostly a poor historian and history per medical record.     1. Patient admitted with right lower extremity cellulitis and PVD; also noted with leukocytosis most likely reactive to infection  - follow up cultures   - serial cbc and monitor temperature   - reviewed prior medical records to evaluate for resistant or atypical pathogens   - iv hydration and supportive care   - had vascular surgery  - had a prolonged course of zosyn, which will stop today  - continue doxycycline for another 7 days  - check lactate, ?mesenteric ischemia as cause of abdominal spasms, versus gastroparesis?  2. other issues: per medicine

## 2022-12-20 NOTE — PROGRESS NOTE ADULT - SUBJECTIVE AND OBJECTIVE BOX
SURGERY DAILY PROGRESS NOTE:     Subjective:  Patient seen and examined at bedside during am rounds. AVSS. Denies any fevers, chills, n/v/d, chest pain or shortness of breath    Objective:  Vital Signs Last 24 Hrs  T(C): 36.6 (20 Dec 2022 09:10), Max: 37.2 (20 Dec 2022 06:40)  T(F): 97.8 (20 Dec 2022 09:10), Max: 98.9 (20 Dec 2022 06:40)  HR: 78 (20 Dec 2022 09:00) (69 - 98)  BP: 106/52 (20 Dec 2022 09:00) (106/52 - 160/66)  BP(mean): 69 (20 Dec 2022 09:00) (66 - 93)  RR: 18 (20 Dec 2022 09:00) (0 - 18)  SpO2: 100% (20 Dec 2022 09:00) (97% - 100%)    Parameters below as of 19 Dec 2022 20:30  Patient On (Oxygen Delivery Method): nasal cannula  O2 Flow (L/min): 2        PHYSICAL EXAM   Gen: well-appearing, in no acute distress  CV: pulse regularly present   Resp: airway patent, non-labored breathing  Abd: soft, NTND  Extremities: RLE less erythematous and swollen                          7.9    17.74 )-----------( 355      ( 20 Dec 2022 05:57 )             25.2     12-20    141  |  108  |  27<H>  ----------------------------<  252<H>  4.2   |  24  |  1.19    Ca    9.2      20 Dec 2022 05:57  Phos  3.7     12-20  Mg     1.7     12-20    TPro  7.1  /  Alb  2.5<L>  /  TBili  0.7  /  DBili  x   /  AST  13<L>  /  ALT  15  /  AlkPhos  166<H>  12-19        MEDICATIONS  (STANDING):  amLODIPine   Tablet 5 milliGRAM(s) Oral daily  aspirin  chewable 81 milliGRAM(s) Oral daily  aspirin  chewable 81 milliGRAM(s) Oral once  clopidogrel Tablet 300 milliGRAM(s) Oral daily  dextrose 5% + sodium chloride 0.45%. 1000 milliLiter(s) (125 mL/Hr) IV Continuous <Continuous>  dextrose 5%. 1000 milliLiter(s) (50 mL/Hr) IV Continuous <Continuous>  dextrose 5%. 1000 milliLiter(s) (100 mL/Hr) IV Continuous <Continuous>  dextrose 50% Injectable 25 Gram(s) IV Push once  dextrose 50% Injectable 12.5 Gram(s) IV Push once  dextrose 50% Injectable 25 Gram(s) IV Push once  doxycycline monohydrate Capsule 100 milliGRAM(s) Oral every 12 hours  famotidine    Tablet 40 milliGRAM(s) Oral at bedtime  gabapentin 300 milliGRAM(s) Oral two times a day  gabapentin 600 milliGRAM(s) Oral at bedtime  glucagon  Injectable 1 milliGRAM(s) IntraMuscular once  heparin   Injectable 5000 Unit(s) SubCutaneous every 8 hours  insulin glargine Injectable (LANTUS) 15 Unit(s) SubCutaneous at bedtime  insulin lispro (ADMELOG) corrective regimen sliding scale   SubCutaneous three times a day before meals  isosorbide   mononitrate ER Tablet (IMDUR) 30 milliGRAM(s) Oral daily  lisinopril 5 milliGRAM(s) Oral daily  metoclopramide 10 milliGRAM(s) Oral daily  multivitamin 1 Tablet(s) Oral daily  pantoprazole    Tablet 40 milliGRAM(s) Oral before breakfast  piperacillin/tazobactam IVPB.. 3.375 Gram(s) IV Intermittent every 8 hours  polyethylene glycol 3350 17 Gram(s) Oral daily  senna 2 Tablet(s) Oral at bedtime  simvastatin 10 milliGRAM(s) Oral at bedtime  sodium chloride 0.9% lock flush 3 milliLiter(s) IV Push every 8 hours  sucralfate suspension 1 Gram(s) Oral four times a day  tamsulosin 0.8 milliGRAM(s) Oral at bedtime    MEDICATIONS  (PRN):  acetaminophen     Tablet .. 650 milliGRAM(s) Oral every 6 hours PRN Temp greater or equal to 38C (100.4F), Mild Pain (1 - 3)  aluminum hydroxide/magnesium hydroxide/simethicone Suspension 30 milliLiter(s) Oral every 4 hours PRN Dyspepsia  dextrose Oral Gel 15 Gram(s) Oral once PRN Blood Glucose LESS THAN 70 milliGRAM(s)/deciliter  HYDROmorphone  Injectable 2 milliGRAM(s) IV Push every 4 hours PRN Severe Pain (7 - 10)  magnesium hydroxide Suspension 30 milliLiter(s) Oral every 8 hours PRN Constipation  melatonin 3 milliGRAM(s) Oral at bedtime PRN Insomnia  ondansetron Injectable 4 milliGRAM(s) IV Push every 8 hours PRN Nausea and/or Vomiting

## 2022-12-20 NOTE — PROGRESS NOTE ADULT - ASSESSMENT
ASSESSMENT  63 yo Male BIBEMS from [place] [facility] due to severe epigastric area abdominal pain since yesterday morning. Patient reported vomiting. Patient denies diarrhea. He has no active pain during my evaluation. Patient reported he has not been eating or drinking regularly. He has also noticed redness of RLE over last week and black area on bottom of foot    Admitted for  1. RLE cellulitis  2. Severe arterial stenosis of distal RLE   3. epigastric pain  4. hx MV repair, s/p L BKA (february), HTN, HLD, DM2, CAD s/p CABG    PLAN  - pain control prn. regimen adjusted. tylenol, oxycodone, dilaudid for breakthrough. gabapentin  - cont ASA/plavix, lisinopril, amlodipine, imdur  - will need mitral valve work-up once recovered from infectious process with Structural Heart Disease Dr Wilson will eventually need R&L heart Cath/ALEX as per cardiology notes  - on room air. no active pulm issues  - clear liquid diet. PPI carafate  - new dx cirrhosis and likely GERD as per GI who recommended outpatient EGD and f/u with Dr. Anderson for further cirrhosis mgmt  - hyperglycemic in setting of DM. continue lantus 15units qHS. mod ISS. BGMs ac hs. FS goal <180.  - cont PO doxycycline 100mg bid. can dc zosyn. d/w ID. monitor temps  - RLE dry gangrene- podiatry to debride as outpatient when cleared by vascular. wound care  - DVT ppx- hep sq. No SCDs. monitor pulses LE    Will discuss with Dr. Frederick     ASSESSMENT  61 yo Male BIBEMS from [place] [facility] due to severe epigastric area abdominal pain since yesterday morning. Patient reported vomiting. Patient denies diarrhea. He has no active pain during my evaluation. Patient reported he has not been eating or drinking regularly. He has also noticed redness of RLE over last week and black area on bottom of foot    Admitted for  1. RLE cellulitis  2. Severe arterial stenosis of distal RLE   3. epigastric pain  4. hx MV repair, s/p L BKA (february), HTN, HLD, DM2, CAD s/p CABG    PLAN  - pain control prn. regimen adjusted. tylenol, oxycodone, dilaudid for breakthrough. gabapentin  - cont ASA/plavix, lisinopril, amlodipine, imdur  - will need mitral valve work-up once recovered from infectious process with Structural Heart Disease Dr Wilson will eventually need R&L heart Cath/ALEX as per cardiology notes  - on room air. no active pulm issues  - clear liquid diet. PPI carafate  - new dx cirrhosis and likely GERD as per GI who recommended outpatient EGD and f/u with Dr. Anderson for further cirrhosis mgmt  - hyperglycemic in setting of DM. continue lantus 15units qHS. mod ISS. BGMs ac hs. FS goal <180.  - cont PO doxycycline 100mg bid. can dc zosyn. d/w ID. monitor temps  - RLE dry gangrene- podiatry to debride as outpatient when cleared by vascular. wound care  - DVT ppx- hep sq. No SCDs. monitor pulses LE

## 2022-12-20 NOTE — PROVIDER CONTACT NOTE (OTHER) - DATE AND TIME:
02-Dec-2022 05:34
02-Dec-2022 10:25
05-Dec-2022 18:39
20-Dec-2022 11:05
20-Dec-2022 19:00
02-Dec-2022 05:30
06-Dec-2022 04:17
12-Dec-2022 22:09

## 2022-12-20 NOTE — PROGRESS NOTE ADULT - SUBJECTIVE AND OBJECTIVE BOX
Patient is a 62y old  Male who presents with a chief complaint of Elevated troponin, PAD and Cellulitis (20 Dec 2022 09:32)      BRIEF HOSPITAL COURSE: ***    12/20 pt c/o RLE pain neuropathy and epigastric pain. describe as cramping burning pain intermittent, unable to determine if related to food.    PAST MEDICAL & SURGICAL HISTORY:  HTN (hypertension)      DM (diabetes mellitus)      Anemia      GERD (gastroesophageal reflux disease)      S/P BKA (below knee amputation), left      CAD (coronary artery disease)      S/P CABG x 3      Status post amputation of leg            Medications:  doxycycline monohydrate Capsule 100 milliGRAM(s) Oral every 12 hours  piperacillin/tazobactam IVPB.. 3.375 Gram(s) IV Intermittent every 8 hours    amLODIPine   Tablet 5 milliGRAM(s) Oral daily  isosorbide   mononitrate ER Tablet (IMDUR) 30 milliGRAM(s) Oral daily  lisinopril 5 milliGRAM(s) Oral daily      acetaminophen     Tablet .. 650 milliGRAM(s) Oral every 6 hours PRN  gabapentin 300 milliGRAM(s) Oral two times a day  gabapentin 600 milliGRAM(s) Oral at bedtime  HYDROmorphone  Injectable 1 milliGRAM(s) IV Push every 4 hours PRN  melatonin 3 milliGRAM(s) Oral at bedtime PRN  ondansetron Injectable 4 milliGRAM(s) IV Push every 8 hours PRN  oxyCODONE    IR 5 milliGRAM(s) Oral every 4 hours PRN  oxyCODONE    IR 10 milliGRAM(s) Oral every 6 hours PRN      aspirin  chewable 81 milliGRAM(s) Oral daily  aspirin  chewable 81 milliGRAM(s) Oral once  heparin   Injectable 5000 Unit(s) SubCutaneous every 8 hours    aluminum hydroxide/magnesium hydroxide/simethicone Suspension 30 milliLiter(s) Oral every 4 hours PRN  famotidine    Tablet 40 milliGRAM(s) Oral at bedtime  magnesium hydroxide Suspension 30 milliLiter(s) Oral every 8 hours PRN  metoclopramide 10 milliGRAM(s) Oral daily  pantoprazole    Tablet 40 milliGRAM(s) Oral before breakfast  polyethylene glycol 3350 17 Gram(s) Oral daily  senna 2 Tablet(s) Oral at bedtime  sucralfate suspension 1 Gram(s) Oral four times a day    tamsulosin 0.8 milliGRAM(s) Oral at bedtime    dextrose 50% Injectable 25 Gram(s) IV Push once  dextrose 50% Injectable 12.5 Gram(s) IV Push once  dextrose 50% Injectable 25 Gram(s) IV Push once  dextrose Oral Gel 15 Gram(s) Oral once PRN  glucagon  Injectable 1 milliGRAM(s) IntraMuscular once  insulin glargine Injectable (LANTUS) 15 Unit(s) SubCutaneous at bedtime  insulin lispro (ADMELOG) corrective regimen sliding scale   SubCutaneous Before meals and at bedtime  simvastatin 10 milliGRAM(s) Oral at bedtime    dextrose 5% + sodium chloride 0.45%. 1000 milliLiter(s) IV Continuous <Continuous>  dextrose 5%. 1000 milliLiter(s) IV Continuous <Continuous>  dextrose 5%. 1000 milliLiter(s) IV Continuous <Continuous>  multivitamin 1 Tablet(s) Oral daily  sodium chloride 0.9% lock flush 3 milliLiter(s) IV Push every 8 hours                ICU Vital Signs Last 24 Hrs  T(C): 36.6 (20 Dec 2022 09:10), Max: 37.2 (20 Dec 2022 06:40)  T(F): 97.8 (20 Dec 2022 09:10), Max: 98.9 (20 Dec 2022 06:40)  HR: 86 (20 Dec 2022 13:02) (69 - 98)  BP: 95/55 (20 Dec 2022 13:02) (95/55 - 160/66)  BP(mean): 68 (20 Dec 2022 13:02) (65 - 93)  ABP: 105/52 (20 Dec 2022 11:00) (105/52 - 143/69)  ABP(mean): 71 (20 Dec 2022 11:00) (71 - 97)  RR: 17 (20 Dec 2022 13:02) (0 - 18)  SpO2: 95% (20 Dec 2022 13:02) (95% - 100%)    O2 Parameters below as of 19 Dec 2022 20:30  Patient On (Oxygen Delivery Method): nasal cannula  O2 Flow (L/min): 2              I&O's Detail    19 Dec 2022 07:01  -  20 Dec 2022 07:00  --------------------------------------------------------  IN:    dextrose 5% + sodium chloride 0.45%: 165 mL    Other (mL): 1200 mL  Total IN: 1365 mL    OUT:    Indwelling Catheter - Urethral (mL): 2020 mL    Stool (mL): 1 mL  Total OUT: 2021 mL    Total NET: -656 mL            LABS:                        7.9    17.74 )-----------( 355      ( 20 Dec 2022 05:57 )             25.2     12-20    141  |  108  |  27<H>  ----------------------------<  252<H>  4.2   |  24  |  1.19    Ca    9.2      20 Dec 2022 05:57  Phos  3.7     12-20  Mg     1.7     12-20    TPro  7.1  /  Alb  2.5<L>  /  TBili  0.7  /  DBili  x   /  AST  13<L>  /  ALT  15  /  AlkPhos  166<H>  12-19          CAPILLARY BLOOD GLUCOSE      POCT Blood Glucose.: 282 mg/dL (20 Dec 2022 11:48)    PT/INR - ( 19 Dec 2022 18:10 )   PT: 14.4 sec;   INR: 1.24 ratio         PTT - ( 19 Dec 2022 05:36 )  PTT:25.5 sec    CULTURES:      Physical Examination:    General: No acute distress.    HEENT: Pupils equal, reactive to light.  Symmetric.  PULM: Clear to auscultation bilaterally, no wheezing  NECK: Supple, no lymphadnopathy, trachea midline  CVS: Regular rate and rhythm, no murmurs  ABD: Soft, nondistended, + epigastric tenderness, normoactive bowel sounds,   EXT: RLE warm, dry necrotic ulcer on R plantar foot, dorsal cellulitis. no discharge. s/p L BKA  SKIN: Warm and well perfused, RLE wounds and chronic necrotic wound  NEURO: Alert, oriented, interactive, nonfocal    DEVICES:     RADIOLOGY: ***       Patient is a 62y old  Male who presents with a chief complaint of Elevated troponin, PAD and Cellulitis (20 Dec 2022 09:32)    BRIEF HOSPITAL COURSE: 63 yo Male BIBEMS from [place] [facility] due to severe epigastric area abdominal pain since yesterday morning. Patient reported vomiting. Patient denies diarrhea. He has no active pain during my evaluation. Patient reported he has not been eating or drinking regularly. He has also noticed redness of RLE over last week and black area on bottom of foot. Patient denies any chest pain, any SOB. He had 3 vessel CBAG done, in 2016. He also had Lower leg amputation done in Feb, 2022, since he was leaving in Rehab facility.     12/20 pt c/o RLE pain neuropathy and epigastric pain. describe as cramping burning pain intermittent, unable to determine if related to food.    PAST MEDICAL & SURGICAL HISTORY:  HTN (hypertension)  DM (diabetes mellitus)  Anemia  GERD (gastroesophageal reflux disease)  S/P BKA (below knee amputation), left  CAD (coronary artery disease)  S/P CABG x 3  Status post amputation of leg    Medications:  doxycycline monohydrate Capsule 100 milliGRAM(s) Oral every 12 hours  piperacillin/tazobactam IVPB.. 3.375 Gram(s) IV Intermittent every 8 hours  amLODIPine   Tblet 5 milliGRAM(s) Oral daily  isosorbide   mononitrate ER Tablet (IMDUR) 30 milliGRAM(s) Oral daily  lisinopril 5 milliGRAM(s) Oral daily  acetaminophen     Tablet .. 650 milliGRAM(s) Oral every 6 hours PRN  gabapentin 300 milliGRAM(s) Oral two times a day  gabapentin 600 milliGRAM(s) Oral at bedtime  HYDROmorphone  Injectable 1 milliGRAM(s) IV Push every 4 hours PRN  melatonin 3 milliGRAM(s) Oral at bedtime PRN  ondansetron Injectable 4 milliGRAM(s) IV Push every 8 hours PRN  oxyCODONE    IR 5 milliGRAM(s) Oral every 4 hours PRN  oxyCODONE    IR 10 milliGRAM(s) Oral every 6 hours PRN  aspirin  chewable 81 milliGRAM(s) Oral daily  aspirin  chewable 81 milliGRAM(s) Oral once  heparin   Injectable 5000 Unit(s) SubCutaneous every 8 hours  aluminum hydroxide/magnesium hydroxide/simethicone Suspension 30 milliLiter(s) Oral every 4 hours PRN  famotidine    Tablet 40 milliGRAM(s) Oral at bedtime  magnesium hydroxide Suspension 30 milliLiter(s) Oral every 8 hours PRN  metoclopramide 10 milliGRAM(s) Oral daily  pantoprazole    Tablet 40 milliGRAM(s) Oral before breakfast  polyethylene glycol 3350 17 Gram(s) Oral daily  senna 2 Tablet(s) Oral at bedtime  sucralfate suspension 1 Gram(s) Oral four times a day  tamsulosin 0.8 milliGRAM(s) Oral at bedtime  dextrose 50% Injectable 25 Gram(s) IV Push once  dextrose 50% Injectable 12.5 Gram(s) IV Push once  dextrose 50% Injectable 25 Gram(s) IV Push once  dextrose Oral Gel 15 Gram(s) Oral once PRN  glucagon  Injectable 1 milliGRAM(s) IntraMuscular once  insulin glargine Injectable (LANTUS) 15 Unit(s) SubCutaneous at bedtime  insulin lispro (ADMELOG) corrective regimen sliding scale   SubCutaneous Before meals and at bedtime  simvastatin 10 milliGRAM(s) Oral at bedtime  dextrose 5% + sodium chloride 0.45%. 1000 milliLiter(s) IV Continuous <Continuous>  dextrose 5%. 1000 milliLiter(s) IV Continuous <Continuous>  dextrose 5%. 1000 milliLiter(s) IV Continuous <Continuous>  multivitamin 1 Tablet(s) Oral daily  sodium chloride 0.9% lock flush 3 milliLiter(s) IV Push every 8 hours      ICU Vital Signs Last 24 Hrs  T(C): 36.6 (20 Dec 2022 09:10), Max: 37.2 (20 Dec 2022 06:40)  T(F): 97.8 (20 Dec 2022 09:10), Max: 98.9 (20 Dec 2022 06:40)  HR: 86 (20 Dec 2022 13:02) (69 - 98)  BP: 95/55 (20 Dec 2022 13:02) (95/55 - 160/66)  BP(mean): 68 (20 Dec 2022 13:02) (65 - 93)  ABP: 105/52 (20 Dec 2022 11:00) (105/52 - 143/69)  ABP(mean): 71 (20 Dec 2022 11:00) (71 - 97)  RR: 17 (20 Dec 2022 13:02) (0 - 18)  SpO2: 95% (20 Dec 2022 13:02) (95% - 100%)    O2 Parameters below as of 19 Dec 2022 20:30  Patient On (Oxygen Delivery Method): nasal cannula  O2 Flow (L/min): 2      I&O's Detail    19 Dec 2022 07:01  -  20 Dec 2022 07:00  --------------------------------------------------------  IN:    dextrose 5% + sodium chloride 0.45%: 165 mL    Other (mL): 1200 mL  Total IN: 1365 mL    OUT:    Indwelling Catheter - Urethral (mL): 2020 mL    Stool (mL): 1 mL  Total OUT: 2021 mL    Total NET: -656 mL      LABS:                        7.9    17.74 )-----------( 355      ( 20 Dec 2022 05:57 )             25.2     12-20    141  |  108  |  27<H>  ----------------------------<  252<H>  4.2   |  24  |  1.19    Ca    9.2      20 Dec 2022 05:57  Phos  3.7     12-20  Mg     1.7     12-20    TPro  7.1  /  Alb  2.5<L>  /  TBili  0.7  /  DBili  x   /  AST  13<L>  /  ALT  15  /  AlkPhos  166<H>  12-19    CAPILLARY BLOOD GLUCOSE  POCT Blood Glucose.: 282 mg/dL (20 Dec 2022 11:48)    PT/INR - ( 19 Dec 2022 18:10 )   PT: 14.4 sec;   INR: 1.24 ratio    PTT - ( 19 Dec 2022 05:36 )  PTT:25.5 sec    CULTURES:    Physical Examination:    General: No acute distress.    HEENT: Pupils equal, reactive to light.  Symmetric.  PULM: Clear to auscultation bilaterally, no wheezing  NECK: Supple, no lymphadnopathy, trachea midline  CVS: Regular rate and rhythm, no murmurs  ABD: Soft, nondistended, + epigastric tenderness, normoactive bowel sounds,   EXT: RLE warm, dry necrotic ulcer on R plantar foot, dorsal cellulitis. no discharge. s/p L BKA  SKIN: Warm and well perfused, RLE wounds and chronic necrotic wound  NEURO: Alert, oriented, interactive, nonfocal    DEVICES:     RADIOLOGY: ***

## 2022-12-20 NOTE — PROVIDER CONTACT NOTE (OTHER) - ACTION/TREATMENT ORDERED:
Dr gave verbal order for RN to hold Lantus dose for tonight.
MD aware. 1 liter normal saline bolus ordered over 1 hour.
patient placed in IV book and will try again in AM

## 2022-12-20 NOTE — PROGRESS NOTE ADULT - SUBJECTIVE AND OBJECTIVE BOX
Date of service: 12-20-22 @ 14:57      Patient had vascular surgery; is comfortable, no complaints, has at times abdominal "spasms"    ROS unable to obtain secondary to patient medical condition     MEDICATIONS  (STANDING):  amLODIPine   Tablet 5 milliGRAM(s) Oral daily  aspirin  chewable 81 milliGRAM(s) Oral daily  aspirin  chewable 81 milliGRAM(s) Oral once  dextrose 5% + sodium chloride 0.45%. 1000 milliLiter(s) (125 mL/Hr) IV Continuous <Continuous>  dextrose 5%. 1000 milliLiter(s) (50 mL/Hr) IV Continuous <Continuous>  dextrose 5%. 1000 milliLiter(s) (100 mL/Hr) IV Continuous <Continuous>  dextrose 50% Injectable 25 Gram(s) IV Push once  dextrose 50% Injectable 12.5 Gram(s) IV Push once  dextrose 50% Injectable 25 Gram(s) IV Push once  doxycycline monohydrate Capsule 100 milliGRAM(s) Oral every 12 hours  famotidine    Tablet 40 milliGRAM(s) Oral at bedtime  gabapentin 600 milliGRAM(s) Oral at bedtime  gabapentin 300 milliGRAM(s) Oral two times a day  glucagon  Injectable 1 milliGRAM(s) IntraMuscular once  heparin   Injectable 5000 Unit(s) SubCutaneous every 8 hours  insulin glargine Injectable (LANTUS) 15 Unit(s) SubCutaneous at bedtime  insulin lispro (ADMELOG) corrective regimen sliding scale   SubCutaneous Before meals and at bedtime  isosorbide   mononitrate ER Tablet (IMDUR) 30 milliGRAM(s) Oral daily  lisinopril 5 milliGRAM(s) Oral daily  metoclopramide 10 milliGRAM(s) Oral daily  multivitamin 1 Tablet(s) Oral daily  pantoprazole    Tablet 40 milliGRAM(s) Oral before breakfast  polyethylene glycol 3350 17 Gram(s) Oral daily  senna 2 Tablet(s) Oral at bedtime  simvastatin 10 milliGRAM(s) Oral at bedtime  sodium chloride 0.9% lock flush 3 milliLiter(s) IV Push every 8 hours  sucralfate suspension 1 Gram(s) Oral four times a day  tamsulosin 0.8 milliGRAM(s) Oral at bedtime    MEDICATIONS  (PRN):  acetaminophen     Tablet .. 650 milliGRAM(s) Oral every 6 hours PRN Temp greater or equal to 38C (100.4F), Mild Pain (1 - 3)  aluminum hydroxide/magnesium hydroxide/simethicone Suspension 30 milliLiter(s) Oral every 4 hours PRN Dyspepsia  dextrose Oral Gel 15 Gram(s) Oral once PRN Blood Glucose LESS THAN 70 milliGRAM(s)/deciliter  HYDROmorphone  Injectable 1 milliGRAM(s) IV Push every 4 hours PRN breakthrough pain  magnesium hydroxide Suspension 30 milliLiter(s) Oral every 8 hours PRN Constipation  melatonin 3 milliGRAM(s) Oral at bedtime PRN Insomnia  ondansetron Injectable 4 milliGRAM(s) IV Push every 8 hours PRN Nausea and/or Vomiting  oxyCODONE    IR 5 milliGRAM(s) Oral every 4 hours PRN Moderate Pain (4 - 6)  oxyCODONE    IR 10 milliGRAM(s) Oral every 6 hours PRN Severe Pain (7 - 10)      Vital Signs Last 24 Hrs  T(C): 36.8 (20 Dec 2022 13:45), Max: 37.2 (20 Dec 2022 06:40)  T(F): 98.2 (20 Dec 2022 13:45), Max: 98.9 (20 Dec 2022 06:40)  HR: 91 (20 Dec 2022 14:07) (69 - 98)  BP: 90/56 (20 Dec 2022 14:07) (90/56 - 160/66)  BP(mean): 67 (20 Dec 2022 14:07) (65 - 93)  RR: 10 (20 Dec 2022 14:07) (0 - 18)  SpO2: 93% (20 Dec 2022 14:07) (92% - 100%)    Parameters below as of 19 Dec 2022 20:30  Patient On (Oxygen Delivery Method): nasal cannula  O2 Flow (L/min): 2          Physical Exam:        Physical Exam:          Physical Exam:          Constitutional: frail looking  HEENT: NC/AT, EOMI, PERRLA, conjunctivae clear; ears and nose atraumatic; pharynx clear  Neck: supple; thyroid not palpable  Back: no tenderness  Respiratory: respiratory effort normal; clear to auscultation  Cardiovascular: S1S2 regular, no murmurs  Abdomen: soft, not tender, not distended, positive BS; no liver or spleen organomegaly  Genitourinary: no suprapubic tenderness  Musculoskeletal: no muscle tenderness, left BKA; right lower extremity with erythema from pretibia to foot with ulcers on anterior shin and on sole of the foot, eschar on sole of foot over fifth MTP, that is dry  Neurological/ Psychiatric: AxOx3, judgement and insight normal;  moving all extremities  Skin: no rashes; no palpable lesions    Labs: all available labs reviewed                       Labs:             Labs:                        7.9    17.74 )-----------( 355      ( 20 Dec 2022 05:57 )             25.2     12-20    141  |  108  |  27<H>  ----------------------------<  252<H>  4.2   |  24  |  1.19    Ca    9.2      20 Dec 2022 05:57  Phos  3.7     12-20  Mg     1.7     12-20    TPro  7.1  /  Alb  2.5<L>  /  TBili  0.7  /  DBili  x   /  AST  13<L>  /  ALT  15  /  AlkPhos  166<H>  12-19           Cultures:               < from: Xray Foot AP + Lateral + Oblique, Right (12.01.22 @ 22:01) >  ACC: [id number] EXAM:  XR FOOT COMP MIN 3 VIEWS RT                          PROCEDURE DATE:  12/01/2022          INTERPRETATION:  History: 62-year-old male, necrotic area in the foot.    Three views of the right foot without comparison demonstrate soft tissue   emphysema at the plantar aspect of the midfoot and calcaneus.    No gross cortical destruction, fracture or dislocation.    IMPRESSION:  Soft tissue emphysema at the plantar aspect of the mid foot/calcaneous.   If there is continued clinical concern follow-up MRI can be ordered    < end of copied text >        Radiology: all available radiological tests reviewed    Advanced directives addressed: full resuscitation

## 2022-12-21 DIAGNOSIS — I05.0 RHEUMATIC MITRAL STENOSIS: ICD-10-CM

## 2022-12-21 LAB
GLUCOSE BLDC GLUCOMTR-MCNC: 172 MG/DL — HIGH (ref 70–99)
GLUCOSE BLDC GLUCOMTR-MCNC: 209 MG/DL — HIGH (ref 70–99)

## 2022-12-21 PROCEDURE — 93880 EXTRACRANIAL BILAT STUDY: CPT | Mod: 26

## 2022-12-21 PROCEDURE — 99233 SBSQ HOSP IP/OBS HIGH 50: CPT

## 2022-12-21 PROCEDURE — 99232 SBSQ HOSP IP/OBS MODERATE 35: CPT

## 2022-12-21 RX ORDER — METOPROLOL TARTRATE 50 MG
25 TABLET ORAL DAILY
Refills: 0 | Status: DISCONTINUED | OUTPATIENT
Start: 2022-12-21 | End: 2022-12-27

## 2022-12-21 RX ORDER — FUROSEMIDE 40 MG
40 TABLET ORAL DAILY
Refills: 0 | Status: DISCONTINUED | OUTPATIENT
Start: 2022-12-22 | End: 2022-12-27

## 2022-12-21 RX ORDER — ACETAMINOPHEN 500 MG
1000 TABLET ORAL ONCE
Refills: 0 | Status: DISCONTINUED | OUTPATIENT
Start: 2022-12-21 | End: 2022-12-27

## 2022-12-21 RX ADMIN — Medication 1 GRAM(S): at 00:41

## 2022-12-21 RX ADMIN — CLOPIDOGREL BISULFATE 75 MILLIGRAM(S): 75 TABLET, FILM COATED ORAL at 10:21

## 2022-12-21 RX ADMIN — Medication 4: at 18:20

## 2022-12-21 RX ADMIN — Medication 100 MILLIGRAM(S): at 10:21

## 2022-12-21 RX ADMIN — GABAPENTIN 600 MILLIGRAM(S): 400 CAPSULE ORAL at 21:17

## 2022-12-21 RX ADMIN — FAMOTIDINE 40 MILLIGRAM(S): 10 INJECTION INTRAVENOUS at 21:16

## 2022-12-21 RX ADMIN — SENNA PLUS 2 TABLET(S): 8.6 TABLET ORAL at 21:16

## 2022-12-21 RX ADMIN — SODIUM CHLORIDE 3 MILLILITER(S): 9 INJECTION INTRAMUSCULAR; INTRAVENOUS; SUBCUTANEOUS at 21:48

## 2022-12-21 RX ADMIN — Medication 100 MILLIGRAM(S): at 21:17

## 2022-12-21 RX ADMIN — Medication 1 TABLET(S): at 10:21

## 2022-12-21 RX ADMIN — TAMSULOSIN HYDROCHLORIDE 0.8 MILLIGRAM(S): 0.4 CAPSULE ORAL at 21:16

## 2022-12-21 RX ADMIN — POLYETHYLENE GLYCOL 3350 17 GRAM(S): 17 POWDER, FOR SOLUTION ORAL at 10:21

## 2022-12-21 RX ADMIN — SIMVASTATIN 10 MILLIGRAM(S): 20 TABLET, FILM COATED ORAL at 21:16

## 2022-12-21 RX ADMIN — Medication 2: at 21:15

## 2022-12-21 RX ADMIN — PANTOPRAZOLE SODIUM 40 MILLIGRAM(S): 20 TABLET, DELAYED RELEASE ORAL at 10:21

## 2022-12-21 RX ADMIN — Medication 10 MILLIGRAM(S): at 10:23

## 2022-12-21 RX ADMIN — HYDROMORPHONE HYDROCHLORIDE 1 MILLIGRAM(S): 2 INJECTION INTRAMUSCULAR; INTRAVENOUS; SUBCUTANEOUS at 06:30

## 2022-12-21 RX ADMIN — Medication 81 MILLIGRAM(S): at 10:21

## 2022-12-21 RX ADMIN — HYDROMORPHONE HYDROCHLORIDE 1 MILLIGRAM(S): 2 INJECTION INTRAMUSCULAR; INTRAVENOUS; SUBCUTANEOUS at 15:20

## 2022-12-21 RX ADMIN — HYDROMORPHONE HYDROCHLORIDE 1 MILLIGRAM(S): 2 INJECTION INTRAMUSCULAR; INTRAVENOUS; SUBCUTANEOUS at 22:42

## 2022-12-21 RX ADMIN — HYDROMORPHONE HYDROCHLORIDE 1 MILLIGRAM(S): 2 INJECTION INTRAMUSCULAR; INTRAVENOUS; SUBCUTANEOUS at 10:22

## 2022-12-21 RX ADMIN — Medication 1 GRAM(S): at 18:20

## 2022-12-21 RX ADMIN — HYDROMORPHONE HYDROCHLORIDE 1 MILLIGRAM(S): 2 INJECTION INTRAMUSCULAR; INTRAVENOUS; SUBCUTANEOUS at 11:00

## 2022-12-21 RX ADMIN — Medication 25 MILLIGRAM(S): at 18:20

## 2022-12-21 RX ADMIN — OXYCODONE HYDROCHLORIDE 10 MILLIGRAM(S): 5 TABLET ORAL at 14:12

## 2022-12-21 RX ADMIN — LISINOPRIL 5 MILLIGRAM(S): 2.5 TABLET ORAL at 10:21

## 2022-12-21 RX ADMIN — Medication 1 GRAM(S): at 14:20

## 2022-12-21 RX ADMIN — HYDROMORPHONE HYDROCHLORIDE 1 MILLIGRAM(S): 2 INJECTION INTRAMUSCULAR; INTRAVENOUS; SUBCUTANEOUS at 19:20

## 2022-12-21 RX ADMIN — HEPARIN SODIUM 5000 UNIT(S): 5000 INJECTION INTRAVENOUS; SUBCUTANEOUS at 21:16

## 2022-12-21 RX ADMIN — HEPARIN SODIUM 5000 UNIT(S): 5000 INJECTION INTRAVENOUS; SUBCUTANEOUS at 05:59

## 2022-12-21 RX ADMIN — HYDROMORPHONE HYDROCHLORIDE 1 MILLIGRAM(S): 2 INJECTION INTRAMUSCULAR; INTRAVENOUS; SUBCUTANEOUS at 00:41

## 2022-12-21 RX ADMIN — Medication 1 GRAM(S): at 05:58

## 2022-12-21 RX ADMIN — HYDROMORPHONE HYDROCHLORIDE 1 MILLIGRAM(S): 2 INJECTION INTRAMUSCULAR; INTRAVENOUS; SUBCUTANEOUS at 14:20

## 2022-12-21 RX ADMIN — HYDROMORPHONE HYDROCHLORIDE 1 MILLIGRAM(S): 2 INJECTION INTRAMUSCULAR; INTRAVENOUS; SUBCUTANEOUS at 18:20

## 2022-12-21 RX ADMIN — AMLODIPINE BESYLATE 5 MILLIGRAM(S): 2.5 TABLET ORAL at 10:21

## 2022-12-21 RX ADMIN — GABAPENTIN 300 MILLIGRAM(S): 400 CAPSULE ORAL at 05:59

## 2022-12-21 RX ADMIN — SODIUM CHLORIDE 3 MILLILITER(S): 9 INJECTION INTRAMUSCULAR; INTRAVENOUS; SUBCUTANEOUS at 05:45

## 2022-12-21 RX ADMIN — HEPARIN SODIUM 5000 UNIT(S): 5000 INJECTION INTRAVENOUS; SUBCUTANEOUS at 12:55

## 2022-12-21 RX ADMIN — HYDROMORPHONE HYDROCHLORIDE 1 MILLIGRAM(S): 2 INJECTION INTRAMUSCULAR; INTRAVENOUS; SUBCUTANEOUS at 05:59

## 2022-12-21 RX ADMIN — ISOSORBIDE MONONITRATE 30 MILLIGRAM(S): 60 TABLET, EXTENDED RELEASE ORAL at 10:23

## 2022-12-21 RX ADMIN — PANTOPRAZOLE SODIUM 40 MILLIGRAM(S): 20 TABLET, DELAYED RELEASE ORAL at 21:16

## 2022-12-21 RX ADMIN — HYDROMORPHONE HYDROCHLORIDE 1 MILLIGRAM(S): 2 INJECTION INTRAMUSCULAR; INTRAVENOUS; SUBCUTANEOUS at 01:00

## 2022-12-21 RX ADMIN — GABAPENTIN 300 MILLIGRAM(S): 400 CAPSULE ORAL at 12:55

## 2022-12-21 RX ADMIN — SODIUM CHLORIDE 3 MILLILITER(S): 9 INJECTION INTRAMUSCULAR; INTRAVENOUS; SUBCUTANEOUS at 12:52

## 2022-12-21 RX ADMIN — OXYCODONE HYDROCHLORIDE 10 MILLIGRAM(S): 5 TABLET ORAL at 12:54

## 2022-12-21 NOTE — PROGRESS NOTE ADULT - ASSESSMENT
ASSESSMENT  61 yo Male BIBEMS from [facility] rehab due to severe epigastric area abdominal pain since yesterday morning. Patient reported vomiting. Patient denies diarrhea. He has no active pain during my evaluation. Patient reported he has not been eating or drinking regularly. He has also noticed redness of RLE over last week and black area on bottom of foot    Admitted for  1. RLE cellulitis  2. Severe arterial stenosis of distal RLE   3. epigastric pain  4. hx MV repair, s/p L BKA (february), HTN, HLD, DM2, CAD s/p CABG    PLAN  - pain control prn. regimen adjusted. tylenol, oxycodone, dilaudid for breakthrough. gabapentin  - cont ASA/plavix, lisinopril, amlodipine, imdur  - will need mitral valve work-up once recovered from infectious process with Structural Heart Disease Dr Wilson will eventually need R&L heart Cath/ALEX as per cardiology notes  - on room air. no active pulm issues  - clear liquid diet. PPI carafate  - new dx cirrhosis and likely GERD as per GI who recommended outpatient EGD and f/u with Dr. Anderson for further cirrhosis mgmt  - hyperglycemic in setting of DM. continue lantus 15units qHS. mod ISS. BGMs ac hs. FS goal <180.  - cont PO doxycycline 100mg bid. can dc zosyn. d/w ID. monitor temps  - RLE dry gangrene- podiatry to debride as outpatient when cleared by vascular. wound care  - DVT ppx- hep sq. No SCDs. monitor pulses LE

## 2022-12-21 NOTE — PHYSICAL THERAPY INITIAL EVALUATION ADULT - ACTIVE RANGE OF MOTION EXAMINATION, REHAB EVAL
NOTE: L BK residual limb WFL ; RLE mod-severely limited AROM due to exquisite pain ; R foot is hanging in PF with adaptive shortening R heelcord tendons,able to initiate DF R ankle /toes weakly/inconsistently,appears inhibited by pain,edema etc/keisha. upper extremity Active ROM was WNL (within normal limits)

## 2022-12-21 NOTE — PHYSICAL THERAPY INITIAL EVALUATION ADULT - PATIENT/FAMILY/SIGNIFICANT OTHER GOALS STATEMENT, PT EVAL
pt c/o ++pain R leg ,reluctant to allow full exam RLE and states is not ready to attempt out of bed at this time , too soon after surgery

## 2022-12-21 NOTE — PROGRESS NOTE ADULT - SUBJECTIVE AND OBJECTIVE BOX
Patient is a 62y old  Male who presents with a chief complaint of Elevated troponin, PAD and Cellulitis (20 Dec 2022 09:32)    BRIEF HOSPITAL COURSE: 63 yo Male BIBEMS from [facility] rehab due to severe epigastric area abdominal pain since yesterday morning. Patient reported vomiting. Patient denies diarrhea. He has no active pain during my evaluation. Patient reported he has not been eating or drinking regularly. He has also noticed redness of RLE over last week and black area on bottom of foot. Patient denies any chest pain, any SOB. He had 3 vessel CBAG done, in 2016. He also had Lower leg amputation done in Feb, 2022, since he was leaving in Rehab facility.     12/20 pt c/o RLE pain neuropathy and epigastric pain. describe as cramping burning pain intermittent, unable to determine if related to food.    PAST MEDICAL & SURGICAL HISTORY:  HTN (hypertension)  DM (diabetes mellitus)  Anemia  GERD (gastroesophageal reflux disease)  S/P BKA (below knee amputation), left  CAD (coronary artery disease)  S/P CABG x 3  Status post amputation of leg    MEDICATIONS  (STANDING):  amLODIPine   Tablet 5 milliGRAM(s) Oral daily  aspirin  chewable 81 milliGRAM(s) Oral daily  aspirin  chewable 81 milliGRAM(s) Oral once  clopidogrel Tablet 75 milliGRAM(s) Oral daily  dextrose 5% + sodium chloride 0.45%. 1000 milliLiter(s) (125 mL/Hr) IV Continuous <Continuous>  dextrose 5%. 1000 milliLiter(s) (100 mL/Hr) IV Continuous <Continuous>  dextrose 5%. 1000 milliLiter(s) (50 mL/Hr) IV Continuous <Continuous>  dextrose 50% Injectable 25 Gram(s) IV Push once  dextrose 50% Injectable 12.5 Gram(s) IV Push once  dextrose 50% Injectable 25 Gram(s) IV Push once  doxycycline monohydrate Capsule 100 milliGRAM(s) Oral every 12 hours  famotidine    Tablet 40 milliGRAM(s) Oral at bedtime  gabapentin 300 milliGRAM(s) Oral two times a day  gabapentin 600 milliGRAM(s) Oral at bedtime  glucagon  Injectable 1 milliGRAM(s) IntraMuscular once  heparin   Injectable 5000 Unit(s) SubCutaneous every 8 hours  insulin glargine Injectable (LANTUS) 15 Unit(s) SubCutaneous at bedtime  insulin lispro (ADMELOG) corrective regimen sliding scale   SubCutaneous Before meals and at bedtime  isosorbide   mononitrate ER Tablet (IMDUR) 30 milliGRAM(s) Oral daily  lisinopril 5 milliGRAM(s) Oral daily  metoclopramide 10 milliGRAM(s) Oral daily  multivitamin 1 Tablet(s) Oral daily  pantoprazole    Tablet 40 milliGRAM(s) Oral every 12 hours  polyethylene glycol 3350 17 Gram(s) Oral daily  senna 2 Tablet(s) Oral at bedtime  simvastatin 10 milliGRAM(s) Oral at bedtime  sodium chloride 0.9% lock flush 3 milliLiter(s) IV Push every 8 hours  sucralfate suspension 1 Gram(s) Oral four times a day  tamsulosin 0.8 milliGRAM(s) Oral at bedtime        ICU Vital Signs Last 24 Hrs  T(C): 36.6 (20 Dec 2022 09:10), Max: 37.2 (20 Dec 2022 06:40)  T(F): 97.8 (20 Dec 2022 09:10), Max: 98.9 (20 Dec 2022 06:40)  HR: 86 (20 Dec 2022 13:02) (69 - 98)  BP: 95/55 (20 Dec 2022 13:02) (95/55 - 160/66)  BP(mean): 68 (20 Dec 2022 13:02) (65 - 93)  ABP: 105/52 (20 Dec 2022 11:00) (105/52 - 143/69)  ABP(mean): 71 (20 Dec 2022 11:00) (71 - 97)  RR: 17 (20 Dec 2022 13:02) (0 - 18)  SpO2: 95% (20 Dec 2022 13:02) (95% - 100%)    O2 Parameters below as of 19 Dec 2022 20:30  Patient On (Oxygen Delivery Method): nasal cannula  O2 Flow (L/min): 2      I&O's Detail    19 Dec 2022 07:01  -  20 Dec 2022 07:00  --------------------------------------------------------  IN:    dextrose 5% + sodium chloride 0.45%: 165 mL    Other (mL): 1200 mL  Total IN: 1365 mL    OUT:    Indwelling Catheter - Urethral (mL): 2020 mL    Stool (mL): 1 mL  Total OUT: 2021 mL    Total NET: -656 mL      LABS:                        7.9    17.74 )-----------( 355      ( 20 Dec 2022 05:57 )             25.2     12-20    141  |  108  |  27<H>  ----------------------------<  252<H>  4.2   |  24  |  1.19    Ca    9.2      20 Dec 2022 05:57  Phos  3.7     12-20  Mg     1.7     12-20    TPro  7.1  /  Alb  2.5<L>  /  TBili  0.7  /  DBili  x   /  AST  13<L>  /  ALT  15  /  AlkPhos  166<H>  12-19    CAPILLARY BLOOD GLUCOSE  POCT Blood Glucose.: 282 mg/dL (20 Dec 2022 11:48)    PT/INR - ( 19 Dec 2022 18:10 )   PT: 14.4 sec;   INR: 1.24 ratio    PTT - ( 19 Dec 2022 05:36 )  PTT:25.5 sec    CULTURES:    Physical Examination:    General: No acute distress.    HEENT: Pupils equal, reactive to light.  Symmetric.  PULM: Clear to auscultation bilaterally, no wheezing  NECK: Supple, no lymphadnopathy, trachea midline  CVS: Regular rate and rhythm, no murmurs  ABD: Soft, nondistended, + epigastric tenderness, normoactive bowel sounds,   EXT: RLE warm, dry necrotic ulcer on R plantar foot, dorsal cellulitis. no discharge. s/p L BKA  SKIN: Warm and well perfused, RLE wounds and chronic necrotic wound  NEURO: Alert, oriented, interactive, nonfocal    DEVICES:     RADIOLOGY: ***

## 2022-12-21 NOTE — PROGRESS NOTE ADULT - SUBJECTIVE AND OBJECTIVE BOX
Subjective:    Seen for further evaluation now s/p Fem-Pop Bypass of RLE,  continues to complain of pain.  Reports occasional dyspnea.  Denies chest pain, palpitations.     Medications:  acetaminophen     Tablet .. 650 milliGRAM(s) Oral every 6 hours PRN  aluminum hydroxide/magnesium hydroxide/simethicone Suspension 30 milliLiter(s) Oral every 4 hours PRN  amLODIPine   Tablet 5 milliGRAM(s) Oral daily  aspirin  chewable 81 milliGRAM(s) Oral daily  aspirin  chewable 81 milliGRAM(s) Oral once  clopidogrel Tablet 75 milliGRAM(s) Oral daily  dextrose 5% + sodium chloride 0.45%. 1000 milliLiter(s) IV Continuous <Continuous>  dextrose 5%. 1000 milliLiter(s) IV Continuous <Continuous>  dextrose 5%. 1000 milliLiter(s) IV Continuous <Continuous>  dextrose 50% Injectable 25 Gram(s) IV Push once  dextrose 50% Injectable 12.5 Gram(s) IV Push once  dextrose 50% Injectable 25 Gram(s) IV Push once  dextrose Oral Gel 15 Gram(s) Oral once PRN  doxycycline monohydrate Capsule 100 milliGRAM(s) Oral every 12 hours  famotidine    Tablet 40 milliGRAM(s) Oral at bedtime  gabapentin 300 milliGRAM(s) Oral two times a day  gabapentin 600 milliGRAM(s) Oral at bedtime  glucagon  Injectable 1 milliGRAM(s) IntraMuscular once  heparin   Injectable 5000 Unit(s) SubCutaneous every 8 hours  HYDROmorphone  Injectable 1 milliGRAM(s) IV Push every 4 hours PRN  insulin glargine Injectable (LANTUS) 15 Unit(s) SubCutaneous at bedtime  insulin lispro (ADMELOG) corrective regimen sliding scale   SubCutaneous Before meals and at bedtime  isosorbide   mononitrate ER Tablet (IMDUR) 30 milliGRAM(s) Oral daily  lisinopril 5 milliGRAM(s) Oral daily  magnesium hydroxide Suspension 30 milliLiter(s) Oral every 8 hours PRN  melatonin 3 milliGRAM(s) Oral at bedtime PRN  metoclopramide 10 milliGRAM(s) Oral daily  multivitamin 1 Tablet(s) Oral daily  ondansetron Injectable 4 milliGRAM(s) IV Push every 8 hours PRN  oxyCODONE    IR 5 milliGRAM(s) Oral every 4 hours PRN  oxyCODONE    IR 10 milliGRAM(s) Oral every 6 hours PRN  pantoprazole    Tablet 40 milliGRAM(s) Oral every 12 hours  polyethylene glycol 3350 17 Gram(s) Oral daily  senna 2 Tablet(s) Oral at bedtime  simvastatin 10 milliGRAM(s) Oral at bedtime  sodium chloride 0.9% lock flush 3 milliLiter(s) IV Push every 8 hours  sucralfate suspension 1 Gram(s) Oral four times a day  tamsulosin 0.8 milliGRAM(s) Oral at bedtime      Physical Exam:    Vitals:  Vital Signs Last 24 Hours  T(C): 36.8 (12-21-22 @ 09:04), Max: 37.4 (12-21-22 @ 08:45)  HR: 87 (12-21-22 @ 13:00) (80 - 91)  BP: 109/79 (12-21-22 @ 13:00) (86/43 - 143/75)  RR: 19 (12-21-22 @ 13:00) (6 - 22)  SpO2: 95% (12-21-22 @ 10:00) (89% - 97%)        I&O's Summary    20 Dec 2022 07:01  -  21 Dec 2022 07:00  --------------------------------------------------------  IN: 2900 mL / OUT: 780 mL / NET: 2120 mL    21 Dec 2022 07:01  -  21 Dec 2022 13:44  --------------------------------------------------------  IN: 288 mL / OUT: 0 mL / NET: 288 mL        Tele:    General: No distress. Comfortable.  HEENT: EOM intact.  Neck: Neck supple. JVP moderately elevated. No masses  Chest: Clear to auscultation bilaterally  CV: Normal S1 and S2. Diastolic Murmur noted, no rub, or gallops. Radial pulses normal.  Abdomen: Soft, non-distended, non-tender  Skin: No rashes or skin breakdown  Extremities:  LLE BKA, RLE 1+ edema noted, +erythema    Neurology: Alert and oriented times three. Sensation intact  Psych: Affect normal    Labs:                        8.7    14.82 )-----------( 314      ( 21 Dec 2022 09:34 )             27.1     12-21    140  |  107  |  33<H>  ----------------------------<  128<H>  3.7   |  27  |  1.44<H>    Ca    8.7      21 Dec 2022 09:34  Phos  2.2     12-21  Mg     1.7     12-21    TPro  6.4  /  Alb  2.2<L>  /  TBili  0.7  /  DBili  x   /  AST  13<L>  /  ALT  15  /  AlkPhos  136<H>  12-21    PT/INR - ( 19 Dec 2022 18:10 )   PT: 14.4 sec;   INR: 1.24 ratio                        Subjective:    Seen for further evaluation now s/p Fem-Pop Bypass of RLE,  continues to complain of pain.  Reports occasional dyspnea.  Denies chest pain, palpitations.     Medications:  acetaminophen     Tablet .. 650 milliGRAM(s) Oral every 6 hours PRN  aluminum hydroxide/magnesium hydroxide/simethicone Suspension 30 milliLiter(s) Oral every 4 hours PRN  amLODIPine   Tablet 5 milliGRAM(s) Oral daily  aspirin  chewable 81 milliGRAM(s) Oral daily  aspirin  chewable 81 milliGRAM(s) Oral once  clopidogrel Tablet 75 milliGRAM(s) Oral daily  dextrose 5% + sodium chloride 0.45%. 1000 milliLiter(s) IV Continuous <Continuous>  dextrose 5%. 1000 milliLiter(s) IV Continuous <Continuous>  dextrose 5%. 1000 milliLiter(s) IV Continuous <Continuous>  dextrose 50% Injectable 25 Gram(s) IV Push once  dextrose 50% Injectable 12.5 Gram(s) IV Push once  dextrose 50% Injectable 25 Gram(s) IV Push once  dextrose Oral Gel 15 Gram(s) Oral once PRN  doxycycline monohydrate Capsule 100 milliGRAM(s) Oral every 12 hours  famotidine    Tablet 40 milliGRAM(s) Oral at bedtime  gabapentin 300 milliGRAM(s) Oral two times a day  gabapentin 600 milliGRAM(s) Oral at bedtime  glucagon  Injectable 1 milliGRAM(s) IntraMuscular once  heparin   Injectable 5000 Unit(s) SubCutaneous every 8 hours  HYDROmorphone  Injectable 1 milliGRAM(s) IV Push every 4 hours PRN  insulin glargine Injectable (LANTUS) 15 Unit(s) SubCutaneous at bedtime  insulin lispro (ADMELOG) corrective regimen sliding scale   SubCutaneous Before meals and at bedtime  isosorbide   mononitrate ER Tablet (IMDUR) 30 milliGRAM(s) Oral daily  lisinopril 5 milliGRAM(s) Oral daily  magnesium hydroxide Suspension 30 milliLiter(s) Oral every 8 hours PRN  melatonin 3 milliGRAM(s) Oral at bedtime PRN  metoclopramide 10 milliGRAM(s) Oral daily  multivitamin 1 Tablet(s) Oral daily  ondansetron Injectable 4 milliGRAM(s) IV Push every 8 hours PRN  oxyCODONE    IR 5 milliGRAM(s) Oral every 4 hours PRN  oxyCODONE    IR 10 milliGRAM(s) Oral every 6 hours PRN  pantoprazole    Tablet 40 milliGRAM(s) Oral every 12 hours  polyethylene glycol 3350 17 Gram(s) Oral daily  senna 2 Tablet(s) Oral at bedtime  simvastatin 10 milliGRAM(s) Oral at bedtime  sodium chloride 0.9% lock flush 3 milliLiter(s) IV Push every 8 hours  sucralfate suspension 1 Gram(s) Oral four times a day  tamsulosin 0.8 milliGRAM(s) Oral at bedtime      Physical Exam:    Vitals:  Vital Signs Last 24 Hours  T(C): 36.8 (12-21-22 @ 09:04), Max: 37.4 (12-21-22 @ 08:45)  HR: 87 (12-21-22 @ 13:00) (80 - 91)  BP: 109/79 (12-21-22 @ 13:00) (86/43 - 143/75)  RR: 19 (12-21-22 @ 13:00) (6 - 22)  SpO2: 95% (12-21-22 @ 10:00) (89% - 97%)        I&O's Summary    20 Dec 2022 07:01  -  21 Dec 2022 07:00  --------------------------------------------------------  IN: 2900 mL / OUT: 780 mL / NET: 2120 mL    21 Dec 2022 07:01  -  21 Dec 2022 13:44  --------------------------------------------------------  IN: 288 mL / OUT: 0 mL / NET: 288 mL        Tele: Sinus 80s     General: No distress. Comfortable.  HEENT: EOM intact.  Neck: Neck supple. JVP moderately elevated. No masses  Chest: Clear to auscultation bilaterally  CV: Normal S1 and S2. Diastolic Murmur noted, no rub, or gallops. Radial pulses normal.  Abdomen: Soft, non-distended, non-tender  Skin: No rashes or skin breakdown  Extremities:  LLE BKA, RLE 1+ edema noted, +erythema    Neurology: Alert and oriented times three. Sensation intact  Psych: Affect normal    Labs:                        8.7    14.82 )-----------( 314      ( 21 Dec 2022 09:34 )             27.1     12-21    140  |  107  |  33<H>  ----------------------------<  128<H>  3.7   |  27  |  1.44<H>    Ca    8.7      21 Dec 2022 09:34  Phos  2.2     12-21  Mg     1.7     12-21    TPro  6.4  /  Alb  2.2<L>  /  TBili  0.7  /  DBili  x   /  AST  13<L>  /  ALT  15  /  AlkPhos  136<H>  12-21    PT/INR - ( 19 Dec 2022 18:10 )   PT: 14.4 sec;   INR: 1.24 ratio

## 2022-12-21 NOTE — PHYSICAL THERAPY INITIAL EVALUATION ADULT - PLANNED THERAPY INTERVENTIONS, PT EVAL
bed mobility training/gait training/prosthetic fitting/training/ROM/strengthening/stretching/transfer training

## 2022-12-21 NOTE — PHYSICAL THERAPY INITIAL EVALUATION ADULT - GENERAL OBSERVATIONS, REHAB EVAL
looks ill,resting supine in bed with R foot exposed, L BKA well healed ; + dry eschar over 4,5th digits R foot and plantar lateral forefoot, large bulla /blister noted over dorsal ankle/midfoot with small skin tear at proximal aspect of bulla , awake ,alert, c/o R leg pain

## 2022-12-21 NOTE — PROGRESS NOTE ADULT - ASSESSMENT
62 year old male with history of PVD, IHD, MV repair. admitted with right leg cellulitis and was found to have significant stenosis of his RLE vasculature    Plan:  s/p RLE angio  medical and cardiac clearances appreciated  Vein mapping appreciated  POD 2 s/p Bypass from R femoral artery to distal tibial artery  JONO gamez  PT   Medical management as per primary team  F/u at [facility] outpatient with podiatry  Plan discussed with Dr Bhardwaj

## 2022-12-21 NOTE — PROGRESS NOTE ADULT - SUBJECTIVE AND OBJECTIVE BOX
CHIEF COMPLAINT: Patient is a 62y old  Male who presents with a chief complaint of Elevated troponin, PAD and Cellulitis (02 Dec 2022 10:14)    HPI:  61 y/o man with a history of CAD s/p CABG and MV repair (2016 - [place]), HTN, DM, GERD, L BKA, who was transferred from [facility] for the evaluation of abdominal discomfort and cellulitis.  Cardiology consult requested for elevated troponin.  Mr. Roberts has no cardiac symptoms -- he has not been experiencing angina or dyspnea.  He walks during PT and denies exertional chest discomfort.  He does not see a cardiologist in the outpatient setting.    12/4/22 Comfortable in bed with CC of SOB No CP Tele SR   12/5/22 Semirecumbent in bed no CP HR 70-80 BPM    12/6/22 patient is feeling ok , did have RLE angiogram today results pending  no chest pain or shortness of breath at rest  12/7/22 feeling well no new complaints No CP/SOB  12/21/'22: called to evaluate for ALEX.  had R femoral bypass to distal tibial  dec 19th.  Tolerated procedure well    MEDICATIONS:  OUTPATIENT  Home Medications:  amLODIPine 5 mg oral tablet: 1 tab(s) orally once a day (02 Dec 2022 00:25)  aspirin 81 mg oral capsule: 1  orally once a day (02 Dec 2022 00:25)  famotidine 20 mg oral tablet: 2 tablets at bedtime   ***on hold from 12/01/2022 16:36 to 12/2/2020 16:35 (02 Dec 2022 00:25)  gabapentin 300 mg oral capsule: 1 cap(s) orally 2 times a day  ***on hold from 12/01/2022 16:36 to 12/2/2020 16:35 (02 Dec 2022 00:25)  gabapentin 600 mg oral tablet: 1 tab(s) orally once a day (in the evening)  ***on hold from 12/01/22 16:36 to 12/02/2022 16:35*** (02 Dec 2022 00:25)  insulin lispro: 18 unit(s) subcutaneous 3 times a day (before meals)  ***on hold from 12/01/2022 16:36 to 12/2/2020 16:35 (02 Dec 2022 00:25)  insulin lispro 100 units/mL subcutaneous solution: subcutaneous 3 times a day (before meals) per sliding scale:  150-200 = 2 units  201-250 = 4 units  251-300 = 6 units  301-350 = 8 units  351-400 = 10 units  ***on hold from 12/01/2022 16:36 to 12/2/2020 16:35***     (02 Dec 2022 00:25)  isosorbide mononitrate 30 mg oral tablet, extended release: 1 tab(s) orally once a day (02 Dec 2022 00:25)  Lantus Solostar Pen: 30 unit(s) subcutaneous once a day (at bedtime  ***on hold from 12/01/2022 16:36 to 12/2/2020 16:35 (02 Dec 2022 00:25)  lisinopril 5 mg oral tablet: 1 tab(s) orally once a day  ***on hold from 12/01/2022 16:36 to 12/2/2020 16:35*** (02 Dec 2022 00:25)  metoclopramide 10 mg oral tablet: 1 tab(s) orally once a day (02 Dec 2022 00:25)  Milk of Magnesia 8% oral suspension: 30 milliliter(s) orally every 8 hours, As Needed  ***on hold from 12/01/2022 16:36 to 12/2/2020 16:35*** (02 Dec 2022 00:25)  multivitamin: 1 tab(s) orally once a day  ***on hold from 12/01/2022 16:36 to 12/2/2020 10:26*** (02 Dec 2022 00:25)  ondansetron 4 mg oral disintegrating strip: 1 each orally every 8 hours, As Needed (02 Dec 2022 00:25)  oxyCODONE 5 mg oral tablet: 1 tab(s) orally every 6 hours, As Needed  ***on hold from 12/01/2022 16:36 to 12/2/2020 16:35*** (02 Dec 2022 00:25)  Senna 8.6 mg oral tablet: 1 tab(s) orally once a day (at bedtime) (02 Dec 2022 00:25)  simethicone 125 mg oral tablet: 1 tab(s) orally every 6 hours, As Needed  ***on hold from 12/01/2022 16:36 to 12/2/2020 16:35*** (02 Dec 2022 00:25)  simvastatin 10 mg oral tablet: 1 tab(s) orally once a day (at bedtime) (02 Dec 2022 00:25)  tamsulosin 0.4 mg oral capsule: 2 cap(s) orally once a day (in the evening) with dinner  ***on hold from 12/01/2022 16:36 to 12/2/2020 16:35 (02 Dec 2022 00:25)  Tylenol 325 mg oral tablet: 2 tab(s) orally every 6 hours, As Needed  ******on hold from 12/01/2022 16:36 to 12/2/2020 16:35*** (02 Dec 2022 00:25)      INPATIENT  MEDICATIONS  (STANDING):  amLODIPine   Tablet 5 milliGRAM(s) Oral daily  aspirin  chewable 81 milliGRAM(s) Oral daily  aspirin  chewable 81 milliGRAM(s) Oral once  clopidogrel Tablet 75 milliGRAM(s) Oral daily  dextrose 5% + sodium chloride 0.45%. 1000 milliLiter(s) (125 mL/Hr) IV Continuous <Continuous>  dextrose 5%. 1000 milliLiter(s) (50 mL/Hr) IV Continuous <Continuous>  dextrose 5%. 1000 milliLiter(s) (100 mL/Hr) IV Continuous <Continuous>  dextrose 50% Injectable 25 Gram(s) IV Push once  dextrose 50% Injectable 12.5 Gram(s) IV Push once  dextrose 50% Injectable 25 Gram(s) IV Push once  doxycycline monohydrate Capsule 100 milliGRAM(s) Oral every 12 hours  famotidine    Tablet 40 milliGRAM(s) Oral at bedtime  gabapentin 300 milliGRAM(s) Oral two times a day  gabapentin 600 milliGRAM(s) Oral at bedtime  glucagon  Injectable 1 milliGRAM(s) IntraMuscular once  heparin   Injectable 5000 Unit(s) SubCutaneous every 8 hours  insulin glargine Injectable (LANTUS) 15 Unit(s) SubCutaneous at bedtime  insulin lispro (ADMELOG) corrective regimen sliding scale   SubCutaneous Before meals and at bedtime  isosorbide   mononitrate ER Tablet (IMDUR) 30 milliGRAM(s) Oral daily  metoclopramide 10 milliGRAM(s) Oral daily  metoprolol succinate ER 25 milliGRAM(s) Oral daily  multivitamin 1 Tablet(s) Oral daily  pantoprazole    Tablet 40 milliGRAM(s) Oral every 12 hours  polyethylene glycol 3350 17 Gram(s) Oral daily  senna 2 Tablet(s) Oral at bedtime  simvastatin 10 milliGRAM(s) Oral at bedtime  sodium chloride 0.9% lock flush 3 milliLiter(s) IV Push every 8 hours  sucralfate suspension 1 Gram(s) Oral four times a day  tamsulosin 0.8 milliGRAM(s) Oral at bedtime    MEDICATIONS  (PRN):  acetaminophen     Tablet .. 650 milliGRAM(s) Oral every 6 hours PRN Temp greater or equal to 38C (100.4F), Mild Pain (1 - 3)  acetaminophen   IVPB .. 1000 milliGRAM(s) IV Intermittent once PRN Mild Pain (1 - 3)  aluminum hydroxide/magnesium hydroxide/simethicone Suspension 30 milliLiter(s) Oral every 4 hours PRN Dyspepsia  dextrose Oral Gel 15 Gram(s) Oral once PRN Blood Glucose LESS THAN 70 milliGRAM(s)/deciliter  HYDROmorphone  Injectable 1 milliGRAM(s) IV Push every 4 hours PRN breakthrough pain  magnesium hydroxide Suspension 30 milliLiter(s) Oral every 8 hours PRN Constipation  melatonin 3 milliGRAM(s) Oral at bedtime PRN Insomnia  ondansetron Injectable 4 milliGRAM(s) IV Push every 8 hours PRN Nausea and/or Vomiting  oxyCODONE    IR 5 milliGRAM(s) Oral every 4 hours PRN Moderate Pain (4 - 6)  oxyCODONE    IR 10 milliGRAM(s) Oral every 6 hours PRN Severe Pain (7 - 10)          Vital Signs Last 24 Hrs  T(C): 36.7 (21 Dec 2022 14:29), Max: 37.4 (21 Dec 2022 08:45)  T(F): 98 (21 Dec 2022 14:29), Max: 99.3 (21 Dec 2022 08:45)  HR: 91 (21 Dec 2022 17:00) (80 - 91)  BP: 130/70 (21 Dec 2022 17:00) (102/45 - 143/75)  BP(mean): 86 (21 Dec 2022 17:00) (62 - 94)  RR: 22 (21 Dec 2022 17:00) (12 - 22)  SpO2: 95% (21 Dec 2022 10:00) (89% - 97%)    Parameters below as of 21 Dec 2022 08:45  Patient On (Oxygen Delivery Method): room air    Daily     Daily I&O's Summary    20 Dec 2022 07:01  -  21 Dec 2022 07:00  --------------------------------------------------------  IN: 2900 mL / OUT: 780 mL / NET: 2120 mL    21 Dec 2022 07:01  -  21 Dec 2022 17:38  --------------------------------------------------------  IN: 288 mL / OUT: 0 mL / NET: 288 mL        I&O's Detail    20 Dec 2022 07:01  -  21 Dec 2022 07:00  --------------------------------------------------------  IN:    dextrose 5% + sodium chloride 0.45%: 1000 mL    Oral Fluid: 300 mL    PRBCs (Packed Red Blood Cells): 600 mL    Sodium Chloride 0.9% Bolus: 1000 mL  Total IN: 2900 mL    OUT:    Indwelling Catheter - Urethral (mL): 780 mL  Total OUT: 780 mL    Total NET: 2120 mL      21 Dec 2022 07:01  -  21 Dec 2022 17:38  --------------------------------------------------------  IN:    PRBCs (Packed Red Blood Cells): 288 mL  Total IN: 288 mL    OUT:  Total OUT: 0 mL    Total NET: 288 mL          I&O's Summary    20 Dec 2022 07:01  -  21 Dec 2022 07:00  --------------------------------------------------------  IN: 2900 mL / OUT: 780 mL / NET: 2120 mL    21 Dec 2022 07:01  -  21 Dec 2022 17:38  --------------------------------------------------------  IN: 288 mL / OUT: 0 mL / NET: 288 mL        PHYSICAL EXAM:    Constitutional: NAD, awake   HEENT: PERR, EOMI,  No oral cyanosis.  Neck:  supple,  + JVD to lower jaw  Respiratory: Breath sounds are clear bilaterally, No wheezing, rales or rhonchi  Cardiovascular: S1 and S2, regular rate and rhythm, low grade diastolic murmur gallops or rubs  Gastrointestinal: Bowel Sounds present, soft, nontender.   Extremities: LLE BKA, RLE 1+ edema noted, +erythema    Vascular: 2+ peripheral pulses  Neurological: A/O x 3, no focal deficits  Musculoskeletal: no calf tenderness.  Skin: No rashes.      ===============================  ===============================  LABS:                         8.7    14.82 )-----------( 314      ( 21 Dec 2022 09:34 )             27.1                         6.7    16.27 )-----------( 328      ( 20 Dec 2022 18:10 )             21.2                         7.9    17.74 )-----------( 355      ( 20 Dec 2022 05:57 )             25.2     21 Dec 2022 09:34    140    |  107    |  33     ----------------------------<  128    3.7     |  27     |  1.44   20 Dec 2022 05:57    141    |  108    |  27     ----------------------------<  252    4.2     |  24     |  1.19   19 Dec 2022 18:10    143    |  105    |  28     ----------------------------<  197    3.4     |  29     |  1.08     Ca    8.7        21 Dec 2022 09:34  Ca    9.2        20 Dec 2022 05:57  Ca    9.3        19 Dec 2022 18:10  Phos  2.2       21 Dec 2022 09:34  Phos  3.7       20 Dec 2022 05:57  Phos  3.4       19 Dec 2022 05:36  Mg     1.7       21 Dec 2022 09:34  Mg     1.7       20 Dec 2022 05:57  Mg     1.7       19 Dec 2022 05:36    TPro  6.4    /  Alb  2.2    /  TBili  0.7    /  DBili  x      /  AST  13     /  ALT  15     /  AlkPhos  136    21 Dec 2022 09:34  TPro  7.1    /  Alb  2.5    /  TBili  0.7    /  DBili  x      /  AST  13     /  ALT  15     /  AlkPhos  166    19 Dec 2022 18:10  TPro  7.6    /  Alb  2.7    /  TBili  0.6    /  DBili  x      /  AST  9      /  ALT  14     /  AlkPhos  181    19 Dec 2022 05:36    PT/INR - ( 19 Dec 2022 18:10 )   PT: 14.4 sec;   INR: 1.24 ratio      ===============================  ===============================  CARDIAC BIOMARKERS:  BNP  Serum Pro-Brain Natriuretic Peptide: 4088 pg/mL *H* [0 - 125] (12-09-22 @ 11:41)  Serum Pro-Brain Natriuretic Peptide: 3796 pg/mL *H* [0 - 125] (12-07-22 @ 07:42)  Serum Pro-Brain Natriuretic Peptide: 49373 pg/mL *H* [0 - 125] (12-01-22 @ 20:07)      TROPONIN  Troponin I, High Sensitivity Result: 201.30 ng/L *H* (12-02-22 @ 07:48)  Troponin I, High Sensitivity Result: 364.23 ng/L *H* (12-01-22 @ 23:50)  Troponin I, High Sensitivity Result: 345.99 ng/L *H* (12-01-22 @ 20:07)  Troponin I, High Sensitivity Result: 16.64 ng/L (11-01-22 @ 12:58)    ===============================  ===============================  -------------------------  TTE Echo Complete w/o Contrast w/ Doppler (12.02.22 @ 08:46):   Endocardium is not well visualized, however, overall left ventricular systolic function appears normal. Mild concentric left ventricular hypertrophy. Septal flattening is seen; this finding is consistent with right heart pressure / volume overload. Estimated left ventricular ejection fraction is 55-60%.   The right ventricle exhibits mild dilation, mild diffuse hypokinesis, and mild depression of contractility.   Severe mitral stenosis. Mild mitral regurgitation.   Mild to moderate tricuspid valve regurgitation.   Severe pulmonary hypertension.  -------------------------  ECG: SR, nonspecific ST/T abn  -------------------------  < from: NM Nuclear Stress Pharmacologic Multiple (12.09.22 @ 10:50) >    IMPRESSION: Normal SPECT Myocardial Perfusion Imaging.    No evidence of reversible or fixed perfusion defects.    Normal left ventricular contractility with an ejection fraction of 73%   (Normal: 50% or greater).    No regional wall motion abnormalities.    Please refer to cardiac stress test report for dosage of Regadenoson   administered, EKG findings and symptoms during the procedure.  -------------------------  ===============================    Manjinder Stevie, M.D.  Cardiology, [facility]  Cell: [phone]  Offices:   [phone] ([facility] Office)  [phone] ([facility] Office)

## 2022-12-21 NOTE — PROGRESS NOTE ADULT - ASSESSMENT
61 y/o man with a history of CAD s/p CABG and MV repair (28 CG Future annuloplasty ring 2016 - [place]), HTN, DM, GERD, L BKA, who has severe mitral stenosis with high gradient at normal HR.  S/p RLE Fem-Pop on 12/19.  Severe MS on TTE may be related to annuloplasty size vs. pannus ingrowth, leaflet fibrosis, and/or leaflet calcification.  He is stable at rest and given his poor leg circulation and cellulitis, that will be the mainstay of primary focus for now. It is reasonable to get ALEX if stable to further evaluate mitral valve.  If MS severity is confirmed, will recommend a R/LHC.  Will also need PFTS and carotid Doppler. He also appears volume overloaded on exam and would benefit from diuresis. Once clear from an infectious standpoint, I agree with his candidacy for mitral valve intervention by structural heart. Findings relayed to patient and patient's cardiology team. Structural heart to follow.

## 2022-12-21 NOTE — PROGRESS NOTE ADULT - SUBJECTIVE AND OBJECTIVE BOX
SURGERY DAILY PROGRESS NOTE:     Subjective:  Patient seen and examined at bedside during am rounds. AVSS. Denies any fevers, chills, n/v/d, chest pain or shortness of breath    Objective:  Vital Signs Last 24 Hrs  T(C): 37 (21 Dec 2022 05:45), Max: 37.1 (21 Dec 2022 05:29)  T(F): 98.6 (21 Dec 2022 05:45), Max: 98.7 (21 Dec 2022 05:29)  HR: 86 (21 Dec 2022 07:00) (77 - 91)  BP: 115/63 (21 Dec 2022 07:00) (86/43 - 135/74)  BP(mean): 79 (21 Dec 2022 07:00) (57 - 93)  RR: 17 (21 Dec 2022 07:00) (6 - 21)  SpO2: 97% (21 Dec 2022 07:00) (92% - 100%)    Parameters below as of 21 Dec 2022 06:00  Patient On (Oxygen Delivery Method): room air            PHYSICAL EXAM   Gen: well-appearing, in no acute distress  CV: pulse regularly present   Resp: airway patent, non-labored breathing  Abd: soft, NTND  Extremities: RLE less erythematous and swollen                                     6.7    16.27 )-----------( 328      ( 20 Dec 2022 18:10 )             21.2     12-20    141  |  108  |  27<H>  ----------------------------<  252<H>  4.2   |  24  |  1.19    Ca    9.2      20 Dec 2022 05:57  Phos  3.7     12-20  Mg     1.7     12-20    TPro  7.1  /  Alb  2.5<L>  /  TBili  0.7  /  DBili  x   /  AST  13<L>  /  ALT  15  /  AlkPhos  166<H>  12-19        MEDICATIONS  (STANDING):  amLODIPine   Tablet 5 milliGRAM(s) Oral daily  aspirin  chewable 81 milliGRAM(s) Oral daily  aspirin  chewable 81 milliGRAM(s) Oral once  clopidogrel Tablet 75 milliGRAM(s) Oral daily  dextrose 5% + sodium chloride 0.45%. 1000 milliLiter(s) (125 mL/Hr) IV Continuous <Continuous>  dextrose 5%. 1000 milliLiter(s) (50 mL/Hr) IV Continuous <Continuous>  dextrose 5%. 1000 milliLiter(s) (100 mL/Hr) IV Continuous <Continuous>  dextrose 50% Injectable 25 Gram(s) IV Push once  dextrose 50% Injectable 12.5 Gram(s) IV Push once  dextrose 50% Injectable 25 Gram(s) IV Push once  doxycycline monohydrate Capsule 100 milliGRAM(s) Oral every 12 hours  famotidine    Tablet 40 milliGRAM(s) Oral at bedtime  gabapentin 300 milliGRAM(s) Oral two times a day  gabapentin 600 milliGRAM(s) Oral at bedtime  glucagon  Injectable 1 milliGRAM(s) IntraMuscular once  heparin   Injectable 5000 Unit(s) SubCutaneous every 8 hours  insulin glargine Injectable (LANTUS) 15 Unit(s) SubCutaneous at bedtime  insulin lispro (ADMELOG) corrective regimen sliding scale   SubCutaneous Before meals and at bedtime  isosorbide   mononitrate ER Tablet (IMDUR) 30 milliGRAM(s) Oral daily  lisinopril 5 milliGRAM(s) Oral daily  metoclopramide 10 milliGRAM(s) Oral daily  multivitamin 1 Tablet(s) Oral daily  pantoprazole    Tablet 40 milliGRAM(s) Oral every 12 hours  polyethylene glycol 3350 17 Gram(s) Oral daily  senna 2 Tablet(s) Oral at bedtime  simvastatin 10 milliGRAM(s) Oral at bedtime  sodium chloride 0.9% lock flush 3 milliLiter(s) IV Push every 8 hours  sucralfate suspension 1 Gram(s) Oral four times a day  tamsulosin 0.8 milliGRAM(s) Oral at bedtime    MEDICATIONS  (PRN):  acetaminophen     Tablet .. 650 milliGRAM(s) Oral every 6 hours PRN Temp greater or equal to 38C (100.4F), Mild Pain (1 - 3)  aluminum hydroxide/magnesium hydroxide/simethicone Suspension 30 milliLiter(s) Oral every 4 hours PRN Dyspepsia  dextrose Oral Gel 15 Gram(s) Oral once PRN Blood Glucose LESS THAN 70 milliGRAM(s)/deciliter  HYDROmorphone  Injectable 1 milliGRAM(s) IV Push every 4 hours PRN breakthrough pain  magnesium hydroxide Suspension 30 milliLiter(s) Oral every 8 hours PRN Constipation  melatonin 3 milliGRAM(s) Oral at bedtime PRN Insomnia  ondansetron Injectable 4 milliGRAM(s) IV Push every 8 hours PRN Nausea and/or Vomiting  oxyCODONE    IR 5 milliGRAM(s) Oral every 4 hours PRN Moderate Pain (4 - 6)  oxyCODONE    IR 10 milliGRAM(s) Oral every 6 hours PRN Severe Pain (7 - 10)

## 2022-12-21 NOTE — PHYSICAL THERAPY INITIAL EVALUATION ADULT - IMPAIRMENTS FOUND, PT EVAL
anthropometric characteristics/circulation/gait, locomotion, and balance/integumentary integrity/muscle strength/ROM/sensory integrity

## 2022-12-21 NOTE — PROGRESS NOTE ADULT - PROBLEM SELECTOR PLAN 3
-Past h/o MV repair, now w/ severely elevated transmitral gradient  w/ severe pul HTN dec 2nd  -Will plan for ALEX tommorrow to evaluate MS severity  if severe MS plan for R & L heart cath for further evaluation  for possible valve intervention.

## 2022-12-21 NOTE — PHYSICAL THERAPY INITIAL EVALUATION ADULT - PASSIVE RANGE OF MOTION EXAMINATION, REHAB EVAL
DF R ankle to ~neutral ,knee/HIP FLEX <90 degrees/Right LE Passive ROM was WFL (within functional limits)

## 2022-12-21 NOTE — PHYSICAL THERAPY INITIAL EVALUATION ADULT - SKIN INTEGRITY
s/p R femoral -anterior tibial bypass surgery, incision R anterolateral lower leg with dressing in place C/D/I , large hemorrhagic bulla to dorsal R ankle/midfoot with skin tear over proximal aspect of this bulla ; well healed surgical scar L BK residual limb s/p amputation FEB 2022/blister/bulla/scar/dryness/skin tear/surgical incision

## 2022-12-21 NOTE — PHYSICAL THERAPY INITIAL EVALUATION ADULT - PERTINENT HX OF CURRENT PROBLEM, REHAB EVAL
severe PAD R leg with vascular stenosis, ischemic changes R foot Pt admitted from [facility] c/o abdominal pain & vomiting , increasing redness,swelling R leg past 1 week with a "black spot "to bottom of R forefoot , pt with severe PAD R leg with vascular stenosis, ischemic changes R foot,+cellulitis , elevated Troponins

## 2022-12-21 NOTE — PHYSICAL THERAPY INITIAL EVALUATION ADULT - LEVEL OF INDEPENDENCE: SCOOT/BRIDGE, REHAB EVAL
unable to push off L BK limb with attempts at scooting, RLE presently too sensitive/painful post-op to use for push off/moderate assist (50% patients effort)

## 2022-12-21 NOTE — PROGRESS NOTE ADULT - PROBLEM SELECTOR PLAN 1
S/p MVr with 28 CG future ring 2016 with TTE showing severe MS with mean gradient of 13 with mild LE edema and elevated JVP   - ALEX to further assess severity of MS  - Lasix for diuresis, monitor electrolytes  - PFTs, Carotid doppler  - Pending ALEX results, possible R/LHC  - If unable to complete workup, can continue as outpatient S/p MVr with 28 CG future ring 2016 with TTE showing severe MS with mean gradient of 13 with mild LE edema and elevated JVP   - ALEX to further assess severity of MS  - Resume Lasix 40 mg PO daily for diuresis, monitor electrolytes  - Add Metoprolol succinate 25 mg PO daily for rate control    - PFTs, Carotid doppler  - Pending ALEX results, possible R/LHC  - If unable to complete workup, can continue as outpatient

## 2022-12-21 NOTE — PHYSICAL THERAPY INITIAL EVALUATION ADULT - LEVEL OF INDEPENDENCE: SIT/STAND, REHAB EVAL
TBA when BK prosthesis available and R foot tolerates WBAT ,may need to defer for now and try sliding board transfers bed to

## 2022-12-21 NOTE — PHYSICAL THERAPY INITIAL EVALUATION ADULT - PRECAUTIONS/LIMITATIONS, REHAB EVAL
surgical precautions wears L BK prosthesis since BKA Feb 2022 ,but not available in hospital ,left it at [facility]/fall precautions/surgical precautions pt with CAD s/p [id number] , had MV repair 2016 ,but now with severe mitral stenosis /regurgitation and sever pulmonary HTN ; wears L BK prosthesis since BKA Feb 2022 ,but not available in hospital ,left it at [facility]/cardiac precautions/fall precautions/surgical precautions

## 2022-12-21 NOTE — PHYSICAL THERAPY INITIAL EVALUATION ADULT - PATIENT PROFILE REVIEW, REHAB EVAL
pt not seen 12/20 as MD order for 24 hours BEDREST with [facility] to be kept in expiring 1148am today; Pt rec'd 2u PRBCs last night & early this morning for low H/H

## 2022-12-21 NOTE — PHYSICAL THERAPY INITIAL EVALUATION ADULT - DIAGNOSIS, PT EVAL
PVD ,RLE  arterial stenosis, s/p R femoral -anterior tib artery bypass 12/19/22, anemia of acute blood loss post-op s/p 2u PRBCs cellulitis RLE ,+ PVD ,RLE  arterial stenosis, s/p R femoral -anterior tib artery bypass 12/19/22, anemia of acute blood loss post-op s/p 2u PRBCs, CAD with h/o CABGx3, severe MV stenosis/regurgitation despite MV repair 2016; newly diagnosed hepatic cirrhosis on imaging

## 2022-12-22 LAB
ANION GAP SERPL CALC-SCNC: 5 MMOL/L — SIGNIFICANT CHANGE UP (ref 5–17)
BASOPHILS # BLD AUTO: 0.08 K/UL — SIGNIFICANT CHANGE UP (ref 0–0.2)
BASOPHILS NFR BLD AUTO: 0.6 % — SIGNIFICANT CHANGE UP (ref 0–2)
BUN SERPL-MCNC: 36 MG/DL — HIGH (ref 7–23)
CALCIUM SERPL-MCNC: 9.2 MG/DL — SIGNIFICANT CHANGE UP (ref 8.5–10.1)
CHLORIDE SERPL-SCNC: 112 MMOL/L — HIGH (ref 96–108)
CO2 SERPL-SCNC: 25 MMOL/L — SIGNIFICANT CHANGE UP (ref 22–31)
CREAT SERPL-MCNC: 1.24 MG/DL — SIGNIFICANT CHANGE UP (ref 0.5–1.3)
EGFR: 66 ML/MIN/1.73M2 — SIGNIFICANT CHANGE UP
EOSINOPHIL # BLD AUTO: 0.32 K/UL — SIGNIFICANT CHANGE UP (ref 0–0.5)
EOSINOPHIL NFR BLD AUTO: 2.4 % — SIGNIFICANT CHANGE UP (ref 0–6)
GLUCOSE BLDC GLUCOMTR-MCNC: 162 MG/DL — HIGH (ref 70–99)
GLUCOSE BLDC GLUCOMTR-MCNC: 175 MG/DL — HIGH (ref 70–99)
GLUCOSE BLDC GLUCOMTR-MCNC: 207 MG/DL — HIGH (ref 70–99)
GLUCOSE BLDC GLUCOMTR-MCNC: 230 MG/DL — HIGH (ref 70–99)
GLUCOSE SERPL-MCNC: 192 MG/DL — HIGH (ref 70–99)
HCT VFR BLD CALC: 27.8 % — LOW (ref 39–50)
HGB BLD-MCNC: 9 G/DL — LOW (ref 13–17)
IMM GRANULOCYTES NFR BLD AUTO: 0.7 % — SIGNIFICANT CHANGE UP (ref 0–0.9)
LYMPHOCYTES # BLD AUTO: 1.46 K/UL — SIGNIFICANT CHANGE UP (ref 1–3.3)
LYMPHOCYTES # BLD AUTO: 11 % — LOW (ref 13–44)
MAGNESIUM SERPL-MCNC: 1.9 MG/DL — SIGNIFICANT CHANGE UP (ref 1.6–2.6)
MCHC RBC-ENTMCNC: 31 PG — SIGNIFICANT CHANGE UP (ref 27–34)
MCHC RBC-ENTMCNC: 32.4 GM/DL — SIGNIFICANT CHANGE UP (ref 32–36)
MCV RBC AUTO: 95.9 FL — SIGNIFICANT CHANGE UP (ref 80–100)
MONOCYTES # BLD AUTO: 1.24 K/UL — HIGH (ref 0–0.9)
MONOCYTES NFR BLD AUTO: 9.3 % — SIGNIFICANT CHANGE UP (ref 2–14)
NEUTROPHILS # BLD AUTO: 10.08 K/UL — HIGH (ref 1.8–7.4)
NEUTROPHILS NFR BLD AUTO: 76 % — SIGNIFICANT CHANGE UP (ref 43–77)
PHOSPHATE SERPL-MCNC: 2.5 MG/DL — SIGNIFICANT CHANGE UP (ref 2.5–4.5)
PLATELET # BLD AUTO: 289 K/UL — SIGNIFICANT CHANGE UP (ref 150–400)
POTASSIUM SERPL-MCNC: 4.1 MMOL/L — SIGNIFICANT CHANGE UP (ref 3.5–5.3)
POTASSIUM SERPL-SCNC: 4.1 MMOL/L — SIGNIFICANT CHANGE UP (ref 3.5–5.3)
RBC # BLD: 2.9 M/UL — LOW (ref 4.2–5.8)
RBC # FLD: 16.2 % — HIGH (ref 10.3–14.5)
SARS-COV-2 RNA SPEC QL NAA+PROBE: SIGNIFICANT CHANGE UP
SODIUM SERPL-SCNC: 142 MMOL/L — SIGNIFICANT CHANGE UP (ref 135–145)
WBC # BLD: 13.27 K/UL — HIGH (ref 3.8–10.5)
WBC # FLD AUTO: 13.27 K/UL — HIGH (ref 3.8–10.5)

## 2022-12-22 PROCEDURE — 93312 ECHO TRANSESOPHAGEAL: CPT | Mod: 26

## 2022-12-22 PROCEDURE — 93325 DOPPLER ECHO COLOR FLOW MAPG: CPT | Mod: 26

## 2022-12-22 PROCEDURE — 99232 SBSQ HOSP IP/OBS MODERATE 35: CPT | Mod: 25

## 2022-12-22 PROCEDURE — 99233 SBSQ HOSP IP/OBS HIGH 50: CPT

## 2022-12-22 PROCEDURE — 93320 DOPPLER ECHO COMPLETE: CPT | Mod: 26

## 2022-12-22 RX ORDER — HYDROMORPHONE HYDROCHLORIDE 2 MG/ML
2 INJECTION INTRAMUSCULAR; INTRAVENOUS; SUBCUTANEOUS EVERY 6 HOURS
Refills: 0 | Status: DISCONTINUED | OUTPATIENT
Start: 2022-12-22 | End: 2022-12-24

## 2022-12-22 RX ORDER — HYDROMORPHONE HYDROCHLORIDE 2 MG/ML
1 INJECTION INTRAMUSCULAR; INTRAVENOUS; SUBCUTANEOUS ONCE
Refills: 0 | Status: DISCONTINUED | OUTPATIENT
Start: 2022-12-22 | End: 2022-12-22

## 2022-12-22 RX ADMIN — Medication 100 MILLIGRAM(S): at 21:26

## 2022-12-22 RX ADMIN — Medication 40 MILLIGRAM(S): at 13:56

## 2022-12-22 RX ADMIN — Medication 1 GRAM(S): at 17:02

## 2022-12-22 RX ADMIN — AMLODIPINE BESYLATE 5 MILLIGRAM(S): 2.5 TABLET ORAL at 13:48

## 2022-12-22 RX ADMIN — FAMOTIDINE 40 MILLIGRAM(S): 10 INJECTION INTRAVENOUS at 21:07

## 2022-12-22 RX ADMIN — Medication 3 MILLIGRAM(S): at 21:06

## 2022-12-22 RX ADMIN — HYDROMORPHONE HYDROCHLORIDE 2 MILLIGRAM(S): 2 INJECTION INTRAMUSCULAR; INTRAVENOUS; SUBCUTANEOUS at 21:05

## 2022-12-22 RX ADMIN — HYDROMORPHONE HYDROCHLORIDE 1 MILLIGRAM(S): 2 INJECTION INTRAMUSCULAR; INTRAVENOUS; SUBCUTANEOUS at 17:00

## 2022-12-22 RX ADMIN — Medication 100 MILLIGRAM(S): at 17:02

## 2022-12-22 RX ADMIN — INSULIN GLARGINE 15 UNIT(S): 100 INJECTION, SOLUTION SUBCUTANEOUS at 21:26

## 2022-12-22 RX ADMIN — HYDROMORPHONE HYDROCHLORIDE 1 MILLIGRAM(S): 2 INJECTION INTRAMUSCULAR; INTRAVENOUS; SUBCUTANEOUS at 12:50

## 2022-12-22 RX ADMIN — PANTOPRAZOLE SODIUM 40 MILLIGRAM(S): 20 TABLET, DELAYED RELEASE ORAL at 13:48

## 2022-12-22 RX ADMIN — HYDROMORPHONE HYDROCHLORIDE 2 MILLIGRAM(S): 2 INJECTION INTRAMUSCULAR; INTRAVENOUS; SUBCUTANEOUS at 21:30

## 2022-12-22 RX ADMIN — Medication 81 MILLIGRAM(S): at 13:48

## 2022-12-22 RX ADMIN — Medication 10 MILLIGRAM(S): at 13:55

## 2022-12-22 RX ADMIN — GABAPENTIN 300 MILLIGRAM(S): 400 CAPSULE ORAL at 13:48

## 2022-12-22 RX ADMIN — SODIUM CHLORIDE 3 MILLILITER(S): 9 INJECTION INTRAMUSCULAR; INTRAVENOUS; SUBCUTANEOUS at 21:28

## 2022-12-22 RX ADMIN — CLOPIDOGREL BISULFATE 75 MILLIGRAM(S): 75 TABLET, FILM COATED ORAL at 13:56

## 2022-12-22 RX ADMIN — HEPARIN SODIUM 5000 UNIT(S): 5000 INJECTION INTRAVENOUS; SUBCUTANEOUS at 05:00

## 2022-12-22 RX ADMIN — HYDROMORPHONE HYDROCHLORIDE 1 MILLIGRAM(S): 2 INJECTION INTRAMUSCULAR; INTRAVENOUS; SUBCUTANEOUS at 08:18

## 2022-12-22 RX ADMIN — GABAPENTIN 600 MILLIGRAM(S): 400 CAPSULE ORAL at 21:06

## 2022-12-22 RX ADMIN — POLYETHYLENE GLYCOL 3350 17 GRAM(S): 17 POWDER, FOR SOLUTION ORAL at 13:56

## 2022-12-22 RX ADMIN — Medication 1 GRAM(S): at 05:00

## 2022-12-22 RX ADMIN — SODIUM CHLORIDE 3 MILLILITER(S): 9 INJECTION INTRAMUSCULAR; INTRAVENOUS; SUBCUTANEOUS at 05:05

## 2022-12-22 RX ADMIN — Medication 1 TABLET(S): at 13:48

## 2022-12-22 RX ADMIN — PANTOPRAZOLE SODIUM 40 MILLIGRAM(S): 20 TABLET, DELAYED RELEASE ORAL at 21:06

## 2022-12-22 RX ADMIN — ISOSORBIDE MONONITRATE 30 MILLIGRAM(S): 60 TABLET, EXTENDED RELEASE ORAL at 13:55

## 2022-12-22 RX ADMIN — TAMSULOSIN HYDROCHLORIDE 0.8 MILLIGRAM(S): 0.4 CAPSULE ORAL at 21:06

## 2022-12-22 RX ADMIN — Medication 1 GRAM(S): at 02:06

## 2022-12-22 RX ADMIN — HYDROMORPHONE HYDROCHLORIDE 1 MILLIGRAM(S): 2 INJECTION INTRAMUSCULAR; INTRAVENOUS; SUBCUTANEOUS at 04:04

## 2022-12-22 RX ADMIN — Medication 2: at 17:00

## 2022-12-22 RX ADMIN — Medication 4: at 21:24

## 2022-12-22 RX ADMIN — SIMVASTATIN 10 MILLIGRAM(S): 20 TABLET, FILM COATED ORAL at 21:06

## 2022-12-22 RX ADMIN — Medication 25 MILLIGRAM(S): at 13:50

## 2022-12-22 RX ADMIN — Medication 4: at 08:47

## 2022-12-22 RX ADMIN — SODIUM CHLORIDE 125 MILLILITER(S): 9 INJECTION, SOLUTION INTRAVENOUS at 13:48

## 2022-12-22 RX ADMIN — HYDROMORPHONE HYDROCHLORIDE 1 MILLIGRAM(S): 2 INJECTION INTRAMUSCULAR; INTRAVENOUS; SUBCUTANEOUS at 18:20

## 2022-12-22 RX ADMIN — Medication 2: at 12:02

## 2022-12-22 RX ADMIN — HEPARIN SODIUM 5000 UNIT(S): 5000 INJECTION INTRAVENOUS; SUBCUTANEOUS at 21:05

## 2022-12-22 RX ADMIN — SODIUM CHLORIDE 3 MILLILITER(S): 9 INJECTION INTRAMUSCULAR; INTRAVENOUS; SUBCUTANEOUS at 14:00

## 2022-12-22 RX ADMIN — SENNA PLUS 2 TABLET(S): 8.6 TABLET ORAL at 21:26

## 2022-12-22 RX ADMIN — HEPARIN SODIUM 5000 UNIT(S): 5000 INJECTION INTRAVENOUS; SUBCUTANEOUS at 13:56

## 2022-12-22 RX ADMIN — GABAPENTIN 300 MILLIGRAM(S): 400 CAPSULE ORAL at 05:00

## 2022-12-22 NOTE — PROGRESS NOTE ADULT - SUBJECTIVE AND OBJECTIVE BOX
REASON FOR VISIT: MV Disease    HPI:  63 y/o man with a history of CAD s/p CABG and MV repair (2016 - [facility]), HTN, DM, GERD, L BKA, who was transferred from [facility] for the evaluation of abdominal discomfort and cellulitis.  Cardiology consult requested for elevated troponin.  Mr. Roberts has no cardiac symptoms -- he has not been experiencing angina or dyspnea.  He walks during PT and denies exertional chest discomfort.  He does not see a cardiologist in the outpatient setting.      12/4/22 Comfortable in bed with CC of SOB No CP Tele SR   12/5/22 Semirecumbent in bed no CP HR 70-80 BPM  12/6/22 patient is feeling ok , did have RLE angiogram today results pending  no chest pain or shortness of breath at rest  12/7/22 feeling well no new complaints No CP/SOB  12/21/'22: called to evaluate for ALEX.  had R femoral bypass to distal tibial    CV MEDICATIONS  (STANDING):  amLODIPine   Tablet 5 milliGRAM(s) Oral daily  aspirin  chewable 81 milliGRAM(s) Oral daily  clopidogrel Tablet 75 milliGRAM(s) Oral daily  furosemide    Tablet 40 milliGRAM(s) Oral daily  isosorbide   mononitrate ER Tablet (IMDUR) 30 milliGRAM(s) Oral daily  metoprolol succinate ER 25 milliGRAM(s) Oral daily  simvastatin 10 milliGRAM(s) Oral at bedtime    Vital Signs Last 24 Hrs  T(C): 36.8 (22 Dec 2022 10:21), Max: 36.8 (22 Dec 2022 08:43)  T(F): 98.3 (22 Dec 2022 10:21), Max: 98.3 (22 Dec 2022 08:43)  HR: 83 (22 Dec 2022 10:21) (74 - 94)  BP: 151/71 (22 Dec 2022 10:21) (109/79 - 151/74)  BP(mean): 95 (22 Dec 2022 10:00) (69 - 95)  RR: 20 (22 Dec 2022 10:21) (12 - 22)  SpO2: 93% (22 Dec 2022 10:21) (92% - 97%)    PHYSICAL EXAM:  Constitutional: NAD, awake  HEENT: No oral cyanosis. Poor dentition  Respiratory: Breath sounds are clear bilaterally  Cardiovascular: S1 and S2, regular rate and rhythm, murmurs  Skin: Wound R foot    LABS:                    9.0    13.27 )-----------( 289      ( 22 Dec 2022 06:18 )             27.8     142  |  112<H>  |  36<H>  ----------------------------<  192<H>  4.1   |  25  |  1.24    Ca    9.2      22 Dec 2022 06:18  Phos  2.5     12-22  Mg     1.9     12-22    TPro  6.4  /  Alb  2.2<L>  /  TBili  0.7  /  DBili  x   /  AST  13<L>  /  ALT  15  /  AlkPhos  136<H>  12-21    TTE Echo Complete w/o Contrast w/ Doppler (12.02.22 @ 08:46):   Endocardium is not well visualized, however, overall left ventricular systolic function appears normal. Mild concentric left ventricular hypertrophy. Septal flattening is seen; this finding is consistent with right heart pressure / volume overload. Estimated left ventricular ejection fraction is 55-60%.   The right ventricle exhibits mild dilation, mild diffuse hypokinesis, and mild depression of contractility.   Severe mitral stenosis. Mild mitral regurgitation.   Mild to moderate tricuspid valve regurgitation.   Severe pulmonary hypertension.

## 2022-12-22 NOTE — CHART NOTE - NSCHARTNOTEFT_GEN_A_CORE
Procedure: ALEX    Indication:  Mitral valve disease    Findings:  Severe mitral valve disease (stenosis / regurgitation).  See echo report for all findings.    Patient tolerated procedure well; no complications.

## 2022-12-22 NOTE — PROGRESS NOTE ADULT - SUBJECTIVE AND OBJECTIVE BOX
SURGERY DAILY PROGRESS NOTE:     Subjective:  Patient seen and examined at bedside during am rounds. AVSS. Denies any fevers, chills, n/v/d, chest pain or shortness of breath    Objective:  Vital Signs Last 24 Hrs  T(C): 36.8 (22 Dec 2022 08:43), Max: 36.8 (22 Dec 2022 08:43)  T(F): 98.3 (22 Dec 2022 08:43), Max: 98.3 (22 Dec 2022 08:43)  HR: 74 (22 Dec 2022 08:42) (74 - 94)  BP: 137/65 (22 Dec 2022 08:42) (109/79 - 143/75)  BP(mean): 87 (22 Dec 2022 08:42) (69 - 94)  RR: 18 (22 Dec 2022 08:42) (12 - 22)  SpO2: 96% (22 Dec 2022 08:42) (92% - 97%)    Parameters below as of 22 Dec 2022 08:42  Patient On (Oxygen Delivery Method): room air                PHYSICAL EXAM   Gen: well-appearing, in no acute distress  CV: pulse regularly present   Resp: airway patent, non-labored breathing  Abd: soft, NTND  Extremities: RLE less erythematous and swollen                                              9.0    13.27 )-----------( 289      ( 22 Dec 2022 06:18 )             27.8     12-22    142  |  112<H>  |  36<H>  ----------------------------<  192<H>  4.1   |  25  |  1.24    Ca    9.2      22 Dec 2022 06:18  Phos  2.5     12-22  Mg     1.9     12-22    TPro  6.4  /  Alb  2.2<L>  /  TBili  0.7  /  DBili  x   /  AST  13<L>  /  ALT  15  /  AlkPhos  136<H>  12-21          MEDICATIONS  (STANDING):  amLODIPine   Tablet 5 milliGRAM(s) Oral daily  aspirin  chewable 81 milliGRAM(s) Oral daily  aspirin  chewable 81 milliGRAM(s) Oral once  clopidogrel Tablet 75 milliGRAM(s) Oral daily  dextrose 5% + sodium chloride 0.45%. 1000 milliLiter(s) (125 mL/Hr) IV Continuous <Continuous>  dextrose 5%. 1000 milliLiter(s) (100 mL/Hr) IV Continuous <Continuous>  dextrose 5%. 1000 milliLiter(s) (50 mL/Hr) IV Continuous <Continuous>  dextrose 50% Injectable 25 Gram(s) IV Push once  dextrose 50% Injectable 12.5 Gram(s) IV Push once  dextrose 50% Injectable 25 Gram(s) IV Push once  doxycycline monohydrate Capsule 100 milliGRAM(s) Oral every 12 hours  famotidine    Tablet 40 milliGRAM(s) Oral at bedtime  furosemide    Tablet 40 milliGRAM(s) Oral daily  gabapentin 600 milliGRAM(s) Oral at bedtime  gabapentin 300 milliGRAM(s) Oral two times a day  glucagon  Injectable 1 milliGRAM(s) IntraMuscular once  heparin   Injectable 5000 Unit(s) SubCutaneous every 8 hours  insulin glargine Injectable (LANTUS) 15 Unit(s) SubCutaneous at bedtime  insulin lispro (ADMELOG) corrective regimen sliding scale   SubCutaneous Before meals and at bedtime  isosorbide   mononitrate ER Tablet (IMDUR) 30 milliGRAM(s) Oral daily  metoclopramide 10 milliGRAM(s) Oral daily  metoprolol succinate ER 25 milliGRAM(s) Oral daily  multivitamin 1 Tablet(s) Oral daily  pantoprazole    Tablet 40 milliGRAM(s) Oral every 12 hours  polyethylene glycol 3350 17 Gram(s) Oral daily  senna 2 Tablet(s) Oral at bedtime  simvastatin 10 milliGRAM(s) Oral at bedtime  sodium chloride 0.9% lock flush 3 milliLiter(s) IV Push every 8 hours  sucralfate suspension 1 Gram(s) Oral four times a day  tamsulosin 0.8 milliGRAM(s) Oral at bedtime    MEDICATIONS  (PRN):  acetaminophen     Tablet .. 650 milliGRAM(s) Oral every 6 hours PRN Temp greater or equal to 38C (100.4F), Mild Pain (1 - 3)  acetaminophen   IVPB .. 1000 milliGRAM(s) IV Intermittent once PRN Mild Pain (1 - 3)  aluminum hydroxide/magnesium hydroxide/simethicone Suspension 30 milliLiter(s) Oral every 4 hours PRN Dyspepsia  dextrose Oral Gel 15 Gram(s) Oral once PRN Blood Glucose LESS THAN 70 milliGRAM(s)/deciliter  HYDROmorphone  Injectable 1 milliGRAM(s) IV Push every 4 hours PRN breakthrough pain  magnesium hydroxide Suspension 30 milliLiter(s) Oral every 8 hours PRN Constipation  melatonin 3 milliGRAM(s) Oral at bedtime PRN Insomnia  ondansetron Injectable 4 milliGRAM(s) IV Push every 8 hours PRN Nausea and/or Vomiting  oxyCODONE    IR 5 milliGRAM(s) Oral every 4 hours PRN Moderate Pain (4 - 6)  oxyCODONE    IR 10 milliGRAM(s) Oral every 6 hours PRN Severe Pain (7 - 10)

## 2022-12-22 NOTE — PROGRESS NOTE ADULT - ASSESSMENT
62 year old male with history of PVD, IHD, MV repair. admitted with right leg cellulitis and was found to have significant stenosis of his RLE vasculature    Plan:  s/p RLE angio  medical and cardiac clearances appreciated  Vein mapping appreciated  POD 3 s/p Bypass from R femoral artery to distal tibial artery  JONO gamez  PT   Medical management as per primary team  F/u at [facility] outpatient with podiatry  Plan discussed with Dr Bhardwaj

## 2022-12-22 NOTE — PROGRESS NOTE ADULT - SUBJECTIVE AND OBJECTIVE BOX
Date of Service: 12/12/22  Chief Complaint :61 y/o male PMHx of PVD s/p left BKA, DM, HTN, CAD, anemia, and GERD, Full code with molst BIBEMS from [facility] c/o severe abdominal pain since this morning. Pt reports vomiting. Pt denies diarrhea. Pt reports he has not been eating or drinking. Pt reports he has noticed redness of RLE over last week and black area on bottom of foot. States he was scheduled for angiogram tomorrow with Dr. Dickson who has been following patient for continued PVD of right lower extremity.  Podiatry consulted for unstagable wound to Right 4/5th metatarsal head. Patient denies any pain to the area. 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 Patient says he noticed redness/erythema to his right lower extremity. Patient also says that he has narrowing of his blood vessels to his right leg, and that there is decreased blood flow to his right foot.     12/22/22: Pt seen by podiatry team. Podiatry re-consulted for Right foot blister. Pt resting comfortably at bedside, NAD, pleasant. No other pedal complaints at this time.     PMH: HTN (hypertension)    DM (diabetes mellitus)      PSH:    Allergies:No Known Allergies    MEDICATIONS  (STANDING):  amLODIPine   Tablet 5 milliGRAM(s) Oral daily  aspirin  chewable 81 milliGRAM(s) Oral daily  aspirin  chewable 81 milliGRAM(s) Oral once  clopidogrel Tablet 75 milliGRAM(s) Oral daily  dextrose 5% + sodium chloride 0.45%. 1000 milliLiter(s) (125 mL/Hr) IV Continuous <Continuous>  dextrose 5%. 1000 milliLiter(s) (100 mL/Hr) IV Continuous <Continuous>  dextrose 5%. 1000 milliLiter(s) (50 mL/Hr) IV Continuous <Continuous>  dextrose 50% Injectable 25 Gram(s) IV Push once  dextrose 50% Injectable 12.5 Gram(s) IV Push once  dextrose 50% Injectable 25 Gram(s) IV Push once  doxycycline monohydrate Capsule 100 milliGRAM(s) Oral every 12 hours  famotidine    Tablet 40 milliGRAM(s) Oral at bedtime  furosemide    Tablet 40 milliGRAM(s) Oral daily  gabapentin 600 milliGRAM(s) Oral at bedtime  gabapentin 300 milliGRAM(s) Oral two times a day  glucagon  Injectable 1 milliGRAM(s) IntraMuscular once  heparin   Injectable 5000 Unit(s) SubCutaneous every 8 hours  insulin glargine Injectable (LANTUS) 15 Unit(s) SubCutaneous at bedtime  insulin lispro (ADMELOG) corrective regimen sliding scale   SubCutaneous Before meals and at bedtime  isosorbide   mononitrate ER Tablet (IMDUR) 30 milliGRAM(s) Oral daily  metoclopramide 10 milliGRAM(s) Oral daily  metoprolol succinate ER 25 milliGRAM(s) Oral daily  multivitamin 1 Tablet(s) Oral daily  pantoprazole    Tablet 40 milliGRAM(s) Oral every 12 hours  polyethylene glycol 3350 17 Gram(s) Oral daily  senna 2 Tablet(s) Oral at bedtime  simvastatin 10 milliGRAM(s) Oral at bedtime  sodium chloride 0.9% lock flush 3 milliLiter(s) IV Push every 8 hours  sucralfate suspension 1 Gram(s) Oral four times a day  tamsulosin 0.8 milliGRAM(s) Oral at bedtime    MEDICATIONS  (PRN):  acetaminophen     Tablet .. 650 milliGRAM(s) Oral every 6 hours PRN Temp greater or equal to 38C (100.4F), Mild Pain (1 - 3)  acetaminophen   IVPB .. 1000 milliGRAM(s) IV Intermittent once PRN Mild Pain (1 - 3)  aluminum hydroxide/magnesium hydroxide/simethicone Suspension 30 milliLiter(s) Oral every 4 hours PRN Dyspepsia  dextrose Oral Gel 15 Gram(s) Oral once PRN Blood Glucose LESS THAN 70 milliGRAM(s)/deciliter  HYDROmorphone  Injectable 1 milliGRAM(s) IV Push every 4 hours PRN breakthrough pain  magnesium hydroxide Suspension 30 milliLiter(s) Oral every 8 hours PRN Constipation  melatonin 3 milliGRAM(s) Oral at bedtime PRN Insomnia  ondansetron Injectable 4 milliGRAM(s) IV Push every 8 hours PRN Nausea and/or Vomiting  oxyCODONE    IR 5 milliGRAM(s) Oral every 4 hours PRN Moderate Pain (4 - 6)  oxyCODONE    IR 10 milliGRAM(s) Oral every 6 hours PRN Severe Pain (7 - 10)      Vital Signs Last 24 Hrs  T(C): 36.8 (22 Dec 2022 10:21), Max: 36.8 (22 Dec 2022 08:43)  T(F): 98.3 (22 Dec 2022 10:21), Max: 98.3 (22 Dec 2022 08:43)  HR: 81 (22 Dec 2022 11:36) (74 - 94)  BP: 148/76 (22 Dec 2022 11:36) (125/66 - 151/74)  BP(mean): 97 (22 Dec 2022 11:36) (69 - 97)  RR: 19 (22 Dec 2022 11:36) (12 - 26)  SpO2: 92% (22 Dec 2022 11:36) (92% - 97%)    Parameters below as of 22 Dec 2022 11:36  Patient On (Oxygen Delivery Method): room air       Physical Exam:   Constitutional: NAD, alert;  Derm:  Skin warm, dry and supple bilateral.     Erythema noted grossly to right foot and lower leg  Scaly skin noted grossly to right foot  Dry eschar wound noted to the right 4th/5th metatarsal head measuring approximately 5x3.5 cm, no pus, no malodor, no pain on palpation, negative fluctuance. Indication of bluish cyanotic discoloration from Right mid leg to Right foot digits 1-5 (improved). Dry small eschar lesions to Right distal toes 2-3. New appearance of dry eschar to Right 4th and 5th toe.     Hemorrhagic bulla to extending from Right dorsomedial midfoot to anteromedial R ankle, approx [id number], no active discharge, no malodor, no pus, no SOI.   Vascular: Dorsalis Pedis and Posterior Tibial pulses dopplerable to RLE. RLE warm to the touch wnl.   Neuro: Protective sensation absent to the level of the digits bilateral.  MSK: Muscle strength 4/5 in all major muscle groups of Right lower extremity.  Below knee amputation to left lower extremity         Labs:                                              9.0    18.77 )-----------( 406      ( 12 Dec 2022 08:07 )             28.5     12-12    141  |  107  |  19  ----------------------------<  97  3.2<L>   |  28  |  0.85    Ca    9.3      12 Dec 2022 08:07                  Rad/EKG     < from: Xray Foot AP + Lateral + Oblique, Right (12.01.22 @ 22:01) >  INTERPRETATION:  History: 62-year-old male, necrotic area in the foot.    Three views of the right foot without comparison demonstrate soft tissue   emphysema at the plantar aspect of the midfoot and calcaneus.    No gross cortical destruction, fracture or dislocation.    IMPRESSION:  Soft tissue emphysema at the plantar aspect of the mid foot/calcaneous.   If there is continued clinical concern follow-up MRI can be ordered    < end of copied text >  < from: MR Foot No Cont, Right (12.03.22 @ 12:13) >  IMPRESSION:  1.  There is no evidence for osteomyelitis.    < end of copied text >  < from: US Duplex Arterial Lower Ext Ltd, Right (12.01.22 @ 21:40) >  IMPRESSION:    No appreciable flow detected in the posterior tibial and peroneal   arteries.    < end of copied text >   Date of Service: 12/22/22  Chief Complaint :63 y/o male PMHx of PVD s/p left BKA, DM, HTN, CAD, anemia, and GERD, Full code with molst BIBEMS from [facility] c/o severe abdominal pain since this morning. Pt reports vomiting. Pt denies diarrhea. Pt reports he has not been eating or drinking. Pt reports he has noticed redness of RLE over last week and black area on bottom of foot. States he was scheduled for angiogram tomorrow with Dr. Dickson who has been following patient for continued PVD of right lower extremity.  Podiatry consulted for unstagable wound to Right 4/5th metatarsal head. Patient denies any pain to the area. 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 Patient says he noticed redness/erythema to his right lower extremity. Patient also says that he has narrowing of his blood vessels to his right leg, and that there is decreased blood flow to his right foot.     12/22/22: Pt seen by podiatry team. Podiatry re-consulted for Right foot blister. Pt currently POD 3 s/p Bypass from R femoral artery to distal tibial artery. Pt resting comfortably at bedside, NAD, pleasant. No other pedal complaints at this time.     PMH: HTN (hypertension)    DM (diabetes mellitus)      PSH:    Allergies:No Known Allergies    MEDICATIONS  (STANDING):  amLODIPine   Tablet 5 milliGRAM(s) Oral daily  aspirin  chewable 81 milliGRAM(s) Oral daily  aspirin  chewable 81 milliGRAM(s) Oral once  clopidogrel Tablet 75 milliGRAM(s) Oral daily  dextrose 5% + sodium chloride 0.45%. 1000 milliLiter(s) (125 mL/Hr) IV Continuous <Continuous>  dextrose 5%. 1000 milliLiter(s) (100 mL/Hr) IV Continuous <Continuous>  dextrose 5%. 1000 milliLiter(s) (50 mL/Hr) IV Continuous <Continuous>  dextrose 50% Injectable 25 Gram(s) IV Push once  dextrose 50% Injectable 12.5 Gram(s) IV Push once  dextrose 50% Injectable 25 Gram(s) IV Push once  doxycycline monohydrate Capsule 100 milliGRAM(s) Oral every 12 hours  famotidine    Tablet 40 milliGRAM(s) Oral at bedtime  furosemide    Tablet 40 milliGRAM(s) Oral daily  gabapentin 600 milliGRAM(s) Oral at bedtime  gabapentin 300 milliGRAM(s) Oral two times a day  glucagon  Injectable 1 milliGRAM(s) IntraMuscular once  heparin   Injectable 5000 Unit(s) SubCutaneous every 8 hours  insulin glargine Injectable (LANTUS) 15 Unit(s) SubCutaneous at bedtime  insulin lispro (ADMELOG) corrective regimen sliding scale   SubCutaneous Before meals and at bedtime  isosorbide   mononitrate ER Tablet (IMDUR) 30 milliGRAM(s) Oral daily  metoclopramide 10 milliGRAM(s) Oral daily  metoprolol succinate ER 25 milliGRAM(s) Oral daily  multivitamin 1 Tablet(s) Oral daily  pantoprazole    Tablet 40 milliGRAM(s) Oral every 12 hours  polyethylene glycol 3350 17 Gram(s) Oral daily  senna 2 Tablet(s) Oral at bedtime  simvastatin 10 milliGRAM(s) Oral at bedtime  sodium chloride 0.9% lock flush 3 milliLiter(s) IV Push every 8 hours  sucralfate suspension 1 Gram(s) Oral four times a day  tamsulosin 0.8 milliGRAM(s) Oral at bedtime    MEDICATIONS  (PRN):  acetaminophen     Tablet .. 650 milliGRAM(s) Oral every 6 hours PRN Temp greater or equal to 38C (100.4F), Mild Pain (1 - 3)  acetaminophen   IVPB .. 1000 milliGRAM(s) IV Intermittent once PRN Mild Pain (1 - 3)  aluminum hydroxide/magnesium hydroxide/simethicone Suspension 30 milliLiter(s) Oral every 4 hours PRN Dyspepsia  dextrose Oral Gel 15 Gram(s) Oral once PRN Blood Glucose LESS THAN 70 milliGRAM(s)/deciliter  HYDROmorphone  Injectable 1 milliGRAM(s) IV Push every 4 hours PRN breakthrough pain  magnesium hydroxide Suspension 30 milliLiter(s) Oral every 8 hours PRN Constipation  melatonin 3 milliGRAM(s) Oral at bedtime PRN Insomnia  ondansetron Injectable 4 milliGRAM(s) IV Push every 8 hours PRN Nausea and/or Vomiting  oxyCODONE    IR 5 milliGRAM(s) Oral every 4 hours PRN Moderate Pain (4 - 6)  oxyCODONE    IR 10 milliGRAM(s) Oral every 6 hours PRN Severe Pain (7 - 10)      Vital Signs Last 24 Hrs  T(C): 36.8 (22 Dec 2022 10:21), Max: 36.8 (22 Dec 2022 08:43)  T(F): 98.3 (22 Dec 2022 10:21), Max: 98.3 (22 Dec 2022 08:43)  HR: 81 (22 Dec 2022 11:36) (74 - 94)  BP: 148/76 (22 Dec 2022 11:36) (125/66 - 151/74)  BP(mean): 97 (22 Dec 2022 11:36) (69 - 97)  RR: 19 (22 Dec 2022 11:36) (12 - 26)  SpO2: 92% (22 Dec 2022 11:36) (92% - 97%)    Parameters below as of 22 Dec 2022 11:36  Patient On (Oxygen Delivery Method): room air       Physical Exam:   Constitutional: NAD, alert;  Derm:  Skin warm, dry and supple bilateral.     Erythema noted grossly to right foot and lower leg  Scaly skin noted grossly to right foot  Dry eschar wound noted to the right 4th/5th metatarsal head measuring approximately 5x3.5 cm, no pus, no malodor, no pain on palpation, negative fluctuance. Indication of bluish cyanotic discoloration from Right mid leg to Right foot digits 1-5 (improved). Dry small eschar lesions to Right distal toes 2-3. New appearance of dry eschar to Right 4th and 5th toe.     Hemorrhagic bulla to extending from Right dorsomedial midfoot to anteromedial R ankle, approx [id number], no active discharge, no malodor, no pus, no SOI.   Vascular: Dorsalis Pedis and Posterior Tibial pulses dopplerable to RLE. RLE warm to the touch wnl.   Neuro: Protective sensation absent to the level of the digits bilateral.  MSK: Muscle strength 4/5 in all major muscle groups of Right lower extremity.  Below knee amputation to left lower extremity         Labs:                                              9.0    18.77 )-----------( 406      ( 12 Dec 2022 08:07 )             28.5     12-12    141  |  107  |  19  ----------------------------<  97  3.2<L>   |  28  |  0.85    Ca    9.3      12 Dec 2022 08:07                  Rad/EKG     < from: Xray Foot AP + Lateral + Oblique, Right (12.01.22 @ 22:01) >  INTERPRETATION:  History: 62-year-old male, necrotic area in the foot.    Three views of the right foot without comparison demonstrate soft tissue   emphysema at the plantar aspect of the midfoot and calcaneus.    No gross cortical destruction, fracture or dislocation.    IMPRESSION:  Soft tissue emphysema at the plantar aspect of the mid foot/calcaneous.   If there is continued clinical concern follow-up MRI can be ordered    < end of copied text >  < from: MR Foot No Cont, Right (12.03.22 @ 12:13) >  IMPRESSION:  1.  There is no evidence for osteomyelitis.    < end of copied text >  < from: US Duplex Arterial Lower Ext Ltd, Right (12.01.22 @ 21:40) >  IMPRESSION:    No appreciable flow detected in the posterior tibial and peroneal   arteries.    < end of copied text >

## 2022-12-22 NOTE — PROCEDURAL SAFETY CHECKLIST WITH OR WITHOUT SEDATION - NSPOSTCOMMENTFT_GEN_ALL_CORE
Pt tolerated procedure well. probe went in at 1029 with a bubble study at 1042 and probe out at 1044. pt awake and alert.

## 2022-12-22 NOTE — PATIENT PROFILE ADULT - BILL PAYMENT
How Many Skin Cancers Have You Had?: more than one What Is The Reason For Today's Visit?: History of Non-Melanoma Skin Cancer no

## 2022-12-22 NOTE — PROGRESS NOTE ADULT - SUBJECTIVE AND OBJECTIVE BOX
Patient seen and examined  Review of Systems:  General:denies fever chills, headache, weakness  HEENT: denies blurry vision,diffculty swallowing, difficulty hearing, tinnitus  Cardiovascular: denies chest pain  ,palpitations  Pulmonary:denies shortness of breath, cough, wheezing, hemoptysis  Gastrointestinal: denies abdominal pain, constipation, diarrhea,nausea , vomiting, hematochezia  : denies hematuria, dysuria, or incontinence  Neurological: denies weakness, numbness , tingling, dizziness, tremors  MSK: denies muscle pain, difficulty ambulating, swelling, back pain  skin: denies skin rash, itching, burning, or  skin lesions  Psychiatrical: denies mood disturbances, anxierty, feeling depressed, depression , or difficulty sleeping    Objective:  Vitals  T(C): 36.8 (12-22-22 @ 10:21), Max: 36.8 (12-22-22 @ 08:43)  HR: 81 (12-22-22 @ 11:36) (74 - 94)  BP: 148/76 (12-22-22 @ 11:36) (125/66 - 151/74)  RR: 19 (12-22-22 @ 11:36) (12 - 26)  SpO2: 92% (12-22-22 @ 11:36) (92% - 97%)    Physical Exam:  General: comfortable, no acute distress, well nourished  HEENT: Atraumatic, no LAD, trachea midline, PERRLA  Cardiovascular: normal s1s2, no murmurs, gallops or fricition rubs  Pulmonary: clear to ausculation Bilaterally, no wheezing , rhonchi  Gastrointestinal: soft non tender non distended, no masses felt, no organomegally  Muscloskeletal: no lower extremity edema, intact bilateral lower extremity pulses  Neurological: CN II-12 intact. No focal weakness  Psychiatrical: normal mood, cooperative  SKIN: no rash, lesions or ulcers    Labs:                          9.0    13.27 )-----------( 289      ( 22 Dec 2022 06:18 )             27.8     12-22    142  |  112<H>  |  36<H>  ----------------------------<  192<H>  4.1   |  25  |  1.24    Ca    9.2      22 Dec 2022 06:18  Phos  2.5     12-22  Mg     1.9     12-22    TPro  6.4  /  Alb  2.2<L>  /  TBili  0.7  /  DBili  x   /  AST  13<L>  /  ALT  15  /  AlkPhos  136<H>  12-21    LIVER FUNCTIONS - ( 21 Dec 2022 09:34 )  Alb: 2.2 g/dL / Pro: 6.4 gm/dL / ALK PHOS: 136 U/L / ALT: 15 U/L / AST: 13 U/L / GGT: x                 Active Medications  MEDICATIONS  (STANDING):  amLODIPine   Tablet 5 milliGRAM(s) Oral daily  aspirin  chewable 81 milliGRAM(s) Oral daily  aspirin  chewable 81 milliGRAM(s) Oral once  clopidogrel Tablet 75 milliGRAM(s) Oral daily  dextrose 5% + sodium chloride 0.45%. 1000 milliLiter(s) (125 mL/Hr) IV Continuous <Continuous>  dextrose 5%. 1000 milliLiter(s) (100 mL/Hr) IV Continuous <Continuous>  dextrose 5%. 1000 milliLiter(s) (50 mL/Hr) IV Continuous <Continuous>  dextrose 50% Injectable 25 Gram(s) IV Push once  dextrose 50% Injectable 12.5 Gram(s) IV Push once  dextrose 50% Injectable 25 Gram(s) IV Push once  doxycycline monohydrate Capsule 100 milliGRAM(s) Oral every 12 hours  famotidine    Tablet 40 milliGRAM(s) Oral at bedtime  furosemide    Tablet 40 milliGRAM(s) Oral daily  gabapentin 600 milliGRAM(s) Oral at bedtime  gabapentin 300 milliGRAM(s) Oral two times a day  glucagon  Injectable 1 milliGRAM(s) IntraMuscular once  heparin   Injectable 5000 Unit(s) SubCutaneous every 8 hours  insulin glargine Injectable (LANTUS) 15 Unit(s) SubCutaneous at bedtime  insulin lispro (ADMELOG) corrective regimen sliding scale   SubCutaneous Before meals and at bedtime  isosorbide   mononitrate ER Tablet (IMDUR) 30 milliGRAM(s) Oral daily  metoclopramide 10 milliGRAM(s) Oral daily  metoprolol succinate ER 25 milliGRAM(s) Oral daily  multivitamin 1 Tablet(s) Oral daily  pantoprazole    Tablet 40 milliGRAM(s) Oral every 12 hours  polyethylene glycol 3350 17 Gram(s) Oral daily  senna 2 Tablet(s) Oral at bedtime  simvastatin 10 milliGRAM(s) Oral at bedtime  sodium chloride 0.9% lock flush 3 milliLiter(s) IV Push every 8 hours  sucralfate suspension 1 Gram(s) Oral four times a day  tamsulosin 0.8 milliGRAM(s) Oral at bedtime    MEDICATIONS  (PRN):  acetaminophen     Tablet .. 650 milliGRAM(s) Oral every 6 hours PRN Temp greater or equal to 38C (100.4F), Mild Pain (1 - 3)  acetaminophen   IVPB .. 1000 milliGRAM(s) IV Intermittent once PRN Mild Pain (1 - 3)  aluminum hydroxide/magnesium hydroxide/simethicone Suspension 30 milliLiter(s) Oral every 4 hours PRN Dyspepsia  dextrose Oral Gel 15 Gram(s) Oral once PRN Blood Glucose LESS THAN 70 milliGRAM(s)/deciliter  HYDROmorphone  Injectable 1 milliGRAM(s) IV Push every 4 hours PRN breakthrough pain  magnesium hydroxide Suspension 30 milliLiter(s) Oral every 8 hours PRN Constipation  melatonin 3 milliGRAM(s) Oral at bedtime PRN Insomnia  ondansetron Injectable 4 milliGRAM(s) IV Push every 8 hours PRN Nausea and/or Vomiting  oxyCODONE    IR 5 milliGRAM(s) Oral every 4 hours PRN Moderate Pain (4 - 6)  oxyCODONE    IR 10 milliGRAM(s) Oral every 6 hours PRN Severe Pain (7 - 10)     Patient seen and examined in SICU. downgraded to medicmarke  reports right foot pain  followed by vascular and podiatry  undergoing ALEX: for possible need for left/right heart cath  Review of Systems:  General:denies fever chills, headache, weakness  HEENT: denies blurry vision,diffculty swallowing, difficulty hearing, tinnitus  Cardiovascular: denies chest pain  ,palpitations  Pulmonary:denies shortness of breath, cough, wheezing, hemoptysis  Gastrointestinal: denies abdominal pain, constipation, diarrhea,nausea , vomiting, hematochezia  : denies hematuria, dysuria, or incontinence  Neurological: denies weakness, numbness , tingling, dizziness, tremors  MSK: denies muscle pain, difficulty ambulating, swelling, back pain  skin: denies skin rash, itching, burning, or  skin lesions  Psychiatrical: denies mood disturbances, anxierty, feeling depressed, depression , or difficulty sleeping    Objective:  Vitals  T(C): 36.8 (12-22-22 @ 10:21), Max: 36.8 (12-22-22 @ 08:43)  HR: 81 (12-22-22 @ 11:36) (74 - 94)  BP: 148/76 (12-22-22 @ 11:36) (125/66 - 151/74)  RR: 19 (12-22-22 @ 11:36) (12 - 26)  SpO2: 92% (12-22-22 @ 11:36) (92% - 97%)    Physical Exam:  General: comfortable, no acute distress, well nourished  HEENT: Atraumatic, no LAD, trachea midline, PERRLA  Cardiovascular: normal s1s2, no murmurs, gallops or fricition rubs  Pulmonary: clear to ausculation Bilaterally, no wheezing , rhonchi  Gastrointestinal: soft non tender non distended, no masses felt, no organomegally  Muscloskeletal: no lower extremity edema, L BKA. RLE : Dry eschar wound noted to the right 4th/5th metatarsal. Dry small eschar lesions to Right distal toes 2-3. Hemorrhagic bulla to extending toanteromedial R ankle\. Neurological: CN II-12 intact. No focal weakness  Psychiatrical: normal mood, cooperative  SKIN: no rash, lesions or ulcers    Labs:                          9.0    13.27 )-----------( 289      ( 22 Dec 2022 06:18 )             27.8     12-22    142  |  112<H>  |  36<H>  ----------------------------<  192<H>  4.1   |  25  |  1.24    Ca    9.2      22 Dec 2022 06:18  Phos  2.5     12-22  Mg     1.9     12-22    TPro  6.4  /  Alb  2.2<L>  /  TBili  0.7  /  DBili  x   /  AST  13<L>  /  ALT  15  /  AlkPhos  136<H>  12-21    LIVER FUNCTIONS - ( 21 Dec 2022 09:34 )  Alb: 2.2 g/dL / Pro: 6.4 gm/dL / ALK PHOS: 136 U/L / ALT: 15 U/L / AST: 13 U/L / GGT: x                 Active Medications  MEDICATIONS  (STANDING):  amLODIPine   Tablet 5 milliGRAM(s) Oral daily  aspirin  chewable 81 milliGRAM(s) Oral daily  aspirin  chewable 81 milliGRAM(s) Oral once  clopidogrel Tablet 75 milliGRAM(s) Oral daily  dextrose 5% + sodium chloride 0.45%. 1000 milliLiter(s) (125 mL/Hr) IV Continuous <Continuous>  dextrose 5%. 1000 milliLiter(s) (100 mL/Hr) IV Continuous <Continuous>  dextrose 5%. 1000 milliLiter(s) (50 mL/Hr) IV Continuous <Continuous>  dextrose 50% Injectable 25 Gram(s) IV Push once  dextrose 50% Injectable 12.5 Gram(s) IV Push once  dextrose 50% Injectable 25 Gram(s) IV Push once  doxycycline monohydrate Capsule 100 milliGRAM(s) Oral every 12 hours  famotidine    Tablet 40 milliGRAM(s) Oral at bedtime  furosemide    Tablet 40 milliGRAM(s) Oral daily  gabapentin 600 milliGRAM(s) Oral at bedtime  gabapentin 300 milliGRAM(s) Oral two times a day  glucagon  Injectable 1 milliGRAM(s) IntraMuscular once  heparin   Injectable 5000 Unit(s) SubCutaneous every 8 hours  insulin glargine Injectable (LANTUS) 15 Unit(s) SubCutaneous at bedtime  insulin lispro (ADMELOG) corrective regimen sliding scale   SubCutaneous Before meals and at bedtime  isosorbide   mononitrate ER Tablet (IMDUR) 30 milliGRAM(s) Oral daily  metoclopramide 10 milliGRAM(s) Oral daily  metoprolol succinate ER 25 milliGRAM(s) Oral daily  multivitamin 1 Tablet(s) Oral daily  pantoprazole    Tablet 40 milliGRAM(s) Oral every 12 hours  polyethylene glycol 3350 17 Gram(s) Oral daily  senna 2 Tablet(s) Oral at bedtime  simvastatin 10 milliGRAM(s) Oral at bedtime  sodium chloride 0.9% lock flush 3 milliLiter(s) IV Push every 8 hours  sucralfate suspension 1 Gram(s) Oral four times a day  tamsulosin 0.8 milliGRAM(s) Oral at bedtime    MEDICATIONS  (PRN):  acetaminophen     Tablet .. 650 milliGRAM(s) Oral every 6 hours PRN Temp greater or equal to 38C (100.4F), Mild Pain (1 - 3)  acetaminophen   IVPB .. 1000 milliGRAM(s) IV Intermittent once PRN Mild Pain (1 - 3)  aluminum hydroxide/magnesium hydroxide/simethicone Suspension 30 milliLiter(s) Oral every 4 hours PRN Dyspepsia  dextrose Oral Gel 15 Gram(s) Oral once PRN Blood Glucose LESS THAN 70 milliGRAM(s)/deciliter  HYDROmorphone  Injectable 1 milliGRAM(s) IV Push every 4 hours PRN breakthrough pain  magnesium hydroxide Suspension 30 milliLiter(s) Oral every 8 hours PRN Constipation  melatonin 3 milliGRAM(s) Oral at bedtime PRN Insomnia  ondansetron Injectable 4 milliGRAM(s) IV Push every 8 hours PRN Nausea and/or Vomiting  oxyCODONE    IR 5 milliGRAM(s) Oral every 4 hours PRN Moderate Pain (4 - 6)  oxyCODONE    IR 10 milliGRAM(s) Oral every 6 hours PRN Severe Pain (7 - 10)

## 2022-12-22 NOTE — PROGRESS NOTE ADULT - ASSESSMENT
Assesment: 63 y/o male see inpatient for the following   - Right eschar wound to the 4th/5th submetatarsal head, stable >> Now s/p Bypass from R femoral artery to distal tibial artery  - Erythema with cyanotic appearance to RLE possible PAD, improved  - Dry eschar to Right 4th and 5th digits, new and stable.   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 Jez Mauricio  Discussed diagnosis and treatment with patient. Patient demonstrated verbal understanding of all interventions and tolerated interventions well without any complications.   X-rays reviewed : no soft tissue emphysema or osseous fx   WC: betadine paint and allyvan pad  Continue with IV antibiotics As Per ID/MED  All additional care by Med appreciated  ID consult and recommendations appreciated.  Vascular consult appreciated: Angiogram perfomred  Pt notes blister forming at plantar R foot, now stable eschar at the plantar 4th/5th metatarsal head, no pus, no malodor, no pain. Wound related to poor circulation of R foot. Recommend for area to fully demarcate at this time.  Erythema to RLE 2/2 PVD.  MRI shows no evidence of osteomyelitis  WBAT to R foot w/ sx shoe.     -No acute podiatric intervention at this time as dry eschar wound is stable. Plan for outpatient debridement once cleared by vascular. Pt to follow up at [facility] as outpatient recommended.  -On re-consult evaluation, appearance of large hemorrhagic bullae, no active discharge, stable without clinical SOI. Right eschar wound to the 4th/5th submetatarsal head has extended to the 4th and 5th digits, dry eschar is stable.     Stable from podiatric standpoint for dc, please dc when stable from all other specialities.   Podiatry will follow while in house.       Wound care instructions to be applied daily to right foot  - Carefully remove dressings to right foot   - Apply betadine paint to right foot at the 4th/5th metatarsal heads   - Apply sterile gauze to area, and wrap with kerlix and ace.   Thanks Assesment: 61 y/o male see inpatient for the following   - Right eschar wound to the 4th/5th submetatarsal head, stable >> Now s/p Bypass from R femoral artery to distal tibial artery  - Erythema with cyanotic appearance to RLE possible PAD, improved  - Dry eschar to Right 4th and 5th digits, new and stable.   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 Patient demonstrated verbal understanding of all interventions and tolerated interventions well without any complications.   X-rays reviewed : no soft tissue emphysema or osseous fx   WC: betadine paint and allyvan pad  Continue with IV antibiotics As Per ID/MED  All additional care by Med appreciated  ID consult and recommendations appreciated.  Vascular consult appreciated: Angiogram perfomred  Pt notes blister forming at plantar R foot, now stable eschar at the plantar 4th/5th metatarsal head, no pus, no malodor, no pain. Wound related to poor circulation of R foot. Recommend for area to fully demarcate at this time.  Erythema to RLE 2/2 PVD.  MRI shows no evidence of osteomyelitis  WBAT to R foot w/ sx shoe.     -No acute podiatric intervention at this time as dry eschar wound is stable. Plan for outpatient debridement once cleared by vascular. Pt to follow up at [facility] as outpatient recommended.  -On re-consult evaluation, appearance of large hemorrhagic bullae, no active discharge, stable without clinical SOI. Right eschar wound to the 4th/5th submetatarsal head has extended to the 4th and 5th digits, dry eschar is stable.     Stable from podiatric standpoint for dc, please dc when stable from all other specialities.   Podiatry will follow while in house.     Case D/W with Dr. Jez Mauricio, will advise if plan changes    Wound care instructions to be applied daily to right foot  - Carefully remove dressings to right foot   - Apply betadine paint to right foot at the 4th/5th metatarsal heads   - Apply sterile gauze to area, and wrap with kerlix and ace.   Thanks

## 2022-12-22 NOTE — PROGRESS NOTE ADULT - ASSESSMENT
ASSESSMENT  63 yo Male BIBEMS from [facility] due to severe epigastric area abdominal pain since yesterday morning. Patient reported vomiting. Patient denies diarrhea. He has no active pain during my evaluation. Patient reported he has not been eating or drinking regularly. He has also noticed redness of RLE over last week and black area on bottom of foot admitted for RLE cellulitis    RLE cellultis / gangrene  cont PO doxycycline 100mg bid. can dc zosyn. d/w ID. monitor temps  RLE dry gangrene- podiatry to debride as outpatient when cleared by vascular. wound care  pain control prn. regimen adjusted. tylenol, oxycodone, dilaudid for breakthrough. gabapentin    Severe arterial stenosis of distal RLE  s/p L BKA Feb  s/p RLE angio  POD 2 s/p Bypass from R femoral artery to distal tibial artery      Cirrohisis (new)  outpatient followup  f/u with Dr. Anderson for further cirrhosis mgmt    Mitral stenosis.   S/p MVr with 28 CG future ring 2016 with TTE showing severe MS with mean gradient of 13 with mild LE edema and elevated JVP   s/p ALEX to further assess severity of MS 12/22  plan  Lasix 40 mg PO daily for diuresis, monitor electrolytes  Metoprolol succinate 25 mg PO daily for rate control    needs L and R heart cath    HTN, HLD  lisinopril, amlodipine, imdur    CAD s/p CABG  cont ASA/plavix,     Diabetes  continue lantus 15units qHS. mod ISS. BGMs ac hs. FS goal <180.      dvt ppx

## 2022-12-22 NOTE — ADVANCED PRACTICE NURSE CONSULT - ASSESSMENT
Patient foot wound being followed and addressed by podiatry team. If any other wound care recommendation warranted please reconsult. Thank you

## 2022-12-22 NOTE — PROCEDURAL SAFETY CHECKLIST WITH OR WITHOUT SEDATION - NSPREVERIFYSED_GEN_ALL_CORE
Adult Mental Health Inpatient Program  Interdisciplinary Treatment Plan    Cary Gatica  [id number]   :1959    Diagnosis: Major Depressive Disorder      Plan Type:  Initial Plan    Your patient, Cary Gatica was seen in the [address] [place] [id number].  Please see below for the patient agreed upon plan, including Goal(s), Focus Indicators, Outcomes and Interventions. Goals:                         Goal Type:          Goal Progression:                      Focus Indicators:       Objectives:     Therapy objectives: Patient identifies 2 healthy ways to cope with negative thoughts by 19       Interventions:    Therapy intervention: Safety Plan       Patient progress towards goals: Initial Treatment Plan        KRYSTIN Rajan done

## 2022-12-22 NOTE — PROGRESS NOTE ADULT - PROBLEM SELECTOR PLAN 1
S/p past MV repair but now with severe mitral stenosis / regurgitation -- will need input from structural heart team regarding optimal management -- will need to recover from foot wound / surgery before eventual intervention.

## 2022-12-23 LAB
ALBUMIN SERPL ELPH-MCNC: 2.3 G/DL — LOW (ref 3.3–5)
ALP SERPL-CCNC: 151 U/L — HIGH (ref 40–120)
ALT FLD-CCNC: 19 U/L — SIGNIFICANT CHANGE UP (ref 12–78)
ANION GAP SERPL CALC-SCNC: 4 MMOL/L — LOW (ref 5–17)
AST SERPL-CCNC: 15 U/L — SIGNIFICANT CHANGE UP (ref 15–37)
BASOPHILS # BLD AUTO: 0.06 K/UL — SIGNIFICANT CHANGE UP (ref 0–0.2)
BASOPHILS NFR BLD AUTO: 0.5 % — SIGNIFICANT CHANGE UP (ref 0–2)
BILIRUB SERPL-MCNC: 0.6 MG/DL — SIGNIFICANT CHANGE UP (ref 0.2–1.2)
BUN SERPL-MCNC: 26 MG/DL — HIGH (ref 7–23)
CALCIUM SERPL-MCNC: 9 MG/DL — SIGNIFICANT CHANGE UP (ref 8.5–10.1)
CHLORIDE SERPL-SCNC: 108 MMOL/L — SIGNIFICANT CHANGE UP (ref 96–108)
CO2 SERPL-SCNC: 27 MMOL/L — SIGNIFICANT CHANGE UP (ref 22–31)
CREAT SERPL-MCNC: 1.03 MG/DL — SIGNIFICANT CHANGE UP (ref 0.5–1.3)
EGFR: 83 ML/MIN/1.73M2 — SIGNIFICANT CHANGE UP
EOSINOPHIL # BLD AUTO: 0.45 K/UL — SIGNIFICANT CHANGE UP (ref 0–0.5)
EOSINOPHIL NFR BLD AUTO: 4 % — SIGNIFICANT CHANGE UP (ref 0–6)
GLUCOSE BLDC GLUCOMTR-MCNC: 174 MG/DL — HIGH (ref 70–99)
GLUCOSE BLDC GLUCOMTR-MCNC: 231 MG/DL — HIGH (ref 70–99)
GLUCOSE BLDC GLUCOMTR-MCNC: 235 MG/DL — HIGH (ref 70–99)
GLUCOSE BLDC GLUCOMTR-MCNC: 274 MG/DL — HIGH (ref 70–99)
GLUCOSE SERPL-MCNC: 264 MG/DL — HIGH (ref 70–99)
HCT VFR BLD CALC: 28.3 % — LOW (ref 39–50)
HGB BLD-MCNC: 8.9 G/DL — LOW (ref 13–17)
IMM GRANULOCYTES NFR BLD AUTO: 0.7 % — SIGNIFICANT CHANGE UP (ref 0–0.9)
LYMPHOCYTES # BLD AUTO: 1.37 K/UL — SIGNIFICANT CHANGE UP (ref 1–3.3)
LYMPHOCYTES # BLD AUTO: 12.2 % — LOW (ref 13–44)
MAGNESIUM SERPL-MCNC: 1.8 MG/DL — SIGNIFICANT CHANGE UP (ref 1.6–2.6)
MCHC RBC-ENTMCNC: 30.6 PG — SIGNIFICANT CHANGE UP (ref 27–34)
MCHC RBC-ENTMCNC: 31.4 GM/DL — LOW (ref 32–36)
MCV RBC AUTO: 97.3 FL — SIGNIFICANT CHANGE UP (ref 80–100)
MONOCYTES # BLD AUTO: 0.83 K/UL — SIGNIFICANT CHANGE UP (ref 0–0.9)
MONOCYTES NFR BLD AUTO: 7.4 % — SIGNIFICANT CHANGE UP (ref 2–14)
NEUTROPHILS # BLD AUTO: 8.43 K/UL — HIGH (ref 1.8–7.4)
NEUTROPHILS NFR BLD AUTO: 75.2 % — SIGNIFICANT CHANGE UP (ref 43–77)
PHOSPHATE SERPL-MCNC: 3 MG/DL — SIGNIFICANT CHANGE UP (ref 2.5–4.5)
PLATELET # BLD AUTO: 288 K/UL — SIGNIFICANT CHANGE UP (ref 150–400)
POTASSIUM SERPL-MCNC: 3.8 MMOL/L — SIGNIFICANT CHANGE UP (ref 3.5–5.3)
POTASSIUM SERPL-SCNC: 3.8 MMOL/L — SIGNIFICANT CHANGE UP (ref 3.5–5.3)
PROT SERPL-MCNC: 6.7 GM/DL — SIGNIFICANT CHANGE UP (ref 6–8.3)
RBC # BLD: 2.91 M/UL — LOW (ref 4.2–5.8)
RBC # FLD: 15.4 % — HIGH (ref 10.3–14.5)
SAO2 % BLD: 59.4 % — LOW (ref 60–90)
SAO2 % BLDA: 96.4 % — SIGNIFICANT CHANGE UP (ref 94–98)
SODIUM SERPL-SCNC: 139 MMOL/L — SIGNIFICANT CHANGE UP (ref 135–145)
WBC # BLD: 11.22 K/UL — HIGH (ref 3.8–10.5)
WBC # FLD AUTO: 11.22 K/UL — HIGH (ref 3.8–10.5)

## 2022-12-23 PROCEDURE — 99233 SBSQ HOSP IP/OBS HIGH 50: CPT

## 2022-12-23 PROCEDURE — 74177 CT ABD & PELVIS W/CONTRAST: CPT | Mod: 26

## 2022-12-23 PROCEDURE — 93461 R&L HRT ART/VENTRICLE ANGIO: CPT | Mod: 26

## 2022-12-23 RX ORDER — SIMETHICONE 80 MG/1
80 TABLET, CHEWABLE ORAL EVERY 6 HOURS
Refills: 0 | Status: DISCONTINUED | OUTPATIENT
Start: 2022-12-23 | End: 2022-12-27

## 2022-12-23 RX ORDER — HYDROMORPHONE HYDROCHLORIDE 2 MG/ML
2 INJECTION INTRAMUSCULAR; INTRAVENOUS; SUBCUTANEOUS ONCE
Refills: 0 | Status: DISCONTINUED | OUTPATIENT
Start: 2022-12-23 | End: 2022-12-23

## 2022-12-23 RX ADMIN — SODIUM CHLORIDE 3 MILLILITER(S): 9 INJECTION INTRAMUSCULAR; INTRAVENOUS; SUBCUTANEOUS at 23:51

## 2022-12-23 RX ADMIN — Medication 2: at 14:11

## 2022-12-23 RX ADMIN — POLYETHYLENE GLYCOL 3350 17 GRAM(S): 17 POWDER, FOR SOLUTION ORAL at 17:31

## 2022-12-23 RX ADMIN — INSULIN GLARGINE 15 UNIT(S): 100 INJECTION, SOLUTION SUBCUTANEOUS at 22:21

## 2022-12-23 RX ADMIN — SIMVASTATIN 10 MILLIGRAM(S): 20 TABLET, FILM COATED ORAL at 22:20

## 2022-12-23 RX ADMIN — Medication 6: at 08:25

## 2022-12-23 RX ADMIN — SODIUM CHLORIDE 3 MILLILITER(S): 9 INJECTION INTRAMUSCULAR; INTRAVENOUS; SUBCUTANEOUS at 08:19

## 2022-12-23 RX ADMIN — SENNA PLUS 2 TABLET(S): 8.6 TABLET ORAL at 22:21

## 2022-12-23 RX ADMIN — Medication 81 MILLIGRAM(S): at 08:38

## 2022-12-23 RX ADMIN — SODIUM CHLORIDE 125 MILLILITER(S): 9 INJECTION, SOLUTION INTRAVENOUS at 17:53

## 2022-12-23 RX ADMIN — HEPARIN SODIUM 5000 UNIT(S): 5000 INJECTION INTRAVENOUS; SUBCUTANEOUS at 22:21

## 2022-12-23 RX ADMIN — GABAPENTIN 600 MILLIGRAM(S): 400 CAPSULE ORAL at 22:21

## 2022-12-23 RX ADMIN — HYDROMORPHONE HYDROCHLORIDE 2 MILLIGRAM(S): 2 INJECTION INTRAMUSCULAR; INTRAVENOUS; SUBCUTANEOUS at 09:05

## 2022-12-23 RX ADMIN — GABAPENTIN 300 MILLIGRAM(S): 400 CAPSULE ORAL at 08:29

## 2022-12-23 RX ADMIN — SODIUM CHLORIDE 3 MILLILITER(S): 9 INJECTION INTRAMUSCULAR; INTRAVENOUS; SUBCUTANEOUS at 14:13

## 2022-12-23 RX ADMIN — Medication 3 MILLIGRAM(S): at 22:20

## 2022-12-23 RX ADMIN — HYDROMORPHONE HYDROCHLORIDE 2 MILLIGRAM(S): 2 INJECTION INTRAMUSCULAR; INTRAVENOUS; SUBCUTANEOUS at 18:18

## 2022-12-23 RX ADMIN — HEPARIN SODIUM 5000 UNIT(S): 5000 INJECTION INTRAVENOUS; SUBCUTANEOUS at 06:03

## 2022-12-23 RX ADMIN — HYDROMORPHONE HYDROCHLORIDE 2 MILLIGRAM(S): 2 INJECTION INTRAMUSCULAR; INTRAVENOUS; SUBCUTANEOUS at 13:05

## 2022-12-23 RX ADMIN — HYDROMORPHONE HYDROCHLORIDE 2 MILLIGRAM(S): 2 INJECTION INTRAMUSCULAR; INTRAVENOUS; SUBCUTANEOUS at 08:27

## 2022-12-23 RX ADMIN — HYDROMORPHONE HYDROCHLORIDE 2 MILLIGRAM(S): 2 INJECTION INTRAMUSCULAR; INTRAVENOUS; SUBCUTANEOUS at 02:27

## 2022-12-23 RX ADMIN — FAMOTIDINE 40 MILLIGRAM(S): 10 INJECTION INTRAVENOUS at 22:21

## 2022-12-23 RX ADMIN — Medication 1 TABLET(S): at 08:40

## 2022-12-23 RX ADMIN — HYDROMORPHONE HYDROCHLORIDE 2 MILLIGRAM(S): 2 INJECTION INTRAMUSCULAR; INTRAVENOUS; SUBCUTANEOUS at 12:44

## 2022-12-23 RX ADMIN — Medication 100 MILLIGRAM(S): at 22:21

## 2022-12-23 RX ADMIN — SIMETHICONE 80 MILLIGRAM(S): 80 TABLET, CHEWABLE ORAL at 18:54

## 2022-12-23 RX ADMIN — Medication 25 MILLIGRAM(S): at 08:40

## 2022-12-23 RX ADMIN — PANTOPRAZOLE SODIUM 40 MILLIGRAM(S): 20 TABLET, DELAYED RELEASE ORAL at 08:59

## 2022-12-23 RX ADMIN — PANTOPRAZOLE SODIUM 40 MILLIGRAM(S): 20 TABLET, DELAYED RELEASE ORAL at 22:20

## 2022-12-23 RX ADMIN — Medication 4: at 22:22

## 2022-12-23 RX ADMIN — Medication 4: at 17:42

## 2022-12-23 RX ADMIN — HYDROMORPHONE HYDROCHLORIDE 2 MILLIGRAM(S): 2 INJECTION INTRAMUSCULAR; INTRAVENOUS; SUBCUTANEOUS at 03:57

## 2022-12-23 RX ADMIN — Medication 1 GRAM(S): at 00:29

## 2022-12-23 RX ADMIN — AMLODIPINE BESYLATE 5 MILLIGRAM(S): 2.5 TABLET ORAL at 08:41

## 2022-12-23 RX ADMIN — TAMSULOSIN HYDROCHLORIDE 0.8 MILLIGRAM(S): 0.4 CAPSULE ORAL at 22:20

## 2022-12-23 RX ADMIN — Medication 10 MILLIGRAM(S): at 08:58

## 2022-12-23 RX ADMIN — CLOPIDOGREL BISULFATE 75 MILLIGRAM(S): 75 TABLET, FILM COATED ORAL at 08:37

## 2022-12-23 RX ADMIN — Medication 100 MILLIGRAM(S): at 08:37

## 2022-12-23 RX ADMIN — Medication 1 GRAM(S): at 08:29

## 2022-12-23 RX ADMIN — GABAPENTIN 300 MILLIGRAM(S): 400 CAPSULE ORAL at 14:20

## 2022-12-23 RX ADMIN — HEPARIN SODIUM 5000 UNIT(S): 5000 INJECTION INTRAVENOUS; SUBCUTANEOUS at 14:20

## 2022-12-23 RX ADMIN — ISOSORBIDE MONONITRATE 30 MILLIGRAM(S): 60 TABLET, EXTENDED RELEASE ORAL at 08:36

## 2022-12-23 RX ADMIN — Medication 1 GRAM(S): at 17:31

## 2022-12-23 NOTE — PROGRESS NOTE ADULT - NSPROGADDITIONALINFOA_GEN_ALL_CORE
medically acute  will need to d/w cards r and L heart cath
Patient seen with YONI Ruano  Patient foot warm  BP ok  on doxy for foot  hemodynamically stable  has PATRICK, creatinine 1.5 tolerating po with trend  may transfer to floor
medically acute  for left/right heart cath  as for as vascular/podiatry; patient stable for dc back to long term care. will need outpatient vascular/podiatry followup. needs outpatient debridment when cleared by vascular
Patient seen with YONI Ruano  Patient awake and alert PO 1 s/p right leg bypass  secondary to non healing ulcers right foot  has completed long course of abx.    1. Foot warm  2. futher debridement per podiatry  3. s/c zosyn, on doxycycline another week  4. severe mitral stenosis, will need eventuall  mary right/left heart cath  5. cad conitnue aspirin

## 2022-12-23 NOTE — PROGRESS NOTE ADULT - NS ATTEND AMEND GEN_ALL_CORE FT
Patient had cardiac cath , patents grafts ,   normal EF elevated diastolic LVEDP   diastolic heart failure , advised the patient to follow low salt diet  continue diuretic , BB ,  follow up as OP ,
Agree with above. As stated previously, given severe pHTN, high trans-gradient across mitral, I agree with evaluation for patient for mitral intervention. Would continue inpatient work up with ALEX with planimetry and then eventually R/L heart catheterization if ALEX confirmatory, in addition to carotids, PFTs. Once all information obtained, will review plan with structural heart team and determine best treatment plan which will be done as outpatient given current vascular issues, treatment. Findings relayed to patient and patient's cardiologist.     Thank you for allowing me to participate in the care of your patient. Feel free to contact me if any clinical issues arise via contact information below or MS Teams.    Germán Wilson MD, FACC, [facility]   Director, Interventional Cardiology, [address], [address] Floor, [address] Office: [phone]  [place] Office: [phone]  Email: [email or web address]
Patient seen and examined.  He should have work-up for mitral stenosis once ID issues are resolved.
Patient seen and examined; pharmacologic nuclear stress prior to vascular surgery with plans for eventual work-up for MVR.
Plan of care reviewed. Discussed with NP. Monitor weights. Agree with plan of care.

## 2022-12-23 NOTE — CHART NOTE - NSCHARTNOTEFT_GEN_A_CORE
-Consent obtained for cardiac catheterization w/ coronary angiogram and possible stent placement and possible sedation/analgesia Pt is competent, has capacity, and understands risks and benefits of procedure. Risks and benefits discussed. Risk discussed included, but not limited to MI, stroke, mortality, major bleeding, arrythmia, or infection. All questions answered   ASA:II  Bleeding  Risk score:4%  Creatinine:1.03  GFR:83    Wojciech score: 4 points

## 2022-12-23 NOTE — PROGRESS NOTE ADULT - SUBJECTIVE AND OBJECTIVE BOX
REASON FOR VISIT: MV Disease    HPI:  63 y/o man with a history of CAD s/p CABG and MV repair (2016 - [place]), HTN, DM, GERD, L BKA, who was transferred from [facility] for the evaluation of abdominal discomfort and cellulitis.  Cardiology consult requested for elevated troponin.  Mr. Roberts has no cardiac symptoms -- he has not been experiencing angina or dyspnea.  He walks during PT and denies exertional chest discomfort.  He does not see a cardiologist in the outpatient setting.      12/4/22 Comfortable in bed with CC of SOB No CP Tele SR   12/5/22 Semirecumbent in bed no CP HR 70-80 BPM  12/6/22 patient is feeling ok , did have RLE angiogram today results pending  no chest pain or shortness of breath at rest  12/7/22 feeling well no new complaints No CP/SOB  12/21/'22: called to evaluate for ALEX.  had R femoral bypass to distal tibial  12/23/22 Patient s/p LHC and RHC.  Denies cp or sob.      MEDICATIONS  (STANDING):  amLODIPine   Tablet 5 milliGRAM(s) Oral daily  aspirin  chewable 81 milliGRAM(s) Oral daily  aspirin  chewable 81 milliGRAM(s) Oral once  clopidogrel Tablet 75 milliGRAM(s) Oral daily  dextrose 5% + sodium chloride 0.45%. 1000 milliLiter(s) (125 mL/Hr) IV Continuous <Continuous>  dextrose 5%. 1000 milliLiter(s) (100 mL/Hr) IV Continuous <Continuous>  dextrose 5%. 1000 milliLiter(s) (50 mL/Hr) IV Continuous <Continuous>  dextrose 50% Injectable 25 Gram(s) IV Push once  dextrose 50% Injectable 12.5 Gram(s) IV Push once  dextrose 50% Injectable 25 Gram(s) IV Push once  doxycycline monohydrate Capsule 100 milliGRAM(s) Oral every 12 hours  famotidine    Tablet 40 milliGRAM(s) Oral at bedtime  furosemide    Tablet 40 milliGRAM(s) Oral daily  gabapentin 300 milliGRAM(s) Oral two times a day  gabapentin 600 milliGRAM(s) Oral at bedtime  glucagon  Injectable 1 milliGRAM(s) IntraMuscular once  heparin   Injectable 5000 Unit(s) SubCutaneous every 8 hours  insulin glargine Injectable (LANTUS) 15 Unit(s) SubCutaneous at bedtime  insulin lispro (ADMELOG) corrective regimen sliding scale   SubCutaneous Before meals and at bedtime  isosorbide   mononitrate ER Tablet (IMDUR) 30 milliGRAM(s) Oral daily  metoclopramide 10 milliGRAM(s) Oral daily  metoprolol succinate ER 25 milliGRAM(s) Oral daily  multivitamin 1 Tablet(s) Oral daily  pantoprazole    Tablet 40 milliGRAM(s) Oral every 12 hours  polyethylene glycol 3350 17 Gram(s) Oral daily  senna 2 Tablet(s) Oral at bedtime  simvastatin 10 milliGRAM(s) Oral at bedtime  sodium chloride 0.9% lock flush 3 milliLiter(s) IV Push every 8 hours  sucralfate suspension 1 Gram(s) Oral four times a day  tamsulosin 0.8 milliGRAM(s) Oral at bedtime    MEDICATIONS  (PRN):  acetaminophen     Tablet .. 650 milliGRAM(s) Oral every 6 hours PRN Temp greater or equal to 38C (100.4F), Mild Pain (1 - 3)  acetaminophen   IVPB .. 1000 milliGRAM(s) IV Intermittent once PRN Mild Pain (1 - 3)  aluminum hydroxide/magnesium hydroxide/simethicone Suspension 30 milliLiter(s) Oral every 4 hours PRN Dyspepsia  dextrose Oral Gel 15 Gram(s) Oral once PRN Blood Glucose LESS THAN 70 milliGRAM(s)/deciliter  HYDROmorphone  Injectable 2 milliGRAM(s) IV Push every 6 hours PRN Severe Pain (7 - 10)  magnesium hydroxide Suspension 30 milliLiter(s) Oral every 8 hours PRN Constipation  melatonin 3 milliGRAM(s) Oral at bedtime PRN Insomnia  ondansetron Injectable 4 milliGRAM(s) IV Push every 8 hours PRN Nausea and/or Vomiting  oxyCODONE    IR 5 milliGRAM(s) Oral every 4 hours PRN Moderate Pain (4 - 6)    Vital Signs Last 24 Hrs  T(C): 36.2 (23 Dec 2022 14:04), Max: 36.8 (22 Dec 2022 16:15)  T(F): 97.2 (23 Dec 2022 14:04), Max: 98.3 (22 Dec 2022 16:15)  HR: 76 (23 Dec 2022 14:04) (73 - 80)  BP: 130/59 (23 Dec 2022 14:04) (121/58 - 147/73)  BP(mean): --  RR: 18 (23 Dec 2022 14:04) (18 - 22)  SpO2: 97% (23 Dec 2022 14:04) (93% - 99%)    Parameters below as of 23 Dec 2022 14:04  Patient On (Oxygen Delivery Method): room air      PHYSICAL EXAM:  Constitutional: NAD, awake  HEENT: No oral cyanosis. Poor dentition  Respiratory: Breath sounds are clear bilaterally  Cardiovascular: S1 and S2, regular rate and rhythm, +murmur  Skin: Wound R foot    LABS:                            8.9    11.22 )-----------( 288      ( 23 Dec 2022 08:50 )             28.3                     9.0    13.27 )-----------( 289      ( 22 Dec 2022 06:18 )             27.8     12-23    139  |  108  |  26<H>  ----------------------------<  264<H>  3.8   |  27  |  1.03    Ca    9.0      23 Dec 2022 08:50  Phos  3.0     12-23  Mg     1.8     12-23    TPro  6.7  /  Alb  2.3<L>  /  TBili  0.6  /  DBili  x   /  AST  15  /  ALT  19  /  AlkPhos  151<H>  12-23      142  |  112<H>  |  36<H>  ----------------------------<  192<H>  4.1   |  25  |  1.24    Ca    9.2      22 Dec 2022 06:18  Phos  2.5     12-22  Mg     1.9     12-22    TPro  6.4  /  Alb  2.2<L>  /  TBili  0.7  /  DBili  x   /  AST  13<L>  /  ALT  15  /  AlkPhos  136<H>  12-21    TTE Echo Complete w/o Contrast w/ Doppler (12.02.22 @ 08:46):   Endocardium is not well visualized, however, overall left ventricular systolic function appears normal. Mild concentric left ventricular hypertrophy. Septal flattening is seen; this finding is consistent with right heart pressure / volume overload. Estimated left ventricular ejection fraction is 55-60%.   The right ventricle exhibits mild dilation, mild diffuse hypokinesis, and mild depression of contractility.   Severe mitral stenosis. Mild mitral regurgitation.   Mild to moderate tricuspid valve regurgitation.   Severe pulmonary hypertension.    Procedure: ALEX    Indication:  Mitral valve disease    Findings:  Severe mitral valve disease (stenosis / regurgitation).  See echo report for all findings.    Patient tolerated procedure well; no complications.

## 2022-12-23 NOTE — PROGRESS NOTE ADULT - ASSESSMENT
ASSESSMENT  61 yo Male BIBEMS from [facility] due to severe epigastric area abdominal pain since yesterday morning. Patient reported vomiting. Patient denies diarrhea. He has no active pain during my evaluation. Patient reported he has not been eating or drinking regularly. He has also noticed redness of RLE over last week and black area on bottom of foot admitted for RLE cellulitis    RLE cellultis / gangrene  cont PO doxycycline 100mg bid. can dc zosyn. d/w ID. monitor temps  RLE dry gangrene- podiatry to debride as outpatient when cleared by vascular. wound care  Noted Podiatry recs 12/23:  Wound care instructions to be applied every 2-3 days to right foot  - Carefully remove dressings to right foot   - Apply xeroform to the blister of the right foot (top of foot)   - Apply betadine paint to right foot at the 4th/5th metatarsal heads, and 4th/5th digits.  - Apply sterile gauze to area, and wrap with kerlix and tape.   pain control prn. regimen adjusted. tylenol, oxycodone, dilaudid for breakthrough. gabapentin    Severe arterial stenosis of distal RLE  s/p L BKA Feb  s/p RLE angio  POD 3 s/p Bypass from R femoral artery to distal tibial artery      Cirrohisis (new)  outpatient followup  f/u with Dr. Anderson for further cirrhosis mgmt    Mitral stenosis.   S/p MVr with 28 CG future ring 2016 with TTE showing severe MS with mean gradient of 13 with mild LE edema and elevated JVP   s/p ALEX to further assess severity of MS 12/22  plan  Lasix 40 mg PO daily for diuresis, monitor electrolytes  Metoprolol succinate 25 mg PO daily for rate control    FOR RIGHT AND LEFT HEART CATH 12/23    HTN, HLD  lisinopril, amlodipine, imdur    CAD s/p CABG  cont ASA/plavix,     Diabetes  continue lantus 15units qHS. mod ISS. BGMs ac hs. FS goal <180.      dvt ppx : heparin

## 2022-12-23 NOTE — PACU DISCHARGE NOTE - COMMENTS
transferred to SICU
Patient s/p RHC and LHC. Left groin dressing clean dry intact, no s/s of hematoma or bleeding noted. Patient c/o pain in Right foot, NP Tone aware, IVP Dilaudid 2mg given with good relief. Report given to Glory DENNIS on 5E. Patient safely transported back to unit with transport team in stretcher.

## 2022-12-23 NOTE — PROGRESS NOTE ADULT - SUBJECTIVE AND OBJECTIVE BOX
Date of Service: 12/23/22  Chief Complaint :61 y/o male PMHx of PVD s/p left BKA, DM, HTN, CAD, anemia, and GERD, Full code with molst BIBEMS from [facility] c/o severe abdominal pain since this morning. Pt reports vomiting. Pt denies diarrhea. Pt reports he has not been eating or drinking. Pt reports he has noticed redness of RLE over last week and black area on bottom of foot. States he was scheduled for angiogram tomorrow with Dr. Dickson who has been following patient for continued PVD of right lower extremity.  Podiatry consulted for unstagable wound to Right 4/5th metatarsal head. Patient denies any pain to the area. 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 Patient says he noticed redness/erythema to his right lower extremity. Patient also says that he has narrowing of his blood vessels to his right leg, and that there is decreased blood flow to his right foot.     12/23/22: Pt seen by podiatry team. Podiatry re-consulted for Right foot blister. Pt currently POD  s/p Bypass from R femoral artery to distal tibial artery. Pt resting comfortably at bedside, NAD, pleasant. No other pedal complaints at this time.     PMH: HTN (hypertension)    DM (diabetes mellitus)      PSH:    Allergies:No Known Allergies    MEDICATIONS  (STANDING):  amLODIPine   Tablet 5 milliGRAM(s) Oral daily  aspirin  chewable 81 milliGRAM(s) Oral daily  aspirin  chewable 81 milliGRAM(s) Oral once  clopidogrel Tablet 75 milliGRAM(s) Oral daily  dextrose 5% + sodium chloride 0.45%. 1000 milliLiter(s) (125 mL/Hr) IV Continuous <Continuous>  dextrose 5%. 1000 milliLiter(s) (50 mL/Hr) IV Continuous <Continuous>  dextrose 5%. 1000 milliLiter(s) (100 mL/Hr) IV Continuous <Continuous>  dextrose 50% Injectable 25 Gram(s) IV Push once  dextrose 50% Injectable 12.5 Gram(s) IV Push once  dextrose 50% Injectable 25 Gram(s) IV Push once  doxycycline monohydrate Capsule 100 milliGRAM(s) Oral every 12 hours  famotidine    Tablet 40 milliGRAM(s) Oral at bedtime  furosemide    Tablet 40 milliGRAM(s) Oral daily  gabapentin 600 milliGRAM(s) Oral at bedtime  gabapentin 300 milliGRAM(s) Oral two times a day  glucagon  Injectable 1 milliGRAM(s) IntraMuscular once  heparin   Injectable 5000 Unit(s) SubCutaneous every 8 hours  insulin glargine Injectable (LANTUS) 15 Unit(s) SubCutaneous at bedtime  insulin lispro (ADMELOG) corrective regimen sliding scale   SubCutaneous Before meals and at bedtime  isosorbide   mononitrate ER Tablet (IMDUR) 30 milliGRAM(s) Oral daily  metoclopramide 10 milliGRAM(s) Oral daily  metoprolol succinate ER 25 milliGRAM(s) Oral daily  multivitamin 1 Tablet(s) Oral daily  pantoprazole    Tablet 40 milliGRAM(s) Oral every 12 hours  polyethylene glycol 3350 17 Gram(s) Oral daily  senna 2 Tablet(s) Oral at bedtime  simvastatin 10 milliGRAM(s) Oral at bedtime  sodium chloride 0.9% lock flush 3 milliLiter(s) IV Push every 8 hours  sucralfate suspension 1 Gram(s) Oral four times a day  tamsulosin 0.8 milliGRAM(s) Oral at bedtime    MEDICATIONS  (PRN):  acetaminophen     Tablet .. 650 milliGRAM(s) Oral every 6 hours PRN Temp greater or equal to 38C (100.4F), Mild Pain (1 - 3)  acetaminophen   IVPB .. 1000 milliGRAM(s) IV Intermittent once PRN Mild Pain (1 - 3)  aluminum hydroxide/magnesium hydroxide/simethicone Suspension 30 milliLiter(s) Oral every 4 hours PRN Dyspepsia  dextrose Oral Gel 15 Gram(s) Oral once PRN Blood Glucose LESS THAN 70 milliGRAM(s)/deciliter  HYDROmorphone  Injectable 2 milliGRAM(s) IV Push every 6 hours PRN Severe Pain (7 - 10)  magnesium hydroxide Suspension 30 milliLiter(s) Oral every 8 hours PRN Constipation  melatonin 3 milliGRAM(s) Oral at bedtime PRN Insomnia  ondansetron Injectable 4 milliGRAM(s) IV Push every 8 hours PRN Nausea and/or Vomiting  oxyCODONE    IR 5 milliGRAM(s) Oral every 4 hours PRN Moderate Pain (4 - 6)       Vital Signs Last 24 Hrs  T(C): 36.2 (23 Dec 2022 07:36), Max: 36.8 (22 Dec 2022 08:43)  T(F): 97.1 (23 Dec 2022 07:36), Max: 98.3 (22 Dec 2022 08:43)  HR: 73 (23 Dec 2022 07:36) (73 - 83)  BP: 143/73 (23 Dec 2022 07:36) (130/68 - 151/74)  BP(mean): 97 (22 Dec 2022 11:36) (87 - 97)  RR: 18 (23 Dec 2022 07:36) (18 - 26)  SpO2: 99% (23 Dec 2022 07:36) (92% - 99%)    Parameters below as of 23 Dec 2022 07:36  Patient On (Oxygen Delivery Method): room air             Physical Exam:   Constitutional: NAD, alert;  Derm:  Skin warm, dry and supple bilateral.     Erythema noted grossly to right foot and lower leg  Scaly skin noted grossly to right foot  Dry eschar wound noted to the right 4th/5th metatarsal head measuring approximately 5x3.5 cm, no pus, no malodor, no pain on palpation, negative fluctuance. Indication of bluish cyanotic discoloration from Right mid leg to Right foot digits 1-5 (improved). Dry small eschar lesions to Right distal toes 2-3. New appearance of dry eschar to Right 4th and 5th toe.     Hemorrhagic bulla to extending from Right dorsomedial midfoot to anteromedial R ankle, approx [id number], no active discharge, no malodor, no pus, no SOI.   Vascular: Dorsalis Pedis and Posterior Tibial pulses dopplerable to RLE. RLE warm to the touch wnl.   Neuro: Protective sensation absent to the level of the digits bilateral.  MSK: Muscle strength 4/5 in all major muscle groups of Right lower extremity.  Below knee amputation to left lower extremity         Labs:                                   9.0    13.27 )-----------( 289      ( 22 Dec 2022 06:18 )             27.8     12-22    142  |  112<H>  |  36<H>  ----------------------------<  192<H>  4.1   |  25  |  1.24    Ca    9.2      22 Dec 2022 06:18  Phos  2.5     12-22  Mg     1.9     12-22    TPro  6.4  /  Alb  2.2<L>  /  TBili  0.7  /  DBili  x   /  AST  13<L>  /  ALT  15  /  AlkPhos  136<H>  12-21                               Rad/EKG     < from: Xray Foot AP + Lateral + Oblique, Right (12.01.22 @ 22:01) >  INTERPRETATION:  History: 62-year-old male, necrotic area in the foot.    Three views of the right foot without comparison demonstrate soft tissue   emphysema at the plantar aspect of the midfoot and calcaneus.    No gross cortical destruction, fracture or dislocation.    IMPRESSION:  Soft tissue emphysema at the plantar aspect of the mid foot/calcaneous.   If there is continued clinical concern follow-up MRI can be ordered    < end of copied text >  < from: MR Foot No Cont, Right (12.03.22 @ 12:13) >  IMPRESSION:  1.  There is no evidence for osteomyelitis.    < end of copied text >  < from: US Duplex Arterial Lower Ext Ltd, Right (12.01.22 @ 21:40) >  IMPRESSION:    No appreciable flow detected in the posterior tibial and peroneal   arteries.    < end of copied text >

## 2022-12-23 NOTE — PROGRESS NOTE ADULT - SUBJECTIVE AND OBJECTIVE BOX
SURGERY DAILY PROGRESS NOTE:     Subjective:  Patient seen and examined at bedside during am rounds. AVSS. Denies any fevers, chills, n/v/d, chest pain or shortness of breath    Objective:  Vital Signs Last 24 Hrs  T(C): 36.2 (23 Dec 2022 07:36), Max: 36.8 (22 Dec 2022 08:43)  T(F): 97.1 (23 Dec 2022 07:36), Max: 98.3 (22 Dec 2022 08:43)  HR: 73 (23 Dec 2022 07:36) (73 - 83)  BP: 143/73 (23 Dec 2022 07:36) (130/68 - 151/74)  BP(mean): 97 (22 Dec 2022 11:36) (87 - 97)  RR: 18 (23 Dec 2022 07:36) (18 - 26)  SpO2: 99% (23 Dec 2022 07:36) (92% - 99%)    Parameters below as of 23 Dec 2022 07:36  Patient On (Oxygen Delivery Method): room air        PHYSICAL EXAM   Gen: well-appearing, in no acute distress  CV: pulse regularly present   Resp: airway patent, non-labored breathing  Abd: soft, NTND  Extremities: RLE less erythematous and swollen                                                           9.0    13.27 )-----------( 289      ( 22 Dec 2022 06:18 )             27.8     12-22    142  |  112<H>  |  36<H>  ----------------------------<  192<H>  4.1   |  25  |  1.24    Ca    9.2      22 Dec 2022 06:18  Phos  2.5     12-22  Mg     1.9     12-22    TPro  6.4  /  Alb  2.2<L>  /  TBili  0.7  /  DBili  x   /  AST  13<L>  /  ALT  15  /  AlkPhos  136<H>  12-21            MEDICATIONS  (STANDING):  amLODIPine   Tablet 5 milliGRAM(s) Oral daily  aspirin  chewable 81 milliGRAM(s) Oral daily  aspirin  chewable 81 milliGRAM(s) Oral once  clopidogrel Tablet 75 milliGRAM(s) Oral daily  dextrose 5% + sodium chloride 0.45%. 1000 milliLiter(s) (125 mL/Hr) IV Continuous <Continuous>  dextrose 5%. 1000 milliLiter(s) (100 mL/Hr) IV Continuous <Continuous>  dextrose 5%. 1000 milliLiter(s) (50 mL/Hr) IV Continuous <Continuous>  dextrose 50% Injectable 25 Gram(s) IV Push once  dextrose 50% Injectable 12.5 Gram(s) IV Push once  dextrose 50% Injectable 25 Gram(s) IV Push once  doxycycline monohydrate Capsule 100 milliGRAM(s) Oral every 12 hours  famotidine    Tablet 40 milliGRAM(s) Oral at bedtime  furosemide    Tablet 40 milliGRAM(s) Oral daily  gabapentin 600 milliGRAM(s) Oral at bedtime  gabapentin 300 milliGRAM(s) Oral two times a day  glucagon  Injectable 1 milliGRAM(s) IntraMuscular once  heparin   Injectable 5000 Unit(s) SubCutaneous every 8 hours  insulin glargine Injectable (LANTUS) 15 Unit(s) SubCutaneous at bedtime  insulin lispro (ADMELOG) corrective regimen sliding scale   SubCutaneous Before meals and at bedtime  isosorbide   mononitrate ER Tablet (IMDUR) 30 milliGRAM(s) Oral daily  metoclopramide 10 milliGRAM(s) Oral daily  metoprolol succinate ER 25 milliGRAM(s) Oral daily  multivitamin 1 Tablet(s) Oral daily  pantoprazole    Tablet 40 milliGRAM(s) Oral every 12 hours  polyethylene glycol 3350 17 Gram(s) Oral daily  senna 2 Tablet(s) Oral at bedtime  simvastatin 10 milliGRAM(s) Oral at bedtime  sodium chloride 0.9% lock flush 3 milliLiter(s) IV Push every 8 hours  sucralfate suspension 1 Gram(s) Oral four times a day  tamsulosin 0.8 milliGRAM(s) Oral at bedtime    MEDICATIONS  (PRN):  acetaminophen     Tablet .. 650 milliGRAM(s) Oral every 6 hours PRN Temp greater or equal to 38C (100.4F), Mild Pain (1 - 3)  acetaminophen   IVPB .. 1000 milliGRAM(s) IV Intermittent once PRN Mild Pain (1 - 3)  aluminum hydroxide/magnesium hydroxide/simethicone Suspension 30 milliLiter(s) Oral every 4 hours PRN Dyspepsia  dextrose Oral Gel 15 Gram(s) Oral once PRN Blood Glucose LESS THAN 70 milliGRAM(s)/deciliter  HYDROmorphone  Injectable 1 milliGRAM(s) IV Push every 4 hours PRN breakthrough pain  magnesium hydroxide Suspension 30 milliLiter(s) Oral every 8 hours PRN Constipation  melatonin 3 milliGRAM(s) Oral at bedtime PRN Insomnia  ondansetron Injectable 4 milliGRAM(s) IV Push every 8 hours PRN Nausea and/or Vomiting  oxyCODONE    IR 5 milliGRAM(s) Oral every 4 hours PRN Moderate Pain (4 - 6)  oxyCODONE    IR 10 milliGRAM(s) Oral every 6 hours PRN Severe Pain (7 - 10)

## 2022-12-23 NOTE — PROGRESS NOTE ADULT - SUBJECTIVE AND OBJECTIVE BOX
s/p LHC and RHC    Denies chest pain, shortness of breath, dizziness or palpitations post procedure. Reports recurrent lower back and abdominal pain. Denies nausea or vomiting. Has passed flatus and moved his bowels    PHYSICAL EXAM:  Constitutional: NAD  Neuro: Alert and oriented x 3  Speech clear No focal deficits  Neck: No bruit  Respiratory: CTAB  Cardiovascular: S1 and S2, RRR,   Gastrointestinal: BS+, soft, NT/ND. no rebound tenderness  Extremities: No clubbing cyanosis or edema. LBKA  Vascular: Doppler right DP   Psychiatric: Normal mood, normal affect  Musculoskeletal: 5/5 strength b/l upper and lower extremities  Left groin procedure site CDI.  no bleeding, no hematoma, site soft, non tender, positive pedal pulses bilaterally. Right groin dsg with dry blood. Site is soft; tender on palpation      HPI:  61 y/o Male BIBEMS from [facility] due to severe epigastric area abdominal pain since yesterday morning. Patient reported vomiting. Patient denies diarrhea. He has no active pain during my evaluation. Patient reported he has not been eating or drinking regularly. He has also noticed redness of RLE over last week and black area on bottom of foot. Patient denies any chest pain, any SOB. He had 3 vessel CBAG done, in 2016. He also had Lower leg amputation done in Feb, 2022, since he was leaving in Rehab facility.  (02 Dec 2022 04:27)  POD 4 s/p Bypass from R femoral artery to distal tibial artery  + Trop  TTE revealed severe mitral valve stenosis and pulmonary HTN    s/p LHC revealing patent grafts, PCWP 28    -continue hospitalist service  -Pain management  -KRYSTEN prevention IVF guidelines not followed d/t elevated PCWP  -plan of care discussed with patient and MD   -post procedure instructions reviewed  -follow up with Dr Wilson post discharge at [facility] for mitral valve surgery evaluation  -Discussed therapeutic lifestyle changes to reduce risk factors such as following a cardiac diet, weight loss, maintaining a healthy weight, exercise, smoking cessation, medication compliance, and regular follow-up  with MD

## 2022-12-23 NOTE — PROGRESS NOTE ADULT - ASSESSMENT
62 year old male with history of PVD, IHD, MV repair. admitted with right leg cellulitis and was found to have significant stenosis of his RLE vasculature    Plan:  s/p RLE angio  medical and cardiac clearances appreciated  Vein mapping appreciated  POD 4 s/p Bypass from R femoral artery to distal tibial artery  JONO gamez  PT   Medical management as per primary team  F/u at [facility] outpatient with podiatry  Plan discussed with Dr Bhardwaj

## 2022-12-23 NOTE — PROGRESS NOTE ADULT - PROBLEM SELECTOR PLAN 1
S/p past MV repair but now with severe mitral stenosis / regurgitation, S/P LHC & RHC today patent grafts PCWP 28. Patient will follow up with Dr. Wilson post DC at [facility] for MV surgery eval -

## 2022-12-23 NOTE — PROGRESS NOTE ADULT - SUBJECTIVE AND OBJECTIVE BOX
Patient seen and examined  npo for cardiac cath  Review of Systems:  General:denies fever chills, headache, weakness  HEENT: denies blurry vision,diffculty swallowing, difficulty hearing, tinnitus  Cardiovascular: denies chest pain  ,palpitations  Pulmonary:denies shortness of breath, cough, wheezing, hemoptysis  Gastrointestinal: denies abdominal pain, constipation, diarrhea,nausea , vomiting, hematochezia  : denies hematuria, dysuria, or incontinence  Neurological: denies weakness, numbness , tingling, dizziness, tremors  MSK: denies muscle pain, difficulty ambulating, swelling, back pain  skin: denies skin rash, itching, burning, or  skin lesions  Psychiatrical: denies mood disturbances, anxierty, feeling depressed, depression , or difficulty sleeping    Objective:  Vitals  Vital Signs Last 24 Hrs  T(C): 36.2 (23 Dec 2022 07:36), Max: 36.8 (22 Dec 2022 16:15)  T(F): 97.1 (23 Dec 2022 07:36), Max: 98.3 (22 Dec 2022 16:15)  HR: 75 (23 Dec 2022 12:00) (73 - 80)  BP: 139/68 (23 Dec 2022 12:00) (139/68 - 147/73)  BP(mean): --  RR: 20 (23 Dec 2022 12:00) (18 - 22)  SpO2: 95% (23 Dec 2022 12:00) (94% - 99%)    Parameters below as of 23 Dec 2022 12:00  Patient On (Oxygen Delivery Method): room air        Physical Exam:  General: comfortable, no acute distress, well nourished  HEENT: Atraumatic, no LAD, trachea midline, PERRLA  Cardiovascular: normal s1s2, no murmurs, gallops or fricition rubs  Pulmonary: clear to ausculation Bilaterally, no wheezing , rhonchi  Gastrointestinal: soft non tender non distended, no masses felt, no organomegally  Muscloskeletal: no lower extremity edema, L BKA. RLE : Dry eschar wound noted to the right 4th/5th metatarsal. Dry small eschar lesions to Right distal toes 2-3. Hemorrhagic bulla to extending toanteromedial R ankle\. Neurological: CN II-12 intact. No focal weakness  Psychiatrical: normal mood, cooperative  SKIN: no rash, lesions or ulcers    Labs:                          9.0    13.27 )-----------( 289      ( 22 Dec 2022 06:18 )             27.8     12-22    142  |  112<H>  |  36<H>  ----------------------------<  192<H>  4.1   |  25  |  1.24    Ca    9.2      22 Dec 2022 06:18  Phos  2.5     12-22  Mg     1.9     12-22    TPro  6.4  /  Alb  2.2<L>  /  TBili  0.7  /  DBili  x   /  AST  13<L>  /  ALT  15  /  AlkPhos  136<H>  12-21    LIVER FUNCTIONS - ( 21 Dec 2022 09:34 )  Alb: 2.2 g/dL / Pro: 6.4 gm/dL / ALK PHOS: 136 U/L / ALT: 15 U/L / AST: 13 U/L / GGT: x                 Active Medications  MEDICATIONS  (STANDING):  amLODIPine   Tablet 5 milliGRAM(s) Oral daily  aspirin  chewable 81 milliGRAM(s) Oral daily  aspirin  chewable 81 milliGRAM(s) Oral once  clopidogrel Tablet 75 milliGRAM(s) Oral daily  dextrose 5% + sodium chloride 0.45%. 1000 milliLiter(s) (125 mL/Hr) IV Continuous <Continuous>  dextrose 5%. 1000 milliLiter(s) (100 mL/Hr) IV Continuous <Continuous>  dextrose 5%. 1000 milliLiter(s) (50 mL/Hr) IV Continuous <Continuous>  dextrose 50% Injectable 25 Gram(s) IV Push once  dextrose 50% Injectable 12.5 Gram(s) IV Push once  dextrose 50% Injectable 25 Gram(s) IV Push once  doxycycline monohydrate Capsule 100 milliGRAM(s) Oral every 12 hours  famotidine    Tablet 40 milliGRAM(s) Oral at bedtime  furosemide    Tablet 40 milliGRAM(s) Oral daily  gabapentin 600 milliGRAM(s) Oral at bedtime  gabapentin 300 milliGRAM(s) Oral two times a day  glucagon  Injectable 1 milliGRAM(s) IntraMuscular once  heparin   Injectable 5000 Unit(s) SubCutaneous every 8 hours  insulin glargine Injectable (LANTUS) 15 Unit(s) SubCutaneous at bedtime  insulin lispro (ADMELOG) corrective regimen sliding scale   SubCutaneous Before meals and at bedtime  isosorbide   mononitrate ER Tablet (IMDUR) 30 milliGRAM(s) Oral daily  metoclopramide 10 milliGRAM(s) Oral daily  metoprolol succinate ER 25 milliGRAM(s) Oral daily  multivitamin 1 Tablet(s) Oral daily  pantoprazole    Tablet 40 milliGRAM(s) Oral every 12 hours  polyethylene glycol 3350 17 Gram(s) Oral daily  senna 2 Tablet(s) Oral at bedtime  simvastatin 10 milliGRAM(s) Oral at bedtime  sodium chloride 0.9% lock flush 3 milliLiter(s) IV Push every 8 hours  sucralfate suspension 1 Gram(s) Oral four times a day  tamsulosin 0.8 milliGRAM(s) Oral at bedtime    MEDICATIONS  (PRN):  acetaminophen     Tablet .. 650 milliGRAM(s) Oral every 6 hours PRN Temp greater or equal to 38C (100.4F), Mild Pain (1 - 3)  acetaminophen   IVPB .. 1000 milliGRAM(s) IV Intermittent once PRN Mild Pain (1 - 3)  aluminum hydroxide/magnesium hydroxide/simethicone Suspension 30 milliLiter(s) Oral every 4 hours PRN Dyspepsia  dextrose Oral Gel 15 Gram(s) Oral once PRN Blood Glucose LESS THAN 70 milliGRAM(s)/deciliter  HYDROmorphone  Injectable 1 milliGRAM(s) IV Push every 4 hours PRN breakthrough pain  magnesium hydroxide Suspension 30 milliLiter(s) Oral every 8 hours PRN Constipation  melatonin 3 milliGRAM(s) Oral at bedtime PRN Insomnia  ondansetron Injectable 4 milliGRAM(s) IV Push every 8 hours PRN Nausea and/or Vomiting  oxyCODONE    IR 5 milliGRAM(s) Oral every 4 hours PRN Moderate Pain (4 - 6)  oxyCODONE    IR 10 milliGRAM(s) Oral every 6 hours PRN Severe Pain (7 - 10)

## 2022-12-23 NOTE — PROGRESS NOTE ADULT - NS ATTEND OPT1 GEN_ALL_CORE
I attest my time as attending is greater than 50% of the total combined time spent on qualifying patient care activities by the PA/NP and attending.
I independently performed the documented:
I independently performed the documented:

## 2022-12-23 NOTE — PROGRESS NOTE ADULT - ASSESSMENT
Assesment: 61 y/o male see inpatient for the following   - Right eschar wound to the 4th/5th submetatarsal head, stable >> Now s/p Bypass from R femoral artery to distal tibial artery  - Erythema with cyanotic appearance to RLE possible PAD, improved  - Dry eschar to Right 4th and 5th digits, new and stable.   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 Patient demonstrated verbal understanding of all interventions and tolerated interventions well without any complications.   X-rays reviewed : no soft tissue emphysema or osseous fx   WC: betadine paint and allyvan pad  Continue with IV antibiotics As Per ID/MED  All additional care by Med appreciated  ID consult and recommendations appreciated.  Vascular consult appreciated: Angiogram perfomred  Pt notes blister forming at plantar R foot, now stable eschar at the plantar 4th/5th metatarsal head, no pus, no malodor, no pain. Wound related to poor circulation of R foot. Recommend for area to fully demarcate at this time.  Erythema to RLE 2/2 PVD.  MRI shows no evidence of osteomyelitis  WBAT to R foot w/ sx shoe.     -No acute podiatric intervention at this time as dry eschar wound is stable. Plan for outpatient debridement once cleared by vascular. Pt to follow up at [facility] as outpatient recommended.  -On re-consult evaluation, appearance of large hemorrhagic bullae, no active discharge, stable without clinical SOI. Right eschar wound to the 4th/5th submetatarsal head has extended to the 4th and 5th digits, dry eschar is stable.     Stable from podiatric standpoint for dc, please dc when stable from all other specialities.   Podiatry signing off at this time, please follow in wound care center.   Case D/W with Dr. Jez Mauricio, will advise if plan changes    Wound care instructions to be applied every 2-3 days to right foot  - Carefully remove dressings to right foot   - Apply xeroform to the blister of the right foot (top of foot)   - Apply betadine paint to right foot at the 4th/5th metatarsal heads, and 4th/5th digits.  - Apply sterile gauze to area, and wrap with kerlix and tape.   Thanks Assesment: 63 y/o male see inpatient for the following   - Right eschar wound to the 4th/5th submetatarsal head, stable >> Now s/p Bypass from R femoral artery to distal tibial artery  - Erythema with cyanotic appearance to RLE possible PAD, improved  - Dry eschar to Right 4th and 5th digits, new and stable.   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 Patient demonstrated verbal understanding of all interventions and tolerated interventions well without any complications.   X-rays reviewed : no soft tissue emphysema or osseous fx   WC: betadine paint and allyvan pad  Continue with IV antibiotics As Per ID/MED  All additional care by Med appreciated  ID consult and recommendations appreciated.  Vascular consult appreciated: Angiogram perfomred  Pt notes blister forming at plantar R foot, now stable eschar at the plantar 4th/5th metatarsal head, no pus, no malodor, no pain. Wound related to poor circulation of R foot. Recommend for area to fully demarcate at this time.  Erythema to RLE 2/2 PVD.  MRI shows no evidence of osteomyelitis  WBAT to R foot w/ sx shoe.     -No acute podiatric intervention at this time as dry eschar wound is stable. Plan for outpatient debridement once cleared by vascular. Pt to follow up at [facility] as outpatient recommended.  -On re-consult evaluation, appearance of large hemorrhagic bullae, no active discharge, stable without clinical SOI. Right eschar wound to the 4th/5th submetatarsal head has extended to the 4th and 5th digits, dry eschar is stable.   -Pt was met by Podiatry team this AM and discussed with the etiology of the hemorrhagic bullae. Recommended drainage of the bulla to help with pain and prevent infection. Per pt's wishes, large hemorrhagic bullae was not drained as he does not want his bulla to be disturbed. Large bulla dressed w/ xeroform and light kerlix.     Stable from podiatric standpoint for dc, please dc when stable from all other specialities.   Podiatry signing off at this time, please follow in wound care center.   Case D/W with Dr. Jez Mauricio, will advise if plan changes    Wound care instructions to be applied every 2-3 days to right foot  - Carefully remove dressings to right foot   - Apply xeroform to the blister of the right foot (top of foot)   - Apply betadine paint to right foot at the 4th/5th metatarsal heads, and 4th/5th digits.  - Apply sterile gauze to area, and wrap with kerlix and tape.   Thanks

## 2022-12-24 ENCOUNTER — TRANSCRIPTION ENCOUNTER (OUTPATIENT)
Age: 62
End: 2022-12-24

## 2022-12-24 DIAGNOSIS — I10 ESSENTIAL (PRIMARY) HYPERTENSION: ICD-10-CM

## 2022-12-24 LAB
ANION GAP SERPL CALC-SCNC: 6 MMOL/L — SIGNIFICANT CHANGE UP (ref 5–17)
BASOPHILS # BLD AUTO: 0.05 K/UL — SIGNIFICANT CHANGE UP (ref 0–0.2)
BASOPHILS NFR BLD AUTO: 0.4 % — SIGNIFICANT CHANGE UP (ref 0–2)
BUN SERPL-MCNC: 18 MG/DL — SIGNIFICANT CHANGE UP (ref 7–23)
CALCIUM SERPL-MCNC: 9.2 MG/DL — SIGNIFICANT CHANGE UP (ref 8.5–10.1)
CHLORIDE SERPL-SCNC: 108 MMOL/L — SIGNIFICANT CHANGE UP (ref 96–108)
CO2 SERPL-SCNC: 26 MMOL/L — SIGNIFICANT CHANGE UP (ref 22–31)
CREAT SERPL-MCNC: 0.93 MG/DL — SIGNIFICANT CHANGE UP (ref 0.5–1.3)
EGFR: 93 ML/MIN/1.73M2 — SIGNIFICANT CHANGE UP
EOSINOPHIL # BLD AUTO: 0.43 K/UL — SIGNIFICANT CHANGE UP (ref 0–0.5)
EOSINOPHIL NFR BLD AUTO: 3.7 % — SIGNIFICANT CHANGE UP (ref 0–6)
GLUCOSE BLDC GLUCOMTR-MCNC: 151 MG/DL — HIGH (ref 70–99)
GLUCOSE BLDC GLUCOMTR-MCNC: 178 MG/DL — HIGH (ref 70–99)
GLUCOSE BLDC GLUCOMTR-MCNC: 178 MG/DL — HIGH (ref 70–99)
GLUCOSE BLDC GLUCOMTR-MCNC: 250 MG/DL — HIGH (ref 70–99)
GLUCOSE SERPL-MCNC: 189 MG/DL — HIGH (ref 70–99)
HCT VFR BLD CALC: 26.5 % — LOW (ref 39–50)
HGB BLD-MCNC: 8.5 G/DL — LOW (ref 13–17)
IMM GRANULOCYTES NFR BLD AUTO: 0.6 % — SIGNIFICANT CHANGE UP (ref 0–0.9)
LYMPHOCYTES # BLD AUTO: 1.15 K/UL — SIGNIFICANT CHANGE UP (ref 1–3.3)
LYMPHOCYTES # BLD AUTO: 9.9 % — LOW (ref 13–44)
MAGNESIUM SERPL-MCNC: 1.7 MG/DL — SIGNIFICANT CHANGE UP (ref 1.6–2.6)
MCHC RBC-ENTMCNC: 31.4 PG — SIGNIFICANT CHANGE UP (ref 27–34)
MCHC RBC-ENTMCNC: 32.1 GM/DL — SIGNIFICANT CHANGE UP (ref 32–36)
MCV RBC AUTO: 97.8 FL — SIGNIFICANT CHANGE UP (ref 80–100)
MONOCYTES # BLD AUTO: 1.01 K/UL — HIGH (ref 0–0.9)
MONOCYTES NFR BLD AUTO: 8.7 % — SIGNIFICANT CHANGE UP (ref 2–14)
NEUTROPHILS # BLD AUTO: 8.93 K/UL — HIGH (ref 1.8–7.4)
NEUTROPHILS NFR BLD AUTO: 76.7 % — SIGNIFICANT CHANGE UP (ref 43–77)
PLATELET # BLD AUTO: 299 K/UL — SIGNIFICANT CHANGE UP (ref 150–400)
POTASSIUM SERPL-MCNC: 3.7 MMOL/L — SIGNIFICANT CHANGE UP (ref 3.5–5.3)
POTASSIUM SERPL-SCNC: 3.7 MMOL/L — SIGNIFICANT CHANGE UP (ref 3.5–5.3)
RBC # BLD: 2.71 M/UL — LOW (ref 4.2–5.8)
RBC # FLD: 15 % — HIGH (ref 10.3–14.5)
SODIUM SERPL-SCNC: 140 MMOL/L — SIGNIFICANT CHANGE UP (ref 135–145)
WBC # BLD: 11.64 K/UL — HIGH (ref 3.8–10.5)
WBC # FLD AUTO: 11.64 K/UL — HIGH (ref 3.8–10.5)

## 2022-12-24 PROCEDURE — 99233 SBSQ HOSP IP/OBS HIGH 50: CPT

## 2022-12-24 PROCEDURE — 99232 SBSQ HOSP IP/OBS MODERATE 35: CPT

## 2022-12-24 RX ORDER — POLYETHYLENE GLYCOL 3350 17 G/17G
17 POWDER, FOR SOLUTION ORAL
Qty: 0 | Refills: 0 | DISCHARGE
Start: 2022-12-24

## 2022-12-24 RX ORDER — FUROSEMIDE 40 MG
1 TABLET ORAL
Qty: 0 | Refills: 0 | DISCHARGE
Start: 2022-12-24

## 2022-12-24 RX ORDER — INSULIN GLARGINE 100 [IU]/ML
30 INJECTION, SOLUTION SUBCUTANEOUS
Qty: 0 | Refills: 0 | DISCHARGE

## 2022-12-24 RX ORDER — DEXTROSE 50 % IN WATER 50 %
12.5 SYRINGE (ML) INTRAVENOUS ONCE
Refills: 0 | Status: DISCONTINUED | OUTPATIENT
Start: 2022-12-24 | End: 2022-12-27

## 2022-12-24 RX ORDER — INSULIN LISPRO 100/ML
4 VIAL (ML) SUBCUTANEOUS
Refills: 0 | Status: DISCONTINUED | OUTPATIENT
Start: 2022-12-24 | End: 2022-12-27

## 2022-12-24 RX ORDER — INSULIN LISPRO 100/ML
18 VIAL (ML) SUBCUTANEOUS
Qty: 0 | Refills: 0 | DISCHARGE

## 2022-12-24 RX ORDER — SUCRALFATE 1 G
10 TABLET ORAL
Qty: 0 | Refills: 0 | DISCHARGE
Start: 2022-12-24

## 2022-12-24 RX ORDER — DEXTROSE 50 % IN WATER 50 %
25 SYRINGE (ML) INTRAVENOUS ONCE
Refills: 0 | Status: DISCONTINUED | OUTPATIENT
Start: 2022-12-24 | End: 2022-12-27

## 2022-12-24 RX ORDER — HYDROMORPHONE HYDROCHLORIDE 2 MG/ML
2 INJECTION INTRAMUSCULAR; INTRAVENOUS; SUBCUTANEOUS EVERY 4 HOURS
Refills: 0 | Status: DISCONTINUED | OUTPATIENT
Start: 2022-12-24 | End: 2022-12-25

## 2022-12-24 RX ORDER — CLOPIDOGREL BISULFATE 75 MG/1
1 TABLET, FILM COATED ORAL
Qty: 0 | Refills: 0 | DISCHARGE
Start: 2022-12-24

## 2022-12-24 RX ORDER — SODIUM CHLORIDE 9 MG/ML
1000 INJECTION, SOLUTION INTRAVENOUS
Refills: 0 | Status: DISCONTINUED | OUTPATIENT
Start: 2022-12-24 | End: 2022-12-27

## 2022-12-24 RX ORDER — METOPROLOL TARTRATE 50 MG
1 TABLET ORAL
Qty: 0 | Refills: 0 | DISCHARGE
Start: 2022-12-24

## 2022-12-24 RX ORDER — INSULIN GLARGINE 100 [IU]/ML
20 INJECTION, SOLUTION SUBCUTANEOUS AT BEDTIME
Refills: 0 | Status: DISCONTINUED | OUTPATIENT
Start: 2022-12-24 | End: 2022-12-27

## 2022-12-24 RX ORDER — GLUCAGON INJECTION, SOLUTION 0.5 MG/.1ML
1 INJECTION, SOLUTION SUBCUTANEOUS ONCE
Refills: 0 | Status: DISCONTINUED | OUTPATIENT
Start: 2022-12-24 | End: 2022-12-27

## 2022-12-24 RX ORDER — LISINOPRIL 2.5 MG/1
1 TABLET ORAL
Qty: 0 | Refills: 0 | DISCHARGE

## 2022-12-24 RX ORDER — PANTOPRAZOLE SODIUM 20 MG/1
1 TABLET, DELAYED RELEASE ORAL
Qty: 0 | Refills: 0 | DISCHARGE
Start: 2022-12-24

## 2022-12-24 RX ORDER — DEXTROSE 50 % IN WATER 50 %
15 SYRINGE (ML) INTRAVENOUS ONCE
Refills: 0 | Status: DISCONTINUED | OUTPATIENT
Start: 2022-12-24 | End: 2022-12-27

## 2022-12-24 RX ADMIN — HEPARIN SODIUM 5000 UNIT(S): 5000 INJECTION INTRAVENOUS; SUBCUTANEOUS at 13:39

## 2022-12-24 RX ADMIN — SIMETHICONE 80 MILLIGRAM(S): 80 TABLET, CHEWABLE ORAL at 00:06

## 2022-12-24 RX ADMIN — HYDROMORPHONE HYDROCHLORIDE 2 MILLIGRAM(S): 2 INJECTION INTRAMUSCULAR; INTRAVENOUS; SUBCUTANEOUS at 18:36

## 2022-12-24 RX ADMIN — HYDROMORPHONE HYDROCHLORIDE 2 MILLIGRAM(S): 2 INJECTION INTRAMUSCULAR; INTRAVENOUS; SUBCUTANEOUS at 12:20

## 2022-12-24 RX ADMIN — PANTOPRAZOLE SODIUM 40 MILLIGRAM(S): 20 TABLET, DELAYED RELEASE ORAL at 22:22

## 2022-12-24 RX ADMIN — CLOPIDOGREL BISULFATE 75 MILLIGRAM(S): 75 TABLET, FILM COATED ORAL at 10:29

## 2022-12-24 RX ADMIN — SODIUM CHLORIDE 3 MILLILITER(S): 9 INJECTION INTRAMUSCULAR; INTRAVENOUS; SUBCUTANEOUS at 05:25

## 2022-12-24 RX ADMIN — Medication 4 UNIT(S): at 18:56

## 2022-12-24 RX ADMIN — Medication 2: at 12:20

## 2022-12-24 RX ADMIN — Medication 1 GRAM(S): at 18:06

## 2022-12-24 RX ADMIN — HEPARIN SODIUM 5000 UNIT(S): 5000 INJECTION INTRAVENOUS; SUBCUTANEOUS at 22:22

## 2022-12-24 RX ADMIN — SIMETHICONE 80 MILLIGRAM(S): 80 TABLET, CHEWABLE ORAL at 12:05

## 2022-12-24 RX ADMIN — ISOSORBIDE MONONITRATE 30 MILLIGRAM(S): 60 TABLET, EXTENDED RELEASE ORAL at 10:28

## 2022-12-24 RX ADMIN — SIMVASTATIN 10 MILLIGRAM(S): 20 TABLET, FILM COATED ORAL at 22:25

## 2022-12-24 RX ADMIN — TAMSULOSIN HYDROCHLORIDE 0.8 MILLIGRAM(S): 0.4 CAPSULE ORAL at 22:23

## 2022-12-24 RX ADMIN — SODIUM CHLORIDE 3 MILLILITER(S): 9 INJECTION INTRAMUSCULAR; INTRAVENOUS; SUBCUTANEOUS at 13:26

## 2022-12-24 RX ADMIN — HYDROMORPHONE HYDROCHLORIDE 2 MILLIGRAM(S): 2 INJECTION INTRAMUSCULAR; INTRAVENOUS; SUBCUTANEOUS at 22:15

## 2022-12-24 RX ADMIN — GABAPENTIN 300 MILLIGRAM(S): 400 CAPSULE ORAL at 13:39

## 2022-12-24 RX ADMIN — Medication 2: at 22:27

## 2022-12-24 RX ADMIN — GABAPENTIN 300 MILLIGRAM(S): 400 CAPSULE ORAL at 06:01

## 2022-12-24 RX ADMIN — SENNA PLUS 2 TABLET(S): 8.6 TABLET ORAL at 22:22

## 2022-12-24 RX ADMIN — Medication 4 UNIT(S): at 12:20

## 2022-12-24 RX ADMIN — SIMETHICONE 80 MILLIGRAM(S): 80 TABLET, CHEWABLE ORAL at 06:05

## 2022-12-24 RX ADMIN — FAMOTIDINE 40 MILLIGRAM(S): 10 INJECTION INTRAVENOUS at 22:24

## 2022-12-24 RX ADMIN — Medication 100 MILLIGRAM(S): at 10:28

## 2022-12-24 RX ADMIN — Medication 4: at 08:25

## 2022-12-24 RX ADMIN — HYDROMORPHONE HYDROCHLORIDE 2 MILLIGRAM(S): 2 INJECTION INTRAMUSCULAR; INTRAVENOUS; SUBCUTANEOUS at 22:54

## 2022-12-24 RX ADMIN — GABAPENTIN 600 MILLIGRAM(S): 400 CAPSULE ORAL at 22:23

## 2022-12-24 RX ADMIN — Medication 100 MILLIGRAM(S): at 22:24

## 2022-12-24 RX ADMIN — HEPARIN SODIUM 5000 UNIT(S): 5000 INJECTION INTRAVENOUS; SUBCUTANEOUS at 06:04

## 2022-12-24 RX ADMIN — Medication 1 TABLET(S): at 10:29

## 2022-12-24 RX ADMIN — HYDROMORPHONE HYDROCHLORIDE 2 MILLIGRAM(S): 2 INJECTION INTRAMUSCULAR; INTRAVENOUS; SUBCUTANEOUS at 00:07

## 2022-12-24 RX ADMIN — Medication 1 GRAM(S): at 06:01

## 2022-12-24 RX ADMIN — HYDROMORPHONE HYDROCHLORIDE 2 MILLIGRAM(S): 2 INJECTION INTRAMUSCULAR; INTRAVENOUS; SUBCUTANEOUS at 00:24

## 2022-12-24 RX ADMIN — Medication 81 MILLIGRAM(S): at 10:28

## 2022-12-24 RX ADMIN — POLYETHYLENE GLYCOL 3350 17 GRAM(S): 17 POWDER, FOR SOLUTION ORAL at 10:27

## 2022-12-24 RX ADMIN — Medication 2: at 18:55

## 2022-12-24 RX ADMIN — Medication 10 MILLIGRAM(S): at 10:29

## 2022-12-24 RX ADMIN — Medication 1 GRAM(S): at 22:53

## 2022-12-24 RX ADMIN — PANTOPRAZOLE SODIUM 40 MILLIGRAM(S): 20 TABLET, DELAYED RELEASE ORAL at 10:29

## 2022-12-24 RX ADMIN — HYDROMORPHONE HYDROCHLORIDE 2 MILLIGRAM(S): 2 INJECTION INTRAMUSCULAR; INTRAVENOUS; SUBCUTANEOUS at 12:04

## 2022-12-24 RX ADMIN — SODIUM CHLORIDE 3 MILLILITER(S): 9 INJECTION INTRAMUSCULAR; INTRAVENOUS; SUBCUTANEOUS at 22:53

## 2022-12-24 RX ADMIN — Medication 1 GRAM(S): at 00:01

## 2022-12-24 RX ADMIN — Medication 25 MILLIGRAM(S): at 10:28

## 2022-12-24 RX ADMIN — Medication 1 GRAM(S): at 12:05

## 2022-12-24 RX ADMIN — AMLODIPINE BESYLATE 5 MILLIGRAM(S): 2.5 TABLET ORAL at 10:28

## 2022-12-24 RX ADMIN — HYDROMORPHONE HYDROCHLORIDE 2 MILLIGRAM(S): 2 INJECTION INTRAMUSCULAR; INTRAVENOUS; SUBCUTANEOUS at 18:06

## 2022-12-24 RX ADMIN — Medication 40 MILLIGRAM(S): at 10:28

## 2022-12-24 RX ADMIN — INSULIN GLARGINE 20 UNIT(S): 100 INJECTION, SOLUTION SUBCUTANEOUS at 22:21

## 2022-12-24 RX ADMIN — HYDROMORPHONE HYDROCHLORIDE 2 MILLIGRAM(S): 2 INJECTION INTRAMUSCULAR; INTRAVENOUS; SUBCUTANEOUS at 06:01

## 2022-12-24 RX ADMIN — HYDROMORPHONE HYDROCHLORIDE 2 MILLIGRAM(S): 2 INJECTION INTRAMUSCULAR; INTRAVENOUS; SUBCUTANEOUS at 06:57

## 2022-12-24 NOTE — PROGRESS NOTE ADULT - SUBJECTIVE AND OBJECTIVE BOX
SURGERY DAILY PROGRESS NOTE:     Subjective:  Patient seen and examined at bedside during am rounds. AVSS. Denies any fevers, chills, n/v/d, chest pain or shortness of breath    Objective:    MEDICATIONS  (STANDING):  amLODIPine   Tablet 5 milliGRAM(s) Oral daily  aspirin  chewable 81 milliGRAM(s) Oral daily  aspirin  chewable 81 milliGRAM(s) Oral once  clopidogrel Tablet 75 milliGRAM(s) Oral daily  dextrose 5%. 1000 milliLiter(s) (50 mL/Hr) IV Continuous <Continuous>  dextrose 5%. 1000 milliLiter(s) (100 mL/Hr) IV Continuous <Continuous>  dextrose 50% Injectable 25 Gram(s) IV Push once  dextrose 50% Injectable 12.5 Gram(s) IV Push once  dextrose 50% Injectable 25 Gram(s) IV Push once  doxycycline monohydrate Capsule 100 milliGRAM(s) Oral every 12 hours  famotidine    Tablet 40 milliGRAM(s) Oral at bedtime  furosemide    Tablet 40 milliGRAM(s) Oral daily  gabapentin 300 milliGRAM(s) Oral two times a day  gabapentin 600 milliGRAM(s) Oral at bedtime  glucagon  Injectable 1 milliGRAM(s) IntraMuscular once  heparin   Injectable 5000 Unit(s) SubCutaneous every 8 hours  insulin glargine Injectable (LANTUS) 20 Unit(s) SubCutaneous at bedtime  insulin lispro (ADMELOG) corrective regimen sliding scale   SubCutaneous Before meals and at bedtime  insulin lispro Injectable (ADMELOG) 4 Unit(s) SubCutaneous three times a day before meals  isosorbide   mononitrate ER Tablet (IMDUR) 30 milliGRAM(s) Oral daily  metoclopramide 10 milliGRAM(s) Oral daily  metoprolol succinate ER 25 milliGRAM(s) Oral daily  multivitamin 1 Tablet(s) Oral daily  pantoprazole    Tablet 40 milliGRAM(s) Oral every 12 hours  polyethylene glycol 3350 17 Gram(s) Oral daily  senna 2 Tablet(s) Oral at bedtime  simethicone 80 milliGRAM(s) Chew every 6 hours  simvastatin 10 milliGRAM(s) Oral at bedtime  sodium chloride 0.9% lock flush 3 milliLiter(s) IV Push every 8 hours  sucralfate suspension 1 Gram(s) Oral four times a day  tamsulosin 0.8 milliGRAM(s) Oral at bedtime    MEDICATIONS  (PRN):  acetaminophen     Tablet .. 650 milliGRAM(s) Oral every 6 hours PRN Temp greater or equal to 38C (100.4F), Mild Pain (1 - 3)  acetaminophen   IVPB .. 1000 milliGRAM(s) IV Intermittent once PRN Mild Pain (1 - 3)  aluminum hydroxide/magnesium hydroxide/simethicone Suspension 30 milliLiter(s) Oral every 4 hours PRN Dyspepsia  dextrose Oral Gel 15 Gram(s) Oral once PRN Blood Glucose LESS THAN 70 milliGRAM(s)/deciliter  HYDROmorphone  Injectable 2 milliGRAM(s) IV Push every 6 hours PRN Severe Pain (7 - 10)  magnesium hydroxide Suspension 30 milliLiter(s) Oral every 8 hours PRN Constipation  melatonin 3 milliGRAM(s) Oral at bedtime PRN Insomnia  ondansetron Injectable 4 milliGRAM(s) IV Push every 8 hours PRN Nausea and/or Vomiting  oxyCODONE    IR 5 milliGRAM(s) Oral every 4 hours PRN Moderate Pain (4 - 6)      Vital Signs Last 24 Hrs  T(C): 37.1 (24 Dec 2022 08:20), Max: 37.1 (24 Dec 2022 08:20)  T(F): 98.7 (24 Dec 2022 08:20), Max: 98.7 (24 Dec 2022 08:20)  HR: 80 (24 Dec 2022 08:20) (80 - 80)  BP: 155/77 (24 Dec 2022 08:20) (142/65 - 155/77)  BP(mean): --  RR: 18 (24 Dec 2022 08:20) (18 - 18)  SpO2: 94% (24 Dec 2022 08:20) (94% - 97%)    Parameters below as of 24 Dec 2022 08:20  Patient On (Oxygen Delivery Method): room air          PHYSICAL EXAM   PHYSICAL EXAM   Gen: well-appearing, in no acute distress  CV: pulse regularly present   Resp: airway patent, non-labored breathing  Abd: soft, NTND  Extremities: RLE less erythematous and swollen, dressing on R foot, incision sites look clean/dry and intact        I&O's Detail    23 Dec 2022 07:01  -  24 Dec 2022 07:00  --------------------------------------------------------  IN:  Total IN: 0 mL    OUT:    Indwelling Catheter - Urethral (mL): 850 mL  Total OUT: 850 mL    Total NET: -850 mL          LABS:                        8.5    11.64 )-----------( 299      ( 24 Dec 2022 11:05 )             26.5     12-24    140  |  108  |  18  ----------------------------<  189<H>  3.7   |  26  |  0.93    Ca    9.2      24 Dec 2022 11:05  Phos  3.0     12-23  Mg     1.7     12-24    TPro  6.7  /  Alb  2.3<L>  /  TBili  0.6  /  DBili  x   /  AST  15  /  ALT  19  /  AlkPhos  151<H>  12-23          RADIOLOGY & ADDITIONAL STUDIES:    ASSESSMENT/PLAN:

## 2022-12-24 NOTE — DISCHARGE NOTE PROVIDER - NSDCFUSCHEDAPPT_GEN_ALL_CORE_FT
Germán Wilson  [facility]  CARDIOLOGY [address]  Scheduled Appointment: 01/03/2023

## 2022-12-24 NOTE — DISCHARGE NOTE PROVIDER - PROVIDER TOKENS
PROVIDER:[TOKEN:[[id number]]],PROVIDER:[TOKEN:[[id number]]],FREE:[LAST:[Dr Justin HORVATH],PHONE:[(   )    -],FAX:[(   )    -]],PROVIDER:[TOKEN:[[id number]]]

## 2022-12-24 NOTE — DISCHARGE NOTE PROVIDER - HOSPITAL COURSE
61 yo Male BIBEMS from [facility] due to severe epigastric area abdominal pain since yesterday morning. Patient reported vomiting. Patient denies diarrhea. He has no active pain during my evaluation. Patient reported he has not been eating or drinking regularly. He has also noticed redness of RLE over last week and black area on bottom of foot admitted for RLE cellulitis    RLE cellultis / gangrene  cont PO doxycycline 100mg bid for 7 days . can dc zosyn. d/w ID. monitor temps  RLE dry gangrene- podiatry to debride as outpatient when cleared by vascular. wound care  Noted Podiatry recs 12/23:  Wound care instructions to be applied every 2-3 days to right foot  - Carefully remove dressings to right foot   - Apply xeroform to the blister of the right foot (top of foot)   - Apply betadine paint to right foot at the 4th/5th metatarsal heads, and 4th/5th digits.  - Apply sterile gauze to area, and wrap with kerlix and tape.   pain control prn. regimen adjusted. tylenol, oxycodone, dilaudid for breakthrough. gabapentin  Severe arterial stenosis of distal RLE  s/p L BKA Feb  s/p RLE angio  POD 3 s/p Bypass from R femoral artery to distal tibial artery    R groin hematoma related to  RHC and LHC 12/23  hb stable  I dw with cardio Dr palla- ok to dc from cardio stand point if hb stable         Cirrohisis (new)  outpatient followup  f/u with Dr. Anderson for further cirrhosis mgmt    Mitral stenosis.   S/p MVr with 28 CG future ring 2016 with TTE showing severe MS with mean gradient of 13 with mild LE edema and elevated JVP   s/p ALEX to further assess severity of MS 12/22  plan  Lasix 40 mg PO daily for diuresis, monitor electrolytes  Metoprolol succinate 25 mg PO daily for rate control    lisinopril 5mg was discontinued after lasix and metoprolol added-  follow up with Cardiology as outpatient to decide if lisinopril needs to be resumed as outpatient  s/p RIGHT AND LEFT HEART CATH 12/23-LHC & RHC  showed patent grafts PCWP 28.   continue lasix 40 mg po daily     incidental finding 1.1cm left renal lesion   f/u PMDoutpt renal MRI     HTN, HLD  lisinopril, amlodipine, imdur    CAD s/p CABG  cont ASA/plavix,     Diabetes  increased  lantus 20units qHS. mod ISS. BGMs ac hs. FS goal <180.resume pre meal short acting insulin TID at lower dose   eventually titrate up insulin as needed       dvt ppx : heparin  pt stable for dc to NH  from vascular/podiatry and cardio stand point   medically stable for dc if Hb stable   Patient will follow up with Dr. Wilson post DC at [facility] for MV surgery eval -  .F/u at [facility] outpatient with podiatry  Outpatient work up and management of cirrhosis. with Dr Anderson  outpt appointment with Dr. Anderson. Will need outpatient EGD either way. 61 yo Male BIBEMS from [facility] due to severe epigastric area abdominal pain since yesterday morning. Patient reported vomiting. Patient denies diarrhea. He has no active pain during my evaluation. Patient reported he has not been eating or drinking regularly. He has also noticed redness of RLE over last week and black area on bottom of foot admitted for RLE cellulitis    RLE cellultis / gangrene  cont PO doxycycline 100mg bid for 7 days . can dc zosyn. d/w ID. monitor temps  RLE dry gangrene- podiatry to debride as outpatient when cleared by vascular. wound care  Noted Podiatry recs 12/23:  Wound care instructions to be applied every 2-3 days to right foot  - Carefully remove dressings to right foot   - Apply xeroform to the blister of the right foot (top of foot)   - Apply betadine paint to right foot at the 4th/5th metatarsal heads, and 4th/5th digits.  - Apply sterile gauze to area, and wrap with kerlix and tape.   pain control prn. regimen adjusted. tylenol, oxycodone, dilaudid for breakthrough. gabapentin  Severe arterial stenosis of distal RLE  s/p L BKA Feb  s/p RLE angio  POD 3 s/p Bypass from R femoral artery to distal tibial artery    R groin hematoma related to  RHC and LHC 12/23  hb stable  I dw with cardio Dr palla- ok to dc from cardio stand point if hb stable         Cirrohisis (new)  outpatient followup  f/u with Dr. Anderson for further cirrhosis mgmt    Mitral stenosis.   S/p MVr with 28 CG future ring 2016 with TTE showing severe MS with mean gradient of 13 with mild LE edema and elevated JVP   s/p ALEX to further assess severity of MS 12/22  plan  Lasix 40 mg PO daily for diuresis, monitor electrolytes  Metoprolol succinate 25 mg PO daily for rate control    lisinopril 5mg was discontinued after lasix and metoprolol added-  follow up with Cardiology as outpatient to decide if lisinopril needs to be resumed as outpatient  s/p RIGHT AND LEFT HEART CATH 12/23-LHC & RHC  showed patent grafts PCWP 28.   continue lasix 40 mg po daily     NSTEMI type 2 ruled out and demand ischemia ruled in    incidental finding 1.1cm left renal lesion   f/u PMDoutpt renal MRI     HTN, HLD  lisinopril, amlodipine, imdur    CAD s/p CABG  cont ASA/plavix,     Diabetes  increased  lantus 20units qHS. mod ISS. BGMs ac hs. FS goal <180.resume pre meal short acting insulin TID at lower dose   eventually titrate up insulin as needed       dvt ppx : heparin  pt stable for dc to NH  from vascular/podiatry and cardio stand point   medically stable for dc if Hb stable   Patient will follow up with Dr. Wilson post DC at [facility] for MV surgery eval -  .F/u at [facility] outpatient with podiatry  Outpatient work up and management of cirrhosis. with Dr Anderson  outpt appointment with Dr. Anderson. Will need outpatient EGD either way.

## 2022-12-24 NOTE — PROGRESS NOTE ADULT - PROBLEM SELECTOR PLAN 2
Severe CAD s/p remote CABG; continue aspirin, simvastatin.  No CP  EKG NSR NS ST T wave abnormalities similar to prior NL LV FX on Echo
Severe CAD s/p remote CABG; continue aspirin, simvastatin.  Remains W/O CP  EKG NSR NS ST T wave abnormalities similar to prior NL LV FX on Echo
Severe CAD s/p remote CABG; continue aspirin, simvastatin.  Remains W/O CP  EKG NSR NS ST T wave abnormalities similar to prior NL LV FX on Echo
Severe pulmonary HTN - likely from MV disease; L & R heart cath today, see above
Severe pulmonary HTN - likely from MV disease; L & R heart cath (timing as per Dr Wilson).
Severe CAD s/p remote CABG; continue aspirin, simvastatin.  Remains W/O CP  EKG NSR NS ST T wave abnormalities similar to prior NL LV FX on Echo
secondary Severe pulmonary HTN - likely from MV disease; s/p L & R heart cath as mentioned  above
Severe pulmonary HTN - possibly from MV disease  L&R HC planned as noted above.

## 2022-12-24 NOTE — PROGRESS NOTE ADULT - PROBLEM SELECTOR PROBLEM 3
Mitral valve disease
CAD (coronary artery disease)
Mitral valve disease

## 2022-12-24 NOTE — DISCHARGE NOTE PROVIDER - NSDCMRMEDTOKEN_GEN_ALL_CORE_FT
amLODIPine 5 mg oral tablet: 1 tab(s) orally once a day  aspirin 81 mg oral capsule: 1  orally once a day  clopidogrel 75 mg oral tablet: 1 tab(s) orally once a day  doxycycline monohydrate 50 mg oral capsule: 2 cap(s) orally every 12 hours for total 7 days  famotidine 20 mg oral tablet: 2 tablets at bedtime   ***on hold from 12/01/2022 16:36 to 12/2/2020 16:35  furosemide 40 mg oral tablet: 1 tab(s) orally once a day  gabapentin 300 mg oral capsule: 1 cap(s) orally 2 times a day  ***on hold from 12/01/2022 16:36 to 12/2/2020 16:35  gabapentin 600 mg oral tablet: 1 tab(s) orally once a day (in the evening)  ***on hold from 12/01/22 16:36 to 12/02/2022 16:35***  insulin lispro: 4 unit(s) subcutaneous 3 times a day (before meals)  may eventually titrate up to home dose as needed  ( home dose was 18 units TID premeal)  insulin lispro 100 units/mL subcutaneous solution: subcutaneous 3 times a day (before meals) per sliding scale:  150-200 = 2 units  201-250 = 4 units  251-300 = 6 units  301-350 = 8 units  351-400 = 10 units  ***on hold from 12/01/2022 16:36 to 12/2/2020 16:35***      isosorbide mononitrate 30 mg oral tablet, extended release: 1 tab(s) orally once a day  Lantus Solostar Pen: 20 unit(s) subcutaneous once a day (at bedtime), may eventually titrate up gradually as needed for glucose control   ( home dose was 30 units at hs)  metoclopramide 10 mg oral tablet: 1 tab(s) orally once a day  metoprolol succinate 25 mg oral tablet, extended release: 1 tab(s) orally once a day  Milk of Magnesia 8% oral suspension: 30 milliliter(s) orally every 8 hours, As Needed  ***on hold from 12/01/2022 16:36 to 12/2/2020 16:35***  multivitamin: 1 tab(s) orally once a day  ***on hold from 12/01/2022 16:36 to 12/2/2020 10:26***  ondansetron 4 mg oral disintegrating strip: 1 each orally every 8 hours, As Needed  oxyCODONE 5 mg oral tablet: 1 tab(s) orally every 6 hours, As Needed  ***on hold from 12/01/2022 16:36 to 12/2/2020 16:35***  pantoprazole 40 mg oral delayed release tablet: 1 tab(s) orally every 12 hours  polyethylene glycol 3350 oral powder for reconstitution: 17 gram(s) orally once a day  Senna 8.6 mg oral tablet: 1 tab(s) orally once a day (at bedtime)  simethicone 125 mg oral tablet: 1 tab(s) orally every 6 hours, As Needed  ***on hold from 12/01/2022 16:36 to 12/2/2020 16:35***  simvastatin 10 mg oral tablet: 1 tab(s) orally once a day (at bedtime)  sucralfate 1 g/10 mL oral suspension: 10 milliliter(s) orally 4 times a day  tamsulosin 0.4 mg oral capsule: 2 cap(s) orally once a day (in the evening) with dinner  ***on hold from 12/01/2022 16:36 to 12/2/2020 16:35  Tylenol 325 mg oral tablet: 2 tab(s) orally every 6 hours, As Needed  ******on hold from 12/01/2022 16:36 to 12/2/2020 16:35***   amLODIPine 5 mg oral tablet: 1 tab(s) orally once a day  aspirin 81 mg oral capsule: 1  orally once a day  clopidogrel 75 mg oral tablet: 1 tab(s) orally once a day  doxycycline monohydrate 50 mg oral capsule: 2 cap(s) orally every 12 hours for total 7 days  famotidine 20 mg oral tablet: 2 tablets at bedtime   ***on hold from 12/01/2022 16:36 to 12/2/2020 16:35  furosemide 40 mg oral tablet: 1 tab(s) orally once a day  gabapentin 300 mg oral capsule: 1 cap(s) orally 2 times a day  ***on hold from 12/01/2022 16:36 to 12/2/2020 16:35  gabapentin 600 mg oral tablet: 1 tab(s) orally once a day (in the evening)  ***on hold from 12/01/22 16:36 to 12/02/2022 16:35***  HYDROmorphone 2 mg oral tablet: 2 tab(s) orally every 4 hours, As Needed - 10)  insulin lispro: 4 unit(s) subcutaneous 3 times a day  insulin lispro 100 units/mL subcutaneous solution: 2 unit(s) subcutaneous 3 times a day -200 = 2 units  201-250 = 4 units  251-300 = 6 units  301-350 = 8 units  351-400 = 10 units  ***on hold from 12/01/2022 16:36 to 12/2/2020 16:35***      isosorbide mononitrate 30 mg oral tablet, extended release: 1 tab(s) orally once a day  Lantus Solostar Pen: 20 unit(s) subcutaneous once a day (at bedtime), may eventually titrate up gradually as needed for glucose control   ( home dose was 30 units at hs)  metoclopramide 10 mg oral tablet: 1 tab(s) orally once a day  metoprolol succinate 25 mg oral tablet, extended release: 1 tab(s) orally once a day  Milk of Magnesia 8% oral suspension: 30 milliliter(s) orally every 8 hours, As Needed  ***on hold from 12/01/2022 16:36 to 12/2/2020 16:35***  multivitamin: 1 tab(s) orally once a day  ***on hold from 12/01/2022 16:36 to 12/2/2020 10:26***  ondansetron 4 mg oral disintegrating strip: 1 each orally every 8 hours, As Needed  oxyCODONE 5 mg oral tablet: 2 tab(s) orally every 6 hours, As Needed  pantoprazole 40 mg oral delayed release tablet: 1 tab(s) orally every 12 hours  polyethylene glycol 3350 oral powder for reconstitution: 17 gram(s) orally once a day  Senna 8.6 mg oral tablet: 1 tab(s) orally once a day (at bedtime)  simethicone 125 mg oral tablet: 1 tab(s) orally every 6 hours, As Needed  ***on hold from 12/01/2022 16:36 to 12/2/2020 16:35***  simvastatin 10 mg oral tablet: 1 tab(s) orally once a day (at bedtime)  sucralfate 1 g/10 mL oral suspension: 10 milliliter(s) orally 4 times a day  tamsulosin 0.4 mg oral capsule: 2 cap(s) orally once a day (in the evening) with dinner  ***on hold from 12/01/2022 16:36 to 12/2/2020 16:35  Tylenol 325 mg oral tablet: 2 tab(s) orally every 6 hours, As Needed  ******on hold from 12/01/2022 16:36 to 12/2/2020 16:35***

## 2022-12-24 NOTE — PROGRESS NOTE ADULT - SUBJECTIVE AND OBJECTIVE BOX
REASON FOR VISIT: MV Disease    HPI:  63 y/o man with a history of CAD s/p CABG and MV repair (2016 - [place]), HTN, DM, GERD, L BKA, who was transferred from [facility] for the evaluation of abdominal discomfort and cellulitis.  Cardiology consult requested for elevated troponin.  Mr. Roberts has no cardiac symptoms -- he has not been experiencing angina or dyspnea.  He walks during PT and denies exertional chest discomfort.  He does not see a cardiologist in the outpatient setting.      22 Comfortable in bed with CC of SOB No CP Tele SR   22 Semirecumbent in bed no CP HR 70-80 BPM  22 patient is feeling ok , did have RLE angiogram today results pending  no chest pain or shortness of breath at rest  22 feeling well no new complaints No CP/SOB  : called to evaluate for ALEX.  had R femoral bypass to distal tibial  22 Patient s/p LHC and RHC.  Denies cp or sob.    22 Patient is feeling fine at rest , no sob ,     MEDICATIONS  (STANDING):  amLODIPine   Tablet 5 milliGRAM(s) Oral daily  aspirin  chewable 81 milliGRAM(s) Oral daily  aspirin  chewable 81 milliGRAM(s) Oral once  clopidogrel Tablet 75 milliGRAM(s) Oral daily  dextrose 5% + sodium chloride 0.45%. 1000 milliLiter(s) (125 mL/Hr) IV Continuous <Continuous>  dextrose 5%. 1000 milliLiter(s) (50 mL/Hr) IV Continuous <Continuous>  dextrose 5%. 1000 milliLiter(s) (100 mL/Hr) IV Continuous <Continuous>  dextrose 50% Injectable 25 Gram(s) IV Push once  dextrose 50% Injectable 12.5 Gram(s) IV Push once  dextrose 50% Injectable 25 Gram(s) IV Push once  doxycycline monohydrate Capsule 100 milliGRAM(s) Oral every 12 hours  famotidine    Tablet 40 milliGRAM(s) Oral at bedtime  furosemide    Tablet 40 milliGRAM(s) Oral daily  gabapentin 300 milliGRAM(s) Oral two times a day  gabapentin 600 milliGRAM(s) Oral at bedtime  glucagon  Injectable 1 milliGRAM(s) IntraMuscular once  heparin   Injectable 5000 Unit(s) SubCutaneous every 8 hours  insulin glargine Injectable (LANTUS) 15 Unit(s) SubCutaneous at bedtime  insulin lispro (ADMELOG) corrective regimen sliding scale   SubCutaneous Before meals and at bedtime  isosorbide   mononitrate ER Tablet (IMDUR) 30 milliGRAM(s) Oral daily  metoclopramide 10 milliGRAM(s) Oral daily  metoprolol succinate ER 25 milliGRAM(s) Oral daily  multivitamin 1 Tablet(s) Oral daily  pantoprazole    Tablet 40 milliGRAM(s) Oral every 12 hours  polyethylene glycol 3350 17 Gram(s) Oral daily  senna 2 Tablet(s) Oral at bedtime  simethicone 80 milliGRAM(s) Chew every 6 hours  simvastatin 10 milliGRAM(s) Oral at bedtime  sodium chloride 0.9% lock flush 3 milliLiter(s) IV Push every 8 hours  sucralfate suspension 1 Gram(s) Oral four times a day  tamsulosin 0.8 milliGRAM(s) Oral at bedtime    MEDICATIONS  (PRN):  acetaminophen     Tablet .. 650 milliGRAM(s) Oral every 6 hours PRN Temp greater or equal to 38C (100.4F), Mild Pain (1 - 3)  acetaminophen   IVPB .. 1000 milliGRAM(s) IV Intermittent once PRN Mild Pain (1 - 3)  aluminum hydroxide/magnesium hydroxide/simethicone Suspension 30 milliLiter(s) Oral every 4 hours PRN Dyspepsia  dextrose Oral Gel 15 Gram(s) Oral once PRN Blood Glucose LESS THAN 70 milliGRAM(s)/deciliter  HYDROmorphone  Injectable 2 milliGRAM(s) IV Push every 6 hours PRN Severe Pain (7 - 10)  magnesium hydroxide Suspension 30 milliLiter(s) Oral every 8 hours PRN Constipation  melatonin 3 milliGRAM(s) Oral at bedtime PRN Insomnia  ondansetron Injectable 4 milliGRAM(s) IV Push every 8 hours PRN Nausea and/or Vomiting  oxyCODONE    IR 5 milliGRAM(s) Oral every 4 hours PRN Moderate Pain (4 - 6)    Vital Signs Last 24 Hrs  T(C): 36.6 (23 Dec 2022 15:44), Max: 36.6 (23 Dec 2022 15:44)  T(F): 97.8 (23 Dec 2022 15:44), Max: 97.8 (23 Dec 2022 15:44)  HR: 80 (23 Dec 2022 15:44) (74 - 80)  BP: 142/65 (23 Dec 2022 15:44) (121/58 - 147/73)  BP(mean): --  RR: 18 (23 Dec 2022 15:44) (18 - 22)  SpO2: 97% (23 Dec 2022 15:44) (93% - 99%)    Parameters below as of 23 Dec 2022 15:44  Patient On (Oxygen Delivery Method): room air        I&O's Summary    23 Dec 2022 07:01  -  24 Dec 2022 07:00  --------------------------------------------------------  IN: 0 mL / OUT: 850 mL / NET: -850 mL          PHYSICAL EXAM:  Constitutional: NAD, awake  HEENT: No oral cyanosis. Poor dentition  Respiratory: Breath sounds are clear bilaterally  Cardiovascular: S1 and S2, regular rate and rhythm, +murmur  Skin: Wound R foot  Extremities  left BKA , right leg sutured wound  , foot in bandage   LABS:                                  8.9    11.22 )-----------( 288      ( 23 Dec 2022 08:50 )             28.3         139  |  108  |  26<H>  ----------------------------<  264<H>  3.8   |  27  |  1.03    Ca    9.0      23 Dec 2022 08:50  Phos  3.0       Mg     1.8         TPro  6.7  /  Alb  2.3<L>  /  TBili  0.6  /  DBili  x   /  AST  15  /  ALT  19  /  AlkPhos  151<H>          LIVER FUNCTIONS - ( 23 Dec 2022 08:50 )  Alb: 2.3 g/dL / Pro: 6.7 gm/dL / ALK PHOS: 151 U/L / ALT: 19 U/L / AST: 15 U/L / GGT: x                   TTE Echo Complete w/o Contrast w/ Doppler (22 @ 08:46):   Endocardium is not well visualized, however, overall left ventricular systolic function appears normal. Mild concentric left ventricular hypertrophy. Septal flattening is seen; this finding is consistent with right heart pressure / volume overload. Estimated left ventricular ejection fraction is 55-60%.   The right ventricle exhibits mild dilation, mild diffuse hypokinesis, and mild depression of contractility.   Severe mitral stenosis. Mild mitral regurgitation.   Mild to moderate tricuspid valve regurgitation.   Severe pulmonary hypertension.    Procedure: ALEX    Indication:  Mitral valve disease    Findings:  Severe mitral valve disease (stenosis / regurgitation).  See echo report for all findings.    Patient tolerated procedure well; no complications.    < from: Cardiac Catheterization (22 @ 10:36) >     Cardiac Arteries and Lesion Findings    LMCA: Mild diffuse atherosclerosis    LAD:     Mid LAD: 100% stenosis.Pre procedure ANA M 0 flow was noted.    LCx:     Prox CX: 80% stenosis.     Lat 1st Ob Marilynn% stenosis.    RCA:     Mid RCA: 100% stenosis.     R PDA: 70% stenosis.    Ramus: Mild atherosclerosis    Cardiac Grafts  -There is a LIMA graft that originates at the LIMA and attaches to the Mid  LAD.Patent graft  -There is a Vein graft that originates at the Aorta Left and attaches to   the  Lat 1st Ob Marilynn.Patent graft  -There is a graft that originates at the Aorta Right and attaches to the R  PDA.Patent graft     Impression     Diagnostic Conclusions     Severe native vessel disease as described above with patent grafts (LIMA   to LAD, SVG to RPDA and to LPL). EF 60%. PCWP 25. Mitral gradient 16.33.     Recommendations     Medical management for coronary artery disease given no anginal symptoms.   Severe mitral gradient as per assessment, plan to see structural heart at    as outpatient for likely valve in ring given reoperation with recent   infection, PAD intervention.    Estimated Blood Loss:5ml.      < end of copied text >

## 2022-12-24 NOTE — PROGRESS NOTE ADULT - ASSESSMENT
61 yo Male BIBEMS from [facility] due to severe epigastric area abdominal pain since yesterday morning. Patient reported vomiting. Patient denies diarrhea. He has no active pain during my evaluation. Patient reported he has not been eating or drinking regularly. He has also noticed redness of RLE over last week and black area on bottom of foot admitted for RLE cellulitis    RLE cellultis / gangrene  cont PO doxycycline 100mg bid for 7 days . can dc zosyn. d/w ID. monitor temps  RLE dry gangrene- podiatry to debride as outpatient when cleared by vascular. wound care  Noted Podiatry recs 12/23:  Wound care instructions to be applied every 2-3 days to right foot  - Carefully remove dressings to right foot   - Apply xeroform to the blister of the right foot (top of foot)   - Apply betadine paint to right foot at the 4th/5th metatarsal heads, and 4th/5th digits.  - Apply sterile gauze to area, and wrap with kerlix and tape.   pain control prn. regimen adjusted. tylenol, oxycodone, dilaudid for breakthrough. gabapentin    Severe arterial stenosis of distal RLE  s/p L BKA Feb  s/p RLE angio  POD 3 s/p Bypass from R femoral artery to distal tibial artery    R groin hematoma related to  RHC and LHC 12/23  hb stable  I dw with cardio Dr palla- ok to dc from cardio stand point if hb stable         Cirrohisis (new)  outpatient followup  f/u with Dr. Anderson for further cirrhosis mgmt    Mitral stenosis.   S/p MVr with 28 CG future ring 2016 with TTE showing severe MS with mean gradient of 13 with mild LE edema and elevated JVP   s/p ALEX to further assess severity of MS 12/22  plan  Lasix 40 mg PO daily for diuresis, monitor electrolytes  Metoprolol succinate 25 mg PO daily for rate control    s/p RIGHT AND LEFT HEART CATH 12/23-LHC & RHC  showed patent grafts PCWP 28.   continue lasix 40 mg po daily     incidental finding 1.1cm left renal lesion   f/u PMDoutpt renal MRI     HTN, HLD  lisinopril, amlodipine, imdur    CAD s/p CABG  cont ASA/plavix,     Diabetes  continue lantus 15units qHS. mod ISS. BGMs ac hs. FS goal <180.      dvt ppx : heparin  s for as vascular/podiatry; patient stable for dc back to long term care. will need outpatient vascular/podiatry followup. needs outpatient debridment when cleared by vascular 61 yo Male BIBEMS from [facility] due to severe epigastric area abdominal pain since yesterday morning. Patient reported vomiting. Patient denies diarrhea. He has no active pain during my evaluation. Patient reported he has not been eating or drinking regularly. He has also noticed redness of RLE over last week and black area on bottom of foot admitted for RLE cellulitis    RLE cellultis / gangrene  cont PO doxycycline 100mg bid for 7 days . can dc zosyn. d/w ID. monitor temps  RLE dry gangrene- podiatry to debride as outpatient when cleared by vascular. wound care  Noted Podiatry recs 12/23:  Wound care instructions to be applied every 2-3 days to right foot  - Carefully remove dressings to right foot   - Apply xeroform to the blister of the right foot (top of foot)   - Apply betadine paint to right foot at the 4th/5th metatarsal heads, and 4th/5th digits.  - Apply sterile gauze to area, and wrap with kerlix and tape.   pain control prn. regimen adjusted. tylenol, oxycodone, dilaudid for breakthrough. gabapentin    Severe arterial stenosis of distal RLE  s/p L BKA Feb  s/p RLE angio  POD 3 s/p Bypass from R femoral artery to distal tibial artery  increased dilaudid 2mg IV q4hrs prn for sever pain    Acute urinary retention likely from opiates  has freed placed this admission   cw with flomax     R groin hematoma related to  RHC and LHC 12/23  post op blood loss anemia  slow down trend of hb   I dw with cardio Dr palla- ok to dc from cardio stand point if hb stable         Cirrohisis (new)  outpatient followup  f/u with Dr. Anderson for further cirrhosis mgmt    Mitral stenosis.   S/p MVr with 28 CG future ring 2016 with TTE showing severe MS with mean gradient of 13 with mild LE edema and elevated JVP   s/p ALEX to further assess severity of MS 12/22  plan  Lasix 40 mg PO daily for diuresis, monitor electrolytes  Metoprolol succinate 25 mg PO daily for rate control    s/p RIGHT AND LEFT HEART CATH 12/23-LHC & RHC  showed patent grafts PCWP 28.   continue lasix 40 mg po daily     incidental finding 1.1cm left renal lesion   f/u PMDoutpt renal MRI     HTN, HLD  lisinopril, amlodipine, imdur    CAD s/p CABG  cont ASA/plavix,     Diabetes  continue lantus 15units qHS. mod ISS. BGMs ac hs. FS goal <180.      dvt ppx : heparin  s for as vascular/podiatry; patient stable for dc back to long term care. will need outpatient vascular/podiatry followup. needs outpatient debridment when cleared by vascular 63 yo Male BIBEMS from [facility] due to severe epigastric area abdominal pain since yesterday morning. Patient reported vomiting. Patient denies diarrhea. He has no active pain during my evaluation. Patient reported he has not been eating or drinking regularly. He has also noticed redness of RLE over last week and black area on bottom of foot admitted for RLE cellulitis    RLE cellultis / gangrene  cont PO doxycycline 100mg bid for 7 days . can dc zosyn. d/w ID. monitor temps  RLE dry gangrene- podiatry to debride as outpatient when cleared by vascular. wound care  Noted Podiatry recs 12/23:  Wound care instructions to be applied every 2-3 days to right foot  - Carefully remove dressings to right foot   - Apply xeroform to the blister of the right foot (top of foot)   - Apply betadine paint to right foot at the 4th/5th metatarsal heads, and 4th/5th digits.  - Apply sterile gauze to area, and wrap with kerlix and tape.   pain control prn. regimen adjusted. tylenol, oxycodone, dilaudid for breakthrough. gabapentin    Severe arterial stenosis of distal RLE  s/p L BKA Feb  s/p RLE angio  POD 3 s/p Bypass from R femoral artery to distal tibial artery  increased dilaudid 2mg IV q4hrs prn for sever pain    Acute urinary retention likely from opiates  has freed placed this admission   cw with flomax     R groin hematoma related to  RHC and LHC 12/23  post op blood loss anemia  slow down trend of hb   I dw with cardio Dr palla- ok to dc from cardio stand point if hb stable         Cirrohisis (new)  outpatient followup  f/u with Dr. Anderson for further cirrhosis mgmt    Mitral stenosis.   S/p MVr with 28 CG future ring 2016 with TTE showing severe MS with mean gradient of 13 with mild LE edema and elevated JVP   s/p ALEX to further assess severity of MS 12/22  plan  Lasix 40 mg PO daily for diuresis, monitor electrolytes  Metoprolol succinate 25 mg PO daily for rate control    s/p RIGHT AND LEFT HEART CATH 12/23-LHC & RHC  showed patent grafts PCWP 28.   continue lasix 40 mg po daily     incidental finding 1.1cm left renal lesion   f/u PMDoutpt renal MRI     HTN, HLD  lisinopril, amlodipine, imdur    CAD s/p CABG  cont ASA/plavix,     Diabetes  continue lantus 15units qHS. mod ISS. BGMs ac hs. FS goal <180.      dvt ppx : heparin  s for as vascular/podiatry; patient stable for dc back to long term care. will need outpatient vascular/podiatry followup. needs outpatient debridment when cleared by vascular

## 2022-12-24 NOTE — PROGRESS NOTE ADULT - PROBLEM SELECTOR PLAN 1
S/p past MV repair but now with severe mitral stenosis / regurgitation, S/P LHC & RHC  showed patent grafts PCWP 28. continue lasix 40 mg po daily      Patient will follow up with Dr. Wilson post DC at [facility] for MV surgery eval -

## 2022-12-24 NOTE — DISCHARGE NOTE PROVIDER - NSDCCPCAREPLAN_GEN_ALL_CORE_FT
PRINCIPAL DISCHARGE DIAGNOSIS  Diagnosis: PVD (peripheral vascular disease)  Assessment and Plan of Treatment: Patient will follow up with Dr. Wilson after discharge  at [facility] for Mitral valve  surgery evaluation  -  lisinopril 5mg was discontinued after lasix and metoprolol added-  follow up with Cardiology as outpatient to decide if lisinopril needs to be resumed as outpatient  follow up  at [facility]  outpatient with podiatry  Outpatient work up and management of cirrhosis. with Dr Anderson  outpt appointment with Dr. Anderson. Will need outpatient upper endoscopy      SECONDARY DISCHARGE DIAGNOSES  Diagnosis: Cirrhosis  Assessment and Plan of Treatment:     Diagnosis: Necrotic eschar  Assessment and Plan of Treatment:     Diagnosis: Elevated troponin  Assessment and Plan of Treatment:     Diagnosis: PVD (peripheral vascular disease)  Assessment and Plan of Treatment:

## 2022-12-24 NOTE — DISCHARGE NOTE PROVIDER - CARE PROVIDER_API CALL
Jez Mauricio (DPM)  Podiatric Medicine  Follow Up Time:     Tank Mac (MD)  Surgery; Vascular Surgery  [address], [address] [place] [address]  Phone: [phone]  Fax: [phone]  Follow Up Time:     Dr Justin HORVATH,   Phone: (   )    -  Fax: (   )    -  Follow Up Time:     Germán Wilson)  Cardiology; Internal Medicine; Interventional Cardiology  [facility] [facility], [address]  [place], [place] [id number]  Phone: [phone]  Fax: [phone]  Follow Up Time:

## 2022-12-24 NOTE — PROGRESS NOTE ADULT - PROBLEM SELECTOR PLAN 4
Severe pulmonary HTN - possibly from MV disease  L&R planned heart cath as above after vascular surgeries are complete.    Will sign off please call if questions.
Severe pulmonary HTN - possibly from MV disease  L&R HC planned as noted above.
Severe pulmonary HTN - possibly from MV disease  Plan as above   Would check SPO2 with ambulation.  L&R planned heart cath as above      S/P RLE angio PO day 2 Pt will need femoral distal bypass
Severe pulmonary HTN - possibly from MV disease  Plan as above  Would check SPO2 with ambulation.  L&R planned heart cath as above
Severe pulmonary HTN - possibly from MV disease  Plan as above  Would check SPO2 with ambulation.  L&R planned heart cath as above
controlled , continue norvasc
Severe pulmonary HTN - possibly from MV disease  Plan as above repeat BNP/CXR  Would check SPO2 with ambulation.  L&R planned heart cath as above      S/P RLE angio PO day 2 Pt will need femoral distal bypass
Severe pulmonary HTN - possibly from MV disease  Plan as above  Would check SPO2 with ambulation.

## 2022-12-24 NOTE — DISCHARGE NOTE PROVIDER - CARE PROVIDERS DIRECT ADDRESSES
,DirectAddress_Unknown,[email or web address],DirectAddress_Unknown,DirectAddress_Unknown

## 2022-12-24 NOTE — PROGRESS NOTE ADULT - SUBJECTIVE AND OBJECTIVE BOX
12/24 no sob ,no CP , no pain to r groin     Review of Systems:  General:denies fever chills, headache, weakness  HEENT: denies blurry vision,diffculty swallowing, difficulty hearing, tinnitus  Cardiovascular: denies chest pain  ,palpitations  Pulmonary:denies shortness of breath, cough, wheezing, hemoptysis  Gastrointestinal: denies abdominal pain, constipation, diarrhea,nausea , vomiting, hematochezia  : denies hematuria, dysuria, or incontinence  Neurological: denies weakness, numbness , tingling, dizziness, tremors  MSK: denies muscle pain, difficulty ambulating, swelling, back pain  skin: denies skin rash, itching, burning, or  skin lesions  Psychiatrical: denies mood disturbances, anxierty, feeling depressed, depression , or difficulty sleeping        PHYSICAL EXAM:    Daily     Daily     ICU Vital Signs Last 24 Hrs  T(C): 36.6 (23 Dec 2022 15:44), Max: 36.6 (23 Dec 2022 15:44)  T(F): 97.8 (23 Dec 2022 15:44), Max: 97.8 (23 Dec 2022 15:44)  HR: 80 (23 Dec 2022 15:44) (74 - 80)  BP: 142/65 (23 Dec 2022 15:44) (121/58 - 147/73)  BP(mean): --  ABP: --  ABP(mean): --  RR: 18 (23 Dec 2022 15:44) (18 - 22)  SpO2: 97% (23 Dec 2022 15:44) (93% - 99%)    O2 Parameters below as of 23 Dec 2022 15:44  Patient On (Oxygen Delivery Method): room air  Physical Exam:  General: comfortable, no acute distress, well nourished  HEENT: Atraumatic, no LAD, trachea midline, PERRLA  Cardiovascular: normal s1s2, no murmurs, gallops or fricition rubs  Pulmonary: clear to ausculation Bilaterally, no wheezing , rhonchi  Gastrointestinal: soft non tender non distended, no masses felt, no organomegally  Muscloskeletal: no lower extremity edema, L BKA. RLE : Dry eschar wound noted to the right 4th/5th metatarsal. Dry small eschar lesions to Right distal toes 2-3. Hemorrhagic bulla to extending toanteromedial R ankle Neurological: CN II-12 intact. No focal weakness  Psychiatrical: normal mood, cooperative  SKIN: no rash, lesions or ulcers                                    8.9    11.22 )-----------( 288      ( 23 Dec 2022 08:50 )             28.3       CBC Full  -  ( 23 Dec 2022 08:50 )  WBC Count : 11.22 K/uL  RBC Count : 2.91 M/uL  Hemoglobin : 8.9 g/dL  Hematocrit : 28.3 %  Platelet Count - Automated : 288 K/uL  Mean Cell Volume : 97.3 fl  Mean Cell Hemoglobin : 30.6 pg  Mean Cell Hemoglobin Concentration : 31.4 gm/dL  Auto Neutrophil # : 8.43 K/uL  Auto Lymphocyte # : 1.37 K/uL  Auto Monocyte # : 0.83 K/uL  Auto Eosinophil # : 0.45 K/uL  Auto Basophil # : 0.06 K/uL  Auto Neutrophil % : 75.2 %  Auto Lymphocyte % : 12.2 %  Auto Monocyte % : 7.4 %  Auto Eosinophil % : 4.0 %  Auto Basophil % : 0.5 %      12-23    139  |  108  |  26<H>  ----------------------------<  264<H>  3.8   |  27  |  1.03    Ca    9.0      23 Dec 2022 08:50  Phos  3.0     12-23  Mg     1.8     12-23    TPro  6.7  /  Alb  2.3<L>  /  TBili  0.6  /  DBili  x   /  AST  15  /  ALT  19  /  AlkPhos  151<H>  12-23      LIVER FUNCTIONS - ( 23 Dec 2022 08:50 )  Alb: 2.3 g/dL / Pro: 6.7 gm/dL / ALK PHOS: 151 U/L / ALT: 19 U/L / AST: 15 U/L / GGT: x                           ABG - ( 23 Dec 2022 10:47 )  pH, Arterial: x     pH, Blood: x     /  pCO2: x     /  pO2: x     / HCO3: x     / Base Excess: x     /  SaO2: 96.4                MEDICATIONS  (STANDING):  amLODIPine   Tablet 5 milliGRAM(s) Oral daily  aspirin  chewable 81 milliGRAM(s) Oral daily  aspirin  chewable 81 milliGRAM(s) Oral once  clopidogrel Tablet 75 milliGRAM(s) Oral daily  dextrose 5% + sodium chloride 0.45%. 1000 milliLiter(s) (125 mL/Hr) IV Continuous <Continuous>  dextrose 5%. 1000 milliLiter(s) (50 mL/Hr) IV Continuous <Continuous>  dextrose 5%. 1000 milliLiter(s) (100 mL/Hr) IV Continuous <Continuous>  dextrose 50% Injectable 25 Gram(s) IV Push once  dextrose 50% Injectable 12.5 Gram(s) IV Push once  dextrose 50% Injectable 25 Gram(s) IV Push once  doxycycline monohydrate Capsule 100 milliGRAM(s) Oral every 12 hours  famotidine    Tablet 40 milliGRAM(s) Oral at bedtime  furosemide    Tablet 40 milliGRAM(s) Oral daily  gabapentin 300 milliGRAM(s) Oral two times a day  gabapentin 600 milliGRAM(s) Oral at bedtime  glucagon  Injectable 1 milliGRAM(s) IntraMuscular once  heparin   Injectable 5000 Unit(s) SubCutaneous every 8 hours  insulin glargine Injectable (LANTUS) 15 Unit(s) SubCutaneous at bedtime  insulin lispro (ADMELOG) corrective regimen sliding scale   SubCutaneous Before meals and at bedtime  isosorbide   mononitrate ER Tablet (IMDUR) 30 milliGRAM(s) Oral daily  metoclopramide 10 milliGRAM(s) Oral daily  metoprolol succinate ER 25 milliGRAM(s) Oral daily  multivitamin 1 Tablet(s) Oral daily  pantoprazole    Tablet 40 milliGRAM(s) Oral every 12 hours  polyethylene glycol 3350 17 Gram(s) Oral daily  senna 2 Tablet(s) Oral at bedtime  simethicone 80 milliGRAM(s) Chew every 6 hours  simvastatin 10 milliGRAM(s) Oral at bedtime  sodium chloride 0.9% lock flush 3 milliLiter(s) IV Push every 8 hours  sucralfate suspension 1 Gram(s) Oral four times a day  tamsulosin 0.8 milliGRAM(s) Oral at bedtime       12/24 no sob ,no CP , no pain to r groin , says r foot post op pain still sever and wants his dilaudid dose to be increased , says prior q4hrs prn  was helping,     Review of Systems:  General:denies fever chills, headache, weakness  HEENT: denies blurry vision,diffculty swallowing, difficulty hearing, tinnitus  Cardiovascular: denies chest pain  ,palpitations  Pulmonary:denies shortness of breath, cough, wheezing, hemoptysis  Gastrointestinal: denies abdominal pain, constipation, diarrhea,nausea , vomiting, hematochezia  : denies hematuria, dysuria, or incontinence  Neurological: denies weakness, numbness , tingling, dizziness, tremors  MSK: denies muscle pain, difficulty ambulating, swelling, back pain  skin: denies skin rash, itching, burning, or  skin lesions  Psychiatrical: denies mood disturbances, anxierty, feeling depressed, depression , or difficulty sleeping        PHYSICAL EXAM:    Daily     Daily     ICU Vital Signs Last 24 Hrs  T(C): 36.6 (23 Dec 2022 15:44), Max: 36.6 (23 Dec 2022 15:44)  T(F): 97.8 (23 Dec 2022 15:44), Max: 97.8 (23 Dec 2022 15:44)  HR: 80 (23 Dec 2022 15:44) (74 - 80)  BP: 142/65 (23 Dec 2022 15:44) (121/58 - 147/73)  BP(mean): --  ABP: --  ABP(mean): --  RR: 18 (23 Dec 2022 15:44) (18 - 22)  SpO2: 97% (23 Dec 2022 15:44) (93% - 99%)    O2 Parameters below as of 23 Dec 2022 15:44  Patient On (Oxygen Delivery Method): room air  Physical Exam:  General: comfortable, no acute distress, well nourished  HEENT: Atraumatic, no LAD, trachea midline, PERRLA  Cardiovascular: normal s1s2, no murmurs, gallops or fricition rubs  Pulmonary: clear to ausculation Bilaterally, no wheezing , rhonchi  Gastrointestinal: soft non tender non distended, no masses felt, no organomegally  Muscloskeletal: no lower extremity edema, L BKA. RLE : Dry eschar wound noted to the right 4th/5th metatarsal. Dry small eschar lesions to Right distal toes 2-3. Hemorrhagic bulla to extending toanteromedial R ankle Neurological: CN II-12 intact. No focal weakness  Psychiatrical: normal mood, cooperative  SKIN: no rash, lesions or ulcers                                    8.9    11.22 )-----------( 288      ( 23 Dec 2022 08:50 )             28.3       CBC Full  -  ( 23 Dec 2022 08:50 )  WBC Count : 11.22 K/uL  RBC Count : 2.91 M/uL  Hemoglobin : 8.9 g/dL  Hematocrit : 28.3 %  Platelet Count - Automated : 288 K/uL  Mean Cell Volume : 97.3 fl  Mean Cell Hemoglobin : 30.6 pg  Mean Cell Hemoglobin Concentration : 31.4 gm/dL  Auto Neutrophil # : 8.43 K/uL  Auto Lymphocyte # : 1.37 K/uL  Auto Monocyte # : 0.83 K/uL  Auto Eosinophil # : 0.45 K/uL  Auto Basophil # : 0.06 K/uL  Auto Neutrophil % : 75.2 %  Auto Lymphocyte % : 12.2 %  Auto Monocyte % : 7.4 %  Auto Eosinophil % : 4.0 %  Auto Basophil % : 0.5 %      12-23    139  |  108  |  26<H>  ----------------------------<  264<H>  3.8   |  27  |  1.03    Ca    9.0      23 Dec 2022 08:50  Phos  3.0     12-23  Mg     1.8     12-23    TPro  6.7  /  Alb  2.3<L>  /  TBili  0.6  /  DBili  x   /  AST  15  /  ALT  19  /  AlkPhos  151<H>  12-23      LIVER FUNCTIONS - ( 23 Dec 2022 08:50 )  Alb: 2.3 g/dL / Pro: 6.7 gm/dL / ALK PHOS: 151 U/L / ALT: 19 U/L / AST: 15 U/L / GGT: x                           ABG - ( 23 Dec 2022 10:47 )  pH, Arterial: x     pH, Blood: x     /  pCO2: x     /  pO2: x     / HCO3: x     / Base Excess: x     /  SaO2: 96.4                MEDICATIONS  (STANDING):  amLODIPine   Tablet 5 milliGRAM(s) Oral daily  aspirin  chewable 81 milliGRAM(s) Oral daily  aspirin  chewable 81 milliGRAM(s) Oral once  clopidogrel Tablet 75 milliGRAM(s) Oral daily  dextrose 5% + sodium chloride 0.45%. 1000 milliLiter(s) (125 mL/Hr) IV Continuous <Continuous>  dextrose 5%. 1000 milliLiter(s) (50 mL/Hr) IV Continuous <Continuous>  dextrose 5%. 1000 milliLiter(s) (100 mL/Hr) IV Continuous <Continuous>  dextrose 50% Injectable 25 Gram(s) IV Push once  dextrose 50% Injectable 12.5 Gram(s) IV Push once  dextrose 50% Injectable 25 Gram(s) IV Push once  doxycycline monohydrate Capsule 100 milliGRAM(s) Oral every 12 hours  famotidine    Tablet 40 milliGRAM(s) Oral at bedtime  furosemide    Tablet 40 milliGRAM(s) Oral daily  gabapentin 300 milliGRAM(s) Oral two times a day  gabapentin 600 milliGRAM(s) Oral at bedtime  glucagon  Injectable 1 milliGRAM(s) IntraMuscular once  heparin   Injectable 5000 Unit(s) SubCutaneous every 8 hours  insulin glargine Injectable (LANTUS) 15 Unit(s) SubCutaneous at bedtime  insulin lispro (ADMELOG) corrective regimen sliding scale   SubCutaneous Before meals and at bedtime  isosorbide   mononitrate ER Tablet (IMDUR) 30 milliGRAM(s) Oral daily  metoclopramide 10 milliGRAM(s) Oral daily  metoprolol succinate ER 25 milliGRAM(s) Oral daily  multivitamin 1 Tablet(s) Oral daily  pantoprazole    Tablet 40 milliGRAM(s) Oral every 12 hours  polyethylene glycol 3350 17 Gram(s) Oral daily  senna 2 Tablet(s) Oral at bedtime  simethicone 80 milliGRAM(s) Chew every 6 hours  simvastatin 10 milliGRAM(s) Oral at bedtime  sodium chloride 0.9% lock flush 3 milliLiter(s) IV Push every 8 hours  sucralfate suspension 1 Gram(s) Oral four times a day  tamsulosin 0.8 milliGRAM(s) Oral at bedtime

## 2022-12-24 NOTE — PROGRESS NOTE ADULT - PROBLEM SELECTOR PROBLEM 4
Pulmonary hypertension
HTN (hypertension)
Pulmonary hypertension

## 2022-12-25 LAB
BASOPHILS # BLD AUTO: 0.06 K/UL — SIGNIFICANT CHANGE UP (ref 0–0.2)
BASOPHILS NFR BLD AUTO: 0.6 % — SIGNIFICANT CHANGE UP (ref 0–2)
EOSINOPHIL # BLD AUTO: 0.52 K/UL — HIGH (ref 0–0.5)
EOSINOPHIL NFR BLD AUTO: 5 % — SIGNIFICANT CHANGE UP (ref 0–6)
GLUCOSE BLDC GLUCOMTR-MCNC: 136 MG/DL — HIGH (ref 70–99)
GLUCOSE BLDC GLUCOMTR-MCNC: 168 MG/DL — HIGH (ref 70–99)
GLUCOSE BLDC GLUCOMTR-MCNC: 184 MG/DL — HIGH (ref 70–99)
HCT VFR BLD CALC: 28.2 % — LOW (ref 39–50)
HGB BLD-MCNC: 9.1 G/DL — LOW (ref 13–17)
IMM GRANULOCYTES NFR BLD AUTO: 0.7 % — SIGNIFICANT CHANGE UP (ref 0–0.9)
LYMPHOCYTES # BLD AUTO: 1.59 K/UL — SIGNIFICANT CHANGE UP (ref 1–3.3)
LYMPHOCYTES # BLD AUTO: 15.1 % — SIGNIFICANT CHANGE UP (ref 13–44)
MCHC RBC-ENTMCNC: 30.8 PG — SIGNIFICANT CHANGE UP (ref 27–34)
MCHC RBC-ENTMCNC: 32.3 GM/DL — SIGNIFICANT CHANGE UP (ref 32–36)
MCV RBC AUTO: 95.6 FL — SIGNIFICANT CHANGE UP (ref 80–100)
MONOCYTES # BLD AUTO: 1.11 K/UL — HIGH (ref 0–0.9)
MONOCYTES NFR BLD AUTO: 10.6 % — SIGNIFICANT CHANGE UP (ref 2–14)
NEUTROPHILS # BLD AUTO: 7.15 K/UL — SIGNIFICANT CHANGE UP (ref 1.8–7.4)
NEUTROPHILS NFR BLD AUTO: 68 % — SIGNIFICANT CHANGE UP (ref 43–77)
PLATELET # BLD AUTO: 315 K/UL — SIGNIFICANT CHANGE UP (ref 150–400)
RBC # BLD: 2.95 M/UL — LOW (ref 4.2–5.8)
RBC # FLD: 14.8 % — HIGH (ref 10.3–14.5)
WBC # BLD: 10.5 K/UL — SIGNIFICANT CHANGE UP (ref 3.8–10.5)
WBC # FLD AUTO: 10.5 K/UL — SIGNIFICANT CHANGE UP (ref 3.8–10.5)

## 2022-12-25 PROCEDURE — 99232 SBSQ HOSP IP/OBS MODERATE 35: CPT

## 2022-12-25 RX ORDER — HYDROMORPHONE HYDROCHLORIDE 2 MG/ML
1.5 INJECTION INTRAMUSCULAR; INTRAVENOUS; SUBCUTANEOUS EVERY 4 HOURS
Refills: 0 | Status: DISCONTINUED | OUTPATIENT
Start: 2022-12-25 | End: 2022-12-26

## 2022-12-25 RX ADMIN — Medication 2: at 18:43

## 2022-12-25 RX ADMIN — SIMVASTATIN 10 MILLIGRAM(S): 20 TABLET, FILM COATED ORAL at 21:47

## 2022-12-25 RX ADMIN — PANTOPRAZOLE SODIUM 40 MILLIGRAM(S): 20 TABLET, DELAYED RELEASE ORAL at 10:37

## 2022-12-25 RX ADMIN — Medication 81 MILLIGRAM(S): at 10:36

## 2022-12-25 RX ADMIN — HYDROMORPHONE HYDROCHLORIDE 2 MILLIGRAM(S): 2 INJECTION INTRAMUSCULAR; INTRAVENOUS; SUBCUTANEOUS at 10:36

## 2022-12-25 RX ADMIN — Medication 40 MILLIGRAM(S): at 10:37

## 2022-12-25 RX ADMIN — Medication 1 GRAM(S): at 21:45

## 2022-12-25 RX ADMIN — HYDROMORPHONE HYDROCHLORIDE 2 MILLIGRAM(S): 2 INJECTION INTRAMUSCULAR; INTRAVENOUS; SUBCUTANEOUS at 14:40

## 2022-12-25 RX ADMIN — PANTOPRAZOLE SODIUM 40 MILLIGRAM(S): 20 TABLET, DELAYED RELEASE ORAL at 21:46

## 2022-12-25 RX ADMIN — SIMETHICONE 80 MILLIGRAM(S): 80 TABLET, CHEWABLE ORAL at 06:08

## 2022-12-25 RX ADMIN — SIMETHICONE 80 MILLIGRAM(S): 80 TABLET, CHEWABLE ORAL at 12:35

## 2022-12-25 RX ADMIN — CLOPIDOGREL BISULFATE 75 MILLIGRAM(S): 75 TABLET, FILM COATED ORAL at 10:36

## 2022-12-25 RX ADMIN — INSULIN GLARGINE 20 UNIT(S): 100 INJECTION, SOLUTION SUBCUTANEOUS at 21:44

## 2022-12-25 RX ADMIN — Medication 100 MILLIGRAM(S): at 21:46

## 2022-12-25 RX ADMIN — HYDROMORPHONE HYDROCHLORIDE 1.5 MILLIGRAM(S): 2 INJECTION INTRAMUSCULAR; INTRAVENOUS; SUBCUTANEOUS at 19:00

## 2022-12-25 RX ADMIN — POLYETHYLENE GLYCOL 3350 17 GRAM(S): 17 POWDER, FOR SOLUTION ORAL at 10:37

## 2022-12-25 RX ADMIN — HYDROMORPHONE HYDROCHLORIDE 1.5 MILLIGRAM(S): 2 INJECTION INTRAMUSCULAR; INTRAVENOUS; SUBCUTANEOUS at 18:41

## 2022-12-25 RX ADMIN — Medication 1 GRAM(S): at 18:42

## 2022-12-25 RX ADMIN — Medication 1 GRAM(S): at 12:35

## 2022-12-25 RX ADMIN — HYDROMORPHONE HYDROCHLORIDE 1.5 MILLIGRAM(S): 2 INJECTION INTRAMUSCULAR; INTRAVENOUS; SUBCUTANEOUS at 22:41

## 2022-12-25 RX ADMIN — GABAPENTIN 300 MILLIGRAM(S): 400 CAPSULE ORAL at 14:39

## 2022-12-25 RX ADMIN — TAMSULOSIN HYDROCHLORIDE 0.8 MILLIGRAM(S): 0.4 CAPSULE ORAL at 21:45

## 2022-12-25 RX ADMIN — Medication 1 GRAM(S): at 06:08

## 2022-12-25 RX ADMIN — Medication 10 MILLIGRAM(S): at 12:35

## 2022-12-25 RX ADMIN — Medication 4 UNIT(S): at 18:44

## 2022-12-25 RX ADMIN — Medication 25 MILLIGRAM(S): at 10:55

## 2022-12-25 RX ADMIN — HYDROMORPHONE HYDROCHLORIDE 2 MILLIGRAM(S): 2 INJECTION INTRAMUSCULAR; INTRAVENOUS; SUBCUTANEOUS at 02:11

## 2022-12-25 RX ADMIN — HYDROMORPHONE HYDROCHLORIDE 2 MILLIGRAM(S): 2 INJECTION INTRAMUSCULAR; INTRAVENOUS; SUBCUTANEOUS at 15:10

## 2022-12-25 RX ADMIN — Medication 2: at 12:42

## 2022-12-25 RX ADMIN — HEPARIN SODIUM 5000 UNIT(S): 5000 INJECTION INTRAVENOUS; SUBCUTANEOUS at 06:11

## 2022-12-25 RX ADMIN — Medication 1 TABLET(S): at 10:36

## 2022-12-25 RX ADMIN — HYDROMORPHONE HYDROCHLORIDE 2 MILLIGRAM(S): 2 INJECTION INTRAMUSCULAR; INTRAVENOUS; SUBCUTANEOUS at 11:06

## 2022-12-25 RX ADMIN — Medication 4 UNIT(S): at 13:49

## 2022-12-25 RX ADMIN — SODIUM CHLORIDE 3 MILLILITER(S): 9 INJECTION INTRAMUSCULAR; INTRAVENOUS; SUBCUTANEOUS at 14:56

## 2022-12-25 RX ADMIN — AMLODIPINE BESYLATE 5 MILLIGRAM(S): 2.5 TABLET ORAL at 10:36

## 2022-12-25 RX ADMIN — ISOSORBIDE MONONITRATE 30 MILLIGRAM(S): 60 TABLET, EXTENDED RELEASE ORAL at 10:36

## 2022-12-25 RX ADMIN — SODIUM CHLORIDE 3 MILLILITER(S): 9 INJECTION INTRAMUSCULAR; INTRAVENOUS; SUBCUTANEOUS at 21:48

## 2022-12-25 RX ADMIN — HYDROMORPHONE HYDROCHLORIDE 1.5 MILLIGRAM(S): 2 INJECTION INTRAMUSCULAR; INTRAVENOUS; SUBCUTANEOUS at 22:59

## 2022-12-25 RX ADMIN — SODIUM CHLORIDE 3 MILLILITER(S): 9 INJECTION INTRAMUSCULAR; INTRAVENOUS; SUBCUTANEOUS at 05:54

## 2022-12-25 RX ADMIN — Medication 100 MILLIGRAM(S): at 10:36

## 2022-12-25 RX ADMIN — GABAPENTIN 600 MILLIGRAM(S): 400 CAPSULE ORAL at 21:47

## 2022-12-25 RX ADMIN — HYDROMORPHONE HYDROCHLORIDE 2 MILLIGRAM(S): 2 INJECTION INTRAMUSCULAR; INTRAVENOUS; SUBCUTANEOUS at 03:54

## 2022-12-25 RX ADMIN — HEPARIN SODIUM 5000 UNIT(S): 5000 INJECTION INTRAVENOUS; SUBCUTANEOUS at 14:40

## 2022-12-25 RX ADMIN — GABAPENTIN 300 MILLIGRAM(S): 400 CAPSULE ORAL at 06:08

## 2022-12-25 RX ADMIN — HEPARIN SODIUM 5000 UNIT(S): 5000 INJECTION INTRAVENOUS; SUBCUTANEOUS at 21:45

## 2022-12-25 RX ADMIN — SENNA PLUS 2 TABLET(S): 8.6 TABLET ORAL at 21:47

## 2022-12-25 RX ADMIN — SIMETHICONE 80 MILLIGRAM(S): 80 TABLET, CHEWABLE ORAL at 00:52

## 2022-12-25 RX ADMIN — FAMOTIDINE 40 MILLIGRAM(S): 10 INJECTION INTRAVENOUS at 21:46

## 2022-12-25 NOTE — PROGRESS NOTE ADULT - SUBJECTIVE AND OBJECTIVE BOX
12/25 no sob ,no CP , no pain to r groin , says r foot post op pain  ,     Review of Systems:  General:denies fever chills, headache, weakness  HEENT: denies blurry vision,diffculty swallowing, difficulty hearing, tinnitus  Cardiovascular: denies chest pain  ,palpitations  Pulmonary:denies shortness of breath, cough, wheezing, hemoptysis  Gastrointestinal: denies abdominal pain, constipation, diarrhea,nausea , vomiting, hematochezia  : denies hematuria, dysuria, or incontinence  Neurological: denies weakness, numbness , tingling, dizziness, tremors  MSK: denies muscle pain, difficulty ambulating, swelling, back pain  skin: denies skin rash, itching, burning, or  skin lesions  Psychiatrical: denies mood disturbances, anxierty, feeling depressed, depression , or difficulty sleeping    Physical Exam:  General: comfortable, no acute distress, well nourished  HEENT: Atraumatic, no LAD, trachea midline, PERRLA  Cardiovascular: normal s1s2, no murmurs, gallops or fricition rubs  Pulmonary: clear to ausculation Bilaterally, no wheezing , rhonchi  Gastrointestinal: soft non tender non distended, no masses felt, no organomegally  Muscloskeletal: no lower extremity edema, L BKA. RLE : Dry eschar wound noted to the right 4th/5th metatarsal. Dry small eschar lesions to Right distal toes 2-3. Hemorrhagic bulla to extending toanteromedial R ankle Neurological: CN II-12 intact. No focal weakness  Psychiatrical: normal mood, cooperative  SKIN: no rash, lesions or ulcers      PHYSICAL EXAM:    Daily     Daily     ICU Vital Signs Last 24 Hrs  T(C): 36.4 (25 Dec 2022 16:10), Max: 37 (24 Dec 2022 16:40)  T(F): 97.5 (25 Dec 2022 16:10), Max: 98.6 (24 Dec 2022 16:40)  HR: 83 (25 Dec 2022 16:10) (71 - 94)  BP: 131/73 (25 Dec 2022 16:10) (127/51 - 179/76)  BP(mean): --  ABP: --  ABP(mean): --  RR: 18 (25 Dec 2022 16:10) (18 - 18)  SpO2: 96% (25 Dec 2022 16:10) (95% - 99%)    O2 Parameters below as of 25 Dec 2022 16:10  Patient On (Oxygen Delivery Method): room air                              9.1    10.50 )-----------( 315      ( 25 Dec 2022 07:28 )             28.2       CBC Full  -  ( 25 Dec 2022 07:28 )  WBC Count : 10.50 K/uL  RBC Count : 2.95 M/uL  Hemoglobin : 9.1 g/dL  Hematocrit : 28.2 %  Platelet Count - Automated : 315 K/uL  Mean Cell Volume : 95.6 fl  Mean Cell Hemoglobin : 30.8 pg  Mean Cell Hemoglobin Concentration : 32.3 gm/dL  Auto Neutrophil # : 7.15 K/uL  Auto Lymphocyte # : 1.59 K/uL  Auto Monocyte # : 1.11 K/uL  Auto Eosinophil # : 0.52 K/uL  Auto Basophil # : 0.06 K/uL  Auto Neutrophil % : 68.0 %  Auto Lymphocyte % : 15.1 %  Auto Monocyte % : 10.6 %  Auto Eosinophil % : 5.0 %  Auto Basophil % : 0.6 %      12-24    140  |  108  |  18  ----------------------------<  189<H>  3.7   |  26  |  0.93    Ca    9.2      24 Dec 2022 11:05  Mg     1.7     12-24                              MEDICATIONS  (STANDING):  amLODIPine   Tablet 5 milliGRAM(s) Oral daily  aspirin  chewable 81 milliGRAM(s) Oral daily  aspirin  chewable 81 milliGRAM(s) Oral once  clopidogrel Tablet 75 milliGRAM(s) Oral daily  dextrose 5%. 1000 milliLiter(s) (50 mL/Hr) IV Continuous <Continuous>  dextrose 5%. 1000 milliLiter(s) (100 mL/Hr) IV Continuous <Continuous>  dextrose 50% Injectable 25 Gram(s) IV Push once  dextrose 50% Injectable 12.5 Gram(s) IV Push once  dextrose 50% Injectable 25 Gram(s) IV Push once  doxycycline monohydrate Capsule 100 milliGRAM(s) Oral every 12 hours  famotidine    Tablet 40 milliGRAM(s) Oral at bedtime  furosemide    Tablet 40 milliGRAM(s) Oral daily  gabapentin 300 milliGRAM(s) Oral two times a day  gabapentin 600 milliGRAM(s) Oral at bedtime  glucagon  Injectable 1 milliGRAM(s) IntraMuscular once  heparin   Injectable 5000 Unit(s) SubCutaneous every 8 hours  insulin glargine Injectable (LANTUS) 20 Unit(s) SubCutaneous at bedtime  insulin lispro (ADMELOG) corrective regimen sliding scale   SubCutaneous Before meals and at bedtime  insulin lispro Injectable (ADMELOG) 4 Unit(s) SubCutaneous three times a day before meals  isosorbide   mononitrate ER Tablet (IMDUR) 30 milliGRAM(s) Oral daily  metoclopramide 10 milliGRAM(s) Oral daily  metoprolol succinate ER 25 milliGRAM(s) Oral daily  multivitamin 1 Tablet(s) Oral daily  pantoprazole    Tablet 40 milliGRAM(s) Oral every 12 hours  polyethylene glycol 3350 17 Gram(s) Oral daily  senna 2 Tablet(s) Oral at bedtime  simethicone 80 milliGRAM(s) Chew every 6 hours  simvastatin 10 milliGRAM(s) Oral at bedtime  sodium chloride 0.9% lock flush 3 milliLiter(s) IV Push every 8 hours  sucralfate suspension 1 Gram(s) Oral four times a day  tamsulosin 0.8 milliGRAM(s) Oral at bedtime

## 2022-12-25 NOTE — PROGRESS NOTE ADULT - ASSESSMENT
61 yo Male BIBEMS from [facility] due to severe epigastric area abdominal pain since yesterday morning. Patient reported vomiting. Patient denies diarrhea. He has no active pain during my evaluation. Patient reported he has not been eating or drinking regularly. He has also noticed redness of RLE over last week and black area on bottom of foot admitted for RLE cellulitis    RLE cellultis / gangrene  cont PO doxycycline 100mg bid for 7 days . can dc zosyn. d/w ID. monitor temps  RLE dry gangrene- podiatry to debride as outpatient when cleared by vascular. wound care  Noted Podiatry recs 12/23:  Wound care instructions to be applied every 2-3 days to right foot  - Carefully remove dressings to right foot   - Apply xeroform to the blister of the right foot (top of foot)   - Apply betadine paint to right foot at the 4th/5th metatarsal heads, and 4th/5th digits.  - Apply sterile gauze to area, and wrap with kerlix and tape.   pain control prn. regimen adjusted. tylenol, oxycodone, dilaudid for breakthrough. gabapentin  Severe arterial stenosis of distal RLE  s/p L BKA Feb  s/p RLE angio  POD 3 s/p Bypass from R femoral artery to distal tibial artery  decreased dilaudid 1.5mg IV q4hrs prn for sever pain    Acute urinary retention likely from opiates vs post op  has freed placed this admission   cw with flomax     R groin hematoma related to  RHC and LHC 12/23  post op blood loss anemia  slow down trend of hb   I dw with cardio Dr palla- ok to dc from cardio stand point if hb stable         Cirrohisis (new)  outpatient followup  f/u with Dr. Anderson for further cirrhosis mgmt    Mitral stenosis.   S/p MVr with 28 CG future ring 2016 with TTE showing severe MS with mean gradient of 13 with mild LE edema and elevated JVP   s/p ALEX to further assess severity of MS 12/22  plan  Lasix 40 mg PO daily for diuresis, monitor electrolytes  Metoprolol succinate 25 mg PO daily for rate control    s/p RIGHT AND LEFT HEART CATH 12/23-LHC & RHC  showed patent grafts PCWP 28.   continue lasix 40 mg po daily   incidental finding 1.1cm left renal lesion   f/u PMDoutpt renal MRI     HTN, HLD  lisinopril, amlodipine, imdur    CAD s/p CABG  cont ASA/plavix,     Diabetes  continue lantus 15units qHS. mod ISS. BGMs ac hs. FS goal <180.      dvt ppx : heparin  s for as vascular/podiatry; patient stable for dc back to long term care. will need outpatient vascular/podiatry followup. needs outpatient debridment when cleared by vascular

## 2022-12-26 LAB
ANION GAP SERPL CALC-SCNC: 6 MMOL/L — SIGNIFICANT CHANGE UP (ref 5–17)
BUN SERPL-MCNC: 18 MG/DL — SIGNIFICANT CHANGE UP (ref 7–23)
CALCIUM SERPL-MCNC: 9.3 MG/DL — SIGNIFICANT CHANGE UP (ref 8.5–10.1)
CHLORIDE SERPL-SCNC: 108 MMOL/L — SIGNIFICANT CHANGE UP (ref 96–108)
CO2 SERPL-SCNC: 28 MMOL/L — SIGNIFICANT CHANGE UP (ref 22–31)
CREAT SERPL-MCNC: 0.86 MG/DL — SIGNIFICANT CHANGE UP (ref 0.5–1.3)
EGFR: 99 ML/MIN/1.73M2 — SIGNIFICANT CHANGE UP
GLUCOSE BLDC GLUCOMTR-MCNC: 102 MG/DL — HIGH (ref 70–99)
GLUCOSE BLDC GLUCOMTR-MCNC: 122 MG/DL — HIGH (ref 70–99)
GLUCOSE BLDC GLUCOMTR-MCNC: 125 MG/DL — HIGH (ref 70–99)
GLUCOSE BLDC GLUCOMTR-MCNC: 135 MG/DL — HIGH (ref 70–99)
GLUCOSE SERPL-MCNC: 128 MG/DL — HIGH (ref 70–99)
POTASSIUM SERPL-MCNC: 3.3 MMOL/L — LOW (ref 3.5–5.3)
POTASSIUM SERPL-SCNC: 3.3 MMOL/L — LOW (ref 3.5–5.3)
SODIUM SERPL-SCNC: 142 MMOL/L — SIGNIFICANT CHANGE UP (ref 135–145)

## 2022-12-26 PROCEDURE — 99232 SBSQ HOSP IP/OBS MODERATE 35: CPT

## 2022-12-26 RX ORDER — HYDROMORPHONE HYDROCHLORIDE 2 MG/ML
1.5 INJECTION INTRAMUSCULAR; INTRAVENOUS; SUBCUTANEOUS ONCE
Refills: 0 | Status: DISCONTINUED | OUTPATIENT
Start: 2022-12-26 | End: 2022-12-26

## 2022-12-26 RX ORDER — POTASSIUM CHLORIDE 20 MEQ
40 PACKET (EA) ORAL ONCE
Refills: 0 | Status: COMPLETED | OUTPATIENT
Start: 2022-12-26 | End: 2022-12-26

## 2022-12-26 RX ORDER — HYDROMORPHONE HYDROCHLORIDE 2 MG/ML
2 INJECTION INTRAMUSCULAR; INTRAVENOUS; SUBCUTANEOUS EVERY 4 HOURS
Refills: 0 | Status: DISCONTINUED | OUTPATIENT
Start: 2022-12-26 | End: 2022-12-27

## 2022-12-26 RX ADMIN — Medication 100 MILLIGRAM(S): at 10:18

## 2022-12-26 RX ADMIN — SODIUM CHLORIDE 3 MILLILITER(S): 9 INJECTION INTRAMUSCULAR; INTRAVENOUS; SUBCUTANEOUS at 21:36

## 2022-12-26 RX ADMIN — HYDROMORPHONE HYDROCHLORIDE 1.5 MILLIGRAM(S): 2 INJECTION INTRAMUSCULAR; INTRAVENOUS; SUBCUTANEOUS at 05:03

## 2022-12-26 RX ADMIN — GABAPENTIN 300 MILLIGRAM(S): 400 CAPSULE ORAL at 13:23

## 2022-12-26 RX ADMIN — INSULIN GLARGINE 20 UNIT(S): 100 INJECTION, SOLUTION SUBCUTANEOUS at 21:35

## 2022-12-26 RX ADMIN — HYDROMORPHONE HYDROCHLORIDE 1.5 MILLIGRAM(S): 2 INJECTION INTRAMUSCULAR; INTRAVENOUS; SUBCUTANEOUS at 10:30

## 2022-12-26 RX ADMIN — HYDROMORPHONE HYDROCHLORIDE 1.5 MILLIGRAM(S): 2 INJECTION INTRAMUSCULAR; INTRAVENOUS; SUBCUTANEOUS at 10:17

## 2022-12-26 RX ADMIN — Medication 1 TABLET(S): at 10:18

## 2022-12-26 RX ADMIN — Medication 4 UNIT(S): at 11:54

## 2022-12-26 RX ADMIN — SENNA PLUS 2 TABLET(S): 8.6 TABLET ORAL at 21:30

## 2022-12-26 RX ADMIN — Medication 81 MILLIGRAM(S): at 10:17

## 2022-12-26 RX ADMIN — TAMSULOSIN HYDROCHLORIDE 0.8 MILLIGRAM(S): 0.4 CAPSULE ORAL at 21:30

## 2022-12-26 RX ADMIN — PANTOPRAZOLE SODIUM 40 MILLIGRAM(S): 20 TABLET, DELAYED RELEASE ORAL at 21:30

## 2022-12-26 RX ADMIN — HYDROMORPHONE HYDROCHLORIDE 1.5 MILLIGRAM(S): 2 INJECTION INTRAMUSCULAR; INTRAVENOUS; SUBCUTANEOUS at 21:45

## 2022-12-26 RX ADMIN — HYDROMORPHONE HYDROCHLORIDE 1.5 MILLIGRAM(S): 2 INJECTION INTRAMUSCULAR; INTRAVENOUS; SUBCUTANEOUS at 14:47

## 2022-12-26 RX ADMIN — HYDROMORPHONE HYDROCHLORIDE 1.5 MILLIGRAM(S): 2 INJECTION INTRAMUSCULAR; INTRAVENOUS; SUBCUTANEOUS at 21:29

## 2022-12-26 RX ADMIN — SIMVASTATIN 10 MILLIGRAM(S): 20 TABLET, FILM COATED ORAL at 21:31

## 2022-12-26 RX ADMIN — SODIUM CHLORIDE 3 MILLILITER(S): 9 INJECTION INTRAMUSCULAR; INTRAVENOUS; SUBCUTANEOUS at 13:11

## 2022-12-26 RX ADMIN — HEPARIN SODIUM 5000 UNIT(S): 5000 INJECTION INTRAVENOUS; SUBCUTANEOUS at 05:02

## 2022-12-26 RX ADMIN — SIMETHICONE 80 MILLIGRAM(S): 80 TABLET, CHEWABLE ORAL at 11:54

## 2022-12-26 RX ADMIN — Medication 100 MILLIGRAM(S): at 21:30

## 2022-12-26 RX ADMIN — Medication 1 GRAM(S): at 11:54

## 2022-12-26 RX ADMIN — HYDROMORPHONE HYDROCHLORIDE 2 MILLIGRAM(S): 2 INJECTION INTRAMUSCULAR; INTRAVENOUS; SUBCUTANEOUS at 18:22

## 2022-12-26 RX ADMIN — GABAPENTIN 600 MILLIGRAM(S): 400 CAPSULE ORAL at 21:30

## 2022-12-26 RX ADMIN — Medication 40 MILLIGRAM(S): at 10:17

## 2022-12-26 RX ADMIN — ISOSORBIDE MONONITRATE 30 MILLIGRAM(S): 60 TABLET, EXTENDED RELEASE ORAL at 10:17

## 2022-12-26 RX ADMIN — Medication 10 MILLIGRAM(S): at 10:27

## 2022-12-26 RX ADMIN — Medication 4 UNIT(S): at 16:48

## 2022-12-26 RX ADMIN — HYDROMORPHONE HYDROCHLORIDE 1.5 MILLIGRAM(S): 2 INJECTION INTRAMUSCULAR; INTRAVENOUS; SUBCUTANEOUS at 05:15

## 2022-12-26 RX ADMIN — Medication 40 MILLIEQUIVALENT(S): at 16:45

## 2022-12-26 RX ADMIN — FAMOTIDINE 40 MILLIGRAM(S): 10 INJECTION INTRAVENOUS at 21:30

## 2022-12-26 RX ADMIN — POLYETHYLENE GLYCOL 3350 17 GRAM(S): 17 POWDER, FOR SOLUTION ORAL at 10:18

## 2022-12-26 RX ADMIN — AMLODIPINE BESYLATE 5 MILLIGRAM(S): 2.5 TABLET ORAL at 10:18

## 2022-12-26 RX ADMIN — Medication 4 UNIT(S): at 07:48

## 2022-12-26 RX ADMIN — SODIUM CHLORIDE 3 MILLILITER(S): 9 INJECTION INTRAMUSCULAR; INTRAVENOUS; SUBCUTANEOUS at 04:56

## 2022-12-26 RX ADMIN — HEPARIN SODIUM 5000 UNIT(S): 5000 INJECTION INTRAVENOUS; SUBCUTANEOUS at 21:30

## 2022-12-26 RX ADMIN — HYDROMORPHONE HYDROCHLORIDE 2 MILLIGRAM(S): 2 INJECTION INTRAMUSCULAR; INTRAVENOUS; SUBCUTANEOUS at 18:45

## 2022-12-26 RX ADMIN — Medication 1 GRAM(S): at 21:29

## 2022-12-26 RX ADMIN — CLOPIDOGREL BISULFATE 75 MILLIGRAM(S): 75 TABLET, FILM COATED ORAL at 10:17

## 2022-12-26 RX ADMIN — GABAPENTIN 300 MILLIGRAM(S): 400 CAPSULE ORAL at 05:03

## 2022-12-26 RX ADMIN — Medication 1 GRAM(S): at 16:49

## 2022-12-26 RX ADMIN — PANTOPRAZOLE SODIUM 40 MILLIGRAM(S): 20 TABLET, DELAYED RELEASE ORAL at 10:27

## 2022-12-26 RX ADMIN — HYDROMORPHONE HYDROCHLORIDE 1.5 MILLIGRAM(S): 2 INJECTION INTRAMUSCULAR; INTRAVENOUS; SUBCUTANEOUS at 14:06

## 2022-12-26 RX ADMIN — Medication 25 MILLIGRAM(S): at 10:17

## 2022-12-26 RX ADMIN — HEPARIN SODIUM 5000 UNIT(S): 5000 INJECTION INTRAVENOUS; SUBCUTANEOUS at 13:23

## 2022-12-26 RX ADMIN — Medication 1 GRAM(S): at 05:03

## 2022-12-26 NOTE — PROVIDER CONTACT NOTE (OTHER) - NAME OF MD/NP/PA/DO NOTIFIED:
Dr Palla
Surgery red team/ YONI Hinson
Dr Nahid Fierro (PCP)
Dr Stewart- Vascular
Dr. RADHA Hooper
[place]
Dr Lin
MD Mattson

## 2022-12-26 NOTE — PROGRESS NOTE ADULT - REASON FOR ADMISSION
Elevated troponin, PAD and Cellulitis
RLE cellulitis  Epigastric discomfort  Elevated troponin
Elevated troponin, PAD and Cellulitis
RLE cellulitis  Epigastric abdominal discomfort
Elevated troponin, PAD and Cellulitis
RLE cellulitis  Epigastric discomfort  Elevated troponin
Elevated troponin, PAD and Cellulitis

## 2022-12-26 NOTE — PROVIDER CONTACT NOTE (OTHER) - SITUATION
Pt's Blood sugar reading is 113. Pt is due for 30 units of lantus. Pt did not eat and refuses dinner.
Patient states no PCP.
patient resides at [facility]
spoke to robbin from service aware of consult
Dr Reyes is aware of consult
pt IV infiltrated, ICU nurse, NICU nurse, education and admin tried to start IV. patient refusing to let anyone else try.
spoke to mario from service aware of consult
Office is aware that patient is in [facility].  Please fax discharge papers to [phone].
pt with low urine output via freed 250 cc for shift and BP running low 80/50.

## 2022-12-26 NOTE — PROGRESS NOTE ADULT - ASSESSMENT
61 yo Male BIBEMS from [facility] due to severe epigastric area abdominal pain since yesterday morning. Patient reported vomiting. Patient denies diarrhea. He has no active pain during my evaluation. Patient reported he has not been eating or drinking regularly. He has also noticed redness of RLE over last week and black area on bottom of foot admitted for RLE cellulitis    RLE cellultis / gangrene  cont PO doxycycline 100mg bid for 7 days . can dc zosyn. d/w ID. monitor temps  RLE dry gangrene- podiatry to debride as outpatient when cleared by vascular. wound care  Noted Podiatry recs 12/23:  Wound care instructions to be applied every 2-3 days to right foot  - Carefully remove dressings to right foot   - Apply xeroform to the blister of the right foot (top of foot)   - Apply betadine paint to right foot at the 4th/5th metatarsal heads, and 4th/5th digits.  - Apply sterile gauze to area, and wrap with kerlix and tape.   pain control prn. regimen adjusted. tylenol, oxycodone, dilaudid for breakthrough. gabapentin  Severe arterial stenosis of distal RLE  s/p L BKA Feb  s/p RLE angio  POD 3 s/p Bypass from R femoral artery to distal tibial artery  switched Iv dilaudid to po    Acute urinary retention likely from opiates vs post op  has freed placed this admission   cw with flomax   TOV TODAY    R groin hematoma related to  RHC and LHC 12/23  post op blood loss anemia  slow down trend of hb   I dw with cardio Dr palla- ok to dc from cardio stand point if hb stable   Cirrohisis (new)  outpatient followup  f/u with Dr. Anderson for further cirrhosis mgmt    Mitral stenosis.   S/p MVr with 28 CG future ring 2016 with TTE showing severe MS with mean gradient of 13 with mild LE edema and elevated JVP   s/p ALEX to further assess severity of MS 12/22  plan  Lasix 40 mg PO daily for diuresis, monitor electrolytes  Metoprolol succinate 25 mg PO daily for rate control    s/p RIGHT AND LEFT HEART CATH 12/23-LHC & RHC  showed patent grafts PCWP 28.   continue lasix 40 mg po daily   incidental finding 1.1cm left renal lesion   f/u PMDoutpt renal MRI     HTN, HLD  lisinopril, amlodipine, imdur    CAD s/p CABG  cont ASA/plavix,     Diabetes  continue lantus 15units qHS. mod ISS. BGMs ac hs. FS goal <180.      dvt ppx : heparin  s for as vascular/podiatry; patient stable for dc back to long term care. will need outpatient vascular/podiatry followup. needs outpatient debridment when cleared by vascular

## 2022-12-26 NOTE — PROVIDER CONTACT NOTE (OTHER) - REASON
notified pcp
cardio consult
no Iv access
Dr Nahid Fierro (PCP)
Notification of admission to PCP
Lantus dose concern
consult
gi consult
low urine output

## 2022-12-26 NOTE — PROGRESS NOTE ADULT - SUBJECTIVE AND OBJECTIVE BOX
12/25 no sob ,no CP , no pain to r groin , says r foot post op pain  ,   12/26 AGREEABLE TO SWITCH iV DILAUDID TO PO, TRIAL OF VOIDING TODAY, DENIES SOB, cp, VOMITING, TOLERATING PO    Review of Systems:  General:denies fever chills, headache, weakness  HEENT: denies blurry vision,diffculty swallowing, difficulty hearing, tinnitus  Cardiovascular: denies chest pain  ,palpitations  Pulmonary:denies shortness of breath, cough, wheezing, hemoptysis  Gastrointestinal: denies abdominal pain, constipation, diarrhea,nausea , vomiting, hematochezia  : denies hematuria, dysuria, or incontinence  Neurological: denies weakness, numbness , tingling, dizziness, tremors  MSK: denies muscle pain, difficulty ambulating, swelling, back pain  skin: denies skin rash, itching, burning, or  skin lesions  Psychiatrical: denies mood disturbances, anxierty, feeling depressed, depression , or difficulty sleeping    Physical Exam:  General: comfortable, no acute distress, well nourished  HEENT: Atraumatic, no LAD, trachea midline, PERRLA  Cardiovascular: normal s1s2, no murmurs, gallops or fricition rubs  Pulmonary: clear to ausculation Bilaterally, no wheezing , rhonchi  Gastrointestinal: soft non tender non distended, no masses felt, no organomegally  Muscloskeletal: no lower extremity edema, L BKA. RLE : Dry eschar wound noted to the right 4th/5th metatarsal. Dry small eschar lesions to Right distal toes 2-3. Hemorrhagic bulla to extending toanteromedial R ankle Neurological: CN II-12 intact. No focal weakness  Psychiatrical: normal mood, cooperative  SKIN: no rash, lesions or ulcers      VITALS     Daily     Daily     ICU Vital Signs Last 24 Hrs  T(C): 36.4 (26 Dec 2022 15:29), Max: 37.1 (26 Dec 2022 08:55)  T(F): 97.6 (26 Dec 2022 15:29), Max: 98.7 (26 Dec 2022 08:55)  HR: 76 (26 Dec 2022 15:29) (76 - 82)  BP: 133/74 (26 Dec 2022 15:29) (129/63 - 146/77)  BP(mean): --  ABP: --  ABP(mean): --  RR: 18 (26 Dec 2022 15:29) (18 - 18)  SpO2: 98% (26 Dec 2022 15:29) (98% - 99%)    O2 Parameters below as of 26 Dec 2022 15:29  Patient On (Oxygen Delivery Method): room air                              9.1    10.50 )-----------( 315      ( 25 Dec 2022 07:28 )             28.2       CBC Full  -  ( 25 Dec 2022 07:28 )  WBC Count : 10.50 K/uL  RBC Count : 2.95 M/uL  Hemoglobin : 9.1 g/dL  Hematocrit : 28.2 %  Platelet Count - Automated : 315 K/uL  Mean Cell Volume : 95.6 fl  Mean Cell Hemoglobin : 30.8 pg  Mean Cell Hemoglobin Concentration : 32.3 gm/dL  Auto Neutrophil # : 7.15 K/uL  Auto Lymphocyte # : 1.59 K/uL  Auto Monocyte # : 1.11 K/uL  Auto Eosinophil # : 0.52 K/uL  Auto Basophil # : 0.06 K/uL  Auto Neutrophil % : 68.0 %  Auto Lymphocyte % : 15.1 %  Auto Monocyte % : 10.6 %  Auto Eosinophil % : 5.0 %  Auto Basophil % : 0.6 %      12-26    142  |  108  |  18  ----------------------------<  128<H>  3.3<L>   |  28  |  0.86    Ca    9.3      26 Dec 2022 07:35                              MEDICATIONS  (STANDING):  amLODIPine   Tablet 5 milliGRAM(s) Oral daily  aspirin  chewable 81 milliGRAM(s) Oral daily  aspirin  chewable 81 milliGRAM(s) Oral once  clopidogrel Tablet 75 milliGRAM(s) Oral daily  dextrose 5%. 1000 milliLiter(s) (50 mL/Hr) IV Continuous <Continuous>  dextrose 5%. 1000 milliLiter(s) (100 mL/Hr) IV Continuous <Continuous>  dextrose 50% Injectable 25 Gram(s) IV Push once  dextrose 50% Injectable 12.5 Gram(s) IV Push once  dextrose 50% Injectable 25 Gram(s) IV Push once  doxycycline monohydrate Capsule 100 milliGRAM(s) Oral every 12 hours  famotidine    Tablet 40 milliGRAM(s) Oral at bedtime  furosemide    Tablet 40 milliGRAM(s) Oral daily  gabapentin 300 milliGRAM(s) Oral two times a day  gabapentin 600 milliGRAM(s) Oral at bedtime  glucagon  Injectable 1 milliGRAM(s) IntraMuscular once  heparin   Injectable 5000 Unit(s) SubCutaneous every 8 hours  insulin glargine Injectable (LANTUS) 20 Unit(s) SubCutaneous at bedtime  insulin lispro (ADMELOG) corrective regimen sliding scale   SubCutaneous Before meals and at bedtime  insulin lispro Injectable (ADMELOG) 4 Unit(s) SubCutaneous three times a day before meals  isosorbide   mononitrate ER Tablet (IMDUR) 30 milliGRAM(s) Oral daily  metoclopramide 10 milliGRAM(s) Oral daily  metoprolol succinate ER 25 milliGRAM(s) Oral daily  multivitamin 1 Tablet(s) Oral daily  pantoprazole    Tablet 40 milliGRAM(s) Oral every 12 hours  polyethylene glycol 3350 17 Gram(s) Oral daily  potassium chloride    Tablet ER 40 milliEquivalent(s) Oral once  senna 2 Tablet(s) Oral at bedtime  simethicone 80 milliGRAM(s) Chew every 6 hours  simvastatin 10 milliGRAM(s) Oral at bedtime  sodium chloride 0.9% lock flush 3 milliLiter(s) IV Push every 8 hours  sucralfate suspension 1 Gram(s) Oral four times a day  tamsulosin 0.8 milliGRAM(s) Oral at bedtime

## 2022-12-27 ENCOUNTER — TRANSCRIPTION ENCOUNTER (OUTPATIENT)
Age: 62
End: 2022-12-27

## 2022-12-27 VITALS
SYSTOLIC BLOOD PRESSURE: 137 MMHG | OXYGEN SATURATION: 96 % | DIASTOLIC BLOOD PRESSURE: 62 MMHG | RESPIRATION RATE: 19 BRPM | TEMPERATURE: 98 F | HEART RATE: 77 BPM

## 2022-12-27 LAB
ANION GAP SERPL CALC-SCNC: 7 MMOL/L — SIGNIFICANT CHANGE UP (ref 5–17)
BUN SERPL-MCNC: 19 MG/DL — SIGNIFICANT CHANGE UP (ref 7–23)
CALCIUM SERPL-MCNC: 9 MG/DL — SIGNIFICANT CHANGE UP (ref 8.5–10.1)
CHLORIDE SERPL-SCNC: 112 MMOL/L — HIGH (ref 96–108)
CO2 SERPL-SCNC: 27 MMOL/L — SIGNIFICANT CHANGE UP (ref 22–31)
CREAT SERPL-MCNC: 1.05 MG/DL — SIGNIFICANT CHANGE UP (ref 0.5–1.3)
EGFR: 81 ML/MIN/1.73M2 — SIGNIFICANT CHANGE UP
GLUCOSE BLDC GLUCOMTR-MCNC: 104 MG/DL — HIGH (ref 70–99)
GLUCOSE BLDC GLUCOMTR-MCNC: 142 MG/DL — HIGH (ref 70–99)
GLUCOSE BLDC GLUCOMTR-MCNC: 219 MG/DL — HIGH (ref 70–99)
GLUCOSE SERPL-MCNC: 100 MG/DL — HIGH (ref 70–99)
POTASSIUM SERPL-MCNC: 3.4 MMOL/L — LOW (ref 3.5–5.3)
POTASSIUM SERPL-SCNC: 3.4 MMOL/L — LOW (ref 3.5–5.3)
SARS-COV-2 RNA SPEC QL NAA+PROBE: SIGNIFICANT CHANGE UP
SODIUM SERPL-SCNC: 146 MMOL/L — HIGH (ref 135–145)

## 2022-12-27 PROCEDURE — 99238 HOSP IP/OBS DSCHRG MGMT 30/<: CPT

## 2022-12-27 RX ORDER — HYDROMORPHONE HYDROCHLORIDE 2 MG/ML
4 INJECTION INTRAMUSCULAR; INTRAVENOUS; SUBCUTANEOUS ONCE
Refills: 0 | Status: DISCONTINUED | OUTPATIENT
Start: 2022-12-27 | End: 2022-12-27

## 2022-12-27 RX ORDER — INSULIN LISPRO 100/ML
4 VIAL (ML) SUBCUTANEOUS
Qty: 0 | Refills: 0 | DISCHARGE

## 2022-12-27 RX ORDER — HYDROMORPHONE HYDROCHLORIDE 2 MG/ML
2 INJECTION INTRAMUSCULAR; INTRAVENOUS; SUBCUTANEOUS
Qty: 0 | Refills: 0 | DISCHARGE
Start: 2022-12-27

## 2022-12-27 RX ORDER — HYDROMORPHONE HYDROCHLORIDE 2 MG/ML
1.5 INJECTION INTRAMUSCULAR; INTRAVENOUS; SUBCUTANEOUS
Qty: 0 | Refills: 0 | DISCHARGE
Start: 2022-12-27 | End: 2023-01-01

## 2022-12-27 RX ORDER — OXYCODONE HYDROCHLORIDE 5 MG/1
1 TABLET ORAL
Qty: 0 | Refills: 0 | DISCHARGE

## 2022-12-27 RX ORDER — INSULIN LISPRO 100/ML
0 VIAL (ML) SUBCUTANEOUS
Qty: 0 | Refills: 0 | DISCHARGE

## 2022-12-27 RX ADMIN — Medication 4 UNIT(S): at 17:16

## 2022-12-27 RX ADMIN — Medication 4: at 17:15

## 2022-12-27 RX ADMIN — HYDROMORPHONE HYDROCHLORIDE 2 MILLIGRAM(S): 2 INJECTION INTRAMUSCULAR; INTRAVENOUS; SUBCUTANEOUS at 01:21

## 2022-12-27 RX ADMIN — HYDROMORPHONE HYDROCHLORIDE 4 MILLIGRAM(S): 2 INJECTION INTRAMUSCULAR; INTRAVENOUS; SUBCUTANEOUS at 12:28

## 2022-12-27 RX ADMIN — Medication 25 MILLIGRAM(S): at 09:08

## 2022-12-27 RX ADMIN — HYDROMORPHONE HYDROCHLORIDE 4 MILLIGRAM(S): 2 INJECTION INTRAMUSCULAR; INTRAVENOUS; SUBCUTANEOUS at 10:47

## 2022-12-27 RX ADMIN — Medication 100 MILLIGRAM(S): at 09:12

## 2022-12-27 RX ADMIN — HEPARIN SODIUM 5000 UNIT(S): 5000 INJECTION INTRAVENOUS; SUBCUTANEOUS at 06:02

## 2022-12-27 RX ADMIN — Medication 4 UNIT(S): at 08:04

## 2022-12-27 RX ADMIN — HYDROMORPHONE HYDROCHLORIDE 4 MILLIGRAM(S): 2 INJECTION INTRAMUSCULAR; INTRAVENOUS; SUBCUTANEOUS at 08:15

## 2022-12-27 RX ADMIN — OXYCODONE HYDROCHLORIDE 5 MILLIGRAM(S): 5 TABLET ORAL at 11:32

## 2022-12-27 RX ADMIN — OXYCODONE HYDROCHLORIDE 5 MILLIGRAM(S): 5 TABLET ORAL at 10:40

## 2022-12-27 RX ADMIN — HYDROMORPHONE HYDROCHLORIDE 4 MILLIGRAM(S): 2 INJECTION INTRAMUSCULAR; INTRAVENOUS; SUBCUTANEOUS at 17:16

## 2022-12-27 RX ADMIN — AMLODIPINE BESYLATE 5 MILLIGRAM(S): 2.5 TABLET ORAL at 09:07

## 2022-12-27 RX ADMIN — GABAPENTIN 300 MILLIGRAM(S): 400 CAPSULE ORAL at 06:03

## 2022-12-27 RX ADMIN — Medication 40 MILLIGRAM(S): at 09:12

## 2022-12-27 RX ADMIN — HYDROMORPHONE HYDROCHLORIDE 2 MILLIGRAM(S): 2 INJECTION INTRAMUSCULAR; INTRAVENOUS; SUBCUTANEOUS at 02:20

## 2022-12-27 RX ADMIN — Medication 1 TABLET(S): at 09:08

## 2022-12-27 RX ADMIN — ISOSORBIDE MONONITRATE 30 MILLIGRAM(S): 60 TABLET, EXTENDED RELEASE ORAL at 09:08

## 2022-12-27 RX ADMIN — Medication 1 GRAM(S): at 11:30

## 2022-12-27 RX ADMIN — PANTOPRAZOLE SODIUM 40 MILLIGRAM(S): 20 TABLET, DELAYED RELEASE ORAL at 09:11

## 2022-12-27 RX ADMIN — SODIUM CHLORIDE 3 MILLILITER(S): 9 INJECTION INTRAMUSCULAR; INTRAVENOUS; SUBCUTANEOUS at 11:40

## 2022-12-27 RX ADMIN — SODIUM CHLORIDE 3 MILLILITER(S): 9 INJECTION INTRAMUSCULAR; INTRAVENOUS; SUBCUTANEOUS at 05:13

## 2022-12-27 RX ADMIN — ONDANSETRON 4 MILLIGRAM(S): 8 TABLET, FILM COATED ORAL at 09:09

## 2022-12-27 RX ADMIN — CLOPIDOGREL BISULFATE 75 MILLIGRAM(S): 75 TABLET, FILM COATED ORAL at 09:11

## 2022-12-27 RX ADMIN — OXYCODONE HYDROCHLORIDE 5 MILLIGRAM(S): 5 TABLET ORAL at 06:03

## 2022-12-27 RX ADMIN — Medication 81 MILLIGRAM(S): at 09:08

## 2022-12-27 RX ADMIN — Medication 1 GRAM(S): at 06:03

## 2022-12-27 RX ADMIN — Medication 10 MILLIGRAM(S): at 09:16

## 2022-12-27 RX ADMIN — GABAPENTIN 300 MILLIGRAM(S): 400 CAPSULE ORAL at 14:20

## 2022-12-27 NOTE — DISCHARGE NOTE NURSING/CASE MANAGEMENT/SOCIAL WORK - PATIENT PORTAL LINK FT
You can access the FollowMyHealth Patient Portal offered by [facility] by registering at the following website: [email or web address]. By joining [employer]’s FollowMyHealth portal, you will also be able to view your health information using other applications (apps) compatible with our system.

## 2022-12-27 NOTE — DISCHARGE NOTE NURSING/CASE MANAGEMENT/SOCIAL WORK - NSDCPEFALRISK_GEN_ALL_CORE
For information on Fall & Injury Prevention, visit: [email or web address] OR  [email or web address] OR  https://www.cdc.gov/steadi/patient.html

## 2022-12-29 ENCOUNTER — NON-APPOINTMENT (OUTPATIENT)
Age: 62
End: 2022-12-29

## 2022-12-29 DIAGNOSIS — E11.9 TYPE 2 DIABETES MELLITUS W/OUT COMPLICATIONS: ICD-10-CM

## 2022-12-29 DIAGNOSIS — Z86.79 PERSONAL HISTORY OF OTHER DISEASES OF THE CIRCULATORY SYSTEM: ICD-10-CM

## 2022-12-29 DIAGNOSIS — E78.5 HYPERLIPIDEMIA, UNSPECIFIED: ICD-10-CM

## 2022-12-29 RX ORDER — TAMSULOSIN HYDROCHLORIDE 0.4 MG/1
0.4 CAPSULE ORAL
Refills: 0 | Status: ACTIVE | COMMUNITY
Start: 2022-12-29

## 2022-12-29 RX ORDER — INSULIN LISPRO 100 [IU]/ML
100 INJECTION, SOLUTION INTRAVENOUS; SUBCUTANEOUS
Refills: 0 | Status: ACTIVE | COMMUNITY
Start: 2022-12-29

## 2022-12-29 RX ORDER — PANTOPRAZOLE 40 MG/1
40 TABLET, DELAYED RELEASE ORAL TWICE DAILY
Qty: 60 | Refills: 0 | Status: ACTIVE | COMMUNITY
Start: 2022-12-29

## 2022-12-29 RX ORDER — INSULIN GLARGINE 100 [IU]/ML
100 INJECTION, SOLUTION SUBCUTANEOUS
Refills: 0 | Status: ACTIVE | COMMUNITY
Start: 2022-12-29

## 2022-12-29 RX ORDER — ONDANSETRON 4 MG/1
4 TABLET ORAL
Refills: 0 | Status: ACTIVE | COMMUNITY
Start: 2022-12-29

## 2022-12-29 RX ORDER — ASPIRIN 81 MG/1
81 TABLET ORAL
Refills: 0 | Status: ACTIVE | COMMUNITY
Start: 2022-12-29

## 2022-12-29 RX ORDER — ACETAMINOPHEN 325 MG/1
325 TABLET, FILM COATED ORAL EVERY 6 HOURS
Refills: 0 | Status: ACTIVE | COMMUNITY
Start: 2022-12-29

## 2022-12-29 RX ORDER — METOPROLOL SUCCINATE 25 MG/1
25 TABLET, EXTENDED RELEASE ORAL
Qty: 90 | Refills: 1 | Status: ACTIVE | COMMUNITY
Start: 2022-12-29

## 2022-12-29 RX ORDER — GABAPENTIN 600 MG/1
600 TABLET, COATED ORAL
Refills: 0 | Status: ACTIVE | COMMUNITY
Start: 2022-12-29

## 2022-12-29 RX ORDER — SUCRALFATE 1 G/10ML
1 SUSPENSION ORAL 4 TIMES DAILY
Refills: 0 | Status: ACTIVE | COMMUNITY
Start: 2022-12-29

## 2022-12-29 RX ORDER — CLOPIDOGREL BISULFATE 75 MG/1
75 TABLET, FILM COATED ORAL
Qty: 90 | Refills: 0 | Status: ACTIVE | COMMUNITY
Start: 2022-12-29

## 2022-12-29 RX ORDER — FUROSEMIDE 40 MG/1
40 TABLET ORAL DAILY
Qty: 30 | Refills: 1 | Status: ACTIVE | COMMUNITY
Start: 2022-12-29

## 2022-12-29 RX ORDER — SIMVASTATIN 10 MG/1
10 TABLET, FILM COATED ORAL
Qty: 90 | Refills: 3 | Status: ACTIVE | COMMUNITY
Start: 2022-12-29

## 2022-12-29 RX ORDER — SIMETHICONE 125 MG
125 CAPSULE ORAL
Refills: 0 | Status: ACTIVE | COMMUNITY
Start: 2022-12-29

## 2022-12-29 RX ORDER — FAMOTIDINE 20 MG/1
20 TABLET, FILM COATED ORAL
Refills: 0 | Status: ACTIVE | COMMUNITY
Start: 2022-12-29

## 2022-12-29 RX ORDER — HYDROMORPHONE HYDROCHLORIDE 2 MG/1
2 TABLET ORAL
Refills: 0 | Status: ACTIVE | COMMUNITY
Start: 2022-12-29

## 2022-12-29 RX ORDER — MULTIVITAMIN
TABLET ORAL DAILY
Refills: 0 | Status: ACTIVE | COMMUNITY
Start: 2022-12-29

## 2023-01-01 ENCOUNTER — INPATIENT (INPATIENT)
Facility: HOSPITAL | Age: 63
LOS: 3 days | Discharge: HOME | DRG: 398 | End: 2023-11-26
Attending: GENERAL PRACTICE | Admitting: GENERAL PRACTICE
Payer: MEDICARE

## 2023-01-01 ENCOUNTER — TRANSCRIPTION ENCOUNTER (OUTPATIENT)
Age: 63
End: 2023-01-01

## 2023-01-01 ENCOUNTER — INPATIENT (INPATIENT)
Facility: HOSPITAL | Age: 63
LOS: 8 days | Discharge: NURSING, REHAB OR LONG-TERM CARE | DRG: 871 | End: 2023-06-06
Attending: HOSPITALIST | Admitting: INTERNAL MEDICINE
Payer: MEDICARE

## 2023-01-01 ENCOUNTER — RESULT REVIEW (OUTPATIENT)
Age: 63
End: 2023-01-01

## 2023-01-01 ENCOUNTER — EMERGENCY (EMERGENCY)
Facility: HOSPITAL | Age: 63
LOS: 0 days | Discharge: HOME | End: 2023-06-23
Attending: EMERGENCY MEDICINE
Payer: MEDICARE

## 2023-01-01 ENCOUNTER — APPOINTMENT (OUTPATIENT)
Dept: CARDIOTHORACIC SURGERY | Facility: CLINIC | Age: 63
End: 2023-01-01
Payer: MEDICARE

## 2023-01-01 ENCOUNTER — INPATIENT (INPATIENT)
Facility: HOSPITAL | Age: 63
LOS: 8 days | Discharge: OTHER INSTITUTION | DRG: 74 | End: 2023-12-26
Attending: INTERNAL MEDICINE | Admitting: STUDENT IN AN ORGANIZED HEALTH CARE EDUCATION/TRAINING PROGRAM
Payer: MEDICARE

## 2023-01-01 ENCOUNTER — APPOINTMENT (OUTPATIENT)
Dept: GASTROENTEROLOGY | Facility: CLINIC | Age: 63
End: 2023-01-01
Payer: MEDICARE

## 2023-01-01 ENCOUNTER — EMERGENCY (EMERGENCY)
Facility: HOSPITAL | Age: 63
LOS: 0 days | Discharge: HOME | End: 2023-03-08
Attending: EMERGENCY MEDICINE
Payer: MEDICARE

## 2023-01-01 VITALS
HEART RATE: 95 BPM | OXYGEN SATURATION: 96 % | SYSTOLIC BLOOD PRESSURE: 145 MMHG | DIASTOLIC BLOOD PRESSURE: 83 MMHG | WEIGHT: 160.06 LBS | RESPIRATION RATE: 18 BRPM | HEIGHT: 64 IN | TEMPERATURE: 99 F

## 2023-01-01 VITALS
DIASTOLIC BLOOD PRESSURE: 61 MMHG | SYSTOLIC BLOOD PRESSURE: 131 MMHG | TEMPERATURE: 98 F | HEART RATE: 73 BPM | RESPIRATION RATE: 18 BRPM | OXYGEN SATURATION: 100 %

## 2023-01-01 VITALS
OXYGEN SATURATION: 98 % | RESPIRATION RATE: 16 BRPM | SYSTOLIC BLOOD PRESSURE: 148 MMHG | HEIGHT: 65 IN | HEART RATE: 85 BPM | TEMPERATURE: 98 F | DIASTOLIC BLOOD PRESSURE: 85 MMHG

## 2023-01-01 VITALS
OXYGEN SATURATION: 100 % | SYSTOLIC BLOOD PRESSURE: 152 MMHG | DIASTOLIC BLOOD PRESSURE: 82 MMHG | RESPIRATION RATE: 18 BRPM | HEART RATE: 86 BPM | TEMPERATURE: 98 F

## 2023-01-01 VITALS — DIASTOLIC BLOOD PRESSURE: 88 MMHG | SYSTOLIC BLOOD PRESSURE: 153 MMHG

## 2023-01-01 VITALS
WEIGHT: 185 LBS | HEIGHT: 70 IN | OXYGEN SATURATION: 98 % | HEART RATE: 74 BPM | DIASTOLIC BLOOD PRESSURE: 78 MMHG | RESPIRATION RATE: 16 BRPM | BODY MASS INDEX: 26.48 KG/M2 | TEMPERATURE: 96.4 F | SYSTOLIC BLOOD PRESSURE: 120 MMHG

## 2023-01-01 VITALS
SYSTOLIC BLOOD PRESSURE: 141 MMHG | DIASTOLIC BLOOD PRESSURE: 67 MMHG | OXYGEN SATURATION: 92 % | RESPIRATION RATE: 18 BRPM | HEIGHT: 70 IN | HEART RATE: 108 BPM | TEMPERATURE: 98 F | WEIGHT: 195.11 LBS

## 2023-01-01 VITALS
SYSTOLIC BLOOD PRESSURE: 153 MMHG | HEART RATE: 88 BPM | OXYGEN SATURATION: 96 % | DIASTOLIC BLOOD PRESSURE: 81 MMHG | TEMPERATURE: 98 F | RESPIRATION RATE: 18 BRPM

## 2023-01-01 VITALS
SYSTOLIC BLOOD PRESSURE: 138 MMHG | TEMPERATURE: 98 F | HEART RATE: 73 BPM | DIASTOLIC BLOOD PRESSURE: 73 MMHG | RESPIRATION RATE: 17 BRPM | OXYGEN SATURATION: 100 %

## 2023-01-01 VITALS
SYSTOLIC BLOOD PRESSURE: 128 MMHG | BODY MASS INDEX: 25.77 KG/M2 | OXYGEN SATURATION: 98 % | HEIGHT: 70 IN | RESPIRATION RATE: 16 BRPM | TEMPERATURE: 97.8 F | DIASTOLIC BLOOD PRESSURE: 74 MMHG | HEART RATE: 74 BPM | WEIGHT: 180 LBS

## 2023-01-01 VITALS
RESPIRATION RATE: 18 BRPM | HEIGHT: 65 IN | TEMPERATURE: 98 F | HEART RATE: 92 BPM | OXYGEN SATURATION: 100 % | SYSTOLIC BLOOD PRESSURE: 151 MMHG | DIASTOLIC BLOOD PRESSURE: 101 MMHG | WEIGHT: 164.91 LBS

## 2023-01-01 VITALS
WEIGHT: 179.9 LBS | TEMPERATURE: 98 F | OXYGEN SATURATION: 100 % | SYSTOLIC BLOOD PRESSURE: 164 MMHG | HEART RATE: 99 BPM | RESPIRATION RATE: 20 BRPM | DIASTOLIC BLOOD PRESSURE: 74 MMHG | HEIGHT: 70 IN

## 2023-01-01 DIAGNOSIS — J93.9 PNEUMOTHORAX, UNSPECIFIED: ICD-10-CM

## 2023-01-01 DIAGNOSIS — Z79.82 LONG TERM (CURRENT) USE OF ASPIRIN: ICD-10-CM

## 2023-01-01 DIAGNOSIS — I25.10 ATHEROSCLEROTIC HEART DISEASE OF NATIVE CORONARY ARTERY W/OUT ANGINA PECTORIS: ICD-10-CM

## 2023-01-01 DIAGNOSIS — R10.9 UNSPECIFIED ABDOMINAL PAIN: ICD-10-CM

## 2023-01-01 DIAGNOSIS — I11.0 HYPERTENSIVE HEART DISEASE WITH HEART FAILURE: ICD-10-CM

## 2023-01-01 DIAGNOSIS — Z95.1 PRESENCE OF AORTOCORONARY BYPASS GRAFT: Chronic | ICD-10-CM

## 2023-01-01 DIAGNOSIS — F17.200 NICOTINE DEPENDENCE, UNSPECIFIED, UNCOMPLICATED: ICD-10-CM

## 2023-01-01 DIAGNOSIS — Z89.431 ACQUIRED ABSENCE OF RIGHT FOOT: ICD-10-CM

## 2023-01-01 DIAGNOSIS — R11.2 NAUSEA WITH VOMITING, UNSPECIFIED: ICD-10-CM

## 2023-01-01 DIAGNOSIS — I05.0 RHEUMATIC MITRAL STENOSIS: ICD-10-CM

## 2023-01-01 DIAGNOSIS — Z89.619 ACQUIRED ABSENCE OF UNSPECIFIED LEG ABOVE KNEE: Chronic | ICD-10-CM

## 2023-01-01 DIAGNOSIS — Z79.84 LONG TERM (CURRENT) USE OF ORAL HYPOGLYCEMIC DRUGS: ICD-10-CM

## 2023-01-01 DIAGNOSIS — E11.43 TYPE 2 DIABETES MELLITUS WITH DIABETIC AUTONOMIC (POLY)NEUROPATHY: ICD-10-CM

## 2023-01-01 DIAGNOSIS — Z89.512 ACQUIRED ABSENCE OF LEFT LEG BELOW KNEE: ICD-10-CM

## 2023-01-01 DIAGNOSIS — F10.11 ALCOHOL ABUSE, IN REMISSION: ICD-10-CM

## 2023-01-01 DIAGNOSIS — J18.9 PNEUMONIA, UNSPECIFIED ORGANISM: ICD-10-CM

## 2023-01-01 DIAGNOSIS — E11.9 TYPE 2 DIABETES MELLITUS WITHOUT COMPLICATIONS: ICD-10-CM

## 2023-01-01 DIAGNOSIS — I50.9 HEART FAILURE, UNSPECIFIED: ICD-10-CM

## 2023-01-01 DIAGNOSIS — J96.00 ACUTE RESPIRATORY FAILURE, UNSPECIFIED WHETHER WITH HYPOXIA OR HYPERCAPNIA: ICD-10-CM

## 2023-01-01 DIAGNOSIS — K57.30 DIVERTICULOSIS OF LARGE INTESTINE WITHOUT PERFORATION OR ABSCESS WITHOUT BLEEDING: ICD-10-CM

## 2023-01-01 DIAGNOSIS — J98.11 ATELECTASIS: ICD-10-CM

## 2023-01-01 DIAGNOSIS — K35.30 ACUTE APPENDICITIS WITH LOCALIZED PERITONITIS, WITHOUT PERFORATION OR GANGRENE: ICD-10-CM

## 2023-01-01 DIAGNOSIS — Z95.2 PRESENCE OF PROSTHETIC HEART VALVE: ICD-10-CM

## 2023-01-01 DIAGNOSIS — I34.0 NONRHEUMATIC MITRAL (VALVE) INSUFFICIENCY: ICD-10-CM

## 2023-01-01 DIAGNOSIS — I10 ESSENTIAL (PRIMARY) HYPERTENSION: ICD-10-CM

## 2023-01-01 DIAGNOSIS — I25.10 ATHEROSCLEROTIC HEART DISEASE OF NATIVE CORONARY ARTERY WITHOUT ANGINA PECTORIS: ICD-10-CM

## 2023-01-01 DIAGNOSIS — E11.40 TYPE 2 DIABETES MELLITUS WITH DIABETIC NEUROPATHY, UNSPECIFIED: ICD-10-CM

## 2023-01-01 DIAGNOSIS — Z90.49 ACQUIRED ABSENCE OF OTHER SPECIFIED PARTS OF DIGESTIVE TRACT: ICD-10-CM

## 2023-01-01 DIAGNOSIS — E43 UNSPECIFIED SEVERE PROTEIN-CALORIE MALNUTRITION: ICD-10-CM

## 2023-01-01 DIAGNOSIS — Z95.1 PRESENCE OF AORTOCORONARY BYPASS GRAFT: ICD-10-CM

## 2023-01-01 DIAGNOSIS — R10.13 EPIGASTRIC PAIN: ICD-10-CM

## 2023-01-01 DIAGNOSIS — Z89.429 ACQUIRED ABSENCE OF OTHER TOE(S), UNSPECIFIED SIDE: ICD-10-CM

## 2023-01-01 DIAGNOSIS — Z79.4 LONG TERM (CURRENT) USE OF INSULIN: ICD-10-CM

## 2023-01-01 DIAGNOSIS — K21.9 GASTRO-ESOPHAGEAL REFLUX DISEASE WITHOUT ESOPHAGITIS: ICD-10-CM

## 2023-01-01 DIAGNOSIS — Z79.02 LONG TERM (CURRENT) USE OF ANTITHROMBOTICS/ANTIPLATELETS: ICD-10-CM

## 2023-01-01 DIAGNOSIS — I27.20 PULMONARY HYPERTENSION, UNSPECIFIED: ICD-10-CM

## 2023-01-01 DIAGNOSIS — D64.9 ANEMIA, UNSPECIFIED: ICD-10-CM

## 2023-01-01 DIAGNOSIS — J90 PLEURAL EFFUSION, NOT ELSEWHERE CLASSIFIED: ICD-10-CM

## 2023-01-01 DIAGNOSIS — K35.80 UNSPECIFIED ACUTE APPENDICITIS: ICD-10-CM

## 2023-01-01 DIAGNOSIS — N17.9 ACUTE KIDNEY FAILURE, UNSPECIFIED: ICD-10-CM

## 2023-01-01 DIAGNOSIS — I50.33 ACUTE ON CHRONIC DIASTOLIC (CONGESTIVE) HEART FAILURE: ICD-10-CM

## 2023-01-01 DIAGNOSIS — K70.30 ALCOHOLIC CIRRHOSIS OF LIVER WITHOUT ASCITES: ICD-10-CM

## 2023-01-01 DIAGNOSIS — F17.210 NICOTINE DEPENDENCE, CIGARETTES, UNCOMPLICATED: ICD-10-CM

## 2023-01-01 DIAGNOSIS — R33.9 RETENTION OF URINE, UNSPECIFIED: ICD-10-CM

## 2023-01-01 DIAGNOSIS — J81.0 ACUTE PULMONARY EDEMA: ICD-10-CM

## 2023-01-01 DIAGNOSIS — K70.31 ALCOHOLIC CIRRHOSIS OF LIVER WITH ASCITES: ICD-10-CM

## 2023-01-01 DIAGNOSIS — I73.9 PERIPHERAL VASCULAR DISEASE, UNSPECIFIED: ICD-10-CM

## 2023-01-01 DIAGNOSIS — E11.51 TYPE 2 DIABETES MELLITUS WITH DIABETIC PERIPHERAL ANGIOPATHY WITHOUT GANGRENE: ICD-10-CM

## 2023-01-01 DIAGNOSIS — N39.0 URINARY TRACT INFECTION, SITE NOT SPECIFIED: ICD-10-CM

## 2023-01-01 DIAGNOSIS — R59.9 ENLARGED LYMPH NODES, UNSPECIFIED: ICD-10-CM

## 2023-01-01 DIAGNOSIS — K59.00 CONSTIPATION, UNSPECIFIED: ICD-10-CM

## 2023-01-01 DIAGNOSIS — Z89.421 ACQUIRED ABSENCE OF OTHER RIGHT TOE(S): ICD-10-CM

## 2023-01-01 DIAGNOSIS — K74.60 UNSPECIFIED CIRRHOSIS OF LIVER: ICD-10-CM

## 2023-01-01 DIAGNOSIS — N40.0 BENIGN PROSTATIC HYPERPLASIA WITHOUT LOWER URINARY TRACT SYMPTOMS: ICD-10-CM

## 2023-01-01 DIAGNOSIS — K31.84 GASTROPARESIS: ICD-10-CM

## 2023-01-01 DIAGNOSIS — R06.02 SHORTNESS OF BREATH: ICD-10-CM

## 2023-01-01 DIAGNOSIS — G89.29 OTHER CHRONIC PAIN: ICD-10-CM

## 2023-01-01 DIAGNOSIS — M79.604 PAIN IN RIGHT LEG: ICD-10-CM

## 2023-01-01 DIAGNOSIS — Z20.822 CONTACT WITH AND (SUSPECTED) EXPOSURE TO COVID-19: ICD-10-CM

## 2023-01-01 DIAGNOSIS — M25.50 PAIN IN UNSPECIFIED JOINT: ICD-10-CM

## 2023-01-01 DIAGNOSIS — A41.9 SEPSIS, UNSPECIFIED ORGANISM: ICD-10-CM

## 2023-01-01 LAB
A1C WITH ESTIMATED AVERAGE GLUCOSE RESULT: 6.7 % — HIGH (ref 4–5.6)
A1C WITH ESTIMATED AVERAGE GLUCOSE RESULT: 7 % — HIGH (ref 4–5.6)
A1C WITH ESTIMATED AVERAGE GLUCOSE RESULT: 7 % — HIGH (ref 4–5.6)
ADD ON TEST-SPECIMEN IN LAB: SIGNIFICANT CHANGE UP
ALBUMIN FLD-MCNC: 1.4 G/DL — SIGNIFICANT CHANGE UP
ALBUMIN FLD-MCNC: 2.1 G/DL — SIGNIFICANT CHANGE UP
ALBUMIN SERPL ELPH-MCNC: 2.6 G/DL — LOW (ref 3.3–5)
ALBUMIN SERPL ELPH-MCNC: 2.6 G/DL — LOW (ref 3.3–5)
ALBUMIN SERPL ELPH-MCNC: 2.7 G/DL — LOW (ref 3.3–5)
ALBUMIN SERPL ELPH-MCNC: 2.9 G/DL — LOW (ref 3.3–5)
ALBUMIN SERPL ELPH-MCNC: 2.9 G/DL — LOW (ref 3.3–5)
ALBUMIN SERPL ELPH-MCNC: 3.1 G/DL — LOW (ref 3.3–5)
ALBUMIN SERPL ELPH-MCNC: 3.2 G/DL — LOW (ref 3.3–5)
ALBUMIN SERPL ELPH-MCNC: 3.2 G/DL — LOW (ref 3.3–5)
ALBUMIN SERPL ELPH-MCNC: 3.3 G/DL — SIGNIFICANT CHANGE UP (ref 3.3–5)
ALBUMIN SERPL ELPH-MCNC: 3.5 G/DL — SIGNIFICANT CHANGE UP (ref 3.3–5)
ALBUMIN SERPL ELPH-MCNC: 3.5 G/DL — SIGNIFICANT CHANGE UP (ref 3.3–5)
ALBUMIN SERPL ELPH-MCNC: 3.6 G/DL — SIGNIFICANT CHANGE UP (ref 3.3–5)
ALBUMIN SERPL ELPH-MCNC: 3.6 G/DL — SIGNIFICANT CHANGE UP (ref 3.3–5)
ALP SERPL-CCNC: 152 U/L — HIGH (ref 40–120)
ALP SERPL-CCNC: 152 U/L — HIGH (ref 40–120)
ALP SERPL-CCNC: 162 U/L — HIGH (ref 40–120)
ALP SERPL-CCNC: 162 U/L — HIGH (ref 40–120)
ALP SERPL-CCNC: 172 U/L — HIGH (ref 40–120)
ALP SERPL-CCNC: 172 U/L — HIGH (ref 40–120)
ALP SERPL-CCNC: 202 U/L — HIGH (ref 40–120)
ALP SERPL-CCNC: 202 U/L — HIGH (ref 40–120)
ALP SERPL-CCNC: 233 U/L — HIGH (ref 40–120)
ALP SERPL-CCNC: 237 U/L — HIGH (ref 40–120)
ALP SERPL-CCNC: 241 U/L — HIGH (ref 40–120)
ALP SERPL-CCNC: 256 U/L — HIGH (ref 40–120)
ALP SERPL-CCNC: 256 U/L — HIGH (ref 40–120)
ALP SERPL-CCNC: 257 U/L — HIGH (ref 40–120)
ALP SERPL-CCNC: 267 U/L — HIGH (ref 40–120)
ALP SERPL-CCNC: 272 U/L — HIGH (ref 40–120)
ALT FLD-CCNC: 14 U/L — SIGNIFICANT CHANGE UP (ref 12–78)
ALT FLD-CCNC: 17 U/L — SIGNIFICANT CHANGE UP (ref 12–78)
ALT FLD-CCNC: 19 U/L — SIGNIFICANT CHANGE UP (ref 12–78)
ALT FLD-CCNC: 20 U/L — SIGNIFICANT CHANGE UP (ref 12–78)
ALT FLD-CCNC: 21 U/L — SIGNIFICANT CHANGE UP (ref 12–78)
ALT FLD-CCNC: 26 U/L — SIGNIFICANT CHANGE UP (ref 12–78)
ALT FLD-CCNC: 26 U/L — SIGNIFICANT CHANGE UP (ref 12–78)
ALT FLD-CCNC: 27 U/L — SIGNIFICANT CHANGE UP (ref 12–78)
ALT FLD-CCNC: 27 U/L — SIGNIFICANT CHANGE UP (ref 12–78)
ALT FLD-CCNC: 28 U/L — SIGNIFICANT CHANGE UP (ref 12–78)
ALT FLD-CCNC: 31 U/L — SIGNIFICANT CHANGE UP (ref 12–78)
AMMONIA BLD-MCNC: 22 UMOL/L — SIGNIFICANT CHANGE UP (ref 11–32)
AMMONIA BLD-MCNC: 22 UMOL/L — SIGNIFICANT CHANGE UP (ref 11–32)
ANION GAP SERPL CALC-SCNC: 10 MMOL/L — SIGNIFICANT CHANGE UP (ref 5–17)
ANION GAP SERPL CALC-SCNC: 10 MMOL/L — SIGNIFICANT CHANGE UP (ref 5–17)
ANION GAP SERPL CALC-SCNC: 12 MMOL/L — SIGNIFICANT CHANGE UP (ref 5–17)
ANION GAP SERPL CALC-SCNC: 3 MMOL/L — LOW (ref 5–17)
ANION GAP SERPL CALC-SCNC: 4 MMOL/L — LOW (ref 5–17)
ANION GAP SERPL CALC-SCNC: 4 MMOL/L — LOW (ref 5–17)
ANION GAP SERPL CALC-SCNC: 5 MMOL/L — SIGNIFICANT CHANGE UP (ref 5–17)
ANION GAP SERPL CALC-SCNC: 6 MMOL/L — SIGNIFICANT CHANGE UP (ref 5–17)
ANION GAP SERPL CALC-SCNC: 7 MMOL/L — SIGNIFICANT CHANGE UP (ref 5–17)
APPEARANCE UR: CLEAR — SIGNIFICANT CHANGE UP
APTT BLD: 32 SEC — SIGNIFICANT CHANGE UP (ref 27.5–35.5)
APTT BLD: 35.2 SEC — SIGNIFICANT CHANGE UP (ref 24.5–35.6)
APTT BLD: 35.2 SEC — SIGNIFICANT CHANGE UP (ref 24.5–35.6)
APTT BLD: 36.5 SEC — HIGH (ref 27.5–35.5)
AST SERPL-CCNC: 10 U/L — LOW (ref 15–37)
AST SERPL-CCNC: 11 U/L — LOW (ref 15–37)
AST SERPL-CCNC: 12 U/L — LOW (ref 15–37)
AST SERPL-CCNC: 14 U/L — LOW (ref 15–37)
AST SERPL-CCNC: 15 U/L — SIGNIFICANT CHANGE UP (ref 15–37)
AST SERPL-CCNC: 15 U/L — SIGNIFICANT CHANGE UP (ref 15–37)
AST SERPL-CCNC: 20 U/L — SIGNIFICANT CHANGE UP (ref 15–37)
AST SERPL-CCNC: 22 U/L — SIGNIFICANT CHANGE UP (ref 15–37)
AST SERPL-CCNC: 22 U/L — SIGNIFICANT CHANGE UP (ref 15–37)
AST SERPL-CCNC: 23 U/L — SIGNIFICANT CHANGE UP (ref 15–37)
AST SERPL-CCNC: 24 U/L — SIGNIFICANT CHANGE UP (ref 15–37)
AST SERPL-CCNC: 26 U/L — SIGNIFICANT CHANGE UP (ref 15–37)
AST SERPL-CCNC: 26 U/L — SIGNIFICANT CHANGE UP (ref 15–37)
AST SERPL-CCNC: 9 U/L — LOW (ref 15–37)
B PERT IGG+IGM PNL SER: ABNORMAL
B PERT IGG+IGM PNL SER: ABNORMAL
BACTERIA # UR AUTO: ABNORMAL
BACTERIA # UR AUTO: ABNORMAL
BACTERIA # UR AUTO: NEGATIVE /HPF — SIGNIFICANT CHANGE UP
BACTERIA # UR AUTO: NEGATIVE — SIGNIFICANT CHANGE UP
BASOPHILS # BLD AUTO: 0.04 K/UL — SIGNIFICANT CHANGE UP (ref 0–0.2)
BASOPHILS # BLD AUTO: 0.04 K/UL — SIGNIFICANT CHANGE UP (ref 0–0.2)
BASOPHILS # BLD AUTO: 0.05 K/UL — SIGNIFICANT CHANGE UP (ref 0–0.2)
BASOPHILS # BLD AUTO: 0.06 K/UL — SIGNIFICANT CHANGE UP (ref 0–0.2)
BASOPHILS # BLD AUTO: 0.07 K/UL — SIGNIFICANT CHANGE UP (ref 0–0.2)
BASOPHILS # BLD AUTO: 0.08 K/UL — SIGNIFICANT CHANGE UP (ref 0–0.2)
BASOPHILS # BLD AUTO: 0.09 K/UL — SIGNIFICANT CHANGE UP (ref 0–0.2)
BASOPHILS # BLD AUTO: 0.12 K/UL — SIGNIFICANT CHANGE UP (ref 0–0.2)
BASOPHILS NFR BLD AUTO: 0.2 % — SIGNIFICANT CHANGE UP (ref 0–2)
BASOPHILS NFR BLD AUTO: 0.2 % — SIGNIFICANT CHANGE UP (ref 0–2)
BASOPHILS NFR BLD AUTO: 0.4 % — SIGNIFICANT CHANGE UP (ref 0–2)
BASOPHILS NFR BLD AUTO: 0.5 % — SIGNIFICANT CHANGE UP (ref 0–2)
BASOPHILS NFR BLD AUTO: 0.5 % — SIGNIFICANT CHANGE UP (ref 0–2)
BASOPHILS NFR BLD AUTO: 0.7 % — SIGNIFICANT CHANGE UP (ref 0–2)
BASOPHILS NFR BLD AUTO: 0.8 % — SIGNIFICANT CHANGE UP (ref 0–2)
BASOPHILS NFR BLD AUTO: 0.8 % — SIGNIFICANT CHANGE UP (ref 0–2)
BASOPHILS NFR BLD AUTO: 0.9 % — SIGNIFICANT CHANGE UP (ref 0–2)
BILIRUB DIRECT SERPL-MCNC: 0.3 MG/DL — SIGNIFICANT CHANGE UP (ref 0–0.3)
BILIRUB INDIRECT FLD-MCNC: 0.1 MG/DL — LOW (ref 0.2–1)
BILIRUB SERPL-MCNC: 0.3 MG/DL — SIGNIFICANT CHANGE UP (ref 0.2–1.2)
BILIRUB SERPL-MCNC: 0.4 MG/DL — SIGNIFICANT CHANGE UP (ref 0.2–1.2)
BILIRUB SERPL-MCNC: 0.5 MG/DL — SIGNIFICANT CHANGE UP (ref 0.2–1.2)
BILIRUB SERPL-MCNC: 0.6 MG/DL — SIGNIFICANT CHANGE UP (ref 0.2–1.2)
BILIRUB SERPL-MCNC: 0.9 MG/DL — SIGNIFICANT CHANGE UP (ref 0.2–1.2)
BILIRUB UR-MCNC: NEGATIVE — SIGNIFICANT CHANGE UP
BLD GP AB SCN SERPL QL: SIGNIFICANT CHANGE UP
BLD GP AB SCN SERPL QL: SIGNIFICANT CHANGE UP
BUN SERPL-MCNC: 11 MG/DL — SIGNIFICANT CHANGE UP (ref 7–23)
BUN SERPL-MCNC: 14 MG/DL — SIGNIFICANT CHANGE UP (ref 7–23)
BUN SERPL-MCNC: 16 MG/DL — SIGNIFICANT CHANGE UP (ref 7–23)
BUN SERPL-MCNC: 17 MG/DL — SIGNIFICANT CHANGE UP (ref 7–23)
BUN SERPL-MCNC: 21 MG/DL — SIGNIFICANT CHANGE UP (ref 7–23)
BUN SERPL-MCNC: 24 MG/DL — HIGH (ref 7–23)
BUN SERPL-MCNC: 24 MG/DL — HIGH (ref 7–23)
BUN SERPL-MCNC: 25 MG/DL — HIGH (ref 7–23)
BUN SERPL-MCNC: 25 MG/DL — HIGH (ref 7–23)
BUN SERPL-MCNC: 26 MG/DL — HIGH (ref 7–23)
BUN SERPL-MCNC: 26 MG/DL — HIGH (ref 7–23)
BUN SERPL-MCNC: 31 MG/DL — HIGH (ref 7–23)
BUN SERPL-MCNC: 33 MG/DL — HIGH (ref 7–23)
BUN SERPL-MCNC: 35 MG/DL — HIGH (ref 7–23)
BUN SERPL-MCNC: 36 MG/DL — HIGH (ref 7–23)
CALCIUM SERPL-MCNC: 10.1 MG/DL — SIGNIFICANT CHANGE UP (ref 8.5–10.1)
CALCIUM SERPL-MCNC: 10.3 MG/DL — HIGH (ref 8.5–10.1)
CALCIUM SERPL-MCNC: 10.3 MG/DL — HIGH (ref 8.5–10.1)
CALCIUM SERPL-MCNC: 8.6 MG/DL — SIGNIFICANT CHANGE UP (ref 8.5–10.1)
CALCIUM SERPL-MCNC: 8.6 MG/DL — SIGNIFICANT CHANGE UP (ref 8.5–10.1)
CALCIUM SERPL-MCNC: 8.7 MG/DL — SIGNIFICANT CHANGE UP (ref 8.5–10.1)
CALCIUM SERPL-MCNC: 8.8 MG/DL — SIGNIFICANT CHANGE UP (ref 8.5–10.1)
CALCIUM SERPL-MCNC: 8.9 MG/DL — SIGNIFICANT CHANGE UP (ref 8.5–10.1)
CALCIUM SERPL-MCNC: 9 MG/DL — SIGNIFICANT CHANGE UP (ref 8.5–10.1)
CALCIUM SERPL-MCNC: 9.1 MG/DL — SIGNIFICANT CHANGE UP (ref 8.5–10.1)
CALCIUM SERPL-MCNC: 9.4 MG/DL — SIGNIFICANT CHANGE UP (ref 8.5–10.1)
CALCIUM SERPL-MCNC: 9.5 MG/DL — SIGNIFICANT CHANGE UP (ref 8.5–10.1)
CAST: 1 /LPF — SIGNIFICANT CHANGE UP (ref 0–4)
CAST: 1 /LPF — SIGNIFICANT CHANGE UP (ref 0–4)
CHLORIDE SERPL-SCNC: 105 MMOL/L — SIGNIFICANT CHANGE UP (ref 96–108)
CHLORIDE SERPL-SCNC: 107 MMOL/L — SIGNIFICANT CHANGE UP (ref 96–108)
CHLORIDE SERPL-SCNC: 108 MMOL/L — SIGNIFICANT CHANGE UP (ref 96–108)
CHLORIDE SERPL-SCNC: 109 MMOL/L — HIGH (ref 96–108)
CHLORIDE SERPL-SCNC: 109 MMOL/L — HIGH (ref 96–108)
CHLORIDE SERPL-SCNC: 110 MMOL/L — HIGH (ref 96–108)
CHLORIDE SERPL-SCNC: 111 MMOL/L — HIGH (ref 96–108)
CHLORIDE SERPL-SCNC: 112 MMOL/L — HIGH (ref 96–108)
CHLORIDE SERPL-SCNC: 113 MMOL/L — HIGH (ref 96–108)
CHOLEST SERPL-MCNC: 131 MG/DL — SIGNIFICANT CHANGE UP
CHOLEST SERPL-MCNC: 194 MG/DL — SIGNIFICANT CHANGE UP
CHOLEST SERPL-MCNC: 194 MG/DL — SIGNIFICANT CHANGE UP
CO2 SERPL-SCNC: 20 MMOL/L — LOW (ref 22–31)
CO2 SERPL-SCNC: 23 MMOL/L — SIGNIFICANT CHANGE UP (ref 22–31)
CO2 SERPL-SCNC: 23 MMOL/L — SIGNIFICANT CHANGE UP (ref 22–31)
CO2 SERPL-SCNC: 24 MMOL/L — SIGNIFICANT CHANGE UP (ref 22–31)
CO2 SERPL-SCNC: 25 MMOL/L — SIGNIFICANT CHANGE UP (ref 22–31)
CO2 SERPL-SCNC: 26 MMOL/L — SIGNIFICANT CHANGE UP (ref 22–31)
CO2 SERPL-SCNC: 28 MMOL/L — SIGNIFICANT CHANGE UP (ref 22–31)
CO2 SERPL-SCNC: 29 MMOL/L — SIGNIFICANT CHANGE UP (ref 22–31)
COLOR FLD: SIGNIFICANT CHANGE UP
COLOR FLD: YELLOW — SIGNIFICANT CHANGE UP
COLOR SPEC: YELLOW — SIGNIFICANT CHANGE UP
CREAT SERPL-MCNC: 0.74 MG/DL — SIGNIFICANT CHANGE UP (ref 0.5–1.3)
CREAT SERPL-MCNC: 0.82 MG/DL — SIGNIFICANT CHANGE UP (ref 0.5–1.3)
CREAT SERPL-MCNC: 0.85 MG/DL — SIGNIFICANT CHANGE UP (ref 0.5–1.3)
CREAT SERPL-MCNC: 0.89 MG/DL — SIGNIFICANT CHANGE UP (ref 0.5–1.3)
CREAT SERPL-MCNC: 0.89 MG/DL — SIGNIFICANT CHANGE UP (ref 0.5–1.3)
CREAT SERPL-MCNC: 0.98 MG/DL — SIGNIFICANT CHANGE UP (ref 0.5–1.3)
CREAT SERPL-MCNC: 1.03 MG/DL — SIGNIFICANT CHANGE UP (ref 0.5–1.3)
CREAT SERPL-MCNC: 1.05 MG/DL — SIGNIFICANT CHANGE UP (ref 0.5–1.3)
CREAT SERPL-MCNC: 1.07 MG/DL — SIGNIFICANT CHANGE UP (ref 0.5–1.3)
CREAT SERPL-MCNC: 1.08 MG/DL — SIGNIFICANT CHANGE UP (ref 0.5–1.3)
CREAT SERPL-MCNC: 1.08 MG/DL — SIGNIFICANT CHANGE UP (ref 0.5–1.3)
CREAT SERPL-MCNC: 1.09 MG/DL — SIGNIFICANT CHANGE UP (ref 0.5–1.3)
CREAT SERPL-MCNC: 1.12 MG/DL — SIGNIFICANT CHANGE UP (ref 0.5–1.3)
CREAT SERPL-MCNC: 1.12 MG/DL — SIGNIFICANT CHANGE UP (ref 0.5–1.3)
CREAT SERPL-MCNC: 1.25 MG/DL — SIGNIFICANT CHANGE UP (ref 0.5–1.3)
CREAT SERPL-MCNC: 1.28 MG/DL — SIGNIFICANT CHANGE UP (ref 0.5–1.3)
CREAT SERPL-MCNC: 1.28 MG/DL — SIGNIFICANT CHANGE UP (ref 0.5–1.3)
CREAT SERPL-MCNC: 1.36 MG/DL — HIGH (ref 0.5–1.3)
CREAT SERPL-MCNC: 1.36 MG/DL — HIGH (ref 0.5–1.3)
CREAT SERPL-MCNC: 1.47 MG/DL — HIGH (ref 0.5–1.3)
CREAT SERPL-MCNC: 1.47 MG/DL — HIGH (ref 0.5–1.3)
CREAT SERPL-MCNC: 1.57 MG/DL — HIGH (ref 0.5–1.3)
CREAT SERPL-MCNC: 1.57 MG/DL — HIGH (ref 0.5–1.3)
CULTURE RESULTS: NO GROWTH — SIGNIFICANT CHANGE UP
CULTURE RESULTS: SIGNIFICANT CHANGE UP
DIFF PNL FLD: ABNORMAL
EGFR: 103 ML/MIN/1.73M2 — SIGNIFICANT CHANGE UP
EGFR: 50 ML/MIN/1.73M2 — LOW
EGFR: 50 ML/MIN/1.73M2 — LOW
EGFR: 54 ML/MIN/1.73M2 — LOW
EGFR: 54 ML/MIN/1.73M2 — LOW
EGFR: 59 ML/MIN/1.73M2 — LOW
EGFR: 59 ML/MIN/1.73M2 — LOW
EGFR: 63 ML/MIN/1.73M2 — SIGNIFICANT CHANGE UP
EGFR: 63 ML/MIN/1.73M2 — SIGNIFICANT CHANGE UP
EGFR: 65 ML/MIN/1.73M2 — SIGNIFICANT CHANGE UP
EGFR: 74 ML/MIN/1.73M2 — SIGNIFICANT CHANGE UP
EGFR: 74 ML/MIN/1.73M2 — SIGNIFICANT CHANGE UP
EGFR: 77 ML/MIN/1.73M2 — SIGNIFICANT CHANGE UP
EGFR: 78 ML/MIN/1.73M2 — SIGNIFICANT CHANGE UP
EGFR: 80 ML/MIN/1.73M2 — SIGNIFICANT CHANGE UP
EGFR: 82 ML/MIN/1.73M2 — SIGNIFICANT CHANGE UP
EGFR: 87 ML/MIN/1.73M2 — SIGNIFICANT CHANGE UP
EGFR: 97 ML/MIN/1.73M2 — SIGNIFICANT CHANGE UP
EGFR: 97 ML/MIN/1.73M2 — SIGNIFICANT CHANGE UP
EGFR: 98 ML/MIN/1.73M2 — SIGNIFICANT CHANGE UP
EGFR: 99 ML/MIN/1.73M2 — SIGNIFICANT CHANGE UP
EOSINOPHIL # BLD AUTO: 0.02 K/UL — SIGNIFICANT CHANGE UP (ref 0–0.5)
EOSINOPHIL # BLD AUTO: 0.02 K/UL — SIGNIFICANT CHANGE UP (ref 0–0.5)
EOSINOPHIL # BLD AUTO: 0.05 K/UL — SIGNIFICANT CHANGE UP (ref 0–0.5)
EOSINOPHIL # BLD AUTO: 0.07 K/UL — SIGNIFICANT CHANGE UP (ref 0–0.5)
EOSINOPHIL # BLD AUTO: 0.07 K/UL — SIGNIFICANT CHANGE UP (ref 0–0.5)
EOSINOPHIL # BLD AUTO: 0.28 K/UL — SIGNIFICANT CHANGE UP (ref 0–0.5)
EOSINOPHIL # BLD AUTO: 0.31 K/UL — SIGNIFICANT CHANGE UP (ref 0–0.5)
EOSINOPHIL # BLD AUTO: 0.38 K/UL — SIGNIFICANT CHANGE UP (ref 0–0.5)
EOSINOPHIL # BLD AUTO: 0.38 K/UL — SIGNIFICANT CHANGE UP (ref 0–0.5)
EOSINOPHIL # BLD AUTO: 0.39 K/UL — SIGNIFICANT CHANGE UP (ref 0–0.5)
EOSINOPHIL # BLD AUTO: 0.53 K/UL — HIGH (ref 0–0.5)
EOSINOPHIL # BLD AUTO: 0.59 K/UL — HIGH (ref 0–0.5)
EOSINOPHIL # FLD: 0 % — SIGNIFICANT CHANGE UP
EOSINOPHIL # FLD: 0 % — SIGNIFICANT CHANGE UP
EOSINOPHIL NFR BLD AUTO: 0.1 % — SIGNIFICANT CHANGE UP (ref 0–6)
EOSINOPHIL NFR BLD AUTO: 0.1 % — SIGNIFICANT CHANGE UP (ref 0–6)
EOSINOPHIL NFR BLD AUTO: 0.4 % — SIGNIFICANT CHANGE UP (ref 0–6)
EOSINOPHIL NFR BLD AUTO: 1.8 % — SIGNIFICANT CHANGE UP (ref 0–6)
EOSINOPHIL NFR BLD AUTO: 2.4 % — SIGNIFICANT CHANGE UP (ref 0–6)
EOSINOPHIL NFR BLD AUTO: 2.4 % — SIGNIFICANT CHANGE UP (ref 0–6)
EOSINOPHIL NFR BLD AUTO: 2.7 % — SIGNIFICANT CHANGE UP (ref 0–6)
EOSINOPHIL NFR BLD AUTO: 4 % — SIGNIFICANT CHANGE UP (ref 0–6)
EOSINOPHIL NFR BLD AUTO: 5 % — SIGNIFICANT CHANGE UP (ref 0–6)
EOSINOPHIL NFR BLD AUTO: 7 % — HIGH (ref 0–6)
EPI CELLS # UR: ABNORMAL
EPI CELLS # UR: NEGATIVE — SIGNIFICANT CHANGE UP
EPI CELLS # UR: SIGNIFICANT CHANGE UP
ESTIMATED AVERAGE GLUCOSE: 146 MG/DL — HIGH (ref 68–114)
ESTIMATED AVERAGE GLUCOSE: 154 MG/DL — HIGH (ref 68–114)
ESTIMATED AVERAGE GLUCOSE: 154 MG/DL — HIGH (ref 68–114)
FERRITIN SERPL-MCNC: 76 NG/ML — SIGNIFICANT CHANGE UP (ref 30–400)
FLUAV AG NPH QL: SIGNIFICANT CHANGE UP
FLUBV AG NPH QL: SIGNIFICANT CHANGE UP
FLUID INTAKE SUBSTANCE CLASS: SIGNIFICANT CHANGE UP
FLUID INTAKE SUBSTANCE CLASS: SIGNIFICANT CHANGE UP
FOLATE+VIT B12 SERBLD-IMP: 0 % — SIGNIFICANT CHANGE UP
FOLATE+VIT B12 SERBLD-IMP: 0 % — SIGNIFICANT CHANGE UP
GLUCOSE BLDC GLUCOMTR-MCNC: 103 MG/DL — HIGH (ref 70–99)
GLUCOSE BLDC GLUCOMTR-MCNC: 121 MG/DL — HIGH (ref 70–99)
GLUCOSE BLDC GLUCOMTR-MCNC: 121 MG/DL — HIGH (ref 70–99)
GLUCOSE BLDC GLUCOMTR-MCNC: 122 MG/DL — HIGH (ref 70–99)
GLUCOSE BLDC GLUCOMTR-MCNC: 125 MG/DL — HIGH (ref 70–99)
GLUCOSE BLDC GLUCOMTR-MCNC: 125 MG/DL — HIGH (ref 70–99)
GLUCOSE BLDC GLUCOMTR-MCNC: 126 MG/DL — HIGH (ref 70–99)
GLUCOSE BLDC GLUCOMTR-MCNC: 126 MG/DL — HIGH (ref 70–99)
GLUCOSE BLDC GLUCOMTR-MCNC: 129 MG/DL — HIGH (ref 70–99)
GLUCOSE BLDC GLUCOMTR-MCNC: 129 MG/DL — HIGH (ref 70–99)
GLUCOSE BLDC GLUCOMTR-MCNC: 138 MG/DL — HIGH (ref 70–99)
GLUCOSE BLDC GLUCOMTR-MCNC: 139 MG/DL — HIGH (ref 70–99)
GLUCOSE BLDC GLUCOMTR-MCNC: 140 MG/DL — HIGH (ref 70–99)
GLUCOSE BLDC GLUCOMTR-MCNC: 140 MG/DL — HIGH (ref 70–99)
GLUCOSE BLDC GLUCOMTR-MCNC: 141 MG/DL — HIGH (ref 70–99)
GLUCOSE BLDC GLUCOMTR-MCNC: 141 MG/DL — HIGH (ref 70–99)
GLUCOSE BLDC GLUCOMTR-MCNC: 143 MG/DL — HIGH (ref 70–99)
GLUCOSE BLDC GLUCOMTR-MCNC: 143 MG/DL — HIGH (ref 70–99)
GLUCOSE BLDC GLUCOMTR-MCNC: 144 MG/DL — HIGH (ref 70–99)
GLUCOSE BLDC GLUCOMTR-MCNC: 144 MG/DL — HIGH (ref 70–99)
GLUCOSE BLDC GLUCOMTR-MCNC: 148 MG/DL — HIGH (ref 70–99)
GLUCOSE BLDC GLUCOMTR-MCNC: 148 MG/DL — HIGH (ref 70–99)
GLUCOSE BLDC GLUCOMTR-MCNC: 149 MG/DL — HIGH (ref 70–99)
GLUCOSE BLDC GLUCOMTR-MCNC: 150 MG/DL — HIGH (ref 70–99)
GLUCOSE BLDC GLUCOMTR-MCNC: 152 MG/DL — HIGH (ref 70–99)
GLUCOSE BLDC GLUCOMTR-MCNC: 153 MG/DL — HIGH (ref 70–99)
GLUCOSE BLDC GLUCOMTR-MCNC: 153 MG/DL — HIGH (ref 70–99)
GLUCOSE BLDC GLUCOMTR-MCNC: 154 MG/DL — HIGH (ref 70–99)
GLUCOSE BLDC GLUCOMTR-MCNC: 155 MG/DL — HIGH (ref 70–99)
GLUCOSE BLDC GLUCOMTR-MCNC: 155 MG/DL — HIGH (ref 70–99)
GLUCOSE BLDC GLUCOMTR-MCNC: 156 MG/DL — HIGH (ref 70–99)
GLUCOSE BLDC GLUCOMTR-MCNC: 157 MG/DL — HIGH (ref 70–99)
GLUCOSE BLDC GLUCOMTR-MCNC: 159 MG/DL — HIGH (ref 70–99)
GLUCOSE BLDC GLUCOMTR-MCNC: 159 MG/DL — HIGH (ref 70–99)
GLUCOSE BLDC GLUCOMTR-MCNC: 163 MG/DL — HIGH (ref 70–99)
GLUCOSE BLDC GLUCOMTR-MCNC: 163 MG/DL — HIGH (ref 70–99)
GLUCOSE BLDC GLUCOMTR-MCNC: 164 MG/DL — HIGH (ref 70–99)
GLUCOSE BLDC GLUCOMTR-MCNC: 164 MG/DL — HIGH (ref 70–99)
GLUCOSE BLDC GLUCOMTR-MCNC: 168 MG/DL — HIGH (ref 70–99)
GLUCOSE BLDC GLUCOMTR-MCNC: 168 MG/DL — HIGH (ref 70–99)
GLUCOSE BLDC GLUCOMTR-MCNC: 172 MG/DL — HIGH (ref 70–99)
GLUCOSE BLDC GLUCOMTR-MCNC: 172 MG/DL — HIGH (ref 70–99)
GLUCOSE BLDC GLUCOMTR-MCNC: 173 MG/DL — HIGH (ref 70–99)
GLUCOSE BLDC GLUCOMTR-MCNC: 174 MG/DL — HIGH (ref 70–99)
GLUCOSE BLDC GLUCOMTR-MCNC: 174 MG/DL — HIGH (ref 70–99)
GLUCOSE BLDC GLUCOMTR-MCNC: 175 MG/DL — HIGH (ref 70–99)
GLUCOSE BLDC GLUCOMTR-MCNC: 175 MG/DL — HIGH (ref 70–99)
GLUCOSE BLDC GLUCOMTR-MCNC: 177 MG/DL — HIGH (ref 70–99)
GLUCOSE BLDC GLUCOMTR-MCNC: 180 MG/DL — HIGH (ref 70–99)
GLUCOSE BLDC GLUCOMTR-MCNC: 180 MG/DL — HIGH (ref 70–99)
GLUCOSE BLDC GLUCOMTR-MCNC: 184 MG/DL — HIGH (ref 70–99)
GLUCOSE BLDC GLUCOMTR-MCNC: 184 MG/DL — HIGH (ref 70–99)
GLUCOSE BLDC GLUCOMTR-MCNC: 186 MG/DL — HIGH (ref 70–99)
GLUCOSE BLDC GLUCOMTR-MCNC: 190 MG/DL — HIGH (ref 70–99)
GLUCOSE BLDC GLUCOMTR-MCNC: 193 MG/DL — HIGH (ref 70–99)
GLUCOSE BLDC GLUCOMTR-MCNC: 195 MG/DL — HIGH (ref 70–99)
GLUCOSE BLDC GLUCOMTR-MCNC: 200 MG/DL — HIGH (ref 70–99)
GLUCOSE BLDC GLUCOMTR-MCNC: 204 MG/DL — HIGH (ref 70–99)
GLUCOSE BLDC GLUCOMTR-MCNC: 206 MG/DL — HIGH (ref 70–99)
GLUCOSE BLDC GLUCOMTR-MCNC: 209 MG/DL — HIGH (ref 70–99)
GLUCOSE BLDC GLUCOMTR-MCNC: 210 MG/DL — HIGH (ref 70–99)
GLUCOSE BLDC GLUCOMTR-MCNC: 210 MG/DL — HIGH (ref 70–99)
GLUCOSE BLDC GLUCOMTR-MCNC: 211 MG/DL — HIGH (ref 70–99)
GLUCOSE BLDC GLUCOMTR-MCNC: 211 MG/DL — HIGH (ref 70–99)
GLUCOSE BLDC GLUCOMTR-MCNC: 212 MG/DL — HIGH (ref 70–99)
GLUCOSE BLDC GLUCOMTR-MCNC: 212 MG/DL — HIGH (ref 70–99)
GLUCOSE BLDC GLUCOMTR-MCNC: 221 MG/DL — HIGH (ref 70–99)
GLUCOSE BLDC GLUCOMTR-MCNC: 221 MG/DL — HIGH (ref 70–99)
GLUCOSE BLDC GLUCOMTR-MCNC: 223 MG/DL — HIGH (ref 70–99)
GLUCOSE BLDC GLUCOMTR-MCNC: 223 MG/DL — HIGH (ref 70–99)
GLUCOSE BLDC GLUCOMTR-MCNC: 224 MG/DL — HIGH (ref 70–99)
GLUCOSE BLDC GLUCOMTR-MCNC: 225 MG/DL — HIGH (ref 70–99)
GLUCOSE BLDC GLUCOMTR-MCNC: 227 MG/DL — HIGH (ref 70–99)
GLUCOSE BLDC GLUCOMTR-MCNC: 227 MG/DL — HIGH (ref 70–99)
GLUCOSE BLDC GLUCOMTR-MCNC: 229 MG/DL — HIGH (ref 70–99)
GLUCOSE BLDC GLUCOMTR-MCNC: 242 MG/DL — HIGH (ref 70–99)
GLUCOSE BLDC GLUCOMTR-MCNC: 242 MG/DL — HIGH (ref 70–99)
GLUCOSE BLDC GLUCOMTR-MCNC: 247 MG/DL — HIGH (ref 70–99)
GLUCOSE BLDC GLUCOMTR-MCNC: 247 MG/DL — HIGH (ref 70–99)
GLUCOSE BLDC GLUCOMTR-MCNC: 250 MG/DL — HIGH (ref 70–99)
GLUCOSE BLDC GLUCOMTR-MCNC: 256 MG/DL — HIGH (ref 70–99)
GLUCOSE BLDC GLUCOMTR-MCNC: 256 MG/DL — HIGH (ref 70–99)
GLUCOSE BLDC GLUCOMTR-MCNC: 267 MG/DL — HIGH (ref 70–99)
GLUCOSE BLDC GLUCOMTR-MCNC: 275 MG/DL — HIGH (ref 70–99)
GLUCOSE BLDC GLUCOMTR-MCNC: 277 MG/DL — HIGH (ref 70–99)
GLUCOSE BLDC GLUCOMTR-MCNC: 280 MG/DL — HIGH (ref 70–99)
GLUCOSE BLDC GLUCOMTR-MCNC: 280 MG/DL — HIGH (ref 70–99)
GLUCOSE BLDC GLUCOMTR-MCNC: 281 MG/DL — HIGH (ref 70–99)
GLUCOSE BLDC GLUCOMTR-MCNC: 306 MG/DL — HIGH (ref 70–99)
GLUCOSE BLDC GLUCOMTR-MCNC: 312 MG/DL — HIGH (ref 70–99)
GLUCOSE BLDC GLUCOMTR-MCNC: 313 MG/DL — HIGH (ref 70–99)
GLUCOSE BLDC GLUCOMTR-MCNC: 325 MG/DL — HIGH (ref 70–99)
GLUCOSE BLDC GLUCOMTR-MCNC: 370 MG/DL — HIGH (ref 70–99)
GLUCOSE BLDC GLUCOMTR-MCNC: 77 MG/DL — SIGNIFICANT CHANGE UP (ref 70–99)
GLUCOSE BLDC GLUCOMTR-MCNC: 77 MG/DL — SIGNIFICANT CHANGE UP (ref 70–99)
GLUCOSE BLDC GLUCOMTR-MCNC: 97 MG/DL — SIGNIFICANT CHANGE UP (ref 70–99)
GLUCOSE BLDC GLUCOMTR-MCNC: 97 MG/DL — SIGNIFICANT CHANGE UP (ref 70–99)
GLUCOSE BLDC GLUCOMTR-MCNC: 98 MG/DL — SIGNIFICANT CHANGE UP (ref 70–99)
GLUCOSE BLDC GLUCOMTR-MCNC: 98 MG/DL — SIGNIFICANT CHANGE UP (ref 70–99)
GLUCOSE FLD-MCNC: 147 MG/DL — SIGNIFICANT CHANGE UP
GLUCOSE FLD-MCNC: 153 MG/DL — SIGNIFICANT CHANGE UP
GLUCOSE SERPL-MCNC: 117 MG/DL — HIGH (ref 70–99)
GLUCOSE SERPL-MCNC: 121 MG/DL — HIGH (ref 70–99)
GLUCOSE SERPL-MCNC: 123 MG/DL — HIGH (ref 70–99)
GLUCOSE SERPL-MCNC: 145 MG/DL — HIGH (ref 70–99)
GLUCOSE SERPL-MCNC: 145 MG/DL — HIGH (ref 70–99)
GLUCOSE SERPL-MCNC: 147 MG/DL — HIGH (ref 70–99)
GLUCOSE SERPL-MCNC: 149 MG/DL — HIGH (ref 70–99)
GLUCOSE SERPL-MCNC: 149 MG/DL — HIGH (ref 70–99)
GLUCOSE SERPL-MCNC: 152 MG/DL — HIGH (ref 70–99)
GLUCOSE SERPL-MCNC: 152 MG/DL — HIGH (ref 70–99)
GLUCOSE SERPL-MCNC: 155 MG/DL — HIGH (ref 70–99)
GLUCOSE SERPL-MCNC: 157 MG/DL — HIGH (ref 70–99)
GLUCOSE SERPL-MCNC: 157 MG/DL — HIGH (ref 70–99)
GLUCOSE SERPL-MCNC: 174 MG/DL — HIGH (ref 70–99)
GLUCOSE SERPL-MCNC: 174 MG/DL — HIGH (ref 70–99)
GLUCOSE SERPL-MCNC: 181 MG/DL — HIGH (ref 70–99)
GLUCOSE SERPL-MCNC: 182 MG/DL — HIGH (ref 70–99)
GLUCOSE SERPL-MCNC: 185 MG/DL — HIGH (ref 70–99)
GLUCOSE SERPL-MCNC: 193 MG/DL — HIGH (ref 70–99)
GLUCOSE SERPL-MCNC: 98 MG/DL — SIGNIFICANT CHANGE UP (ref 70–99)
GLUCOSE UR QL: 100 MG/DL
GLUCOSE UR QL: 50 MG/DL
GLUCOSE UR QL: NEGATIVE — SIGNIFICANT CHANGE UP
GLUCOSE UR QL: NEGATIVE — SIGNIFICANT CHANGE UP
GRAM STN FLD: SIGNIFICANT CHANGE UP
GRAM STN FLD: SIGNIFICANT CHANGE UP
HCT VFR BLD CALC: 29.4 % — LOW (ref 39–50)
HCT VFR BLD CALC: 29.4 % — LOW (ref 39–50)
HCT VFR BLD CALC: 29.6 % — LOW (ref 39–50)
HCT VFR BLD CALC: 29.7 % — LOW (ref 39–50)
HCT VFR BLD CALC: 29.7 % — LOW (ref 39–50)
HCT VFR BLD CALC: 29.8 % — LOW (ref 39–50)
HCT VFR BLD CALC: 30.3 % — LOW (ref 39–50)
HCT VFR BLD CALC: 31.1 % — LOW (ref 39–50)
HCT VFR BLD CALC: 31.2 % — LOW (ref 39–50)
HCT VFR BLD CALC: 33.7 % — LOW (ref 39–50)
HCT VFR BLD CALC: 35.1 % — LOW (ref 39–50)
HCT VFR BLD CALC: 35.1 % — LOW (ref 39–50)
HCT VFR BLD CALC: 35.3 % — LOW (ref 39–50)
HCT VFR BLD CALC: 35.3 % — LOW (ref 39–50)
HCT VFR BLD CALC: 37.9 % — LOW (ref 39–50)
HCT VFR BLD CALC: 37.9 % — LOW (ref 39–50)
HCT VFR BLD CALC: 40.4 % — SIGNIFICANT CHANGE UP (ref 39–50)
HCT VFR BLD CALC: 40.4 % — SIGNIFICANT CHANGE UP (ref 39–50)
HCT VFR BLD CALC: 40.9 % — SIGNIFICANT CHANGE UP (ref 39–50)
HCT VFR BLD CALC: 40.9 % — SIGNIFICANT CHANGE UP (ref 39–50)
HCT VFR BLD CALC: 41.6 % — SIGNIFICANT CHANGE UP (ref 39–50)
HCT VFR BLD CALC: 41.6 % — SIGNIFICANT CHANGE UP (ref 39–50)
HCT VFR BLD CALC: 44.5 % — SIGNIFICANT CHANGE UP (ref 39–50)
HCT VFR BLD CALC: 44.5 % — SIGNIFICANT CHANGE UP (ref 39–50)
HDLC SERPL-MCNC: 29 MG/DL — LOW
HDLC SERPL-MCNC: 41 MG/DL — SIGNIFICANT CHANGE UP
HDLC SERPL-MCNC: 41 MG/DL — SIGNIFICANT CHANGE UP
HGB BLD-MCNC: 10.6 G/DL — LOW (ref 13–17)
HGB BLD-MCNC: 11.5 G/DL — LOW (ref 13–17)
HGB BLD-MCNC: 11.5 G/DL — LOW (ref 13–17)
HGB BLD-MCNC: 11.6 G/DL — LOW (ref 13–17)
HGB BLD-MCNC: 11.6 G/DL — LOW (ref 13–17)
HGB BLD-MCNC: 12.4 G/DL — LOW (ref 13–17)
HGB BLD-MCNC: 12.4 G/DL — LOW (ref 13–17)
HGB BLD-MCNC: 13.2 G/DL — SIGNIFICANT CHANGE UP (ref 13–17)
HGB BLD-MCNC: 13.2 G/DL — SIGNIFICANT CHANGE UP (ref 13–17)
HGB BLD-MCNC: 13.3 G/DL — SIGNIFICANT CHANGE UP (ref 13–17)
HGB BLD-MCNC: 13.3 G/DL — SIGNIFICANT CHANGE UP (ref 13–17)
HGB BLD-MCNC: 13.7 G/DL — SIGNIFICANT CHANGE UP (ref 13–17)
HGB BLD-MCNC: 13.7 G/DL — SIGNIFICANT CHANGE UP (ref 13–17)
HGB BLD-MCNC: 14.8 G/DL — SIGNIFICANT CHANGE UP (ref 13–17)
HGB BLD-MCNC: 14.8 G/DL — SIGNIFICANT CHANGE UP (ref 13–17)
HGB BLD-MCNC: 8.7 G/DL — LOW (ref 13–17)
HGB BLD-MCNC: 8.7 G/DL — LOW (ref 13–17)
HGB BLD-MCNC: 8.8 G/DL — LOW (ref 13–17)
HGB BLD-MCNC: 8.9 G/DL — LOW (ref 13–17)
HGB BLD-MCNC: 8.9 G/DL — LOW (ref 13–17)
HGB BLD-MCNC: 9.1 G/DL — LOW (ref 13–17)
HGB BLD-MCNC: 9.1 G/DL — LOW (ref 13–17)
HGB BLD-MCNC: 9.4 G/DL — LOW (ref 13–17)
HGB BLD-MCNC: 9.5 G/DL — LOW (ref 13–17)
HYALINE CASTS # UR AUTO: ABNORMAL /LPF
HYALINE CASTS # UR AUTO: ABNORMAL /LPF
HYALINE CASTS # UR AUTO: NEGATIVE /LPF — SIGNIFICANT CHANGE UP
IMM GRANULOCYTES NFR BLD AUTO: 0.3 % — SIGNIFICANT CHANGE UP (ref 0–0.9)
IMM GRANULOCYTES NFR BLD AUTO: 0.3 % — SIGNIFICANT CHANGE UP (ref 0–0.9)
IMM GRANULOCYTES NFR BLD AUTO: 0.4 % — SIGNIFICANT CHANGE UP (ref 0–0.9)
IMM GRANULOCYTES NFR BLD AUTO: 0.5 % — SIGNIFICANT CHANGE UP (ref 0–0.9)
IMM GRANULOCYTES NFR BLD AUTO: 0.6 % — SIGNIFICANT CHANGE UP (ref 0–0.9)
INR BLD: 0.95 RATIO — SIGNIFICANT CHANGE UP (ref 0.85–1.18)
INR BLD: 0.95 RATIO — SIGNIFICANT CHANGE UP (ref 0.85–1.18)
INR BLD: 1.2 RATIO — HIGH (ref 0.88–1.16)
INR BLD: 1.23 RATIO — HIGH (ref 0.88–1.16)
INR BLD: 1.28 RATIO — HIGH (ref 0.88–1.16)
IRON SATN MFR SERPL: 26 UG/DL — LOW (ref 45–165)
IRON SATN MFR SERPL: 7 % — LOW (ref 16–55)
KETONES UR-MCNC: ABNORMAL
KETONES UR-MCNC: NEGATIVE MG/DL — SIGNIFICANT CHANGE UP
LACTATE SERPL-SCNC: 1.2 MMOL/L — SIGNIFICANT CHANGE UP (ref 0.7–2)
LACTATE SERPL-SCNC: 1.4 MMOL/L — SIGNIFICANT CHANGE UP (ref 0.7–2)
LACTATE SERPL-SCNC: 1.7 MMOL/L — SIGNIFICANT CHANGE UP (ref 0.7–2)
LACTATE SERPL-SCNC: 2 MMOL/L — SIGNIFICANT CHANGE UP (ref 0.7–2)
LACTATE SERPL-SCNC: 2 MMOL/L — SIGNIFICANT CHANGE UP (ref 0.7–2)
LACTATE SERPL-SCNC: 2.4 MMOL/L — HIGH (ref 0.7–2)
LACTATE SERPL-SCNC: 2.8 MMOL/L — HIGH (ref 0.7–2)
LACTATE SERPL-SCNC: 2.8 MMOL/L — HIGH (ref 0.7–2)
LACTATE SERPL-SCNC: 3.2 MMOL/L — HIGH (ref 0.7–2)
LACTATE SERPL-SCNC: 3.2 MMOL/L — HIGH (ref 0.7–2)
LDH SERPL L TO P-CCNC: 217 U/L — SIGNIFICANT CHANGE UP (ref 84–241)
LDH SERPL L TO P-CCNC: 82 U/L — SIGNIFICANT CHANGE UP
LDH SERPL L TO P-CCNC: 95 U/L — SIGNIFICANT CHANGE UP
LEUKOCYTE ESTERASE UR-ACNC: NEGATIVE — SIGNIFICANT CHANGE UP
LIDOCAIN IGE QN: 11 U/L — LOW (ref 13–75)
LIDOCAIN IGE QN: 14 U/L — SIGNIFICANT CHANGE UP (ref 13–75)
LIDOCAIN IGE QN: 14 U/L — SIGNIFICANT CHANGE UP (ref 13–75)
LIDOCAIN IGE QN: 22 U/L — LOW (ref 73–393)
LIDOCAIN IGE QN: 23 U/L — LOW (ref 73–393)
LIDOCAIN IGE QN: 24 U/L — LOW (ref 73–393)
LIPID PNL WITH DIRECT LDL SERPL: 124 MG/DL — HIGH
LIPID PNL WITH DIRECT LDL SERPL: 124 MG/DL — HIGH
LIPID PNL WITH DIRECT LDL SERPL: 87 MG/DL — SIGNIFICANT CHANGE UP
LYMPHOCYTES # BLD AUTO: 0.84 K/UL — LOW (ref 1–3.3)
LYMPHOCYTES # BLD AUTO: 1.17 K/UL — SIGNIFICANT CHANGE UP (ref 1–3.3)
LYMPHOCYTES # BLD AUTO: 1.17 K/UL — SIGNIFICANT CHANGE UP (ref 1–3.3)
LYMPHOCYTES # BLD AUTO: 1.31 K/UL — SIGNIFICANT CHANGE UP (ref 1–3.3)
LYMPHOCYTES # BLD AUTO: 1.36 K/UL — SIGNIFICANT CHANGE UP (ref 1–3.3)
LYMPHOCYTES # BLD AUTO: 1.43 K/UL — SIGNIFICANT CHANGE UP (ref 1–3.3)
LYMPHOCYTES # BLD AUTO: 1.44 K/UL — SIGNIFICANT CHANGE UP (ref 1–3.3)
LYMPHOCYTES # BLD AUTO: 1.77 K/UL — SIGNIFICANT CHANGE UP (ref 1–3.3)
LYMPHOCYTES # BLD AUTO: 1.77 K/UL — SIGNIFICANT CHANGE UP (ref 1–3.3)
LYMPHOCYTES # BLD AUTO: 1.86 K/UL — SIGNIFICANT CHANGE UP (ref 1–3.3)
LYMPHOCYTES # BLD AUTO: 1.86 K/UL — SIGNIFICANT CHANGE UP (ref 1–3.3)
LYMPHOCYTES # BLD AUTO: 10.9 % — LOW (ref 13–44)
LYMPHOCYTES # BLD AUTO: 10.9 % — LOW (ref 13–44)
LYMPHOCYTES # BLD AUTO: 11 % — LOW (ref 13–44)
LYMPHOCYTES # BLD AUTO: 11 % — LOW (ref 13–44)
LYMPHOCYTES # BLD AUTO: 12 % — LOW (ref 13–44)
LYMPHOCYTES # BLD AUTO: 13.5 % — SIGNIFICANT CHANGE UP (ref 13–44)
LYMPHOCYTES # BLD AUTO: 13.8 % — SIGNIFICANT CHANGE UP (ref 13–44)
LYMPHOCYTES # BLD AUTO: 14.8 % — SIGNIFICANT CHANGE UP (ref 13–44)
LYMPHOCYTES # BLD AUTO: 15.5 % — SIGNIFICANT CHANGE UP (ref 13–44)
LYMPHOCYTES # BLD AUTO: 2.11 K/UL — SIGNIFICANT CHANGE UP (ref 1–3.3)
LYMPHOCYTES # BLD AUTO: 6.3 % — LOW (ref 13–44)
LYMPHOCYTES # BLD AUTO: 6.3 % — LOW (ref 13–44)
LYMPHOCYTES # BLD AUTO: 6.8 % — LOW (ref 13–44)
LYMPHOCYTES # FLD: 11 % — SIGNIFICANT CHANGE UP
LYMPHOCYTES # FLD: 11 % — SIGNIFICANT CHANGE UP
MCHC RBC-ENTMCNC: 25.7 PG — LOW (ref 27–34)
MCHC RBC-ENTMCNC: 25.8 PG — LOW (ref 27–34)
MCHC RBC-ENTMCNC: 25.9 PG — LOW (ref 27–34)
MCHC RBC-ENTMCNC: 26 PG — LOW (ref 27–34)
MCHC RBC-ENTMCNC: 26.1 PG — LOW (ref 27–34)
MCHC RBC-ENTMCNC: 26.2 PG — LOW (ref 27–34)
MCHC RBC-ENTMCNC: 26.2 PG — LOW (ref 27–34)
MCHC RBC-ENTMCNC: 26.4 PG — LOW (ref 27–34)
MCHC RBC-ENTMCNC: 26.7 PG — LOW (ref 27–34)
MCHC RBC-ENTMCNC: 29.1 PG — SIGNIFICANT CHANGE UP (ref 27–34)
MCHC RBC-ENTMCNC: 29.2 GM/DL — LOW (ref 32–36)
MCHC RBC-ENTMCNC: 29.3 GM/DL — LOW (ref 32–36)
MCHC RBC-ENTMCNC: 29.6 GM/DL — LOW (ref 32–36)
MCHC RBC-ENTMCNC: 29.9 GM/DL — LOW (ref 32–36)
MCHC RBC-ENTMCNC: 29.9 GM/DL — LOW (ref 32–36)
MCHC RBC-ENTMCNC: 30 GM/DL — LOW (ref 32–36)
MCHC RBC-ENTMCNC: 30 GM/DL — LOW (ref 32–36)
MCHC RBC-ENTMCNC: 30.2 GM/DL — LOW (ref 32–36)
MCHC RBC-ENTMCNC: 31.5 GM/DL — LOW (ref 32–36)
MCHC RBC-ENTMCNC: 32 PG — SIGNIFICANT CHANGE UP (ref 27–34)
MCHC RBC-ENTMCNC: 32.1 GM/DL — SIGNIFICANT CHANGE UP (ref 32–36)
MCHC RBC-ENTMCNC: 32.3 PG — SIGNIFICANT CHANGE UP (ref 27–34)
MCHC RBC-ENTMCNC: 32.4 PG — SIGNIFICANT CHANGE UP (ref 27–34)
MCHC RBC-ENTMCNC: 32.4 PG — SIGNIFICANT CHANGE UP (ref 27–34)
MCHC RBC-ENTMCNC: 32.5 GM/DL — SIGNIFICANT CHANGE UP (ref 32–36)
MCHC RBC-ENTMCNC: 32.5 GM/DL — SIGNIFICANT CHANGE UP (ref 32–36)
MCHC RBC-ENTMCNC: 32.5 PG — SIGNIFICANT CHANGE UP (ref 27–34)
MCHC RBC-ENTMCNC: 32.5 PG — SIGNIFICANT CHANGE UP (ref 27–34)
MCHC RBC-ENTMCNC: 32.6 GM/DL — SIGNIFICANT CHANGE UP (ref 32–36)
MCHC RBC-ENTMCNC: 32.6 GM/DL — SIGNIFICANT CHANGE UP (ref 32–36)
MCHC RBC-ENTMCNC: 32.7 GM/DL — SIGNIFICANT CHANGE UP (ref 32–36)
MCHC RBC-ENTMCNC: 32.7 PG — SIGNIFICANT CHANGE UP (ref 27–34)
MCHC RBC-ENTMCNC: 32.7 PG — SIGNIFICANT CHANGE UP (ref 27–34)
MCHC RBC-ENTMCNC: 32.9 GM/DL — SIGNIFICANT CHANGE UP (ref 32–36)
MCHC RBC-ENTMCNC: 32.9 GM/DL — SIGNIFICANT CHANGE UP (ref 32–36)
MCHC RBC-ENTMCNC: 33 GM/DL — SIGNIFICANT CHANGE UP (ref 32–36)
MCHC RBC-ENTMCNC: 33 GM/DL — SIGNIFICANT CHANGE UP (ref 32–36)
MCHC RBC-ENTMCNC: 33.3 GM/DL — SIGNIFICANT CHANGE UP (ref 32–36)
MCHC RBC-ENTMCNC: 33.3 GM/DL — SIGNIFICANT CHANGE UP (ref 32–36)
MCV RBC AUTO: 83.8 FL — SIGNIFICANT CHANGE UP (ref 80–100)
MCV RBC AUTO: 86.4 FL — SIGNIFICANT CHANGE UP (ref 80–100)
MCV RBC AUTO: 86.6 FL — SIGNIFICANT CHANGE UP (ref 80–100)
MCV RBC AUTO: 86.6 FL — SIGNIFICANT CHANGE UP (ref 80–100)
MCV RBC AUTO: 87 FL — SIGNIFICANT CHANGE UP (ref 80–100)
MCV RBC AUTO: 87.5 FL — SIGNIFICANT CHANGE UP (ref 80–100)
MCV RBC AUTO: 88.9 FL — SIGNIFICANT CHANGE UP (ref 80–100)
MCV RBC AUTO: 89.1 FL — SIGNIFICANT CHANGE UP (ref 80–100)
MCV RBC AUTO: 89.2 FL — SIGNIFICANT CHANGE UP (ref 80–100)
MCV RBC AUTO: 90.8 FL — SIGNIFICANT CHANGE UP (ref 80–100)
MCV RBC AUTO: 96.7 FL — SIGNIFICANT CHANGE UP (ref 80–100)
MCV RBC AUTO: 96.7 FL — SIGNIFICANT CHANGE UP (ref 80–100)
MCV RBC AUTO: 98.1 FL — SIGNIFICANT CHANGE UP (ref 80–100)
MCV RBC AUTO: 98.1 FL — SIGNIFICANT CHANGE UP (ref 80–100)
MCV RBC AUTO: 98.2 FL — SIGNIFICANT CHANGE UP (ref 80–100)
MCV RBC AUTO: 98.2 FL — SIGNIFICANT CHANGE UP (ref 80–100)
MCV RBC AUTO: 98.6 FL — SIGNIFICANT CHANGE UP (ref 80–100)
MCV RBC AUTO: 98.6 FL — SIGNIFICANT CHANGE UP (ref 80–100)
MCV RBC AUTO: 99 FL — SIGNIFICANT CHANGE UP (ref 80–100)
MCV RBC AUTO: 99 FL — SIGNIFICANT CHANGE UP (ref 80–100)
MCV RBC AUTO: 99.2 FL — SIGNIFICANT CHANGE UP (ref 80–100)
MCV RBC AUTO: 99.2 FL — SIGNIFICANT CHANGE UP (ref 80–100)
MCV RBC AUTO: 99.5 FL — SIGNIFICANT CHANGE UP (ref 80–100)
MCV RBC AUTO: 99.5 FL — SIGNIFICANT CHANGE UP (ref 80–100)
MELD SCORE WITH DIALYSIS: 22 POINTS — SIGNIFICANT CHANGE UP
MELD SCORE WITHOUT DIALYSIS: 11 POINTS — SIGNIFICANT CHANGE UP
MESOTHL CELL # FLD: 0 % — SIGNIFICANT CHANGE UP
MESOTHL CELL # FLD: 3 % — SIGNIFICANT CHANGE UP
MONOCYTES # BLD AUTO: 0.66 K/UL — SIGNIFICANT CHANGE UP (ref 0–0.9)
MONOCYTES # BLD AUTO: 0.75 K/UL — SIGNIFICANT CHANGE UP (ref 0–0.9)
MONOCYTES # BLD AUTO: 0.75 K/UL — SIGNIFICANT CHANGE UP (ref 0–0.9)
MONOCYTES # BLD AUTO: 0.78 K/UL — SIGNIFICANT CHANGE UP (ref 0–0.9)
MONOCYTES # BLD AUTO: 0.82 K/UL — SIGNIFICANT CHANGE UP (ref 0–0.9)
MONOCYTES # BLD AUTO: 0.91 K/UL — HIGH (ref 0–0.9)
MONOCYTES # BLD AUTO: 1.01 K/UL — HIGH (ref 0–0.9)
MONOCYTES # BLD AUTO: 1.2 K/UL — HIGH (ref 0–0.9)
MONOCYTES # BLD AUTO: 1.24 K/UL — HIGH (ref 0–0.9)
MONOCYTES # BLD AUTO: 1.24 K/UL — HIGH (ref 0–0.9)
MONOCYTES NFR BLD AUTO: 11.9 % — SIGNIFICANT CHANGE UP (ref 2–14)
MONOCYTES NFR BLD AUTO: 4.1 % — SIGNIFICANT CHANGE UP (ref 2–14)
MONOCYTES NFR BLD AUTO: 4.1 % — SIGNIFICANT CHANGE UP (ref 2–14)
MONOCYTES NFR BLD AUTO: 5.4 % — SIGNIFICANT CHANGE UP (ref 2–14)
MONOCYTES NFR BLD AUTO: 7.2 % — SIGNIFICANT CHANGE UP (ref 2–14)
MONOCYTES NFR BLD AUTO: 7.3 % — SIGNIFICANT CHANGE UP (ref 2–14)
MONOCYTES NFR BLD AUTO: 7.5 % — SIGNIFICANT CHANGE UP (ref 2–14)
MONOCYTES NFR BLD AUTO: 7.5 % — SIGNIFICANT CHANGE UP (ref 2–14)
MONOCYTES NFR BLD AUTO: 7.9 % — SIGNIFICANT CHANGE UP (ref 2–14)
MONOCYTES NFR BLD AUTO: 9.4 % — SIGNIFICANT CHANGE UP (ref 2–14)
MONOS+MACROS # FLD: 75 % — SIGNIFICANT CHANGE UP
MONOS+MACROS # FLD: 80 % — SIGNIFICANT CHANGE UP
NEUTROPHILS # BLD AUTO: 10.62 K/UL — HIGH (ref 1.8–7.4)
NEUTROPHILS # BLD AUTO: 11.49 K/UL — HIGH (ref 1.8–7.4)
NEUTROPHILS # BLD AUTO: 12.58 K/UL — HIGH (ref 1.8–7.4)
NEUTROPHILS # BLD AUTO: 12.58 K/UL — HIGH (ref 1.8–7.4)
NEUTROPHILS # BLD AUTO: 13.66 K/UL — HIGH (ref 1.8–7.4)
NEUTROPHILS # BLD AUTO: 13.66 K/UL — HIGH (ref 1.8–7.4)
NEUTROPHILS # BLD AUTO: 16.38 K/UL — HIGH (ref 1.8–7.4)
NEUTROPHILS # BLD AUTO: 16.38 K/UL — HIGH (ref 1.8–7.4)
NEUTROPHILS # BLD AUTO: 5.46 K/UL — SIGNIFICANT CHANGE UP (ref 1.8–7.4)
NEUTROPHILS # BLD AUTO: 6.8 K/UL — SIGNIFICANT CHANGE UP (ref 1.8–7.4)
NEUTROPHILS # BLD AUTO: 7.83 K/UL — HIGH (ref 1.8–7.4)
NEUTROPHILS # BLD AUTO: 8.73 K/UL — HIGH (ref 1.8–7.4)
NEUTROPHILS NFR BLD AUTO: 64.4 % — SIGNIFICANT CHANGE UP (ref 43–77)
NEUTROPHILS NFR BLD AUTO: 70.5 % — SIGNIFICANT CHANGE UP (ref 43–77)
NEUTROPHILS NFR BLD AUTO: 73.2 % — SIGNIFICANT CHANGE UP (ref 43–77)
NEUTROPHILS NFR BLD AUTO: 75.3 % — SIGNIFICANT CHANGE UP (ref 43–77)
NEUTROPHILS NFR BLD AUTO: 77 % — SIGNIFICANT CHANGE UP (ref 43–77)
NEUTROPHILS NFR BLD AUTO: 78.4 % — HIGH (ref 43–77)
NEUTROPHILS NFR BLD AUTO: 78.4 % — HIGH (ref 43–77)
NEUTROPHILS NFR BLD AUTO: 80.4 % — HIGH (ref 43–77)
NEUTROPHILS NFR BLD AUTO: 80.4 % — HIGH (ref 43–77)
NEUTROPHILS NFR BLD AUTO: 86.3 % — HIGH (ref 43–77)
NEUTROPHILS NFR BLD AUTO: 88.8 % — HIGH (ref 43–77)
NEUTROPHILS NFR BLD AUTO: 88.8 % — HIGH (ref 43–77)
NEUTROPHILS-BODY FLUID: 11 % — SIGNIFICANT CHANGE UP
NEUTROPHILS-BODY FLUID: 9 % — SIGNIFICANT CHANGE UP
NITRITE UR-MCNC: NEGATIVE — SIGNIFICANT CHANGE UP
NON HDL CHOLESTEROL: 102 MG/DL — SIGNIFICANT CHANGE UP
NON HDL CHOLESTEROL: 153 MG/DL — HIGH
NON HDL CHOLESTEROL: 153 MG/DL — HIGH
NON-GYNECOLOGICAL CYTOLOGY STUDY: SIGNIFICANT CHANGE UP
NON-GYNECOLOGICAL CYTOLOGY STUDY: SIGNIFICANT CHANGE UP
NRBC # FLD: 0 % — SIGNIFICANT CHANGE UP
NRBC # FLD: 0 % — SIGNIFICANT CHANGE UP
NT-PROBNP SERPL-SCNC: 7681 PG/ML — HIGH (ref 0–125)
OTHER CELLS FLD MANUAL: 0 % — SIGNIFICANT CHANGE UP
OTHER CELLS FLD MANUAL: 0 % — SIGNIFICANT CHANGE UP
PH FLD: 7.7 — SIGNIFICANT CHANGE UP
PH UR: 5 — SIGNIFICANT CHANGE UP (ref 5–8)
PH UR: 7.5 — SIGNIFICANT CHANGE UP (ref 5–8)
PLATELET # BLD AUTO: 222 K/UL — SIGNIFICANT CHANGE UP (ref 150–400)
PLATELET # BLD AUTO: 222 K/UL — SIGNIFICANT CHANGE UP (ref 150–400)
PLATELET # BLD AUTO: 237 K/UL — SIGNIFICANT CHANGE UP (ref 150–400)
PLATELET # BLD AUTO: 237 K/UL — SIGNIFICANT CHANGE UP (ref 150–400)
PLATELET # BLD AUTO: 244 K/UL — SIGNIFICANT CHANGE UP (ref 150–400)
PLATELET # BLD AUTO: 244 K/UL — SIGNIFICANT CHANGE UP (ref 150–400)
PLATELET # BLD AUTO: 255 K/UL — SIGNIFICANT CHANGE UP (ref 150–400)
PLATELET # BLD AUTO: 255 K/UL — SIGNIFICANT CHANGE UP (ref 150–400)
PLATELET # BLD AUTO: 274 K/UL — SIGNIFICANT CHANGE UP (ref 150–400)
PLATELET # BLD AUTO: 274 K/UL — SIGNIFICANT CHANGE UP (ref 150–400)
PLATELET # BLD AUTO: 282 K/UL — SIGNIFICANT CHANGE UP (ref 150–400)
PLATELET # BLD AUTO: 282 K/UL — SIGNIFICANT CHANGE UP (ref 150–400)
PLATELET # BLD AUTO: 307 K/UL — SIGNIFICANT CHANGE UP (ref 150–400)
PLATELET # BLD AUTO: 322 K/UL — SIGNIFICANT CHANGE UP (ref 150–400)
PLATELET # BLD AUTO: 334 K/UL — SIGNIFICANT CHANGE UP (ref 150–400)
PLATELET # BLD AUTO: 339 K/UL — SIGNIFICANT CHANGE UP (ref 150–400)
PLATELET # BLD AUTO: 351 K/UL — SIGNIFICANT CHANGE UP (ref 150–400)
PLATELET # BLD AUTO: 359 K/UL — SIGNIFICANT CHANGE UP (ref 150–400)
PLATELET # BLD AUTO: 362 K/UL — SIGNIFICANT CHANGE UP (ref 150–400)
PLATELET # BLD AUTO: 365 K/UL — SIGNIFICANT CHANGE UP (ref 150–400)
PLATELET # BLD AUTO: 365 K/UL — SIGNIFICANT CHANGE UP (ref 150–400)
PLATELET # BLD AUTO: 475 K/UL — HIGH (ref 150–400)
POTASSIUM SERPL-MCNC: 3.6 MMOL/L — SIGNIFICANT CHANGE UP (ref 3.5–5.3)
POTASSIUM SERPL-MCNC: 3.6 MMOL/L — SIGNIFICANT CHANGE UP (ref 3.5–5.3)
POTASSIUM SERPL-MCNC: 3.7 MMOL/L — SIGNIFICANT CHANGE UP (ref 3.5–5.3)
POTASSIUM SERPL-MCNC: 3.7 MMOL/L — SIGNIFICANT CHANGE UP (ref 3.5–5.3)
POTASSIUM SERPL-MCNC: 3.9 MMOL/L — SIGNIFICANT CHANGE UP (ref 3.5–5.3)
POTASSIUM SERPL-MCNC: 4.1 MMOL/L — SIGNIFICANT CHANGE UP (ref 3.5–5.3)
POTASSIUM SERPL-MCNC: 4.2 MMOL/L — SIGNIFICANT CHANGE UP (ref 3.5–5.3)
POTASSIUM SERPL-MCNC: 4.3 MMOL/L — SIGNIFICANT CHANGE UP (ref 3.5–5.3)
POTASSIUM SERPL-MCNC: 4.4 MMOL/L — SIGNIFICANT CHANGE UP (ref 3.5–5.3)
POTASSIUM SERPL-MCNC: 4.4 MMOL/L — SIGNIFICANT CHANGE UP (ref 3.5–5.3)
POTASSIUM SERPL-MCNC: 4.5 MMOL/L — SIGNIFICANT CHANGE UP (ref 3.5–5.3)
POTASSIUM SERPL-MCNC: 4.6 MMOL/L — SIGNIFICANT CHANGE UP (ref 3.5–5.3)
POTASSIUM SERPL-MCNC: 4.6 MMOL/L — SIGNIFICANT CHANGE UP (ref 3.5–5.3)
POTASSIUM SERPL-MCNC: 4.8 MMOL/L — SIGNIFICANT CHANGE UP (ref 3.5–5.3)
POTASSIUM SERPL-MCNC: 4.8 MMOL/L — SIGNIFICANT CHANGE UP (ref 3.5–5.3)
POTASSIUM SERPL-SCNC: 3.6 MMOL/L — SIGNIFICANT CHANGE UP (ref 3.5–5.3)
POTASSIUM SERPL-SCNC: 3.6 MMOL/L — SIGNIFICANT CHANGE UP (ref 3.5–5.3)
POTASSIUM SERPL-SCNC: 3.7 MMOL/L — SIGNIFICANT CHANGE UP (ref 3.5–5.3)
POTASSIUM SERPL-SCNC: 3.7 MMOL/L — SIGNIFICANT CHANGE UP (ref 3.5–5.3)
POTASSIUM SERPL-SCNC: 3.9 MMOL/L — SIGNIFICANT CHANGE UP (ref 3.5–5.3)
POTASSIUM SERPL-SCNC: 4.1 MMOL/L — SIGNIFICANT CHANGE UP (ref 3.5–5.3)
POTASSIUM SERPL-SCNC: 4.2 MMOL/L — SIGNIFICANT CHANGE UP (ref 3.5–5.3)
POTASSIUM SERPL-SCNC: 4.3 MMOL/L — SIGNIFICANT CHANGE UP (ref 3.5–5.3)
POTASSIUM SERPL-SCNC: 4.4 MMOL/L — SIGNIFICANT CHANGE UP (ref 3.5–5.3)
POTASSIUM SERPL-SCNC: 4.4 MMOL/L — SIGNIFICANT CHANGE UP (ref 3.5–5.3)
POTASSIUM SERPL-SCNC: 4.5 MMOL/L — SIGNIFICANT CHANGE UP (ref 3.5–5.3)
POTASSIUM SERPL-SCNC: 4.6 MMOL/L — SIGNIFICANT CHANGE UP (ref 3.5–5.3)
POTASSIUM SERPL-SCNC: 4.6 MMOL/L — SIGNIFICANT CHANGE UP (ref 3.5–5.3)
POTASSIUM SERPL-SCNC: 4.8 MMOL/L — SIGNIFICANT CHANGE UP (ref 3.5–5.3)
POTASSIUM SERPL-SCNC: 4.8 MMOL/L — SIGNIFICANT CHANGE UP (ref 3.5–5.3)
PROT FLD-MCNC: 2.3 G/DL — SIGNIFICANT CHANGE UP
PROT FLD-MCNC: 3.8 G/DL — SIGNIFICANT CHANGE UP
PROT SERPL-MCNC: 6.8 GM/DL — SIGNIFICANT CHANGE UP (ref 6–8.3)
PROT SERPL-MCNC: 7 GM/DL — SIGNIFICANT CHANGE UP (ref 6–8.3)
PROT SERPL-MCNC: 7.2 GM/DL — SIGNIFICANT CHANGE UP (ref 6–8.3)
PROT SERPL-MCNC: 7.3 GM/DL — SIGNIFICANT CHANGE UP (ref 6–8.3)
PROT SERPL-MCNC: 7.4 GM/DL — SIGNIFICANT CHANGE UP (ref 6–8.3)
PROT SERPL-MCNC: 7.5 GM/DL — SIGNIFICANT CHANGE UP (ref 6–8.3)
PROT SERPL-MCNC: 7.6 GM/DL — SIGNIFICANT CHANGE UP (ref 6–8.3)
PROT SERPL-MCNC: 7.6 GM/DL — SIGNIFICANT CHANGE UP (ref 6–8.3)
PROT SERPL-MCNC: 7.7 GM/DL — SIGNIFICANT CHANGE UP (ref 6–8.3)
PROT SERPL-MCNC: 7.7 GM/DL — SIGNIFICANT CHANGE UP (ref 6–8.3)
PROT SERPL-MCNC: 8 GM/DL — SIGNIFICANT CHANGE UP (ref 6–8.3)
PROT SERPL-MCNC: 8.2 GM/DL — SIGNIFICANT CHANGE UP (ref 6–8.3)
PROT SERPL-MCNC: 8.5 GM/DL — HIGH (ref 6–8.3)
PROT SERPL-MCNC: 8.5 GM/DL — HIGH (ref 6–8.3)
PROT SERPL-MCNC: 8.7 GM/DL — HIGH (ref 6–8.3)
PROT SERPL-MCNC: 8.7 GM/DL — HIGH (ref 6–8.3)
PROT UR-MCNC: 300 MG/DL
PROT UR-MCNC: 300 MG/DL
PROT UR-MCNC: 300 MG/DL — SIGNIFICANT CHANGE UP
PROT UR-MCNC: 300 MG/DL — SIGNIFICANT CHANGE UP
PROT UR-MCNC: 500 MG/DL
PROT UR-MCNC: >=1000 MG/DL
PROT UR-MCNC: >=1000 MG/DL
PROTHROM AB SERPL-ACNC: 10.7 SEC — SIGNIFICANT CHANGE UP (ref 9.5–13)
PROTHROM AB SERPL-ACNC: 10.7 SEC — SIGNIFICANT CHANGE UP (ref 9.5–13)
PROTHROM AB SERPL-ACNC: 14 SEC — HIGH (ref 10.5–13.4)
PROTHROM AB SERPL-ACNC: 14.3 SEC — HIGH (ref 10.5–13.4)
PROTHROM AB SERPL-ACNC: 14.9 SEC — HIGH (ref 10.5–13.4)
RBC # BLD: 3.26 M/UL — LOW (ref 4.2–5.8)
RBC # BLD: 3.33 M/UL — LOW (ref 4.2–5.8)
RBC # BLD: 3.36 M/UL — LOW (ref 4.2–5.8)
RBC # BLD: 3.38 M/UL — LOW (ref 4.2–5.8)
RBC # BLD: 3.41 M/UL — LOW (ref 4.2–5.8)
RBC # BLD: 3.43 M/UL — LOW (ref 4.2–5.8)
RBC # BLD: 3.45 M/UL — LOW (ref 4.2–5.8)
RBC # BLD: 3.5 M/UL — LOW (ref 4.2–5.8)
RBC # BLD: 3.56 M/UL — LOW (ref 4.2–5.8)
RBC # BLD: 3.56 M/UL — LOW (ref 4.2–5.8)
RBC # BLD: 3.59 M/UL — LOW (ref 4.2–5.8)
RBC # BLD: 3.63 M/UL — LOW (ref 4.2–5.8)
RBC # BLD: 3.63 M/UL — LOW (ref 4.2–5.8)
RBC # BLD: 3.83 M/UL — LOW (ref 4.2–5.8)
RBC # BLD: 3.83 M/UL — LOW (ref 4.2–5.8)
RBC # BLD: 4.02 M/UL — LOW (ref 4.2–5.8)
RBC # BLD: 4.06 M/UL — LOW (ref 4.2–5.8)
RBC # BLD: 4.06 M/UL — LOW (ref 4.2–5.8)
RBC # BLD: 4.15 M/UL — LOW (ref 4.2–5.8)
RBC # BLD: 4.15 M/UL — LOW (ref 4.2–5.8)
RBC # BLD: 4.24 M/UL — SIGNIFICANT CHANGE UP (ref 4.2–5.8)
RBC # BLD: 4.24 M/UL — SIGNIFICANT CHANGE UP (ref 4.2–5.8)
RBC # BLD: 4.53 M/UL — SIGNIFICANT CHANGE UP (ref 4.2–5.8)
RBC # BLD: 4.53 M/UL — SIGNIFICANT CHANGE UP (ref 4.2–5.8)
RBC # FLD: 12.8 % — SIGNIFICANT CHANGE UP (ref 10.3–14.5)
RBC # FLD: 12.8 % — SIGNIFICANT CHANGE UP (ref 10.3–14.5)
RBC # FLD: 12.9 % — SIGNIFICANT CHANGE UP (ref 10.3–14.5)
RBC # FLD: 13 % — SIGNIFICANT CHANGE UP (ref 10.3–14.5)
RBC # FLD: 13 % — SIGNIFICANT CHANGE UP (ref 10.3–14.5)
RBC # FLD: 13.1 % — SIGNIFICANT CHANGE UP (ref 10.3–14.5)
RBC # FLD: 13.4 % — SIGNIFICANT CHANGE UP (ref 10.3–14.5)
RBC # FLD: 13.4 % — SIGNIFICANT CHANGE UP (ref 10.3–14.5)
RBC # FLD: 15.9 % — HIGH (ref 10.3–14.5)
RBC # FLD: 17.2 % — HIGH (ref 10.3–14.5)
RBC # FLD: 17.2 % — HIGH (ref 10.3–14.5)
RBC # FLD: 17.6 % — HIGH (ref 10.3–14.5)
RBC # FLD: 17.7 % — HIGH (ref 10.3–14.5)
RBC # FLD: 17.9 % — HIGH (ref 10.3–14.5)
RBC # FLD: 18 % — HIGH (ref 10.3–14.5)
RBC # FLD: 18 % — HIGH (ref 10.3–14.5)
RBC # FLD: 18.1 % — HIGH (ref 10.3–14.5)
RBC # FLD: 18.1 % — HIGH (ref 10.3–14.5)
RBC CASTS # UR COMP ASSIST: 10 /HPF — HIGH (ref 0–4)
RBC CASTS # UR COMP ASSIST: 10 /HPF — HIGH (ref 0–4)
RBC CASTS # UR COMP ASSIST: 15 /HPF — HIGH (ref 0–4)
RBC CASTS # UR COMP ASSIST: 15 /HPF — HIGH (ref 0–4)
RBC CASTS # UR COMP ASSIST: ABNORMAL /HPF (ref 0–4)
RCV VOL RI: 6000 /UL — HIGH (ref 0–0)
RCV VOL RI: < 2000 /UL — SIGNIFICANT CHANGE UP (ref 0–0)
RSV RNA NPH QL NAA+NON-PROBE: SIGNIFICANT CHANGE UP
SARS-COV-2 RNA SPEC QL NAA+PROBE: SIGNIFICANT CHANGE UP
SODIUM SERPL-SCNC: 136 MMOL/L — SIGNIFICANT CHANGE UP (ref 135–145)
SODIUM SERPL-SCNC: 137 MMOL/L — SIGNIFICANT CHANGE UP (ref 135–145)
SODIUM SERPL-SCNC: 138 MMOL/L — SIGNIFICANT CHANGE UP (ref 135–145)
SODIUM SERPL-SCNC: 139 MMOL/L — SIGNIFICANT CHANGE UP (ref 135–145)
SODIUM SERPL-SCNC: 140 MMOL/L — SIGNIFICANT CHANGE UP (ref 135–145)
SODIUM SERPL-SCNC: 140 MMOL/L — SIGNIFICANT CHANGE UP (ref 135–145)
SODIUM SERPL-SCNC: 141 MMOL/L — SIGNIFICANT CHANGE UP (ref 135–145)
SODIUM SERPL-SCNC: 142 MMOL/L — SIGNIFICANT CHANGE UP (ref 135–145)
SODIUM SERPL-SCNC: 143 MMOL/L — SIGNIFICANT CHANGE UP (ref 135–145)
SODIUM SERPL-SCNC: 144 MMOL/L — SIGNIFICANT CHANGE UP (ref 135–145)
SODIUM SERPL-SCNC: 144 MMOL/L — SIGNIFICANT CHANGE UP (ref 135–145)
SP GR SPEC: 1.01 — SIGNIFICANT CHANGE UP (ref 1.01–1.02)
SP GR SPEC: 1.01 — SIGNIFICANT CHANGE UP (ref 1.01–1.02)
SP GR SPEC: 1.02 — SIGNIFICANT CHANGE UP (ref 1.01–1.02)
SP GR SPEC: 1.02 — SIGNIFICANT CHANGE UP (ref 1–1.03)
SP GR SPEC: 1.02 — SIGNIFICANT CHANGE UP (ref 1–1.03)
SP GR SPEC: >1.03 — HIGH (ref 1–1.03)
SP GR SPEC: >1.03 — HIGH (ref 1–1.03)
SPECIMEN SOURCE: SIGNIFICANT CHANGE UP
SQUAMOUS # UR AUTO: 0 /HPF — SIGNIFICANT CHANGE UP (ref 0–5)
SQUAMOUS # UR AUTO: 0 /HPF — SIGNIFICANT CHANGE UP (ref 0–5)
SURGICAL PATHOLOGY STUDY: SIGNIFICANT CHANGE UP
TIBC SERPL-MCNC: 357 UG/DL — SIGNIFICANT CHANGE UP (ref 220–430)
TOTAL NUCLEATED CELL COUNT, BODY FLUID: 271 /UL — SIGNIFICANT CHANGE UP
TOTAL NUCLEATED CELL COUNT, BODY FLUID: 360 /UL — SIGNIFICANT CHANGE UP
TRIGL SERPL-MCNC: 158 MG/DL — HIGH
TRIGL SERPL-MCNC: 158 MG/DL — HIGH
TRIGL SERPL-MCNC: 74 MG/DL — SIGNIFICANT CHANGE UP
TROPONIN I, HIGH SENSITIVITY RESULT: 21.97 NG/L — SIGNIFICANT CHANGE UP
TROPONIN I, HIGH SENSITIVITY RESULT: 21.98 NG/L — SIGNIFICANT CHANGE UP
TROPONIN I, HIGH SENSITIVITY RESULT: 24.72 NG/L — SIGNIFICANT CHANGE UP
TUBE TYPE: SIGNIFICANT CHANGE UP
TUBE TYPE: SIGNIFICANT CHANGE UP
UIBC SERPL-MCNC: 330 UG/DL — SIGNIFICANT CHANGE UP (ref 110–370)
UROBILINOGEN FLD QL: 1
UROBILINOGEN FLD QL: 1 MG/DL — SIGNIFICANT CHANGE UP (ref 0.2–1)
WBC # BLD: 10.68 K/UL — HIGH (ref 3.8–10.5)
WBC # BLD: 10.69 K/UL — HIGH (ref 3.8–10.5)
WBC # BLD: 10.69 K/UL — HIGH (ref 3.8–10.5)
WBC # BLD: 11.34 K/UL — HIGH (ref 3.8–10.5)
WBC # BLD: 11.71 K/UL — HIGH (ref 3.8–10.5)
WBC # BLD: 11.98 K/UL — HIGH (ref 3.8–10.5)
WBC # BLD: 11.98 K/UL — HIGH (ref 3.8–10.5)
WBC # BLD: 12.3 K/UL — HIGH (ref 3.8–10.5)
WBC # BLD: 13.03 K/UL — HIGH (ref 3.8–10.5)
WBC # BLD: 13.03 K/UL — HIGH (ref 3.8–10.5)
WBC # BLD: 15.26 K/UL — HIGH (ref 3.8–10.5)
WBC # BLD: 15.92 K/UL — HIGH (ref 3.8–10.5)
WBC # BLD: 15.92 K/UL — HIGH (ref 3.8–10.5)
WBC # BLD: 16.05 K/UL — HIGH (ref 3.8–10.5)
WBC # BLD: 16.05 K/UL — HIGH (ref 3.8–10.5)
WBC # BLD: 17 K/UL — HIGH (ref 3.8–10.5)
WBC # BLD: 17 K/UL — HIGH (ref 3.8–10.5)
WBC # BLD: 18.45 K/UL — HIGH (ref 3.8–10.5)
WBC # BLD: 18.45 K/UL — HIGH (ref 3.8–10.5)
WBC # BLD: 8.16 K/UL — SIGNIFICANT CHANGE UP (ref 3.8–10.5)
WBC # BLD: 8.47 K/UL — SIGNIFICANT CHANGE UP (ref 3.8–10.5)
WBC # BLD: 9.6 K/UL — SIGNIFICANT CHANGE UP (ref 3.8–10.5)
WBC # BLD: 9.66 K/UL — SIGNIFICANT CHANGE UP (ref 3.8–10.5)
WBC # BLD: 9.96 K/UL — SIGNIFICANT CHANGE UP (ref 3.8–10.5)
WBC # FLD AUTO: 10.68 K/UL — HIGH (ref 3.8–10.5)
WBC # FLD AUTO: 10.69 K/UL — HIGH (ref 3.8–10.5)
WBC # FLD AUTO: 10.69 K/UL — HIGH (ref 3.8–10.5)
WBC # FLD AUTO: 11.34 K/UL — HIGH (ref 3.8–10.5)
WBC # FLD AUTO: 11.71 K/UL — HIGH (ref 3.8–10.5)
WBC # FLD AUTO: 11.98 K/UL — HIGH (ref 3.8–10.5)
WBC # FLD AUTO: 11.98 K/UL — HIGH (ref 3.8–10.5)
WBC # FLD AUTO: 12.3 K/UL — HIGH (ref 3.8–10.5)
WBC # FLD AUTO: 13.03 K/UL — HIGH (ref 3.8–10.5)
WBC # FLD AUTO: 13.03 K/UL — HIGH (ref 3.8–10.5)
WBC # FLD AUTO: 15.26 K/UL — HIGH (ref 3.8–10.5)
WBC # FLD AUTO: 15.92 K/UL — HIGH (ref 3.8–10.5)
WBC # FLD AUTO: 15.92 K/UL — HIGH (ref 3.8–10.5)
WBC # FLD AUTO: 16.05 K/UL — HIGH (ref 3.8–10.5)
WBC # FLD AUTO: 16.05 K/UL — HIGH (ref 3.8–10.5)
WBC # FLD AUTO: 17 K/UL — HIGH (ref 3.8–10.5)
WBC # FLD AUTO: 17 K/UL — HIGH (ref 3.8–10.5)
WBC # FLD AUTO: 18.45 K/UL — HIGH (ref 3.8–10.5)
WBC # FLD AUTO: 18.45 K/UL — HIGH (ref 3.8–10.5)
WBC # FLD AUTO: 8.16 K/UL — SIGNIFICANT CHANGE UP (ref 3.8–10.5)
WBC # FLD AUTO: 8.47 K/UL — SIGNIFICANT CHANGE UP (ref 3.8–10.5)
WBC # FLD AUTO: 9.6 K/UL — SIGNIFICANT CHANGE UP (ref 3.8–10.5)
WBC # FLD AUTO: 9.66 K/UL — SIGNIFICANT CHANGE UP (ref 3.8–10.5)
WBC # FLD AUTO: 9.96 K/UL — SIGNIFICANT CHANGE UP (ref 3.8–10.5)
WBC UR QL: 0 /HPF — SIGNIFICANT CHANGE UP (ref 0–5)
WBC UR QL: NEGATIVE /HPF — SIGNIFICANT CHANGE UP (ref 0–5)
WBC UR QL: NEGATIVE /HPF — SIGNIFICANT CHANGE UP (ref 0–5)
WBC UR QL: SIGNIFICANT CHANGE UP /HPF (ref 0–5)

## 2023-01-01 PROCEDURE — 99231 SBSQ HOSP IP/OBS SF/LOW 25: CPT

## 2023-01-01 PROCEDURE — 93306 TTE W/DOPPLER COMPLETE: CPT | Mod: 26

## 2023-01-01 PROCEDURE — 99233 SBSQ HOSP IP/OBS HIGH 50: CPT

## 2023-01-01 PROCEDURE — 93306 TTE W/DOPPLER COMPLETE: CPT

## 2023-01-01 PROCEDURE — 81001 URINALYSIS AUTO W/SCOPE: CPT

## 2023-01-01 PROCEDURE — 85027 COMPLETE CBC AUTOMATED: CPT

## 2023-01-01 PROCEDURE — 71045 X-RAY EXAM CHEST 1 VIEW: CPT | Mod: 26

## 2023-01-01 PROCEDURE — 99285 EMERGENCY DEPT VISIT HI MDM: CPT | Mod: 25

## 2023-01-01 PROCEDURE — C9399: CPT

## 2023-01-01 PROCEDURE — 74177 CT ABD & PELVIS W/CONTRAST: CPT | Mod: 26

## 2023-01-01 PROCEDURE — 99232 SBSQ HOSP IP/OBS MODERATE 35: CPT

## 2023-01-01 PROCEDURE — 87070 CULTURE OTHR SPECIMN AEROBIC: CPT

## 2023-01-01 PROCEDURE — 74177 CT ABD & PELVIS W/CONTRAST: CPT | Mod: 26,MG

## 2023-01-01 PROCEDURE — 83615 LACTATE (LD) (LDH) ENZYME: CPT

## 2023-01-01 PROCEDURE — 74177 CT ABD & PELVIS W/CONTRAST: CPT | Mod: 26,MA

## 2023-01-01 PROCEDURE — 99231 SBSQ HOSP IP/OBS SF/LOW 25: CPT | Mod: 25

## 2023-01-01 PROCEDURE — 74183 MRI ABD W/O CNTR FLWD CNTR: CPT

## 2023-01-01 PROCEDURE — 80053 COMPREHEN METABOLIC PANEL: CPT

## 2023-01-01 PROCEDURE — 80048 BASIC METABOLIC PNL TOTAL CA: CPT

## 2023-01-01 PROCEDURE — 96375 TX/PRO/DX INJ NEW DRUG ADDON: CPT

## 2023-01-01 PROCEDURE — 99223 1ST HOSP IP/OBS HIGH 75: CPT

## 2023-01-01 PROCEDURE — C9113: CPT

## 2023-01-01 PROCEDURE — 99222 1ST HOSP IP/OBS MODERATE 55: CPT

## 2023-01-01 PROCEDURE — 93010 ELECTROCARDIOGRAM REPORT: CPT

## 2023-01-01 PROCEDURE — 85730 THROMBOPLASTIN TIME PARTIAL: CPT

## 2023-01-01 PROCEDURE — 71045 X-RAY EXAM CHEST 1 VIEW: CPT

## 2023-01-01 PROCEDURE — 83605 ASSAY OF LACTIC ACID: CPT

## 2023-01-01 PROCEDURE — 96376 TX/PRO/DX INJ SAME DRUG ADON: CPT

## 2023-01-01 PROCEDURE — 88304 TISSUE EXAM BY PATHOLOGIST: CPT

## 2023-01-01 PROCEDURE — 82945 GLUCOSE OTHER FLUID: CPT

## 2023-01-01 PROCEDURE — 85025 COMPLETE CBC W/AUTO DIFF WBC: CPT

## 2023-01-01 PROCEDURE — 84157 ASSAY OF PROTEIN OTHER: CPT

## 2023-01-01 PROCEDURE — 74176 CT ABD & PELVIS W/O CONTRAST: CPT | Mod: 26,MA

## 2023-01-01 PROCEDURE — 43239 EGD BIOPSY SINGLE/MULTIPLE: CPT

## 2023-01-01 PROCEDURE — 88305 TISSUE EXAM BY PATHOLOGIST: CPT | Mod: 26

## 2023-01-01 PROCEDURE — 87040 BLOOD CULTURE FOR BACTERIA: CPT

## 2023-01-01 PROCEDURE — 71045 X-RAY EXAM CHEST 1 VIEW: CPT | Mod: 26,76

## 2023-01-01 PROCEDURE — 43239 EGD BIOPSY SINGLE/MULTIPLE: CPT | Mod: GC

## 2023-01-01 PROCEDURE — 96374 THER/PROPH/DIAG INJ IV PUSH: CPT | Mod: XU

## 2023-01-01 PROCEDURE — 87086 URINE CULTURE/COLONY COUNT: CPT

## 2023-01-01 PROCEDURE — 82962 GLUCOSE BLOOD TEST: CPT

## 2023-01-01 PROCEDURE — 83690 ASSAY OF LIPASE: CPT

## 2023-01-01 PROCEDURE — 85610 PROTHROMBIN TIME: CPT

## 2023-01-01 PROCEDURE — 32557 INSERT CATH PLEURA W/ IMAGE: CPT | Mod: RT

## 2023-01-01 PROCEDURE — 97161 PT EVAL LOW COMPLEX 20 MIN: CPT | Mod: GP

## 2023-01-01 PROCEDURE — 97162 PT EVAL MOD COMPLEX 30 MIN: CPT | Mod: GP

## 2023-01-01 PROCEDURE — 74177 CT ABD & PELVIS W/CONTRAST: CPT | Mod: MA

## 2023-01-01 PROCEDURE — 87635 SARS-COV-2 COVID-19 AMP PRB: CPT

## 2023-01-01 PROCEDURE — 74177 CT ABD & PELVIS W/CONTRAST: CPT | Mod: MG

## 2023-01-01 PROCEDURE — 49083 ABD PARACENTESIS W/IMAGING: CPT

## 2023-01-01 PROCEDURE — 99285 EMERGENCY DEPT VISIT HI MDM: CPT

## 2023-01-01 PROCEDURE — 80061 LIPID PANEL: CPT

## 2023-01-01 PROCEDURE — G1004: CPT

## 2023-01-01 PROCEDURE — 36415 COLL VENOUS BLD VENIPUNCTURE: CPT

## 2023-01-01 PROCEDURE — 76700 US EXAM ABDOM COMPLETE: CPT | Mod: 26,XU

## 2023-01-01 PROCEDURE — 83540 ASSAY OF IRON: CPT

## 2023-01-01 PROCEDURE — 87102 FUNGUS ISOLATION CULTURE: CPT

## 2023-01-01 PROCEDURE — 89051 BODY FLUID CELL COUNT: CPT

## 2023-01-01 PROCEDURE — 87075 CULTR BACTERIA EXCEPT BLOOD: CPT

## 2023-01-01 PROCEDURE — 96374 THER/PROPH/DIAG INJ IV PUSH: CPT

## 2023-01-01 PROCEDURE — 99205 OFFICE O/P NEW HI 60 MIN: CPT

## 2023-01-01 PROCEDURE — 71250 CT THORAX DX C-: CPT | Mod: 26

## 2023-01-01 PROCEDURE — 82140 ASSAY OF AMMONIA: CPT

## 2023-01-01 PROCEDURE — 87040 BLOOD CULTURE FOR BACTERIA: CPT | Mod: 91

## 2023-01-01 PROCEDURE — 74177 CT ABD & PELVIS W/CONTRAST: CPT

## 2023-01-01 PROCEDURE — 88312 SPECIAL STAINS GROUP 1: CPT | Mod: 26

## 2023-01-01 PROCEDURE — 88312 SPECIAL STAINS GROUP 1: CPT

## 2023-01-01 PROCEDURE — A9579: CPT

## 2023-01-01 PROCEDURE — 83880 ASSAY OF NATRIURETIC PEPTIDE: CPT

## 2023-01-01 PROCEDURE — 99239 HOSP IP/OBS DSCHRG MGMT >30: CPT

## 2023-01-01 PROCEDURE — 84484 ASSAY OF TROPONIN QUANT: CPT

## 2023-01-01 PROCEDURE — 82042 OTHER SOURCE ALBUMIN QUAN EA: CPT

## 2023-01-01 PROCEDURE — 76700 US EXAM ABDOM COMPLETE: CPT

## 2023-01-01 PROCEDURE — 83986 ASSAY PH BODY FLUID NOS: CPT

## 2023-01-01 PROCEDURE — 94640 AIRWAY INHALATION TREATMENT: CPT

## 2023-01-01 PROCEDURE — 88108 CYTOPATH CONCENTRATE TECH: CPT

## 2023-01-01 PROCEDURE — 88108 CYTOPATH CONCENTRATE TECH: CPT | Mod: 26

## 2023-01-01 PROCEDURE — 83036 HEMOGLOBIN GLYCOSYLATED A1C: CPT

## 2023-01-01 PROCEDURE — 93975 VASCULAR STUDY: CPT

## 2023-01-01 PROCEDURE — 93975 VASCULAR STUDY: CPT | Mod: 26

## 2023-01-01 PROCEDURE — 74183 MRI ABD W/O CNTR FLWD CNTR: CPT | Mod: 26

## 2023-01-01 PROCEDURE — 0241U: CPT

## 2023-01-01 PROCEDURE — 80076 HEPATIC FUNCTION PANEL: CPT

## 2023-01-01 PROCEDURE — 99214 OFFICE O/P EST MOD 30 MIN: CPT

## 2023-01-01 PROCEDURE — 83550 IRON BINDING TEST: CPT

## 2023-01-01 PROCEDURE — 71250 CT THORAX DX C-: CPT

## 2023-01-01 PROCEDURE — 43235 EGD DIAGNOSTIC BRUSH WASH: CPT | Mod: 59

## 2023-01-01 PROCEDURE — 82728 ASSAY OF FERRITIN: CPT

## 2023-01-01 PROCEDURE — 97116 GAIT TRAINING THERAPY: CPT | Mod: GP

## 2023-01-01 PROCEDURE — 93005 ELECTROCARDIOGRAM TRACING: CPT

## 2023-01-01 PROCEDURE — 88305 TISSUE EXAM BY PATHOLOGIST: CPT

## 2023-01-01 PROCEDURE — 99284 EMERGENCY DEPT VISIT MOD MDM: CPT | Mod: 25

## 2023-01-01 PROCEDURE — 99285 EMERGENCY DEPT VISIT HI MDM: CPT | Mod: FS,25

## 2023-01-01 PROCEDURE — 88304 TISSUE EXAM BY PATHOLOGIST: CPT | Mod: 26

## 2023-01-01 RX ORDER — TIOTROPIUM BROMIDE 18 UG/1
2 CAPSULE ORAL; RESPIRATORY (INHALATION)
Qty: 0 | Refills: 0 | DISCHARGE
Start: 2023-01-01

## 2023-01-01 RX ORDER — POLYETHYLENE GLYCOL 3350 17 G/17G
17 POWDER, FOR SOLUTION ORAL AT BEDTIME
Refills: 0 | Status: DISCONTINUED | OUTPATIENT
Start: 2023-01-01 | End: 2023-01-01

## 2023-01-01 RX ORDER — FUROSEMIDE 40 MG
1 TABLET ORAL
Qty: 0 | Refills: 0 | DISCHARGE
Start: 2023-01-01

## 2023-01-01 RX ORDER — DEXTROSE 50 % IN WATER 50 %
12.5 SYRINGE (ML) INTRAVENOUS ONCE
Refills: 0 | Status: DISCONTINUED | OUTPATIENT
Start: 2023-01-01 | End: 2023-01-01

## 2023-01-01 RX ORDER — SODIUM CHLORIDE 9 MG/ML
1000 INJECTION INTRAMUSCULAR; INTRAVENOUS; SUBCUTANEOUS
Refills: 0 | Status: DISCONTINUED | OUTPATIENT
Start: 2023-01-01 | End: 2023-01-01

## 2023-01-01 RX ORDER — ACETAMINOPHEN 500 MG
650 TABLET ORAL EVERY 6 HOURS
Refills: 0 | Status: DISCONTINUED | OUTPATIENT
Start: 2023-01-01 | End: 2023-01-01

## 2023-01-01 RX ORDER — SODIUM CHLORIDE 9 MG/ML
500 INJECTION INTRAMUSCULAR; INTRAVENOUS; SUBCUTANEOUS ONCE
Refills: 0 | Status: DISCONTINUED | OUTPATIENT
Start: 2023-01-01 | End: 2023-01-01

## 2023-01-01 RX ORDER — INSULIN LISPRO 100/ML
VIAL (ML) SUBCUTANEOUS
Refills: 0 | Status: DISCONTINUED | OUTPATIENT
Start: 2023-01-01 | End: 2023-01-01

## 2023-01-01 RX ORDER — ONDANSETRON 8 MG/1
4 TABLET, FILM COATED ORAL EVERY 6 HOURS
Refills: 0 | Status: DISCONTINUED | OUTPATIENT
Start: 2023-01-01 | End: 2023-01-01

## 2023-01-01 RX ORDER — SODIUM CHLORIDE 9 MG/ML
1000 INJECTION, SOLUTION INTRAVENOUS
Refills: 0 | Status: DISCONTINUED | OUTPATIENT
Start: 2023-01-01 | End: 2023-01-01

## 2023-01-01 RX ORDER — TRAMADOL HYDROCHLORIDE 50 MG/1
25 TABLET ORAL EVERY 4 HOURS
Refills: 0 | Status: DISCONTINUED | OUTPATIENT
Start: 2023-01-01 | End: 2023-01-01

## 2023-01-01 RX ORDER — SIMVASTATIN 20 MG/1
10 TABLET, FILM COATED ORAL AT BEDTIME
Refills: 0 | Status: DISCONTINUED | OUTPATIENT
Start: 2023-01-01 | End: 2023-01-01

## 2023-01-01 RX ORDER — HYDROMORPHONE HYDROCHLORIDE 2 MG/ML
1 INJECTION INTRAMUSCULAR; INTRAVENOUS; SUBCUTANEOUS ONCE
Refills: 0 | Status: DISCONTINUED | OUTPATIENT
Start: 2023-01-01 | End: 2023-01-01

## 2023-01-01 RX ORDER — METOCLOPRAMIDE HCL 10 MG
1 TABLET ORAL
Qty: 0 | Refills: 0 | DISCHARGE

## 2023-01-01 RX ORDER — GABAPENTIN 400 MG/1
300 CAPSULE ORAL
Refills: 0 | Status: DISCONTINUED | OUTPATIENT
Start: 2023-01-01 | End: 2023-01-01

## 2023-01-01 RX ORDER — HYDROMORPHONE HYDROCHLORIDE 2 MG/ML
0.5 INJECTION INTRAMUSCULAR; INTRAVENOUS; SUBCUTANEOUS ONCE
Refills: 0 | Status: DISCONTINUED | OUTPATIENT
Start: 2023-01-01 | End: 2023-01-01

## 2023-01-01 RX ORDER — FINASTERIDE 5 MG/1
5 TABLET, FILM COATED ORAL DAILY
Refills: 0 | Status: DISCONTINUED | OUTPATIENT
Start: 2023-01-01 | End: 2023-01-01

## 2023-01-01 RX ORDER — HYDROMORPHONE HYDROCHLORIDE 2 MG/ML
1.5 INJECTION INTRAMUSCULAR; INTRAVENOUS; SUBCUTANEOUS EVERY 4 HOURS
Refills: 0 | Status: DISCONTINUED | OUTPATIENT
Start: 2023-01-01 | End: 2023-01-01

## 2023-01-01 RX ORDER — SENNA PLUS 8.6 MG/1
2 TABLET ORAL AT BEDTIME
Refills: 0 | Status: DISCONTINUED | OUTPATIENT
Start: 2023-01-01 | End: 2023-01-01

## 2023-01-01 RX ORDER — FUROSEMIDE 40 MG
40 TABLET ORAL
Refills: 0 | Status: DISCONTINUED | OUTPATIENT
Start: 2023-01-01 | End: 2023-01-01

## 2023-01-01 RX ORDER — PANTOPRAZOLE SODIUM 20 MG/1
40 TABLET, DELAYED RELEASE ORAL
Refills: 0 | Status: DISCONTINUED | OUTPATIENT
Start: 2023-01-01 | End: 2023-01-01

## 2023-01-01 RX ORDER — TAMSULOSIN HYDROCHLORIDE 0.4 MG/1
2 CAPSULE ORAL
Qty: 0 | Refills: 0 | DISCHARGE

## 2023-01-01 RX ORDER — FUROSEMIDE 40 MG
3 TABLET ORAL
Refills: 0 | DISCHARGE

## 2023-01-01 RX ORDER — SPIRONOLACTONE 25 MG/1
4 TABLET, FILM COATED ORAL
Qty: 0 | Refills: 0 | DISCHARGE
Start: 2023-01-01

## 2023-01-01 RX ORDER — FAMOTIDINE 10 MG/ML
2 INJECTION INTRAVENOUS
Qty: 0 | Refills: 0 | DISCHARGE

## 2023-01-01 RX ORDER — DEXTROSE 50 % IN WATER 50 %
25 SYRINGE (ML) INTRAVENOUS ONCE
Refills: 0 | Status: DISCONTINUED | OUTPATIENT
Start: 2023-01-01 | End: 2023-01-01

## 2023-01-01 RX ORDER — ISOSORBIDE MONONITRATE 60 MG/1
1 TABLET, EXTENDED RELEASE ORAL
Qty: 0 | Refills: 0 | DISCHARGE

## 2023-01-01 RX ORDER — NALOXEGOL OXALATE 12.5 MG/1
25 TABLET, FILM COATED ORAL DAILY
Refills: 0 | Status: DISCONTINUED | OUTPATIENT
Start: 2023-01-01 | End: 2023-01-01

## 2023-01-01 RX ORDER — INSULIN LISPRO 100/ML
VIAL (ML) SUBCUTANEOUS AT BEDTIME
Refills: 0 | Status: DISCONTINUED | OUTPATIENT
Start: 2023-01-01 | End: 2023-01-01

## 2023-01-01 RX ORDER — METFORMIN HYDROCHLORIDE 850 MG/1
2 TABLET ORAL
Refills: 0 | DISCHARGE

## 2023-01-01 RX ORDER — UMECLIDINIUM 62.5 UG/1
1 AEROSOL, POWDER ORAL
Refills: 0 | DISCHARGE

## 2023-01-01 RX ORDER — CEFTRIAXONE 500 MG/1
1000 INJECTION, POWDER, FOR SOLUTION INTRAMUSCULAR; INTRAVENOUS ONCE
Refills: 0 | Status: COMPLETED | OUTPATIENT
Start: 2023-01-01 | End: 2023-01-01

## 2023-01-01 RX ORDER — ALBUTEROL 90 UG/1
2 AEROSOL, METERED ORAL
Qty: 0 | Refills: 0 | DISCHARGE

## 2023-01-01 RX ORDER — CLOPIDOGREL BISULFATE 75 MG/1
75 TABLET, FILM COATED ORAL DAILY
Refills: 0 | Status: DISCONTINUED | OUTPATIENT
Start: 2023-01-01 | End: 2023-01-01

## 2023-01-01 RX ORDER — METOPROLOL TARTRATE 50 MG
25 TABLET ORAL ONCE
Refills: 0 | Status: COMPLETED | OUTPATIENT
Start: 2023-01-01 | End: 2023-01-01

## 2023-01-01 RX ORDER — MAGNESIUM HYDROXIDE 400 MG/1
30 TABLET, CHEWABLE ORAL DAILY
Refills: 0 | Status: DISCONTINUED | OUTPATIENT
Start: 2023-01-01 | End: 2023-01-01

## 2023-01-01 RX ORDER — LIDOCAINE 4 G/100G
1 CREAM TOPICAL DAILY
Refills: 0 | Status: DISCONTINUED | OUTPATIENT
Start: 2023-01-01 | End: 2023-01-01

## 2023-01-01 RX ORDER — ENOXAPARIN SODIUM 100 MG/ML
40 INJECTION SUBCUTANEOUS EVERY 24 HOURS
Refills: 0 | Status: DISCONTINUED | OUTPATIENT
Start: 2023-01-01 | End: 2023-01-01

## 2023-01-01 RX ORDER — DOCUSATE SODIUM 100 MG
3 CAPSULE ORAL
Refills: 0 | DISCHARGE

## 2023-01-01 RX ORDER — FUROSEMIDE 40 MG
10 TABLET ORAL ONCE
Refills: 0 | Status: COMPLETED | OUTPATIENT
Start: 2023-01-01 | End: 2023-01-01

## 2023-01-01 RX ORDER — TAMSULOSIN HYDROCHLORIDE 0.4 MG/1
0.4 CAPSULE ORAL AT BEDTIME
Refills: 0 | Status: DISCONTINUED | OUTPATIENT
Start: 2023-01-01 | End: 2023-01-01

## 2023-01-01 RX ORDER — OXYCODONE AND ACETAMINOPHEN 5; 325 MG/1; MG/1
1 TABLET ORAL EVERY 4 HOURS
Refills: 0 | Status: DISCONTINUED | OUTPATIENT
Start: 2023-01-01 | End: 2023-01-01

## 2023-01-01 RX ORDER — SPIRONOLACTONE 25 MG/1
100 TABLET, FILM COATED ORAL DAILY
Refills: 0 | Status: DISCONTINUED | OUTPATIENT
Start: 2023-01-01 | End: 2023-01-01

## 2023-01-01 RX ORDER — ASPIRIN/CALCIUM CARB/MAGNESIUM 324 MG
81 TABLET ORAL DAILY
Refills: 0 | Status: DISCONTINUED | OUTPATIENT
Start: 2023-01-01 | End: 2023-01-01

## 2023-01-01 RX ORDER — LACTULOSE 10 G/15ML
10 SOLUTION ORAL DAILY
Refills: 0 | Status: DISCONTINUED | OUTPATIENT
Start: 2023-01-01 | End: 2023-01-01

## 2023-01-01 RX ORDER — INSULIN GLARGINE 100 [IU]/ML
25 INJECTION, SOLUTION SUBCUTANEOUS
Qty: 0 | Refills: 0 | DISCHARGE

## 2023-01-01 RX ORDER — DEXTROSE 50 % IN WATER 50 %
15 SYRINGE (ML) INTRAVENOUS ONCE
Refills: 0 | Status: DISCONTINUED | OUTPATIENT
Start: 2023-01-01 | End: 2023-01-01

## 2023-01-01 RX ORDER — FAMOTIDINE 10 MG/ML
20 INJECTION INTRAVENOUS ONCE
Refills: 0 | Status: COMPLETED | OUTPATIENT
Start: 2023-01-01 | End: 2023-01-01

## 2023-01-01 RX ORDER — AMLODIPINE BESYLATE 2.5 MG/1
10 TABLET ORAL DAILY
Refills: 0 | Status: DISCONTINUED | OUTPATIENT
Start: 2023-01-01 | End: 2023-01-01

## 2023-01-01 RX ORDER — HYDROMORPHONE HYDROCHLORIDE 2 MG/ML
6 INJECTION INTRAMUSCULAR; INTRAVENOUS; SUBCUTANEOUS EVERY 6 HOURS
Refills: 0 | Status: DISCONTINUED | OUTPATIENT
Start: 2023-01-01 | End: 2023-01-01

## 2023-01-01 RX ORDER — IBUPROFEN 200 MG
600 TABLET ORAL EVERY 6 HOURS
Refills: 0 | Status: DISCONTINUED | OUTPATIENT
Start: 2023-01-01 | End: 2023-01-01

## 2023-01-01 RX ORDER — HYDROMORPHONE HYDROCHLORIDE 2 MG/ML
0.5 INJECTION INTRAMUSCULAR; INTRAVENOUS; SUBCUTANEOUS
Refills: 0 | Status: DISCONTINUED | OUTPATIENT
Start: 2023-01-01 | End: 2023-01-01

## 2023-01-01 RX ORDER — METOPROLOL TARTRATE 50 MG
25 TABLET ORAL DAILY
Refills: 0 | Status: DISCONTINUED | OUTPATIENT
Start: 2023-01-01 | End: 2023-01-01

## 2023-01-01 RX ORDER — MULTIVIT WITH MIN/MFOLATE/K2 340-15/3 G
296 POWDER (GRAM) ORAL ONCE
Refills: 0 | Status: COMPLETED | OUTPATIENT
Start: 2023-01-01 | End: 2023-01-01

## 2023-01-01 RX ORDER — LANOLIN ALCOHOL/MO/W.PET/CERES
3 CREAM (GRAM) TOPICAL AT BEDTIME
Refills: 0 | Status: DISCONTINUED | OUTPATIENT
Start: 2023-01-01 | End: 2023-01-01

## 2023-01-01 RX ORDER — FUROSEMIDE 40 MG
40 TABLET ORAL DAILY
Refills: 0 | Status: DISCONTINUED | OUTPATIENT
Start: 2023-01-01 | End: 2023-01-01

## 2023-01-01 RX ORDER — SUCRALFATE 1 G
10 TABLET ORAL
Qty: 0 | Refills: 0 | DISCHARGE
Start: 2023-01-01

## 2023-01-01 RX ORDER — POLYETHYLENE GLYCOL 3350 17 G/17G
17 POWDER, FOR SOLUTION ORAL
Refills: 0 | Status: ACTIVE | COMMUNITY

## 2023-01-01 RX ORDER — HYDROMORPHONE HYDROCHLORIDE 2 MG/ML
0.5 INJECTION INTRAMUSCULAR; INTRAVENOUS; SUBCUTANEOUS EVERY 6 HOURS
Refills: 0 | Status: DISCONTINUED | OUTPATIENT
Start: 2023-01-01 | End: 2023-01-01

## 2023-01-01 RX ORDER — ONDANSETRON 8 MG/1
1 TABLET, FILM COATED ORAL
Qty: 0 | Refills: 0 | DISCHARGE

## 2023-01-01 RX ORDER — OXYCODONE 5 MG/1
5 TABLET ORAL EVERY 8 HOURS
Qty: 90 | Refills: 0 | Status: DISCONTINUED | COMMUNITY
Start: 2022-12-29 | End: 2023-01-01

## 2023-01-01 RX ORDER — GLUCAGON INJECTION, SOLUTION 0.5 MG/.1ML
1 INJECTION, SOLUTION SUBCUTANEOUS ONCE
Refills: 0 | Status: DISCONTINUED | OUTPATIENT
Start: 2023-01-01 | End: 2023-01-01

## 2023-01-01 RX ORDER — LACTOBACILLUS ACIDOPHILUS 100MM CELL
1 CAPSULE ORAL
Qty: 0 | Refills: 0 | DISCHARGE

## 2023-01-01 RX ORDER — ONDANSETRON 8 MG/1
4 TABLET, FILM COATED ORAL ONCE
Refills: 0 | Status: DISCONTINUED | OUTPATIENT
Start: 2023-01-01 | End: 2023-01-01

## 2023-01-01 RX ORDER — IPRATROPIUM/ALBUTEROL SULFATE 18-103MCG
3 AEROSOL WITH ADAPTER (GRAM) INHALATION
Refills: 0 | DISCHARGE

## 2023-01-01 RX ORDER — LACTULOSE 10 G/15ML
15 SOLUTION ORAL
Refills: 0 | DISCHARGE

## 2023-01-01 RX ORDER — HYDROMORPHONE HYDROCHLORIDE 2 MG/ML
1 INJECTION INTRAMUSCULAR; INTRAVENOUS; SUBCUTANEOUS EVERY 4 HOURS
Refills: 0 | Status: DISCONTINUED | OUTPATIENT
Start: 2023-01-01 | End: 2023-01-01

## 2023-01-01 RX ORDER — METFORMIN HYDROCHLORIDE 500 MG/1
500 TABLET, COATED ORAL
Refills: 0 | Status: ACTIVE | COMMUNITY

## 2023-01-01 RX ORDER — BUDESONIDE AND FORMOTEROL FUMARATE DIHYDRATE 160; 4.5 UG/1; UG/1
2 AEROSOL RESPIRATORY (INHALATION)
Qty: 0 | Refills: 0 | DISCHARGE
Start: 2023-01-01

## 2023-01-01 RX ORDER — PANTOPRAZOLE SODIUM 20 MG/1
1 TABLET, DELAYED RELEASE ORAL
Refills: 0 | DISCHARGE

## 2023-01-01 RX ORDER — LIDOCAINE 4 G/100G
1 CREAM TOPICAL ONCE
Refills: 0 | Status: COMPLETED | OUTPATIENT
Start: 2023-01-01 | End: 2023-01-01

## 2023-01-01 RX ORDER — FAMOTIDINE 10 MG/ML
2 INJECTION INTRAVENOUS
Refills: 0 | DISCHARGE

## 2023-01-01 RX ORDER — METOPROLOL TARTRATE 50 MG
5 TABLET ORAL ONCE
Refills: 0 | Status: COMPLETED | OUTPATIENT
Start: 2023-01-01 | End: 2023-01-01

## 2023-01-01 RX ORDER — PANTOPRAZOLE SODIUM 20 MG/1
40 TABLET, DELAYED RELEASE ORAL ONCE
Refills: 0 | Status: COMPLETED | OUTPATIENT
Start: 2023-01-01 | End: 2023-01-01

## 2023-01-01 RX ORDER — ACETAMINOPHEN 500 MG
2 TABLET ORAL
Refills: 0 | DISCHARGE

## 2023-01-01 RX ORDER — HYDROMORPHONE HYDROCHLORIDE 2 MG/ML
6 INJECTION INTRAMUSCULAR; INTRAVENOUS; SUBCUTANEOUS ONCE
Refills: 0 | Status: DISCONTINUED | OUTPATIENT
Start: 2023-01-01 | End: 2023-01-01

## 2023-01-01 RX ORDER — INSULIN LISPRO 100/ML
4 VIAL (ML) SUBCUTANEOUS ONCE
Refills: 0 | Status: COMPLETED | OUTPATIENT
Start: 2023-01-01 | End: 2023-01-01

## 2023-01-01 RX ORDER — BUDESONIDE AND FORMOTEROL FUMARATE DIHYDRATE 160; 4.5 UG/1; UG/1
2 AEROSOL RESPIRATORY (INHALATION)
Refills: 0 | Status: DISCONTINUED | OUTPATIENT
Start: 2023-01-01 | End: 2023-01-01

## 2023-01-01 RX ORDER — HYDROMORPHONE HYDROCHLORIDE 2 MG/ML
0.2 INJECTION INTRAMUSCULAR; INTRAVENOUS; SUBCUTANEOUS ONCE
Refills: 0 | Status: DISCONTINUED | OUTPATIENT
Start: 2023-01-01 | End: 2023-01-01

## 2023-01-01 RX ORDER — METOCLOPRAMIDE HCL 10 MG
10 TABLET ORAL ONCE
Refills: 0 | Status: COMPLETED | OUTPATIENT
Start: 2023-01-01 | End: 2023-01-01

## 2023-01-01 RX ORDER — INSULIN GLARGINE 100 [IU]/ML
30 INJECTION, SOLUTION SUBCUTANEOUS AT BEDTIME
Refills: 0 | Status: DISCONTINUED | OUTPATIENT
Start: 2023-01-01 | End: 2023-01-01

## 2023-01-01 RX ORDER — INSULIN GLARGINE 100 [IU]/ML
16 INJECTION, SOLUTION SUBCUTANEOUS AT BEDTIME
Refills: 0 | Status: DISCONTINUED | OUTPATIENT
Start: 2023-01-01 | End: 2023-01-01

## 2023-01-01 RX ORDER — SUCRALFATE 1 G
1 TABLET ORAL
Qty: 0 | Refills: 0 | DISCHARGE
Start: 2023-01-01 | End: 2023-01-01

## 2023-01-01 RX ORDER — SPIRONOLACTONE 25 MG/1
1 TABLET, FILM COATED ORAL
Qty: 0 | Refills: 0 | DISCHARGE

## 2023-01-01 RX ORDER — HEPARIN SODIUM 5000 [USP'U]/ML
5000 INJECTION INTRAVENOUS; SUBCUTANEOUS EVERY 12 HOURS
Refills: 0 | Status: DISCONTINUED | OUTPATIENT
Start: 2023-01-01 | End: 2023-01-01

## 2023-01-01 RX ORDER — GABAPENTIN 400 MG/1
1 CAPSULE ORAL
Qty: 0 | Refills: 0 | DISCHARGE

## 2023-01-01 RX ORDER — ONDANSETRON 8 MG/1
4 TABLET, FILM COATED ORAL ONCE
Refills: 0 | Status: COMPLETED | OUTPATIENT
Start: 2023-01-01 | End: 2023-01-01

## 2023-01-01 RX ORDER — POLYETHYLENE GLYCOL 3350 17 G/17G
17 POWDER, FOR SOLUTION ORAL EVERY 12 HOURS
Refills: 0 | Status: DISCONTINUED | OUTPATIENT
Start: 2023-01-01 | End: 2023-01-01

## 2023-01-01 RX ORDER — SODIUM CHLORIDE 9 MG/ML
500 INJECTION INTRAMUSCULAR; INTRAVENOUS; SUBCUTANEOUS ONCE
Refills: 0 | Status: COMPLETED | OUTPATIENT
Start: 2023-01-01 | End: 2023-01-01

## 2023-01-01 RX ORDER — TIOTROPIUM BROMIDE 18 UG/1
2 CAPSULE ORAL; RESPIRATORY (INHALATION) DAILY
Refills: 0 | Status: DISCONTINUED | OUTPATIENT
Start: 2023-01-01 | End: 2023-01-01

## 2023-01-01 RX ORDER — MORPHINE SULFATE 50 MG/1
2 CAPSULE, EXTENDED RELEASE ORAL ONCE
Refills: 0 | Status: DISCONTINUED | OUTPATIENT
Start: 2023-01-01 | End: 2023-01-01

## 2023-01-01 RX ORDER — AMLODIPINE BESYLATE 5 MG/1
5 TABLET ORAL DAILY
Qty: 90 | Refills: 3 | Status: DISCONTINUED | COMMUNITY
Start: 2022-12-29 | End: 2023-01-01

## 2023-01-01 RX ORDER — POLYETHYLENE GLYCOL 3350 17 G/17G
17 POWDER, FOR SOLUTION ORAL ONCE
Refills: 0 | Status: COMPLETED | OUTPATIENT
Start: 2023-01-01 | End: 2023-01-01

## 2023-01-01 RX ORDER — KETOROLAC TROMETHAMINE 30 MG/ML
30 SYRINGE (ML) INJECTION ONCE
Refills: 0 | Status: DISCONTINUED | OUTPATIENT
Start: 2023-01-01 | End: 2023-01-01

## 2023-01-01 RX ORDER — TAMSULOSIN HYDROCHLORIDE 0.4 MG/1
1 CAPSULE ORAL
Qty: 0 | Refills: 0 | DISCHARGE
Start: 2023-01-01

## 2023-01-01 RX ORDER — DOCUSATE SODIUM 100 MG
3 CAPSULE ORAL
Qty: 0 | Refills: 0 | DISCHARGE

## 2023-01-01 RX ORDER — LIDOCAINE 4 G/100G
1 CREAM TOPICAL
Refills: 0 | DISCHARGE

## 2023-01-01 RX ORDER — SIMETHICONE 80 MG/1
1 TABLET, CHEWABLE ORAL
Qty: 0 | Refills: 0 | DISCHARGE

## 2023-01-01 RX ORDER — INSULIN LISPRO 100/ML
2 VIAL (ML) SUBCUTANEOUS
Qty: 0 | Refills: 0 | DISCHARGE

## 2023-01-01 RX ORDER — GABAPENTIN 400 MG/1
600 CAPSULE ORAL AT BEDTIME
Refills: 0 | Status: DISCONTINUED | OUTPATIENT
Start: 2023-01-01 | End: 2023-01-01

## 2023-01-01 RX ORDER — MAGNESIUM HYDROXIDE 400 MG/1
30 TABLET, CHEWABLE ORAL
Qty: 0 | Refills: 0 | DISCHARGE

## 2023-01-01 RX ORDER — LACTULOSE 10 G/15ML
30 SOLUTION ORAL
Qty: 0 | Refills: 0 | DISCHARGE

## 2023-01-01 RX ORDER — OXYCODONE HYDROCHLORIDE 5 MG/1
5 TABLET ORAL ONCE
Refills: 0 | Status: DISCONTINUED | OUTPATIENT
Start: 2023-01-01 | End: 2023-01-01

## 2023-01-01 RX ORDER — SODIUM CHLORIDE 9 MG/ML
1000 INJECTION INTRAMUSCULAR; INTRAVENOUS; SUBCUTANEOUS ONCE
Refills: 0 | Status: COMPLETED | OUTPATIENT
Start: 2023-01-01 | End: 2023-01-01

## 2023-01-01 RX ORDER — PIPERACILLIN AND TAZOBACTAM 4; .5 G/20ML; G/20ML
3.38 INJECTION, POWDER, LYOPHILIZED, FOR SOLUTION INTRAVENOUS ONCE
Refills: 0 | Status: COMPLETED | OUTPATIENT
Start: 2023-01-01 | End: 2023-01-01

## 2023-01-01 RX ORDER — ASPIRIN/CALCIUM CARB/MAGNESIUM 324 MG
1 TABLET ORAL
Qty: 0 | Refills: 0 | DISCHARGE

## 2023-01-01 RX ORDER — SENNOSIDES AND DOCUSATE SODIUM 8.6; 5 MG/1; MG/1
8.6-5 TABLET ORAL
Refills: 0 | Status: DISCONTINUED | COMMUNITY
Start: 2022-12-29 | End: 2023-01-01

## 2023-01-01 RX ORDER — SIMVASTATIN 20 MG/1
1 TABLET, FILM COATED ORAL
Qty: 0 | Refills: 0 | DISCHARGE

## 2023-01-01 RX ORDER — SENNA PLUS 8.6 MG/1
2 TABLET ORAL
Refills: 0 | DISCHARGE

## 2023-01-01 RX ORDER — PANTOPRAZOLE SODIUM 20 MG/1
40 TABLET, DELAYED RELEASE ORAL DAILY
Refills: 0 | Status: DISCONTINUED | OUTPATIENT
Start: 2023-01-01 | End: 2023-01-01

## 2023-01-01 RX ORDER — ONDANSETRON 8 MG/1
4 TABLET, FILM COATED ORAL EVERY 8 HOURS
Refills: 0 | Status: DISCONTINUED | OUTPATIENT
Start: 2023-01-01 | End: 2023-01-01

## 2023-01-01 RX ORDER — INSULIN LISPRO 100/ML
0 VIAL (ML) SUBCUTANEOUS
Refills: 0 | DISCHARGE

## 2023-01-01 RX ORDER — ONDANSETRON 8 MG/1
1 TABLET, FILM COATED ORAL
Refills: 0 | DISCHARGE

## 2023-01-01 RX ORDER — AMLODIPINE BESYLATE 2.5 MG/1
1 TABLET ORAL
Qty: 0 | Refills: 0 | DISCHARGE

## 2023-01-01 RX ORDER — ISOSORBIDE MONONITRATE 30 MG/1
30 TABLET, EXTENDED RELEASE ORAL DAILY
Qty: 90 | Refills: 3 | Status: DISCONTINUED | COMMUNITY
Start: 2022-12-29 | End: 2023-01-01

## 2023-01-01 RX ORDER — AZITHROMYCIN 500 MG/1
500 TABLET, FILM COATED ORAL EVERY 24 HOURS
Refills: 0 | Status: COMPLETED | OUTPATIENT
Start: 2023-01-01 | End: 2023-01-01

## 2023-01-01 RX ORDER — INSULIN LISPRO 100/ML
3 VIAL (ML) SUBCUTANEOUS
Qty: 0 | Refills: 0 | DISCHARGE

## 2023-01-01 RX ORDER — INSULIN GLARGINE 100 [IU]/ML
10 INJECTION, SOLUTION SUBCUTANEOUS AT BEDTIME
Refills: 0 | Status: DISCONTINUED | OUTPATIENT
Start: 2023-01-01 | End: 2023-01-01

## 2023-01-01 RX ORDER — LACTOBACILLUS ACIDOPHILUS 100MM CELL
1 CAPSULE ORAL
Refills: 0 | Status: DISCONTINUED | OUTPATIENT
Start: 2023-01-01 | End: 2023-01-01

## 2023-01-01 RX ORDER — OXYCODONE HYDROCHLORIDE 5 MG/1
2 TABLET ORAL
Qty: 0 | Refills: 0 | DISCHARGE

## 2023-01-01 RX ORDER — INSULIN LISPRO 100/ML
4 VIAL (ML) SUBCUTANEOUS
Qty: 0 | Refills: 0 | DISCHARGE

## 2023-01-01 RX ORDER — INSULIN GLARGINE 100 [IU]/ML
45 INJECTION, SOLUTION SUBCUTANEOUS
Refills: 0 | DISCHARGE
Start: 2023-01-01

## 2023-01-01 RX ORDER — FINASTERIDE 5 MG/1
5 TABLET, FILM COATED ORAL
Refills: 0 | Status: ACTIVE | COMMUNITY

## 2023-01-01 RX ORDER — ACETAMINOPHEN 500 MG
1000 TABLET ORAL ONCE
Refills: 0 | Status: COMPLETED | OUTPATIENT
Start: 2023-01-01 | End: 2023-01-01

## 2023-01-01 RX ORDER — HYDROMORPHONE HYDROCHLORIDE 2 MG/ML
0.5 INJECTION INTRAMUSCULAR; INTRAVENOUS; SUBCUTANEOUS EVERY 4 HOURS
Refills: 0 | Status: DISCONTINUED | OUTPATIENT
Start: 2023-01-01 | End: 2023-01-01

## 2023-01-01 RX ORDER — METHOCARBAMOL 500 MG/1
2 TABLET, FILM COATED ORAL
Qty: 0 | Refills: 0 | DISCHARGE

## 2023-01-01 RX ORDER — ACETAMINOPHEN 500 MG
2 TABLET ORAL
Qty: 0 | Refills: 0 | DISCHARGE

## 2023-01-01 RX ORDER — INSULIN LISPRO 100/ML
12 VIAL (ML) SUBCUTANEOUS
Refills: 0 | DISCHARGE

## 2023-01-01 RX ORDER — DIPHENHYDRAMINE HCL 50 MG
25 CAPSULE ORAL ONCE
Refills: 0 | Status: COMPLETED | OUTPATIENT
Start: 2023-01-01 | End: 2023-01-01

## 2023-01-01 RX ORDER — POTASSIUM CHLORIDE 20 MEQ
1 PACKET (EA) ORAL
Refills: 0 | DISCHARGE

## 2023-01-01 RX ORDER — AZITHROMYCIN 500 MG/1
500 TABLET, FILM COATED ORAL ONCE
Refills: 0 | Status: COMPLETED | OUTPATIENT
Start: 2023-01-01 | End: 2023-01-01

## 2023-01-01 RX ORDER — LIDOCAINE 4 G/100G
10 CREAM TOPICAL ONCE
Refills: 0 | Status: COMPLETED | OUTPATIENT
Start: 2023-01-01 | End: 2023-01-01

## 2023-01-01 RX ORDER — CEFTRIAXONE 500 MG/1
1000 INJECTION, POWDER, FOR SOLUTION INTRAMUSCULAR; INTRAVENOUS EVERY 24 HOURS
Refills: 0 | Status: DISCONTINUED | OUTPATIENT
Start: 2023-01-01 | End: 2023-01-01

## 2023-01-01 RX ORDER — MAGNESIUM HYDROXIDE 400 MG/1
30 TABLET, CHEWABLE ORAL
Qty: 0 | Refills: 0 | DISCHARGE
Start: 2023-01-01

## 2023-01-01 RX ORDER — INSULIN GLARGINE 100 [IU]/ML
20 INJECTION, SOLUTION SUBCUTANEOUS
Qty: 0 | Refills: 0 | DISCHARGE

## 2023-01-01 RX ORDER — SOD SULF/SODIUM/NAHCO3/KCL/PEG
2000 SOLUTION, RECONSTITUTED, ORAL ORAL ONCE
Refills: 0 | Status: COMPLETED | OUTPATIENT
Start: 2023-01-01 | End: 2023-01-01

## 2023-01-01 RX ORDER — INSULIN GLARGINE 100 [IU]/ML
16 INJECTION, SOLUTION SUBCUTANEOUS
Refills: 0 | DISCHARGE

## 2023-01-01 RX ORDER — MAGNESIUM HYDROXIDE 400 MG/1
30 TABLET, CHEWABLE ORAL THREE TIMES A DAY
Refills: 0 | Status: DISCONTINUED | OUTPATIENT
Start: 2023-01-01 | End: 2023-01-01

## 2023-01-01 RX ORDER — LACTULOSE 10 G/15ML
15 SOLUTION ORAL DAILY
Refills: 0 | Status: DISCONTINUED | OUTPATIENT
Start: 2023-01-01 | End: 2023-01-01

## 2023-01-01 RX ORDER — AMLODIPINE BESYLATE 10 MG/1
10 TABLET ORAL
Refills: 0 | Status: ACTIVE | COMMUNITY

## 2023-01-01 RX ORDER — LACTULOSE 10 G/15ML
10 SOLUTION ORAL ONCE
Refills: 0 | Status: COMPLETED | OUTPATIENT
Start: 2023-01-01 | End: 2023-01-01

## 2023-01-01 RX ORDER — SENNA PLUS 8.6 MG/1
1 TABLET ORAL
Qty: 0 | Refills: 0 | DISCHARGE

## 2023-01-01 RX ORDER — LACTULOSE 10 G/15ML
10 SOLUTION ORAL TWICE DAILY
Qty: 540 | Refills: 3 | Status: ACTIVE | COMMUNITY
Start: 2023-01-01

## 2023-01-01 RX ORDER — METOPROLOL TARTRATE 50 MG
1 TABLET ORAL
Refills: 0 | DISCHARGE

## 2023-01-01 RX ORDER — DOCUSATE SODIUM 100 MG
100 TABLET ORAL
Refills: 0 | Status: ACTIVE | COMMUNITY

## 2023-01-01 RX ORDER — METOCLOPRAMIDE 10 MG/1
10 TABLET ORAL
Qty: 90 | Refills: 3 | Status: DISCONTINUED | COMMUNITY
Start: 2022-12-29 | End: 2023-01-01

## 2023-01-01 RX ORDER — HYDROMORPHONE HYDROCHLORIDE 2 MG/ML
1.5 INJECTION INTRAMUSCULAR; INTRAVENOUS; SUBCUTANEOUS
Refills: 0 | DISCHARGE
Start: 2023-01-01 | End: 2023-01-01

## 2023-01-01 RX ORDER — SODIUM CHLORIDE 9 MG/ML
2550 INJECTION INTRAMUSCULAR; INTRAVENOUS; SUBCUTANEOUS ONCE
Refills: 0 | Status: COMPLETED | OUTPATIENT
Start: 2023-01-01 | End: 2023-01-01

## 2023-01-01 RX ORDER — FINASTERIDE 5 MG/1
1 TABLET, FILM COATED ORAL
Qty: 0 | Refills: 0 | DISCHARGE

## 2023-01-01 RX ORDER — HYDROMORPHONE HYDROCHLORIDE 2 MG/ML
1 INJECTION INTRAMUSCULAR; INTRAVENOUS; SUBCUTANEOUS EVERY 6 HOURS
Refills: 0 | Status: DISCONTINUED | OUTPATIENT
Start: 2023-01-01 | End: 2023-01-01

## 2023-01-01 RX ORDER — METHYLNALTREXONE BROMIDE 12 MG/.6ML
12 INJECTION, SOLUTION SUBCUTANEOUS ONCE
Refills: 0 | Status: COMPLETED | OUTPATIENT
Start: 2023-01-01 | End: 2023-01-01

## 2023-01-01 RX ORDER — POTASSIUM CHLORIDE 20 MEQ
20 PACKET (EA) ORAL DAILY
Refills: 0 | Status: DISCONTINUED | OUTPATIENT
Start: 2023-01-01 | End: 2023-01-01

## 2023-01-01 RX ORDER — ALBUTEROL 90 UG/1
2 AEROSOL, METERED ORAL EVERY 6 HOURS
Refills: 0 | Status: DISCONTINUED | OUTPATIENT
Start: 2023-01-01 | End: 2023-01-01

## 2023-01-01 RX ORDER — SUCRALFATE 1 G
1 TABLET ORAL
Refills: 0 | Status: DISCONTINUED | OUTPATIENT
Start: 2023-01-01 | End: 2023-01-01

## 2023-01-01 RX ORDER — METOCLOPRAMIDE HCL 10 MG
5 TABLET ORAL
Refills: 0 | Status: DISCONTINUED | OUTPATIENT
Start: 2023-01-01 | End: 2023-01-01

## 2023-01-01 RX ADMIN — FINASTERIDE 5 MILLIGRAM(S): 5 TABLET, FILM COATED ORAL at 16:25

## 2023-01-01 RX ADMIN — Medication 400 MILLIGRAM(S): at 23:02

## 2023-01-01 RX ADMIN — HYDROMORPHONE HYDROCHLORIDE 1.5 MILLIGRAM(S): 2 INJECTION INTRAMUSCULAR; INTRAVENOUS; SUBCUTANEOUS at 12:06

## 2023-01-01 RX ADMIN — PANTOPRAZOLE SODIUM 40 MILLIGRAM(S): 20 TABLET, DELAYED RELEASE ORAL at 21:56

## 2023-01-01 RX ADMIN — SODIUM CHLORIDE 100 MILLILITER(S): 9 INJECTION INTRAMUSCULAR; INTRAVENOUS; SUBCUTANEOUS at 21:41

## 2023-01-01 RX ADMIN — Medication 1 GRAM(S): at 05:24

## 2023-01-01 RX ADMIN — HYDROMORPHONE HYDROCHLORIDE 1.5 MILLIGRAM(S): 2 INJECTION INTRAMUSCULAR; INTRAVENOUS; SUBCUTANEOUS at 13:02

## 2023-01-01 RX ADMIN — AZITHROMYCIN 500 MILLIGRAM(S): 500 TABLET, FILM COATED ORAL at 14:37

## 2023-01-01 RX ADMIN — Medication 3: at 17:15

## 2023-01-01 RX ADMIN — Medication 2: at 08:43

## 2023-01-01 RX ADMIN — SIMVASTATIN 10 MILLIGRAM(S): 20 TABLET, FILM COATED ORAL at 22:46

## 2023-01-01 RX ADMIN — HYDROMORPHONE HYDROCHLORIDE 1 MILLIGRAM(S): 2 INJECTION INTRAMUSCULAR; INTRAVENOUS; SUBCUTANEOUS at 16:59

## 2023-01-01 RX ADMIN — BUDESONIDE AND FORMOTEROL FUMARATE DIHYDRATE 2 PUFF(S): 160; 4.5 AEROSOL RESPIRATORY (INHALATION) at 20:31

## 2023-01-01 RX ADMIN — HYDROMORPHONE HYDROCHLORIDE 0.5 MILLIGRAM(S): 2 INJECTION INTRAMUSCULAR; INTRAVENOUS; SUBCUTANEOUS at 06:20

## 2023-01-01 RX ADMIN — Medication 4: at 18:03

## 2023-01-01 RX ADMIN — Medication 30 MILLIGRAM(S): at 23:25

## 2023-01-01 RX ADMIN — Medication 2: at 16:38

## 2023-01-01 RX ADMIN — Medication 20 MILLIEQUIVALENT(S): at 09:47

## 2023-01-01 RX ADMIN — Medication 25 MILLIGRAM(S): at 11:54

## 2023-01-01 RX ADMIN — Medication 1200 MILLIGRAM(S): at 21:39

## 2023-01-01 RX ADMIN — Medication 1: at 21:21

## 2023-01-01 RX ADMIN — Medication 1 TABLET(S): at 08:26

## 2023-01-01 RX ADMIN — AMLODIPINE BESYLATE 10 MILLIGRAM(S): 2.5 TABLET ORAL at 16:27

## 2023-01-01 RX ADMIN — Medication 40 MILLIGRAM(S): at 05:16

## 2023-01-01 RX ADMIN — Medication 1 TABLET(S): at 12:09

## 2023-01-01 RX ADMIN — LIDOCAINE 1 PATCH: 4 CREAM TOPICAL at 10:19

## 2023-01-01 RX ADMIN — Medication 3 MILLIGRAM(S): at 22:19

## 2023-01-01 RX ADMIN — Medication 40 MILLIGRAM(S): at 08:31

## 2023-01-01 RX ADMIN — PANTOPRAZOLE SODIUM 40 MILLIGRAM(S): 20 TABLET, DELAYED RELEASE ORAL at 06:46

## 2023-01-01 RX ADMIN — HYDROMORPHONE HYDROCHLORIDE 1.5 MILLIGRAM(S): 2 INJECTION INTRAMUSCULAR; INTRAVENOUS; SUBCUTANEOUS at 10:51

## 2023-01-01 RX ADMIN — SODIUM CHLORIDE 75 MILLILITER(S): 9 INJECTION, SOLUTION INTRAVENOUS at 06:22

## 2023-01-01 RX ADMIN — HYDROMORPHONE HYDROCHLORIDE 1 MILLIGRAM(S): 2 INJECTION INTRAMUSCULAR; INTRAVENOUS; SUBCUTANEOUS at 03:02

## 2023-01-01 RX ADMIN — LACTULOSE 10 GRAM(S): 10 SOLUTION ORAL at 10:02

## 2023-01-01 RX ADMIN — TAMSULOSIN HYDROCHLORIDE 0.4 MILLIGRAM(S): 0.4 CAPSULE ORAL at 22:20

## 2023-01-01 RX ADMIN — SIMVASTATIN 10 MILLIGRAM(S): 20 TABLET, FILM COATED ORAL at 21:33

## 2023-01-01 RX ADMIN — ENOXAPARIN SODIUM 40 MILLIGRAM(S): 100 INJECTION SUBCUTANEOUS at 11:34

## 2023-01-01 RX ADMIN — SIMVASTATIN 10 MILLIGRAM(S): 20 TABLET, FILM COATED ORAL at 21:37

## 2023-01-01 RX ADMIN — LIDOCAINE 10 MILLILITER(S): 4 CREAM TOPICAL at 23:24

## 2023-01-01 RX ADMIN — Medication 110 MILLIGRAM(S): at 11:26

## 2023-01-01 RX ADMIN — BUDESONIDE AND FORMOTEROL FUMARATE DIHYDRATE 2 PUFF(S): 160; 4.5 AEROSOL RESPIRATORY (INHALATION) at 07:40

## 2023-01-01 RX ADMIN — PANTOPRAZOLE SODIUM 40 MILLIGRAM(S): 20 TABLET, DELAYED RELEASE ORAL at 10:01

## 2023-01-01 RX ADMIN — SPIRONOLACTONE 100 MILLIGRAM(S): 25 TABLET, FILM COATED ORAL at 10:01

## 2023-01-01 RX ADMIN — CLOPIDOGREL BISULFATE 75 MILLIGRAM(S): 75 TABLET, FILM COATED ORAL at 09:45

## 2023-01-01 RX ADMIN — Medication 40 MILLIGRAM(S): at 11:29

## 2023-01-01 RX ADMIN — Medication 600 MILLIGRAM(S): at 12:05

## 2023-01-01 RX ADMIN — SIMVASTATIN 10 MILLIGRAM(S): 20 TABLET, FILM COATED ORAL at 22:00

## 2023-01-01 RX ADMIN — ONDANSETRON 4 MILLIGRAM(S): 8 TABLET, FILM COATED ORAL at 09:08

## 2023-01-01 RX ADMIN — FINASTERIDE 5 MILLIGRAM(S): 5 TABLET, FILM COATED ORAL at 09:30

## 2023-01-01 RX ADMIN — PANTOPRAZOLE SODIUM 40 MILLIGRAM(S): 20 TABLET, DELAYED RELEASE ORAL at 21:20

## 2023-01-01 RX ADMIN — CEFTRIAXONE 1000 MILLIGRAM(S): 500 INJECTION, POWDER, FOR SOLUTION INTRAMUSCULAR; INTRAVENOUS at 21:56

## 2023-01-01 RX ADMIN — GABAPENTIN 300 MILLIGRAM(S): 400 CAPSULE ORAL at 19:03

## 2023-01-01 RX ADMIN — LIDOCAINE 1 PATCH: 4 CREAM TOPICAL at 21:45

## 2023-01-01 RX ADMIN — Medication 2: at 16:34

## 2023-01-01 RX ADMIN — HYDROMORPHONE HYDROCHLORIDE 1 MILLIGRAM(S): 2 INJECTION INTRAMUSCULAR; INTRAVENOUS; SUBCUTANEOUS at 19:54

## 2023-01-01 RX ADMIN — GABAPENTIN 300 MILLIGRAM(S): 400 CAPSULE ORAL at 05:16

## 2023-01-01 RX ADMIN — HYDROMORPHONE HYDROCHLORIDE 1.5 MILLIGRAM(S): 2 INJECTION INTRAMUSCULAR; INTRAVENOUS; SUBCUTANEOUS at 05:52

## 2023-01-01 RX ADMIN — Medication 1 GRAM(S): at 11:46

## 2023-01-01 RX ADMIN — Medication 1: at 08:32

## 2023-01-01 RX ADMIN — HYDROMORPHONE HYDROCHLORIDE 0.5 MILLIGRAM(S): 2 INJECTION INTRAMUSCULAR; INTRAVENOUS; SUBCUTANEOUS at 20:32

## 2023-01-01 RX ADMIN — GABAPENTIN 300 MILLIGRAM(S): 400 CAPSULE ORAL at 05:34

## 2023-01-01 RX ADMIN — GABAPENTIN 600 MILLIGRAM(S): 400 CAPSULE ORAL at 21:09

## 2023-01-01 RX ADMIN — HYDROMORPHONE HYDROCHLORIDE 0.5 MILLIGRAM(S): 2 INJECTION INTRAMUSCULAR; INTRAVENOUS; SUBCUTANEOUS at 02:49

## 2023-01-01 RX ADMIN — Medication 4: at 17:11

## 2023-01-01 RX ADMIN — Medication 1 GRAM(S): at 05:33

## 2023-01-01 RX ADMIN — GABAPENTIN 300 MILLIGRAM(S): 400 CAPSULE ORAL at 14:29

## 2023-01-01 RX ADMIN — Medication 2: at 12:10

## 2023-01-01 RX ADMIN — Medication 25 MILLIGRAM(S): at 02:22

## 2023-01-01 RX ADMIN — Medication 1 TABLET(S): at 11:54

## 2023-01-01 RX ADMIN — GABAPENTIN 300 MILLIGRAM(S): 400 CAPSULE ORAL at 14:37

## 2023-01-01 RX ADMIN — Medication 1200 MILLIGRAM(S): at 09:40

## 2023-01-01 RX ADMIN — SIMVASTATIN 10 MILLIGRAM(S): 20 TABLET, FILM COATED ORAL at 21:49

## 2023-01-01 RX ADMIN — HYDROMORPHONE HYDROCHLORIDE 1 MILLIGRAM(S): 2 INJECTION INTRAMUSCULAR; INTRAVENOUS; SUBCUTANEOUS at 02:01

## 2023-01-01 RX ADMIN — HYDROMORPHONE HYDROCHLORIDE 0.5 MILLIGRAM(S): 2 INJECTION INTRAMUSCULAR; INTRAVENOUS; SUBCUTANEOUS at 18:34

## 2023-01-01 RX ADMIN — HYDROMORPHONE HYDROCHLORIDE 0.5 MILLIGRAM(S): 2 INJECTION INTRAMUSCULAR; INTRAVENOUS; SUBCUTANEOUS at 03:03

## 2023-01-01 RX ADMIN — Medication 1 TABLET(S): at 09:59

## 2023-01-01 RX ADMIN — METHYLNALTREXONE BROMIDE 12 MILLIGRAM(S): 12 INJECTION, SOLUTION SUBCUTANEOUS at 14:51

## 2023-01-01 RX ADMIN — Medication 1 GRAM(S): at 22:01

## 2023-01-01 RX ADMIN — GABAPENTIN 300 MILLIGRAM(S): 400 CAPSULE ORAL at 06:34

## 2023-01-01 RX ADMIN — HEPARIN SODIUM 5000 UNIT(S): 5000 INJECTION INTRAVENOUS; SUBCUTANEOUS at 10:17

## 2023-01-01 RX ADMIN — Medication 25 MILLIGRAM(S): at 10:18

## 2023-01-01 RX ADMIN — CLOPIDOGREL BISULFATE 75 MILLIGRAM(S): 75 TABLET, FILM COATED ORAL at 09:25

## 2023-01-01 RX ADMIN — HYDROMORPHONE HYDROCHLORIDE 0.5 MILLIGRAM(S): 2 INJECTION INTRAMUSCULAR; INTRAVENOUS; SUBCUTANEOUS at 15:37

## 2023-01-01 RX ADMIN — GABAPENTIN 300 MILLIGRAM(S): 400 CAPSULE ORAL at 06:00

## 2023-01-01 RX ADMIN — Medication 25 MILLIGRAM(S): at 09:40

## 2023-01-01 RX ADMIN — GABAPENTIN 300 MILLIGRAM(S): 400 CAPSULE ORAL at 22:46

## 2023-01-01 RX ADMIN — GABAPENTIN 300 MILLIGRAM(S): 400 CAPSULE ORAL at 09:45

## 2023-01-01 RX ADMIN — GABAPENTIN 300 MILLIGRAM(S): 400 CAPSULE ORAL at 05:50

## 2023-01-01 RX ADMIN — HYDROMORPHONE HYDROCHLORIDE 1.5 MILLIGRAM(S): 2 INJECTION INTRAMUSCULAR; INTRAVENOUS; SUBCUTANEOUS at 05:34

## 2023-01-01 RX ADMIN — CEFTRIAXONE 1000 MILLIGRAM(S): 500 INJECTION, POWDER, FOR SOLUTION INTRAMUSCULAR; INTRAVENOUS at 22:42

## 2023-01-01 RX ADMIN — Medication 4: at 17:35

## 2023-01-01 RX ADMIN — HYDROMORPHONE HYDROCHLORIDE 1.5 MILLIGRAM(S): 2 INJECTION INTRAMUSCULAR; INTRAVENOUS; SUBCUTANEOUS at 09:35

## 2023-01-01 RX ADMIN — POLYETHYLENE GLYCOL 3350 17 GRAM(S): 17 POWDER, FOR SOLUTION ORAL at 11:53

## 2023-01-01 RX ADMIN — Medication 1 GRAM(S): at 05:39

## 2023-01-01 RX ADMIN — FINASTERIDE 5 MILLIGRAM(S): 5 TABLET, FILM COATED ORAL at 08:33

## 2023-01-01 RX ADMIN — HYDROMORPHONE HYDROCHLORIDE 1 MILLIGRAM(S): 2 INJECTION INTRAMUSCULAR; INTRAVENOUS; SUBCUTANEOUS at 07:01

## 2023-01-01 RX ADMIN — HYDROMORPHONE HYDROCHLORIDE 6 MILLIGRAM(S): 2 INJECTION INTRAMUSCULAR; INTRAVENOUS; SUBCUTANEOUS at 17:18

## 2023-01-01 RX ADMIN — HYDROMORPHONE HYDROCHLORIDE 1 MILLIGRAM(S): 2 INJECTION INTRAMUSCULAR; INTRAVENOUS; SUBCUTANEOUS at 22:00

## 2023-01-01 RX ADMIN — SPIRONOLACTONE 100 MILLIGRAM(S): 25 TABLET, FILM COATED ORAL at 09:24

## 2023-01-01 RX ADMIN — NALOXEGOL OXALATE 25 MILLIGRAM(S): 12.5 TABLET, FILM COATED ORAL at 09:41

## 2023-01-01 RX ADMIN — LACTULOSE 10 GRAM(S): 10 SOLUTION ORAL at 09:54

## 2023-01-01 RX ADMIN — SODIUM CHLORIDE 75 MILLILITER(S): 9 INJECTION, SOLUTION INTRAVENOUS at 05:44

## 2023-01-01 RX ADMIN — Medication 3 MILLIGRAM(S): at 21:39

## 2023-01-01 RX ADMIN — HYDROMORPHONE HYDROCHLORIDE 1 MILLIGRAM(S): 2 INJECTION INTRAMUSCULAR; INTRAVENOUS; SUBCUTANEOUS at 09:12

## 2023-01-01 RX ADMIN — GABAPENTIN 300 MILLIGRAM(S): 400 CAPSULE ORAL at 08:31

## 2023-01-01 RX ADMIN — AMLODIPINE BESYLATE 10 MILLIGRAM(S): 2.5 TABLET ORAL at 09:45

## 2023-01-01 RX ADMIN — ONDANSETRON 4 MILLIGRAM(S): 8 TABLET, FILM COATED ORAL at 10:14

## 2023-01-01 RX ADMIN — Medication 40 MILLIGRAM(S): at 14:59

## 2023-01-01 RX ADMIN — HYDROMORPHONE HYDROCHLORIDE 1 MILLIGRAM(S): 2 INJECTION INTRAMUSCULAR; INTRAVENOUS; SUBCUTANEOUS at 21:40

## 2023-01-01 RX ADMIN — SODIUM CHLORIDE 1000 MILLILITER(S): 9 INJECTION INTRAMUSCULAR; INTRAVENOUS; SUBCUTANEOUS at 13:30

## 2023-01-01 RX ADMIN — HYDROMORPHONE HYDROCHLORIDE 1 MILLIGRAM(S): 2 INJECTION INTRAMUSCULAR; INTRAVENOUS; SUBCUTANEOUS at 01:10

## 2023-01-01 RX ADMIN — HYDROMORPHONE HYDROCHLORIDE 1 MILLIGRAM(S): 2 INJECTION INTRAMUSCULAR; INTRAVENOUS; SUBCUTANEOUS at 22:01

## 2023-01-01 RX ADMIN — LIDOCAINE 1 PATCH: 4 CREAM TOPICAL at 19:47

## 2023-01-01 RX ADMIN — Medication 5 MILLIGRAM(S): at 17:00

## 2023-01-01 RX ADMIN — GABAPENTIN 300 MILLIGRAM(S): 400 CAPSULE ORAL at 13:52

## 2023-01-01 RX ADMIN — Medication 2: at 16:58

## 2023-01-01 RX ADMIN — HYDROMORPHONE HYDROCHLORIDE 6 MILLIGRAM(S): 2 INJECTION INTRAMUSCULAR; INTRAVENOUS; SUBCUTANEOUS at 08:14

## 2023-01-01 RX ADMIN — ONDANSETRON 4 MILLIGRAM(S): 8 TABLET, FILM COATED ORAL at 08:14

## 2023-01-01 RX ADMIN — HYDROMORPHONE HYDROCHLORIDE 0.5 MILLIGRAM(S): 2 INJECTION INTRAMUSCULAR; INTRAVENOUS; SUBCUTANEOUS at 05:02

## 2023-01-01 RX ADMIN — HYDROMORPHONE HYDROCHLORIDE 0.5 MILLIGRAM(S): 2 INJECTION INTRAMUSCULAR; INTRAVENOUS; SUBCUTANEOUS at 04:52

## 2023-01-01 RX ADMIN — SIMVASTATIN 10 MILLIGRAM(S): 20 TABLET, FILM COATED ORAL at 22:34

## 2023-01-01 RX ADMIN — HYDROMORPHONE HYDROCHLORIDE 6 MILLIGRAM(S): 2 INJECTION INTRAMUSCULAR; INTRAVENOUS; SUBCUTANEOUS at 23:00

## 2023-01-01 RX ADMIN — HYDROMORPHONE HYDROCHLORIDE 1.5 MILLIGRAM(S): 2 INJECTION INTRAMUSCULAR; INTRAVENOUS; SUBCUTANEOUS at 10:42

## 2023-01-01 RX ADMIN — FINASTERIDE 5 MILLIGRAM(S): 5 TABLET, FILM COATED ORAL at 09:40

## 2023-01-01 RX ADMIN — SPIRONOLACTONE 100 MILLIGRAM(S): 25 TABLET, FILM COATED ORAL at 16:26

## 2023-01-01 RX ADMIN — HYDROMORPHONE HYDROCHLORIDE 1 MILLIGRAM(S): 2 INJECTION INTRAMUSCULAR; INTRAVENOUS; SUBCUTANEOUS at 17:29

## 2023-01-01 RX ADMIN — INSULIN GLARGINE 10 UNIT(S): 100 INJECTION, SOLUTION SUBCUTANEOUS at 21:22

## 2023-01-01 RX ADMIN — HYDROMORPHONE HYDROCHLORIDE 0.5 MILLIGRAM(S): 2 INJECTION INTRAMUSCULAR; INTRAVENOUS; SUBCUTANEOUS at 00:14

## 2023-01-01 RX ADMIN — HYDROMORPHONE HYDROCHLORIDE 1 MILLIGRAM(S): 2 INJECTION INTRAMUSCULAR; INTRAVENOUS; SUBCUTANEOUS at 17:06

## 2023-01-01 RX ADMIN — HYDROMORPHONE HYDROCHLORIDE 1 MILLIGRAM(S): 2 INJECTION INTRAMUSCULAR; INTRAVENOUS; SUBCUTANEOUS at 12:35

## 2023-01-01 RX ADMIN — Medication 1 TABLET(S): at 11:48

## 2023-01-01 RX ADMIN — CLOPIDOGREL BISULFATE 75 MILLIGRAM(S): 75 TABLET, FILM COATED ORAL at 11:28

## 2023-01-01 RX ADMIN — Medication 2: at 18:00

## 2023-01-01 RX ADMIN — Medication 2000 MILLILITER(S): at 11:12

## 2023-01-01 RX ADMIN — Medication 400 MILLIGRAM(S): at 03:03

## 2023-01-01 RX ADMIN — HYDROMORPHONE HYDROCHLORIDE 0.5 MILLIGRAM(S): 2 INJECTION INTRAMUSCULAR; INTRAVENOUS; SUBCUTANEOUS at 17:12

## 2023-01-01 RX ADMIN — FINASTERIDE 5 MILLIGRAM(S): 5 TABLET, FILM COATED ORAL at 10:18

## 2023-01-01 RX ADMIN — HYDROMORPHONE HYDROCHLORIDE 1 MILLIGRAM(S): 2 INJECTION INTRAMUSCULAR; INTRAVENOUS; SUBCUTANEOUS at 02:59

## 2023-01-01 RX ADMIN — Medication 1: at 12:27

## 2023-01-01 RX ADMIN — HYDROMORPHONE HYDROCHLORIDE 1 MILLIGRAM(S): 2 INJECTION INTRAMUSCULAR; INTRAVENOUS; SUBCUTANEOUS at 05:34

## 2023-01-01 RX ADMIN — HYDROMORPHONE HYDROCHLORIDE 1 MILLIGRAM(S): 2 INJECTION INTRAMUSCULAR; INTRAVENOUS; SUBCUTANEOUS at 20:24

## 2023-01-01 RX ADMIN — INSULIN GLARGINE 10 UNIT(S): 100 INJECTION, SOLUTION SUBCUTANEOUS at 21:53

## 2023-01-01 RX ADMIN — LIDOCAINE 1 PATCH: 4 CREAM TOPICAL at 21:59

## 2023-01-01 RX ADMIN — Medication 1 TABLET(S): at 18:38

## 2023-01-01 RX ADMIN — CEFTRIAXONE 1000 MILLIGRAM(S): 500 INJECTION, POWDER, FOR SOLUTION INTRAMUSCULAR; INTRAVENOUS at 23:58

## 2023-01-01 RX ADMIN — TRAMADOL HYDROCHLORIDE 25 MILLIGRAM(S): 50 TABLET ORAL at 10:35

## 2023-01-01 RX ADMIN — HEPARIN SODIUM 5000 UNIT(S): 5000 INJECTION INTRAVENOUS; SUBCUTANEOUS at 08:34

## 2023-01-01 RX ADMIN — Medication 1 TABLET(S): at 17:13

## 2023-01-01 RX ADMIN — Medication 25 MILLIGRAM(S): at 10:21

## 2023-01-01 RX ADMIN — HYDROMORPHONE HYDROCHLORIDE 1.5 MILLIGRAM(S): 2 INJECTION INTRAMUSCULAR; INTRAVENOUS; SUBCUTANEOUS at 12:31

## 2023-01-01 RX ADMIN — PANTOPRAZOLE SODIUM 40 MILLIGRAM(S): 20 TABLET, DELAYED RELEASE ORAL at 09:46

## 2023-01-01 RX ADMIN — INSULIN GLARGINE 16 UNIT(S): 100 INJECTION, SOLUTION SUBCUTANEOUS at 22:45

## 2023-01-01 RX ADMIN — ONDANSETRON 4 MILLIGRAM(S): 8 TABLET, FILM COATED ORAL at 06:43

## 2023-01-01 RX ADMIN — HYDROMORPHONE HYDROCHLORIDE 0.5 MILLIGRAM(S): 2 INJECTION INTRAMUSCULAR; INTRAVENOUS; SUBCUTANEOUS at 05:34

## 2023-01-01 RX ADMIN — Medication 1 TABLET(S): at 09:46

## 2023-01-01 RX ADMIN — LIDOCAINE 1 PATCH: 4 CREAM TOPICAL at 22:19

## 2023-01-01 RX ADMIN — GABAPENTIN 300 MILLIGRAM(S): 400 CAPSULE ORAL at 13:05

## 2023-01-01 RX ADMIN — GABAPENTIN 300 MILLIGRAM(S): 400 CAPSULE ORAL at 14:59

## 2023-01-01 RX ADMIN — Medication 1000 MILLIGRAM(S): at 03:16

## 2023-01-01 RX ADMIN — Medication 1 GRAM(S): at 13:16

## 2023-01-01 RX ADMIN — HYDROMORPHONE HYDROCHLORIDE 1 MILLIGRAM(S): 2 INJECTION INTRAMUSCULAR; INTRAVENOUS; SUBCUTANEOUS at 10:00

## 2023-01-01 RX ADMIN — HYDROMORPHONE HYDROCHLORIDE 0.5 MILLIGRAM(S): 2 INJECTION INTRAMUSCULAR; INTRAVENOUS; SUBCUTANEOUS at 09:24

## 2023-01-01 RX ADMIN — Medication 1: at 08:24

## 2023-01-01 RX ADMIN — LIDOCAINE 1 PATCH: 4 CREAM TOPICAL at 11:30

## 2023-01-01 RX ADMIN — Medication 1 GRAM(S): at 18:54

## 2023-01-01 RX ADMIN — SENNA PLUS 2 TABLET(S): 8.6 TABLET ORAL at 21:44

## 2023-01-01 RX ADMIN — HYDROMORPHONE HYDROCHLORIDE 1 MILLIGRAM(S): 2 INJECTION INTRAMUSCULAR; INTRAVENOUS; SUBCUTANEOUS at 13:31

## 2023-01-01 RX ADMIN — Medication 1 TABLET(S): at 09:52

## 2023-01-01 RX ADMIN — PANTOPRAZOLE SODIUM 40 MILLIGRAM(S): 20 TABLET, DELAYED RELEASE ORAL at 11:55

## 2023-01-01 RX ADMIN — AMLODIPINE BESYLATE 10 MILLIGRAM(S): 2.5 TABLET ORAL at 09:02

## 2023-01-01 RX ADMIN — HYDROMORPHONE HYDROCHLORIDE 1.5 MILLIGRAM(S): 2 INJECTION INTRAMUSCULAR; INTRAVENOUS; SUBCUTANEOUS at 11:52

## 2023-01-01 RX ADMIN — HYDROMORPHONE HYDROCHLORIDE 0.5 MILLIGRAM(S): 2 INJECTION INTRAMUSCULAR; INTRAVENOUS; SUBCUTANEOUS at 22:40

## 2023-01-01 RX ADMIN — GABAPENTIN 600 MILLIGRAM(S): 400 CAPSULE ORAL at 22:35

## 2023-01-01 RX ADMIN — Medication 1 TABLET(S): at 11:55

## 2023-01-01 RX ADMIN — SIMVASTATIN 10 MILLIGRAM(S): 20 TABLET, FILM COATED ORAL at 21:27

## 2023-01-01 RX ADMIN — HYDROMORPHONE HYDROCHLORIDE 1 MILLIGRAM(S): 2 INJECTION INTRAMUSCULAR; INTRAVENOUS; SUBCUTANEOUS at 21:00

## 2023-01-01 RX ADMIN — SPIRONOLACTONE 100 MILLIGRAM(S): 25 TABLET, FILM COATED ORAL at 12:11

## 2023-01-01 RX ADMIN — INSULIN GLARGINE 16 UNIT(S): 100 INJECTION, SOLUTION SUBCUTANEOUS at 21:56

## 2023-01-01 RX ADMIN — TAMSULOSIN HYDROCHLORIDE 0.4 MILLIGRAM(S): 0.4 CAPSULE ORAL at 21:49

## 2023-01-01 RX ADMIN — Medication 40 MILLIGRAM(S): at 06:23

## 2023-01-01 RX ADMIN — Medication 1: at 11:43

## 2023-01-01 RX ADMIN — SODIUM CHLORIDE 1000 MILLILITER(S): 9 INJECTION INTRAMUSCULAR; INTRAVENOUS; SUBCUTANEOUS at 21:54

## 2023-01-01 RX ADMIN — Medication 40 MILLIGRAM(S): at 05:23

## 2023-01-01 RX ADMIN — PANTOPRAZOLE SODIUM 40 MILLIGRAM(S): 20 TABLET, DELAYED RELEASE ORAL at 22:45

## 2023-01-01 RX ADMIN — HYDROMORPHONE HYDROCHLORIDE 1.5 MILLIGRAM(S): 2 INJECTION INTRAMUSCULAR; INTRAVENOUS; SUBCUTANEOUS at 15:49

## 2023-01-01 RX ADMIN — HYDROMORPHONE HYDROCHLORIDE 0.5 MILLIGRAM(S): 2 INJECTION INTRAMUSCULAR; INTRAVENOUS; SUBCUTANEOUS at 00:10

## 2023-01-01 RX ADMIN — TAMSULOSIN HYDROCHLORIDE 0.4 MILLIGRAM(S): 0.4 CAPSULE ORAL at 21:52

## 2023-01-01 RX ADMIN — HYDROMORPHONE HYDROCHLORIDE 1 MILLIGRAM(S): 2 INJECTION INTRAMUSCULAR; INTRAVENOUS; SUBCUTANEOUS at 15:21

## 2023-01-01 RX ADMIN — Medication 40 MILLIGRAM(S): at 06:35

## 2023-01-01 RX ADMIN — HYDROMORPHONE HYDROCHLORIDE 0.5 MILLIGRAM(S): 2 INJECTION INTRAMUSCULAR; INTRAVENOUS; SUBCUTANEOUS at 09:08

## 2023-01-01 RX ADMIN — AMLODIPINE BESYLATE 10 MILLIGRAM(S): 2.5 TABLET ORAL at 08:32

## 2023-01-01 RX ADMIN — HYDROMORPHONE HYDROCHLORIDE 1 MILLIGRAM(S): 2 INJECTION INTRAMUSCULAR; INTRAVENOUS; SUBCUTANEOUS at 03:15

## 2023-01-01 RX ADMIN — Medication 40 MILLIGRAM(S): at 05:35

## 2023-01-01 RX ADMIN — HYDROMORPHONE HYDROCHLORIDE 1.5 MILLIGRAM(S): 2 INJECTION INTRAMUSCULAR; INTRAVENOUS; SUBCUTANEOUS at 08:39

## 2023-01-01 RX ADMIN — HEPARIN SODIUM 5000 UNIT(S): 5000 INJECTION INTRAVENOUS; SUBCUTANEOUS at 14:59

## 2023-01-01 RX ADMIN — PANTOPRAZOLE SODIUM 40 MILLIGRAM(S): 20 TABLET, DELAYED RELEASE ORAL at 09:30

## 2023-01-01 RX ADMIN — HYDROMORPHONE HYDROCHLORIDE 1 MILLIGRAM(S): 2 INJECTION INTRAMUSCULAR; INTRAVENOUS; SUBCUTANEOUS at 10:38

## 2023-01-01 RX ADMIN — Medication 81 MILLIGRAM(S): at 12:11

## 2023-01-01 RX ADMIN — Medication 2: at 08:40

## 2023-01-01 RX ADMIN — SPIRONOLACTONE 100 MILLIGRAM(S): 25 TABLET, FILM COATED ORAL at 09:11

## 2023-01-01 RX ADMIN — GABAPENTIN 600 MILLIGRAM(S): 400 CAPSULE ORAL at 22:45

## 2023-01-01 RX ADMIN — HYDROMORPHONE HYDROCHLORIDE 1.5 MILLIGRAM(S): 2 INJECTION INTRAMUSCULAR; INTRAVENOUS; SUBCUTANEOUS at 00:29

## 2023-01-01 RX ADMIN — HYDROMORPHONE HYDROCHLORIDE 1.5 MILLIGRAM(S): 2 INJECTION INTRAMUSCULAR; INTRAVENOUS; SUBCUTANEOUS at 15:19

## 2023-01-01 RX ADMIN — Medication 25 MILLIGRAM(S): at 16:25

## 2023-01-01 RX ADMIN — HYDROMORPHONE HYDROCHLORIDE 1 MILLIGRAM(S): 2 INJECTION INTRAMUSCULAR; INTRAVENOUS; SUBCUTANEOUS at 22:10

## 2023-01-01 RX ADMIN — CLOPIDOGREL BISULFATE 75 MILLIGRAM(S): 75 TABLET, FILM COATED ORAL at 16:27

## 2023-01-01 RX ADMIN — Medication 1 TABLET(S): at 16:24

## 2023-01-01 RX ADMIN — HYDROMORPHONE HYDROCHLORIDE 0.5 MILLIGRAM(S): 2 INJECTION INTRAMUSCULAR; INTRAVENOUS; SUBCUTANEOUS at 08:54

## 2023-01-01 RX ADMIN — Medication 1 TABLET(S): at 09:31

## 2023-01-01 RX ADMIN — HYDROMORPHONE HYDROCHLORIDE 1 MILLIGRAM(S): 2 INJECTION INTRAMUSCULAR; INTRAVENOUS; SUBCUTANEOUS at 18:45

## 2023-01-01 RX ADMIN — PANTOPRAZOLE SODIUM 40 MILLIGRAM(S): 20 TABLET, DELAYED RELEASE ORAL at 21:50

## 2023-01-01 RX ADMIN — Medication 40 MILLIGRAM(S): at 09:47

## 2023-01-01 RX ADMIN — PANTOPRAZOLE SODIUM 40 MILLIGRAM(S): 20 TABLET, DELAYED RELEASE ORAL at 10:05

## 2023-01-01 RX ADMIN — HYDROMORPHONE HYDROCHLORIDE 1 MILLIGRAM(S): 2 INJECTION INTRAMUSCULAR; INTRAVENOUS; SUBCUTANEOUS at 22:30

## 2023-01-01 RX ADMIN — CEFTRIAXONE 1000 MILLIGRAM(S): 500 INJECTION, POWDER, FOR SOLUTION INTRAMUSCULAR; INTRAVENOUS at 18:35

## 2023-01-01 RX ADMIN — HYDROMORPHONE HYDROCHLORIDE 6 MILLIGRAM(S): 2 INJECTION INTRAMUSCULAR; INTRAVENOUS; SUBCUTANEOUS at 23:57

## 2023-01-01 RX ADMIN — GABAPENTIN 600 MILLIGRAM(S): 400 CAPSULE ORAL at 21:36

## 2023-01-01 RX ADMIN — AMLODIPINE BESYLATE 10 MILLIGRAM(S): 2.5 TABLET ORAL at 10:05

## 2023-01-01 RX ADMIN — HYDROMORPHONE HYDROCHLORIDE 0.5 MILLIGRAM(S): 2 INJECTION INTRAMUSCULAR; INTRAVENOUS; SUBCUTANEOUS at 10:30

## 2023-01-01 RX ADMIN — HYDROMORPHONE HYDROCHLORIDE 1 MILLIGRAM(S): 2 INJECTION INTRAMUSCULAR; INTRAVENOUS; SUBCUTANEOUS at 20:30

## 2023-01-01 RX ADMIN — HYDROMORPHONE HYDROCHLORIDE 0.5 MILLIGRAM(S): 2 INJECTION INTRAMUSCULAR; INTRAVENOUS; SUBCUTANEOUS at 11:17

## 2023-01-01 RX ADMIN — Medication 3 MILLIGRAM(S): at 03:03

## 2023-01-01 RX ADMIN — NALOXEGOL OXALATE 25 MILLIGRAM(S): 12.5 TABLET, FILM COATED ORAL at 10:06

## 2023-01-01 RX ADMIN — HYDROMORPHONE HYDROCHLORIDE 1 MILLIGRAM(S): 2 INJECTION INTRAMUSCULAR; INTRAVENOUS; SUBCUTANEOUS at 04:41

## 2023-01-01 RX ADMIN — SPIRONOLACTONE 100 MILLIGRAM(S): 25 TABLET, FILM COATED ORAL at 09:51

## 2023-01-01 RX ADMIN — Medication 81 MILLIGRAM(S): at 09:46

## 2023-01-01 RX ADMIN — SIMVASTATIN 10 MILLIGRAM(S): 20 TABLET, FILM COATED ORAL at 21:38

## 2023-01-01 RX ADMIN — Medication 1 TABLET(S): at 19:42

## 2023-01-01 RX ADMIN — HYDROMORPHONE HYDROCHLORIDE 1 MILLIGRAM(S): 2 INJECTION INTRAMUSCULAR; INTRAVENOUS; SUBCUTANEOUS at 12:25

## 2023-01-01 RX ADMIN — HYDROMORPHONE HYDROCHLORIDE 1.5 MILLIGRAM(S): 2 INJECTION INTRAMUSCULAR; INTRAVENOUS; SUBCUTANEOUS at 20:48

## 2023-01-01 RX ADMIN — SENNA PLUS 2 TABLET(S): 8.6 TABLET ORAL at 22:47

## 2023-01-01 RX ADMIN — LIDOCAINE 1 PATCH: 4 CREAM TOPICAL at 09:40

## 2023-01-01 RX ADMIN — PANTOPRAZOLE SODIUM 40 MILLIGRAM(S): 20 TABLET, DELAYED RELEASE ORAL at 21:38

## 2023-01-01 RX ADMIN — FINASTERIDE 5 MILLIGRAM(S): 5 TABLET, FILM COATED ORAL at 11:28

## 2023-01-01 RX ADMIN — LACTULOSE 15 GRAM(S): 10 SOLUTION ORAL at 10:22

## 2023-01-01 RX ADMIN — MORPHINE SULFATE 2 MILLIGRAM(S): 50 CAPSULE, EXTENDED RELEASE ORAL at 21:41

## 2023-01-01 RX ADMIN — HEPARIN SODIUM 5000 UNIT(S): 5000 INJECTION INTRAVENOUS; SUBCUTANEOUS at 09:59

## 2023-01-01 RX ADMIN — POLYETHYLENE GLYCOL 3350 17 GRAM(S): 17 POWDER, FOR SOLUTION ORAL at 08:27

## 2023-01-01 RX ADMIN — LACTULOSE 10 GRAM(S): 10 SOLUTION ORAL at 10:06

## 2023-01-01 RX ADMIN — TAMSULOSIN HYDROCHLORIDE 0.4 MILLIGRAM(S): 0.4 CAPSULE ORAL at 21:56

## 2023-01-01 RX ADMIN — Medication 1 GRAM(S): at 23:21

## 2023-01-01 RX ADMIN — ONDANSETRON 4 MILLIGRAM(S): 8 TABLET, FILM COATED ORAL at 06:42

## 2023-01-01 RX ADMIN — FINASTERIDE 5 MILLIGRAM(S): 5 TABLET, FILM COATED ORAL at 12:10

## 2023-01-01 RX ADMIN — HYDROMORPHONE HYDROCHLORIDE 1.5 MILLIGRAM(S): 2 INJECTION INTRAMUSCULAR; INTRAVENOUS; SUBCUTANEOUS at 14:06

## 2023-01-01 RX ADMIN — HYDROMORPHONE HYDROCHLORIDE 1.5 MILLIGRAM(S): 2 INJECTION INTRAMUSCULAR; INTRAVENOUS; SUBCUTANEOUS at 15:54

## 2023-01-01 RX ADMIN — Medication 1 TABLET(S): at 17:17

## 2023-01-01 RX ADMIN — HYDROMORPHONE HYDROCHLORIDE 1 MILLIGRAM(S): 2 INJECTION INTRAMUSCULAR; INTRAVENOUS; SUBCUTANEOUS at 08:31

## 2023-01-01 RX ADMIN — Medication 1 TABLET(S): at 12:28

## 2023-01-01 RX ADMIN — FINASTERIDE 5 MILLIGRAM(S): 5 TABLET, FILM COATED ORAL at 10:01

## 2023-01-01 RX ADMIN — HYDROMORPHONE HYDROCHLORIDE 0.5 MILLIGRAM(S): 2 INJECTION INTRAMUSCULAR; INTRAVENOUS; SUBCUTANEOUS at 16:26

## 2023-01-01 RX ADMIN — HYDROMORPHONE HYDROCHLORIDE 1.5 MILLIGRAM(S): 2 INJECTION INTRAMUSCULAR; INTRAVENOUS; SUBCUTANEOUS at 07:53

## 2023-01-01 RX ADMIN — PANTOPRAZOLE SODIUM 40 MILLIGRAM(S): 20 TABLET, DELAYED RELEASE ORAL at 10:42

## 2023-01-01 RX ADMIN — HYDROMORPHONE HYDROCHLORIDE 0.5 MILLIGRAM(S): 2 INJECTION INTRAMUSCULAR; INTRAVENOUS; SUBCUTANEOUS at 09:30

## 2023-01-01 RX ADMIN — LIDOCAINE 1 PATCH: 4 CREAM TOPICAL at 10:00

## 2023-01-01 RX ADMIN — SIMVASTATIN 10 MILLIGRAM(S): 20 TABLET, FILM COATED ORAL at 22:19

## 2023-01-01 RX ADMIN — Medication 1 GRAM(S): at 05:31

## 2023-01-01 RX ADMIN — SODIUM CHLORIDE 2550 MILLILITER(S): 9 INJECTION INTRAMUSCULAR; INTRAVENOUS; SUBCUTANEOUS at 13:34

## 2023-01-01 RX ADMIN — HYDROMORPHONE HYDROCHLORIDE 1 MILLIGRAM(S): 2 INJECTION INTRAMUSCULAR; INTRAVENOUS; SUBCUTANEOUS at 02:30

## 2023-01-01 RX ADMIN — HYDROMORPHONE HYDROCHLORIDE 1 MILLIGRAM(S): 2 INJECTION INTRAMUSCULAR; INTRAVENOUS; SUBCUTANEOUS at 09:47

## 2023-01-01 RX ADMIN — Medication 10 MILLIGRAM(S): at 20:32

## 2023-01-01 RX ADMIN — LIDOCAINE 1 PATCH: 4 CREAM TOPICAL at 07:00

## 2023-01-01 RX ADMIN — ONDANSETRON 4 MILLIGRAM(S): 8 TABLET, FILM COATED ORAL at 20:01

## 2023-01-01 RX ADMIN — HYDROMORPHONE HYDROCHLORIDE 0.5 MILLIGRAM(S): 2 INJECTION INTRAMUSCULAR; INTRAVENOUS; SUBCUTANEOUS at 09:44

## 2023-01-01 RX ADMIN — Medication 2: at 13:11

## 2023-01-01 RX ADMIN — HYDROMORPHONE HYDROCHLORIDE 1 MILLIGRAM(S): 2 INJECTION INTRAMUSCULAR; INTRAVENOUS; SUBCUTANEOUS at 00:33

## 2023-01-01 RX ADMIN — Medication 25 MILLIGRAM(S): at 09:01

## 2023-01-01 RX ADMIN — HYDROMORPHONE HYDROCHLORIDE 0.5 MILLIGRAM(S): 2 INJECTION INTRAMUSCULAR; INTRAVENOUS; SUBCUTANEOUS at 22:41

## 2023-01-01 RX ADMIN — SPIRONOLACTONE 100 MILLIGRAM(S): 25 TABLET, FILM COATED ORAL at 09:47

## 2023-01-01 RX ADMIN — CLOPIDOGREL BISULFATE 75 MILLIGRAM(S): 75 TABLET, FILM COATED ORAL at 10:00

## 2023-01-01 RX ADMIN — INSULIN GLARGINE 16 UNIT(S): 100 INJECTION, SOLUTION SUBCUTANEOUS at 23:57

## 2023-01-01 RX ADMIN — HEPARIN SODIUM 5000 UNIT(S): 5000 INJECTION INTRAVENOUS; SUBCUTANEOUS at 11:29

## 2023-01-01 RX ADMIN — HYDROMORPHONE HYDROCHLORIDE 1.5 MILLIGRAM(S): 2 INJECTION INTRAMUSCULAR; INTRAVENOUS; SUBCUTANEOUS at 20:30

## 2023-01-01 RX ADMIN — PANTOPRAZOLE SODIUM 40 MILLIGRAM(S): 20 TABLET, DELAYED RELEASE ORAL at 09:59

## 2023-01-01 RX ADMIN — Medication 10 MILLIGRAM(S): at 22:47

## 2023-01-01 RX ADMIN — CEFTRIAXONE 1000 MILLIGRAM(S): 500 INJECTION, POWDER, FOR SOLUTION INTRAMUSCULAR; INTRAVENOUS at 22:07

## 2023-01-01 RX ADMIN — Medication 4: at 11:56

## 2023-01-01 RX ADMIN — GABAPENTIN 600 MILLIGRAM(S): 400 CAPSULE ORAL at 22:20

## 2023-01-01 RX ADMIN — ALBUTEROL 2 PUFF(S): 90 AEROSOL, METERED ORAL at 19:47

## 2023-01-01 RX ADMIN — SIMVASTATIN 10 MILLIGRAM(S): 20 TABLET, FILM COATED ORAL at 21:56

## 2023-01-01 RX ADMIN — INSULIN GLARGINE 16 UNIT(S): 100 INJECTION, SOLUTION SUBCUTANEOUS at 21:38

## 2023-01-01 RX ADMIN — Medication 1 TABLET(S): at 08:38

## 2023-01-01 RX ADMIN — FINASTERIDE 5 MILLIGRAM(S): 5 TABLET, FILM COATED ORAL at 09:02

## 2023-01-01 RX ADMIN — HYDROMORPHONE HYDROCHLORIDE 1.5 MILLIGRAM(S): 2 INJECTION INTRAMUSCULAR; INTRAVENOUS; SUBCUTANEOUS at 12:30

## 2023-01-01 RX ADMIN — POLYETHYLENE GLYCOL 3350 17 GRAM(S): 17 POWDER, FOR SOLUTION ORAL at 01:30

## 2023-01-01 RX ADMIN — CEFTRIAXONE 1000 MILLIGRAM(S): 500 INJECTION, POWDER, FOR SOLUTION INTRAMUSCULAR; INTRAVENOUS at 04:15

## 2023-01-01 RX ADMIN — Medication 1 GRAM(S): at 12:56

## 2023-01-01 RX ADMIN — Medication 1 GRAM(S): at 11:52

## 2023-01-01 RX ADMIN — AMLODIPINE BESYLATE 10 MILLIGRAM(S): 2.5 TABLET ORAL at 08:25

## 2023-01-01 RX ADMIN — Medication 3 MILLIGRAM(S): at 22:01

## 2023-01-01 RX ADMIN — Medication 1 TABLET(S): at 11:46

## 2023-01-01 RX ADMIN — HYDROMORPHONE HYDROCHLORIDE 1.5 MILLIGRAM(S): 2 INJECTION INTRAMUSCULAR; INTRAVENOUS; SUBCUTANEOUS at 03:15

## 2023-01-01 RX ADMIN — INSULIN GLARGINE 16 UNIT(S): 100 INJECTION, SOLUTION SUBCUTANEOUS at 21:52

## 2023-01-01 RX ADMIN — Medication 1 TABLET(S): at 13:15

## 2023-01-01 RX ADMIN — HYDROMORPHONE HYDROCHLORIDE 0.5 MILLIGRAM(S): 2 INJECTION INTRAMUSCULAR; INTRAVENOUS; SUBCUTANEOUS at 16:08

## 2023-01-01 RX ADMIN — HEPARIN SODIUM 5000 UNIT(S): 5000 INJECTION INTRAVENOUS; SUBCUTANEOUS at 21:37

## 2023-01-01 RX ADMIN — HYDROMORPHONE HYDROCHLORIDE 1 MILLIGRAM(S): 2 INJECTION INTRAMUSCULAR; INTRAVENOUS; SUBCUTANEOUS at 13:01

## 2023-01-01 RX ADMIN — HYDROMORPHONE HYDROCHLORIDE 0.5 MILLIGRAM(S): 2 INJECTION INTRAMUSCULAR; INTRAVENOUS; SUBCUTANEOUS at 18:10

## 2023-01-01 RX ADMIN — HYDROMORPHONE HYDROCHLORIDE 1.5 MILLIGRAM(S): 2 INJECTION INTRAMUSCULAR; INTRAVENOUS; SUBCUTANEOUS at 16:37

## 2023-01-01 RX ADMIN — HEPARIN SODIUM 5000 UNIT(S): 5000 INJECTION INTRAVENOUS; SUBCUTANEOUS at 09:46

## 2023-01-01 RX ADMIN — HEPARIN SODIUM 5000 UNIT(S): 5000 INJECTION INTRAVENOUS; SUBCUTANEOUS at 21:36

## 2023-01-01 RX ADMIN — HYDROMORPHONE HYDROCHLORIDE 1.5 MILLIGRAM(S): 2 INJECTION INTRAMUSCULAR; INTRAVENOUS; SUBCUTANEOUS at 20:28

## 2023-01-01 RX ADMIN — HYDROMORPHONE HYDROCHLORIDE 1 MILLIGRAM(S): 2 INJECTION INTRAMUSCULAR; INTRAVENOUS; SUBCUTANEOUS at 00:03

## 2023-01-01 RX ADMIN — SPIRONOLACTONE 100 MILLIGRAM(S): 25 TABLET, FILM COATED ORAL at 10:06

## 2023-01-01 RX ADMIN — TAMSULOSIN HYDROCHLORIDE 0.4 MILLIGRAM(S): 0.4 CAPSULE ORAL at 22:00

## 2023-01-01 RX ADMIN — Medication 1 TABLET(S): at 13:08

## 2023-01-01 RX ADMIN — HYDROMORPHONE HYDROCHLORIDE 6 MILLIGRAM(S): 2 INJECTION INTRAMUSCULAR; INTRAVENOUS; SUBCUTANEOUS at 06:46

## 2023-01-01 RX ADMIN — ENOXAPARIN SODIUM 40 MILLIGRAM(S): 100 INJECTION SUBCUTANEOUS at 12:10

## 2023-01-01 RX ADMIN — HYDROMORPHONE HYDROCHLORIDE 0.5 MILLIGRAM(S): 2 INJECTION INTRAMUSCULAR; INTRAVENOUS; SUBCUTANEOUS at 08:56

## 2023-01-01 RX ADMIN — HYDROMORPHONE HYDROCHLORIDE 0.5 MILLIGRAM(S): 2 INJECTION INTRAMUSCULAR; INTRAVENOUS; SUBCUTANEOUS at 10:44

## 2023-01-01 RX ADMIN — Medication 1 TABLET(S): at 11:28

## 2023-01-01 RX ADMIN — HYDROMORPHONE HYDROCHLORIDE 1.5 MILLIGRAM(S): 2 INJECTION INTRAMUSCULAR; INTRAVENOUS; SUBCUTANEOUS at 03:51

## 2023-01-01 RX ADMIN — SODIUM CHLORIDE 75 MILLILITER(S): 9 INJECTION, SOLUTION INTRAVENOUS at 09:57

## 2023-01-01 RX ADMIN — Medication 1 TABLET(S): at 08:10

## 2023-01-01 RX ADMIN — Medication 1 TABLET(S): at 09:30

## 2023-01-01 RX ADMIN — SODIUM CHLORIDE 1000 MILLILITER(S): 9 INJECTION INTRAMUSCULAR; INTRAVENOUS; SUBCUTANEOUS at 14:04

## 2023-01-01 RX ADMIN — TAMSULOSIN HYDROCHLORIDE 0.4 MILLIGRAM(S): 0.4 CAPSULE ORAL at 21:24

## 2023-01-01 RX ADMIN — AZITHROMYCIN 500 MILLIGRAM(S): 500 TABLET, FILM COATED ORAL at 15:33

## 2023-01-01 RX ADMIN — HYDROMORPHONE HYDROCHLORIDE 1 MILLIGRAM(S): 2 INJECTION INTRAMUSCULAR; INTRAVENOUS; SUBCUTANEOUS at 18:30

## 2023-01-01 RX ADMIN — TAMSULOSIN HYDROCHLORIDE 0.4 MILLIGRAM(S): 0.4 CAPSULE ORAL at 21:33

## 2023-01-01 RX ADMIN — HYDROMORPHONE HYDROCHLORIDE 1 MILLIGRAM(S): 2 INJECTION INTRAMUSCULAR; INTRAVENOUS; SUBCUTANEOUS at 17:40

## 2023-01-01 RX ADMIN — HYDROMORPHONE HYDROCHLORIDE 1 MILLIGRAM(S): 2 INJECTION INTRAMUSCULAR; INTRAVENOUS; SUBCUTANEOUS at 14:05

## 2023-01-01 RX ADMIN — SODIUM CHLORIDE 100 MILLILITER(S): 9 INJECTION INTRAMUSCULAR; INTRAVENOUS; SUBCUTANEOUS at 08:52

## 2023-01-01 RX ADMIN — GABAPENTIN 600 MILLIGRAM(S): 400 CAPSULE ORAL at 21:27

## 2023-01-01 RX ADMIN — HYDROMORPHONE HYDROCHLORIDE 0.5 MILLIGRAM(S): 2 INJECTION INTRAMUSCULAR; INTRAVENOUS; SUBCUTANEOUS at 05:30

## 2023-01-01 RX ADMIN — HYDROMORPHONE HYDROCHLORIDE 0.5 MILLIGRAM(S): 2 INJECTION INTRAMUSCULAR; INTRAVENOUS; SUBCUTANEOUS at 06:23

## 2023-01-01 RX ADMIN — HYDROMORPHONE HYDROCHLORIDE 1 MILLIGRAM(S): 2 INJECTION INTRAMUSCULAR; INTRAVENOUS; SUBCUTANEOUS at 20:04

## 2023-01-01 RX ADMIN — Medication 20 MILLIEQUIVALENT(S): at 09:09

## 2023-01-01 RX ADMIN — Medication 81 MILLIGRAM(S): at 08:26

## 2023-01-01 RX ADMIN — Medication 1: at 08:10

## 2023-01-01 RX ADMIN — HYDROMORPHONE HYDROCHLORIDE 1.5 MILLIGRAM(S): 2 INJECTION INTRAMUSCULAR; INTRAVENOUS; SUBCUTANEOUS at 04:36

## 2023-01-01 RX ADMIN — HYDROMORPHONE HYDROCHLORIDE 1.5 MILLIGRAM(S): 2 INJECTION INTRAMUSCULAR; INTRAVENOUS; SUBCUTANEOUS at 14:23

## 2023-01-01 RX ADMIN — GABAPENTIN 300 MILLIGRAM(S): 400 CAPSULE ORAL at 09:02

## 2023-01-01 RX ADMIN — HEPARIN SODIUM 5000 UNIT(S): 5000 INJECTION INTRAVENOUS; SUBCUTANEOUS at 21:20

## 2023-01-01 RX ADMIN — HYDROMORPHONE HYDROCHLORIDE 1 MILLIGRAM(S): 2 INJECTION INTRAMUSCULAR; INTRAVENOUS; SUBCUTANEOUS at 00:30

## 2023-01-01 RX ADMIN — HYDROMORPHONE HYDROCHLORIDE 1 MILLIGRAM(S): 2 INJECTION INTRAMUSCULAR; INTRAVENOUS; SUBCUTANEOUS at 17:18

## 2023-01-01 RX ADMIN — HYDROMORPHONE HYDROCHLORIDE 1 MILLIGRAM(S): 2 INJECTION INTRAMUSCULAR; INTRAVENOUS; SUBCUTANEOUS at 00:46

## 2023-01-01 RX ADMIN — TAMSULOSIN HYDROCHLORIDE 0.4 MILLIGRAM(S): 0.4 CAPSULE ORAL at 21:44

## 2023-01-01 RX ADMIN — GABAPENTIN 300 MILLIGRAM(S): 400 CAPSULE ORAL at 22:00

## 2023-01-01 RX ADMIN — Medication 81 MILLIGRAM(S): at 09:45

## 2023-01-01 RX ADMIN — HEPARIN SODIUM 5000 UNIT(S): 5000 INJECTION INTRAVENOUS; SUBCUTANEOUS at 21:50

## 2023-01-01 RX ADMIN — GABAPENTIN 300 MILLIGRAM(S): 400 CAPSULE ORAL at 21:59

## 2023-01-01 RX ADMIN — GABAPENTIN 300 MILLIGRAM(S): 400 CAPSULE ORAL at 05:39

## 2023-01-01 RX ADMIN — FINASTERIDE 5 MILLIGRAM(S): 5 TABLET, FILM COATED ORAL at 08:25

## 2023-01-01 RX ADMIN — HYDROMORPHONE HYDROCHLORIDE 1 MILLIGRAM(S): 2 INJECTION INTRAMUSCULAR; INTRAVENOUS; SUBCUTANEOUS at 21:28

## 2023-01-01 RX ADMIN — Medication 81 MILLIGRAM(S): at 16:23

## 2023-01-01 RX ADMIN — Medication 1 TABLET(S): at 09:41

## 2023-01-01 RX ADMIN — HYDROMORPHONE HYDROCHLORIDE 1 MILLIGRAM(S): 2 INJECTION INTRAMUSCULAR; INTRAVENOUS; SUBCUTANEOUS at 02:11

## 2023-01-01 RX ADMIN — Medication 2: at 07:47

## 2023-01-01 RX ADMIN — GABAPENTIN 300 MILLIGRAM(S): 400 CAPSULE ORAL at 09:51

## 2023-01-01 RX ADMIN — LIDOCAINE 1 PATCH: 4 CREAM TOPICAL at 20:00

## 2023-01-01 RX ADMIN — Medication 40 MILLIGRAM(S): at 06:20

## 2023-01-01 RX ADMIN — LIDOCAINE 1 PATCH: 4 CREAM TOPICAL at 06:59

## 2023-01-01 RX ADMIN — HYDROMORPHONE HYDROCHLORIDE 1 MILLIGRAM(S): 2 INJECTION INTRAMUSCULAR; INTRAVENOUS; SUBCUTANEOUS at 18:00

## 2023-01-01 RX ADMIN — HYDROMORPHONE HYDROCHLORIDE 1 MILLIGRAM(S): 2 INJECTION INTRAMUSCULAR; INTRAVENOUS; SUBCUTANEOUS at 14:20

## 2023-01-01 RX ADMIN — Medication 2: at 17:12

## 2023-01-01 RX ADMIN — AMLODIPINE BESYLATE 10 MILLIGRAM(S): 2.5 TABLET ORAL at 09:40

## 2023-01-01 RX ADMIN — Medication 4: at 13:11

## 2023-01-01 RX ADMIN — HYDROMORPHONE HYDROCHLORIDE 1 MILLIGRAM(S): 2 INJECTION INTRAMUSCULAR; INTRAVENOUS; SUBCUTANEOUS at 02:41

## 2023-01-01 RX ADMIN — HYDROMORPHONE HYDROCHLORIDE 1.5 MILLIGRAM(S): 2 INJECTION INTRAMUSCULAR; INTRAVENOUS; SUBCUTANEOUS at 01:42

## 2023-01-01 RX ADMIN — Medication 25 MILLIGRAM(S): at 09:59

## 2023-01-01 RX ADMIN — GABAPENTIN 600 MILLIGRAM(S): 400 CAPSULE ORAL at 21:33

## 2023-01-01 RX ADMIN — FAMOTIDINE 20 MILLIGRAM(S): 10 INJECTION INTRAVENOUS at 23:25

## 2023-01-01 RX ADMIN — Medication 1 TABLET(S): at 10:18

## 2023-01-01 RX ADMIN — Medication 40 MILLIGRAM(S): at 05:53

## 2023-01-01 RX ADMIN — PANTOPRAZOLE SODIUM 40 MILLIGRAM(S): 20 TABLET, DELAYED RELEASE ORAL at 09:47

## 2023-01-01 RX ADMIN — Medication 2: at 13:27

## 2023-01-01 RX ADMIN — NALOXEGOL OXALATE 25 MILLIGRAM(S): 12.5 TABLET, FILM COATED ORAL at 09:52

## 2023-01-01 RX ADMIN — HYDROMORPHONE HYDROCHLORIDE 1.5 MILLIGRAM(S): 2 INJECTION INTRAMUSCULAR; INTRAVENOUS; SUBCUTANEOUS at 11:15

## 2023-01-01 RX ADMIN — Medication 20 MILLIEQUIVALENT(S): at 10:18

## 2023-01-01 RX ADMIN — OXYCODONE HYDROCHLORIDE 5 MILLIGRAM(S): 5 TABLET ORAL at 00:10

## 2023-01-01 RX ADMIN — TAMSULOSIN HYDROCHLORIDE 0.4 MILLIGRAM(S): 0.4 CAPSULE ORAL at 21:10

## 2023-01-01 RX ADMIN — SPIRONOLACTONE 100 MILLIGRAM(S): 25 TABLET, FILM COATED ORAL at 11:31

## 2023-01-01 RX ADMIN — TAMSULOSIN HYDROCHLORIDE 0.4 MILLIGRAM(S): 0.4 CAPSULE ORAL at 22:46

## 2023-01-01 RX ADMIN — Medication 3: at 17:05

## 2023-01-01 RX ADMIN — Medication 2: at 17:57

## 2023-01-01 RX ADMIN — Medication 25 MILLIGRAM(S): at 09:45

## 2023-01-01 RX ADMIN — Medication 1 GRAM(S): at 05:50

## 2023-01-01 RX ADMIN — HYDROMORPHONE HYDROCHLORIDE 1.5 MILLIGRAM(S): 2 INJECTION INTRAMUSCULAR; INTRAVENOUS; SUBCUTANEOUS at 23:19

## 2023-01-01 RX ADMIN — SIMVASTATIN 10 MILLIGRAM(S): 20 TABLET, FILM COATED ORAL at 21:23

## 2023-01-01 RX ADMIN — HYDROMORPHONE HYDROCHLORIDE 1 MILLIGRAM(S): 2 INJECTION INTRAMUSCULAR; INTRAVENOUS; SUBCUTANEOUS at 20:34

## 2023-01-01 RX ADMIN — Medication 600 MILLIGRAM(S): at 11:35

## 2023-01-01 RX ADMIN — AMLODIPINE BESYLATE 10 MILLIGRAM(S): 2.5 TABLET ORAL at 09:32

## 2023-01-01 RX ADMIN — HYDROMORPHONE HYDROCHLORIDE 1.5 MILLIGRAM(S): 2 INJECTION INTRAMUSCULAR; INTRAVENOUS; SUBCUTANEOUS at 16:18

## 2023-01-01 RX ADMIN — Medication 25 MILLIGRAM(S): at 09:53

## 2023-01-01 RX ADMIN — BUDESONIDE AND FORMOTEROL FUMARATE DIHYDRATE 2 PUFF(S): 160; 4.5 AEROSOL RESPIRATORY (INHALATION) at 13:17

## 2023-01-01 RX ADMIN — HYDROMORPHONE HYDROCHLORIDE 1 MILLIGRAM(S): 2 INJECTION INTRAMUSCULAR; INTRAVENOUS; SUBCUTANEOUS at 21:22

## 2023-01-01 RX ADMIN — GABAPENTIN 300 MILLIGRAM(S): 400 CAPSULE ORAL at 10:04

## 2023-01-01 RX ADMIN — GABAPENTIN 300 MILLIGRAM(S): 400 CAPSULE ORAL at 21:09

## 2023-01-01 RX ADMIN — HYDROMORPHONE HYDROCHLORIDE 1 MILLIGRAM(S): 2 INJECTION INTRAMUSCULAR; INTRAVENOUS; SUBCUTANEOUS at 20:03

## 2023-01-01 RX ADMIN — Medication 1 GRAM(S): at 09:52

## 2023-01-01 RX ADMIN — NALOXEGOL OXALATE 25 MILLIGRAM(S): 12.5 TABLET, FILM COATED ORAL at 08:27

## 2023-01-01 RX ADMIN — CLOPIDOGREL BISULFATE 75 MILLIGRAM(S): 75 TABLET, FILM COATED ORAL at 10:18

## 2023-01-01 RX ADMIN — Medication 25 MILLIGRAM(S): at 09:10

## 2023-01-01 RX ADMIN — HYDROMORPHONE HYDROCHLORIDE 1 MILLIGRAM(S): 2 INJECTION INTRAMUSCULAR; INTRAVENOUS; SUBCUTANEOUS at 05:39

## 2023-01-01 RX ADMIN — CLOPIDOGREL BISULFATE 75 MILLIGRAM(S): 75 TABLET, FILM COATED ORAL at 09:40

## 2023-01-01 RX ADMIN — HYDROMORPHONE HYDROCHLORIDE 1 MILLIGRAM(S): 2 INJECTION INTRAMUSCULAR; INTRAVENOUS; SUBCUTANEOUS at 12:56

## 2023-01-01 RX ADMIN — HYDROMORPHONE HYDROCHLORIDE 1 MILLIGRAM(S): 2 INJECTION INTRAMUSCULAR; INTRAVENOUS; SUBCUTANEOUS at 13:25

## 2023-01-01 RX ADMIN — HYDROMORPHONE HYDROCHLORIDE 1.5 MILLIGRAM(S): 2 INJECTION INTRAMUSCULAR; INTRAVENOUS; SUBCUTANEOUS at 10:05

## 2023-01-01 RX ADMIN — TAMSULOSIN HYDROCHLORIDE 0.4 MILLIGRAM(S): 0.4 CAPSULE ORAL at 21:37

## 2023-01-01 RX ADMIN — PANTOPRAZOLE SODIUM 40 MILLIGRAM(S): 20 TABLET, DELAYED RELEASE ORAL at 11:29

## 2023-01-01 RX ADMIN — Medication 2: at 09:09

## 2023-01-01 RX ADMIN — AMLODIPINE BESYLATE 10 MILLIGRAM(S): 2.5 TABLET ORAL at 09:53

## 2023-01-01 RX ADMIN — HYDROMORPHONE HYDROCHLORIDE 1.5 MILLIGRAM(S): 2 INJECTION INTRAMUSCULAR; INTRAVENOUS; SUBCUTANEOUS at 20:09

## 2023-01-01 RX ADMIN — HYDROMORPHONE HYDROCHLORIDE 0.5 MILLIGRAM(S): 2 INJECTION INTRAMUSCULAR; INTRAVENOUS; SUBCUTANEOUS at 08:00

## 2023-01-01 RX ADMIN — Medication 81 MILLIGRAM(S): at 11:28

## 2023-01-01 RX ADMIN — HYDROMORPHONE HYDROCHLORIDE 1 MILLIGRAM(S): 2 INJECTION INTRAMUSCULAR; INTRAVENOUS; SUBCUTANEOUS at 12:03

## 2023-01-01 RX ADMIN — FINASTERIDE 5 MILLIGRAM(S): 5 TABLET, FILM COATED ORAL at 11:51

## 2023-01-01 RX ADMIN — Medication 2: at 16:39

## 2023-01-01 RX ADMIN — Medication 81 MILLIGRAM(S): at 10:05

## 2023-01-01 RX ADMIN — Medication 1 TABLET(S): at 12:02

## 2023-01-01 RX ADMIN — LACTULOSE 10 GRAM(S): 10 SOLUTION ORAL at 10:51

## 2023-01-01 RX ADMIN — AMLODIPINE BESYLATE 10 MILLIGRAM(S): 2.5 TABLET ORAL at 10:18

## 2023-01-01 RX ADMIN — Medication 81 MILLIGRAM(S): at 09:59

## 2023-01-01 RX ADMIN — GABAPENTIN 600 MILLIGRAM(S): 400 CAPSULE ORAL at 21:56

## 2023-01-01 RX ADMIN — GABAPENTIN 300 MILLIGRAM(S): 400 CAPSULE ORAL at 09:26

## 2023-01-01 RX ADMIN — TAMSULOSIN HYDROCHLORIDE 0.4 MILLIGRAM(S): 0.4 CAPSULE ORAL at 22:35

## 2023-01-01 RX ADMIN — Medication 30 MILLILITER(S): at 20:41

## 2023-01-01 RX ADMIN — Medication 1 TABLET(S): at 09:10

## 2023-01-01 RX ADMIN — Medication 40 MILLIGRAM(S): at 09:46

## 2023-01-01 RX ADMIN — PANTOPRAZOLE SODIUM 40 MILLIGRAM(S): 20 TABLET, DELAYED RELEASE ORAL at 21:53

## 2023-01-01 RX ADMIN — Medication 40 MILLIGRAM(S): at 05:04

## 2023-01-01 RX ADMIN — HYDROMORPHONE HYDROCHLORIDE 1 MILLIGRAM(S): 2 INJECTION INTRAMUSCULAR; INTRAVENOUS; SUBCUTANEOUS at 01:31

## 2023-01-01 RX ADMIN — HYDROMORPHONE HYDROCHLORIDE 1 MILLIGRAM(S): 2 INJECTION INTRAMUSCULAR; INTRAVENOUS; SUBCUTANEOUS at 15:15

## 2023-01-01 RX ADMIN — ALBUTEROL 2 PUFF(S): 90 AEROSOL, METERED ORAL at 01:54

## 2023-01-01 RX ADMIN — CLOPIDOGREL BISULFATE 75 MILLIGRAM(S): 75 TABLET, FILM COATED ORAL at 13:20

## 2023-01-01 RX ADMIN — AMLODIPINE BESYLATE 10 MILLIGRAM(S): 2.5 TABLET ORAL at 09:26

## 2023-01-01 RX ADMIN — AMLODIPINE BESYLATE 10 MILLIGRAM(S): 2.5 TABLET ORAL at 09:46

## 2023-01-01 RX ADMIN — HYDROMORPHONE HYDROCHLORIDE 1 MILLIGRAM(S): 2 INJECTION INTRAMUSCULAR; INTRAVENOUS; SUBCUTANEOUS at 07:17

## 2023-01-01 RX ADMIN — HYDROMORPHONE HYDROCHLORIDE 6 MILLIGRAM(S): 2 INJECTION INTRAMUSCULAR; INTRAVENOUS; SUBCUTANEOUS at 23:59

## 2023-01-01 RX ADMIN — Medication 30 MILLILITER(S): at 05:30

## 2023-01-01 RX ADMIN — HYDROMORPHONE HYDROCHLORIDE 1 MILLIGRAM(S): 2 INJECTION INTRAMUSCULAR; INTRAVENOUS; SUBCUTANEOUS at 20:58

## 2023-01-01 RX ADMIN — HYDROMORPHONE HYDROCHLORIDE 1 MILLIGRAM(S): 2 INJECTION INTRAMUSCULAR; INTRAVENOUS; SUBCUTANEOUS at 06:04

## 2023-01-01 RX ADMIN — FINASTERIDE 5 MILLIGRAM(S): 5 TABLET, FILM COATED ORAL at 10:00

## 2023-01-01 RX ADMIN — FINASTERIDE 5 MILLIGRAM(S): 5 TABLET, FILM COATED ORAL at 10:05

## 2023-01-01 RX ADMIN — HYDROMORPHONE HYDROCHLORIDE 1.5 MILLIGRAM(S): 2 INJECTION INTRAMUSCULAR; INTRAVENOUS; SUBCUTANEOUS at 19:36

## 2023-01-01 RX ADMIN — HYDROMORPHONE HYDROCHLORIDE 0.5 MILLIGRAM(S): 2 INJECTION INTRAMUSCULAR; INTRAVENOUS; SUBCUTANEOUS at 08:05

## 2023-01-01 RX ADMIN — HYDROMORPHONE HYDROCHLORIDE 1.5 MILLIGRAM(S): 2 INJECTION INTRAMUSCULAR; INTRAVENOUS; SUBCUTANEOUS at 16:08

## 2023-01-01 RX ADMIN — Medication 4: at 09:46

## 2023-01-01 RX ADMIN — Medication 81 MILLIGRAM(S): at 09:25

## 2023-01-01 RX ADMIN — HYDROMORPHONE HYDROCHLORIDE 1.5 MILLIGRAM(S): 2 INJECTION INTRAMUSCULAR; INTRAVENOUS; SUBCUTANEOUS at 19:15

## 2023-01-01 RX ADMIN — Medication 2: at 11:46

## 2023-01-01 RX ADMIN — GABAPENTIN 600 MILLIGRAM(S): 400 CAPSULE ORAL at 21:37

## 2023-01-01 RX ADMIN — CLOPIDOGREL BISULFATE 75 MILLIGRAM(S): 75 TABLET, FILM COATED ORAL at 09:31

## 2023-01-01 RX ADMIN — ALBUTEROL 2 PUFF(S): 90 AEROSOL, METERED ORAL at 07:40

## 2023-01-01 RX ADMIN — HYDROMORPHONE HYDROCHLORIDE 0.5 MILLIGRAM(S): 2 INJECTION INTRAMUSCULAR; INTRAVENOUS; SUBCUTANEOUS at 18:39

## 2023-01-01 RX ADMIN — Medication 1 TABLET(S): at 09:02

## 2023-01-01 RX ADMIN — HYDROMORPHONE HYDROCHLORIDE 1 MILLIGRAM(S): 2 INJECTION INTRAMUSCULAR; INTRAVENOUS; SUBCUTANEOUS at 05:10

## 2023-01-01 RX ADMIN — PANTOPRAZOLE SODIUM 40 MILLIGRAM(S): 20 TABLET, DELAYED RELEASE ORAL at 21:54

## 2023-01-01 RX ADMIN — PANTOPRAZOLE SODIUM 40 MILLIGRAM(S): 20 TABLET, DELAYED RELEASE ORAL at 09:35

## 2023-01-01 RX ADMIN — Medication 1 TABLET(S): at 09:45

## 2023-01-01 RX ADMIN — HYDROMORPHONE HYDROCHLORIDE 0.5 MILLIGRAM(S): 2 INJECTION INTRAMUSCULAR; INTRAVENOUS; SUBCUTANEOUS at 14:37

## 2023-01-01 RX ADMIN — Medication 1 GRAM(S): at 17:05

## 2023-01-01 RX ADMIN — GABAPENTIN 300 MILLIGRAM(S): 400 CAPSULE ORAL at 10:22

## 2023-01-01 RX ADMIN — HYDROMORPHONE HYDROCHLORIDE 1 MILLIGRAM(S): 2 INJECTION INTRAMUSCULAR; INTRAVENOUS; SUBCUTANEOUS at 11:29

## 2023-01-01 RX ADMIN — Medication 25 MILLIGRAM(S): at 08:26

## 2023-01-01 RX ADMIN — FINASTERIDE 5 MILLIGRAM(S): 5 TABLET, FILM COATED ORAL at 09:45

## 2023-01-01 RX ADMIN — Medication 40 MILLIGRAM(S): at 14:41

## 2023-01-01 RX ADMIN — SODIUM CHLORIDE 100 MILLILITER(S): 9 INJECTION INTRAMUSCULAR; INTRAVENOUS; SUBCUTANEOUS at 21:43

## 2023-01-01 RX ADMIN — GABAPENTIN 300 MILLIGRAM(S): 400 CAPSULE ORAL at 21:44

## 2023-01-01 RX ADMIN — Medication 1 TABLET(S): at 17:05

## 2023-01-01 RX ADMIN — Medication 1 GRAM(S): at 01:24

## 2023-01-01 RX ADMIN — SPIRONOLACTONE 100 MILLIGRAM(S): 25 TABLET, FILM COATED ORAL at 09:01

## 2023-01-01 RX ADMIN — HYDROMORPHONE HYDROCHLORIDE 1.5 MILLIGRAM(S): 2 INJECTION INTRAMUSCULAR; INTRAVENOUS; SUBCUTANEOUS at 22:19

## 2023-01-01 RX ADMIN — TAMSULOSIN HYDROCHLORIDE 0.4 MILLIGRAM(S): 0.4 CAPSULE ORAL at 22:01

## 2023-01-01 RX ADMIN — HYDROMORPHONE HYDROCHLORIDE 0.5 MILLIGRAM(S): 2 INJECTION INTRAMUSCULAR; INTRAVENOUS; SUBCUTANEOUS at 02:48

## 2023-01-01 RX ADMIN — HYDROMORPHONE HYDROCHLORIDE 0.5 MILLIGRAM(S): 2 INJECTION INTRAMUSCULAR; INTRAVENOUS; SUBCUTANEOUS at 01:00

## 2023-01-01 RX ADMIN — BUDESONIDE AND FORMOTEROL FUMARATE DIHYDRATE 2 PUFF(S): 160; 4.5 AEROSOL RESPIRATORY (INHALATION) at 19:47

## 2023-01-01 RX ADMIN — Medication 40 MILLIGRAM(S): at 12:10

## 2023-01-01 RX ADMIN — FINASTERIDE 5 MILLIGRAM(S): 5 TABLET, FILM COATED ORAL at 09:46

## 2023-01-01 RX ADMIN — Medication 20 MILLIEQUIVALENT(S): at 11:29

## 2023-01-01 RX ADMIN — HYDROMORPHONE HYDROCHLORIDE 0.2 MILLIGRAM(S): 2 INJECTION INTRAMUSCULAR; INTRAVENOUS; SUBCUTANEOUS at 04:06

## 2023-01-01 RX ADMIN — FINASTERIDE 5 MILLIGRAM(S): 5 TABLET, FILM COATED ORAL at 10:23

## 2023-01-01 RX ADMIN — HYDROMORPHONE HYDROCHLORIDE 1.5 MILLIGRAM(S): 2 INJECTION INTRAMUSCULAR; INTRAVENOUS; SUBCUTANEOUS at 06:23

## 2023-01-01 RX ADMIN — HYDROMORPHONE HYDROCHLORIDE 1.5 MILLIGRAM(S): 2 INJECTION INTRAMUSCULAR; INTRAVENOUS; SUBCUTANEOUS at 10:45

## 2023-01-01 RX ADMIN — SODIUM CHLORIDE 100 MILLILITER(S): 9 INJECTION INTRAMUSCULAR; INTRAVENOUS; SUBCUTANEOUS at 18:47

## 2023-01-01 RX ADMIN — POLYETHYLENE GLYCOL 3350 17 GRAM(S): 17 POWDER, FOR SOLUTION ORAL at 09:50

## 2023-01-01 RX ADMIN — SODIUM CHLORIDE 500 MILLILITER(S): 9 INJECTION INTRAMUSCULAR; INTRAVENOUS; SUBCUTANEOUS at 02:59

## 2023-01-01 RX ADMIN — Medication 1 GRAM(S): at 17:13

## 2023-01-01 RX ADMIN — Medication 25 MILLIGRAM(S): at 09:47

## 2023-01-01 RX ADMIN — ONDANSETRON 4 MILLIGRAM(S): 8 TABLET, FILM COATED ORAL at 23:24

## 2023-01-01 RX ADMIN — HYDROMORPHONE HYDROCHLORIDE 6 MILLIGRAM(S): 2 INJECTION INTRAMUSCULAR; INTRAVENOUS; SUBCUTANEOUS at 17:55

## 2023-01-01 RX ADMIN — PIPERACILLIN AND TAZOBACTAM 25 GRAM(S): 4; .5 INJECTION, POWDER, LYOPHILIZED, FOR SOLUTION INTRAVENOUS at 03:50

## 2023-01-01 RX ADMIN — LACTULOSE 15 GRAM(S): 10 SOLUTION ORAL at 09:46

## 2023-01-01 RX ADMIN — AMLODIPINE BESYLATE 10 MILLIGRAM(S): 2.5 TABLET ORAL at 05:59

## 2023-01-01 RX ADMIN — HYDROMORPHONE HYDROCHLORIDE 1.5 MILLIGRAM(S): 2 INJECTION INTRAMUSCULAR; INTRAVENOUS; SUBCUTANEOUS at 02:50

## 2023-01-01 RX ADMIN — HYDROMORPHONE HYDROCHLORIDE 1 MILLIGRAM(S): 2 INJECTION INTRAMUSCULAR; INTRAVENOUS; SUBCUTANEOUS at 08:37

## 2023-01-01 RX ADMIN — HYDROMORPHONE HYDROCHLORIDE 0.5 MILLIGRAM(S): 2 INJECTION INTRAMUSCULAR; INTRAVENOUS; SUBCUTANEOUS at 18:18

## 2023-01-01 RX ADMIN — PIPERACILLIN AND TAZOBACTAM 200 GRAM(S): 4; .5 INJECTION, POWDER, LYOPHILIZED, FOR SOLUTION INTRAVENOUS at 02:58

## 2023-01-01 RX ADMIN — HYDROMORPHONE HYDROCHLORIDE 1 MILLIGRAM(S): 2 INJECTION INTRAMUSCULAR; INTRAVENOUS; SUBCUTANEOUS at 06:09

## 2023-01-01 RX ADMIN — Medication 10 MILLIGRAM(S): at 02:21

## 2023-01-01 RX ADMIN — HYDROMORPHONE HYDROCHLORIDE 0.5 MILLIGRAM(S): 2 INJECTION INTRAMUSCULAR; INTRAVENOUS; SUBCUTANEOUS at 11:32

## 2023-01-01 RX ADMIN — HYDROMORPHONE HYDROCHLORIDE 1.5 MILLIGRAM(S): 2 INJECTION INTRAMUSCULAR; INTRAVENOUS; SUBCUTANEOUS at 12:45

## 2023-01-01 RX ADMIN — HYDROMORPHONE HYDROCHLORIDE 1 MILLIGRAM(S): 2 INJECTION INTRAMUSCULAR; INTRAVENOUS; SUBCUTANEOUS at 13:26

## 2023-01-01 RX ADMIN — HYDROMORPHONE HYDROCHLORIDE 1.5 MILLIGRAM(S): 2 INJECTION INTRAMUSCULAR; INTRAVENOUS; SUBCUTANEOUS at 12:22

## 2023-01-01 RX ADMIN — HEPARIN SODIUM 5000 UNIT(S): 5000 INJECTION INTRAVENOUS; SUBCUTANEOUS at 21:52

## 2023-01-01 RX ADMIN — HYDROMORPHONE HYDROCHLORIDE 0.5 MILLIGRAM(S): 2 INJECTION INTRAMUSCULAR; INTRAVENOUS; SUBCUTANEOUS at 06:50

## 2023-01-01 RX ADMIN — HYDROMORPHONE HYDROCHLORIDE 1.5 MILLIGRAM(S): 2 INJECTION INTRAMUSCULAR; INTRAVENOUS; SUBCUTANEOUS at 13:53

## 2023-01-01 RX ADMIN — Medication 1 GRAM(S): at 18:35

## 2023-01-01 RX ADMIN — HYDROMORPHONE HYDROCHLORIDE 1.5 MILLIGRAM(S): 2 INJECTION INTRAMUSCULAR; INTRAVENOUS; SUBCUTANEOUS at 04:10

## 2023-01-01 RX ADMIN — Medication 1 TABLET(S): at 16:40

## 2023-01-01 RX ADMIN — FINASTERIDE 5 MILLIGRAM(S): 5 TABLET, FILM COATED ORAL at 09:53

## 2023-01-01 RX ADMIN — AMLODIPINE BESYLATE 10 MILLIGRAM(S): 2.5 TABLET ORAL at 11:28

## 2023-01-01 RX ADMIN — TAMSULOSIN HYDROCHLORIDE 0.4 MILLIGRAM(S): 0.4 CAPSULE ORAL at 21:39

## 2023-01-01 RX ADMIN — FINASTERIDE 5 MILLIGRAM(S): 5 TABLET, FILM COATED ORAL at 11:34

## 2023-01-01 RX ADMIN — GABAPENTIN 300 MILLIGRAM(S): 400 CAPSULE ORAL at 15:33

## 2023-01-01 RX ADMIN — HYDROMORPHONE HYDROCHLORIDE 1 MILLIGRAM(S): 2 INJECTION INTRAMUSCULAR; INTRAVENOUS; SUBCUTANEOUS at 06:05

## 2023-01-01 RX ADMIN — HYDROMORPHONE HYDROCHLORIDE 1.5 MILLIGRAM(S): 2 INJECTION INTRAMUSCULAR; INTRAVENOUS; SUBCUTANEOUS at 00:10

## 2023-01-01 RX ADMIN — HYDROMORPHONE HYDROCHLORIDE 1 MILLIGRAM(S): 2 INJECTION INTRAMUSCULAR; INTRAVENOUS; SUBCUTANEOUS at 12:09

## 2023-01-01 RX ADMIN — LACTULOSE 10 GRAM(S): 10 SOLUTION ORAL at 22:44

## 2023-01-01 RX ADMIN — ENOXAPARIN SODIUM 40 MILLIGRAM(S): 100 INJECTION SUBCUTANEOUS at 13:32

## 2023-01-01 RX ADMIN — HYDROMORPHONE HYDROCHLORIDE 1 MILLIGRAM(S): 2 INJECTION INTRAMUSCULAR; INTRAVENOUS; SUBCUTANEOUS at 16:41

## 2023-01-01 RX ADMIN — ALBUTEROL 2 PUFF(S): 90 AEROSOL, METERED ORAL at 13:17

## 2023-01-01 RX ADMIN — GABAPENTIN 300 MILLIGRAM(S): 400 CAPSULE ORAL at 05:38

## 2023-01-01 RX ADMIN — POLYETHYLENE GLYCOL 3350 17 GRAM(S): 17 POWDER, FOR SOLUTION ORAL at 21:29

## 2023-01-01 RX ADMIN — GABAPENTIN 300 MILLIGRAM(S): 400 CAPSULE ORAL at 09:47

## 2023-01-01 RX ADMIN — Medication 81 MILLIGRAM(S): at 10:18

## 2023-01-01 RX ADMIN — Medication 1: at 12:29

## 2023-01-01 RX ADMIN — HYDROMORPHONE HYDROCHLORIDE 1 MILLIGRAM(S): 2 INJECTION INTRAMUSCULAR; INTRAVENOUS; SUBCUTANEOUS at 11:08

## 2023-01-01 RX ADMIN — HYDROMORPHONE HYDROCHLORIDE 1 MILLIGRAM(S): 2 INJECTION INTRAMUSCULAR; INTRAVENOUS; SUBCUTANEOUS at 00:37

## 2023-01-01 RX ADMIN — HYDROMORPHONE HYDROCHLORIDE 0.5 MILLIGRAM(S): 2 INJECTION INTRAMUSCULAR; INTRAVENOUS; SUBCUTANEOUS at 10:10

## 2023-01-01 RX ADMIN — HYDROMORPHONE HYDROCHLORIDE 0.5 MILLIGRAM(S): 2 INJECTION INTRAMUSCULAR; INTRAVENOUS; SUBCUTANEOUS at 03:30

## 2023-01-01 RX ADMIN — Medication 2: at 21:34

## 2023-01-01 RX ADMIN — GABAPENTIN 300 MILLIGRAM(S): 400 CAPSULE ORAL at 05:31

## 2023-01-01 RX ADMIN — SODIUM CHLORIDE 125 MILLILITER(S): 9 INJECTION INTRAMUSCULAR; INTRAVENOUS; SUBCUTANEOUS at 23:24

## 2023-01-01 RX ADMIN — SODIUM CHLORIDE 100 MILLILITER(S): 9 INJECTION INTRAMUSCULAR; INTRAVENOUS; SUBCUTANEOUS at 11:22

## 2023-01-01 RX ADMIN — HYDROMORPHONE HYDROCHLORIDE 1 MILLIGRAM(S): 2 INJECTION INTRAMUSCULAR; INTRAVENOUS; SUBCUTANEOUS at 04:13

## 2023-01-01 RX ADMIN — HYDROMORPHONE HYDROCHLORIDE 1 MILLIGRAM(S): 2 INJECTION INTRAMUSCULAR; INTRAVENOUS; SUBCUTANEOUS at 08:22

## 2023-01-01 RX ADMIN — LIDOCAINE 1 PATCH: 4 CREAM TOPICAL at 19:52

## 2023-01-01 RX ADMIN — Medication 4: at 08:22

## 2023-01-01 RX ADMIN — CLOPIDOGREL BISULFATE 75 MILLIGRAM(S): 75 TABLET, FILM COATED ORAL at 09:53

## 2023-01-01 RX ADMIN — HYDROMORPHONE HYDROCHLORIDE 1.5 MILLIGRAM(S): 2 INJECTION INTRAMUSCULAR; INTRAVENOUS; SUBCUTANEOUS at 16:23

## 2023-01-01 RX ADMIN — HYDROMORPHONE HYDROCHLORIDE 0.5 MILLIGRAM(S): 2 INJECTION INTRAMUSCULAR; INTRAVENOUS; SUBCUTANEOUS at 20:54

## 2023-01-01 RX ADMIN — LIDOCAINE 1 PATCH: 4 CREAM TOPICAL at 09:48

## 2023-01-01 RX ADMIN — HYDROMORPHONE HYDROCHLORIDE 1 MILLIGRAM(S): 2 INJECTION INTRAMUSCULAR; INTRAVENOUS; SUBCUTANEOUS at 21:43

## 2023-01-01 RX ADMIN — Medication 1 GRAM(S): at 00:05

## 2023-01-01 RX ADMIN — Medication 25 MILLIGRAM(S): at 09:31

## 2023-01-01 RX ADMIN — HYDROMORPHONE HYDROCHLORIDE 6 MILLIGRAM(S): 2 INJECTION INTRAMUSCULAR; INTRAVENOUS; SUBCUTANEOUS at 12:24

## 2023-01-01 RX ADMIN — TAMSULOSIN HYDROCHLORIDE 0.4 MILLIGRAM(S): 0.4 CAPSULE ORAL at 21:27

## 2023-01-01 RX ADMIN — TIOTROPIUM BROMIDE 2 PUFF(S): 18 CAPSULE ORAL; RESPIRATORY (INHALATION) at 08:43

## 2023-01-01 RX ADMIN — Medication 25 MILLIGRAM(S): at 10:05

## 2023-01-01 RX ADMIN — GABAPENTIN 600 MILLIGRAM(S): 400 CAPSULE ORAL at 22:00

## 2023-01-01 RX ADMIN — PANTOPRAZOLE SODIUM 40 MILLIGRAM(S): 20 TABLET, DELAYED RELEASE ORAL at 09:01

## 2023-01-01 RX ADMIN — ALBUTEROL 2 PUFF(S): 90 AEROSOL, METERED ORAL at 20:31

## 2023-01-01 RX ADMIN — HYDROMORPHONE HYDROCHLORIDE 1 MILLIGRAM(S): 2 INJECTION INTRAMUSCULAR; INTRAVENOUS; SUBCUTANEOUS at 17:20

## 2023-01-01 RX ADMIN — GABAPENTIN 300 MILLIGRAM(S): 400 CAPSULE ORAL at 06:46

## 2023-01-01 RX ADMIN — PANTOPRAZOLE SODIUM 40 MILLIGRAM(S): 20 TABLET, DELAYED RELEASE ORAL at 09:11

## 2023-01-01 RX ADMIN — GABAPENTIN 300 MILLIGRAM(S): 400 CAPSULE ORAL at 14:02

## 2023-01-01 RX ADMIN — GABAPENTIN 600 MILLIGRAM(S): 400 CAPSULE ORAL at 21:45

## 2023-01-01 RX ADMIN — Medication 2: at 17:15

## 2023-01-01 RX ADMIN — HYDROMORPHONE HYDROCHLORIDE 1 MILLIGRAM(S): 2 INJECTION INTRAMUSCULAR; INTRAVENOUS; SUBCUTANEOUS at 01:16

## 2023-01-01 RX ADMIN — AMLODIPINE BESYLATE 10 MILLIGRAM(S): 2.5 TABLET ORAL at 12:10

## 2023-01-01 RX ADMIN — HYDROMORPHONE HYDROCHLORIDE 1 MILLIGRAM(S): 2 INJECTION INTRAMUSCULAR; INTRAVENOUS; SUBCUTANEOUS at 09:35

## 2023-01-01 RX ADMIN — Medication 1 GRAM(S): at 17:33

## 2023-01-01 RX ADMIN — HYDROMORPHONE HYDROCHLORIDE 1.5 MILLIGRAM(S): 2 INJECTION INTRAMUSCULAR; INTRAVENOUS; SUBCUTANEOUS at 08:04

## 2023-01-01 RX ADMIN — HYDROMORPHONE HYDROCHLORIDE 1 MILLIGRAM(S): 2 INJECTION INTRAMUSCULAR; INTRAVENOUS; SUBCUTANEOUS at 03:44

## 2023-01-01 RX ADMIN — Medication 1 GRAM(S): at 12:02

## 2023-01-01 RX ADMIN — Medication 81 MILLIGRAM(S): at 09:32

## 2023-01-01 RX ADMIN — AZITHROMYCIN 500 MILLIGRAM(S): 500 TABLET, FILM COATED ORAL at 14:59

## 2023-01-01 RX ADMIN — Medication 1 GRAM(S): at 18:36

## 2023-01-01 RX ADMIN — AZITHROMYCIN 255 MILLIGRAM(S): 500 TABLET, FILM COATED ORAL at 18:35

## 2023-01-01 RX ADMIN — INSULIN GLARGINE 10 UNIT(S): 100 INJECTION, SOLUTION SUBCUTANEOUS at 21:34

## 2023-01-01 RX ADMIN — CLOPIDOGREL BISULFATE 75 MILLIGRAM(S): 75 TABLET, FILM COATED ORAL at 09:47

## 2023-01-01 RX ADMIN — HYDROMORPHONE HYDROCHLORIDE 1 MILLIGRAM(S): 2 INJECTION INTRAMUSCULAR; INTRAVENOUS; SUBCUTANEOUS at 08:17

## 2023-01-01 RX ADMIN — HYDROMORPHONE HYDROCHLORIDE 1.5 MILLIGRAM(S): 2 INJECTION INTRAMUSCULAR; INTRAVENOUS; SUBCUTANEOUS at 07:45

## 2023-01-01 RX ADMIN — SPIRONOLACTONE 100 MILLIGRAM(S): 25 TABLET, FILM COATED ORAL at 12:02

## 2023-01-01 RX ADMIN — HYDROMORPHONE HYDROCHLORIDE 0.5 MILLIGRAM(S): 2 INJECTION INTRAMUSCULAR; INTRAVENOUS; SUBCUTANEOUS at 13:53

## 2023-01-01 RX ADMIN — Medication 81 MILLIGRAM(S): at 08:31

## 2023-01-01 RX ADMIN — HYDROMORPHONE HYDROCHLORIDE 1 MILLIGRAM(S): 2 INJECTION INTRAMUSCULAR; INTRAVENOUS; SUBCUTANEOUS at 06:30

## 2023-01-01 RX ADMIN — Medication 25 MILLIGRAM(S): at 10:01

## 2023-01-01 RX ADMIN — SPIRONOLACTONE 100 MILLIGRAM(S): 25 TABLET, FILM COATED ORAL at 09:35

## 2023-01-01 RX ADMIN — HYDROMORPHONE HYDROCHLORIDE 1 MILLIGRAM(S): 2 INJECTION INTRAMUSCULAR; INTRAVENOUS; SUBCUTANEOUS at 00:40

## 2023-01-01 RX ADMIN — SIMVASTATIN 10 MILLIGRAM(S): 20 TABLET, FILM COATED ORAL at 22:47

## 2023-01-01 RX ADMIN — HYDROMORPHONE HYDROCHLORIDE 6 MILLIGRAM(S): 2 INJECTION INTRAMUSCULAR; INTRAVENOUS; SUBCUTANEOUS at 01:38

## 2023-01-01 RX ADMIN — Medication 81 MILLIGRAM(S): at 10:01

## 2023-01-01 RX ADMIN — POLYETHYLENE GLYCOL 3350 17 GRAM(S): 17 POWDER, FOR SOLUTION ORAL at 22:20

## 2023-01-01 RX ADMIN — Medication 1 GRAM(S): at 06:34

## 2023-01-01 RX ADMIN — SODIUM CHLORIDE 1000 MILLILITER(S): 9 INJECTION INTRAMUSCULAR; INTRAVENOUS; SUBCUTANEOUS at 17:06

## 2023-01-01 RX ADMIN — ONDANSETRON 4 MILLIGRAM(S): 8 TABLET, FILM COATED ORAL at 02:59

## 2023-01-01 RX ADMIN — CLOPIDOGREL BISULFATE 75 MILLIGRAM(S): 75 TABLET, FILM COATED ORAL at 08:26

## 2023-01-01 RX ADMIN — Medication 1 TABLET(S): at 09:37

## 2023-01-01 RX ADMIN — Medication 40 MILLIGRAM(S): at 15:33

## 2023-01-01 RX ADMIN — HYDROMORPHONE HYDROCHLORIDE 0.5 MILLIGRAM(S): 2 INJECTION INTRAMUSCULAR; INTRAVENOUS; SUBCUTANEOUS at 17:53

## 2023-01-01 RX ADMIN — Medication 20 MILLIEQUIVALENT(S): at 09:44

## 2023-01-01 RX ADMIN — HYDROMORPHONE HYDROCHLORIDE 6 MILLIGRAM(S): 2 INJECTION INTRAMUSCULAR; INTRAVENOUS; SUBCUTANEOUS at 13:24

## 2023-01-01 RX ADMIN — Medication 1 GRAM(S): at 01:33

## 2023-01-01 RX ADMIN — HEPARIN SODIUM 5000 UNIT(S): 5000 INJECTION INTRAVENOUS; SUBCUTANEOUS at 21:33

## 2023-01-01 RX ADMIN — Medication 1 TABLET(S): at 09:09

## 2023-01-01 RX ADMIN — Medication 2: at 09:22

## 2023-01-01 RX ADMIN — Medication 25 MILLIGRAM(S): at 11:27

## 2023-01-01 RX ADMIN — AMLODIPINE BESYLATE 10 MILLIGRAM(S): 2.5 TABLET ORAL at 10:22

## 2023-01-01 RX ADMIN — HYDROMORPHONE HYDROCHLORIDE 0.5 MILLIGRAM(S): 2 INJECTION INTRAMUSCULAR; INTRAVENOUS; SUBCUTANEOUS at 17:48

## 2023-01-01 RX ADMIN — HEPARIN SODIUM 5000 UNIT(S): 5000 INJECTION INTRAVENOUS; SUBCUTANEOUS at 22:49

## 2023-01-01 RX ADMIN — HYDROMORPHONE HYDROCHLORIDE 1 MILLIGRAM(S): 2 INJECTION INTRAMUSCULAR; INTRAVENOUS; SUBCUTANEOUS at 22:13

## 2023-01-01 RX ADMIN — Medication 40 MILLIGRAM(S): at 05:58

## 2023-01-01 RX ADMIN — LIDOCAINE 1 PATCH: 4 CREAM TOPICAL at 12:06

## 2023-01-01 RX ADMIN — HYDROMORPHONE HYDROCHLORIDE 6 MILLIGRAM(S): 2 INJECTION INTRAMUSCULAR; INTRAVENOUS; SUBCUTANEOUS at 02:38

## 2023-01-01 RX ADMIN — HYDROMORPHONE HYDROCHLORIDE 0.5 MILLIGRAM(S): 2 INJECTION INTRAMUSCULAR; INTRAVENOUS; SUBCUTANEOUS at 09:43

## 2023-01-01 RX ADMIN — MORPHINE SULFATE 2 MILLIGRAM(S): 50 CAPSULE, EXTENDED RELEASE ORAL at 21:04

## 2023-01-01 RX ADMIN — HYDROMORPHONE HYDROCHLORIDE 0.2 MILLIGRAM(S): 2 INJECTION INTRAMUSCULAR; INTRAVENOUS; SUBCUTANEOUS at 03:20

## 2023-01-01 RX ADMIN — HYDROMORPHONE HYDROCHLORIDE 1 MILLIGRAM(S): 2 INJECTION INTRAMUSCULAR; INTRAVENOUS; SUBCUTANEOUS at 21:31

## 2023-01-01 RX ADMIN — Medication 1 TABLET(S): at 10:06

## 2023-01-01 RX ADMIN — Medication 1 GRAM(S): at 05:57

## 2023-01-01 RX ADMIN — Medication 296 MILLILITER(S): at 00:59

## 2023-01-01 RX ADMIN — NALOXEGOL OXALATE 25 MILLIGRAM(S): 12.5 TABLET, FILM COATED ORAL at 09:25

## 2023-01-01 RX ADMIN — HYDROMORPHONE HYDROCHLORIDE 1 MILLIGRAM(S): 2 INJECTION INTRAMUSCULAR; INTRAVENOUS; SUBCUTANEOUS at 03:32

## 2023-01-01 RX ADMIN — INSULIN GLARGINE 10 UNIT(S): 100 INJECTION, SOLUTION SUBCUTANEOUS at 21:40

## 2023-01-01 RX ADMIN — SIMVASTATIN 10 MILLIGRAM(S): 20 TABLET, FILM COATED ORAL at 21:44

## 2023-01-01 RX ADMIN — Medication 1 TABLET(S): at 18:35

## 2023-01-01 RX ADMIN — SPIRONOLACTONE 100 MILLIGRAM(S): 25 TABLET, FILM COATED ORAL at 08:27

## 2023-01-01 RX ADMIN — PANTOPRAZOLE SODIUM 40 MILLIGRAM(S): 20 TABLET, DELAYED RELEASE ORAL at 16:22

## 2023-01-01 RX ADMIN — Medication 2: at 11:22

## 2023-01-01 RX ADMIN — LIDOCAINE 1 PATCH: 4 CREAM TOPICAL at 21:44

## 2023-01-01 RX ADMIN — Medication 40 MILLIGRAM(S): at 05:33

## 2023-01-01 RX ADMIN — Medication 1 GRAM(S): at 01:03

## 2023-01-01 RX ADMIN — TRAMADOL HYDROCHLORIDE 25 MILLIGRAM(S): 50 TABLET ORAL at 07:26

## 2023-01-01 RX ADMIN — ONDANSETRON 4 MILLIGRAM(S): 8 TABLET, FILM COATED ORAL at 17:06

## 2023-01-01 RX ADMIN — HYDROMORPHONE HYDROCHLORIDE 1.5 MILLIGRAM(S): 2 INJECTION INTRAMUSCULAR; INTRAVENOUS; SUBCUTANEOUS at 21:00

## 2023-01-01 RX ADMIN — LIDOCAINE 1 PATCH: 4 CREAM TOPICAL at 09:09

## 2023-01-01 RX ADMIN — PANTOPRAZOLE SODIUM 40 MILLIGRAM(S): 20 TABLET, DELAYED RELEASE ORAL at 21:33

## 2023-01-01 RX ADMIN — Medication 1 GRAM(S): at 12:29

## 2023-01-01 RX ADMIN — HYDROMORPHONE HYDROCHLORIDE 1 MILLIGRAM(S): 2 INJECTION INTRAMUSCULAR; INTRAVENOUS; SUBCUTANEOUS at 04:24

## 2023-01-01 RX ADMIN — HYDROMORPHONE HYDROCHLORIDE 1 MILLIGRAM(S): 2 INJECTION INTRAMUSCULAR; INTRAVENOUS; SUBCUTANEOUS at 09:45

## 2023-01-01 RX ADMIN — CLOPIDOGREL BISULFATE 75 MILLIGRAM(S): 75 TABLET, FILM COATED ORAL at 10:05

## 2023-01-01 RX ADMIN — Medication 1 GRAM(S): at 16:41

## 2023-01-01 RX ADMIN — HYDROMORPHONE HYDROCHLORIDE 1.5 MILLIGRAM(S): 2 INJECTION INTRAMUSCULAR; INTRAVENOUS; SUBCUTANEOUS at 17:20

## 2023-01-01 RX ADMIN — Medication 20 MILLIEQUIVALENT(S): at 09:31

## 2023-01-01 RX ADMIN — HYDROMORPHONE HYDROCHLORIDE 0.5 MILLIGRAM(S): 2 INJECTION INTRAMUSCULAR; INTRAVENOUS; SUBCUTANEOUS at 23:37

## 2023-01-01 RX ADMIN — HYDROMORPHONE HYDROCHLORIDE 1 MILLIGRAM(S): 2 INJECTION INTRAMUSCULAR; INTRAVENOUS; SUBCUTANEOUS at 01:01

## 2023-01-01 RX ADMIN — HYDROMORPHONE HYDROCHLORIDE 0.2 MILLIGRAM(S): 2 INJECTION INTRAMUSCULAR; INTRAVENOUS; SUBCUTANEOUS at 03:49

## 2023-01-01 RX ADMIN — GABAPENTIN 300 MILLIGRAM(S): 400 CAPSULE ORAL at 22:20

## 2023-01-01 RX ADMIN — OXYCODONE HYDROCHLORIDE 5 MILLIGRAM(S): 5 TABLET ORAL at 14:18

## 2023-01-01 RX ADMIN — HYDROMORPHONE HYDROCHLORIDE 1.5 MILLIGRAM(S): 2 INJECTION INTRAMUSCULAR; INTRAVENOUS; SUBCUTANEOUS at 18:07

## 2023-01-01 RX ADMIN — SPIRONOLACTONE 100 MILLIGRAM(S): 25 TABLET, FILM COATED ORAL at 09:42

## 2023-01-01 RX ADMIN — CLOPIDOGREL BISULFATE 75 MILLIGRAM(S): 75 TABLET, FILM COATED ORAL at 10:22

## 2023-01-01 RX ADMIN — GABAPENTIN 600 MILLIGRAM(S): 400 CAPSULE ORAL at 21:52

## 2023-01-01 RX ADMIN — GABAPENTIN 600 MILLIGRAM(S): 400 CAPSULE ORAL at 21:44

## 2023-01-01 RX ADMIN — Medication 1 TABLET(S): at 10:02

## 2023-01-01 RX ADMIN — Medication 20 MILLIEQUIVALENT(S): at 11:54

## 2023-01-01 RX ADMIN — HEPARIN SODIUM 5000 UNIT(S): 5000 INJECTION INTRAVENOUS; SUBCUTANEOUS at 21:56

## 2023-01-01 RX ADMIN — SIMVASTATIN 10 MILLIGRAM(S): 20 TABLET, FILM COATED ORAL at 22:01

## 2023-01-01 RX ADMIN — POLYETHYLENE GLYCOL 3350 17 GRAM(S): 17 POWDER, FOR SOLUTION ORAL at 10:06

## 2023-01-01 RX ADMIN — HYDROMORPHONE HYDROCHLORIDE 6 MILLIGRAM(S): 2 INJECTION INTRAMUSCULAR; INTRAVENOUS; SUBCUTANEOUS at 23:30

## 2023-01-01 RX ADMIN — HYDROMORPHONE HYDROCHLORIDE 1 MILLIGRAM(S): 2 INJECTION INTRAMUSCULAR; INTRAVENOUS; SUBCUTANEOUS at 04:43

## 2023-01-01 RX ADMIN — Medication 25 MILLIGRAM(S): at 09:30

## 2023-01-01 RX ADMIN — FAMOTIDINE 20 MILLIGRAM(S): 10 INJECTION INTRAVENOUS at 05:30

## 2023-01-01 RX ADMIN — Medication 81 MILLIGRAM(S): at 09:01

## 2023-01-01 RX ADMIN — Medication 40 MILLIGRAM(S): at 09:31

## 2023-01-01 RX ADMIN — Medication 1 TABLET(S): at 09:44

## 2023-01-01 RX ADMIN — MAGNESIUM HYDROXIDE 30 MILLILITER(S): 400 TABLET, CHEWABLE ORAL at 16:09

## 2023-01-01 RX ADMIN — TRAMADOL HYDROCHLORIDE 25 MILLIGRAM(S): 50 TABLET ORAL at 09:02

## 2023-01-01 RX ADMIN — Medication 1 GRAM(S): at 17:17

## 2023-01-01 RX ADMIN — Medication 25 MILLIGRAM(S): at 12:11

## 2023-01-01 RX ADMIN — HYDROMORPHONE HYDROCHLORIDE 1.5 MILLIGRAM(S): 2 INJECTION INTRAMUSCULAR; INTRAVENOUS; SUBCUTANEOUS at 02:21

## 2023-01-01 RX ADMIN — GABAPENTIN 600 MILLIGRAM(S): 400 CAPSULE ORAL at 21:50

## 2023-01-01 RX ADMIN — LACTULOSE 10 GRAM(S): 10 SOLUTION ORAL at 09:26

## 2023-01-01 RX ADMIN — HYDROMORPHONE HYDROCHLORIDE 1 MILLIGRAM(S): 2 INJECTION INTRAMUSCULAR; INTRAVENOUS; SUBCUTANEOUS at 15:01

## 2023-01-01 RX ADMIN — HYDROMORPHONE HYDROCHLORIDE 1.5 MILLIGRAM(S): 2 INJECTION INTRAMUSCULAR; INTRAVENOUS; SUBCUTANEOUS at 09:09

## 2023-01-01 RX ADMIN — Medication 40 MILLIGRAM(S): at 16:26

## 2023-01-01 RX ADMIN — GABAPENTIN 600 MILLIGRAM(S): 400 CAPSULE ORAL at 21:20

## 2023-01-01 RX ADMIN — AMLODIPINE BESYLATE 10 MILLIGRAM(S): 2.5 TABLET ORAL at 10:01

## 2023-01-01 RX ADMIN — HYDROMORPHONE HYDROCHLORIDE 1.5 MILLIGRAM(S): 2 INJECTION INTRAMUSCULAR; INTRAVENOUS; SUBCUTANEOUS at 00:40

## 2023-01-01 RX ADMIN — GABAPENTIN 300 MILLIGRAM(S): 400 CAPSULE ORAL at 13:15

## 2023-01-01 RX ADMIN — HYDROMORPHONE HYDROCHLORIDE 0.5 MILLIGRAM(S): 2 INJECTION INTRAMUSCULAR; INTRAVENOUS; SUBCUTANEOUS at 23:11

## 2023-01-01 RX ADMIN — CLOPIDOGREL BISULFATE 75 MILLIGRAM(S): 75 TABLET, FILM COATED ORAL at 10:01

## 2023-01-01 RX ADMIN — MAGNESIUM HYDROXIDE 30 MILLILITER(S): 400 TABLET, CHEWABLE ORAL at 11:34

## 2023-01-01 RX ADMIN — HYDROMORPHONE HYDROCHLORIDE 0.5 MILLIGRAM(S): 2 INJECTION INTRAMUSCULAR; INTRAVENOUS; SUBCUTANEOUS at 15:56

## 2023-01-01 RX ADMIN — PANTOPRAZOLE SODIUM 40 MILLIGRAM(S): 20 TABLET, DELAYED RELEASE ORAL at 21:37

## 2023-01-01 RX ADMIN — Medication 5 MILLIGRAM(S): at 21:44

## 2023-01-01 RX ADMIN — Medication 30 MILLILITER(S): at 23:24

## 2023-01-01 RX ADMIN — GABAPENTIN 300 MILLIGRAM(S): 400 CAPSULE ORAL at 12:10

## 2023-01-01 RX ADMIN — HYDROMORPHONE HYDROCHLORIDE 1 MILLIGRAM(S): 2 INJECTION INTRAMUSCULAR; INTRAVENOUS; SUBCUTANEOUS at 15:45

## 2023-01-01 RX ADMIN — FINASTERIDE 5 MILLIGRAM(S): 5 TABLET, FILM COATED ORAL at 09:25

## 2023-01-01 RX ADMIN — HYDROMORPHONE HYDROCHLORIDE 1 MILLIGRAM(S): 2 INJECTION INTRAMUSCULAR; INTRAVENOUS; SUBCUTANEOUS at 04:40

## 2023-01-01 RX ADMIN — HYDROMORPHONE HYDROCHLORIDE 0.5 MILLIGRAM(S): 2 INJECTION INTRAMUSCULAR; INTRAVENOUS; SUBCUTANEOUS at 03:40

## 2023-01-01 RX ADMIN — CLOPIDOGREL BISULFATE 75 MILLIGRAM(S): 75 TABLET, FILM COATED ORAL at 09:02

## 2023-01-01 RX ADMIN — Medication 81 MILLIGRAM(S): at 09:52

## 2023-01-01 RX ADMIN — PANTOPRAZOLE SODIUM 40 MILLIGRAM(S): 20 TABLET, DELAYED RELEASE ORAL at 05:22

## 2023-01-01 RX ADMIN — SIMVASTATIN 10 MILLIGRAM(S): 20 TABLET, FILM COATED ORAL at 21:53

## 2023-01-01 RX ADMIN — HYDROMORPHONE HYDROCHLORIDE 1 MILLIGRAM(S): 2 INJECTION INTRAMUSCULAR; INTRAVENOUS; SUBCUTANEOUS at 10:58

## 2023-01-01 RX ADMIN — Medication 4: at 18:37

## 2023-01-01 RX ADMIN — PANTOPRAZOLE SODIUM 40 MILLIGRAM(S): 20 TABLET, DELAYED RELEASE ORAL at 10:17

## 2023-01-01 RX ADMIN — ONDANSETRON 4 MILLIGRAM(S): 8 TABLET, FILM COATED ORAL at 20:32

## 2023-01-01 RX ADMIN — SIMVASTATIN 10 MILLIGRAM(S): 20 TABLET, FILM COATED ORAL at 21:58

## 2023-01-01 RX ADMIN — SODIUM CHLORIDE 100 MILLILITER(S): 9 INJECTION INTRAMUSCULAR; INTRAVENOUS; SUBCUTANEOUS at 09:18

## 2023-01-01 RX ADMIN — Medication 40 MILLIGRAM(S): at 07:26

## 2023-01-01 RX ADMIN — HYDROMORPHONE HYDROCHLORIDE 0.5 MILLIGRAM(S): 2 INJECTION INTRAMUSCULAR; INTRAVENOUS; SUBCUTANEOUS at 23:15

## 2023-01-01 RX ADMIN — HYDROMORPHONE HYDROCHLORIDE 1 MILLIGRAM(S): 2 INJECTION INTRAMUSCULAR; INTRAVENOUS; SUBCUTANEOUS at 08:42

## 2023-01-01 RX ADMIN — Medication 1 TABLET(S): at 17:12

## 2023-01-01 RX ADMIN — GABAPENTIN 300 MILLIGRAM(S): 400 CAPSULE ORAL at 21:27

## 2023-01-01 RX ADMIN — Medication 2: at 12:02

## 2023-01-01 RX ADMIN — AMLODIPINE BESYLATE 10 MILLIGRAM(S): 2.5 TABLET ORAL at 09:59

## 2023-01-01 RX ADMIN — LIDOCAINE 1 PATCH: 4 CREAM TOPICAL at 09:08

## 2023-01-01 RX ADMIN — HYDROMORPHONE HYDROCHLORIDE 0.5 MILLIGRAM(S): 2 INJECTION INTRAMUSCULAR; INTRAVENOUS; SUBCUTANEOUS at 19:39

## 2023-01-01 RX ADMIN — HYDROMORPHONE HYDROCHLORIDE 0.2 MILLIGRAM(S): 2 INJECTION INTRAMUSCULAR; INTRAVENOUS; SUBCUTANEOUS at 04:10

## 2023-01-01 RX ADMIN — HYDROMORPHONE HYDROCHLORIDE 1 MILLIGRAM(S): 2 INJECTION INTRAMUSCULAR; INTRAVENOUS; SUBCUTANEOUS at 13:12

## 2023-01-01 RX ADMIN — HYDROMORPHONE HYDROCHLORIDE 1 MILLIGRAM(S): 2 INJECTION INTRAMUSCULAR; INTRAVENOUS; SUBCUTANEOUS at 13:49

## 2023-01-01 RX ADMIN — Medication 2: at 08:28

## 2023-01-01 RX ADMIN — AMLODIPINE BESYLATE 10 MILLIGRAM(S): 2.5 TABLET ORAL at 11:51

## 2023-01-01 RX ADMIN — GABAPENTIN 300 MILLIGRAM(S): 400 CAPSULE ORAL at 10:02

## 2023-01-01 RX ADMIN — HEPARIN SODIUM 5000 UNIT(S): 5000 INJECTION INTRAVENOUS; SUBCUTANEOUS at 09:48

## 2023-01-01 RX ADMIN — Medication 25 MILLIGRAM(S): at 18:48

## 2023-01-03 ENCOUNTER — APPOINTMENT (OUTPATIENT)
Dept: CARDIOLOGY | Facility: CLINIC | Age: 63
End: 2023-01-03

## 2023-01-09 NOTE — BRIEF OPERATIVE NOTE - NSICDXBRIEFPOSTOP_GEN_ALL_CORE_FT
Caller: Andrew Kamara, nephew     Doctor: Kt    Reason for call: Patient would like a script for AT HOME PT  Please call Andrew Kamara when order is placed       Call back#: [phone]
POST-OP DIAGNOSIS:  PVD (peripheral vascular disease) 06-Dec-2022 10:26:11  Dulce Maria Alfredo  
POST-OP DIAGNOSIS:  PVD (peripheral vascular disease) 06-Dec-2022 10:26:57 Stenosis of RLE Dulce Maria Alfredo

## 2023-02-01 NOTE — CDI QUERY NOTE - NSCDIOTHERTXTBX_GEN_ALL_CORE_HH
Please include more specific documentation in your progress note and discharge summary.  The documentation in this medical record requires additional clarification to accurately capture the patient’s diagnosis/diagnoses, treatment and or severity of illness. Please document all corresponding diagnoses, either known or suspected, of the clinical indicators described below.    Link Signs and Symptoms to underlying cause if known (e.g. chest pain (likely) secondary to ___________).    Present on Admission:  Was the severity of the condition present on admission? If so, please document in the chart that “(the condition) was present on admission.”    Please clarify the cause of the elevated troponin (s) ?  - NSTEMI type 2 ruled in  - NSTEMI type 2 cannot be ruled out  - NSTEMI type 2 ruled out and demand ischemia ruled in  - Unable to determine  - Other ( Please specify)       CLINICAL INDICATORS:    < from: 12 Lead ECG (12.01.22 @ 18:05) >  Diagnosis Line Sinus tachycardia  Possible Left atrial enlargement  ST & T wave abnormality, consider anterior ischemia  Abnormal ECG  When compared with ECG of 01-NOV-2022 12:07,  ST more depressed Anterior leads  Non-specific change in ST segment in Lateral lead    Troponin levels:  12/1/2022 20:07 : 345.99  12/1/2022 23:50: 364.23  12/2/2022: 07:48: 201.30        ED provider documentation on 12/1/2022:  · Clinical Summary  (MDM): Summarize the clinical encounter	Elevated WBC, ? cystitis on CT, pending UA.  Periportal edema, hepatic edema, hepatitis panel added.  US arterial RLE resulted, podiatry evaluated for necrotic eschar, and vascular surgery to consult.  Given ASA for demand ischemia in setting of infection, Vancomycin, Zosyn, admit to medicine.    Hospitalist progress note on 12/3/2022:  Elevated troponin  Appreciate input from Cardiology. Likely myocardial demand ischemia without infarction. No angina or CHF sx.   - Continue medical management    Hospitalist progress note on 12/8/2022:  CAD /  Hx of CABG / demand ischemia /  severe mitral stenosis / severe pulmonary HTN / HTN:    Cardiologist progress note on 12/8/2022:  Problem/Plan - 1:  ·  Problem: Troponin level elevated.   ·  Plan: Elevated troponin in the setting of infectious process, known CAD, and severe pulmonary HTN  Troponin trending down  Will arrange Nuclear Stress test for tomorrow in order to assess for cardiac clearance prior to planned Fem-Pop Bypass   Patient W/O CP  Echo NL LV FX EF 55%  -- suspect non-ischemic myocardial injury.      Cardiac catherization results 12/23/2022:   Diagnostic Conclusions     Severe native vessel disease as described above with patent grafts (LIMA   to LAD, SVG to RPDA and to LPL). EF 60%. PCWP 25. Mitral gradient 16.33.     Recommendations     Medical management for coronary artery disease given no anginal symptoms.   Severe mitral gradient as per assessment, plan to see structural heart at    as outpatient for likely valve in ring given reoperation with recent   infection, PAD intervention.

## 2023-03-07 NOTE — ED PROVIDER NOTE - NSICDXFAMILYHX_GEN_ALL_CORE_FT
FAMILY HISTORY:  No pertinent family history in first degree relatives     Cephalexin Counseling: I counseled the patient regarding use of cephalexin as an antibiotic for prophylactic and/or therapeutic purposes. Cephalexin (commonly prescribed under brand name Keflex) is a cephalosporin antibiotic which is active against numerous classes of bacteria, including most skin bacteria. Side effects may include nausea, diarrhea, gastrointestinal upset, rash, hives, yeast infections, and in rare cases, hepatitis, kidney disease, seizures, fever, confusion, neurologic symptoms, and others. Patients with severe allergies to penicillin medications are cautioned that there is about a 10% incidence of cross-reactivity with cephalosporins. When possible, patients with penicillin allergies should use alternatives to cephalosporins for antibiotic therapy.

## 2023-03-07 NOTE — ED PROVIDER NOTE - CLINICAL SUMMARY MEDICAL DECISION MAKING FREE TEXT BOX
2345:   pt stating that he needs his dilaudid.   concern for seeking tendency.   pt told will not receive any narcotics in ED. 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   pt states [place] not giving him his dilaudid for pain.   pt denies any fever, HA, cp, sob.   tolerating PO.   states also with n/v.   no sick contacts.         PE pt unkempt with multiple medical issue and possible drug seeking tendecny.   will CT, labs, UA, IVF, meds and reeval    2345:   pt stating that he needs his dilaudid.   concern for seeking tendency.   pt told will not receive any narcotics in ED.  0330:  pt resting comfortably with no distress.   upon awakening  pt states still with severe pain.   CT noted possible UTI.   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   pt states [place] not giving him his dilaudid for pain.   pt denies any fever, HA, cp, sob.   tolerating PO.   states also with n/v.   no sick contacts.         PE pt unkempt with multiple medical issue and possible drug seeking tendecny.   will CT, labs, UA, IVF, meds and reeval    2345:   pt stating that he needs his dilaudid.   concern for seeking tendency.   pt told will not receive any narcotics in ED.  0330:  pt resting comfortably with no distress.   upon awakening  pt states still with severe pain.   CT noted possible UTI.   will give dose of pain med, BCx2, abx for UA then reeval for d/c    0600:  pt refusing to give urine.   resting comfortably.   will treat for clinical uti and d/c with f/u

## 2023-03-07 NOTE — ED PROVIDER NOTE - OBJECTIVE STATEMENT
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   pt states [place] not giving him his dilaudid for pain.   pt denies any fever, HA, cp, sob.   tolerating PO.   states also with n/v.   no sick contacts.

## 2023-03-07 NOTE — ED ADULT NURSE NOTE - NSFALLRSKASSESSTYPE_ED_ALL_ED
Detail Level: Detailed
Quality 111:Pneumonia Vaccination Status For Older Adults: Pneumococcal Vaccination Previously Received
Quality 137: Melanoma: Continuity Of Care - Recall System: Patient information entered into a recall system that includes: target date for the next exam specified AND a process to follow up with patients regarding missed or unscheduled appointments
Initial (On Arrival)

## 2023-03-07 NOTE — ED PROVIDER NOTE - NSFOLLOWUPINSTRUCTIONS_ED_ALL_ED_FT
please follow up with your doctor in 1-2 days.   take medication as prescribed.   drink plenty of fluids.   return to ED for any concerns

## 2023-03-07 NOTE — ED ADULT NURSE NOTE - NSFALLRSKOUTCOME_ED_ALL_ED
[FreeTextEntry1] : Middle-age white female who has a past medical history of hypothyroidism presents with a main complaint of intermittent headaches since she started the new medication Vyepti.  Her last thyroid test from 2/1/2023 showed a total T3 of 68 Free T3 was normal at 2.6 free T4 was normal one-point TSH was 3.01 and thyroid antibodies were negative.  Patient had a cortisol level drawn on November 21 which was 10.2 TSH was 0..7 and free t4 2.0. previous lab test from 9/22/2022 showed a normal TSH of 0.72 free T4 of 1.6 Free thyroxine index was normal at 3.5.  Thyroid function test from October 13, 2022 were within normal range but in August but the free T4 was normal at 1.67 Free T3 was 2.0.  Clinical impression is that this pleasant white female has a history of hypothyroidism with fluctuating levels of TSH but within normal range.  She has been experiencing severe episodes of headache which could be related to the use of the new medication Vyepti which can cause elevation in the level of serum calciitonin.  Recommendation\par 1.  I have advised the patient to continue her current dose of Cytomel and Synthroid.  As she is clinically euthyroid\par 2.  I am also requesting a blood test to check her serum calcitonin level and will repeat her a.m. cortisol.\par 3.  I am also referring patient to [facility] for further evaluation of her debilitating headaches.  The plan was discussed in detail with the patient and her spouse.  Thank you [employer]

## 2023-03-07 NOTE — ED PROVIDER NOTE - PATIENT PORTAL LINK FT
You can access the FollowMyHealth Patient Portal offered by [facility] by registering at the following website: [email or web address]. By joining [employer]’s FollowMyHealth portal, you will also be able to view your health information using other applications (apps) compatible with our system.

## 2023-03-08 NOTE — PHARMACOTHERAPY INTERVENTION NOTE - COMMENTS
Medication reconciliation completed.  Reviewed Medication list and confirmed med allergies with list from Care Facility "[facility]"; confirmed with Dr. First Medjosi.

## 2023-05-28 NOTE — H&P ADULT - ASSESSMENT
62 y/o M w/ PMH of DM2, PVD, gastroparesis, anemia, DM2, GERD, CAD s/p CABG, cirrhosis, MV repair, HTN, dyslipidemia, p/w abdominal pain    *Acute on chronic R heart failure (has h/o severe pulm HTN) vs diastolic CHF? vs valvular abnormalities   -CT chest:  Moderate R and small L pleural effusions with associated B/L basilar onsolidation / atelectasis. Groundglass opacities and interlobular septal thickening, which may represent pulmonary edema. Diffuse body wall edema. Hepatic congestion. Small volume ascites   -Echo 12/2/22: EF 55-60%, wall motion abnormalities, severe pulm HTN   -Lasix IV  -I/Os + Daily weights  -BB / ACEi   -Troponin -   -ProBNP = 7681  -Cardio consult  -Pulm consult      *Abdominal pain / nausea / vomiting w/ h/o gastroparesis   -No vomiting since coming to ED   -CT abd negative for obstruction or inflammation  -GI consult   -Lipase = 22    *Diffuse bladder wall thickening  on CT  -However, UA is not impressive for UTI  -Given possible SIRS vs questionable severe sepsis POA, will start ceftriaxone for now   -Leukocytosis = 12.3  -Lactic acidosis improved 2.4 -> 1.7  -ProBNP = 7681  -COVID negative     *Microcytic anemia  -Trend H/H and f/u outpatient for further management if conditions remain stable during hospitalization    *Cirrhosis  -Elevated alk phos  -F/u outaptient GI      *Retroperitoneal & aortocaval lymphadenopathy   -Possibly reactive, but will need to f/u outpatient as patient will require further work up if it does not resolve after acute illness resolves    *RLE skin grafts  -Patient states he's having skin grafting on RLE by podiatrist at [place]  -Podiatry consult     *DM2  -Humalog ISS + Lantus   -Diabetic diet     *H/o PVD / GERD / CAD / HTN / dyslipidmia  -C/w home meds and f/u outpatient for further management     *DVT ppx  -Heparin SubQ  62 y/o M w/ PMH of DM2, PVD, gastroparesis, anemia, DM2, GERD, CAD s/p CABG, cirrhosis, MV repair, HTN, dyslipidemia, p/w abdominal pain    *Acute on chronic R heart failure (has h/o severe pulm HTN) vs diastolic CHF? vs valvular abnormalities   -CT chest:  Moderate R and small L pleural effusions with associated B/L basilar onsolidation / atelectasis. Groundglass opacities and interlobular septal thickening, which may represent pulmonary edema. Diffuse body wall edema. Hepatic congestion. Small volume ascites   -Echo 12/2/22: EF 55-60%, wall motion abnormalities, severe pulm HTN   -Lasix IV  -I/Os + Daily weights  -On BB  -Will defer starting ACEi to cardio   -Troponin negative   -ProBNP = 7681  -Cardio consult  -Pulm consult      *Abdominal pain / nausea / vomiting w/ h/o gastroparesis   -No vomiting since coming to ED   -CT abd negative for obstruction or inflammation  -GI consult   -Lipase = 22    *Diffuse bladder wall thickening  on CT  -However, UA is not impressive for UTI  -Given possible SIRS vs questionable severe sepsis POA, will start ceftriaxone for now. ID consult   -Leukocytosis = 12.3  -Lactic acidosis improved 2.4 -> 1.7  -ProBNP = 7681  -COVID negative     *Microcytic anemia  -Trend H/H and f/u outpatient for further management if conditions remain stable during hospitalization    *Cirrhosis  -Elevated alk phos  -F/u GI     *Retroperitoneal & aortocaval lymphadenopathy   -Possibly reactive, but will need to f/u outpatient as patient will require further work up if it does not resolve after acute illness resolves    *RLE skin grafts  -Patient states he's having skin grafting on RLE by podiatrist at [place]  -Podiatry consult     *DM2  -Humalog ISS + Lantus   -Diabetic diet     *H/o PVD / GERD / CAD / HTN / dyslipidmia  -C/w home meds and f/u outpatient for further management     *DVT ppx  -Heparin SubQ

## 2023-05-28 NOTE — ED PROVIDER NOTE - NSICDXPASTSURGICALHX_GEN_ALL_CORE_FT
Given Discharge orders. Education given regarding Lovenox shot. Shown how to give SubQ injection. Sent with oxycodone script. Waiting for Lovenox injection box    PAST SURGICAL HISTORY:  S/P CABG x 3     Status post amputation of leg

## 2023-05-28 NOTE — PATIENT PROFILE ADULT - FALL HARM RISK - HARM RISK INTERVENTIONS

## 2023-05-28 NOTE — PHARMACOTHERAPY INTERVENTION NOTE - COMMENTS
Medication history complete. Medications and allergies reviewed with list provided by [facility] and confirmed with .

## 2023-05-28 NOTE — H&P ADULT - HISTORY OF PRESENT ILLNESS
64 y/o M w/ PMH of DM2, PVD, gastroparesis, anemia, DM2, GERD, CAD s/p CABG, cirrhosis, MV repair, HTN, dyslipidemia, p/w abdominal pain. Patient states abdominal pain started yeterday afternoon in epigastric area. Patient states that he had about 8 episodes of non-bloody emesis last night, and 2 episodes today prior to arrival. Also c/o chills. Patient denies any diarrhea, CP, SOB, cough, melena, hematochezia, fever, cough, runny nose, sore throat.     PSH: BKA, CABG, MV repair, R toe amputation, RLE skin graft     Social Hx: Tobacco - 1/2 PPD, etoh  - quit 15 years ago, drugs - h/o marijuana use 40 years ago     Family Hx: Denies h/o pertinent family hx in first degree relatives

## 2023-05-28 NOTE — ED PROVIDER NOTE - MUSCULOSKELETAL, MLM
Spine appears normal, range of motion is not limited, no muscle or joint tenderness Spine appears normal, range of motion is not limited, no muscle or joint tenderness. No nuchal rigidity. Stable lower leg amputation.

## 2023-05-28 NOTE — ED PROVIDER NOTE - CARDIAC, MLM
Tachycardic rate, regular rhythm.  Heart sounds S1, S2.  No murmurs, rubs or gallops. Tachycardic rate, regular rhythm.  Heart sounds S1, S2.  No rubs or gallops. 2+ femoral and radial  pulses.

## 2023-05-28 NOTE — ED ADULT NURSE NOTE - NSFALLRISKASMTTYPE_ED_ALL_ED
Airway  Performed by: Fannie Clinton MD  Authorized by: Fannie Clinton MD     Final Airway Type:  Endotracheal airway  Final Endotracheal Airway*:  ETT  ETT Size (mm)*:  7.5  Cuff*:  Regular  Technique Used for Successful ETT Placement:  Direct laryngoscopy  Devices/Methods Used in Placement*:  Mask  Intubation Procedure*:  Preoxygenation, ETCO2, Atraumatic, Dentition Unchanged and Phaynx Clear  Insertion Site:  Oral  Blade Type*:  MAC  Blade Size*:  3  Measured from*:  Lips  Secured at (cm)*:  20  Placement Verified by: auscultation and capnometry    Glottic View*:  2 - partial view of glottis  Attempts*:  1   Patient Identified, Procedure confirmed, Emergency equipment available and Safety protocols followed  Location:  OR  Urgency:  Elective  Difficult Airway: No    Indications for Airway Management:  Anesthesia  Mask Difficulty Assessment:  1 - vent by mask         Initial (On Arrival)

## 2023-05-28 NOTE — ED ADULT TRIAGE NOTE - CHIEF COMPLAINT QUOTE
pt presents to Ed from [place] with complaints of abdominal pain since last night. pt also endorses nausea and vomiting.

## 2023-05-28 NOTE — ED ADULT NURSE NOTE - NSFALLRISKINTERV_ED_ALL_ED

## 2023-05-28 NOTE — PATIENT PROFILE ADULT - FLU SEASON?
Patient needs a return to work excuse. It should just say Ok for him to return to work on 6/18/18. He will be in this area tomorrow and would like to pick it up. Please call when ready.   No

## 2023-05-28 NOTE — ED PROVIDER NOTE - DIFFERENTIAL DIAGNOSIS
Patient with intractable abdominal pain, nausea, vomiting failure to thrive, complicated medical history, labs, imaging, admission. Differential Diagnosis

## 2023-05-28 NOTE — H&P ADULT - CONVERSATION DETAILS
Patient states he is full resuscitation status for this admission, and HCP is his brother Fermin. Advance care planning time <30 mins

## 2023-05-28 NOTE — ED PROVIDER NOTE - CLINICAL SUMMARY MEDICAL DECISION MAKING FREE TEXT BOX
pain meds, fluids, labs, ct scan, reassess pain meds, labs, ct scan, reassess.    Patient with intractable abdominal pain, nausea, vomiting failure to thrive, complicated medical history, labs, imaging, admission.

## 2023-05-28 NOTE — ED PROVIDER NOTE - OBJECTIVE STATEMENT
Pt is a 62 y/o male presenting to ED with worsening mid ab pain and distention for the past 24 hours along with nausea and vomiting. Pt denies diarrhea, constipation, fever chills. Has a Hx of DM, peripheral vascular diseases, gastroparesis. SHx includes bilateral lower extremity amputation.

## 2023-05-28 NOTE — ED ADULT NURSE NOTE - CHIEF COMPLAINT QUOTE
pt presents to Ed from [place] with complaints of abdominal pain since last night. pt also endorses nausea and vomiting.

## 2023-05-28 NOTE — ED PROVIDER NOTE - NS_ ATTENDINGSCRIBEDETAILS _ED_A_ED_FT
I Brennan Alston MD saw and examined the patient. Scribe documented for me and under my supervision. I have modified the scribe's documentation where necessary to reflect my history, physical exam and other relevant documentations pertinent to the care of the patient.

## 2023-05-28 NOTE — ED ADULT NURSE NOTE - OBJECTIVE STATEMENT
Pt arrives to ED complaining of abdominal pain starting last night. Pt has associated nausea and vomiting. Hx DM, Left BKA. alert and oriented x 4.

## 2023-05-28 NOTE — ED ADULT NURSE REASSESSMENT NOTE - NS ED NURSE REASSESS COMMENT FT1
pt still c/o abdominal pain, given 0.5mg dilaudid per ED MD w/ little to no relief, called hospitalist phone and will administer 2mg morphine per MD orders.  pt vss, will ctm.

## 2023-05-29 NOTE — CONSULT NOTE ADULT - SUBJECTIVE AND OBJECTIVE BOX
CTS Consult Note    S:     HD 2  62 yo male   FULL CODE   PMH of DM II, PVD, CAD s/p CABG, s/p MV repair, HTN, Dyslipidemia, Gastroparesis, Anemia, GERD, Cirrhosis presented with abdominal pain on 5/28/23, associated with vomiting. Admitted to medicine. CTS consulted for right pleural effusion.     Vital Signs:  Vital Signs Last 24 Hrs  T(C): 36.7 (05-29-23 @ 07:14), Max: 36.7 (05-28-23 @ 17:37)  T(F): 98.1 (05-29-23 @ 07:14), Max: 98.1 (05-29-23 @ 07:14)  HR: 87 (05-29-23 @ 07:14) (84 - 92)  BP: 134/75 (05-29-23 @ 07:14) (121/93 - 134/75)  RR: 18 (05-29-23 @ 07:14) (16 - 18)  SpO2: 91% (05-29-23 @ 07:14) (91% - 96%) on (O2)    Telemetry/Alarms:    Relevant labs, radiology and Medications reviewed                        9.1    9.66  )-----------( 351      ( 29 May 2023 06:34 )             31.2     05-29    143  |  112<H>  |  11  ----------------------------<  121<H>  4.1   |  24  |  0.82    Ca    9.4      29 May 2023 06:34    TPro  7.4  /  Alb  2.9<L>  /  TBili  0.6  /  DBili  x   /  AST  10<L>  /  ALT  17  /  AlkPhos  241<H>  05-29    PT/INR - ( 29 May 2023 06:34 )   PT: 14.0 sec;   INR: 1.20 ratio         PTT - ( 29 May 2023 06:34 )  PTT:32.0 sec    MEDICATIONS  (STANDING):  amLODIPine   Tablet 10 milliGRAM(s) Oral daily  aspirin enteric coated 81 milliGRAM(s) Oral daily  azithromycin   Tablet 500 milliGRAM(s) Oral every 24 hours  cefTRIAXone Injectable. 1000 milliGRAM(s) IV Push every 24 hours  clopidogrel Tablet 75 milliGRAM(s) Oral daily  dextrose 5%. 1000 milliLiter(s) (100 mL/Hr) IV Continuous <Continuous>  dextrose 5%. 1000 milliLiter(s) (50 mL/Hr) IV Continuous <Continuous>  dextrose 5%. 1000 milliLiter(s) (50 mL/Hr) IV Continuous <Continuous>  dextrose 5%. 1000 milliLiter(s) (100 mL/Hr) IV Continuous <Continuous>  dextrose 50% Injectable 12.5 Gram(s) IV Push once  dextrose 50% Injectable 25 Gram(s) IV Push once  dextrose 50% Injectable 12.5 Gram(s) IV Push once  dextrose 50% Injectable 25 Gram(s) IV Push once  dextrose 50% Injectable 25 Gram(s) IV Push once  dextrose 50% Injectable 25 Gram(s) IV Push once  finasteride 5 milliGRAM(s) Oral daily  furosemide   Injectable 40 milliGRAM(s) IV Push two times a day  gabapentin 300 milliGRAM(s) Oral two times a day  gabapentin 600 milliGRAM(s) Oral at bedtime  glucagon  Injectable 1 milliGRAM(s) IntraMuscular once  glucagon  Injectable 1 milliGRAM(s) IntraMuscular once  heparin   Injectable 5000 Unit(s) SubCutaneous every 12 hours  insulin glargine Injectable (LANTUS) 16 Unit(s) SubCutaneous at bedtime  insulin lispro (ADMELOG) corrective regimen sliding scale   SubCutaneous three times a day before meals  insulin lispro (ADMELOG) corrective regimen sliding scale   SubCutaneous at bedtime  lactobacillus acidophilus 1 Tablet(s) Oral three times a day with meals  lidocaine   4% Patch 1 Patch Transdermal daily  metoprolol tartrate 25 milliGRAM(s) Oral daily  multivitamin 1 Tablet(s) Oral daily  pantoprazole  Injectable 40 milliGRAM(s) IV Push two times a day  potassium chloride    Tablet ER 20 milliEquivalent(s) Oral daily  simvastatin 10 milliGRAM(s) Oral at bedtime  sucralfate suspension 1 Gram(s) Oral four times a day  tamsulosin 0.4 milliGRAM(s) Oral at bedtime    MEDICATIONS  (PRN):  dextrose Oral Gel 15 Gram(s) Oral once PRN Blood Glucose LESS THAN 70 milliGRAM(s)/deciliter  dextrose Oral Gel 15 Gram(s) Oral once PRN Blood Glucose LESS THAN 70 milliGRAM(s)/deciliter  HYDROmorphone   Tablet 6 milliGRAM(s) Oral every 6 hours PRN Moderate Pain (4 - 6)  HYDROmorphone  Injectable 0.5 milliGRAM(s) IV Push every 4 hours PRN Severe Pain (7 - 10)  ondansetron    Tablet 4 milliGRAM(s) Oral every 6 hours PRN for nausea  polyethylene glycol 3350 17 Gram(s) Oral at bedtime PRN Constipation  senna 2 Tablet(s) Oral at bedtime PRN Constipation    I&O's Summary    28 May 2023 07:01  -  29 May 2023 07:00  --------------------------------------------------------  IN: 0 mL / OUT: 400 mL / NET: -400 mL    29 May 2023 07:01  -  29 May 2023 14:33  --------------------------------------------------------  IN: 0 mL / OUT: 660 mL / NET: -660 mL        Physical Exam:  General: Awake, resposiveabnd appropriate, breathing comfortably,  ENT: PERRLA, EOMI, No JVD  Chest; B/L Bs, decreased at bases, no signifigant sputum production   CVS: S1,S2, Regular, No Murmurs noted   Abd: BS+Soft Non-tender, No rebound or localized pain, No Masses,   Ext: Warm and perfused, No Edema   Neuro: A+O x 3, Moving all extremities, non-focal  CTS Consult Note    S:     HD 1  62 yo male   FULL CODE   PMH of DM II, PVD, CAD s/p CABG, s/p MV repair, HTN, Dyslipidemia, Gastroparesis, Anemia, GERD, Cirrhosis presented with abdominal pain on 5/28/23, associated with vomiting. Admitted to medicine.     CTS consulted for right pleural effusion. Patient reports that he has been unable to lay flat for months now. [facility] where he is seen by their medical staff but does not have a cardiologist. He endorses that he takes lasix every day, and has noted LE swelling but no improvement. He does report that hes noticed dyspnea on exertion over the last few weeks. On my evaluation, patient on room air breathing comfortably. He did mention that he was at [facility] ~1 month ago at which time he believes he had fluid around his lung drained (thinks maybe 1L), but is not 100% sure; he believes he was told it was 2/2 heart failure.     Vital Signs:  Vital Signs Last 24 Hrs  T(C): 36.7 (05-29-23 @ 07:14), Max: 36.7 (05-28-23 @ 17:37)  T(F): 98.1 (05-29-23 @ 07:14), Max: 98.1 (05-29-23 @ 07:14)  HR: 87 (05-29-23 @ 07:14) (84 - 92)  BP: 134/75 (05-29-23 @ 07:14) (121/93 - 134/75)  RR: 18 (05-29-23 @ 07:14) (16 - 18)  SpO2: 91% (05-29-23 @ 07:14) (91% - 96%) on (O2)    Telemetry/Alarms:    Relevant labs, radiology and Medications reviewed                        9.1    9.66  )-----------( 351      ( 29 May 2023 06:34 )             31.2     05-29    143  |  112<H>  |  11  ----------------------------<  121<H>  4.1   |  24  |  0.82    Ca    9.4      29 May 2023 06:34    TPro  7.4  /  Alb  2.9<L>  /  TBili  0.6  /  DBili  x   /  AST  10<L>  /  ALT  17  /  AlkPhos  241<H>  05-29    PT/INR - ( 29 May 2023 06:34 )   PT: 14.0 sec;   INR: 1.20 ratio         PTT - ( 29 May 2023 06:34 )  PTT:32.0 sec    MEDICATIONS  (STANDING):  amLODIPine   Tablet 10 milliGRAM(s) Oral daily  aspirin enteric coated 81 milliGRAM(s) Oral daily  azithromycin   Tablet 500 milliGRAM(s) Oral every 24 hours  cefTRIAXone Injectable. 1000 milliGRAM(s) IV Push every 24 hours  clopidogrel Tablet 75 milliGRAM(s) Oral daily  dextrose 5%. 1000 milliLiter(s) (100 mL/Hr) IV Continuous <Continuous>  dextrose 5%. 1000 milliLiter(s) (50 mL/Hr) IV Continuous <Continuous>  dextrose 5%. 1000 milliLiter(s) (50 mL/Hr) IV Continuous <Continuous>  dextrose 5%. 1000 milliLiter(s) (100 mL/Hr) IV Continuous <Continuous>  dextrose 50% Injectable 12.5 Gram(s) IV Push once  dextrose 50% Injectable 25 Gram(s) IV Push once  dextrose 50% Injectable 12.5 Gram(s) IV Push once  dextrose 50% Injectable 25 Gram(s) IV Push once  dextrose 50% Injectable 25 Gram(s) IV Push once  dextrose 50% Injectable 25 Gram(s) IV Push once  finasteride 5 milliGRAM(s) Oral daily  furosemide   Injectable 40 milliGRAM(s) IV Push two times a day  gabapentin 300 milliGRAM(s) Oral two times a day  gabapentin 600 milliGRAM(s) Oral at bedtime  glucagon  Injectable 1 milliGRAM(s) IntraMuscular once  glucagon  Injectable 1 milliGRAM(s) IntraMuscular once  heparin   Injectable 5000 Unit(s) SubCutaneous every 12 hours  insulin glargine Injectable (LANTUS) 16 Unit(s) SubCutaneous at bedtime  insulin lispro (ADMELOG) corrective regimen sliding scale   SubCutaneous three times a day before meals  insulin lispro (ADMELOG) corrective regimen sliding scale   SubCutaneous at bedtime  lactobacillus acidophilus 1 Tablet(s) Oral three times a day with meals  lidocaine   4% Patch 1 Patch Transdermal daily  metoprolol tartrate 25 milliGRAM(s) Oral daily  multivitamin 1 Tablet(s) Oral daily  pantoprazole  Injectable 40 milliGRAM(s) IV Push two times a day  potassium chloride    Tablet ER 20 milliEquivalent(s) Oral daily  simvastatin 10 milliGRAM(s) Oral at bedtime  sucralfate suspension 1 Gram(s) Oral four times a day  tamsulosin 0.4 milliGRAM(s) Oral at bedtime    MEDICATIONS  (PRN):  dextrose Oral Gel 15 Gram(s) Oral once PRN Blood Glucose LESS THAN 70 milliGRAM(s)/deciliter  dextrose Oral Gel 15 Gram(s) Oral once PRN Blood Glucose LESS THAN 70 milliGRAM(s)/deciliter  HYDROmorphone   Tablet 6 milliGRAM(s) Oral every 6 hours PRN Moderate Pain (4 - 6)  HYDROmorphone  Injectable 0.5 milliGRAM(s) IV Push every 4 hours PRN Severe Pain (7 - 10)  ondansetron    Tablet 4 milliGRAM(s) Oral every 6 hours PRN for nausea  polyethylene glycol 3350 17 Gram(s) Oral at bedtime PRN Constipation  senna 2 Tablet(s) Oral at bedtime PRN Constipation    I&O's Summary    28 May 2023 07:01  -  29 May 2023 07:00  --------------------------------------------------------  IN: 0 mL / OUT: 400 mL / NET: -400 mL    29 May 2023 07:01  -  29 May 2023 14:33  --------------------------------------------------------  IN: 0 mL / OUT: 660 mL / NET: -660 mL        Physical Exam:  General: sitting upright on edge of bed, appears comfortable  ENT: trachea midline  Chest: No respiratory distress, able to speak in complete sentences, BS decreased at left base  CVS: regular rate and rhythm  Abd: +anasarcic BS+Soft Non-tender, no rebound or localized pain  Ext: L BKA, ++pitting edema   Neuro: A+O x 3, Moving all extremities, non-focal

## 2023-05-29 NOTE — CONSULT NOTE ADULT - SUBJECTIVE AND OBJECTIVE BOX
Patient is a 63y old  Male who presents with a chief complaint of abdominal pain (29 May 2023 10:06)    HPI:  64 y/o M w/ PMH of DM2, PVD, gastroparesis, anemia, DM2, GERD, CAD s/p CABG, cirrhosis, MV repair, HTN, dyslipidemia, p/w abdominal pain. Patient states abdominal pain in epigastric area. Patient states that he had about 8 episodes of non-bloody emesis last night, and 2 episodes prior to arrival. Also c/o chills. Patient denies any diarrhea, CP, SOB, cough, melena, hematochezia, fever, cough, runny nose, sore throat. Here wbc ct 12, UA positive, imaging with Findings suggestive of CHF with worsening bilateral pleural effusions likely mild pulmonary edema seen at the lung bases. Suggestion of cirrhosis. Passive hepatic congestion. Increased small volume ascites. Diffuse bladder wall thickening.   Mild upper abdominal and retroperitoneal lymphadenopathy, similar to prior and likely reactive. Was given IV rocephin for possible UTI.     PSH: BKA, CABG, MV repair, R toe amputation, RLE skin graft   PMH: as above    Meds: per reconciliation sheet, noted below  MEDICATIONS  (STANDING):  amLODIPine   Tablet 10 milliGRAM(s) Oral daily  aspirin enteric coated 81 milliGRAM(s) Oral daily  cefTRIAXone Injectable. 1000 milliGRAM(s) IV Push every 24 hours  clopidogrel Tablet 75 milliGRAM(s) Oral daily  dextrose 5%. 1000 milliLiter(s) (100 mL/Hr) IV Continuous <Continuous>  dextrose 5%. 1000 milliLiter(s) (50 mL/Hr) IV Continuous <Continuous>  dextrose 5%. 1000 milliLiter(s) (100 mL/Hr) IV Continuous <Continuous>  dextrose 5%. 1000 milliLiter(s) (50 mL/Hr) IV Continuous <Continuous>  dextrose 50% Injectable 12.5 Gram(s) IV Push once  dextrose 50% Injectable 25 Gram(s) IV Push once  dextrose 50% Injectable 25 Gram(s) IV Push once  dextrose 50% Injectable 25 Gram(s) IV Push once  dextrose 50% Injectable 25 Gram(s) IV Push once  dextrose 50% Injectable 12.5 Gram(s) IV Push once  finasteride 5 milliGRAM(s) Oral daily  furosemide   Injectable 40 milliGRAM(s) IV Push two times a day  gabapentin 300 milliGRAM(s) Oral two times a day  gabapentin 600 milliGRAM(s) Oral at bedtime  glucagon  Injectable 1 milliGRAM(s) IntraMuscular once  glucagon  Injectable 1 milliGRAM(s) IntraMuscular once  heparin   Injectable 5000 Unit(s) SubCutaneous every 12 hours  insulin glargine Injectable (LANTUS) 16 Unit(s) SubCutaneous at bedtime  insulin lispro (ADMELOG) corrective regimen sliding scale   SubCutaneous at bedtime  insulin lispro (ADMELOG) corrective regimen sliding scale   SubCutaneous three times a day before meals  lactobacillus acidophilus 1 Tablet(s) Oral three times a day with meals  lidocaine   4% Patch 1 Patch Transdermal daily  metoprolol tartrate 25 milliGRAM(s) Oral daily  multivitamin 1 Tablet(s) Oral daily  pantoprazole  Injectable 40 milliGRAM(s) IV Push two times a day  potassium chloride    Tablet ER 20 milliEquivalent(s) Oral daily  simvastatin 10 milliGRAM(s) Oral at bedtime  sucralfate suspension 1 Gram(s) Oral four times a day  tamsulosin 0.4 milliGRAM(s) Oral at bedtime      Allergies    No Known Allergies    Intolerances      Social:  Tobacco - 1/2 PPD, etoh  - quit 15 years ago, drugs - h/o marijuana use 40 years ago ; no recent travel, no exposure to TB  FAMILY HISTORY:  No pertinent family history in first degree relatives       no history of premature cardiovascular disease in first degree relatives    ROS: the patient denies fever, no chills, no HA, no dizziness, no sore throat, no blurry vision, no CP, no palpitations, no SOB, no cough, no abdominal pain, no diarrhea, no N/V, no dysuria, no leg pain, no claudication, no rash, no joint aches, no rectal pain or bleeding, no night sweats    All other systems reviewed and are negative    Vital Signs Last 24 Hrs  T(C): 36.7 (29 May 2023 07:14), Max: 36.8 (28 May 2023 12:23)  T(F): 98.1 (29 May 2023 07:14), Max: 98.2 (28 May 2023 12:23)  HR: 87 (29 May 2023 07:14) (84 - 108)  BP: 134/75 (29 May 2023 07:14) (121/93 - 141/67)  BP(mean): 96 (28 May 2023 20:51) (96 - 96)  RR: 18 (29 May 2023 07:14) (16 - 18)  SpO2: 91% (29 May 2023 07:14) (91% - 96%)    Parameters below as of 29 May 2023 07:14  Patient On (Oxygen Delivery Method): nasal cannula  O2 Flow (L/min): 2    Daily Height in cm: 177.8 (28 May 2023 12:23)    Daily Weight in k.2 (29 May 2023 07:16)    PE:  Constitutional: NAD  HEENT: NC/AT, EOMI, PERRLA, conjunctivae clear; ears and nose atraumatic; pharynx benign  Neck: supple; thyroid not palpable  Back: no tenderness  Respiratory: respiratory effort normal; clear to auscultation  Cardiovascular: S1S2 regular, no murmurs  Abdomen: soft, not tender, not distended, positive BS; liver and spleen WNL  Genitourinary: no suprapubic tenderness  Lymphatic: no LN palpable  Musculoskeletal: no muscle tenderness, no joint swelling or tenderness  Extremities: no pedal edema  Neurological/ Psychiatric: AxOx3, Judgement and insight normal;  moving all extremities  Skin: no rashes; no palpable lesions RLE with dressing in place L BKA    Labs: all available labs reviewed                        9.1    9.66  )-----------( 351      ( 29 May 2023 06:34 )             31.2     05-    143  |  112<H>  |  11  ----------------------------<  121<H>  4.1   |  24  |  0.82    Ca    9.4      29 May 2023 06:34    TPro  7.4  /  Alb  2.9<L>  /  TBili  0.6  /  DBili  x   /  AST  10<L>  /  ALT  17  /  AlkPhos  241<H>  05-     LIVER FUNCTIONS - ( 29 May 2023 06:34 )  Alb: 2.9 g/dL / Pro: 7.4 gm/dL / ALK PHOS: 241 U/L / ALT: 17 U/L / AST: 10 U/L / GGT: x           Urinalysis Basic - ( 28 May 2023 18:31 )    Color: Yellow / Appearance: Clear / S.020 / pH: x  Gluc: x / Ketone: Moderate  / Bili: Negative / Urobili: 1   Blood: x / Protein: 500 mg/dL / Nitrite: Negative   Leuk Esterase: Negative / RBC: 25-50 /HPF / WBC 3-5 /HPF   Sq Epi: x / Non Sq Epi: x / Bacteria: Few          Radiology: all available radiological tests reviewed    ACC: [id number] EXAM:  CT ABDOMEN AND PELVIS OC IC   ORDERED BY: ZUHAIR ASTORGA     PROCEDURE DATE:  2023          INTERPRETATION:  CLINICAL INFORMATION: Abdominal pain and distention.    COMPARISON: Multiple prior exams, with most recent CTabdomen/pelvis   3/8/2023.    CONTRAST/COMPLICATIONS:  IV Contrast: Omnipaque 350  90 cc administered   10 cc discarded  Oral Contrast: Omnipaque 300   Fruit 2o  Complications: None reported at time of study completion    PROCEDURE:  CT of the Abdomen and Pelvis was performed.  Sagittal and coronal reformats were performed.    FINDINGS:  LOWER CHEST: Moderate right and small left pleural effusions with   associated bilateral basilar consolidation/atelectasis. Geographic   groundglass opacities and interlobular septal thickening, which may   represent pulmonary edema. Cardiomegaly. Mitral valve prosthesis. Prior   CABG.    LIVER: Diffusely heterogeneous appearance of the liver, suggestive of   passive hepatic congestion. Nodular surface contour of the inferior right   hepatic lobe, suggestive of cirrhosis. Liver is enlarged and measures   20.5 cm in length..  BILE DUCTS: Normal caliber.  GALLBLADDER: Cholecystectomy.  SPLEEN: Within normal limits.  PANCREAS: Atrophic. No pancreatic ductal dilatation. No significant   peripancreatic inflammation.  ADRENALS: Within normal limits.  KIDNEYS/URETERS: No renal stones or hydronephrosis. Stable indeterminate   13 mm exophytic left renal lesion.    BLADDER: Diffuse bladder wall thickening.  REPRODUCTIVE ORGANS: Prostate within normal limits.    BOWEL: No bowel obstruction. Colonic diverticulosis without evidence of   acute diverticulitis. Normal appendix.  PERITONEUM: Small volume abdominopelvic ascites.  VESSELS: Atherosclerotic changes.  RETROPERITONEUM/LYMPH NODES: Mildly enlarged retroperitoneal lymph nodes   measuring up to 1.2 cm short axis at the left para-aortic region (2:63).   Mildly enlarged aortocaval nodes measuring up to 1.4 cm short axis (2:52).  ABDOMINAL WALL: Diffuse body wall edema, worst along the right anterior   lateral abdominal wall. Small fat-containing left inguinal hernia.  BONES: Degenerative changes.    IMPRESSION:  1.  No bowel obstruction or inflammation.    2.  Findings suggestive of CHF with worsening bilateral pleural effusions   (moderate on the right and small on the left) and likely mild pulmonary   edema seen at the lung bases.    3.  Suggestion of cirrhosis. Passive hepatic congestion. Increased small   volume ascites.    4.  Diffuse bladder wall thickening. Correlate with urinalysis to exclude   cystitis.    5.  Mild upper abdominal and retroperitoneal lymphadenopathy, similar to   prior and likely reactive.      Advanced directives addressed: full resuscitation

## 2023-05-29 NOTE — CONSULT NOTE ADULT - ASSESSMENT
A/P: Patient is a 62 yo male with PMH of DM II, PVD, CAD s/p CABG, s/p MV repair, HTN, Dyslipidemia, Gastroparesis, Anemia, GERD, Cirrhosis presented with abdominal pain on 5/28/23, associated with vomiting. Admitted to medicine. CTS consulted for right pleural effusion.     Patient in no respiratory distress.   Afebrile, no leukocytosis. On empiric antibiotic coverage pending cultures.   Currently on 2L NC.   CT Abd/Pelvis showed signs of cirrhosis with hepatic congestion and small volume ascites; also noted moderate right and small left pleural effusions with associated bilateral basilar consolidation/atelectasis.   Recommend dedicated CT Chest to better evaluate.  BNP 7K. Trop negative x 2.   Overall patient appears volume overloaded.   Continued diuresis with IV lasix for now.  Monitor I&Os and daily weights.   Patient on DAPT, not clear why.   Recommend TTE, last TTE 12/2022 noted.   Discussed with Dr. Mina.    A/P: Patient is a 62 yo male with PMH of DM II, PVD, CAD s/p CABG, s/p MV repair, HTN, Dyslipidemia, Gastroparesis, Anemia, GERD, Cirrhosis presented with abdominal pain on 5/28/23, associated with vomiting. Admitted to medicine. CTS consulted for right pleural effusion.     Patient in no respiratory distress, currently on room air breathing comfortably  Afebrile, no leukocytosis. On empiric antibiotic coverage pending cultures.   CT Abd/Pelvis showed signs of cirrhosis with hepatic congestion and small volume ascites; also noted moderate right and small left pleural effusions with associated bilateral basilar consolidation/atelectasis.   Recommend dedicated CT Chest to better evaluate.  BNP 7K. Trop negative x 2.   Overall patient appears volume overloaded, clinically and radiographically  PTA takes 60mg lasix PO daily, renal function normal  Continued diuresis with IV lasix for now, currently on lasix 40mg IVP BID  Monitor I&Os and daily weights.   Patient on DAPT, not clear why (in case one agent would need to be held for procedure)  Recommend TTE, last TTE 12/2022 noted.   Would recommend obtaining records from [facility] recent admission given possible recent thoracentesis.   Discussed with Dr. Mina and Dr. Vu.   A/P: Patient is a 64 yo male with PMH of DM II, PVD, CAD s/p CABG, s/p MV repair, HTN, Dyslipidemia, Gastroparesis, Anemia, GERD, Cirrhosis presented with abdominal pain on 5/28/23, associated with vomiting. Admitted to medicine. CTS consulted for right pleural effusion.     Patient in no respiratory distress, currently on room air breathing comfortably  Afebrile, no leukocytosis. On empiric antibiotic coverage pending cultures.   CT Abd/Pelvis showed signs of cirrhosis with hepatic congestion and small volume ascites; also noted moderate right and small left pleural effusions with associated bilateral basilar consolidation/atelectasis.   Recommend dedicated CT Chest to better evaluate.  BNP 7K. Trop negative x 2.   Overall patient appears volume overloaded, clinically and radiographically  PTA takes 60mg lasix PO daily, renal function normal  Continued diuresis with IV lasix for now, currently on lasix 40mg IVP BID  Monitor I&Os and daily weights.   Patient on DAPT, please hold for now  Plan to evaluate with US and do a bedside thoracentesis in the morning  Recommend TTE, last TTE 12/2022 noted.   Would recommend obtaining records from [facility] recent admission given possible recent thoracentesis.   Discussed with Dr. Mina and Dr. Vu.

## 2023-05-29 NOTE — CONSULT NOTE ADULT - SUBJECTIVE AND OBJECTIVE BOX
HPI:  64 y/o M w/ PMH of DM2, PVD, gastroparesis, anemia, DM2, GERD, CAD s/p CABG, cirrhosis, MV repair, HTN, dyslipidemia, p/w abdominal pain. Patient states abdominal pain started yeterday afternoon in epigastric area. Patient states that he had about 8 episodes of non-bloody emesis last night, and 2 episodes today prior to arrival. Also c/o chills. Patient denies any diarrhea, CP, SOB, cough, melena, hematochezia, fever, cough, runny nose, sore throat.     Patient notes [place]. He was a 1 PPD smoker x 10 years, now 5 cigs/day x past 5 years. Denies cough or sputum production. He is not sure of the reason for his mitral valve repair, which was performed at time of CABG in 2016.      PAST MEDICAL & SURGICAL HISTORY:  HTN (hypertension)      DM (diabetes mellitus)      Anemia      GERD (gastroesophageal reflux disease)      S/P BKA (below knee amputation), left      CAD (coronary artery disease)      S/P CABG x 3      Status post amputation of leg          MEDICATIONS  (STANDING):  amLODIPine   Tablet 10 milliGRAM(s) Oral daily  aspirin enteric coated 81 milliGRAM(s) Oral daily  cefTRIAXone Injectable. 1000 milliGRAM(s) IV Push every 24 hours  clopidogrel Tablet 75 milliGRAM(s) Oral daily  dextrose 5%. 1000 milliLiter(s) (100 mL/Hr) IV Continuous <Continuous>  dextrose 5%. 1000 milliLiter(s) (50 mL/Hr) IV Continuous <Continuous>  dextrose 5%. 1000 milliLiter(s) (50 mL/Hr) IV Continuous <Continuous>  dextrose 5%. 1000 milliLiter(s) (100 mL/Hr) IV Continuous <Continuous>  dextrose 50% Injectable 25 Gram(s) IV Push once  dextrose 50% Injectable 12.5 Gram(s) IV Push once  dextrose 50% Injectable 25 Gram(s) IV Push once  dextrose 50% Injectable 12.5 Gram(s) IV Push once  dextrose 50% Injectable 25 Gram(s) IV Push once  dextrose 50% Injectable 25 Gram(s) IV Push once  finasteride 5 milliGRAM(s) Oral daily  furosemide   Injectable 40 milliGRAM(s) IV Push two times a day  gabapentin 300 milliGRAM(s) Oral two times a day  gabapentin 600 milliGRAM(s) Oral at bedtime  glucagon  Injectable 1 milliGRAM(s) IntraMuscular once  glucagon  Injectable 1 milliGRAM(s) IntraMuscular once  heparin   Injectable 5000 Unit(s) SubCutaneous every 12 hours  insulin glargine Injectable (LANTUS) 16 Unit(s) SubCutaneous at bedtime  insulin lispro (ADMELOG) corrective regimen sliding scale   SubCutaneous three times a day before meals  insulin lispro (ADMELOG) corrective regimen sliding scale   SubCutaneous at bedtime  lactobacillus acidophilus 1 Tablet(s) Oral three times a day with meals  lidocaine   4% Patch 1 Patch Transdermal daily  metoprolol tartrate 25 milliGRAM(s) Oral daily  multivitamin 1 Tablet(s) Oral daily  pantoprazole  Injectable 40 milliGRAM(s) IV Push two times a day  potassium chloride    Tablet ER 20 milliEquivalent(s) Oral daily  simvastatin 10 milliGRAM(s) Oral at bedtime  sucralfate suspension 1 Gram(s) Oral four times a day  tamsulosin 0.4 milliGRAM(s) Oral at bedtime    MEDICATIONS  (PRN):  dextrose Oral Gel 15 Gram(s) Oral once PRN Blood Glucose LESS THAN 70 milliGRAM(s)/deciliter  dextrose Oral Gel 15 Gram(s) Oral once PRN Blood Glucose LESS THAN 70 milliGRAM(s)/deciliter  HYDROmorphone   Tablet 6 milliGRAM(s) Oral every 6 hours PRN Moderate Pain (4 - 6)  HYDROmorphone  Injectable 0.5 milliGRAM(s) IV Push every 4 hours PRN Severe Pain (7 - 10)  ondansetron    Tablet 4 milliGRAM(s) Oral every 6 hours PRN for nausea  polyethylene glycol 3350 17 Gram(s) Oral at bedtime PRN Constipation  senna 2 Tablet(s) Oral at bedtime PRN Constipation      Allergies    No Known Allergies    Intolerances        SOCIAL HISTORY: Denies tobacco, etoh abuse or illicit drug use    FAMILY HISTORY:  No pertinent family history in first degree relatives        Vital Signs Last 24 Hrs  T(C): 36.7 (29 May 2023 07:14), Max: 36.7 (28 May 2023 17:37)  T(F): 98.1 (29 May 2023 07:14), Max: 98.1 (29 May 2023 07:14)  HR: 87 (29 May 2023 07:14) (84 - 92)  BP: 134/75 (29 May 2023 07:14) (121/93 - 134/75)  BP(mean): 96 (28 May 2023 20:51) (96 - 96)  RR: 18 (29 May 2023 07:14) (16 - 18)  SpO2: 91% (29 May 2023 07:14) (91% - 96%)    Parameters below as of 29 May 2023 07:14  Patient On (Oxygen Delivery Method): nasal cannula  O2 Flow (L/min): 2      REVIEW OF SYSTEMS:    CONSTITUTIONAL:  As per HPI.  HEENT:  Eyes:  No diplopia or blurred vision. ENT:  No earache, sore throat or runny nose.  CARDIOVASCULAR:  No pressure, squeezing, tightness, heaviness or aching about the chest, neck, axilla or epigastrium.  RESPIRATORY:  See HPI  GASTROINTESTINAL:  See HPI  GENITOURINARY:  No dysuria, frequency or urgency.  MUSCULOSKELETAL:  As per HPI.  SKIN:  No change in skin, hair or nails.  NEUROLOGIC:  No paresthesias, fasciculations, seizures or weakness.  PSYCHIATRIC:  No disorder of thought or mood.  ENDOCRINE:  No heat or cold intolerance, polyuria or polydipsia.  HEMATOLOGICAL:  No easy bruising or bleedings:  .     PHYSICAL EXAMINATION:    GENERAL APPEARANCE:  Pt. is not currently dyspneic, in no distress. Pt. is alert, oriented, and pleasant.  HEENT:  Pupils are normal and react normally. No icterus. Mucous membranes well colored.  NECK:  Supple. No lymphadenopathy. Jugular venous pressure not elevated.   HEART:   The cardiac impulse has a normal quality. Regular. Normal S1 and S2.   CHEST:  Chest with decreased BS on right approx. 1/3 up. Few basilar crackles. Normal respiratory effort.  ABDOMEN:  Soft and nontender.   EXTREMITIES:  s/p left BKA, right TMA.   SKIN:  No rash or significant lesions are noted.    LABS:                        9.1    9.66  )-----------( 351      ( 29 May 2023 06:34 )             31.2     -    143  |  112<H>  |  11  ----------------------------<  121<H>  4.1   |  24  |  0.82    Ca    9.4      29 May 2023 06:34    TPro  7.4  /  Alb  2.9<L>  /  TBili  0.6  /  DBili  x   /  AST  10<L>  /  ALT  17  /  AlkPhos  241<H>      LIVER FUNCTIONS - ( 29 May 2023 06:34 )  Alb: 2.9 g/dL / Pro: 7.4 gm/dL / ALK PHOS: 241 U/L / ALT: 17 U/L / AST: 10 U/L / GGT: x           PT/INR - ( 29 May 2023 06:34 )   PT: 14.0 sec;   INR: 1.20 ratio         PTT - ( 29 May 2023 06:34 )  PTT:32.0 sec      Urinalysis Basic - ( 28 May 2023 18:31 )    Color: Yellow / Appearance: Clear / S.020 / pH: x  Gluc: x / Ketone: Moderate  / Bili: Negative / Urobili: 1   Blood: x / Protein: 500 mg/dL / Nitrite: Negative   Leuk Esterase: Negative / RBC: 25-50 /HPF / WBC 3-5 /HPF   Sq Epi: x / Non Sq Epi: x / Bacteria: Few          RADIOLOGY & ADDITIONAL STUDIES:< from: CT Abdomen and Pelvis w/ Oral Cont and w/ IV Cont (23 @ 17:35) >  ACC: [id number] EXAM:  CT ABDOMEN AND PELVIS OC IC   ORDERED BY: ZUHAIR ASTORGA     PROCEDURE DATE:  2023          INTERPRETATION:  CLINICAL INFORMATION: Abdominal pain and distention.    COMPARISON: Multiple prior exams, with most recent CTabdomen/pelvis   3/8/2023.    CONTRAST/COMPLICATIONS:  IV Contrast: Omnipaque 350  90 cc administered   10 cc discarded  Oral Contrast: Omnipaque 300   Fruit 2o  Complications: None reported at time of study completion    PROCEDURE:  CT of the Abdomen and Pelvis was performed.  Sagittal and coronal reformats were performed.    FINDINGS:  LOWER CHEST: Moderate right and small left pleural effusions with   associated bilateral basilar consolidation/atelectasis. Geographic   groundglass opacities and interlobular septal thickening, which may   represent pulmonary edema. Cardiomegaly. Mitral valve prosthesis. Prior   CABG.    LIVER: Diffusely heterogeneous appearance of the liver, suggestive of   passive hepatic congestion. Nodular surface contour of the inferior right   hepatic lobe, suggestive of cirrhosis. Liver is enlarged and measures   20.5 cm in length..  BILE DUCTS: Normal caliber.  GALLBLADDER: Cholecystectomy.  SPLEEN: Within normal limits.  PANCREAS: Atrophic. No pancreatic ductal dilatation. No significant   peripancreatic inflammation.  ADRENALS: Within normal limits.  KIDNEYS/URETERS: No renal stones or hydronephrosis. Stable indeterminate   13 mm exophytic left renal lesion.    BLADDER: Diffuse bladder wall thickening.  REPRODUCTIVE ORGANS: Prostate within normal limits.    BOWEL: No bowel obstruction. Colonic diverticulosis without evidence of   acute diverticulitis. Normal appendix.  PERITONEUM: Small volume abdominopelvic ascites.  VESSELS: Atherosclerotic changes.  RETROPERITONEUM/LYMPH NODES: Mildly enlarged retroperitoneal lymph nodes   measuring up to 1.2 cm short axis at the left para-aortic region (2:63).   Mildly enlarged aortocaval nodes measuring up to 1.4 cm short axis (2:52).  ABDOMINAL WALL: Diffuse body wall edema, worst along the right anterior   lateral abdominal wall. Small fat-containing left inguinal hernia.  BONES: Degenerative changes.    IMPRESSION:  1.  No bowel obstruction or inflammation.    2.  Findings suggestive of CHF with worsening bilateral pleural effusions   (moderate on the right and small on the left) and likely mild pulmonary   edema seen at the lung bases.    3.  Suggestion of cirrhosis. Passive hepatic congestion. Increased small   volume ascites.    4.  Diffuse bladder wall thickening. Correlate with urinalysis to exclude   cystitis.    5.  Mild upper abdominal and retroperitoneal lymphadenopathy, similar to   prior and likely reactive.      GRABIEL WORLEY MD

## 2023-05-29 NOTE — CONSULT NOTE ADULT - SUBJECTIVE AND OBJECTIVE BOX
PCP:    REQUESTING PHYSICIAN:    REASON FOR CONSULT:    CHIEF COMPLAINT:    HPI:  64 y/o M w/ PMH of DM2, PVD, gastroparesis, anemia, DM2, GERD, CAD s/p CABG, cirrhosis, MV repair, HTN, dyslipidemia, p/w abdominal pain. Patient states abdominal pain started yeterday afternoon in epigastric area. Patient states that he had about 8 episodes of non-bloody emesis last night, and 2 episodes today prior to arrival. Also c/o chills. Patient denies any diarrhea, CP, SOB, cough, melena, hematochezia, fever, cough, runny nose, sore throat. Cardiology called to evaluate symptoms and risks. Pt denies exertional symptoms.     PSH: BKA, CABG, MV repair, R toe amputation, RLE skin graft     Social Hx: Tobacco - 1/2 PPD, etoh  - quit 15 years ago, drugs - h/o marijuana use 40 years ago     Family Hx: Denies h/o pertinent family hx in first degree relatives  (28 May 2023 22:27)      PAST MEDICAL & SURGICAL HISTORY:  HTN (hypertension)      DM (diabetes mellitus)      Anemia      GERD (gastroesophageal reflux disease)      S/P BKA (below knee amputation), left      CAD (coronary artery disease)      S/P CABG x 3      Status post amputation of leg          Allergies    No Known Allergies    Intolerances        SOCIAL HISTORY:    FAMILY HISTORY:  No pertinent family history in first degree relatives        MEDICATIONS:  MEDICATIONS  (STANDING):  amLODIPine   Tablet 10 milliGRAM(s) Oral daily  aspirin enteric coated 81 milliGRAM(s) Oral daily  cefTRIAXone Injectable. 1000 milliGRAM(s) IV Push every 24 hours  clopidogrel Tablet 75 milliGRAM(s) Oral daily  dextrose 5%. 1000 milliLiter(s) (100 mL/Hr) IV Continuous <Continuous>  dextrose 5%. 1000 milliLiter(s) (50 mL/Hr) IV Continuous <Continuous>  dextrose 5%. 1000 milliLiter(s) (100 mL/Hr) IV Continuous <Continuous>  dextrose 5%. 1000 milliLiter(s) (50 mL/Hr) IV Continuous <Continuous>  dextrose 50% Injectable 12.5 Gram(s) IV Push once  dextrose 50% Injectable 25 Gram(s) IV Push once  dextrose 50% Injectable 25 Gram(s) IV Push once  dextrose 50% Injectable 25 Gram(s) IV Push once  dextrose 50% Injectable 25 Gram(s) IV Push once  dextrose 50% Injectable 12.5 Gram(s) IV Push once  finasteride 5 milliGRAM(s) Oral daily  furosemide   Injectable 40 milliGRAM(s) IV Push daily  gabapentin 300 milliGRAM(s) Oral two times a day  gabapentin 600 milliGRAM(s) Oral at bedtime  glucagon  Injectable 1 milliGRAM(s) IntraMuscular once  glucagon  Injectable 1 milliGRAM(s) IntraMuscular once  heparin   Injectable 5000 Unit(s) SubCutaneous every 12 hours  insulin glargine Injectable (LANTUS) 16 Unit(s) SubCutaneous at bedtime  insulin lispro (ADMELOG) corrective regimen sliding scale   SubCutaneous at bedtime  insulin lispro (ADMELOG) corrective regimen sliding scale   SubCutaneous three times a day before meals  lactobacillus acidophilus 1 Tablet(s) Oral three times a day with meals  lidocaine   4% Patch 1 Patch Transdermal daily  metoprolol tartrate 25 milliGRAM(s) Oral daily  multivitamin 1 Tablet(s) Oral daily  pantoprazole  Injectable 40 milliGRAM(s) IV Push two times a day  potassium chloride    Tablet ER 20 milliEquivalent(s) Oral daily  simvastatin 10 milliGRAM(s) Oral at bedtime  sucralfate suspension 1 Gram(s) Oral four times a day  tamsulosin 0.4 milliGRAM(s) Oral at bedtime    MEDICATIONS  (PRN):  dextrose Oral Gel 15 Gram(s) Oral once PRN Blood Glucose LESS THAN 70 milliGRAM(s)/deciliter  dextrose Oral Gel 15 Gram(s) Oral once PRN Blood Glucose LESS THAN 70 milliGRAM(s)/deciliter  HYDROmorphone   Tablet 6 milliGRAM(s) Oral every 6 hours PRN Moderate Pain (4 - 6)  HYDROmorphone  Injectable 0.5 milliGRAM(s) IV Push every 4 hours PRN Severe Pain (7 - 10)  ondansetron    Tablet 4 milliGRAM(s) Oral every 6 hours PRN for nausea  polyethylene glycol 3350 17 Gram(s) Oral at bedtime PRN Constipation  senna 2 Tablet(s) Oral at bedtime PRN Constipation      REVIEW OF SYSTEMS:    CONSTITUTIONAL: No weakness, fevers or chills  EYES/ENT: No visual changes;  No vertigo or throat pain   NECK: No pain or stiffness  RESPIRATORY: No cough, wheezing, hemoptysis; No shortness of breath  CARDIOVASCULAR: No chest pain or palpitations  GASTROINTESTINAL: Abdominal pain  GENITOURINARY: No dysuria, frequency or hematuria  NEUROLOGICAL: No numbness or weakness  SKIN: No itching, burning, rashes, or lesions   All other review of systems is negative unless indicated above    Vital Signs Last 24 Hrs  T(C): 36.7 (29 May 2023 07:14), Max: 36.8 (28 May 2023 12:23)  T(F): 98.1 (29 May 2023 07:14), Max: 98.2 (28 May 2023 12:23)  HR: 87 (29 May 2023 07:14) (84 - 108)  BP: 134/75 (29 May 2023 07:14) (121/93 - 141/67)  BP(mean): 96 (28 May 2023 20:51) (96 - 96)  RR: 18 (29 May 2023 07:14) (16 - 18)  SpO2: 91% (29 May 2023 07:14) (91% - 96%)    Parameters below as of 29 May 2023 07:14  Patient On (Oxygen Delivery Method): nasal cannula  O2 Flow (L/min): 2      I&O's Summary    28 May 2023 07:01  -  29 May 2023 07:00  --------------------------------------------------------  IN: 0 mL / OUT: 400 mL / NET: -400 mL    29 May 2023 07:01  -  29 May 2023 10:07  --------------------------------------------------------  IN: 0 mL / OUT: 200 mL / NET: -200 mL        PHYSICAL EXAM:    Constitutional: NAD, awake and alert, well-developed  HEENT: PERR, EOMI,  No oral cyananosis.  Neck:  supple,  No JVD  Respiratory: Breath sounds are clear bilaterally, No wheezing, rales or rhonchi  Cardiovascular: S1 and S2, regular rate and rhythm, no Murmurs, gallops or rubs  Gastrointestinal: Bowel Sounds present, soft, nontender.   Extremities: Amputation lower extremities.   Vascular: 2+ peripheral pulses  Neurological: A/O x 3, no focal deficits  Musculoskeletal: no calf tenderness.  Skin: No rashes.      LABS: All Labs Reviewed:                        9.1    9.66  )-----------( 351      ( 29 May 2023 06:34 )             31.2                         9.4    12.30 )-----------( 365      ( 28 May 2023 13:11 )             31.1     29 May 2023 06:34    143    |  112    |  11     ----------------------------<  121    4.1     |  24     |  0.82   28 May 2023 13:11    142    |  110    |  14     ----------------------------<  193    3.6     |  20     |  0.85     Ca    9.4        29 May 2023 06:34  Ca    9.4        28 May 2023 13:11    TPro  7.4    /  Alb  2.9    /  TBili  0.6    /  DBili  x      /  AST  10     /  ALT  17     /  AlkPhos  241    29 May 2023 06:34  TPro  8.0    /  Alb  3.1    /  TBili  0.9    /  DBili  x      /  AST  11     /  ALT  20     /  AlkPhos  272    28 May 2023 13:11    PT/INR - ( 29 May 2023 06:34 )   PT: 14.0 sec;   INR: 1.20 ratio         PTT - ( 29 May 2023 06:34 )  PTT:32.0 sec      Blood Culture:         RADIOLOGY/EKG:< from: 12 Lead ECG (05.29.23 @ 07:54) >  Diagnosis Line Normal sinus rhythm  ST & T wave abnormality, consider anterolateral ischemia  Abnormal ECG  Confirmed by WILDER WEBSTER MD ([id number]) on 5/29/2023 9:10:39 AM    < end of copied text >

## 2023-05-29 NOTE — CONSULT NOTE ADULT - ASSESSMENT
64 y/o M w/ PMH of DM2, PVD, gastroparesis, anemia, DM2, GERD, CAD s/p CABG, cirrhosis, MV repair, HTN, dyslipidemia, p/w abdominal pain. Patient states abdominal pain in epigastric area. Patient states that he had about 8 episodes of non-bloody emesis last night, and 2 episodes prior to arrival. Also c/o chills. Patient denies any diarrhea, CP, SOB, cough, melena, hematochezia, fever, cough, runny nose, sore throat. Here wbc ct 12, UA positive, imaging with Findings suggestive of CHF with worsening bilateral pleural effusions likely mild pulmonary edema seen at the lung bases. Suggestion of cirrhosis. Passive hepatic congestion. Increased small volume ascites. Diffuse bladder wall thickening.   Mild upper abdominal and retroperitoneal lymphadenopathy, similar to prior and likely reactive. Was given IV rocephin for possible UTI.     1. Pyuria. Possible UTI. Cirrhosis with ascites. RLE s/p skin graft. PVD  - f/u urine cx, blood cx  - on rocephin 1gm daily #2  - gi follow up noted paracentesis planned f/u ascitic fluid/cx  - fu cbc  - monitor temps  - tolerating abx well so far; no side effects noted  - reason for abx use and side effects reviewed with patient  - supportive care    2. other issues - care per medicine

## 2023-05-29 NOTE — PROGRESS NOTE ADULT - ASSESSMENT
63 year old woman with type 2 DM, gastroparesis, HTN, HLD, CAD, hx of CABG, MV repair, peripheral vascular disease, hx of L BKA, R toe amputation, hepatic cirrhosis, chronic anemia, presented for further evaluation of epigastric abdominal discomfort, different than typical gastroparesis symptoms. Had associated nausea. No hematemesis, hematochezia or melena. On ROS he also reports dyspnea on exertion. No chest pain or tightness. No diaphoresis. In the ED, patient was found to have mild leukocytosis, stable Hgb, negative troponin, elevated BNP, CT findings of mod R, small L pleural effusion, ground glass opacities, interlobular septal thickening, hepatic cirrhosis, small ascites. Patient was given analgesics, antiemetics, ceftriaxone and furosemide and admitted to Medicine.     Epigastric abdominal discomfort, hx of gastroparesis, also unable to rule out SBP in setting of cirrhosis and small amount of ascites  Etiology unclear. Mild in severity at this point. Tolerating small amounts of PO.   - Empiric ceftriaxone.  - Diagnostic paracentesis, if able  - Empiric Carafate QID, PPI BID  - GI for possible EGD, timing TBD    Acute respiratory failure with hypoxia  On O2. Multifactorial due to pleural effusion, possible pneumonia (see below), possible CHF (see below)  - Continue O2 supplementation, wean O2 as tolerated  - Encourage incentive spirometry  - Continue to treat underlying issues    Airspace changes on CT, cannot r/o pneumonia, cannot rule out Gram-negative organism, present upon admission  Etiology unclear  - Dedicated CT chest ordered  - Empiric ceftriaxone  - Empiric azithromycin    Moderate R and small L pleural effusions  Etiology unclear. Cannot r/o parapneumonic effusion, unable to r/o CHF with exacerbation, also could be related to his hepatic cirrhosis  - Thoracic Surgery consult for diagnostic thoracentesis  - Trial of IV Lasix as he does appear hypervolemic    Elevated BNP  BNP elevated, 7681. CT A/P notable for cardiomegaly, GGO and interlobular septal thickening that could be pulm edema.   - Obtain TTE to assess LVEF, assess MV repair, etc    CAD, hx of CABG  No angina. Troponin negative. EKG NSR, rate WNL, nonspecific ST T changes.   - Continue aspirin, statin, metoprolol    Microscopic hematuria  No urinary complaints  - Repeat Ua, obtain Ur cx    Hepatic cirrhosis  No encephalopathy. No known varices. Trace ascites.  - On ceftriaxone for r/o SBP     Anemia  May be due to chronic disease, cirrhosis  - Check iron studies    IDDM  A1c 6.7  - Continue insulin     BPH  Stable  - Continue Flomax   63 year old woman with type 2 DM, gastroparesis, HTN, HLD, CAD, hx of CABG, MV repair, peripheral vascular disease, hx of L BKA, R toe amputation, hepatic cirrhosis, chronic anemia, presented for further evaluation of epigastric abdominal discomfort, different than typical gastroparesis symptoms. Had associated nausea. No hematemesis, hematochezia or melena. On ROS he also reports dyspnea on exertion. No chest pain or tightness. No diaphoresis. In the ED, patient was found to have mild leukocytosis, stable Hgb, negative troponin, elevated BNP, CT findings of mod R, small L pleural effusion, ground glass opacities, interlobular septal thickening, hepatic cirrhosis, small ascites. Patient was given analgesics, antiemetics, ceftriaxone and furosemide and admitted to Medicine.     Epigastric abdominal discomfort, with SIRS, unable to rule out sepsis, present upon admission  Hx of gastroparesis but feels different than his usual gastroparesis sx. SIRS in ED (, WBC 12.3). Could have SBP in setting of cirrhosis and small amount of ascites. Also, unable to rule out pneumonia (see below). Abd pain now mild in severity at this point. Tolerating small amounts of PO.   - Empiric ceftriaxone.  - Diagnostic paracentesis, if able  - Empiric Carafate QID, PPI BID  - GI for possible EGD, timing TBD    Acute respiratory failure with hypoxia   On O2. Multifactorial due to pleural effusion, possible pneumonia (see below), possible CHF (see below)  - Continue O2 supplementation, wean O2 as tolerated  - Encourage incentive spirometry  - Continue to treat underlying issues    Airspace changes on CT, cannot r/o pneumonia, cannot r/o Gram-negative organism, present upon admission  Etiology unclear  - Dedicated CT chest ordered  - Empiric ceftriaxone  - Empiric azithromycin    Moderate R and small L pleural effusions  Etiology unclear. Cannot r/o parapneumonic effusion, unable to r/o CHF with exacerbation, also could be related to his hepatic cirrhosis  - Thoracic Surgery consult for diagnostic thoracentesis  - Trial of IV Lasix as he does appear hypervolemic    Elevated BNP  BNP elevated, 7681. CT A/P notable for cardiomegaly, GGO and interlobular septal thickening that could be pulm edema.   - Obtain TTE to assess LVEF, assess MV repair, etc    CAD, hx of CABG  No angina. Troponin negative. EKG NSR, rate WNL, nonspecific ST T changes.   - Continue aspirin, statin, metoprolol    Microscopic hematuria  No urinary complaints  - Repeat Ua, obtain Ur cx    Hepatic cirrhosis  No encephalopathy. No known varices. Trace ascites.  - On ceftriaxone for r/o SBP     Anemia  May be due to chronic disease, cirrhosis  - Check iron studies    IDDM  A1c 6.7  - Continue insulin     BPH  Stable  - Continue Flomax   63 year old man with type 2 DM, gastroparesis, HTN, HLD, CAD, hx of CABG, MV repair, peripheral vascular disease, hx of L BKA, R toe amputation, hepatic cirrhosis, chronic anemia, presented for further evaluation of epigastric abdominal discomfort, different than typical gastroparesis symptoms. Had associated nausea. No hematemesis, hematochezia or melena. On ROS he also reports dyspnea on exertion. No chest pain or tightness. No diaphoresis. In the ED, patient was found to have mild leukocytosis, stable Hgb, negative troponin, elevated BNP, CT findings of mod R, small L pleural effusion, ground glass opacities, interlobular septal thickening, hepatic cirrhosis, small ascites. Patient was given analgesics, antiemetics, ceftriaxone and furosemide and admitted to Medicine.     Epigastric abdominal discomfort, with SIRS, unable to rule out sepsis, present upon admission  Hx of gastroparesis but feels different than his usual gastroparesis sx. SIRS in ED (, WBC 12.3). Could have SBP in setting of cirrhosis and small amount of ascites. Also, unable to rule out pneumonia (see below). Abd pain now mild in severity at this point. Tolerating small amounts of PO.   - Empiric ceftriaxone.  - Diagnostic paracentesis, if able  - Empiric Carafate QID, PPI BID  - GI for possible EGD, timing TBD    Acute respiratory failure with hypoxia   On O2. Multifactorial due to pleural effusion, possible pneumonia (see below), possible CHF (see below)  - Continue O2 supplementation, wean O2 as tolerated  - Encourage incentive spirometry  - Continue to treat underlying issues    Airspace changes on CT, cannot r/o pneumonia, cannot r/o Gram-negative organism, present upon admission  Etiology unclear  - Dedicated CT chest ordered  - Empiric ceftriaxone  - Empiric azithromycin    Moderate R and small L pleural effusions  Etiology unclear. Cannot r/o parapneumonic effusion, unable to r/o CHF with exacerbation, also could be related to his hepatic cirrhosis  - Thoracic Surgery consult for diagnostic thoracentesis  - Trial of IV Lasix as he does appear hypervolemic    Elevated BNP  BNP elevated, 7681. CT A/P notable for cardiomegaly, GGO and interlobular septal thickening that could be pulm edema.   - Obtain TTE to assess LVEF, assess MV repair, etc    CAD, hx of CABG  No angina. Troponin negative. EKG NSR, rate WNL, nonspecific ST T changes.   - Continue aspirin, statin, metoprolol    Microscopic hematuria  No urinary complaints  - Repeat Ua, obtain Ur cx    Hepatic cirrhosis  No encephalopathy. No known varices. Trace ascites.  - On ceftriaxone for r/o SBP     Anemia  May be due to chronic disease, cirrhosis  - Check iron studies    IDDM  A1c 6.7  - Continue insulin     BPH  Stable  - Continue Flomax

## 2023-05-29 NOTE — CONSULT NOTE ADULT - ASSESSMENT
Assessment/Plan    - Dedicated CT Chest  - Right diagnostic/therapeutic thorocentesis  - Add Doxycycline to regimen to cover atypicals

## 2023-05-29 NOTE — CONSULT NOTE ADULT - ASSESSMENT
patient seen  full note to follow  obtain MRI/MRCP to better evaluate pancreas, liver, ducts  small volume ascites, can have IR attempt tomorrow if think has window for diagnostic tap  treat symptoms for now   patient seen  full note to follow  nothing on CT as a cause of pain, consider MRI/MRCP to better evaluate pancreas, liver, ducts, may eventually need egd  ppi bid, carafate, anti nausea meds  small volume ascites, can have IR attempt tomorrow if think has window for diagnostic tap  treat symptoms for now   63 year old man with CAD s/p CABG, DM 2, L AKA, PVD, R heart failure, gastroparesis, past choly, former heavy drinker, admitted with abdominal pain.    In terms of pain, nothing on CT as a cause of pain, labs not elucidating anything, consider MRI/MRCP to better evaluate pancreas, liver, ducts, may eventually need egd.     For now, ppi bid, carafate, anti nausea meds    small volume ascites, can have IR attempt tomorrow if think has window for diagnostic tap to r/o SBP.     Has b/l pleural effusions, worse on R. Also with R heart failure and volume overload. Thoracic following. ? multifactorial, component of hepatic hydrothorax and R heart failure. If getting thora, will need albumin sent off so we can do a SAAG.

## 2023-05-29 NOTE — CONSULT NOTE ADULT - PROBLEM SELECTOR RECOMMENDATION 3
Pt advised, she got swabbed this morning Continue current meds. Pt relatively asymptomatic. CT does not suggest mesenteric ischemia although atherosclerosis is noted. Maintain adequate HH for perfusion.

## 2023-05-29 NOTE — CONSULT NOTE ADULT - SUBJECTIVE AND OBJECTIVE BOX
Patient is a 63y old  Male who presents with a chief complaint of abdominal pain (28 May 2023 22:27)    Pt seen full note to follow      HPI:  64 y/o M w/ PMH of DM2, PVD, gastroparesis, anemia, DM2, GERD, CAD s/p CABG, cirrhosis, MV repair, HTN, dyslipidemia, p/w abdominal pain. Patient states abdominal pain started yeterday afternoon in epigastric area. Patient states that he had about 8 episodes of non-bloody emesis last night, and 2 episodes today prior to arrival. Also c/o chills. Patient denies any diarrhea, CP, SOB, cough, melena, hematochezia, fever, cough, runny nose, sore throat.     PSH: BKA, CABG, MV repair, R toe amputation, RLE skin graft     Social Hx: Tobacco - 1/2 PPD, etoh  - quit 15 years ago, drugs - h/o marijuana use 40 years ago     Family Hx: Denies h/o pertinent family hx in first degree relatives  (28 May 2023 22:27)      PAST MEDICAL & SURGICAL HISTORY:  HTN (hypertension)      DM (diabetes mellitus)      Anemia      GERD (gastroesophageal reflux disease)      S/P BKA (below knee amputation), left      CAD (coronary artery disease)      S/P CABG x 3      Status post amputation of leg          MEDICATIONS  (STANDING):  amLODIPine   Tablet 10 milliGRAM(s) Oral daily  aspirin enteric coated 81 milliGRAM(s) Oral daily  cefTRIAXone Injectable. 1000 milliGRAM(s) IV Push every 24 hours  clopidogrel Tablet 75 milliGRAM(s) Oral daily  dextrose 5%. 1000 milliLiter(s) (50 mL/Hr) IV Continuous <Continuous>  dextrose 5%. 1000 milliLiter(s) (100 mL/Hr) IV Continuous <Continuous>  dextrose 5%. 1000 milliLiter(s) (50 mL/Hr) IV Continuous <Continuous>  dextrose 5%. 1000 milliLiter(s) (100 mL/Hr) IV Continuous <Continuous>  dextrose 50% Injectable 25 Gram(s) IV Push once  dextrose 50% Injectable 12.5 Gram(s) IV Push once  dextrose 50% Injectable 25 Gram(s) IV Push once  dextrose 50% Injectable 12.5 Gram(s) IV Push once  dextrose 50% Injectable 25 Gram(s) IV Push once  dextrose 50% Injectable 25 Gram(s) IV Push once  finasteride 5 milliGRAM(s) Oral daily  furosemide   Injectable 40 milliGRAM(s) IV Push daily  gabapentin 300 milliGRAM(s) Oral two times a day  gabapentin 600 milliGRAM(s) Oral at bedtime  glucagon  Injectable 1 milliGRAM(s) IntraMuscular once  glucagon  Injectable 1 milliGRAM(s) IntraMuscular once  heparin   Injectable 5000 Unit(s) SubCutaneous every 12 hours  insulin glargine Injectable (LANTUS) 16 Unit(s) SubCutaneous at bedtime  insulin lispro (ADMELOG) corrective regimen sliding scale   SubCutaneous three times a day before meals  insulin lispro (ADMELOG) corrective regimen sliding scale   SubCutaneous at bedtime  lactobacillus acidophilus 1 Tablet(s) Oral three times a day with meals  lidocaine   4% Patch 1 Patch Transdermal daily  metoprolol tartrate 25 milliGRAM(s) Oral daily  multivitamin 1 Tablet(s) Oral daily  pantoprazole    Tablet 40 milliGRAM(s) Oral before breakfast  potassium chloride    Tablet ER 20 milliEquivalent(s) Oral daily  simvastatin 10 milliGRAM(s) Oral at bedtime  tamsulosin 0.4 milliGRAM(s) Oral at bedtime    MEDICATIONS  (PRN):  dextrose Oral Gel 15 Gram(s) Oral once PRN Blood Glucose LESS THAN 70 milliGRAM(s)/deciliter  dextrose Oral Gel 15 Gram(s) Oral once PRN Blood Glucose LESS THAN 70 milliGRAM(s)/deciliter  HYDROmorphone   Tablet 6 milliGRAM(s) Oral every 6 hours PRN Moderate Pain (4 - 6)  ondansetron    Tablet 4 milliGRAM(s) Oral every 6 hours PRN for nausea  polyethylene glycol 3350 17 Gram(s) Oral at bedtime PRN Constipation  senna 2 Tablet(s) Oral at bedtime PRN Constipation      Allergies    No Known Allergies    Intolerances        SOCIAL HISTORY:    FAMILY HISTORY:  No pertinent family history in first degree relatives        REVIEW OF SYSTEMS:    CONSTITUTIONAL: No weakness, fevers or chills  EYES/ENT: No visual changes;  No vertigo or throat pain   NECK: No pain or stiffness  RESPIRATORY: No cough, wheezing, hemoptysis; No shortness of breath  CARDIOVASCULAR: No chest pain or palpitations  GASTROINTESTINAL: No abdominal or epigastric pain. No nausea, vomiting, or hematemesis; No diarrhea or constipation. No melena or hematochezia.  GENITOURINARY: No dysuria, frequency or hematuria  NEUROLOGICAL: No numbness or weakness  SKIN: No itching, burning, rashes, or lesions   PSYCH: Normal mood and affect  All other review of systems is negative unless indicated above.    Vital Signs Last 24 Hrs  T(C): 36.7 (29 May 2023 07:14), Max: 36.8 (28 May 2023 12:23)  T(F): 98.1 (29 May 2023 07:14), Max: 98.2 (28 May 2023 12:23)  HR: 87 (29 May 2023 07:14) (84 - 108)  BP: 134/75 (29 May 2023 07:14) (121/93 - 141/67)  BP(mean): 96 (28 May 2023 20:51) (96 - 96)  RR: 18 (29 May 2023 07:14) (16 - 18)  SpO2: 91% (29 May 2023 07:14) (91% - 96%)    Parameters below as of 29 May 2023 07:14  Patient On (Oxygen Delivery Method): nasal cannula  O2 Flow (L/min): 2      PHYSICAL EXAM:    Constitutional: No acute distress, well-developed, non-toxic appearing  HEENT: masked, good phonation, not icteric  Neck: supple, no lymphadenopathy  Respiratory: clear to ascultation bilaterally, no wheezing  Cardiovascular: S1 and S2, regular rate and rhythm, no murmurs rubs or gallops  Gastrointestinal: soft, non-tender, non-distended, +bowel sounds, no rebound or guarding, no surgical scars, no drains  Extremities: No peripheral edema, no cyanosis or clubbing  Vascular: 2+ peripheral pulses, no venous stasis  Neurological: A/O x 3, no focal deficits, no asterixis  Psychiatric: Normal mood, normal affect  Skin: No rashes, not jaundiced    LABS:                        9.1    9.66  )-----------( 351      ( 29 May 2023 06:34 )             31.2     05-29    143  |  112<H>  |  11  ----------------------------<  121<H>  4.1   |  24  |  0.82    Ca    9.4      29 May 2023 06:34    TPro  7.4  /  Alb  2.9<L>  /  TBili  0.6  /  DBili  x   /  AST  10<L>  /  ALT  17  /  AlkPhos  241<H>  05-29    PT/INR - ( 29 May 2023 06:34 )   PT: 14.0 sec;   INR: 1.20 ratio         PTT - ( 29 May 2023 06:34 )  PTT:32.0 sec  LIVER FUNCTIONS - ( 29 May 2023 06:34 )  Alb: 2.9 g/dL / Pro: 7.4 gm/dL / ALK PHOS: 241 U/L / ALT: 17 U/L / AST: 10 U/L / GGT: x             RADIOLOGY & ADDITIONAL STUDIES: Patient is a 63y old  Male who presents with a chief complaint of abdominal pain (28 May 2023 22:27)    63 year old man with CAD s/p CABG, DM 2, L AKA, PVD, R heart failure, gastroparesis, past choly, former heavy drinker, admitted with abdominal pain.     Patient states that a day prior to admission he had several episodes of nausea and vomiting. Vomiting was bilious. This associated with diffuse abdominal pain, sharp, non radiating, 4-10/10 in intensity, waxing and waning, without known exacerbating or alleviating factors. Denies any travel history or sick contacts. No eating raw or undercooked foods. No diarrhea. No overt signs of Gi bleeding like hematemesis, melena, hematochezia. He does say that his symptoms of gastroparesis are usually manifested as pain but nothing to this extent.     PAST MEDICAL & SURGICAL HISTORY:  HTN (hypertension)      DM (diabetes mellitus)      Anemia      GERD (gastroesophageal reflux disease)      S/P BKA (below knee amputation), left      CAD (coronary artery disease)      S/P CABG x 3      Status post amputation of leg      MEDICATIONS  (STANDING):  amLODIPine   Tablet 10 milliGRAM(s) Oral daily  aspirin enteric coated 81 milliGRAM(s) Oral daily  cefTRIAXone Injectable. 1000 milliGRAM(s) IV Push every 24 hours  clopidogrel Tablet 75 milliGRAM(s) Oral daily  dextrose 5%. 1000 milliLiter(s) (50 mL/Hr) IV Continuous <Continuous>  dextrose 5%. 1000 milliLiter(s) (100 mL/Hr) IV Continuous <Continuous>  dextrose 5%. 1000 milliLiter(s) (50 mL/Hr) IV Continuous <Continuous>  dextrose 5%. 1000 milliLiter(s) (100 mL/Hr) IV Continuous <Continuous>  dextrose 50% Injectable 25 Gram(s) IV Push once  dextrose 50% Injectable 12.5 Gram(s) IV Push once  dextrose 50% Injectable 25 Gram(s) IV Push once  dextrose 50% Injectable 12.5 Gram(s) IV Push once  dextrose 50% Injectable 25 Gram(s) IV Push once  dextrose 50% Injectable 25 Gram(s) IV Push once  finasteride 5 milliGRAM(s) Oral daily  furosemide   Injectable 40 milliGRAM(s) IV Push daily  gabapentin 300 milliGRAM(s) Oral two times a day  gabapentin 600 milliGRAM(s) Oral at bedtime  glucagon  Injectable 1 milliGRAM(s) IntraMuscular once  glucagon  Injectable 1 milliGRAM(s) IntraMuscular once  heparin   Injectable 5000 Unit(s) SubCutaneous every 12 hours  insulin glargine Injectable (LANTUS) 16 Unit(s) SubCutaneous at bedtime  insulin lispro (ADMELOG) corrective regimen sliding scale   SubCutaneous three times a day before meals  insulin lispro (ADMELOG) corrective regimen sliding scale   SubCutaneous at bedtime  lactobacillus acidophilus 1 Tablet(s) Oral three times a day with meals  lidocaine   4% Patch 1 Patch Transdermal daily  metoprolol tartrate 25 milliGRAM(s) Oral daily  multivitamin 1 Tablet(s) Oral daily  pantoprazole    Tablet 40 milliGRAM(s) Oral before breakfast  potassium chloride    Tablet ER 20 milliEquivalent(s) Oral daily  simvastatin 10 milliGRAM(s) Oral at bedtime  tamsulosin 0.4 milliGRAM(s) Oral at bedtime    MEDICATIONS  (PRN):  dextrose Oral Gel 15 Gram(s) Oral once PRN Blood Glucose LESS THAN 70 milliGRAM(s)/deciliter  dextrose Oral Gel 15 Gram(s) Oral once PRN Blood Glucose LESS THAN 70 milliGRAM(s)/deciliter  HYDROmorphone   Tablet 6 milliGRAM(s) Oral every 6 hours PRN Moderate Pain (4 - 6)  ondansetron    Tablet 4 milliGRAM(s) Oral every 6 hours PRN for nausea  polyethylene glycol 3350 17 Gram(s) Oral at bedtime PRN Constipation  senna 2 Tablet(s) Oral at bedtime PRN Constipation      Allergies    No Known Allergies    Intolerances    SOCIAL HISTORY:  former heavy drinking, quit alcohol 15 years ago, no smoking, no drugs    FAMILY HISTORY:  No pertinent family history in first degree relatives  no colon or stomach cancer    REVIEW OF SYSTEMS:    CONSTITUTIONAL: No weakness, fevers or chills  EYES/ENT: No visual changes;  No vertigo or throat pain   NECK: No pain or stiffness  RESPIRATORY: No cough, wheezing, hemoptysis; No shortness of breath  CARDIOVASCULAR: No chest pain or palpitations  GASTROINTESTINAL: see HPI  GENITOURINARY: No dysuria, frequency or hematuria  NEUROLOGICAL: No numbness or weakness  SKIN: No itching, burning, rashes, or lesions   PSYCH: Normal mood and affect  All other review of systems is negative unless indicated above.    Vital Signs Last 24 Hrs  T(C): 36.7 (29 May 2023 07:14), Max: 36.8 (28 May 2023 12:23)  T(F): 98.1 (29 May 2023 07:14), Max: 98.2 (28 May 2023 12:23)  HR: 87 (29 May 2023 07:14) (84 - 108)  BP: 134/75 (29 May 2023 07:14) (121/93 - 141/67)  BP(mean): 96 (28 May 2023 20:51) (96 - 96)  RR: 18 (29 May 2023 07:14) (16 - 18)  SpO2: 91% (29 May 2023 07:14) (91% - 96%)    Parameters below as of 29 May 2023 07:14  Patient On (Oxygen Delivery Method): nasal cannula  O2 Flow (L/min): 2      PHYSICAL EXAM:    Constitutional: in mild acute distress, uncomfortable  HEENT: unmasked, good phonation, not icteric  Neck: supple, no lymphadenopathy  Respiratory: decreased sounds at bases, urmila R lung, otherwise clear to ascultation bilaterally, no wheezing  Cardiovascular: S1 and S2, regular rate and rhythm, no murmurs rubs or gallops  Gastrointestinal: soft, tender to deep palpation, non-distended, +bowel sounds, no rebound or guarding, + surgical scars, no drains  Extremities: 1+ peripheral edema, no cyanosis or clubbing  Vascular: 2+ peripheral pulses, no venous stasis  Neurological: A/O x 3, no focal deficits, no asterixis  Psychiatric: Normal mood, normal affect  Skin: No rashes, not jaundiced    LABS:                        9.1    9.66  )-----------( 351      ( 29 May 2023 06:34 )             31.2     05-29    143  |  112<H>  |  11  ----------------------------<  121<H>  4.1   |  24  |  0.82    Ca    9.4      29 May 2023 06:34    TPro  7.4  /  Alb  2.9<L>  /  TBili  0.6  /  DBili  x   /  AST  10<L>  /  ALT  17  /  AlkPhos  241<H>  05-29    PT/INR - ( 29 May 2023 06:34 )   PT: 14.0 sec;   INR: 1.20 ratio         PTT - ( 29 May 2023 06:34 )  PTT:32.0 sec  LIVER FUNCTIONS - ( 29 May 2023 06:34 )  Alb: 2.9 g/dL / Pro: 7.4 gm/dL / ALK PHOS: 241 U/L / ALT: 17 U/L / AST: 10 U/L / GGT: x             RADIOLOGY & ADDITIONAL STUDIES: CT with hepatic congestion, pleural effusions, cirrhosis, s/p choly, non dilated ducts

## 2023-05-30 NOTE — PROGRESS NOTE ADULT - ASSESSMENT
63 year old woman with type 2 DM, gastroparesis, HTN, HLD, CAD, hx of CABG, MV repair, peripheral vascular disease, hx of L BKA, R toe amputation, hepatic cirrhosis, chronic anemia, presented for further evaluation of epigastric abdominal discomfort, different than typical gastroparesis symptoms. Had associated nausea. No hematemesis, hematochezia or melena. On ROS he also reports dyspnea on exertion. No chest pain or tightness. No diaphoresis. In the ED, patient was found to have mild leukocytosis, stable Hgb, negative troponin, elevated BNP, CT findings of mod R, small L pleural effusion, ground glass opacities, interlobular septal thickening, hepatic cirrhosis, small ascites. Patient was given analgesics, antiemetics, ceftriaxone and furosemide and admitted to Medicine.     Epigastric abdominal discomfort with SIRS, unable to rule out sepsis, present upon admission  Hx of gastroparesis but feels different than his usual gastroparesis sx. SIRS in ED (, WBC 12.3). Could have SBP in setting of cirrhosis and small amount of ascites. Peritoneal fluid sampled this AM, total nucleated cells 360, which could be consistent with SBP. Peritoneal fluid culture in process. On empiric ceftriaxone. Abd pain now mild in severity at this point. Tolerating PO.   - Continue Empiric ceftriaxone.  - F/u in process peritoneal fluid culture  - Empiric Carafate QID, PPI BID  - F/u with GI for possible EGD, timing TBD    Acute respiratory failure with hypoxia   On O2. Multifactorial due to pleural effusion, possible pneumonia (see below), possible CHF (see below)  - Continue O2 supplementation, wean O2 as tolerated  - Encourage incentive spirometry  - Continue to treat underlying issues    Airspace changes on CT, cannot r/o pneumonia, cannot r/o Gram-negative organism, present upon admission  Etiology unclear. Dedicated CT chest done. Pleural effusions, right greater than left, with septal thickening and groundglass opacities. Right middle and lower lobe opacities likely reflect atelectasis. Small volume mediastinal adenopathy, possibly reactive.  - Continue empiric ceftriaxone and azithromycin    Moderate R and small L pleural effusions  Etiology unclear. Cannot r/o parapneumonic effusion, unable to r/o CHF with exacerbation, also could be related to his hepatic cirrhosis. Received thoracentesis this afternoon with chest tube placement, 700cc drained, tube now clamped.   - Plan to unclamp chest tube in AM to drain up to 1L, Thoracic Surgery following  - Continue trial of IV Lasix as he does appear hypervolemic  - F/u pending pleural fluid studies    Elevated BNP  BNP elevated, 7681. CT A/P notable for cardiomegaly, GGO and interlobular septal thickening that could be pulm edema.   - TTE ordered to assess LVEF, assess MV repair, etc, still pending    CAD, hx of CABG  No angina. Troponin negative. EKG NSR, rate WNL, nonspecific ST T changes.   - Continue aspirin, statin, metoprolol    Microscopic hematuria  No urinary complaints  - Repeat Ua, obtain Ur cx    Hepatic cirrhosis  No encephalopathy. No known varices. Small ascites. Had paracentesis today. 100cc clear yellow fluid removed. SAAG 0.8. total nucleated calls 360.   - On ceftriaxone  - F/u in process peritoneal fluid culture    Anemia  May be due to chronic disease, cirrhosis  - Check iron studies    IDDM  A1c 6.7  - Continue insulin     BPH  Stable  - Continue Flomax    Severe protein calorie malnutrition  Appreciate dietician input  - Trend weight  - Added thiamine and MVI daily      DVT px: Heparin subcut  Code status: Full  Dispo: Return to [place] (long term) once clinically improved and inpatient workup is complete   63 year old man with type 2 DM, gastroparesis, HTN, HLD, CAD, hx of CABG, MV repair, peripheral vascular disease, hx of L BKA, R toe amputation, hepatic cirrhosis, chronic anemia, presented for further evaluation of epigastric abdominal discomfort, different than typical gastroparesis symptoms. Had associated nausea. No hematemesis, hematochezia or melena. On ROS he also reports dyspnea on exertion. No chest pain or tightness. No diaphoresis. In the ED, patient was found to have mild leukocytosis, stable Hgb, negative troponin, elevated BNP, CT findings of mod R, small L pleural effusion, ground glass opacities, interlobular septal thickening, hepatic cirrhosis, small ascites. Patient was given analgesics, antiemetics, ceftriaxone and furosemide and admitted to Medicine.     Epigastric abdominal discomfort with SIRS, unable to rule out sepsis, present upon admission  Hx of gastroparesis but feels different than his usual gastroparesis sx. SIRS in ED (, WBC 12.3). Could have SBP in setting of cirrhosis and small amount of ascites. Peritoneal fluid sampled this AM, total nucleated cells 360, which could be consistent with SBP. Peritoneal fluid culture in process. On empiric ceftriaxone. Abd pain now mild in severity at this point. Tolerating PO.   - Continue Empiric ceftriaxone.  - F/u in process peritoneal fluid culture  - Empiric Carafate QID, PPI BID  - F/u with GI for possible EGD, timing TBD    Acute respiratory failure with hypoxia   On O2. Multifactorial due to pleural effusion, possible pneumonia (see below), possible CHF (see below)  - Continue O2 supplementation, wean O2 as tolerated  - Encourage incentive spirometry  - Continue to treat underlying issues    Airspace changes on CT, cannot r/o pneumonia, cannot r/o Gram-negative organism, present upon admission  Etiology unclear. Dedicated CT chest done. Pleural effusions, right greater than left, with septal thickening and groundglass opacities. Right middle and lower lobe opacities likely reflect atelectasis. Small volume mediastinal adenopathy, possibly reactive.  - Continue empiric ceftriaxone and azithromycin    Moderate R and small L pleural effusions  Etiology unclear. Cannot r/o parapneumonic effusion, unable to r/o CHF with exacerbation, also could be related to his hepatic cirrhosis. Received thoracentesis this afternoon with chest tube placement, 700cc drained, tube now clamped.   - Plan to unclamp chest tube in AM to drain up to 1L, Thoracic Surgery following  - Continue trial of IV Lasix as he does appear hypervolemic  - F/u pending pleural fluid studies    Elevated BNP  BNP elevated, 7681. CT A/P notable for cardiomegaly, GGO and interlobular septal thickening that could be pulm edema.   - TTE ordered to assess LVEF, assess MV repair, etc, still pending    CAD, hx of CABG  No angina. Troponin negative. EKG NSR, rate WNL, nonspecific ST T changes.   - Continue aspirin, statin, metoprolol    Microscopic hematuria  No urinary complaints  - Repeat Ua, obtain Ur cx    Hepatic cirrhosis  No encephalopathy. No known varices. Small ascites. Had paracentesis today. 100cc clear yellow fluid removed. SAAG 0.8. total nucleated calls 360.   - On ceftriaxone  - F/u in process peritoneal fluid culture    Anemia  May be due to chronic disease, cirrhosis  - Check iron studies    IDDM  A1c 6.7  - Continue insulin     BPH  Stable  - Continue Flomax    Severe protein calorie malnutrition  Appreciate dietician input  - Trend weight  - Added thiamine and MVI daily      DVT px: Heparin subcut  Code status: Full  Dispo: Return to [place] (long term) once clinically improved and inpatient workup is complete   63 year old man with type 2 DM, gastroparesis, HTN, HLD, CAD, hx of CABG, MV repair, peripheral vascular disease, hx of L BKA, R toe amputation, hepatic cirrhosis, chronic anemia, presented for further evaluation of epigastric abdominal discomfort, different than typical gastroparesis symptoms. Had associated nausea. No hematemesis, hematochezia or melena. On ROS he also reported dyspnea on exertion. No chest pain or tightness. No diaphoresis. In the ED, patient was found to have mild leukocytosis, stable Hgb, negative troponin, elevated BNP, CT findings of mod R, small L pleural effusion, ground glass opacities, interlobular septal thickening, hepatic cirrhosis, small ascites. Patient was given analgesics, antiemetics, ceftriaxone and furosemide and admitted to Medicine.     Epigastric abdominal discomfort with SIRS, unable to rule out sepsis, present upon admission  Hx of gastroparesis but feels different than his usual gastroparesis sx. SIRS in ED (, WBC 12.3). Could have SBP in setting of cirrhosis and small amount of ascites. Peritoneal fluid sampled this AM, total nucleated cells 360, which could be consistent with SBP. Peritoneal fluid culture in-process. On empiric ceftriaxone. Abd pain now mild in severity at this point. Tolerating PO. DDx also includes gastritis peptic ulcer.    - Continue Empiric ceftriaxone.  - F/u in-process peritoneal fluid culture  - Empiric Carafate QID, PPI BID  - F/u with GI for possible EGD, timing TBD    Acute respiratory failure with hypoxia   On O2. Multifactorial due to pleural effusion, possible pneumonia, possible CHF. See below. Weaned to room air this afternoon following thoracentesis, thoracostomy with pigtail drainage catheter.   - Continue O2 supplementation, wean O2 as tolerated  - Encourage incentive spirometry  - Continue to treat underlying issues  - Management of thoracostomy tube by Thoracic Surgery    Airspace changes on CT, cannot r/o pneumonia, cannot r/o Gram-negative organism, present upon admission  Etiology unclear. Dedicated CT chest done. Pleural effusions, right greater than left, with septal thickening and groundglass opacities. Right middle and lower lobe opacities likely reflect atelectasis. Small volume mediastinal adenopathy, possibly reactive.  - Continue empiric ceftriaxone and azithromycin    Moderate R and small L pleural effusions  Etiology unclear. Cannot r/o parapneumonic effusion, unable to r/o CHF with exacerbation, also could be related to his hepatic cirrhosis. Received thoracentesis this afternoon with chest tube placement, 700cc drained, tube now clamped.   - F/u pending pleural fluid studies  - Plan to unclamp chest tube in AM to drain up to 1L, Thoracic Surgery following  - Continue trial of IV Lasix as he does appear hypervolemic    Elevated BNP  BNP elevated, 7681. CT A/P notable for cardiomegaly, GGO and interlobular septal thickening that could be pulm edema.   - TTE ordered to assess LVEF, assess MV repair, etc, still pending    CAD, hx of CABG  No angina. Troponin negative. EKG NSR, rate WNL, nonspecific ST T changes.   - Continue aspirin, statin, metoprolol    Microscopic hematuria  No urinary complaints  - Repeat Ua, obtain Ur cx    Hepatic cirrhosis  No encephalopathy. No known varices. Small ascites. Had paracentesis today. 100cc clear yellow fluid removed. SAAG 0.8. total nucleated calls 360.   - On ceftriaxone  - F/u in process peritoneal fluid culture    Anemia  May be due to chronic disease, cirrhosis  - Check iron studies    IDDM  A1c 6.7  - Continue insulin     BPH  Stable  - Continue Flomax    Severe protein calorie malnutrition  Appreciate dietician input  - Trend weight  - Added thiamine and MVI daily      DVT px: Heparin subcut  Code status: Full  Dispo: Return to [place] (long term) once clinically improved and inpatient workup is complete

## 2023-05-30 NOTE — DIETITIAN NUTRITION RISK NOTIFICATION - TREATMENT: THE FOLLOWING DIET HAS BEEN RECOMMENDED
Diet, Regular:   Consistent Carbohydrate {Evening Snack} (CSTCHOSN)  DASH/TLC {Sodium & Cholesterol Restricted} (DASH) (05-28-23 @ 23:11) [Active]

## 2023-05-30 NOTE — PROGRESS NOTE ADULT - PROBLEM SELECTOR PLAN 4
·  Problem: Pleural effusion.   ·  Recommendation: Lasix daily IVP. Monitor renal function.  Thoracentesis planned as above.  Echo pending

## 2023-05-30 NOTE — PROGRESS NOTE ADULT - ASSESSMENT
64 y/o M w/ PMH of DM2, PVD, gastroparesis, anemia, DM2, GERD, CAD s/p CABG, cirrhosis, MV repair, HTN, dyslipidemia, p/w abdominal pain. Patient states abdominal pain in epigastric area. Patient states that he had about 8 episodes of non-bloody emesis last night, and 2 episodes prior to arrival. Also c/o chills. Patient denies any diarrhea, CP, SOB, cough, melena, hematochezia, fever, cough, runny nose, sore throat. Here wbc ct 12, UA positive, imaging with Findings suggestive of CHF with worsening bilateral pleural effusions likely mild pulmonary edema seen at the lung bases. Suggestion of cirrhosis. Passive hepatic congestion. Increased small volume ascites. Diffuse bladder wall thickening.   Mild upper abdominal and retroperitoneal lymphadenopathy, similar to prior and likely reactive. Was given IV rocephin for possible UTI.     1. Cirrhosis with ascites. RLE s/p skin graft. PVD  - f/u urine cx, blood cx - no growth  - on rocephin 1gm daily #3  - gi follow up noted paracentesis planned f/u ascitic fluid/cx  - if no evidence of sbp based on pmns/cx can stop abx  - fu cbc  - monitor temps  - tolerating abx well so far; no side effects noted  - reason for abx use and side effects reviewed with patient  - supportive care    2. other issues - care per medicine

## 2023-05-30 NOTE — DIETITIAN INITIAL EVALUATION ADULT - ADD RECOMMEND
Liberalize diet to 2 Gm Na.    Record PO intake in EMR after each meal (nursing.)   MVI w/ minerals daily to ensure 100% RDA met   Consider adding thiamine 100 mg daily 2/2 poor PO intake/ malnutrition  Monitor bowel movements, if no BM for >3 days, consider implementing bowel regimen.   Monitor PO intake, tolerance, labs and weight.

## 2023-05-30 NOTE — DIETITIAN INITIAL EVALUATION ADULT - ORAL INTAKE PTA/DIET HISTORY
Pt lives in [facility].  He reports that they serve a lot of carbs, but he tries to eat a lot of vegetables.  PO intake estimated < 75% ENN > one month

## 2023-05-30 NOTE — PROGRESS NOTE ADULT - PROBLEM SELECTOR PLAN 2
·  Problem: CAD (coronary artery disease).   ·  Recommendation: Troponin negative. ? vascular congestion on CXR. continue lasix, right diagnostic thoracentesis planned as per pulmonary.  Echo pending

## 2023-05-30 NOTE — DIETITIAN INITIAL EVALUATION ADULT - PERTINENT LABORATORY DATA
05-30    142  |  113<H>  |  16  ----------------------------<  117<H>  4.3   |  26  |  1.05    Ca    8.9      30 May 2023 07:10    TPro  7.3  /  Alb  2.9<L>  /  TBili  0.4  /  DBili  0.3  /  AST  9<L>  /  ALT  14  /  AlkPhos  237<H>  05-30  POCT Blood Glucose.: 150 mg/dL (05-30-23 @ 11:48)  A1C with Estimated Average Glucose Result: 6.7 % (05-29-23 @ 06:34)  A1C with Estimated Average Glucose Result: 7.7 % (12-02-22 @ 07:48)

## 2023-05-30 NOTE — PROCEDURE NOTE - SPECIMEN OBTAINED
Physical Therapy  Visit Type: treatment  Precautions:  Medical precautions:  fall risk; standard precautions.  R BKA, Fluid restriction, HOB no >30 degrees per wound recommendations  Lines:     Basic: urinary catheter, capped IV, O2 and telemetry (2L)      Lines in chart and on patient reviewed, cautions maintained throughout session.  Safety Measures: bed alarm    SUBJECTIVE  Patient agreed to participate in therapy this date.    Patient is a 58 year old male admitted to [facility] for Shortness of breath, weight gain, acute exacerbation of CHF.  Patient has h/o L dorsal foot wound, R BKA, diastolic CHF,pulmonary hypertension, sleep apnea and  CKD4.  Pt lives with spouse and in a single level home with 3 steps to enter.  At baseline, patient is Modified Independent with functional mobility tasks using standard cane, R prosthetic.    Patient / Family Goal: return to previous functional status and maximize function     OBJECTIVE     Oriented to person, place, time and situation     Arousal alertness: appropriate responses to stimuli    Affect/Behavior: alert  Patient activity tolerance: 1 to 2 activity to rest  Functional Communication/Cognition    Overall status:  Within functional limits    Bed Mobility:    Rolling left: supervision      Supine to sit: patient refused  Training completed:    Tasks: rolling left    Education details: body mechanics, patient safety and patient requires additional training    At beginning of treatment patient cooperative and willing to work with therapy. After beginning bed mobility, patient reported discomfort due to scrotal edema and requested no further therapy. Importance and goals of therapy expressed to patient by writer. Patient refused to participate.   Transfers:      Sit to stand: patient refused            ASSESSMENT    Impairments: pain, edema and activity tolerance  Functional Limitations: all functional mobility    Visit # since seen by PT:  4    Patient is displaying limited  progress as evidenced by difficulty participating in therapy due to scrotal edema.  At this time the patient continues to demonstrate increased edema, pain, decreased activity tolerance which is limiting the completion of all functional mobility.  Further skilled physical therapy is required to address these limitations in attempt to maximize the patient's independence.         Discharge Recommendations  Recommendation for Discharge: PT WI: OP therapy, Home             PT/OT Mobility Equipment for Discharge: TBD   PT/OT ADL Equipment for Discharge: none anticipated        Skilled therapy is required to address these limitations in attempt to maximize the patient's independence.  Progress: slow progress, medical status limitations    End of Session:   Location: in bed  Safety measures: alarm system in place/re-engaged, lines intact, bed rails x2 and call light within reach    PLAN    Suggestions for next session as indicated: Bed mobility, transfers and gait with best device    PT Frequency: Once a day         Agreement to plan and goals: patient agrees with goals and treatment plan        GOALS  Review Date: 3/12/2021  Long Term Goals: (to be met by time of discharge from hospital)  Sit to stand: Patient will complete sit to stand transfer with single point cane, modified independent.   Status: progressing/ongoing  Stand to sit: Patient will complete stand to sit transfer with single point cane, modified independent.   Status: progressing/ongoing  Stand pivot: Patient will complete stand pivot transfer with single point cane, modified independent.   Status: progressing/ongoing  Ambulation (even): Patient will ambulate on even surface for 50 feet with single point cane, modified independent.   Status: progressing/ongoing  3-4 steps: Patient will ambulate 3-4 steps with using one rail, supervision.  Status: progressing/ongoing    Documented in the chart in the following areas: Pain.      Therapy procedure time and  total treatment time can be found documented on the Time Entry flowsheet   Fluid sent for chemistry/Fluid sent for cytology/Fluid sent for gram stain and culture

## 2023-05-30 NOTE — PROGRESS NOTE ADULT - ASSESSMENT
63 year old man with CAD s/p CABG, DM 2, L AKA, PVD, R heart failure, gastroparesis, past choley, former heavy drinker, admitted with abdominal pain.    Imp: epigastric pain   With some clinical improvement s/p PPI/carafate    Cirrhotic with small ascites, s/p para this am- diagnostic- 100ml removed.  Will get MRCP evaluate liver/pancreas further.  Awaiting thoracentesis for pleural effusion, should send off albumin to determine SAAG    Rec:  ::Continue PPI BID/carafate QID  ::Diet as david  ::Fluid analysis pending  ::Should pain continue, possible inpatient EGD  ::Will need outpatient hepatologist- screening and maintenance

## 2023-05-30 NOTE — DIETITIAN INITIAL EVALUATION ADULT - OTHER INFO
64 y/o M w/ PMH of DM2, PVD, gastroparesis, anemia, DM2, GERD, CAD s/p CABG, cirrhosis, MV repair, HTN, dyslipidemia, p/w abdominal pain. Patient states abdominal pain started yeterday afternoon in epigastric area. Patient states that he had about 8 episodes of non-bloody emesis last night, and 2 episodes today prior to arrival. Also c/o chills. Patient denies any diarrhea, CP, SOB, cough, melena, hematochezia, fever, cough, runny nose, sore throat.    Visited with pt at bedside.  Pt has type 2 DM, tries to eat healthfully with good vegetable intake.   HgbA1c is 6.7  Pt on POCT  NFPE reveals muscle wasting, fat wasting   PO intake estimated < 75% ENN > one month   Pt has BKA  Recommendations to follow in Plan/Intervention

## 2023-05-30 NOTE — PROGRESS NOTE ADULT - SUBJECTIVE AND OBJECTIVE BOX
Chief Complaint: Abdominal pain    Interval Hx: Patient reports some subjective improvement since presentation. Still with some abdominal discomfort, now rather mild, epigastric, non radiating. Not exacerbating with eating. No nausea or vomiting. No diarrhea or constipation. No fever or rigors. He does have some dyspnea on exertion. No chest pain or tightness. No cough. No other complaints. Underwent diagnostic paracentesis this AM. 100cc of clear yellow fluid removed. Total nucleated cells > 250. SAAG < 1. This afternoon, R chest tube was placed w/ 700cc out. Studies are pending.     ROS: Multi system review is comprehensively negative x 10 systems except as above    Vitals:  T(F): 98.2 (30 May 2023 07:43), Max: 98.2 (30 May 2023 07:43)  HR: 78 (30 May 2023 07:43) (77 - 78)  BP: 132/67 (30 May 2023 07:43) (121/71 - 138/79)  RR: 18 (30 May 2023 07:43) (16 - 18)  SpO2: 93% (30 May 2023 07:43) (93% - 94%) on room air    Exam:  Gen: No acute distress. Sitting up in bed, resting comfortably  HEENT: NCAT PERRL EOMI anicteric sclera MMM clear oropharynx  Neck: Supple, no JVD, no LAD  Chest: Normal resp effort at rest, diminished breath sounds in bases B/L, Rt chest tube clamped  CVS: s1 s2 normal RRR  Abd: +BS, soft, non distended, mildly tender in epigastrium, no organomegaly  Ext: L BKA. R toe ambulation.   Skin: Warm, dry  Mood: Calm, pleasant  Neuro: A+OX3, no deficits, no asterixis     Labs:                                 8.7    8.47  )--------( 322             29.7       142  |  113  |  16  -----------------------<  117  4.3   |   26   |  1.05    Ca 8.9    TPro  7.3  /  Alb  2.9  /  TBili  0.4  /  DBili  0.3  /  AST  9  /  ALT  14  /  AlkPhos  237    PT: 14.0 sec;   INR: 1.20 ratio  PTT: 32.0 sec    Troponin negative   proBNP 7681    A1c 6.7  LDL 87    Urinalysis   Color: Yellow / Appearance: Clear / S.020 / pH: x  Gluc: x / Ketone: Moderate  / Bili: Negative / Urobili: 1   Blood: x / Protein: 500 mg/dL / Nitrite: Negative   Leuk Esterase: Negative / RBC: 25-50 /HPF / WBC 3-5 /HPF   Sq Epi: x / Non Sq Epi: x / Bacteria: Few    Ascitic Fluid   Tprot 3.8 (serum 7.3)  Alb 2.1 (serum 2.9), SAAG   LDH 95 (serum )  Total nucleated cells 360, could be indicative of SBP    Pleural Fluid   Studies pending    Micro:  Pleural fluid culture : In process  Peritoneal fluid culture : In process  Urine culture : Negative  Blood culture x 2 : Negative  COVID19 PCR : Negative    Imaging:  CT A/P w/ PO/IV Cont : Moderate right and small left pleural effusions with associated bilateral basilar consolidation/atelectasis. Geographic groundglass opacities and interlobular septal thickening, which may represent pulmonary edema. Cardiomegaly. Mitral valve prosthesis. Prior CABG. Diffusely heterogeneous appearance of the liver, suggestive of passive hepatic congestion. Nodular surface contour of the inferior right hepatic lobe, suggestive of cirrhosis. Liver is enlarged and measures 20.5 cm in length. Normal bile ducts. Cholecystectomy. Normal spleen. Atrophic pancreas. No pancreatic ductal dilatation. No significant peripancreatic inflammation. Normal adrenals. No renal stones or hydronephrosis. Stable indeterminate 13 mm exophytic left renal lesion. Diffuse bladder wall thickening. Prostate within normal limits. No bowel obstruction. Colonic diverticulosis without evidence of acute diverticulitis. Normal appendix. Small volume abdominopelvic ascites. Atherosclerotic changes. Mildly enlarged retroperitoneal lymph nodes measuring up to 1.2 cm short axis at the left para-aortic region. Mildly enlarged aortocaval nodes measuring up to 1.4 cm short axis. Diffuse body wall edema, worst along the right anterior lateral abdominal wall. Small fat-containing left inguinal hernia. Degenerative bone changes.    Cardiac Testing:  EKG : NSR, rate WNL, ST/T changes.    EKG : Sinus tachycardia, rate 110. RV conduction delay. ST/T changes.    Prior cardiac testing  TTE 2022:  Endocardium is not well visualized, however, overall left ventricular systolic function appears normal. Mild concentric left ventricular hypertrophy. Septal flattening is seen; this finding is consistent with right heart pressure / volume overload. Estimated left ventricular ejection fraction is 55-60%. The right ventricle exhibits mild dilation, mild diffuse hypokinesis, and mild depression of contractility. Severe mitral stenosis. Mild mitral regurgitation. Mild to moderate tricuspid valve regurgitation. Severe pulmonary hypertension.    Meds:  MEDICATIONS  (STANDING):  amLODIPine   Tablet 10 milliGRAM(s) Oral daily  aspirin enteric coated 81 milliGRAM(s) Oral daily  azithromycin   Tablet 500 milliGRAM(s) Oral every 24 hours  cefTRIAXone Injectable. 1000 milliGRAM(s) IV Push every 24 hours  clopidogrel Tablet 75 milliGRAM(s) Oral daily  finasteride 5 milliGRAM(s) Oral daily  furosemide   Injectable 40 milliGRAM(s) IV Push two times a day  gabapentin 300 milliGRAM(s) Oral two times a day  gabapentin 600 milliGRAM(s) Oral at bedtime  heparin   Injectable 5000 Unit(s) SubCutaneous every 12 hours  insulin glargine Injectable (LANTUS) 16 Unit(s) SubCutaneous at bedtime  insulin lispro (ADMELOG) corrective regimen sliding scale   SubCutaneous three times a day before meals  insulin lispro (ADMELOG) corrective regimen sliding scale   SubCutaneous at bedtime  lactobacillus acidophilus 1 Tablet(s) Oral three times a day with meals  lidocaine   4% Patch 1 Patch Transdermal daily  metoprolol tartrate 25 milliGRAM(s) Oral daily  multivitamin 1 Tablet(s) Oral daily  pantoprazole  Injectable 40 milliGRAM(s) IV Push two times a day  potassium chloride    Tablet ER 20 milliEquivalent(s) Oral daily  simvastatin 10 milliGRAM(s) Oral at bedtime  sodium chloride 0.9% Bolus 500 milliLiter(s) IV Bolus once  sucralfate suspension 1 Gram(s) Oral four times a day  tamsulosin 0.4 milliGRAM(s) Oral at bedtime    MEDICATIONS  (PRN):  dextrose Oral Gel 15 Gram(s) Oral once PRN Blood Glucose LESS THAN 70 milliGRAM(s)/deciliter  dextrose Oral Gel 15 Gram(s) Oral once PRN Blood Glucose LESS THAN 70 milliGRAM(s)/deciliter  HYDROmorphone   Tablet 6 milliGRAM(s) Oral every 6 hours PRN Moderate Pain (4 - 6)  HYDROmorphone  Injectable 0.5 milliGRAM(s) IV Push every 4 hours PRN Severe Pain (7 - 10)  ondansetron    Tablet 4 milliGRAM(s) Oral every 6 hours PRN for nausea  polyethylene glycol 3350 17 Gram(s) Oral at bedtime PRN Constipation  senna 2 Tablet(s) Oral at bedtime PRN Constipation                           Chief Complaint: Abdominal pain    Interval Hx: Patient reports some subjective improvement since presentation. Still with some abdominal discomfort, now rather mild, epigastric, non radiating. Not exacerbating with eating. No nausea or vomiting. No diarrhea or constipation. No fever or rigors. He does have some dyspnea on exertion. No chest pain or tightness. No cough. No other complaints. Underwent diagnostic paracentesis this AM. 100cc of clear yellow fluid removed. Total nucleated cells > 250. SAAG < 1. This afternoon, R chest tube was placed w/ 700cc out. Studies are pending. Patient oxygenating better now, weaned to room air.     ROS: Multi system review is comprehensively negative x 10 systems except as above    Vitals:  T(F): 98.2 (30 May 2023 07:43), Max: 98.2 (30 May 2023 07:43)  HR: 78 (30 May 2023 07:43) (77 - 78)  BP: 132/67 (30 May 2023 07:43) (121/71 - 138/79)  RR: 18 (30 May 2023 07:43) (16 - 18)  SpO2: 93% (30 May 2023 07:43) (93% - 94%) on room air    Exam:  Gen: No acute distress. Sitting up in bed, resting comfortably  HEENT: NCAT PERRL EOMI anicteric sclera MMM clear oropharynx  Neck: Supple, no JVD, no LAD  Chest: Normal resp effort at rest, diminished breath sounds in bases B/L, Rt chest tube clamped  CVS: s1 s2 normal RRR  Abd: +BS, soft, non distended, mildly tender in epigastrium, no organomegaly  Ext: L BKA. R toe ambulation.   Skin: Warm, dry  Mood: Calm, pleasant  Neuro: A+OX3, no deficits, no asterixis     Labs:                                 8.7    8.47  )--------( 322             29.7       142  |  113  |  16  -----------------------<  117  4.3   |   26   |  1.05    Ca 8.9    TPro  7.3  /  Alb  2.9  /  TBili  0.4  /  DBili  0.3  /  AST  9  /  ALT  14  /  AlkPhos  237    PT: 14.0 sec;   INR: 1.20 ratio  PTT: 32.0 sec    Troponin negative   proBNP 7681    A1c 6.7  LDL 87    Urinalysis   Color: Yellow / Appearance: Clear / S.020 / pH: x  Gluc: x / Ketone: Moderate  / Bili: Negative / Urobili: 1   Blood: x / Protein: 500 mg/dL / Nitrite: Negative   Leuk Esterase: Negative / RBC: 25-50 /HPF / WBC 3-5 /HPF   Sq Epi: x / Non Sq Epi: x / Bacteria: Few    Ascitic Fluid   Tprot 3.8 (serum 7.3)  Alb 2.1 (serum 2.9), SAAG   LDH 95 (serum )  Total nucleated cells 360, could be indicative of SBP    Pleural Fluid   Studies pending    Micro:  Pleural fluid culture : In process  Peritoneal fluid culture : In process  Urine culture : Negative  Blood culture x 2 : Negative  COVID19 PCR : Negative    Imaging:  CT A/P w/ PO/IV Cont : Moderate right and small left pleural effusions with associated bilateral basilar consolidation/atelectasis. Geographic groundglass opacities and interlobular septal thickening, which may represent pulmonary edema. Cardiomegaly. Mitral valve prosthesis. Prior CABG. Diffusely heterogeneous appearance of the liver, suggestive of passive hepatic congestion. Nodular surface contour of the inferior right hepatic lobe, suggestive of cirrhosis. Liver is enlarged and measures 20.5 cm in length. Normal bile ducts. Cholecystectomy. Normal spleen. Atrophic pancreas. No pancreatic ductal dilatation. No significant peripancreatic inflammation. Normal adrenals. No renal stones or hydronephrosis. Stable indeterminate 13 mm exophytic left renal lesion. Diffuse bladder wall thickening. Prostate within normal limits. No bowel obstruction. Colonic diverticulosis without evidence of acute diverticulitis. Normal appendix. Small volume abdominopelvic ascites. Atherosclerotic changes. Mildly enlarged retroperitoneal lymph nodes measuring up to 1.2 cm short axis at the left para-aortic region. Mildly enlarged aortocaval nodes measuring up to 1.4 cm short axis. Diffuse body wall edema, worst along the right anterior lateral abdominal wall. Small fat-containing left inguinal hernia. Degenerative bone changes.    Cardiac Testing:  EKG : NSR, rate WNL, ST/T changes.    EKG : Sinus tachycardia, rate 110. RV conduction delay. ST/T changes.    Prior cardiac testing  TTE 2022:  Endocardium is not well visualized, however, overall left ventricular systolic function appears normal. Mild concentric left ventricular hypertrophy. Septal flattening is seen; this finding is consistent with right heart pressure / volume overload. Estimated left ventricular ejection fraction is 55-60%. The right ventricle exhibits mild dilation, mild diffuse hypokinesis, and mild depression of contractility. Severe mitral stenosis. Mild mitral regurgitation. Mild to moderate tricuspid valve regurgitation. Severe pulmonary hypertension.    Meds:  MEDICATIONS  (STANDING):  amLODIPine   Tablet 10 milliGRAM(s) Oral daily  aspirin enteric coated 81 milliGRAM(s) Oral daily  azithromycin   Tablet 500 milliGRAM(s) Oral every 24 hours  cefTRIAXone Injectable. 1000 milliGRAM(s) IV Push every 24 hours  clopidogrel Tablet 75 milliGRAM(s) Oral daily  finasteride 5 milliGRAM(s) Oral daily  furosemide   Injectable 40 milliGRAM(s) IV Push two times a day  gabapentin 300 milliGRAM(s) Oral two times a day  gabapentin 600 milliGRAM(s) Oral at bedtime  heparin   Injectable 5000 Unit(s) SubCutaneous every 12 hours  insulin glargine Injectable (LANTUS) 16 Unit(s) SubCutaneous at bedtime  insulin lispro (ADMELOG) corrective regimen sliding scale   SubCutaneous three times a day before meals  insulin lispro (ADMELOG) corrective regimen sliding scale   SubCutaneous at bedtime  lactobacillus acidophilus 1 Tablet(s) Oral three times a day with meals  lidocaine   4% Patch 1 Patch Transdermal daily  metoprolol tartrate 25 milliGRAM(s) Oral daily  multivitamin 1 Tablet(s) Oral daily  pantoprazole  Injectable 40 milliGRAM(s) IV Push two times a day  potassium chloride    Tablet ER 20 milliEquivalent(s) Oral daily  simvastatin 10 milliGRAM(s) Oral at bedtime  sodium chloride 0.9% Bolus 500 milliLiter(s) IV Bolus once  sucralfate suspension 1 Gram(s) Oral four times a day  tamsulosin 0.4 milliGRAM(s) Oral at bedtime    MEDICATIONS  (PRN):  dextrose Oral Gel 15 Gram(s) Oral once PRN Blood Glucose LESS THAN 70 milliGRAM(s)/deciliter  dextrose Oral Gel 15 Gram(s) Oral once PRN Blood Glucose LESS THAN 70 milliGRAM(s)/deciliter  HYDROmorphone   Tablet 6 milliGRAM(s) Oral every 6 hours PRN Moderate Pain (4 - 6)  HYDROmorphone  Injectable 0.5 milliGRAM(s) IV Push every 4 hours PRN Severe Pain (7 - 10)  ondansetron    Tablet 4 milliGRAM(s) Oral every 6 hours PRN for nausea  polyethylene glycol 3350 17 Gram(s) Oral at bedtime PRN Constipation  senna 2 Tablet(s) Oral at bedtime PRN Constipation                           Chief Complaint: Abdominal pain    Interval Hx: Patient reports some subjective improvement since presentation. Still with some abdominal discomfort, now rather mild, epigastric, non radiating. Not exacerbating with eating. No nausea or vomiting. No diarrhea or constipation. No fever or rigors. He does have some dyspnea on exertion. No chest pain or tightness. No cough. No other complaints. Underwent diagnostic paracentesis this AM. 100cc of clear yellow fluid removed. Total nucleated cells > 250. SAAG < 1. This afternoon, R chest tube was placed w/ 700cc out. Studies are pending. Patient oxygenating better now, weaned to room air.     ROS: Multi system review is comprehensively negative x 10 systems except as above    Vitals:  T(F): 98.2 (30 May 2023 07:43), Max: 98.2 (30 May 2023 07:43)  HR: 78 (30 May 2023 07:43) (77 - 78)  BP: 132/67 (30 May 2023 07:43) (121/71 - 138/79)  RR: 18 (30 May 2023 07:43) (16 - 18)  SpO2: 93% (30 May 2023 07:43) (93% - 94%) on room air    Exam:  Gen: No acute distress. Sitting up in bed, resting comfortably  HEENT: NCAT PERRL EOMI anicteric sclera MMM clear oropharynx  Neck: Supple, no JVD, no LAD  Chest: Normal resp effort at rest, diminished breath sounds in bases B/L, Rt chest tube clamped  CVS: s1 s2 normal RRR  Abd: +BS, soft, non distended, mildly tender in epigastrium, no organomegaly  Ext: L BKA. R toe ambulation.   Skin: Warm, dry  Mood: Calm, pleasant  Neuro: A+OX3, no deficits, no asterixis     Labs:                                 8.7    8.47  )--------( 322             29.7       142  |  113  |  16  -----------------------<  117  4.3   |   26   |  1.05    Ca 8.9    TPro  7.3  /  Alb  2.9  /  TBili  0.4  /  DBili  0.3  /  AST  9  /  ALT  14  /  AlkPhos  237    PT: 14.0 sec;   INR: 1.20 ratio  PTT: 32.0 sec    Troponin negative   proBNP 7681    A1c 6.7  LDL 87    Urinalysis   Color: Yellow / Appearance: Clear / S.020 / pH: x  Gluc: x / Ketone: Moderate  / Bili: Negative / Urobili: 1   Blood: x / Protein: 500 mg/dL / Nitrite: Negative   Leuk Esterase: Negative / RBC: 25-50 /HPF / WBC 3-5 /HPF   Sq Epi: x / Non Sq Epi: x / Bacteria: Few    Ascitic Fluid   Tprot 3.8 (serum 7.3)  Alb 2.1 (serum 2.9), SAAG   LDH 95 (serum )  Total nucleated cells 360, could be indicative of SBP    Pleural Fluid   Studies for chem and cell count pending    Micro:  Pleural fluid culture : Gram's stain negative, culture in-process  Peritoneal fluid culture : Gram's stain negative, culture in-process  Urine culture : Negative  Blood culture x 2 : Negative  COVID19 PCR : Negative    Imaging:  CT A/P w/ PO/IV Cont : Moderate right and small left pleural effusions with associated bilateral basilar consolidation/atelectasis. Geographic groundglass opacities and interlobular septal thickening, which may represent pulmonary edema. Cardiomegaly. Mitral valve prosthesis. Prior CABG. Diffusely heterogeneous appearance of the liver, suggestive of passive hepatic congestion. Nodular surface contour of the inferior right hepatic lobe, suggestive of cirrhosis. Liver is enlarged and measures 20.5 cm in length. Normal bile ducts. Cholecystectomy. Normal spleen. Atrophic pancreas. No pancreatic ductal dilatation. No significant peripancreatic inflammation. Normal adrenals. No renal stones or hydronephrosis. Stable indeterminate 13 mm exophytic left renal lesion. Diffuse bladder wall thickening. Prostate within normal limits. No bowel obstruction. Colonic diverticulosis without evidence of acute diverticulitis. Normal appendix. Small volume abdominopelvic ascites. Atherosclerotic changes. Mildly enlarged retroperitoneal lymph nodes measuring up to 1.2 cm short axis at the left para-aortic region. Mildly enlarged aortocaval nodes measuring up to 1.4 cm short axis. Diffuse body wall edema, worst along the right anterior lateral abdominal wall. Small fat-containing left inguinal hernia. Degenerative bone changes.    Cardiac Testing:  EKG : NSR, rate WNL, ST/T changes.    EKG : Sinus tachycardia, rate 110. RV conduction delay. ST/T changes.    Prior cardiac testing  TTE 2022:  Endocardium is not well visualized, however, overall left ventricular systolic function appears normal. Mild concentric left ventricular hypertrophy. Septal flattening is seen; this finding is consistent with right heart pressure / volume overload. Estimated left ventricular ejection fraction is 55-60%. The right ventricle exhibits mild dilation, mild diffuse hypokinesis, and mild depression of contractility. Severe mitral stenosis. Mild mitral regurgitation. Mild to moderate tricuspid valve regurgitation. Severe pulmonary hypertension.    Meds:  MEDICATIONS  (STANDING):  amLODIPine   Tablet 10 milliGRAM(s) Oral daily  aspirin enteric coated 81 milliGRAM(s) Oral daily  azithromycin   Tablet 500 milliGRAM(s) Oral every 24 hours  cefTRIAXone Injectable. 1000 milliGRAM(s) IV Push every 24 hours  clopidogrel Tablet 75 milliGRAM(s) Oral daily  finasteride 5 milliGRAM(s) Oral daily  furosemide   Injectable 40 milliGRAM(s) IV Push two times a day  gabapentin 300 milliGRAM(s) Oral two times a day  gabapentin 600 milliGRAM(s) Oral at bedtime  heparin   Injectable 5000 Unit(s) SubCutaneous every 12 hours  insulin glargine Injectable (LANTUS) 16 Unit(s) SubCutaneous at bedtime  insulin lispro (ADMELOG) corrective regimen sliding scale   SubCutaneous three times a day before meals  insulin lispro (ADMELOG) corrective regimen sliding scale   SubCutaneous at bedtime  lactobacillus acidophilus 1 Tablet(s) Oral three times a day with meals  lidocaine   4% Patch 1 Patch Transdermal daily  metoprolol tartrate 25 milliGRAM(s) Oral daily  multivitamin 1 Tablet(s) Oral daily  pantoprazole  Injectable 40 milliGRAM(s) IV Push two times a day  potassium chloride    Tablet ER 20 milliEquivalent(s) Oral daily  simvastatin 10 milliGRAM(s) Oral at bedtime  sodium chloride 0.9% Bolus 500 milliLiter(s) IV Bolus once  sucralfate suspension 1 Gram(s) Oral four times a day  tamsulosin 0.4 milliGRAM(s) Oral at bedtime    MEDICATIONS  (PRN):  dextrose Oral Gel 15 Gram(s) Oral once PRN Blood Glucose LESS THAN 70 milliGRAM(s)/deciliter  dextrose Oral Gel 15 Gram(s) Oral once PRN Blood Glucose LESS THAN 70 milliGRAM(s)/deciliter  HYDROmorphone   Tablet 6 milliGRAM(s) Oral every 6 hours PRN Moderate Pain (4 - 6)  HYDROmorphone  Injectable 0.5 milliGRAM(s) IV Push every 4 hours PRN Severe Pain (7 - 10)  ondansetron    Tablet 4 milliGRAM(s) Oral every 6 hours PRN for nausea  polyethylene glycol 3350 17 Gram(s) Oral at bedtime PRN Constipation  senna 2 Tablet(s) Oral at bedtime PRN Constipation

## 2023-05-30 NOTE — PROGRESS NOTE ADULT - SUBJECTIVE AND OBJECTIVE BOX
Patient is a 63y old  Male who presents with a chief complaint of Heart failure    Followup: gastroparesis, abdominal pain/n/v  Seen s/p paracentesis this am. 100ml removed and sent. Patient endorsing less pain currently but "I need to get down to the bottom of this pain," per patient. Endorsing less abdominal distension over past 24 hours.    MEDICATIONS  (STANDING):  amLODIPine   Tablet 10 milliGRAM(s) Oral daily  aspirin enteric coated 81 milliGRAM(s) Oral daily  azithromycin   Tablet 500 milliGRAM(s) Oral every 24 hours  cefTRIAXone Injectable. 1000 milliGRAM(s) IV Push every 24 hours  clopidogrel Tablet 75 milliGRAM(s) Oral daily  dextrose 5%. 1000 milliLiter(s) (100 mL/Hr) IV Continuous <Continuous>  dextrose 5%. 1000 milliLiter(s) (50 mL/Hr) IV Continuous <Continuous>  dextrose 5%. 1000 milliLiter(s) (50 mL/Hr) IV Continuous <Continuous>  dextrose 5%. 1000 milliLiter(s) (100 mL/Hr) IV Continuous <Continuous>  dextrose 50% Injectable 25 Gram(s) IV Push once  dextrose 50% Injectable 25 Gram(s) IV Push once  dextrose 50% Injectable 12.5 Gram(s) IV Push once  dextrose 50% Injectable 25 Gram(s) IV Push once  dextrose 50% Injectable 12.5 Gram(s) IV Push once  dextrose 50% Injectable 25 Gram(s) IV Push once  finasteride 5 milliGRAM(s) Oral daily  furosemide   Injectable 40 milliGRAM(s) IV Push two times a day  gabapentin 300 milliGRAM(s) Oral two times a day  gabapentin 600 milliGRAM(s) Oral at bedtime  glucagon  Injectable 1 milliGRAM(s) IntraMuscular once  glucagon  Injectable 1 milliGRAM(s) IntraMuscular once  heparin   Injectable 5000 Unit(s) SubCutaneous every 12 hours  insulin glargine Injectable (LANTUS) 16 Unit(s) SubCutaneous at bedtime  insulin lispro (ADMELOG) corrective regimen sliding scale   SubCutaneous three times a day before meals  insulin lispro (ADMELOG) corrective regimen sliding scale   SubCutaneous at bedtime  lactobacillus acidophilus 1 Tablet(s) Oral three times a day with meals  lidocaine   4% Patch 1 Patch Transdermal daily  metoprolol tartrate 25 milliGRAM(s) Oral daily  multivitamin 1 Tablet(s) Oral daily  pantoprazole  Injectable 40 milliGRAM(s) IV Push two times a day  potassium chloride    Tablet ER 20 milliEquivalent(s) Oral daily  simvastatin 10 milliGRAM(s) Oral at bedtime  sodium chloride 0.9% Bolus 500 milliLiter(s) IV Bolus once  sucralfate suspension 1 Gram(s) Oral four times a day  tamsulosin 0.4 milliGRAM(s) Oral at bedtime    MEDICATIONS  (PRN):  dextrose Oral Gel 15 Gram(s) Oral once PRN Blood Glucose LESS THAN 70 milliGRAM(s)/deciliter  dextrose Oral Gel 15 Gram(s) Oral once PRN Blood Glucose LESS THAN 70 milliGRAM(s)/deciliter  HYDROmorphone   Tablet 6 milliGRAM(s) Oral every 6 hours PRN Moderate Pain (4 - 6)  HYDROmorphone  Injectable 0.5 milliGRAM(s) IV Push every 4 hours PRN Severe Pain (7 - 10)  ondansetron    Tablet 4 milliGRAM(s) Oral every 6 hours PRN for nausea  polyethylene glycol 3350 17 Gram(s) Oral at bedtime PRN Constipation  senna 2 Tablet(s) Oral at bedtime PRN Constipation      Vital Signs Last 24 Hrs  T(C): 36.8 (30 May 2023 07:43), Max: 36.8 (30 May 2023 07:43)  T(F): 98.2 (30 May 2023 07:43), Max: 98.2 (30 May 2023 07:43)  HR: 78 (30 May 2023 07:43) (77 - 78)  BP: 132/67 (30 May 2023 07:43) (121/71 - 138/79)  BP(mean): --  RR: 18 (30 May 2023 07:43) (16 - 18)  SpO2: 93% (30 May 2023 07:43) (93% - 94%)    Parameters below as of 30 May 2023 07:43  Patient On (Oxygen Delivery Method): room air        PHYSICAL EXAM:    Constitutional: No acute distress, well-developed, non-toxic appearing  HEENT: masked, good phonation, not icteric  Neck: supple, no lymphadenopathy  Respiratory: clear to ascultation bilaterally, no wheezing  Cardiovascular: S1 and S2, regular rate and rhythm, no murmurs rubs or gallops  Gastrointestinal: soft, non-tender, mod distended, soft, +bowel sounds, no rebound or guarding, no surgical scars, no drains  Extremities: No peripheral edema, no cyanosis or clubbing  Vascular: 2+ peripheral pulses, no venous stasis  Neurological: A/O x 3, no focal deficits, no asterixis  Psychiatric: Normal mood, normal affect  Skin: No rashes, not jaundiced    LABS:                        8.7    8.47  )-----------( 322      ( 30 May 2023 07:10 )             29.7     05-30    142  |  113<H>  |  16  ----------------------------<  117<H>  4.3   |  26  |  1.05    Ca    8.9      30 May 2023 07:10    TPro  7.3  /  Alb  2.9<L>  /  TBili  0.4  /  DBili  0.3  /  AST  9<L>  /  ALT  14  /  AlkPhos  237<H>  05-30    PT/INR - ( 29 May 2023 06:34 )   PT: 14.0 sec;   INR: 1.20 ratio         PTT - ( 29 May 2023 06:34 )  PTT:32.0 sec  LIVER FUNCTIONS - ( 30 May 2023 07:10 )  Alb: 2.9 g/dL / Pro: 7.3 gm/dL / ALK PHOS: 237 U/L / ALT: 14 U/L / AST: 9 U/L / GGT: x             RADIOLOGY & ADDITIONAL STUDIES: Patient is a 63y old  Male who presents with a chief complaint of Heart failure    Followup for: gastroparesis, abdominal pain/n/v    Seen s/p paracentesis this am. 100ml removed and sent. Patient endorsing less pain currently but "I need to get down to the bottom of this pain," per patient. Endorsing less abdominal distension over past 24 hours.    MEDICATIONS  (STANDING):  amLODIPine   Tablet 10 milliGRAM(s) Oral daily  aspirin enteric coated 81 milliGRAM(s) Oral daily  azithromycin   Tablet 500 milliGRAM(s) Oral every 24 hours  cefTRIAXone Injectable. 1000 milliGRAM(s) IV Push every 24 hours  clopidogrel Tablet 75 milliGRAM(s) Oral daily  dextrose 5%. 1000 milliLiter(s) (100 mL/Hr) IV Continuous <Continuous>  dextrose 5%. 1000 milliLiter(s) (50 mL/Hr) IV Continuous <Continuous>  dextrose 5%. 1000 milliLiter(s) (50 mL/Hr) IV Continuous <Continuous>  dextrose 5%. 1000 milliLiter(s) (100 mL/Hr) IV Continuous <Continuous>  dextrose 50% Injectable 25 Gram(s) IV Push once  dextrose 50% Injectable 25 Gram(s) IV Push once  dextrose 50% Injectable 12.5 Gram(s) IV Push once  dextrose 50% Injectable 25 Gram(s) IV Push once  dextrose 50% Injectable 12.5 Gram(s) IV Push once  dextrose 50% Injectable 25 Gram(s) IV Push once  finasteride 5 milliGRAM(s) Oral daily  furosemide   Injectable 40 milliGRAM(s) IV Push two times a day  gabapentin 300 milliGRAM(s) Oral two times a day  gabapentin 600 milliGRAM(s) Oral at bedtime  glucagon  Injectable 1 milliGRAM(s) IntraMuscular once  glucagon  Injectable 1 milliGRAM(s) IntraMuscular once  heparin   Injectable 5000 Unit(s) SubCutaneous every 12 hours  insulin glargine Injectable (LANTUS) 16 Unit(s) SubCutaneous at bedtime  insulin lispro (ADMELOG) corrective regimen sliding scale   SubCutaneous three times a day before meals  insulin lispro (ADMELOG) corrective regimen sliding scale   SubCutaneous at bedtime  lactobacillus acidophilus 1 Tablet(s) Oral three times a day with meals  lidocaine   4% Patch 1 Patch Transdermal daily  metoprolol tartrate 25 milliGRAM(s) Oral daily  multivitamin 1 Tablet(s) Oral daily  pantoprazole  Injectable 40 milliGRAM(s) IV Push two times a day  potassium chloride    Tablet ER 20 milliEquivalent(s) Oral daily  simvastatin 10 milliGRAM(s) Oral at bedtime  sodium chloride 0.9% Bolus 500 milliLiter(s) IV Bolus once  sucralfate suspension 1 Gram(s) Oral four times a day  tamsulosin 0.4 milliGRAM(s) Oral at bedtime    MEDICATIONS  (PRN):  dextrose Oral Gel 15 Gram(s) Oral once PRN Blood Glucose LESS THAN 70 milliGRAM(s)/deciliter  dextrose Oral Gel 15 Gram(s) Oral once PRN Blood Glucose LESS THAN 70 milliGRAM(s)/deciliter  HYDROmorphone   Tablet 6 milliGRAM(s) Oral every 6 hours PRN Moderate Pain (4 - 6)  HYDROmorphone  Injectable 0.5 milliGRAM(s) IV Push every 4 hours PRN Severe Pain (7 - 10)  ondansetron    Tablet 4 milliGRAM(s) Oral every 6 hours PRN for nausea  polyethylene glycol 3350 17 Gram(s) Oral at bedtime PRN Constipation  senna 2 Tablet(s) Oral at bedtime PRN Constipation      Vital Signs Last 24 Hrs  T(C): 36.8 (30 May 2023 07:43), Max: 36.8 (30 May 2023 07:43)  T(F): 98.2 (30 May 2023 07:43), Max: 98.2 (30 May 2023 07:43)  HR: 78 (30 May 2023 07:43) (77 - 78)  BP: 132/67 (30 May 2023 07:43) (121/71 - 138/79)  BP(mean): --  RR: 18 (30 May 2023 07:43) (16 - 18)  SpO2: 93% (30 May 2023 07:43) (93% - 94%)    Parameters below as of 30 May 2023 07:43  Patient On (Oxygen Delivery Method): room air        PHYSICAL EXAM:    Constitutional: No acute distress, chornically ill appearing, non-toxic appearing  HEENT: masked, good phonation, not icteric  Neck: supple, no lymphadenopathy  Respiratory: clear to ascultation bilaterally, no wheezing  Cardiovascular: S1 and S2, regular rate and rhythm, no murmurs rubs or gallops  Gastrointestinal: soft, non-tender, mod distended, soft, +bowel sounds, no rebound or guarding, no surgical scars, no drains  Extremities: No peripheral edema, no cyanosis or clubbing, L AKA  Vascular: 2+ peripheral pulses, no venous stasis  Neurological: A/O x 3, no focal deficits, no asterixis  Psychiatric: Normal mood, normal affect  Skin: No rashes, not jaundiced    LABS:                        8.7    8.47  )-----------( 322      ( 30 May 2023 07:10 )             29.7     05-30    142  |  113<H>  |  16  ----------------------------<  117<H>  4.3   |  26  |  1.05    Ca    8.9      30 May 2023 07:10    TPro  7.3  /  Alb  2.9<L>  /  TBili  0.4  /  DBili  0.3  /  AST  9<L>  /  ALT  14  /  AlkPhos  237<H>  05-30    PT/INR - ( 29 May 2023 06:34 )   PT: 14.0 sec;   INR: 1.20 ratio         PTT - ( 29 May 2023 06:34 )  PTT:32.0 sec  LIVER FUNCTIONS - ( 30 May 2023 07:10 )  Alb: 2.9 g/dL / Pro: 7.3 gm/dL / ALK PHOS: 237 U/L / ALT: 14 U/L / AST: 9 U/L / GGT: x

## 2023-05-30 NOTE — PROGRESS NOTE ADULT - SUBJECTIVE AND OBJECTIVE BOX
PCP:    REQUESTING PHYSICIAN:    REASON FOR CONSULT:    CHIEF COMPLAINT:    HPI:  64 y/o M w/ PMH of DM2, PVD, gastroparesis, anemia, DM2, GERD, CAD s/p CABG, cirrhosis, MV repair, HTN, dyslipidemia, p/w abdominal pain. Patient states abdominal pain started yesterday afternoon in epigastric area. Patient states that he had about 8 episodes of non-bloody emesis last night, and 2 episodes today prior to arrival. Also c/o chills. Patient denies any diarrhea, CP, SOB, cough, melena, hematochezia, fever, cough, runny nose, sore throat. Cardiology called to evaluate symptoms and risks. Pt denies exertional symptoms.     5/30/23: Denies cp.  Breathing improved.  Still with mild abdominal distention.  Tele: RSR        Allergies    No Known Allergies    Intolerances      MEDICATIONS  (STANDING):  amLODIPine   Tablet 10 milliGRAM(s) Oral daily  aspirin enteric coated 81 milliGRAM(s) Oral daily  azithromycin   Tablet 500 milliGRAM(s) Oral every 24 hours  cefTRIAXone Injectable. 1000 milliGRAM(s) IV Push every 24 hours  clopidogrel Tablet 75 milliGRAM(s) Oral daily  dextrose 5%. 1000 milliLiter(s) (100 mL/Hr) IV Continuous <Continuous>  dextrose 5%. 1000 milliLiter(s) (50 mL/Hr) IV Continuous <Continuous>  dextrose 5%. 1000 milliLiter(s) (50 mL/Hr) IV Continuous <Continuous>  dextrose 5%. 1000 milliLiter(s) (100 mL/Hr) IV Continuous <Continuous>  dextrose 50% Injectable 25 Gram(s) IV Push once  dextrose 50% Injectable 25 Gram(s) IV Push once  dextrose 50% Injectable 25 Gram(s) IV Push once  dextrose 50% Injectable 12.5 Gram(s) IV Push once  dextrose 50% Injectable 25 Gram(s) IV Push once  dextrose 50% Injectable 12.5 Gram(s) IV Push once  finasteride 5 milliGRAM(s) Oral daily  furosemide   Injectable 40 milliGRAM(s) IV Push two times a day  gabapentin 300 milliGRAM(s) Oral two times a day  gabapentin 600 milliGRAM(s) Oral at bedtime  glucagon  Injectable 1 milliGRAM(s) IntraMuscular once  glucagon  Injectable 1 milliGRAM(s) IntraMuscular once  heparin   Injectable 5000 Unit(s) SubCutaneous every 12 hours  insulin glargine Injectable (LANTUS) 16 Unit(s) SubCutaneous at bedtime  insulin lispro (ADMELOG) corrective regimen sliding scale   SubCutaneous three times a day before meals  insulin lispro (ADMELOG) corrective regimen sliding scale   SubCutaneous at bedtime  lactobacillus acidophilus 1 Tablet(s) Oral three times a day with meals  lidocaine   4% Patch 1 Patch Transdermal daily  metoprolol tartrate 25 milliGRAM(s) Oral daily  multivitamin 1 Tablet(s) Oral daily  pantoprazole  Injectable 40 milliGRAM(s) IV Push two times a day  potassium chloride    Tablet ER 20 milliEquivalent(s) Oral daily  simvastatin 10 milliGRAM(s) Oral at bedtime  sucralfate suspension 1 Gram(s) Oral four times a day  tamsulosin 0.4 milliGRAM(s) Oral at bedtime    MEDICATIONS  (PRN):  dextrose Oral Gel 15 Gram(s) Oral once PRN Blood Glucose LESS THAN 70 milliGRAM(s)/deciliter  dextrose Oral Gel 15 Gram(s) Oral once PRN Blood Glucose LESS THAN 70 milliGRAM(s)/deciliter  HYDROmorphone   Tablet 6 milliGRAM(s) Oral every 6 hours PRN Moderate Pain (4 - 6)  HYDROmorphone  Injectable 0.5 milliGRAM(s) IV Push every 4 hours PRN Severe Pain (7 - 10)  ondansetron    Tablet 4 milliGRAM(s) Oral every 6 hours PRN for nausea  polyethylene glycol 3350 17 Gram(s) Oral at bedtime PRN Constipation  senna 2 Tablet(s) Oral at bedtime PRN Constipation      Vital Signs Last 24 Hrs  T(C): 36.8 (30 May 2023 07:43), Max: 36.8 (30 May 2023 07:43)  T(F): 98.2 (30 May 2023 07:43), Max: 98.2 (30 May 2023 07:43)  HR: 78 (30 May 2023 07:43) (77 - 78)  BP: 132/67 (30 May 2023 07:43) (121/71 - 138/79)  BP(mean): --  RR: 18 (30 May 2023 07:43) (16 - 18)  SpO2: 93% (30 May 2023 07:43) (93% - 94%)    Parameters below as of 30 May 2023 07:43  Patient On (Oxygen Delivery Method): room air          I&O's Summary    28 May 2023 07:01  -  29 May 2023 07:00  --------------------------------------------------------  IN: 0 mL / OUT: 400 mL / NET: -400 mL    29 May 2023 07:01  -  29 May 2023 10:07  --------------------------------------------------------  IN: 0 mL / OUT: 200 mL / NET: -200 mL        PHYSICAL EXAM:    Constitutional: NAD, awake and alert, well-developed  HEENT: PERR, EOMI,  No oral cyananosis.  Neck:  supple,  No JVD  Respiratory: Breath sounds are clear, diminished at bases, No wheezing, rales or rhonchi  Cardiovascular: S1 and S2, regular rate and rhythm, no Murmurs, gallops or rubs  Gastrointestinal: Bowel Sounds present, soft, nontender.   Extremities: Amputation lower extremities.   Vascular: 2+ peripheral pulses  Neurological: A/O x 3, no focal deficits  Musculoskeletal: no calf tenderness.  Skin: No rashes.      LABS: All Labs Reviewed:                          8.7    8.47  )-----------( 322      ( 30 May 2023 07:10 )             29.7                           9.1    9.66  )-----------( 351      ( 29 May 2023 06:34 )             31.2                         9.4    12.30 )-----------( 365      ( 28 May 2023 13:11 )             31.1     05-30    142  |  113<H>  |  16  ----------------------------<  117<H>  4.3   |  26  |  1.05    Ca    8.9      30 May 2023 07:10    TPro  7.3  /  Alb  2.9<L>  /  TBili  0.4  /  DBili  0.3  /  AST  9<L>  /  ALT  14  /  AlkPhos  237<H>  05-30      29 May 2023 06:34    143    |  112    |  11     ----------------------------<  121    4.1     |  24     |  0.82   28 May 2023 13:11    142    |  110    |  14     ----------------------------<  193    3.6     |  20     |  0.85     Ca    9.4        29 May 2023 06:34  Ca    9.4        28 May 2023 13:11    TPro  7.4    /  Alb  2.9    /  TBili  0.6    /  DBili  x      /  AST  10     /  ALT  17     /  AlkPhos  241    29 May 2023 06:34  TPro  8.0    /  Alb  3.1    /  TBili  0.9    /  DBili  x      /  AST  11     /  ALT  20     /  AlkPhos  272    28 May 2023 13:11    PT/INR - ( 29 May 2023 06:34 )   PT: 14.0 sec;   INR: 1.20 ratio         PTT - ( 29 May 2023 06:34 )  PTT:32.0 sec      Blood Culture:         RADIOLOGY/EKG:< from: 12 Lead ECG (05.29.23 @ 07:54) >  Diagnosis Line Normal sinus rhythm  ST & T wave abnormality, consider anterolateral ischemia  Abnormal ECG  Confirmed by WILDER WEBSTER MD ([id number]) on 5/29/2023 9:10:39 AM    < end of copied text >    < from: ALEX Complete w/Spect and Color (12.22.22 @ 10:45) >   Impression     Summary     A transesophageal echocardiogram was performed and included probe   placement in the esophagus posterior to the heart by the interpreting   physician, real-time image acquisition and interpretation utilizing 2-D,   color flow Doppler, pulsed wave and/or continuous wave with spectral   display. The patient received IV sedation. The procedure was monitored   with automatic blood pressure monitoring, telemetry tracings, and pulse   oximetry. The patient tolerated the procedure well and there were no   complications.     The left ventricle is normal in size, wall thickness, wall motion and   contractility.   No thrombus seen in the left atrial appendage.   S/p mitral valve repair.   Severe mitral stenosis.   Moderate-severe, eccentric mitral regurgitation.   Mild to moderate tricuspid regurgitation.    < end of copied text >         HPI:  64 y/o M w/ PMH of DM2, PVD, gastroparesis, anemia, DM2, GERD, CAD s/p CABG, cirrhosis, MV repair, HTN, dyslipidemia, p/w abdominal pain. Patient states abdominal pain started yesterday afternoon in epigastric area. Patient states that he had about 8 episodes of non-bloody emesis last night, and 2 episodes today prior to arrival. Also c/o chills. Patient denies any diarrhea, CP, SOB, cough, melena, hematochezia, fever, cough, runny nose, sore throat. Cardiology called to evaluate symptoms and risks. Pt denies exertional symptoms.     5/30/23: Denies cp.  Breathing improved.  Still with mild abdominal distention.  Tele: RSR        Allergies    No Known Allergies    Intolerances      MEDICATIONS  (STANDING):  amLODIPine   Tablet 10 milliGRAM(s) Oral daily  aspirin enteric coated 81 milliGRAM(s) Oral daily  azithromycin   Tablet 500 milliGRAM(s) Oral every 24 hours  cefTRIAXone Injectable. 1000 milliGRAM(s) IV Push every 24 hours  clopidogrel Tablet 75 milliGRAM(s) Oral daily  dextrose 5%. 1000 milliLiter(s) (100 mL/Hr) IV Continuous <Continuous>  dextrose 5%. 1000 milliLiter(s) (50 mL/Hr) IV Continuous <Continuous>  dextrose 5%. 1000 milliLiter(s) (50 mL/Hr) IV Continuous <Continuous>  dextrose 5%. 1000 milliLiter(s) (100 mL/Hr) IV Continuous <Continuous>  dextrose 50% Injectable 25 Gram(s) IV Push once  dextrose 50% Injectable 25 Gram(s) IV Push once  dextrose 50% Injectable 25 Gram(s) IV Push once  dextrose 50% Injectable 12.5 Gram(s) IV Push once  dextrose 50% Injectable 25 Gram(s) IV Push once  dextrose 50% Injectable 12.5 Gram(s) IV Push once  finasteride 5 milliGRAM(s) Oral daily  furosemide   Injectable 40 milliGRAM(s) IV Push two times a day  gabapentin 300 milliGRAM(s) Oral two times a day  gabapentin 600 milliGRAM(s) Oral at bedtime  glucagon  Injectable 1 milliGRAM(s) IntraMuscular once  glucagon  Injectable 1 milliGRAM(s) IntraMuscular once  heparin   Injectable 5000 Unit(s) SubCutaneous every 12 hours  insulin glargine Injectable (LANTUS) 16 Unit(s) SubCutaneous at bedtime  insulin lispro (ADMELOG) corrective regimen sliding scale   SubCutaneous three times a day before meals  insulin lispro (ADMELOG) corrective regimen sliding scale   SubCutaneous at bedtime  lactobacillus acidophilus 1 Tablet(s) Oral three times a day with meals  lidocaine   4% Patch 1 Patch Transdermal daily  metoprolol tartrate 25 milliGRAM(s) Oral daily  multivitamin 1 Tablet(s) Oral daily  pantoprazole  Injectable 40 milliGRAM(s) IV Push two times a day  potassium chloride    Tablet ER 20 milliEquivalent(s) Oral daily  simvastatin 10 milliGRAM(s) Oral at bedtime  sucralfate suspension 1 Gram(s) Oral four times a day  tamsulosin 0.4 milliGRAM(s) Oral at bedtime    MEDICATIONS  (PRN):  dextrose Oral Gel 15 Gram(s) Oral once PRN Blood Glucose LESS THAN 70 milliGRAM(s)/deciliter  dextrose Oral Gel 15 Gram(s) Oral once PRN Blood Glucose LESS THAN 70 milliGRAM(s)/deciliter  HYDROmorphone   Tablet 6 milliGRAM(s) Oral every 6 hours PRN Moderate Pain (4 - 6)  HYDROmorphone  Injectable 0.5 milliGRAM(s) IV Push every 4 hours PRN Severe Pain (7 - 10)  ondansetron    Tablet 4 milliGRAM(s) Oral every 6 hours PRN for nausea  polyethylene glycol 3350 17 Gram(s) Oral at bedtime PRN Constipation  senna 2 Tablet(s) Oral at bedtime PRN Constipation      Vital Signs Last 24 Hrs  T(C): 36.8 (30 May 2023 07:43), Max: 36.8 (30 May 2023 07:43)  T(F): 98.2 (30 May 2023 07:43), Max: 98.2 (30 May 2023 07:43)  HR: 78 (30 May 2023 07:43) (77 - 78)  BP: 132/67 (30 May 2023 07:43) (121/71 - 138/79)  BP(mean): --  RR: 18 (30 May 2023 07:43) (16 - 18)  SpO2: 93% (30 May 2023 07:43) (93% - 94%)    Parameters below as of 30 May 2023 07:43  Patient On (Oxygen Delivery Method): room air          I&O's Summary    28 May 2023 07:01  -  29 May 2023 07:00  --------------------------------------------------------  IN: 0 mL / OUT: 400 mL / NET: -400 mL    29 May 2023 07:01  -  29 May 2023 10:07  --------------------------------------------------------  IN: 0 mL / OUT: 200 mL / NET: -200 mL        PHYSICAL EXAM:    Constitutional: NAD, awake and alert, well-developed  HEENT: PERR, EOMI,  No oral cyananosis.  Neck:  supple,  No JVD  Respiratory: Breath sounds are clear, diminished at bases, No wheezing, rales or rhonchi  Cardiovascular: S1 and S2, regular rate and rhythm, no Murmurs, gallops or rubs  Gastrointestinal: Bowel Sounds present, soft, nontender.   Extremities: Amputation lower extremities.   Vascular: 2+ peripheral pulses  Neurological: A/O x 3, no focal deficits  Musculoskeletal: no calf tenderness.  Skin: No rashes.      LABS: All Labs Reviewed:                          8.7    8.47  )-----------( 322      ( 30 May 2023 07:10 )             29.7                           9.1    9.66  )-----------( 351      ( 29 May 2023 06:34 )             31.2                         9.4    12.30 )-----------( 365      ( 28 May 2023 13:11 )             31.1     05-30    142  |  113<H>  |  16  ----------------------------<  117<H>  4.3   |  26  |  1.05    Ca    8.9      30 May 2023 07:10    TPro  7.3  /  Alb  2.9<L>  /  TBili  0.4  /  DBili  0.3  /  AST  9<L>  /  ALT  14  /  AlkPhos  237<H>  05-30      29 May 2023 06:34    143    |  112    |  11     ----------------------------<  121    4.1     |  24     |  0.82   28 May 2023 13:11    142    |  110    |  14     ----------------------------<  193    3.6     |  20     |  0.85     Ca    9.4        29 May 2023 06:34  Ca    9.4        28 May 2023 13:11    TPro  7.4    /  Alb  2.9    /  TBili  0.6    /  DBili  x      /  AST  10     /  ALT  17     /  AlkPhos  241    29 May 2023 06:34  TPro  8.0    /  Alb  3.1    /  TBili  0.9    /  DBili  x      /  AST  11     /  ALT  20     /  AlkPhos  272    28 May 2023 13:11    PT/INR - ( 29 May 2023 06:34 )   PT: 14.0 sec;   INR: 1.20 ratio         PTT - ( 29 May 2023 06:34 )  PTT:32.0 sec      Blood Culture:         RADIOLOGY/EKG:< from: 12 Lead ECG (05.29.23 @ 07:54) >  Diagnosis Line Normal sinus rhythm  ST & T wave abnormality, consider anterolateral ischemia  Abnormal ECG  Confirmed by CECY WEBSTER MDSHUA ([id number]) on 5/29/2023 9:10:39 AM    < end of copied text >    < from: ALEX Complete w/Spect and Color (12.22.22 @ 10:45) >   Impression     Summary     A transesophageal echocardiogram was performed and included probe   placement in the esophagus posterior to the heart by the interpreting   physician, real-time image acquisition and interpretation utilizing 2-D,   color flow Doppler, pulsed wave and/or continuous wave with spectral   display. The patient received IV sedation. The procedure was monitored   with automatic blood pressure monitoring, telemetry tracings, and pulse   oximetry. The patient tolerated the procedure well and there were no   complications.     The left ventricle is normal in size, wall thickness, wall motion and   contractility.   No thrombus seen in the left atrial appendage.   S/p mitral valve repair.   Severe mitral stenosis.   Moderate-severe, eccentric mitral regurgitation.   Mild to moderate tricuspid regurgitation.    < end of copied text >

## 2023-05-30 NOTE — PROGRESS NOTE ADULT - SUBJECTIVE AND OBJECTIVE BOX
Date of service: 23 @ 12:04    pt seen and examined  plan for paracentesis  no fevers  no overnight events     ROS: no fever or chills; denies dizziness, no HA, no SOB or cough, no diarrhea or constipation; no dysuria, no urinary frequency, no legs pain, no rashes    MEDICATIONS  (STANDING):  amLODIPine   Tablet 10 milliGRAM(s) Oral daily  aspirin enteric coated 81 milliGRAM(s) Oral daily  azithromycin   Tablet 500 milliGRAM(s) Oral every 24 hours  cefTRIAXone Injectable. 1000 milliGRAM(s) IV Push every 24 hours  clopidogrel Tablet 75 milliGRAM(s) Oral daily  dextrose 5%. 1000 milliLiter(s) (100 mL/Hr) IV Continuous <Continuous>  dextrose 5%. 1000 milliLiter(s) (50 mL/Hr) IV Continuous <Continuous>  dextrose 5%. 1000 milliLiter(s) (100 mL/Hr) IV Continuous <Continuous>  dextrose 5%. 1000 milliLiter(s) (50 mL/Hr) IV Continuous <Continuous>  dextrose 50% Injectable 12.5 Gram(s) IV Push once  dextrose 50% Injectable 25 Gram(s) IV Push once  dextrose 50% Injectable 25 Gram(s) IV Push once  dextrose 50% Injectable 25 Gram(s) IV Push once  dextrose 50% Injectable 25 Gram(s) IV Push once  dextrose 50% Injectable 12.5 Gram(s) IV Push once  finasteride 5 milliGRAM(s) Oral daily  furosemide   Injectable 40 milliGRAM(s) IV Push two times a day  gabapentin 300 milliGRAM(s) Oral two times a day  gabapentin 600 milliGRAM(s) Oral at bedtime  glucagon  Injectable 1 milliGRAM(s) IntraMuscular once  glucagon  Injectable 1 milliGRAM(s) IntraMuscular once  heparin   Injectable 5000 Unit(s) SubCutaneous every 12 hours  insulin glargine Injectable (LANTUS) 16 Unit(s) SubCutaneous at bedtime  insulin lispro (ADMELOG) corrective regimen sliding scale   SubCutaneous at bedtime  insulin lispro (ADMELOG) corrective regimen sliding scale   SubCutaneous three times a day before meals  lactobacillus acidophilus 1 Tablet(s) Oral three times a day with meals  lidocaine   4% Patch 1 Patch Transdermal daily  metoprolol tartrate 25 milliGRAM(s) Oral daily  multivitamin 1 Tablet(s) Oral daily  pantoprazole  Injectable 40 milliGRAM(s) IV Push two times a day  potassium chloride    Tablet ER 20 milliEquivalent(s) Oral daily  simvastatin 10 milliGRAM(s) Oral at bedtime  sodium chloride 0.9% Bolus 500 milliLiter(s) IV Bolus once  sucralfate suspension 1 Gram(s) Oral four times a day  tamsulosin 0.4 milliGRAM(s) Oral at bedtime      Vital Signs Last 24 Hrs  T(C): 36.8 (30 May 2023 07:43), Max: 36.8 (30 May 2023 07:43)  T(F): 98.2 (30 May 2023 07:43), Max: 98.2 (30 May 2023 07:43)  HR: 78 (30 May 2023 07:43) (77 - 78)  BP: 132/67 (30 May 2023 07:43) (121/71 - 138/79)  BP(mean): --  RR: 18 (30 May 2023 07:43) (16 - 18)  SpO2: 93% (30 May 2023 07:43) (93% - 94%)    Parameters below as of 30 May 2023 07:43  Patient On (Oxygen Delivery Method): room air      PE:  Constitutional: NAD  HEENT: NC/AT, EOMI, PERRLA, conjunctivae clear; ears and nose atraumatic; pharynx benign  Neck: supple; thyroid not palpable  Back: no tenderness  Respiratory: respiratory effort normal; clear to auscultation  Cardiovascular: S1S2 regular, no murmurs  Abdomen: soft, not tender, not distended, positive BS; liver and spleen WNL  Genitourinary: no suprapubic tenderness  Lymphatic: no LN palpable  Musculoskeletal: no muscle tenderness, no joint swelling or tenderness  Extremities: no pedal edema  Neurological/ Psychiatric: AxOx3, Judgement and insight normal;  moving all extremities  Skin: no rashes; no palpable lesions RLE with dressing in place L BKA    Labs: all available labs reviewed                                   8.7    8.47  )-----------( 322      ( 30 May 2023 07:10 )             29.7     05-30    142  |  113<H>  |  16  ----------------------------<  117<H>  4.3   |  26  |  1.05    Ca    8.9      30 May 2023 07:10    TPro  7.3  /  Alb  2.9<L>  /  TBili  0.4  /  DBili  0.3  /  AST  9<L>  /  ALT  14  /  AlkPhos  237<H>          Urinalysis Basic - ( 28 May 2023 18:31 )    Color: Yellow / Appearance: Clear / S.020 / pH: x  Gluc: x / Ketone: Moderate  / Bili: Negative / Urobili: 1   Blood: x / Protein: 500 mg/dL / Nitrite: Negative   Leuk Esterase: Negative / RBC: 25-50 /HPF / WBC 3-5 /HPF   Sq Epi: x / Non Sq Epi: x / Bacteria: Few    Culture - Urine (23 @ 18:31)   Specimen Source: Clean Catch None  Culture Results:   <10,000 CFU/mL Normal Urogenital Jacey  Culture - Blood (23 @ 18:23)   Specimen Source: .Blood None  Culture Results:   No growth to date.    Radiology: all available radiological tests reviewed    ACC: [id number] EXAM:  CT ABDOMEN AND PELVIS OC IC   ORDERED BY: ZUHAIR ASTORGA     PROCEDURE DATE:  2023          INTERPRETATION:  CLINICAL INFORMATION: Abdominal pain and distention.    COMPARISON: Multiple prior exams, with most recent CTabdomen/pelvis   3/8/2023.    CONTRAST/COMPLICATIONS:  IV Contrast: Omnipaque 350  90 cc administered   10 cc discarded  Oral Contrast: Omnipaque 300   Fruit 2o  Complications: None reported at time of study completion    PROCEDURE:  CT of the Abdomen and Pelvis was performed.  Sagittal and coronal reformats were performed.    FINDINGS:  LOWER CHEST: Moderate right and small left pleural effusions with   associated bilateral basilar consolidation/atelectasis. Geographic   groundglass opacities and interlobular septal thickening, which may   represent pulmonary edema. Cardiomegaly. Mitral valve prosthesis. Prior   CABG.    LIVER: Diffusely heterogeneous appearance of the liver, suggestive of   passive hepatic congestion. Nodular surface contour of the inferior right   hepatic lobe, suggestive of cirrhosis. Liver is enlarged and measures   20.5 cm in length..  BILE DUCTS: Normal caliber.  GALLBLADDER: Cholecystectomy.  SPLEEN: Within normal limits.  PANCREAS: Atrophic. No pancreatic ductal dilatation. No significant   peripancreatic inflammation.  ADRENALS: Within normal limits.  KIDNEYS/URETERS: No renal stones or hydronephrosis. Stable indeterminate   13 mm exophytic left renal lesion.    BLADDER: Diffuse bladder wall thickening.  REPRODUCTIVE ORGANS: Prostate within normal limits.    BOWEL: No bowel obstruction. Colonic diverticulosis without evidence of   acute diverticulitis. Normal appendix.  PERITONEUM: Small volume abdominopelvic ascites.  VESSELS: Atherosclerotic changes.  RETROPERITONEUM/LYMPH NODES: Mildly enlarged retroperitoneal lymph nodes   measuring up to 1.2 cm short axis at the left para-aortic region (2:63).   Mildly enlarged aortocaval nodes measuring up to 1.4 cm short axis (2:52).  ABDOMINAL WALL: Diffuse body wall edema, worst along the right anterior   lateral abdominal wall. Small fat-containing left inguinal hernia.  BONES: Degenerative changes.    IMPRESSION:  1.  No bowel obstruction or inflammation.    2.  Findings suggestive of CHF with worsening bilateral pleural effusions   (moderate on the right and small on the left) and likely mild pulmonary   edema seen at the lung bases.    3.  Suggestion of cirrhosis. Passive hepatic congestion. Increased small   volume ascites.    4.  Diffuse bladder wall thickening. Correlate with urinalysis to exclude   cystitis.    5.  Mild upper abdominal and retroperitoneal lymphadenopathy, similar to   prior and likely reactive.      Advanced directives addressed: full resuscitation

## 2023-05-30 NOTE — PROGRESS NOTE ADULT - SUBJECTIVE AND OBJECTIVE BOX
Subjective:    Awake, alert. Feels better today with less dyspnea. No sputum.    MEDICATIONS  (STANDING):  amLODIPine   Tablet 10 milliGRAM(s) Oral daily  aspirin enteric coated 81 milliGRAM(s) Oral daily  azithromycin   Tablet 500 milliGRAM(s) Oral every 24 hours  cefTRIAXone Injectable. 1000 milliGRAM(s) IV Push every 24 hours  clopidogrel Tablet 75 milliGRAM(s) Oral daily  dextrose 5%. 1000 milliLiter(s) (50 mL/Hr) IV Continuous <Continuous>  dextrose 5%. 1000 milliLiter(s) (100 mL/Hr) IV Continuous <Continuous>  dextrose 5%. 1000 milliLiter(s) (50 mL/Hr) IV Continuous <Continuous>  dextrose 5%. 1000 milliLiter(s) (100 mL/Hr) IV Continuous <Continuous>  dextrose 50% Injectable 12.5 Gram(s) IV Push once  dextrose 50% Injectable 25 Gram(s) IV Push once  dextrose 50% Injectable 25 Gram(s) IV Push once  dextrose 50% Injectable 25 Gram(s) IV Push once  dextrose 50% Injectable 25 Gram(s) IV Push once  dextrose 50% Injectable 12.5 Gram(s) IV Push once  finasteride 5 milliGRAM(s) Oral daily  furosemide   Injectable 40 milliGRAM(s) IV Push two times a day  gabapentin 300 milliGRAM(s) Oral two times a day  gabapentin 600 milliGRAM(s) Oral at bedtime  glucagon  Injectable 1 milliGRAM(s) IntraMuscular once  glucagon  Injectable 1 milliGRAM(s) IntraMuscular once  heparin   Injectable 5000 Unit(s) SubCutaneous every 12 hours  insulin glargine Injectable (LANTUS) 16 Unit(s) SubCutaneous at bedtime  insulin lispro (ADMELOG) corrective regimen sliding scale   SubCutaneous three times a day before meals  insulin lispro (ADMELOG) corrective regimen sliding scale   SubCutaneous at bedtime  lactobacillus acidophilus 1 Tablet(s) Oral three times a day with meals  lidocaine   4% Patch 1 Patch Transdermal daily  metoprolol tartrate 25 milliGRAM(s) Oral daily  multivitamin 1 Tablet(s) Oral daily  pantoprazole  Injectable 40 milliGRAM(s) IV Push two times a day  potassium chloride    Tablet ER 20 milliEquivalent(s) Oral daily  simvastatin 10 milliGRAM(s) Oral at bedtime  sucralfate suspension 1 Gram(s) Oral four times a day  tamsulosin 0.4 milliGRAM(s) Oral at bedtime    MEDICATIONS  (PRN):  dextrose Oral Gel 15 Gram(s) Oral once PRN Blood Glucose LESS THAN 70 milliGRAM(s)/deciliter  dextrose Oral Gel 15 Gram(s) Oral once PRN Blood Glucose LESS THAN 70 milliGRAM(s)/deciliter  HYDROmorphone   Tablet 6 milliGRAM(s) Oral every 6 hours PRN Moderate Pain (4 - 6)  HYDROmorphone  Injectable 0.5 milliGRAM(s) IV Push every 4 hours PRN Severe Pain (7 - 10)  ondansetron    Tablet 4 milliGRAM(s) Oral every 6 hours PRN for nausea  polyethylene glycol 3350 17 Gram(s) Oral at bedtime PRN Constipation  senna 2 Tablet(s) Oral at bedtime PRN Constipation      Allergies    No Known Allergies    Intolerances        Vital Signs Last 24 Hrs  T(C): 36.8 (30 May 2023 07:43), Max: 36.8 (30 May 2023 07:43)  T(F): 98.2 (30 May 2023 07:43), Max: 98.2 (30 May 2023 07:43)  HR: 78 (30 May 2023 07:43) (77 - 78)  BP: 132/67 (30 May 2023 07:43) (121/71 - 138/79)  BP(mean): --  RR: 18 (30 May 2023 07:43) (16 - 18)  SpO2: 93% (30 May 2023 07:43) (93% - 94%)    Parameters below as of 30 May 2023 07:43  Patient On (Oxygen Delivery Method): room air        PHYSICAL EXAMINATION:    NECK:  Supple. No lymphadenopathy. Jugular venous pressure not elevated.   HEART:   The cardiac impulse has a normal quality. Reg., Nl S1 and S2.    CHEST:  Chest is clear to auscultation. Decreased BS at right base approx 1/2 up. Normal respiratory effort.  ABDOMEN:  Soft and nontender. Distended.  EXTREMITIES:  There is 2+ edema.       LABS:                        8.7    8.47  )-----------( 322      ( 30 May 2023 07:10 )             29.7     05-30    142  |  113<H>  |  16  ----------------------------<  117<H>  4.3   |  26  |  1.05    Ca    8.9      30 May 2023 07:10    TPro  7.3  /  Alb  2.9<L>  /  TBili  0.4  /  DBili  0.3  /  AST  9<L>  /  ALT  14  /  AlkPhos  237<H>      PT/INR - ( 29 May 2023 06:34 )   PT: 14.0 sec;   INR: 1.20 ratio         PTT - ( 29 May 2023 06:34 )  PTT:32.0 sec  Urinalysis Basic - ( 28 May 2023 18:31 )    Color: Yellow / Appearance: Clear / S.020 / pH: x  Gluc: x / Ketone: Moderate  / Bili: Negative / Urobili: 1   Blood: x / Protein: 500 mg/dL / Nitrite: Negative   Leuk Esterase: Negative / RBC: 25-50 /HPF / WBC 3-5 /HPF   Sq Epi: x / Non Sq Epi: x / Bacteria: Few        RADIOLOGY & ADDITIONAL TESTS:< from: CT Chest No Cont (23 @ 14:54) >  ACC: [id number] EXAM:  CT CHEST   ORDERED BY: KAYLEE LEAHY     PROCEDURE DATE:  2023          INTERPRETATION:  CLINICAL INFORMATION: Dyspnea.    COMPARISON: CT 2017.    CONTRAST/COMPLICATIONS:  IV Contrast: NONE  Oral Contrast: NONE  Complications: None reported at time of study completion    PROCEDURE:  CT of the Chest was performed.  Sagittal and coronal reformats were performed.    FINDINGS:    LUNGS AND AIRWAYS: Patent central airways.  Scattered interlobular septal   thickening and groundglass opacities, suggesting edema with right middle   and lower lobe opacities, likely atelectasis.  PLEURA: Moderate size right and small left pleural effusions.  MEDIASTINUM AND JULIA: Small volume mediastinal adenopathy with reference   lower paratracheal node measuring 2.5 x 1.3 cm (2, 46)  VESSELS: Atherosclerotic calcifications of the thoracic aorta and   coronary arteries.  HEART: Mitral valve replacement. Size is normal. No pericardial effusion.  CHEST WALL AND LOWER NECK: Within normal limits.  VISUALIZED UPPER ABDOMEN: Small volume perihepatic ascites. Small left   upper pole renal cyst..  BONES: Degenerative changes. Median sternotomy.    IMPRESSION:  Pleural effusions, right greater than left, with septal thickening and   groundglass opacities, suggesting pulmonary edema. Right middle and lower   lobe opacities likely reflect atelectasis.    Small volume mediastinal adenopathy, possibly reactive.    Small volume upper abdominal ascites.      MICHAEL GUILLEN MD      Assessment and Recommendation:   	  Assessment/Plan    - Dedicated CT Chest-noted above  - Right diagnostic/therapeutic thorocentesis-PND  - Zithromax added to regimen to cover atypicals  - Await Echo    Problem/Recommendation - 1:  ·  Acute on chronic diastolic congestive heart failure.   Problem/Recommendation - 2:  ·  Pulmonary edema, acute.   Problem/Recommendation - 3:  ·  Pleural effusion, bilateral.   Problem/Recommendation - 4:  ·  Pulmonary hypertension.   Problem/Recommendation - 5:  ·  Shortness of breath.   Problem/Recommendation - 6:  ·  Atelectasis.   Problem/Recommendation - 7:  ·  Pneumonia.

## 2023-05-30 NOTE — DIETITIAN INITIAL EVALUATION ADULT - NAME AND PHONE
Paula Jolly RDN, CDN, [facility]      [phone]   [email or web address]

## 2023-05-30 NOTE — PROGRESS NOTE ADULT - SUBJECTIVE AND OBJECTIVE BOX
Subjective:  Pt seen, no complaints.    Vital Signs:  Vital Signs Last 24 Hrs  T(C): 36.8 (05-30-23 @ 07:43), Max: 36.8 (05-30-23 @ 07:43)  T(F): 98.2 (05-30-23 @ 07:43), Max: 98.2 (05-30-23 @ 07:43)  HR: 78 (05-30-23 @ 07:43) (77 - 78)  BP: 132/67 (05-30-23 @ 07:43) (121/71 - 138/79)  RR: 18 (05-30-23 @ 07:43) (16 - 18)  SpO2: 93% (05-30-23 @ 07:43) (93% - 94%) on (O2)    Telemetry/Alarms:    Relevant labs, radiology and Medications reviewed                        8.7    8.47  )-----------( 322      ( 30 May 2023 07:10 )             29.7     05-30    142  |  113<H>  |  16  ----------------------------<  117<H>  4.3   |  26  |  1.05    Ca    8.9      30 May 2023 07:10    TPro  7.3  /  Alb  2.9<L>  /  TBili  0.4  /  DBili  0.3  /  AST  9<L>  /  ALT  14  /  AlkPhos  237<H>  05-30    PT/INR - ( 29 May 2023 06:34 )   PT: 14.0 sec;   INR: 1.20 ratio         PTT - ( 29 May 2023 06:34 )  PTT:32.0 sec  MEDICATIONS  (STANDING):  amLODIPine   Tablet 10 milliGRAM(s) Oral daily  aspirin enteric coated 81 milliGRAM(s) Oral daily  azithromycin   Tablet 500 milliGRAM(s) Oral every 24 hours  cefTRIAXone Injectable. 1000 milliGRAM(s) IV Push every 24 hours  clopidogrel Tablet 75 milliGRAM(s) Oral daily  dextrose 5%. 1000 milliLiter(s) (50 mL/Hr) IV Continuous <Continuous>  dextrose 5%. 1000 milliLiter(s) (100 mL/Hr) IV Continuous <Continuous>  dextrose 5%. 1000 milliLiter(s) (50 mL/Hr) IV Continuous <Continuous>  dextrose 5%. 1000 milliLiter(s) (100 mL/Hr) IV Continuous <Continuous>  dextrose 50% Injectable 12.5 Gram(s) IV Push once  dextrose 50% Injectable 25 Gram(s) IV Push once  dextrose 50% Injectable 12.5 Gram(s) IV Push once  dextrose 50% Injectable 25 Gram(s) IV Push once  dextrose 50% Injectable 25 Gram(s) IV Push once  dextrose 50% Injectable 25 Gram(s) IV Push once  finasteride 5 milliGRAM(s) Oral daily  furosemide   Injectable 40 milliGRAM(s) IV Push two times a day  gabapentin 300 milliGRAM(s) Oral two times a day  gabapentin 600 milliGRAM(s) Oral at bedtime  glucagon  Injectable 1 milliGRAM(s) IntraMuscular once  glucagon  Injectable 1 milliGRAM(s) IntraMuscular once  heparin   Injectable 5000 Unit(s) SubCutaneous every 12 hours  insulin glargine Injectable (LANTUS) 16 Unit(s) SubCutaneous at bedtime  insulin lispro (ADMELOG) corrective regimen sliding scale   SubCutaneous at bedtime  insulin lispro (ADMELOG) corrective regimen sliding scale   SubCutaneous three times a day before meals  lactobacillus acidophilus 1 Tablet(s) Oral three times a day with meals  lidocaine   4% Patch 1 Patch Transdermal daily  lidocaine   4% Patch 1 Patch Transdermal daily  metoprolol tartrate 25 milliGRAM(s) Oral daily  multivitamin 1 Tablet(s) Oral daily  pantoprazole  Injectable 40 milliGRAM(s) IV Push two times a day  potassium chloride    Tablet ER 20 milliEquivalent(s) Oral daily  simvastatin 10 milliGRAM(s) Oral at bedtime  sodium chloride 0.9% Bolus 500 milliLiter(s) IV Bolus once  sucralfate suspension 1 Gram(s) Oral four times a day  tamsulosin 0.4 milliGRAM(s) Oral at bedtime    MEDICATIONS  (PRN):  dextrose Oral Gel 15 Gram(s) Oral once PRN Blood Glucose LESS THAN 70 milliGRAM(s)/deciliter  dextrose Oral Gel 15 Gram(s) Oral once PRN Blood Glucose LESS THAN 70 milliGRAM(s)/deciliter  HYDROmorphone   Tablet 6 milliGRAM(s) Oral every 6 hours PRN Moderate Pain (4 - 6)  HYDROmorphone  Injectable 0.5 milliGRAM(s) IV Push every 4 hours PRN Severe Pain (7 - 10)  ondansetron    Tablet 4 milliGRAM(s) Oral every 6 hours PRN for nausea  polyethylene glycol 3350 17 Gram(s) Oral at bedtime PRN Constipation  senna 2 Tablet(s) Oral at bedtime PRN Constipation      Physical exam  Gen NAD  Neuro AAOx3  Card RRR  Pulm decreased right  Abd soft  Ext left BKA, Rt TMA wrapped    Tubes: right pigtial clamped    I&O's Summary    29 May 2023 07:01  -  30 May 2023 07:00  --------------------------------------------------------  IN: 0 mL / OUT: 660 mL / NET: -660 mL        Assessment  63y Male  w/ PAST MEDICAL & SURGICAL HISTORY:  HTN (hypertension)      DM (diabetes mellitus)      Anemia      GERD (gastroesophageal reflux disease)      S/P BKA (below knee amputation), left      CAD (coronary artery disease)      S/P CABG x 3      Status post amputation of leg      admitted with complaints of Patient is a 63y old  Male who presents with a chief complaint of abdominal pain (30 May 2023 13:43)  .  64 yo male   FULL CODE   PMH of DM II, PVD, CAD s/p CABG, s/p MV repair, HTN, Dyslipidemia, Gastroparesis, Anemia, GERD, Cirrhosis presented with abdominal pain on 5/28/23, associated with vomiting. Admitted to medicine. S/p Paracentesis 5/30/23. Now s/p Right pigtial cath 5/30/23    f/u pleural studies sent  AM CXR  CT clamped, open to waterseal tomorrow    Discussed with Cardiothoracic Team at AM rounds.

## 2023-05-30 NOTE — PROGRESS NOTE ADULT - PROBLEM SELECTOR PLAN 5
prior  mV repair now severe stenosis / moderate to severe MR , with pulmonary hypertension ,  right heart failure signs ,  repeat echo ,  need reassessment valvular disease ? candidate for surgery

## 2023-05-30 NOTE — CHART NOTE - NSCHARTNOTEFT_GEN_A_CORE
Interventional Radiology Pre-Procedure Note    This is a 63y Male here for a diagnostic paracentesis    NPO:  Anticoagulation:  Antibiotics:  Adverse reaction to anesthesia:  Objection to blood products:     PAST MEDICAL & SURGICAL HISTORY:  HTN (hypertension)      DM (diabetes mellitus)      Anemia      GERD (gastroesophageal reflux disease)      S/P BKA (below knee amputation), left      CAD (coronary artery disease)      S/P CABG x 3      Status post amputation of leg           Vitals: Vital Signs Last 24 Hrs  T(C): 36.8 (30 May 2023 07:43), Max: 36.8 (30 May 2023 07:43)  T(F): 98.2 (30 May 2023 07:43), Max: 98.2 (30 May 2023 07:43)  HR: 78 (30 May 2023 07:43) (77 - 78)  BP: 132/67 (30 May 2023 07:43) (121/71 - 138/79)  BP(mean): --  RR: 18 (30 May 2023 07:43) (16 - 18)  SpO2: 93% (30 May 2023 07:43) (93% - 94%)    Parameters below as of 30 May 2023 07:43  Patient On (Oxygen Delivery Method): room air        Allergies:   Allergies    No Known Allergies    Intolerances        Physical Exam: GEN: NAD; A&Ox3    Labs:                         8.7    8.47  )-----------( 322      ( 30 May 2023 07:10 )             29.7     05-30    142  |  113<H>  |  16  ----------------------------<  117<H>  4.3   |  26  |  1.05    Ca    8.9      30 May 2023 07:10    TPro  7.3  /  Alb  2.9<L>  /  TBili  0.4  /  DBili  0.3  /  AST  9<L>  /  ALT  14  /  AlkPhos  237<H>  05-30    PT/INR - ( 29 May 2023 06:34 )   PT: 14.0 sec;   INR: 1.20 ratio         PTT - ( 29 May 2023 06:34 )  PTT:32.0 sec        Plan: Diagnostic paracentesis      Natanael Acevedo PA-C

## 2023-05-30 NOTE — PROGRESS NOTE ADULT - PROBLEM SELECTOR PLAN 3
·  Problem: PAD (peripheral artery disease).   ·  Recommendation: Continue current meds. Pt relatively asymptomatic. CT does not suggest mesenteric ischemia although atherosclerosis is noted. Maintain adequate HH for perfusion.

## 2023-05-30 NOTE — PROCEDURE NOTE - NSICDXPROCEDURE_GEN_ALL_CORE_FT
PROCEDURES:  Chest tube placement with US guidance 30-May-2023 18:00:23  Rachel Montero  
PROCEDURES:  Paracentesis, with US guidance 30-May-2023 11:24:44  Natanael Acevedo

## 2023-05-30 NOTE — PROCEDURE NOTE - PROCEDURE FINDINGS AND DETAILS
100cc of clear yellow ascitic fluid removed from right abdomen under sterile conditions and ultrasound guidance.

## 2023-05-30 NOTE — DIETITIAN INITIAL EVALUATION ADULT - PERTINENT MEDS FT
MEDICATIONS  (STANDING):  amLODIPine   Tablet 10 milliGRAM(s) Oral daily  aspirin enteric coated 81 milliGRAM(s) Oral daily  azithromycin   Tablet 500 milliGRAM(s) Oral every 24 hours  cefTRIAXone Injectable. 1000 milliGRAM(s) IV Push every 24 hours  clopidogrel Tablet 75 milliGRAM(s) Oral daily  dextrose 5%. 1000 milliLiter(s) (100 mL/Hr) IV Continuous <Continuous>  dextrose 5%. 1000 milliLiter(s) (50 mL/Hr) IV Continuous <Continuous>  dextrose 5%. 1000 milliLiter(s) (50 mL/Hr) IV Continuous <Continuous>  dextrose 5%. 1000 milliLiter(s) (100 mL/Hr) IV Continuous <Continuous>  dextrose 50% Injectable 25 Gram(s) IV Push once  dextrose 50% Injectable 12.5 Gram(s) IV Push once  dextrose 50% Injectable 25 Gram(s) IV Push once  dextrose 50% Injectable 12.5 Gram(s) IV Push once  dextrose 50% Injectable 25 Gram(s) IV Push once  dextrose 50% Injectable 25 Gram(s) IV Push once  finasteride 5 milliGRAM(s) Oral daily  furosemide   Injectable 40 milliGRAM(s) IV Push two times a day  gabapentin 300 milliGRAM(s) Oral two times a day  gabapentin 600 milliGRAM(s) Oral at bedtime  glucagon  Injectable 1 milliGRAM(s) IntraMuscular once  glucagon  Injectable 1 milliGRAM(s) IntraMuscular once  heparin   Injectable 5000 Unit(s) SubCutaneous every 12 hours  insulin glargine Injectable (LANTUS) 16 Unit(s) SubCutaneous at bedtime  insulin lispro (ADMELOG) corrective regimen sliding scale   SubCutaneous three times a day before meals  insulin lispro (ADMELOG) corrective regimen sliding scale   SubCutaneous at bedtime  lactobacillus acidophilus 1 Tablet(s) Oral three times a day with meals  lidocaine   4% Patch 1 Patch Transdermal daily  metoprolol tartrate 25 milliGRAM(s) Oral daily  multivitamin 1 Tablet(s) Oral daily  pantoprazole  Injectable 40 milliGRAM(s) IV Push two times a day  potassium chloride    Tablet ER 20 milliEquivalent(s) Oral daily  simvastatin 10 milliGRAM(s) Oral at bedtime  sodium chloride 0.9% Bolus 500 milliLiter(s) IV Bolus once  sucralfate suspension 1 Gram(s) Oral four times a day  tamsulosin 0.4 milliGRAM(s) Oral at bedtime    MEDICATIONS  (PRN):  dextrose Oral Gel 15 Gram(s) Oral once PRN Blood Glucose LESS THAN 70 milliGRAM(s)/deciliter  dextrose Oral Gel 15 Gram(s) Oral once PRN Blood Glucose LESS THAN 70 milliGRAM(s)/deciliter  HYDROmorphone   Tablet 6 milliGRAM(s) Oral every 6 hours PRN Moderate Pain (4 - 6)  HYDROmorphone  Injectable 0.5 milliGRAM(s) IV Push every 4 hours PRN Severe Pain (7 - 10)  ondansetron    Tablet 4 milliGRAM(s) Oral every 6 hours PRN for nausea  polyethylene glycol 3350 17 Gram(s) Oral at bedtime PRN Constipation  senna 2 Tablet(s) Oral at bedtime PRN Constipation

## 2023-05-31 NOTE — CONSULT NOTE ADULT - TIME BILLING
Differential diagnosis and plan of care discussed with patient after the evaluation.   Counseling on diet, exercise, and medication compliance was done.  Counseling on smoking and alcohol cessation was offered when appropriate.  Pain assessed and judicious use of narcotics when appropriate was discussed.  Importance of fall prevention discussed.  Advanced care planning was discussed with patient and family.
SOB

## 2023-05-31 NOTE — PHYSICAL THERAPY INITIAL EVALUATION ADULT - PERTINENT HX OF CURRENT PROBLEM, REHAB EVAL
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 Admitted to medicine. S/p Paracentesis 5/30/23. Now s/p Right pigtail cath 5/30/23. Transudative effusion.     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 S/p Paracentesis 5/30/23. Now s/p Right pigtail cath 5/30/23. Transudative effusion.     CT opened to waterseal, plan to DC chest tube 6/1,recommendation for diuretic therapy to prevent recurrent transudative effusion

## 2023-05-31 NOTE — PHYSICAL THERAPY INITIAL EVALUATION ADULT - SKIN INTEGRITY
suture visible medial end of TMA surgical wound that has dehisced ,+exudate staining banadgaes over dorsal foot ,distal TMA wound ,ant shin packed with Xeroform ,Adaptic non-stick dressing to dorsal foot/erosion/surgical incision/wound

## 2023-05-31 NOTE — CONSULT NOTE ADULT - NS ATTEND AMEND GEN_ALL_CORE FT
Agree with above. In brief, this is a 63 year old man with prior CABG, mitral ring 2016, DM2, PAD, cirrhosis who now presents with abdominal distention in the setting of ascites as well as nonbloody emesis. His TTE was reviewed and I am in agreement with moderate-severe MS, as well as concomitant MR. In the setting of severe pH (group 2) and dilated RV, he meets indication for intervention, likely in form of Lamar. Would first however, continue to optimize patient from thoracic and GI perspective - EGD pending. After that, will likely consider ALEX with 3D imaging for case planning. Will review with heart team. Recommendations relayed to patient and primary team.     Thank you for allowing me to participate in the care of your patient. Feel free to contact me if any clinical issues arise via contact information below or MS Teams.    Germán Wilson MD, FACC, [facility]   Director, Interventional Cardiology, [address], [facility] [[email or web address]] Floor, [address] Office: [phone]  [place] Office: [phone]  Email: [email or web address]

## 2023-05-31 NOTE — PHYSICAL THERAPY INITIAL EVALUATION ADULT - DIAGNOSIS, PT EVAL
severe mitral stenosis , severe pulmonary HTN severe mitral stenosis , severe pulmonary HTN, R foot TMA with wound dehiscence, L BKA, DM2 ,severe neuropathy R foot , h/o CAD

## 2023-05-31 NOTE — PHYSICAL THERAPY INITIAL EVALUATION ADULT - PATIENT/FAMILY/SIGNIFICANT OTHER GOALS STATEMENT, PT EVAL
pt appears somewhat overwhelmed after discussion with Dr Douglas about needing 2 heart valves worked on, still learning to walk on one leg (after L BKA Feb 2022)

## 2023-05-31 NOTE — PROGRESS NOTE ADULT - ASSESSMENT
63 year old man with type 2 DM, gastroparesis, HTN, HLD, CAD, hx of CABG, MV repair, peripheral vascular disease, hx of L BKA, R toe amputation, hepatic cirrhosis, chronic anemia, presented for further evaluation of epigastric abdominal discomfort, different than typical gastroparesis symptoms. Had associated nausea. No hematemesis, hematochezia or melena. On ROS he also reported dyspnea on exertion. No chest pain or tightness. No diaphoresis. In the ED, patient was found to have mild leukocytosis, stable Hgb, negative troponin, elevated BNP, CT findings of mod R, small L pleural effusion, ground glass opacities, interlobular septal thickening, hepatic cirrhosis, small ascites. Patient was given analgesics, antiemetics, ceftriaxone and furosemide and admitted to Medicine.     #Epigastric abdominal discomfort with SIRS, unable to rule out sepsis, present upon admission  -Hx of gastroparesis but feels different than his usual gastroparesis sx. SIRS in ED (, WBC 12.3), imaging w distended stomach w food and debris  -ascites fluid tapped, cell count not c/w SBP  -DDx also includes gastritis peptic ulcer.    - Empiric Carafate QID, PPI BID  - F/u with GI for possible EGD, timing TBD    #Acute respiratory failure with hypoxia   -On O2. Multifactorial due to pleural effusion, possible pneumonia, possible CHF. See below. Weaned to room air this afternoon following thoracentesis, thoracostomy with pigtail drainage catheter.   - Continue O2 supplementation, wean O2 as tolerated  - Encourage incentive spirometry  - Continue to treat underlying issues  - Management of thoracostomy tube by Thoracic Surgery    #Airspace changes on CT, cannot r/o pneumonia, cannot r/o Gram-negative organism, present upon admission  -Etiology unclear. Dedicated CT chest done. Pleural effusions, right greater than left, with septal thickening and ground glass opacities. Right middle and lower lobe opacities likely reflect atelectasis. Small volume mediastinal adenopathy, possibly reactive.  -CTX x 5d  -Azithro completed    #Moderate R and small L pleural effusions  -Etiology unclear. Cannot r/o parapneumonic effusion, unable to r/o CHF with exacerbation, also could be related to his hepatic cirrhosis  -Received thoracentesis 5/30, with chest tube placement, 700cc drained, tube now clamped.   - F/u pending pleural fluid studies  -echo read pending  -chest tube in place, possibly for removal on 6/1  -lasix 40iv BID  -f/u thoracic recs    #Elevated BNP  -BNP elevated, 7681. CT A/P notable for cardiomegaly, GGO and interlobular septal thickening that could be pulm edema.   - TTE done, read pending    #CAD, hx of CABG  -No angina. Troponin negative. EKG NSR, rate WNL, nonspecific ST T changes.   - Continue aspirin, statin, metoprolol    #Microscopic hematuria  -No urinary complaints  -f/u as outpt    #Hepatic cirrhosis  -No encephalopathy. No known varices. Small ascites. Had paracentesis 5/30, clear yellow fluid, SAAG 0.8  -f/u GI recs    #Anemia  -May be due to chronic disease, cirrhosis    #IDDM  -A1c 6.7  - Continue insulin     #BPH  - Continue Flomax    #Severe protein calorie malnutrition  -Appreciate dietician input  - Trend weight  - Added thiamine and MVI daily      #DVT ppx- SQH  #Code status: Full  #Dispo: Return to [place] (long term) when medically ready,  current w chest tube, on iv diuresis, f/u GI recs (?EGD)

## 2023-05-31 NOTE — PHYSICAL THERAPY INITIAL EVALUATION ADULT - ACTIVE RANGE OF MOTION EXAMINATION, REHAB EVAL
L BK residual limb, RLE with posterior mm adaptive shortening including hamstrings ,triceps surae mm/ekisha. upper extremity Active ROM was WNL (within normal limits)/Left LE Active ROM was WFL (within functional limits)

## 2023-05-31 NOTE — PROGRESS NOTE ADULT - PROBLEM SELECTOR PLAN 2
·  Problem: CAD (coronary artery disease).   ·  Recommendation: Troponin negative, vascular congestion on CXR. continue lasix, right diagnostic thoracentesis planned as per pulmonary.  Echo performed - will followup

## 2023-05-31 NOTE — PROGRESS NOTE ADULT - PROBLEM SELECTOR PLAN 4
·  Problem: Pleural effusion.   ·  Recommendation: Lasix daily IVP. Monitor renal function, s/'p thoracentesis with chest tube - plan to dc today by CT Sx.  Echo performed - will followup

## 2023-05-31 NOTE — CONSULT NOTE ADULT - SUBJECTIVE AND OBJECTIVE BOX
HPI:  64 y/o M w/ PMH of DM2, PVD, gastroparesis, anemia, DM2, GERD, CAD s/p CABG, cirrhosis, MV repair now with mitral stenosis, HTN, dyslipidemia, p/w abdominal pain. Patient states abdominal pain started yeterday afternoon in epigastric area. Patient states that he had about 8 episodes of non-bloody emesis last night, and 2 episodes today prior to arrival. Also c/o chills. Patient denies any diarrhea, CP, SOB, cough, melena, hematochezia, fever, cough, runny nose, sore throat.     Patient had previously been seen in house in December for further management of his MS, but was s/p recent fem-pop bypass and infection so was advised to be seen in outpatient setting but was residing in NH and did not have transportation.  He has continued to experience worsening dyspnea and fatigue     PSH: BKA, CABG, MV repair, R toe amputation, RLE skin graft     Social Hx: Tobacco - 1/2 PPD, etoh  - quit 15 years ago, drugs - h/o marijuana use 40 years ago     Family Hx: Denies h/o pertinent family hx in first degree relatives  (28 May 2023 22:27)    PAST MEDICAL & SURGICAL HISTORY:  HTN (hypertension)  DM (diabetes mellitus)  Anemia  GERD (gastroesophageal reflux disease)    S/P BKA (below knee amputation), left  CAD (coronary artery disease)  S/P CABG x 3  Status post amputation of leg    REVIEW OF SYSTEMS:  14 point ROS negative in detail apart from as documented in HPI.    MEDICATIONS  (STANDING):  amLODIPine   Tablet 10 milliGRAM(s) Oral daily  aspirin enteric coated 81 milliGRAM(s) Oral daily  cefTRIAXone Injectable. 1000 milliGRAM(s) IV Push every 24 hours  clopidogrel Tablet 75 milliGRAM(s) Oral daily  dextrose 5%. 1000 milliLiter(s) (100 mL/Hr) IV Continuous <Continuous>  dextrose 5%. 1000 milliLiter(s) (50 mL/Hr) IV Continuous <Continuous>  dextrose 50% Injectable 25 Gram(s) IV Push once  dextrose 50% Injectable 12.5 Gram(s) IV Push once  dextrose 50% Injectable 25 Gram(s) IV Push once  finasteride 5 milliGRAM(s) Oral daily  furosemide   Injectable 40 milliGRAM(s) IV Push two times a day  gabapentin 300 milliGRAM(s) Oral two times a day  gabapentin 600 milliGRAM(s) Oral at bedtime  glucagon  Injectable 1 milliGRAM(s) IntraMuscular once  heparin   Injectable 5000 Unit(s) SubCutaneous every 12 hours  insulin glargine Injectable (LANTUS) 16 Unit(s) SubCutaneous at bedtime  insulin lispro (ADMELOG) corrective regimen sliding scale   SubCutaneous at bedtime  insulin lispro (ADMELOG) corrective regimen sliding scale   SubCutaneous three times a day before meals  lactobacillus acidophilus 1 Tablet(s) Oral three times a day with meals  lidocaine   4% Patch 1 Patch Transdermal daily  lidocaine   4% Patch 1 Patch Transdermal daily  metoprolol tartrate 25 milliGRAM(s) Oral daily  multivitamin 1 Tablet(s) Oral daily  pantoprazole  Injectable 40 milliGRAM(s) IV Push two times a day  potassium chloride    Tablet ER 20 milliEquivalent(s) Oral daily  simvastatin 10 milliGRAM(s) Oral at bedtime  sucralfate suspension 1 Gram(s) Oral four times a day  tamsulosin 0.4 milliGRAM(s) Oral at bedtime    MEDICATIONS  (PRN):  dextrose Oral Gel 15 Gram(s) Oral once PRN Blood Glucose LESS THAN 70 milliGRAM(s)/deciliter  HYDROmorphone   Tablet 6 milliGRAM(s) Oral every 6 hours PRN Moderate Pain (4 - 6)  HYDROmorphone  Injectable 1 milliGRAM(s) IV Push every 4 hours PRN Severe Pain (7 - 10)  ondansetron    Tablet 4 milliGRAM(s) Oral every 6 hours PRN for nausea  polyethylene glycol 3350 17 Gram(s) Oral at bedtime PRN Constipation  senna 2 Tablet(s) Oral at bedtime PRN Constipation    Home Medications:  amLODIPine 10 mg oral tablet: 1 tab(s) orally once a day (28 May 2023 22:22)  Aspirin Enteric Coated 81 mg oral delayed release tablet: 1 tab(s) orally once a day (28 May 2023 22:22)  clopidogrel 75 mg oral tablet: 1 tab(s) orally once a day (28 May 2023 22:22)  docusate sodium 100 mg oral capsule: 2 cap(s) orally every 12 hours (28 May 2023 22:39)  finasteride 5 mg oral tablet: 1 tab(s) orally once a day (at bedtime) (28 May 2023 22:22)  furosemide 20 mg oral tablet: 3 tab(s) orally once a day (28 May 2023 22:35)  gabapentin 300 mg oral capsule: 1 cap(s) orally 2 times a day (28 May 2023 22:22)  gabapentin 600 mg oral tablet: 1 tab(s) orally once a day (at bedtime) (28 May 2023 22:22)  HYDROmorphone 4 mg oral tablet: 1.5 tab(s) orally every 6 hours as needed for (28 May 2023 22:35)  insulin glargine 100 units/mL subcutaneous solution: 16 unit(s) subcutaneous once a day (at bedtime) (28 May 2023 22:35)  insulin lispro 100 units/mL subcutaneous solution: subcutaneous 3 times a day (before meals) per sliding scale:  151-200 = 2 units  201-250 = 4 units  251-300 = 6 units  301-350 = 8 units  351-400 = 10 units  &gt;400 = call MD      (28 May 2023 22:22)  lactobacillus acidophilus oral capsule: 1 cap(s) orally 3 times a day (28 May 2023 22:22)  lidocaine 5% topical film: Apply topically to affected area once a day (28 May 2023 22:35)  metFORMIN 500 mg oral tablet: 2 tab(s) orally 2 times a day (28 May 2023 22:35)  Metoprolol Tartrate 25 mg oral tablet: 1 tab(s) orally once a day (28 May 2023 22:35)  multivitamin: 1 tab(s) orally once a day (28 May 2023 22:22)  ondansetron 4 mg oral tablet: 1 tab(s) orally every 6 hours as needed for  nausea (28 May 2023 22:35)  pantoprazole 40 mg oral delayed release tablet: 1 tab(s) orally once a day (28 May 2023 22:35)  polyethylene glycol 3350 oral powder for reconstitution: 17 gram(s) orally once a day (at bedtime) (28 May 2023 22:22)  potassium chloride 20 mEq oral tablet, extended release: 1 tab(s) orally once a day (28 May 2023 22:35)  senna (sennosides) 8.6 mg oral tablet: 2 tab(s) orally once a day (at bedtime) (28 May 2023 22:36)  simvastatin 10 mg oral tablet: 1 tab(s) orally once a day (at bedtime) (28 May 2023 22:22)  tamsulosin 0.4 mg oral capsule: 2 cap(s) orally once a day (in the evening) (28 May 2023 22:22)    Allergies    No Known Allergies    Intolerances      Social History:    FAMILY HISTORY:  No pertinent family history in first degree relatives        ICU Vital Signs Last 24 Hrs  T(C): 36.7, Max: 36.9 ( @ 20:36)  HR: 79 (79 - 82)  BP: 140/67 (137/69 - 145/77)  BP(mean): --  ABP: --  ABP(mean): --  RR: 18 (18 - 18)  SpO2: 92% (90% - 95%)    Weight in k (23)  Weight in k.3 (23)      I&O's Summary Last 24 Hrs    IN: 0 mL / OUT: 1650 mL / NET: -1650 mL      Tele: not currently on tele    Physical Exam:    General: No distress. Comfortable.  HEENT: EOM intact.  Neck: Neck supple. JVP moderately elevated. No masses  Chest: Clear to auscultation bilaterally  CV: Normal S1 and S2. Harsh murmur present No rub, or gallops. Radial pulses normal.  Abdomen: Soft, non-distended, non-tender  Skin: No rashes or skin breakdown  Extremities:  Warm, s/p amputations   Neurology: Alert and oriented times three. Sensation intact  Psych: Affect normal    Labs:    ( 23 @ 06:46 )               9.1    10.68 )--------( 359                  30.3     ( 23 @ 06:46 )     142  |  111  |  24  ---------------------<  98  4.1  |  25  |  1.09    Ca 9.0  Phos x   Mg x     ( 23 @ 07:10 )  TPro  7.3  /  Alb  2.9  /  TBili  0.4  /  DBili  0.3  /  AST  9   /  ALT  14  /  AlkPhos  237  ( 23 @ 06:34 )  TPro  7.4  /  Alb  2.9  /  TBili  0.6  /  DBili  x   /  AST  10  /  ALT  17  /  AlkPhos  241                  Lactate Dehydrogenase, Serum: 217 (23 @ 07:10)      RADIOLOGY & ADDITIONAL STUDIES:

## 2023-05-31 NOTE — PHYSICAL THERAPY INITIAL EVALUATION ADULT - ADDITIONAL COMMENTS
pt has been undergoing wound care/dressing changes 1x/week with Dr Bennett @ [facility] R foot s/p TMA ; pt lost his stump  LLE and is wearing silicone liner long periods in place of  ( ?David)

## 2023-05-31 NOTE — PROGRESS NOTE ADULT - SUBJECTIVE AND OBJECTIVE BOX
HOSPITALIST ATTENDING PROGRESS NOTE    Chart and meds reviewed.  Patient seen and examined.    CC: abdnl pain    Subjective: Reports continued abdnl pain, also with pain at site of chest tube. Denies SOB.    All other systems reviewed and found to be negative with the exception of what has been described above.    MEDICATIONS  (STANDING):  amLODIPine   Tablet 10 milliGRAM(s) Oral daily  aspirin enteric coated 81 milliGRAM(s) Oral daily  cefTRIAXone Injectable. 1000 milliGRAM(s) IV Push every 24 hours  clopidogrel Tablet 75 milliGRAM(s) Oral daily  dextrose 5%. 1000 milliLiter(s) (50 mL/Hr) IV Continuous <Continuous>  dextrose 5%. 1000 milliLiter(s) (100 mL/Hr) IV Continuous <Continuous>  dextrose 50% Injectable 25 Gram(s) IV Push once  dextrose 50% Injectable 12.5 Gram(s) IV Push once  dextrose 50% Injectable 25 Gram(s) IV Push once  finasteride 5 milliGRAM(s) Oral daily  furosemide   Injectable 40 milliGRAM(s) IV Push two times a day  gabapentin 300 milliGRAM(s) Oral two times a day  gabapentin 600 milliGRAM(s) Oral at bedtime  glucagon  Injectable 1 milliGRAM(s) IntraMuscular once  heparin   Injectable 5000 Unit(s) SubCutaneous every 12 hours  insulin glargine Injectable (LANTUS) 16 Unit(s) SubCutaneous at bedtime  insulin lispro (ADMELOG) corrective regimen sliding scale   SubCutaneous at bedtime  insulin lispro (ADMELOG) corrective regimen sliding scale   SubCutaneous three times a day before meals  lactobacillus acidophilus 1 Tablet(s) Oral three times a day with meals  lidocaine   4% Patch 1 Patch Transdermal daily  lidocaine   4% Patch 1 Patch Transdermal daily  metoprolol tartrate 25 milliGRAM(s) Oral daily  multivitamin 1 Tablet(s) Oral daily  pantoprazole  Injectable 40 milliGRAM(s) IV Push two times a day  potassium chloride    Tablet ER 20 milliEquivalent(s) Oral daily  simvastatin 10 milliGRAM(s) Oral at bedtime  sucralfate suspension 1 Gram(s) Oral four times a day  tamsulosin 0.4 milliGRAM(s) Oral at bedtime    MEDICATIONS  (PRN):  dextrose Oral Gel 15 Gram(s) Oral once PRN Blood Glucose LESS THAN 70 milliGRAM(s)/deciliter  HYDROmorphone   Tablet 6 milliGRAM(s) Oral every 6 hours PRN Moderate Pain (4 - 6)  HYDROmorphone  Injectable 1 milliGRAM(s) IV Push every 4 hours PRN Severe Pain (7 - 10)  ondansetron    Tablet 4 milliGRAM(s) Oral every 6 hours PRN for nausea  polyethylene glycol 3350 17 Gram(s) Oral at bedtime PRN Constipation  senna 2 Tablet(s) Oral at bedtime PRN Constipation      VITALS:  T(F): 98.1 (05-31-23 @ 07:17), Max: 98.4 (05-30-23 @ 20:36)  HR: 79 (05-31-23 @ 15:30) (79 - 82)  BP: 117/59 (05-31-23 @ 15:30) (117/59 - 145/77)  RR: 18 (05-31-23 @ 07:17) (18 - 18)  SpO2: 92% (05-31-23 @ 07:17) (90% - 95%)  Wt(kg): --    I&O's Summary    30 May 2023 07:01  -  31 May 2023 07:00  --------------------------------------------------------  IN: 0 mL / OUT: 1650 mL / NET: -1650 mL        CAPILLARY BLOOD GLUCOSE      POCT Blood Glucose.: 138 mg/dL (31 May 2023 16:43)  POCT Blood Glucose.: 225 mg/dL (31 May 2023 11:40)  POCT Blood Glucose.: 103 mg/dL (31 May 2023 07:40)  POCT Blood Glucose.: 153 mg/dL (30 May 2023 22:22)  POCT Blood Glucose.: 204 mg/dL (30 May 2023 17:09)      PHYSICAL EXAM:  Gen: NAD  HEENT:  pupils equal and reactive, EOMI, no oropharyngeal lesions, erythema, exudates, oral thrush  NECK:   supple, no carotid bruits, no palpable lymph nodes, no thyromegaly  CV:  +S1, +S2, regular, no murmurs or rubs  RESP:   lungs clear to auscultation bilaterally, no wheezing, rales, rhonchi, good air entry bilaterally, + chest tube  BREAST:  not examined  GI:  abdomen soft, non-tender, non-distended, normal BS, no bruits, no abdominal masses, no palpable masses  RECTAL:  not examined  :  not examined  MSK:   normal muscle tone, no atrophy, no rigidity, no contractions  EXT:  no clubbing, no cyanosis, no edema, no calf pain, swelling or erythema  VASCULAR:  pulses equal and symmetric in the upper and lower extremities  NEURO:  AAOX3, no focal neurological deficits, follows all commands, able to move extremities spontaneously  SKIN:  no ulcers, lesions or rashes    LABS:                            9.1    10.68 )-----------( 359      ( 31 May 2023 06:46 )             30.3     05-31    142  |  111<H>  |  24<H>  ----------------------------<  98  4.1   |  25  |  1.09    Ca    9.0      31 May 2023 06:46    TPro  7.3  /  Alb  2.9<L>  /  TBili  0.4  /  DBili  0.3  /  AST  9<L>  /  ALT  14  /  AlkPhos  237<H>  05-30        LIVER FUNCTIONS - ( 30 May 2023 07:10 )  Alb: 2.9 g/dL / Pro: 7.3 gm/dL / ALK PHOS: 237 U/L / ALT: 14 U/L / AST: 9 U/L / GGT: x           CULTURES:  Micro:  Pleural fluid culture 5/30: Gram's stain negative, culture in-process  Peritoneal fluid culture 5/30: Gram's stain negative, culture in-process  Urine culture 5/28: Negative  Blood culture x 2 5/28: Negative  COVID19 PCR 5/28: Negative    Imaging:  CT A/P w/ PO/IV Cont 5/28: Moderate right and small left pleural effusions with associated bilateral basilar consolidation/atelectasis. Geographic groundglass opacities and interlobular septal thickening, which may represent pulmonary edema. Cardiomegaly. Mitral valve prosthesis. Prior CABG. Diffusely heterogeneous appearance of the liver, suggestive of passive hepatic congestion. Nodular surface contour of the inferior right hepatic lobe, suggestive of cirrhosis. Liver is enlarged and measures 20.5 cm in length. Normal bile ducts. Cholecystectomy. Normal spleen. Atrophic pancreas. No pancreatic ductal dilatation. No significant peripancreatic inflammation. Normal adrenals. No renal stones or hydronephrosis. Stable indeterminate 13 mm exophytic left renal lesion. Diffuse bladder wall thickening. Prostate within normal limits. No bowel obstruction. Colonic diverticulosis without evidence of acute diverticulitis. Normal appendix. Small volume abdominopelvic ascites. Atherosclerotic changes. Mildly enlarged retroperitoneal lymph nodes measuring up to 1.2 cm short axis at the left para-aortic region. Mildly enlarged aortocaval nodes measuring up to 1.4 cm short axis. Diffuse body wall edema, worst along the right anterior lateral abdominal wall. Small fat-containing left inguinal hernia. Degenerative bone changes.    MRCP 5/30/23: Limited exam due to motion. Stomach is distended with food/debris. No evidence of bowel obstruction. Consider upper GI for further evaluation if clinically warranted. Trace upper abdominal ascites. Trace bilateral pleural effusions with bibasilar consolidation versus atelectasis. Correlate with recently performed chest CT. There is a 1 cm exophytic lesion off the upper pole the left kidney which cannot be definitely characterized due to motion. Correlate with nonemergent renal ultrasound. Multiple additional findings as above.    CXR 5/31/23: Placement of pigtail chest tube right base with near complete drainage of right pleural effusion. Mild atelectasis right base Mild pulmonary venous congestion.    Cardiac Testing:  EKG 5/29: NSR, rate WNL, ST/T changes.    EKG 5/28: Sinus tachycardia, rate 110. RV conduction delay. ST/T changes.    Prior cardiac testing  Echo 5/31/23: read pending  TTE 12/2022:  Endocardium is not well visualized, however, overall left ventricular systolic function appears normal. Mild concentric left ventricular hypertrophy. Septal flattening is seen; this finding is consistent with right heart pressure / volume overload. Estimated left ventricular ejection fraction is 55-60%. The right ventricle exhibits mild dilation, mild diffuse hypokinesis, and mild depression of contractility. Severe mitral stenosis. Mild mitral regurgitation. Mild to moderate tricuspid valve regurgitation. Severe pulmonary hypertension.    Telemetry, personally reviewed:  5/31/23: sinus, d/c tele

## 2023-05-31 NOTE — PHYSICAL THERAPY INITIAL EVALUATION ADULT - PRECAUTIONS/LIMITATIONS, REHAB EVAL
severe neuropathy R foot/fall precautions severe neuropathy R foot, wound dehiscence R TMA, wounds dorsal R foot ,,R heel/fall precautions

## 2023-05-31 NOTE — PROGRESS NOTE ADULT - SUBJECTIVE AND OBJECTIVE BOX
HPI:  62 y/o M w/ PMH of DM2, PVD, gastroparesis, anemia, DM2, GERD, CAD s/p CABG, cirrhosis, MV repair, HTN, dyslipidemia, p/w abdominal pain. Patient states abdominal pain started yesterday afternoon in epigastric area. Patient states that he had about 8 episodes of non-bloody emesis last night, and 2 episodes today prior to arrival. Also c/o chills. Patient denies any diarrhea, CP, SOB, cough, melena, hematochezia, fever, cough, runny nose, sore throat. Cardiology called to evaluate symptoms and risks. Pt denies exertional symptoms.     5/30/23: Denies cp.  Breathing improved.  Still with mild abdominal distention.  Tele: RSR  5/31/23: no cardiac complaints, Tele: NSR 70-80 in AM, off tele at present     MEDICATIONS  (STANDING):  amLODIPine   Tablet 10 milliGRAM(s) Oral daily  aspirin enteric coated 81 milliGRAM(s) Oral daily  azithromycin   Tablet 500 milliGRAM(s) Oral every 24 hours  cefTRIAXone Injectable. 1000 milliGRAM(s) IV Push every 24 hours  clopidogrel Tablet 75 milliGRAM(s) Oral daily  dextrose 5%. 1000 milliLiter(s) (50 mL/Hr) IV Continuous <Continuous>  dextrose 5%. 1000 milliLiter(s) (100 mL/Hr) IV Continuous <Continuous>  dextrose 50% Injectable 25 Gram(s) IV Push once  dextrose 50% Injectable 25 Gram(s) IV Push once  dextrose 50% Injectable 12.5 Gram(s) IV Push once  finasteride 5 milliGRAM(s) Oral daily  furosemide   Injectable 40 milliGRAM(s) IV Push two times a day  gabapentin 300 milliGRAM(s) Oral two times a day  gabapentin 600 milliGRAM(s) Oral at bedtime  glucagon  Injectable 1 milliGRAM(s) IntraMuscular once  heparin   Injectable 5000 Unit(s) SubCutaneous every 12 hours  insulin glargine Injectable (LANTUS) 16 Unit(s) SubCutaneous at bedtime  insulin lispro (ADMELOG) corrective regimen sliding scale   SubCutaneous at bedtime  insulin lispro (ADMELOG) corrective regimen sliding scale   SubCutaneous three times a day before meals  lactobacillus acidophilus 1 Tablet(s) Oral three times a day with meals  lidocaine   4% Patch 1 Patch Transdermal daily  lidocaine   4% Patch 1 Patch Transdermal daily  metoprolol tartrate 25 milliGRAM(s) Oral daily  multivitamin 1 Tablet(s) Oral daily  pantoprazole  Injectable 40 milliGRAM(s) IV Push two times a day  potassium chloride    Tablet ER 20 milliEquivalent(s) Oral daily  simvastatin 10 milliGRAM(s) Oral at bedtime  sucralfate suspension 1 Gram(s) Oral four times a day  tamsulosin 0.4 milliGRAM(s) Oral at bedtime      MEDICATIONS  (PRN):  dextrose Oral Gel 15 Gram(s) Oral once PRN Blood Glucose LESS THAN 70 milliGRAM(s)/deciliter  dextrose Oral Gel 15 Gram(s) Oral once PRN Blood Glucose LESS THAN 70 milliGRAM(s)/deciliter  HYDROmorphone   Tablet 6 milliGRAM(s) Oral every 6 hours PRN Moderate Pain (4 - 6)  HYDROmorphone  Injectable 0.5 milliGRAM(s) IV Push every 4 hours PRN Severe Pain (7 - 10)  ondansetron    Tablet 4 milliGRAM(s) Oral every 6 hours PRN for nausea  polyethylene glycol 3350 17 Gram(s) Oral at bedtime PRN Constipation  senna 2 Tablet(s) Oral at bedtime PRN Constipation    Vital Signs Last 24 Hrs  T(C): 36.7 (31 May 2023 07:17), Max: 36.9 (30 May 2023 20:36)  T(F): 98.1 (31 May 2023 07:17), Max: 98.4 (30 May 2023 20:36)  HR: 79 (31 May 2023 07:17) (79 - 82)  BP: 140/67 (31 May 2023 07:17) (137/69 - 145/77)  BP(mean): --  RR: 18 (31 May 2023 07:17) (18 - 18)  SpO2: 92% (31 May 2023 07:17) (90% - 95%)    Parameters below as of 31 May 2023 07:17  Patient On (Oxygen Delivery Method): room air      PHYSICAL EXAM:    Constitutional: NAD, awake and alert, well-developed  HEENT: PERR, EOMI,  No oral cyananosis.  Neck:  supple,  No JVD  Respiratory: Breath sounds are clear, diminished at bases, No wheezing, rales or rhonchi  Cardiovascular: S1 and S2, regular rate and rhythm, no Murmurs, gallops or rubs  Gastrointestinal: Bowel Sounds present, soft, nontender.   Extremities: Amputation lower extremities.   Vascular: 2+ peripheral pulses  Neurological: A/O x 3, no focal deficits  Musculoskeletal: no calf tenderness.  Skin: No rashes.      LABS: All Labs Reviewed:                        9.1    10.68 )-----------( 359      ( 31 May 2023 06:46 )             30.3     05-31    142  |  111<H>  |  24<H>  ----------------------------<  98  4.1   |  25  |  1.09    Ca    9.0      31 May 2023 06:46    TPro  7.3  /  Alb  2.9<L>  /  TBili  0.4  /  DBili  0.3  /  AST  9<L>  /  ALT  14  /  AlkPhos  237<H>  05-30    - TroponinI hsT: <-24.72, <-21.97                          8.7    8.47  )-----------( 322      ( 30 May 2023 07:10 )             29.7                           9.1    9.66  )-----------( 351      ( 29 May 2023 06:34 )             31.2                         9.4    12.30 )-----------( 365      ( 28 May 2023 13:11 )             31.1     05-30    142  |  113<H>  |  16  ----------------------------<  117<H>  4.3   |  26  |  1.05    Ca    8.9      30 May 2023 07:10    TPro  7.3  /  Alb  2.9<L>  /  TBili  0.4  /  DBili  0.3  /  AST  9<L>  /  ALT  14  /  AlkPhos  237<H>  05-30      29 May 2023 06:34    143    |  112    |  11     ----------------------------<  121    4.1     |  24     |  0.82   28 May 2023 13:11    142    |  110    |  14     ----------------------------<  193    3.6     |  20     |  0.85     Ca    9.4        29 May 2023 06:34  Ca    9.4        28 May 2023 13:11    TPro  7.4    /  Alb  2.9    /  TBili  0.6    /  DBili  x      /  AST  10     /  ALT  17     /  AlkPhos  241    29 May 2023 06:34  TPro  8.0    /  Alb  3.1    /  TBili  0.9    /  DBili  x      /  AST  11     /  ALT  20     /  AlkPhos  272    28 May 2023 13:11    PT/INR - ( 29 May 2023 06:34 )   PT: 14.0 sec;   INR: 1.20 ratio         PTT - ( 29 May 2023 06:34 )  PTT:32.0 sec      Blood Culture:         RADIOLOGY/EKG:< from: 12 Lead ECG (05.29.23 @ 07:54) >  Diagnosis Line Normal sinus rhythm  ST & T wave abnormality, consider anterolateral ischemia  Abnormal ECG  Confirmed by WILDER WEBSTER MD ([id number]) on 5/29/2023 9:10:39 AM    < end of copied text >    -------------------------------------------------------------------------------------------------------  < from: ALEX Complete w/Spect and Color (12.22.22 @ 10:45) >   Impression     Summary     A transesophageal echocardiogram was performed and included probe   placement in the esophagus posterior to the heart by the interpreting   physician, real-time image acquisition and interpretation utilizing 2-D,   color flow Doppler, pulsed wave and/or continuous wave with spectral   display. The patient received IV sedation. The procedure was monitored   with automatic blood pressure monitoring, telemetry tracings, and pulse   oximetry. The patient tolerated the procedure well and there were no   complications.     The left ventricle is normal in size, wall thickness, wall motion and   contractility.   No thrombus seen in the left atrial appendage.   S/p mitral valve repair.   Severe mitral stenosis.   Moderate-severe, eccentric mitral regurgitation.   Mild to moderate tricuspid regurgitation.    < end of copied text >  -------------------------------------------------------------------------------------------------

## 2023-05-31 NOTE — PROGRESS NOTE ADULT - PROBLEM SELECTOR PLAN 5
prior  mV repair now severe stenosis / moderate to severe MR , with pulmonary hypertension ,  right heart failure signs ,  repeat echo perforemd - will followup,  need reassessment by structural heart - pt. will followup with Cardiac Sx at [facility] - D/W YONI Garrido prior  mV repair now severe stenosis / moderate to severe MR , with pulmonary hypertension ,  right heart failure signs ,  repeat echo performed - will followup,  need reassessment by structural heart - pt. will followup with Cardiac Sx - D/W YONI Garrido

## 2023-05-31 NOTE — PHYSICAL THERAPY INITIAL EVALUATION ADULT - PLANNED THERAPY INTERVENTIONS, PT EVAL
contact prosthetist to replace , ?stump socks/gait training/prosthetic fitting/training/ROM/strengthening/stretching/transfer training

## 2023-05-31 NOTE — CONSULT NOTE ADULT - ASSESSMENT
63 y/o man with a history of CAD s/p CABG and MV repair (2016 - [facility]), HTN, DM, GERD, L BKA, who has severe mitral stenosis with high gradient at normal HR.

## 2023-05-31 NOTE — PHYSICAL THERAPY INITIAL EVALUATION ADULT - MANUAL MUSCLE TESTING RESULTS, REHAB EVAL
WFL BUE, L BK residual limb >/= 4/5 ; RLE distal strength not manually tested ,appears >/= 3+/5 in available ROM (hand placement to areas of open wounds contraindicated )

## 2023-05-31 NOTE — PROGRESS NOTE ADULT - SUBJECTIVE AND OBJECTIVE BOX
Subjective:  Pt seen, c/o much pain from chest tube. Breathing improved. Seen on RA.    Vital Signs:  Vital Signs Last 24 Hrs  T(C): 36.7 (05-31-23 @ 07:17), Max: 36.9 (05-30-23 @ 20:36)  T(F): 98.1 (05-31-23 @ 07:17), Max: 98.4 (05-30-23 @ 20:36)  HR: 79 (05-31-23 @ 07:17) (79 - 82)  BP: 140/67 (05-31-23 @ 07:17) (137/69 - 145/77)  RR: 18 (05-31-23 @ 07:17) (18 - 18)  SpO2: 92% (05-31-23 @ 07:17) (90% - 95%) on (O2)    Telemetry/Alarms:    Relevant labs, radiology and Medications reviewed                        9.1    10.68 )-----------( 359      ( 31 May 2023 06:46 )             30.3     05-31    142  |  111<H>  |  24<H>  ----------------------------<  98  4.1   |  25  |  1.09    Ca    9.0      31 May 2023 06:46    TPro  7.3  /  Alb  2.9<L>  /  TBili  0.4  /  DBili  0.3  /  AST  9<L>  /  ALT  14  /  AlkPhos  237<H>  05-30      MEDICATIONS  (STANDING):  amLODIPine   Tablet 10 milliGRAM(s) Oral daily  aspirin enteric coated 81 milliGRAM(s) Oral daily  azithromycin   Tablet 500 milliGRAM(s) Oral every 24 hours  cefTRIAXone Injectable. 1000 milliGRAM(s) IV Push every 24 hours  clopidogrel Tablet 75 milliGRAM(s) Oral daily  dextrose 5%. 1000 milliLiter(s) (100 mL/Hr) IV Continuous <Continuous>  dextrose 5%. 1000 milliLiter(s) (50 mL/Hr) IV Continuous <Continuous>  dextrose 50% Injectable 25 Gram(s) IV Push once  dextrose 50% Injectable 25 Gram(s) IV Push once  dextrose 50% Injectable 12.5 Gram(s) IV Push once  finasteride 5 milliGRAM(s) Oral daily  furosemide   Injectable 40 milliGRAM(s) IV Push two times a day  gabapentin 300 milliGRAM(s) Oral two times a day  gabapentin 600 milliGRAM(s) Oral at bedtime  glucagon  Injectable 1 milliGRAM(s) IntraMuscular once  heparin   Injectable 5000 Unit(s) SubCutaneous every 12 hours  insulin glargine Injectable (LANTUS) 16 Unit(s) SubCutaneous at bedtime  insulin lispro (ADMELOG) corrective regimen sliding scale   SubCutaneous at bedtime  insulin lispro (ADMELOG) corrective regimen sliding scale   SubCutaneous three times a day before meals  lactobacillus acidophilus 1 Tablet(s) Oral three times a day with meals  lidocaine   4% Patch 1 Patch Transdermal daily  lidocaine   4% Patch 1 Patch Transdermal daily  metoprolol tartrate 25 milliGRAM(s) Oral daily  multivitamin 1 Tablet(s) Oral daily  pantoprazole  Injectable 40 milliGRAM(s) IV Push two times a day  potassium chloride    Tablet ER 20 milliEquivalent(s) Oral daily  simvastatin 10 milliGRAM(s) Oral at bedtime  sucralfate suspension 1 Gram(s) Oral four times a day  tamsulosin 0.4 milliGRAM(s) Oral at bedtime    MEDICATIONS  (PRN):  dextrose Oral Gel 15 Gram(s) Oral once PRN Blood Glucose LESS THAN 70 milliGRAM(s)/deciliter  HYDROmorphone   Tablet 6 milliGRAM(s) Oral every 6 hours PRN Moderate Pain (4 - 6)  HYDROmorphone  Injectable 0.5 milliGRAM(s) IV Push every 4 hours PRN Severe Pain (7 - 10)  ondansetron    Tablet 4 milliGRAM(s) Oral every 6 hours PRN for nausea  polyethylene glycol 3350 17 Gram(s) Oral at bedtime PRN Constipation  senna 2 Tablet(s) Oral at bedtime PRN Constipation      Physical exam  Gen NAD  Neuro AAOx3  Card RRR  Pulm decreased right  Abd soft  Ext left BKA, Rt TMA wrapped    Tubes: right pigtial clamped    I&O's Summary    30 May 2023 07:01  -  31 May 2023 07:00  --------------------------------------------------------  IN: 0 mL / OUT: 1650 mL / NET: -1650 mL        Assessment  63y Male  w/ PAST MEDICAL & SURGICAL HISTORY:  HTN (hypertension)      DM (diabetes mellitus)      Anemia      GERD (gastroesophageal reflux disease)      S/P BKA (below knee amputation), left      CAD (coronary artery disease)      S/P CABG x 3      Status post amputation of leg      admitted with complaints of Patient is a 63y old  Male who presents with a chief complaint of Abdominal pain  Small ascites  Moderate R and small L pleural effusions  Hypoxia (30 May 2023 19:48)  .  64 yo male   FULL CODE   PMH of DM II, PVD, CAD s/p CABG, s/p MV repair, HTN, Dyslipidemia, Gastroparesis, Anemia, GERD, Cirrhosis presented with abdominal pain on 5/28/23, associated with vomiting. Admitted to medicine. S/p Paracentesis 5/30/23. Now s/p Right pigtial cath 5/30/23. Transudative effusion.     CT opened to waterseal  AM CXR  plan on d/c chest tube 6/1/23  IS  diuretic regiment for prevention of recurrent transudative effusion    Discussed with Cardiothoracic Team at AM rounds.

## 2023-05-31 NOTE — PHYSICAL THERAPY INITIAL EVALUATION ADULT - GENERAL OBSERVATIONS, REHAB EVAL
pt is familiar to me from prior admission late Dec 2022 ,he had a R femoral -anterior tibial bypass on 12/19/22 and was diagnosed with new onset hepatic cirrhosis on imaging Dec 2022; L BKA well healed

## 2023-05-31 NOTE — PHYSICAL THERAPY INITIAL EVALUATION ADULT - IMPAIRMENTS CONTRIBUTING TO GAIT DEVIATIONS, PT EVAL
severe neuropathy R foot (s/p TMA ,partial foot amputation)/decreased flexibility/impaired sensory feedback

## 2023-05-31 NOTE — PHYSICAL THERAPY INITIAL EVALUATION ADULT - PATIENT PROFILE REVIEW, REHAB EVAL
yes Patient previously hospitalized  in December for further management of his severe mitral stenosis,had RHC/LHC 12/23/22 ,he had undergone R fem-pop bypass 12/19/22 & concern for infection so was advised to be seen in outpatient setting but was residing in SNF and did not have transportation.  He has continued to experience worsening dyspnea and fatigue/yes

## 2023-06-01 NOTE — PROGRESS NOTE ADULT - SUBJECTIVE AND OBJECTIVE BOX
HOSPITALIST ATTENDING PROGRESS NOTE    Chart and meds reviewed.  Patient seen and examined.    CC: abdnl pain    Subjective: Denies CP, some pain at chest tube site. Some abdnl pain. Plan for EGD tmrw. Thoracic considering removal of chest tube today.     All other systems reviewed and found to be negative with the exception of what has been described above.    MEDICATIONS  (STANDING):  amLODIPine   Tablet 10 milliGRAM(s) Oral daily  aspirin enteric coated 81 milliGRAM(s) Oral daily  clopidogrel Tablet 75 milliGRAM(s) Oral daily  dextrose 5%. 1000 milliLiter(s) (100 mL/Hr) IV Continuous <Continuous>  dextrose 5%. 1000 milliLiter(s) (50 mL/Hr) IV Continuous <Continuous>  dextrose 50% Injectable 25 Gram(s) IV Push once  dextrose 50% Injectable 25 Gram(s) IV Push once  dextrose 50% Injectable 12.5 Gram(s) IV Push once  finasteride 5 milliGRAM(s) Oral daily  furosemide   Injectable 40 milliGRAM(s) IV Push daily  gabapentin 300 milliGRAM(s) Oral two times a day  gabapentin 600 milliGRAM(s) Oral at bedtime  glucagon  Injectable 1 milliGRAM(s) IntraMuscular once  heparin   Injectable 5000 Unit(s) SubCutaneous every 12 hours  insulin glargine Injectable (LANTUS) 16 Unit(s) SubCutaneous at bedtime  insulin lispro (ADMELOG) corrective regimen sliding scale   SubCutaneous at bedtime  insulin lispro (ADMELOG) corrective regimen sliding scale   SubCutaneous three times a day before meals  lactobacillus acidophilus 1 Tablet(s) Oral three times a day with meals  lidocaine   4% Patch 1 Patch Transdermal daily  lidocaine   4% Patch 1 Patch Transdermal daily  metoprolol tartrate 25 milliGRAM(s) Oral daily  multivitamin 1 Tablet(s) Oral daily  pantoprazole  Injectable 40 milliGRAM(s) IV Push two times a day  potassium chloride    Tablet ER 20 milliEquivalent(s) Oral daily  simvastatin 10 milliGRAM(s) Oral at bedtime  spironolactone 100 milliGRAM(s) Oral daily  sucralfate suspension 1 Gram(s) Oral four times a day  tamsulosin 0.4 milliGRAM(s) Oral at bedtime    MEDICATIONS  (PRN):  dextrose Oral Gel 15 Gram(s) Oral once PRN Blood Glucose LESS THAN 70 milliGRAM(s)/deciliter  HYDROmorphone   Tablet 6 milliGRAM(s) Oral every 6 hours PRN Moderate Pain (4 - 6)  HYDROmorphone  Injectable 1 milliGRAM(s) IV Push every 4 hours PRN Severe Pain (7 - 10)  ondansetron   Disintegrating Tablet 4 milliGRAM(s) Oral every 6 hours PRN Nausea  polyethylene glycol 3350 17 Gram(s) Oral at bedtime PRN Constipation  senna 2 Tablet(s) Oral at bedtime PRN Constipation      VITALS:  T(F): 98.1 (06-01-23 @ 08:06), Max: 98.9 (05-31-23 @ 20:45)  HR: 76 (06-01-23 @ 12:00) (76 - 81)  BP: 115/70 (06-01-23 @ 12:00) (115/70 - 135/73)  RR: 18 (06-01-23 @ 12:00) (18 - 19)  SpO2: 99% (06-01-23 @ 12:00) (97% - 99%)  Wt(kg): --    I&O's Summary    31 May 2023 07:01  -  01 Jun 2023 07:00  --------------------------------------------------------  IN: 0 mL / OUT: 1835 mL / NET: -1835 mL    01 Jun 2023 07:01  -  01 Jun 2023 15:27  --------------------------------------------------------  IN: 0 mL / OUT: 600 mL / NET: -600 mL        CAPILLARY BLOOD GLUCOSE      POCT Blood Glucose.: 139 mg/dL (01 Jun 2023 11:43)  POCT Blood Glucose.: 153 mg/dL (01 Jun 2023 08:04)  POCT Blood Glucose.: 229 mg/dL (31 May 2023 21:43)  POCT Blood Glucose.: 138 mg/dL (31 May 2023 16:43)      PHYSICAL EXAM:  Gen: NAD  HEENT:  pupils equal and reactive, EOMI, no oropharyngeal lesions, erythema, exudates, oral thrush  NECK:   supple, no carotid bruits, no palpable lymph nodes, no thyromegaly  CV:  +S1, +S2, regular, no murmurs or rubs  RESP:   lungs clear to auscultation bilaterally, no wheezing, rales, rhonchi, good air entry bilaterally, + chest tube  BREAST:  not examined  GI:  abdomen soft, non-tender, non-distended, normal BS, no bruits, no abdominal masses, no palpable masses  RECTAL:  not examined  :  not examined  MSK:   normal muscle tone, no atrophy, no rigidity, no contractions  EXT:  no clubbing, no cyanosis, no edema, no calf pain, swelling or erythema  VASCULAR:  pulses equal and symmetric in the upper and lower extremities  NEURO:  AAOX3, no focal neurological deficits, follows all commands, able to move extremities spontaneously  SKIN:  no ulcers, lesions or rashes    LABS:                            8.9    9.96  )-----------( 334      ( 01 Jun 2023 06:52 )             29.7     06-01    141  |  109<H>  |  36<H>  ----------------------------<  147<H>  4.1   |  25  |  1.07    Ca    9.0      01 Jun 2023 06:52    TPro  7.2  /  Alb  2.7<L>  /  TBili  0.5  /  DBili  x   /  AST  14<L>  /  ALT  19  /  AlkPhos  233<H>  06-01        LIVER FUNCTIONS - ( 01 Jun 2023 06:52 )  Alb: 2.7 g/dL / Pro: 7.2 gm/dL / ALK PHOS: 233 U/L / ALT: 19 U/L / AST: 14 U/L / GGT: x           CULTURES:  Micro:  Pleural fluid culture 5/30: Gram's stain negative, culture in-process  Peritoneal fluid culture 5/30: Gram's stain negative, culture in-process  Urine culture 5/28: Negative  Blood culture x 2 5/28: Negative  COVID19 PCR 5/28: Negative    Imaging:  CT A/P w/ PO/IV Cont 5/28: Moderate right and small left pleural effusions with associated bilateral basilar consolidation/atelectasis. Geographic groundglass opacities and interlobular septal thickening, which may represent pulmonary edema. Cardiomegaly. Mitral valve prosthesis. Prior CABG. Diffusely heterogeneous appearance of the liver, suggestive of passive hepatic congestion. Nodular surface contour of the inferior right hepatic lobe, suggestive of cirrhosis. Liver is enlarged and measures 20.5 cm in length. Normal bile ducts. Cholecystectomy. Normal spleen. Atrophic pancreas. No pancreatic ductal dilatation. No significant peripancreatic inflammation. Normal adrenals. No renal stones or hydronephrosis. Stable indeterminate 13 mm exophytic left renal lesion. Diffuse bladder wall thickening. Prostate within normal limits. No bowel obstruction. Colonic diverticulosis without evidence of acute diverticulitis. Normal appendix. Small volume abdominopelvic ascites. Atherosclerotic changes. Mildly enlarged retroperitoneal lymph nodes measuring up to 1.2 cm short axis at the left para-aortic region. Mildly enlarged aortocaval nodes measuring up to 1.4 cm short axis. Diffuse body wall edema, worst along the right anterior lateral abdominal wall. Small fat-containing left inguinal hernia. Degenerative bone changes.    MRCP 5/30/23: Limited exam due to motion. Stomach is distended with food/debris. No evidence of bowel obstruction. Consider upper GI for further evaluation if clinically warranted. Trace upper abdominal ascites. Trace bilateral pleural effusions with bibasilar consolidation versus atelectasis. Correlate with recently performed chest CT. There is a 1 cm exophytic lesion off the upper pole the left kidney which cannot be definitely characterized due to motion. Correlate with nonemergent renal ultrasound. Multiple additional findings as above.    CXR 5/31/23: Placement of pigtail chest tube right base with near complete drainage of right pleural effusion. Mild atelectasis right base Mild pulmonary venous congestion.    Cardiac Testing:  EKG 5/29: NSR, rate WNL, ST/T changes.    EKG 5/28: Sinus tachycardia, rate 110. RV conduction delay. ST/T changes.    Prior cardiac testing  Echo 5/31/23: Mild concentric left ventricular hypertrophy is present. Septal flattening is seen; this finding is consistent with right heart pressure / volume overload. Estimated left ventricular ejection fraction is 60-65 %. Normal appearing left atrium. The right atrium appears mildly dilated. The right ventricle is mildly to moderately dilated with decreased contractility. Definity was used to better visualize the endocardial border. A prominent moderator band and prominent trabeculations are noted. No thrombus is visualized. Mild aortic sclerosis is present with normal valvular opening. Severe mitral annular calcification is present. There is calcification of mitral valve leaflets. The leaflet opening is restricted. Probable moderate to severe mitral stenosis Moderate (2+) tricuspid valve regurgitation is present. Severe pulmonary hypertension. No evidence of pericardial effusion. The IVC is dilated with decreased respiratory variation.    TTE 12/2022:  Endocardium is not well visualized, however, overall left ventricular systolic function appears normal. Mild concentric left ventricular hypertrophy. Septal flattening is seen; this finding is consistent with right heart pressure / volume overload. Estimated left ventricular ejection fraction is 55-60%. The right ventricle exhibits mild dilation, mild diffuse hypokinesis, and mild depression of contractility. Severe mitral stenosis. Mild mitral regurgitation. Mild to moderate tricuspid valve regurgitation. Severe pulmonary hypertension.    Telemetry, personally reviewed:  5/31/23: sinus, d/c tele

## 2023-06-01 NOTE — PROGRESS NOTE ADULT - SUBJECTIVE AND OBJECTIVE BOX
Patient is a 63y old  Male who presents with a chief complaint of abdominal pain    Followup: cirrhosis/ascites, right pleural effusion, epigastric pain, gastroparesis  Right pigtail draining- about 800ml in past 24 hours- for possible dc today. Patient still with epigastric pain, without relation to meals. Does report breathing and abdominal distension as improved. Fluid analysis constistnet with portal hypertension. On Lasix 80mg IV daily.    MEDICATIONS  (STANDING):  amLODIPine   Tablet 10 milliGRAM(s) Oral daily  aspirin enteric coated 81 milliGRAM(s) Oral daily  clopidogrel Tablet 75 milliGRAM(s) Oral daily  dextrose 5%. 1000 milliLiter(s) (50 mL/Hr) IV Continuous <Continuous>  dextrose 5%. 1000 milliLiter(s) (100 mL/Hr) IV Continuous <Continuous>  dextrose 50% Injectable 25 Gram(s) IV Push once  dextrose 50% Injectable 12.5 Gram(s) IV Push once  dextrose 50% Injectable 25 Gram(s) IV Push once  finasteride 5 milliGRAM(s) Oral daily  furosemide   Injectable 40 milliGRAM(s) IV Push daily  gabapentin 300 milliGRAM(s) Oral two times a day  gabapentin 600 milliGRAM(s) Oral at bedtime  glucagon  Injectable 1 milliGRAM(s) IntraMuscular once  heparin   Injectable 5000 Unit(s) SubCutaneous every 12 hours  insulin glargine Injectable (LANTUS) 16 Unit(s) SubCutaneous at bedtime  insulin lispro (ADMELOG) corrective regimen sliding scale   SubCutaneous at bedtime  insulin lispro (ADMELOG) corrective regimen sliding scale   SubCutaneous three times a day before meals  lactobacillus acidophilus 1 Tablet(s) Oral three times a day with meals  lidocaine   4% Patch 1 Patch Transdermal daily  lidocaine   4% Patch 1 Patch Transdermal daily  metoprolol tartrate 25 milliGRAM(s) Oral daily  multivitamin 1 Tablet(s) Oral daily  pantoprazole  Injectable 40 milliGRAM(s) IV Push two times a day  potassium chloride    Tablet ER 20 milliEquivalent(s) Oral daily  simvastatin 10 milliGRAM(s) Oral at bedtime  spironolactone 100 milliGRAM(s) Oral daily  sucralfate suspension 1 Gram(s) Oral four times a day  tamsulosin 0.4 milliGRAM(s) Oral at bedtime    MEDICATIONS  (PRN):  dextrose Oral Gel 15 Gram(s) Oral once PRN Blood Glucose LESS THAN 70 milliGRAM(s)/deciliter  HYDROmorphone   Tablet 6 milliGRAM(s) Oral every 6 hours PRN Moderate Pain (4 - 6)  HYDROmorphone  Injectable 1 milliGRAM(s) IV Push every 4 hours PRN Severe Pain (7 - 10)  ondansetron   Disintegrating Tablet 4 milliGRAM(s) Oral every 6 hours PRN Nausea  polyethylene glycol 3350 17 Gram(s) Oral at bedtime PRN Constipation  senna 2 Tablet(s) Oral at bedtime PRN Constipation      Vital Signs Last 24 Hrs  T(C): 36.7 (01 Jun 2023 08:06), Max: 37.2 (31 May 2023 20:45)  T(F): 98.1 (01 Jun 2023 08:06), Max: 98.9 (31 May 2023 20:45)  HR: 76 (01 Jun 2023 12:00) (76 - 81)  BP: 115/70 (01 Jun 2023 12:00) (115/70 - 135/73)  BP(mean): --  RR: 18 (01 Jun 2023 12:00) (18 - 19)  SpO2: 99% (01 Jun 2023 12:00) (97% - 99%)    Parameters below as of 01 Jun 2023 12:00  Patient On (Oxygen Delivery Method): room air        PHYSICAL EXAM:    Constitutional: No acute distress, frail, non-toxic appearing  HEENT: masked, good phonation, not icteric  Neck: supple, no lymphadenopathy  Respiratory: clear to ascultation bilaterally, no wheezing  Cardiovascular: S1 and S2, regular rate and rhythm, no murmurs rubs or gallops  Gastrointestinal: soft, mod tender epig, softy mild distended, +bowel sounds, no rebound or guarding, no surgical scars, no drains  Extremities: No peripheral edema, LAKA no cyanosis or clubbing  Vascular: 2+ peripheral pulses, no venous stasis  Neurological: A/O x 3, no focal deficits, no asterixis  Psychiatric: Normal mood, normal affect  Skin: No rashes, not jaundiced    LABS:                        8.9    9.96  )-----------( 334      ( 01 Jun 2023 06:52 )             29.7     06-01    141  |  109<H>  |  36<H>  ----------------------------<  147<H>  4.1   |  25  |  1.07    Ca    9.0      01 Jun 2023 06:52    TPro  7.2  /  Alb  2.7<L>  /  TBili  0.5  /  DBili  x   /  AST  14<L>  /  ALT  19  /  AlkPhos  233<H>  06-01      LIVER FUNCTIONS - ( 01 Jun 2023 06:52 )  Alb: 2.7 g/dL / Pro: 7.2 gm/dL / ALK PHOS: 233 U/L / ALT: 19 U/L / AST: 14 U/L / GGT: x             RADIOLOGY & ADDITIONAL STUDIES: Patient is a 63y old  Male who presents with a chief complaint of abdominal pain    Followup: cirrhosis/ascites, right pleural effusion, epigastric pain, gastroparesis  Right pigtail draining- about 800ml in past 24 hours- for possible dc today. Patient still with epigastric pain, without relation to meals. Does report breathing and abdominal distension as improved. Fluid analysis constistnet with portal hypertension. On Lasix 80mg IV daily.    MEDICATIONS  (STANDING):  amLODIPine   Tablet 10 milliGRAM(s) Oral daily  aspirin enteric coated 81 milliGRAM(s) Oral daily  clopidogrel Tablet 75 milliGRAM(s) Oral daily  dextrose 5%. 1000 milliLiter(s) (50 mL/Hr) IV Continuous <Continuous>  dextrose 5%. 1000 milliLiter(s) (100 mL/Hr) IV Continuous <Continuous>  dextrose 50% Injectable 25 Gram(s) IV Push once  dextrose 50% Injectable 12.5 Gram(s) IV Push once  dextrose 50% Injectable 25 Gram(s) IV Push once  finasteride 5 milliGRAM(s) Oral daily  furosemide   Injectable 40 milliGRAM(s) IV Push daily  gabapentin 300 milliGRAM(s) Oral two times a day  gabapentin 600 milliGRAM(s) Oral at bedtime  glucagon  Injectable 1 milliGRAM(s) IntraMuscular once  heparin   Injectable 5000 Unit(s) SubCutaneous every 12 hours  insulin glargine Injectable (LANTUS) 16 Unit(s) SubCutaneous at bedtime  insulin lispro (ADMELOG) corrective regimen sliding scale   SubCutaneous at bedtime  insulin lispro (ADMELOG) corrective regimen sliding scale   SubCutaneous three times a day before meals  lactobacillus acidophilus 1 Tablet(s) Oral three times a day with meals  lidocaine   4% Patch 1 Patch Transdermal daily  lidocaine   4% Patch 1 Patch Transdermal daily  metoprolol tartrate 25 milliGRAM(s) Oral daily  multivitamin 1 Tablet(s) Oral daily  pantoprazole  Injectable 40 milliGRAM(s) IV Push two times a day  potassium chloride    Tablet ER 20 milliEquivalent(s) Oral daily  simvastatin 10 milliGRAM(s) Oral at bedtime  spironolactone 100 milliGRAM(s) Oral daily  sucralfate suspension 1 Gram(s) Oral four times a day  tamsulosin 0.4 milliGRAM(s) Oral at bedtime    MEDICATIONS  (PRN):  dextrose Oral Gel 15 Gram(s) Oral once PRN Blood Glucose LESS THAN 70 milliGRAM(s)/deciliter  HYDROmorphone   Tablet 6 milliGRAM(s) Oral every 6 hours PRN Moderate Pain (4 - 6)  HYDROmorphone  Injectable 1 milliGRAM(s) IV Push every 4 hours PRN Severe Pain (7 - 10)  ondansetron   Disintegrating Tablet 4 milliGRAM(s) Oral every 6 hours PRN Nausea  polyethylene glycol 3350 17 Gram(s) Oral at bedtime PRN Constipation  senna 2 Tablet(s) Oral at bedtime PRN Constipation      Vital Signs Last 24 Hrs  T(C): 36.7 (01 Jun 2023 08:06), Max: 37.2 (31 May 2023 20:45)  T(F): 98.1 (01 Jun 2023 08:06), Max: 98.9 (31 May 2023 20:45)  HR: 76 (01 Jun 2023 12:00) (76 - 81)  BP: 115/70 (01 Jun 2023 12:00) (115/70 - 135/73)  BP(mean): --  RR: 18 (01 Jun 2023 12:00) (18 - 19)  SpO2: 99% (01 Jun 2023 12:00) (97% - 99%)    Parameters below as of 01 Jun 2023 12:00  Patient On (Oxygen Delivery Method): room air        PHYSICAL EXAM:    Constitutional: No acute distress, frail, non-toxic appearing  HEENT: masked, good phonation, not icteric  Neck: supple, no lymphadenopathy  Respiratory: clear to ascultation bilaterally, no wheezing  Cardiovascular: S1 and S2, regular rate and rhythm, no murmurs rubs or gallops  Gastrointestinal: soft, mod tender epig, softy mild distended, +bowel sounds, no rebound or guarding, no surgical scars, no drains  Extremities: No peripheral edema, LAKA no cyanosis or clubbing  Vascular: 2+ peripheral pulses, no venous stasis  Neurological: A/O x 3, no focal deficits, no asterixis  Psychiatric: Normal mood, normal affect  Skin: No rashes, not jaundiced    LABS:                        8.9    9.96  )-----------( 334      ( 01 Jun 2023 06:52 )             29.7     06-01    141  |  109<H>  |  36<H>  ----------------------------<  147<H>  4.1   |  25  |  1.07    Ca    9.0      01 Jun 2023 06:52    TPro  7.2  /  Alb  2.7<L>  /  TBili  0.5  /  DBili  x   /  AST  14<L>  /  ALT  19  /  AlkPhos  233<H>  06-01      LIVER FUNCTIONS - ( 01 Jun 2023 06:52 )  Alb: 2.7 g/dL / Pro: 7.2 gm/dL / ALK PHOS: 233 U/L / ALT: 19 U/L / AST: 14 U/L / GGT: x             RADIOLOGY & ADDITIONAL STUDIES: fluid studies from [facility] reviewed

## 2023-06-01 NOTE — PROGRESS NOTE ADULT - SUBJECTIVE AND OBJECTIVE BOX
HPI:  62 y/o M w/ PMH of DM2, PVD, gastroparesis, anemia, DM2, GERD, CAD s/p CABG, cirrhosis, MV repair, HTN, dyslipidemia, p/w abdominal pain. Patient states abdominal pain started yesterday afternoon in epigastric area. Patient states that he had about 8 episodes of non-bloody emesis last night, and 2 episodes today prior to arrival. Also c/o chills. Patient denies any diarrhea, CP, SOB, cough, melena, hematochezia, fever, cough, runny nose, sore throat. Cardiology called to evaluate symptoms and risks. Pt denies exertional symptoms.     5/30/23: Denies cp.  Breathing improved.  Still with mild abdominal distention.  Tele: RSR  5/31/23: no cardiac complaints, Tele: NSR 70-80 in AM, off tele at present   6/1/23: no SOB, chest tune in place, off tele     MEDICATIONS  (STANDING):  amLODIPine   Tablet 10 milliGRAM(s) Oral daily  aspirin enteric coated 81 milliGRAM(s) Oral daily  cefTRIAXone Injectable. 1000 milliGRAM(s) IV Push every 24 hours  clopidogrel Tablet 75 milliGRAM(s) Oral daily  dextrose 5%. 1000 milliLiter(s) (50 mL/Hr) IV Continuous <Continuous>  dextrose 5%. 1000 milliLiter(s) (100 mL/Hr) IV Continuous <Continuous>  dextrose 50% Injectable 25 Gram(s) IV Push once  dextrose 50% Injectable 12.5 Gram(s) IV Push once  dextrose 50% Injectable 25 Gram(s) IV Push once  finasteride 5 milliGRAM(s) Oral daily  furosemide   Injectable 40 milliGRAM(s) IV Push two times a day  gabapentin 300 milliGRAM(s) Oral two times a day  gabapentin 600 milliGRAM(s) Oral at bedtime  glucagon  Injectable 1 milliGRAM(s) IntraMuscular once  heparin   Injectable 5000 Unit(s) SubCutaneous every 12 hours  insulin glargine Injectable (LANTUS) 16 Unit(s) SubCutaneous at bedtime  insulin lispro (ADMELOG) corrective regimen sliding scale   SubCutaneous at bedtime  insulin lispro (ADMELOG) corrective regimen sliding scale   SubCutaneous three times a day before meals  lactobacillus acidophilus 1 Tablet(s) Oral three times a day with meals  lidocaine   4% Patch 1 Patch Transdermal daily  lidocaine   4% Patch 1 Patch Transdermal daily  metoprolol tartrate 25 milliGRAM(s) Oral daily  multivitamin 1 Tablet(s) Oral daily  pantoprazole  Injectable 40 milliGRAM(s) IV Push two times a day  potassium chloride    Tablet ER 20 milliEquivalent(s) Oral daily  simvastatin 10 milliGRAM(s) Oral at bedtime  sucralfate suspension 1 Gram(s) Oral four times a day  tamsulosin 0.4 milliGRAM(s) Oral at bedtime      MEDICATIONS  (PRN):  dextrose Oral Gel 15 Gram(s) Oral once PRN Blood Glucose LESS THAN 70 milliGRAM(s)/deciliter  dextrose Oral Gel 15 Gram(s) Oral once PRN Blood Glucose LESS THAN 70 milliGRAM(s)/deciliter  HYDROmorphone   Tablet 6 milliGRAM(s) Oral every 6 hours PRN Moderate Pain (4 - 6)  HYDROmorphone  Injectable 0.5 milliGRAM(s) IV Push every 4 hours PRN Severe Pain (7 - 10)  ondansetron    Tablet 4 milliGRAM(s) Oral every 6 hours PRN for nausea  polyethylene glycol 3350 17 Gram(s) Oral at bedtime PRN Constipation  senna 2 Tablet(s) Oral at bedtime PRN Constipation    Vital Signs Last 24 Hrs  T(C): 36.7 (01 Jun 2023 08:06), Max: 37.2 (31 May 2023 20:45)  T(F): 98.1 (01 Jun 2023 08:06), Max: 98.9 (31 May 2023 20:45)  HR: 76 (01 Jun 2023 08:06) (76 - 81)  BP: 133/65 (01 Jun 2023 08:06) (117/59 - 135/73)  BP(mean): --  RR: 19 (01 Jun 2023 08:06) (18 - 19)  SpO2: 98% (01 Jun 2023 08:06) (97% - 98%)    Parameters below as of 01 Jun 2023 08:06  Patient On (Oxygen Delivery Method): room air    PHYSICAL EXAM:    Constitutional: NAD, awake and alert, well-developed  HEENT: PERR, EOMI,  No oral cyananosis.  Neck:  supple,  No JVD  Respiratory: Breath sounds are clear, diminished at bases, No wheezing, rales or rhonchi  Cardiovascular: S1 and S2, regular rate and rhythm, no Murmurs, gallops or rubs  Gastrointestinal: Bowel Sounds present, soft, nontender.   Extremities: Amputation lower extremities.   Vascular: 2+ peripheral pulses  Neurological: A/O x 3, no focal deficits  Musculoskeletal: no calf tenderness.  Skin: No rashes.      LABS: All Labs Reviewed:                       8.9    9.96  )-----------( 334      ( 01 Jun 2023 06:52 )             29.7     06-01    141  |  109<H>  |  36<H>  ----------------------------<  147<H>  4.1   |  25  |  1.07    Ca    9.0      01 Jun 2023 06:52    TPro  7.2  /  Alb  2.7<L>  /  TBili  0.5  /  DBili  x   /  AST  14<L>  /  ALT  19  /  AlkPhos  233<H>  06-01    - TroponinI hsT: <-24.72, <-21.97                        9.1    10.68 )-----------( 359      ( 31 May 2023 06:46 )             30.3     05-31    142  |  111<H>  |  24<H>  ----------------------------<  98  4.1   |  25  |  1.09    Ca    9.0      31 May 2023 06:46    TPro  7.3  /  Alb  2.9<L>  /  TBili  0.4  /  DBili  0.3  /  AST  9<L>  /  ALT  14  /  AlkPhos  237<H>  05-30    - TroponinI hsT: <-24.72, <-21.97                          8.7    8.47  )-----------( 322      ( 30 May 2023 07:10 )             29.7                           9.1    9.66  )-----------( 351      ( 29 May 2023 06:34 )             31.2                         9.4    12.30 )-----------( 365      ( 28 May 2023 13:11 )             31.1     05-30    142  |  113<H>  |  16  ----------------------------<  117<H>  4.3   |  26  |  1.05    Ca    8.9      30 May 2023 07:10    TPro  7.3  /  Alb  2.9<L>  /  TBili  0.4  /  DBili  0.3  /  AST  9<L>  /  ALT  14  /  AlkPhos  237<H>  05-30      29 May 2023 06:34    143    |  112    |  11     ----------------------------<  121    4.1     |  24     |  0.82   28 May 2023 13:11    142    |  110    |  14     ----------------------------<  193    3.6     |  20     |  0.85     Ca    9.4        29 May 2023 06:34  Ca    9.4        28 May 2023 13:11    TPro  7.4    /  Alb  2.9    /  TBili  0.6    /  DBili  x      /  AST  10     /  ALT  17     /  AlkPhos  241    29 May 2023 06:34  TPro  8.0    /  Alb  3.1    /  TBili  0.9    /  DBili  x      /  AST  11     /  ALT  20     /  AlkPhos  272    28 May 2023 13:11    PT/INR - ( 29 May 2023 06:34 )   PT: 14.0 sec;   INR: 1.20 ratio         PTT - ( 29 May 2023 06:34 )  PTT:32.0 sec      Blood Culture:         RADIOLOGY/EKG:< from: 12 Lead ECG (05.29.23 @ 07:54) >  Diagnosis Line Normal sinus rhythm  ST & T wave abnormality, consider anterolateral ischemia  Abnormal ECG  Confirmed by WILDER WEBSTER MD ([id number]) on 5/29/2023 9:10:39 AM    < end of copied text >    < from: TTE Echo Complete w/o Contrast w/ Doppler (05.31.23 @ 09:48) >     Impression     Summary     Mild concentric left ventricular hypertrophy is present.   Septal flattening is seen; this finding is consistent with right heart   pressure / volume overload.   Estimated left ventricular ejection fraction is 60-65 %.   Normal appearing left atrium.   The right atrium appears mildly dilated.   The right ventricle is mildly to moderately dilated with decreased   contractility.   Definity was used to better visualize the endocardial border.   A prominent moderator band and prominent trabeculations are noted.   No thrombus is visualized.   Mild aortic sclerosis is present with normal valvular opening.   Severe mitral annular calcification is present.   There is calcification of mitral valve leaflets. The leaflet opening is   restricted.   Probable moderate to severe mitral stenosis   Moderate (2+) tricuspid valve regurgitation is present.   Severe pulmonary hypertension.   No evidence of pericardial effusion.   The IVC is dilated with decreased respiratory variation.     Signature     ----------------------------------------------------------------   Electronically signed by Natanael Brooks MD(Interpreting   physician) on 05/31/2023 05:31 PM    < end of copied text >      -------------------------------------------------------------------------------------------------------  < from: ALEX Complete w/Spect and Color (12.22.22 @ 10:45) >   Impression     Summary     A transesophageal echocardiogram was performed and included probe   placement in the esophagus posterior to the heart by the interpreting   physician, real-time image acquisition and interpretation utilizing 2-D,   color flow Doppler, pulsed wave and/or continuous wave with spectral   display. The patient received IV sedation. The procedure was monitored   with automatic blood pressure monitoring, telemetry tracings, and pulse   oximetry. The patient tolerated the procedure well and there were no   complications.     The left ventricle is normal in size, wall thickness, wall motion and   contractility.   No thrombus seen in the left atrial appendage.   S/p mitral valve repair.   Severe mitral stenosis.   Moderate-severe, eccentric mitral regurgitation.   Mild to moderate tricuspid regurgitation.    < end of copied text >  -------------------------------------------------------------------------------------------------   REASON FOR VISIT: CAD, MV regurgitation    HPI:  62 y/o M w/ PMH of DM2, PVD, gastroparesis, anemia, DM2, GERD, CAD s/p CABG, cirrhosis, MV repair, HTN, dyslipidemia, p/w abdominal pain. Patient states abdominal pain started yesterday afternoon in epigastric area. Patient states that he had about 8 episodes of non-bloody emesis last night, and 2 episodes today prior to arrival. Also c/o chills. Patient denies any diarrhea, CP, SOB, cough, melena, hematochezia, fever, cough, runny nose, sore throat. Cardiology called to evaluate symptoms and risks. Pt denies exertional symptoms.     5/30/23: Denies cp.  Breathing improved.  Still with mild abdominal distention.  Tele: RSR  5/31/23: no cardiac complaints, Tele: NSR 70-80 in AM, off tele at present   6/1/23: no SOB, chest tune in place, off tele     MEDICATIONS  (STANDING):  amLODIPine   Tablet 10 milliGRAM(s) Oral daily  aspirin enteric coated 81 milliGRAM(s) Oral daily  cefTRIAXone Injectable. 1000 milliGRAM(s) IV Push every 24 hours  clopidogrel Tablet 75 milliGRAM(s) Oral daily  dextrose 5%. 1000 milliLiter(s) (50 mL/Hr) IV Continuous <Continuous>  dextrose 5%. 1000 milliLiter(s) (100 mL/Hr) IV Continuous <Continuous>  dextrose 50% Injectable 25 Gram(s) IV Push once  dextrose 50% Injectable 12.5 Gram(s) IV Push once  dextrose 50% Injectable 25 Gram(s) IV Push once  finasteride 5 milliGRAM(s) Oral daily  furosemide   Injectable 40 milliGRAM(s) IV Push two times a day  gabapentin 300 milliGRAM(s) Oral two times a day  gabapentin 600 milliGRAM(s) Oral at bedtime  glucagon  Injectable 1 milliGRAM(s) IntraMuscular once  heparin   Injectable 5000 Unit(s) SubCutaneous every 12 hours  insulin glargine Injectable (LANTUS) 16 Unit(s) SubCutaneous at bedtime  insulin lispro (ADMELOG) corrective regimen sliding scale   SubCutaneous at bedtime  insulin lispro (ADMELOG) corrective regimen sliding scale   SubCutaneous three times a day before meals  lactobacillus acidophilus 1 Tablet(s) Oral three times a day with meals  lidocaine   4% Patch 1 Patch Transdermal daily  lidocaine   4% Patch 1 Patch Transdermal daily  metoprolol tartrate 25 milliGRAM(s) Oral daily  multivitamin 1 Tablet(s) Oral daily  pantoprazole  Injectable 40 milliGRAM(s) IV Push two times a day  potassium chloride    Tablet ER 20 milliEquivalent(s) Oral daily  simvastatin 10 milliGRAM(s) Oral at bedtime  sucralfate suspension 1 Gram(s) Oral four times a day  tamsulosin 0.4 milliGRAM(s) Oral at bedtime      MEDICATIONS  (PRN):  dextrose Oral Gel 15 Gram(s) Oral once PRN Blood Glucose LESS THAN 70 milliGRAM(s)/deciliter  dextrose Oral Gel 15 Gram(s) Oral once PRN Blood Glucose LESS THAN 70 milliGRAM(s)/deciliter  HYDROmorphone   Tablet 6 milliGRAM(s) Oral every 6 hours PRN Moderate Pain (4 - 6)  HYDROmorphone  Injectable 0.5 milliGRAM(s) IV Push every 4 hours PRN Severe Pain (7 - 10)  ondansetron    Tablet 4 milliGRAM(s) Oral every 6 hours PRN for nausea  polyethylene glycol 3350 17 Gram(s) Oral at bedtime PRN Constipation  senna 2 Tablet(s) Oral at bedtime PRN Constipation    Vital Signs Last 24 Hrs  T(C): 36.7 (01 Jun 2023 08:06), Max: 37.2 (31 May 2023 20:45)  T(F): 98.1 (01 Jun 2023 08:06), Max: 98.9 (31 May 2023 20:45)  HR: 76 (01 Jun 2023 08:06) (76 - 81)  BP: 133/65 (01 Jun 2023 08:06) (117/59 - 135/73)  BP(mean): --  RR: 19 (01 Jun 2023 08:06) (18 - 19)  SpO2: 98% (01 Jun 2023 08:06) (97% - 98%)    Parameters below as of 01 Jun 2023 08:06  Patient On (Oxygen Delivery Method): room air    PHYSICAL EXAM:    Constitutional: NAD, awake and alert, well-developed  HEENT: PERR, EOMI,  No oral cyananosis.  Neck:  supple,  No JVD  Respiratory: Breath sounds are clear, diminished at bases, No wheezing, rales or rhonchi  Cardiovascular: S1 and S2, regular rate and rhythm, no Murmurs, gallops or rubs  Gastrointestinal: Bowel Sounds present, soft, nontender.   Extremities: Amputation lower extremities.   Vascular: 2+ peripheral pulses  Neurological: A/O x 3, no focal deficits  Musculoskeletal: no calf tenderness.  Skin: No rashes.      LABS: All Labs Reviewed:                       8.9    9.96  )-----------( 334      ( 01 Jun 2023 06:52 )             29.7     06-01    141  |  109<H>  |  36<H>  ----------------------------<  147<H>  4.1   |  25  |  1.07    Ca    9.0      01 Jun 2023 06:52    TPro  7.2  /  Alb  2.7<L>  /  TBili  0.5  /  DBili  x   /  AST  14<L>  /  ALT  19  /  AlkPhos  233<H>  06-01    - TroponinI hsT: <-24.72, <-21.97                        9.1    10.68 )-----------( 359      ( 31 May 2023 06:46 )             30.3     05-31    142  |  111<H>  |  24<H>  ----------------------------<  98  4.1   |  25  |  1.09    Ca    9.0      31 May 2023 06:46    TPro  7.3  /  Alb  2.9<L>  /  TBili  0.4  /  DBili  0.3  /  AST  9<L>  /  ALT  14  /  AlkPhos  237<H>  05-30    - TroponinI hsT: <-24.72, <-21.97                          8.7    8.47  )-----------( 322      ( 30 May 2023 07:10 )             29.7                           9.1    9.66  )-----------( 351      ( 29 May 2023 06:34 )             31.2                         9.4    12.30 )-----------( 365      ( 28 May 2023 13:11 )             31.1     05-30    142  |  113<H>  |  16  ----------------------------<  117<H>  4.3   |  26  |  1.05    Ca    8.9      30 May 2023 07:10    TPro  7.3  /  Alb  2.9<L>  /  TBili  0.4  /  DBili  0.3  /  AST  9<L>  /  ALT  14  /  AlkPhos  237<H>  05-30      29 May 2023 06:34    143    |  112    |  11     ----------------------------<  121    4.1     |  24     |  0.82   28 May 2023 13:11    142    |  110    |  14     ----------------------------<  193    3.6     |  20     |  0.85     Ca    9.4        29 May 2023 06:34  Ca    9.4        28 May 2023 13:11    TPro  7.4    /  Alb  2.9    /  TBili  0.6    /  DBili  x      /  AST  10     /  ALT  17     /  AlkPhos  241    29 May 2023 06:34  TPro  8.0    /  Alb  3.1    /  TBili  0.9    /  DBili  x      /  AST  11     /  ALT  20     /  AlkPhos  272    28 May 2023 13:11    PT/INR - ( 29 May 2023 06:34 )   PT: 14.0 sec;   INR: 1.20 ratio         PTT - ( 29 May 2023 06:34 )  PTT:32.0 sec      Blood Culture:         RADIOLOGY/EKG:< from: 12 Lead ECG (05.29.23 @ 07:54) >  Diagnosis Line Normal sinus rhythm  ST & T wave abnormality, consider anterolateral ischemia  Abnormal ECG  Confirmed by WILDER WEBSTER MD ([id number]) on 5/29/2023 9:10:39 AM    < end of copied text >    < from: TTE Echo Complete w/o Contrast w/ Doppler (05.31.23 @ 09:48) >     Impression     Summary     Mild concentric left ventricular hypertrophy is present.   Septal flattening is seen; this finding is consistent with right heart   pressure / volume overload.   Estimated left ventricular ejection fraction is 60-65 %.   Normal appearing left atrium.   The right atrium appears mildly dilated.   The right ventricle is mildly to moderately dilated with decreased   contractility.   Definity was used to better visualize the endocardial border.   A prominent moderator band and prominent trabeculations are noted.   No thrombus is visualized.   Mild aortic sclerosis is present with normal valvular opening.   Severe mitral annular calcification is present.   There is calcification of mitral valve leaflets. The leaflet opening is   restricted.   Probable moderate to severe mitral stenosis   Moderate (2+) tricuspid valve regurgitation is present.   Severe pulmonary hypertension.   No evidence of pericardial effusion.   The IVC is dilated with decreased respiratory variation.     Signature     ----------------------------------------------------------------   Electronically signed by Natanael Brooks MD(Interpreting   physician) on 05/31/2023 05:31 PM    < end of copied text >      -------------------------------------------------------------------------------------------------------  < from: ALEX Complete w/Spect and Color (12.22.22 @ 10:45) >   Impression     Summary     A transesophageal echocardiogram was performed and included probe   placement in the esophagus posterior to the heart by the interpreting   physician, real-time image acquisition and interpretation utilizing 2-D,   color flow Doppler, pulsed wave and/or continuous wave with spectral   display. The patient received IV sedation. The procedure was monitored   with automatic blood pressure monitoring, telemetry tracings, and pulse   oximetry. The patient tolerated the procedure well and there were no   complications.     The left ventricle is normal in size, wall thickness, wall motion and   contractility.   No thrombus seen in the left atrial appendage.   S/p mitral valve repair.   Severe mitral stenosis.   Moderate-severe, eccentric mitral regurgitation.   Mild to moderate tricuspid regurgitation.    < end of copied text >  -------------------------------------------------------------------------------------------------

## 2023-06-01 NOTE — PROGRESS NOTE ADULT - ASSESSMENT
PMH of DM II, PVD, CAD s/p CABG, s/p MV repair, HTN, Dyslipidemia, Gastroparesis, Anemia, GERD, Cirrhosis presented with abdominal pain on 5/28/23, associated with vomiting. Admitted to medicine. S/p Paracentesis 5/30/23. Now s/p Right pigtial cath 5/30/23. Transudative effusion.     Plan   Pigtail removed  CXR without PTX   IS  diuretic regiment for prevention of recurrent transudative effusion  will sign off -please reconsult if needed     Discussed with Cardiothoracic Team at AM rounds.

## 2023-06-01 NOTE — CONSULT NOTE ADULT - SUBJECTIVE AND OBJECTIVE BOX
Date of Consult: 6/1/23  HPI : 62 y/o M w/ PMH of DM2, PVD, gastroparesis, anemia, DM2, GERD, CAD s/p CABG, cirrhosis, MV repair, HTN, dyslipidemia, p/w abdominal pain. Patient states abdominal pain started yeterday afternoon in epigastric area. Patient states that he had about 8 episodes of non-bloody emesis last night, and 2 episodes today prior to arrival. Also c/o chills. Patient denies any diarrhea, CP, SOB, cough, melena, hematochezia, fever, cough, runny nose, sore throat.     PSH: BKA, CABG, MV repair, R toe amputation, RLE skin graft     Social Hx: Tobacco - 1/2 PPD, etoh  - quit 15 years ago, drugs - h/o marijuana use 40 years ago     Family Hx: Denies h/o pertinent family hx in first degree relatives  (28 May 2023 22:27)        REVIEW OF SYSTEMS:  CONSTITUTIONAL: No weakness, fevers or chills  EYES/ENT: No visual changes;  No vertigo or throat pain   NECK: No pain or stiffness  RESPIRATORY: No cough, wheezing, hemoptysis; No shortness of breath  CARDIOVASCULAR: No chest pain or palpitations  GASTROINTESTINAL: No abdominal or epigastric pain. No nausea, vomiting, or hematemesis; No diarrhea or constipation. No melena or hematochezia.  GENITOURINARY: No dysuria, frequency or hematuria  NEUROLOGICAL: No numbness or weakness  SKIN: See physical examination.  All other review of systems is negative unless indicated above    PMH: HTN (hypertension)    DM (diabetes mellitus)    Anemia    GERD (gastroesophageal reflux disease)    S/P BKA (below knee amputation), left    CAD (coronary artery disease)      PSH:S/P CABG x 3    Status post amputation of leg        Allergies:No Known Allergies      Vitals  T(F): 98.1 (06-01-23 @ 08:06), Max: 98.9 (05-31-23 @ 20:45)  HR: 76 (06-01-23 @ 08:06) (76 - 81)  BP: 133/65 (06-01-23 @ 08:06) (117/59 - 135/73)  RR: 19 (06-01-23 @ 08:06) (18 - 19)  SpO2: 98% (06-01-23 @ 08:06) (97% - 98%)  Wt(kg): --    MEDICATIONS  (STANDING):  amLODIPine   Tablet 10 milliGRAM(s) Oral daily  aspirin enteric coated 81 milliGRAM(s) Oral daily  cefTRIAXone Injectable. 1000 milliGRAM(s) IV Push every 24 hours  clopidogrel Tablet 75 milliGRAM(s) Oral daily  dextrose 5%. 1000 milliLiter(s) (50 mL/Hr) IV Continuous <Continuous>  dextrose 5%. 1000 milliLiter(s) (100 mL/Hr) IV Continuous <Continuous>  dextrose 50% Injectable 25 Gram(s) IV Push once  dextrose 50% Injectable 12.5 Gram(s) IV Push once  dextrose 50% Injectable 25 Gram(s) IV Push once  finasteride 5 milliGRAM(s) Oral daily  furosemide   Injectable 40 milliGRAM(s) IV Push two times a day  gabapentin 300 milliGRAM(s) Oral two times a day  gabapentin 600 milliGRAM(s) Oral at bedtime  glucagon  Injectable 1 milliGRAM(s) IntraMuscular once  heparin   Injectable 5000 Unit(s) SubCutaneous every 12 hours  insulin glargine Injectable (LANTUS) 16 Unit(s) SubCutaneous at bedtime  insulin lispro (ADMELOG) corrective regimen sliding scale   SubCutaneous at bedtime  insulin lispro (ADMELOG) corrective regimen sliding scale   SubCutaneous three times a day before meals  lactobacillus acidophilus 1 Tablet(s) Oral three times a day with meals  lidocaine   4% Patch 1 Patch Transdermal daily  lidocaine   4% Patch 1 Patch Transdermal daily  metoprolol tartrate 25 milliGRAM(s) Oral daily  multivitamin 1 Tablet(s) Oral daily  pantoprazole  Injectable 40 milliGRAM(s) IV Push two times a day  potassium chloride    Tablet ER 20 milliEquivalent(s) Oral daily  simvastatin 10 milliGRAM(s) Oral at bedtime  sucralfate suspension 1 Gram(s) Oral four times a day  tamsulosin 0.4 milliGRAM(s) Oral at bedtime    MEDICATIONS  (PRN):  dextrose Oral Gel 15 Gram(s) Oral once PRN Blood Glucose LESS THAN 70 milliGRAM(s)/deciliter  HYDROmorphone   Tablet 6 milliGRAM(s) Oral every 6 hours PRN Moderate Pain (4 - 6)  HYDROmorphone  Injectable 1 milliGRAM(s) IV Push every 4 hours PRN Severe Pain (7 - 10)  ondansetron   Disintegrating Tablet 4 milliGRAM(s) Oral every 6 hours PRN Nausea  polyethylene glycol 3350 17 Gram(s) Oral at bedtime PRN Constipation  senna 2 Tablet(s) Oral at bedtime PRN Constipation      Physical Exam:   Constitutiona: NAD, alert;  Derm:  Skin warm, dry and supple bilateral.    Ulceration to the right distal stump of amputation measuring approx 2x1 cm, negative malodor, negative probe to bone, no erythema, no clinical signs of infection   Ulceration to the right dorsal foot with evidence of well adhering wound graft  measuring approx 2x1 cm, negative malodor, negative probe to bone, no erythema, no clinical signs of infection   Ulceration to the right heel measuring approx 1x1 cm, negative malodor, negative probe to bone, no erythema, no clinical signs of infection   Ulceration to the right anterior leg measuring approx 1x1 cm, negative malodor, negative probe to bone, no erythema, no clinical signs of infection         Vascular: Dorsalis Pedis and Posterior Tibial pulses non palpable.     Neuro: Protective sensation severely diminished to the level of the digits bilateral.  MSK: BKA to LLE, TMA to RLE.         Labs:                          8.9    9.96  )-----------( 334      ( 01 Jun 2023 06:52 )             29.7     WBC Trend  9.96 Date (06-01 @ 06:52)  10.68<H> Date (05-31 @ 06:46)  8.47 Date (05-30 @ 07:10)  9.66 Date (05-29 @ 06:34)  12.30<H> Date (05-28 @ 13:11)      Chem  06-01    141  |  109<H>  |  36<H>  ----------------------------<  147<H>  4.1   |  25  |  1.07    Ca    9.0      01 Jun 2023 06:52    TPro  7.2  /  Alb  2.7<L>  /  TBili  0.5  /  DBili  x   /  AST  14<L>  /  ALT  19  /  AlkPhos  233<H>  06-01

## 2023-06-01 NOTE — PROGRESS NOTE ADULT - SUBJECTIVE AND OBJECTIVE BOX
Subjective:    Awake, alert. Much improved s/p placement of chest tube. Less dyspnea.    MEDICATIONS  (STANDING):  amLODIPine   Tablet 10 milliGRAM(s) Oral daily  aspirin enteric coated 81 milliGRAM(s) Oral daily  cefTRIAXone Injectable. 1000 milliGRAM(s) IV Push every 24 hours  clopidogrel Tablet 75 milliGRAM(s) Oral daily  dextrose 5%. 1000 milliLiter(s) (50 mL/Hr) IV Continuous <Continuous>  dextrose 5%. 1000 milliLiter(s) (100 mL/Hr) IV Continuous <Continuous>  dextrose 50% Injectable 25 Gram(s) IV Push once  dextrose 50% Injectable 12.5 Gram(s) IV Push once  dextrose 50% Injectable 25 Gram(s) IV Push once  finasteride 5 milliGRAM(s) Oral daily  furosemide   Injectable 40 milliGRAM(s) IV Push two times a day  gabapentin 600 milliGRAM(s) Oral at bedtime  gabapentin 300 milliGRAM(s) Oral two times a day  glucagon  Injectable 1 milliGRAM(s) IntraMuscular once  heparin   Injectable 5000 Unit(s) SubCutaneous every 12 hours  insulin glargine Injectable (LANTUS) 16 Unit(s) SubCutaneous at bedtime  insulin lispro (ADMELOG) corrective regimen sliding scale   SubCutaneous at bedtime  insulin lispro (ADMELOG) corrective regimen sliding scale   SubCutaneous three times a day before meals  lactobacillus acidophilus 1 Tablet(s) Oral three times a day with meals  lidocaine   4% Patch 1 Patch Transdermal daily  lidocaine   4% Patch 1 Patch Transdermal daily  metoprolol tartrate 25 milliGRAM(s) Oral daily  multivitamin 1 Tablet(s) Oral daily  pantoprazole  Injectable 40 milliGRAM(s) IV Push two times a day  potassium chloride    Tablet ER 20 milliEquivalent(s) Oral daily  simvastatin 10 milliGRAM(s) Oral at bedtime  sucralfate suspension 1 Gram(s) Oral four times a day  tamsulosin 0.4 milliGRAM(s) Oral at bedtime    MEDICATIONS  (PRN):  dextrose Oral Gel 15 Gram(s) Oral once PRN Blood Glucose LESS THAN 70 milliGRAM(s)/deciliter  HYDROmorphone   Tablet 6 milliGRAM(s) Oral every 6 hours PRN Moderate Pain (4 - 6)  HYDROmorphone  Injectable 1 milliGRAM(s) IV Push every 4 hours PRN Severe Pain (7 - 10)  ondansetron   Disintegrating Tablet 4 milliGRAM(s) Oral every 6 hours PRN Nausea  polyethylene glycol 3350 17 Gram(s) Oral at bedtime PRN Constipation  senna 2 Tablet(s) Oral at bedtime PRN Constipation      Allergies    No Known Allergies    Intolerances        Vital Signs Last 24 Hrs  T(C): 36.7 (01 Jun 2023 08:06), Max: 37.2 (31 May 2023 20:45)  T(F): 98.1 (01 Jun 2023 08:06), Max: 98.9 (31 May 2023 20:45)  HR: 76 (01 Jun 2023 08:06) (76 - 81)  BP: 133/65 (01 Jun 2023 08:06) (117/59 - 135/73)  BP(mean): --  RR: 19 (01 Jun 2023 08:06) (18 - 19)  SpO2: 98% (01 Jun 2023 08:06) (97% - 98%)    Parameters below as of 01 Jun 2023 08:06  Patient On (Oxygen Delivery Method): room air        PHYSICAL EXAMINATION:    NECK:  Supple. No lymphadenopathy. Jugular venous pressure not elevated.  HEART:   The cardiac impulse has a normal quality. Reg., Nl S1 and S2.    CHEST:  Chest is clear to auscultation. Improved air entry on right. Normal respiratory effort.  ABDOMEN:  Soft and nontender.   EXTREMITIES:  There is 2-3+ edema.       LABS:                        8.9    9.96  )-----------( 334      ( 01 Jun 2023 06:52 )             29.7     06-01    141  |  109<H>  |  36<H>  ----------------------------<  147<H>  4.1   |  25  |  1.07    Ca    9.0      01 Jun 2023 06:52    TPro  7.2  /  Alb  2.7<L>  /  TBili  0.5  /  DBili  x   /  AST  14<L>  /  ALT  19  /  AlkPhos  233<H>  06-01          RADIOLOGY & ADDITIONAL TESTS:    Assessment/Plan    - Right diagnostic/therapeutic thorocentesis-fluid is transudo-exudate (meets criteria by protein only)  - Still on Rocephin. Consider D/C'ing  - Echo with severe Pulmonary HTN most likely due to severe MS     Problem/Recommendation - 1:  ·  Acute on chronic diastolic congestive heart failure.   Problem/Recommendation - 2:  ·  Pulmonary edema, acute.   Problem/Recommendation - 3:  ·  Pleural effusion, bilateral.   Problem/Recommendation - 4:  ·  Pulmonary hypertension.   Problem/Recommendation - 5:  ·  Shortness of breath.   Problem/Recommendation - 6:  ·  Atelectasis.   Problem/Recommendation - 7:  ·  Pneumonia.  Subjective:    Awake, alert. Much improved s/p placement of chest tube. Less dyspnea.    MEDICATIONS  (STANDING):  amLODIPine   Tablet 10 milliGRAM(s) Oral daily  aspirin enteric coated 81 milliGRAM(s) Oral daily  cefTRIAXone Injectable. 1000 milliGRAM(s) IV Push every 24 hours  clopidogrel Tablet 75 milliGRAM(s) Oral daily  dextrose 5%. 1000 milliLiter(s) (50 mL/Hr) IV Continuous <Continuous>  dextrose 5%. 1000 milliLiter(s) (100 mL/Hr) IV Continuous <Continuous>  dextrose 50% Injectable 25 Gram(s) IV Push once  dextrose 50% Injectable 12.5 Gram(s) IV Push once  dextrose 50% Injectable 25 Gram(s) IV Push once  finasteride 5 milliGRAM(s) Oral daily  furosemide   Injectable 40 milliGRAM(s) IV Push two times a day  gabapentin 600 milliGRAM(s) Oral at bedtime  gabapentin 300 milliGRAM(s) Oral two times a day  glucagon  Injectable 1 milliGRAM(s) IntraMuscular once  heparin   Injectable 5000 Unit(s) SubCutaneous every 12 hours  insulin glargine Injectable (LANTUS) 16 Unit(s) SubCutaneous at bedtime  insulin lispro (ADMELOG) corrective regimen sliding scale   SubCutaneous at bedtime  insulin lispro (ADMELOG) corrective regimen sliding scale   SubCutaneous three times a day before meals  lactobacillus acidophilus 1 Tablet(s) Oral three times a day with meals  lidocaine   4% Patch 1 Patch Transdermal daily  lidocaine   4% Patch 1 Patch Transdermal daily  metoprolol tartrate 25 milliGRAM(s) Oral daily  multivitamin 1 Tablet(s) Oral daily  pantoprazole  Injectable 40 milliGRAM(s) IV Push two times a day  potassium chloride    Tablet ER 20 milliEquivalent(s) Oral daily  simvastatin 10 milliGRAM(s) Oral at bedtime  sucralfate suspension 1 Gram(s) Oral four times a day  tamsulosin 0.4 milliGRAM(s) Oral at bedtime    MEDICATIONS  (PRN):  dextrose Oral Gel 15 Gram(s) Oral once PRN Blood Glucose LESS THAN 70 milliGRAM(s)/deciliter  HYDROmorphone   Tablet 6 milliGRAM(s) Oral every 6 hours PRN Moderate Pain (4 - 6)  HYDROmorphone  Injectable 1 milliGRAM(s) IV Push every 4 hours PRN Severe Pain (7 - 10)  ondansetron   Disintegrating Tablet 4 milliGRAM(s) Oral every 6 hours PRN Nausea  polyethylene glycol 3350 17 Gram(s) Oral at bedtime PRN Constipation  senna 2 Tablet(s) Oral at bedtime PRN Constipation      Allergies    No Known Allergies    Intolerances        Vital Signs Last 24 Hrs  T(C): 36.7 (01 Jun 2023 08:06), Max: 37.2 (31 May 2023 20:45)  T(F): 98.1 (01 Jun 2023 08:06), Max: 98.9 (31 May 2023 20:45)  HR: 76 (01 Jun 2023 08:06) (76 - 81)  BP: 133/65 (01 Jun 2023 08:06) (117/59 - 135/73)  BP(mean): --  RR: 19 (01 Jun 2023 08:06) (18 - 19)  SpO2: 98% (01 Jun 2023 08:06) (97% - 98%)    Parameters below as of 01 Jun 2023 08:06  Patient On (Oxygen Delivery Method): room air        PHYSICAL EXAMINATION:    NECK:  Supple. No lymphadenopathy. Jugular venous pressure not elevated.  HEART:   The cardiac impulse has a normal quality. Reg., Nl S1 and S2.    CHEST:  Chest is clear to auscultation. Improved air entry on right. Normal respiratory effort.  ABDOMEN:  Soft and nontender.   EXTREMITIES:  There is 2-3+ edema.       LABS:                        8.9    9.96  )-----------( 334      ( 01 Jun 2023 06:52 )             29.7     06-01    141  |  109<H>  |  36<H>  ----------------------------<  147<H>  4.1   |  25  |  1.07    Ca    9.0      01 Jun 2023 06:52    TPro  7.2  /  Alb  2.7<L>  /  TBili  0.5  /  DBili  x   /  AST  14<L>  /  ALT  19  /  AlkPhos  233<H>  06-01          RADIOLOGY & ADDITIONAL TESTS:    Assessment/Plan    - Right diagnostic/therapeutic thorocentesis-fluid is transudate  - Still on Rocephin. Consider D/C'ing  - Echo with severe Pulmonary HTN most likely due to severe MS     Problem/Recommendation - 1:  ·  Acute on chronic diastolic congestive heart failure.   Problem/Recommendation - 2:  ·  Pulmonary edema, acute.   Problem/Recommendation - 3:  ·  Pleural effusion, bilateral.   Problem/Recommendation - 4:  ·  Pulmonary hypertension.   Problem/Recommendation - 5:  ·  Shortness of breath.   Problem/Recommendation - 6:  ·  Atelectasis.   Problem/Recommendation - 7:  ·  Pneumonia.

## 2023-06-01 NOTE — PROGRESS NOTE ADULT - ASSESSMENT
63 year old man with CAD s/p CABG, DM 2, L AKA, PVD, R heart failure, gastroparesis, past choley, former heavy drinker, admitted with abdominal pain.    Imp: epigastric pain, ascites    s/p right pigtail- 800ml in 24hr thus far  s/p dx para- no SBP, +portal hypertension    Rec:  ::Evaluate epigastric pain- EGD planned for tomorrow  ::NPO p MN  ::Continue PPI BID/carafate QID  ::Ascites management: add spironolactone 100mg daily, reduce lasix 40mg for a 5:2 ratio dosing  ::Monitor renal function  ::Will need outpatient hepatologist- screening and maintenance       63 year old man with CAD s/p CABG, DM 2, L AKA, PVD, R heart failure, gastroparesis, past choley, former heavy drinker, admitted with abdominal pain.    Imp: epigastric pain, ascites    s/p right pigtail- 800ml in 24hr thus far  s/p dx para/thora- no SBP, +portal hypertension    Rec:  ::Evaluate epigastric pain- EGD planned for tomorrow  ::NPO p MN  ::Continue PPI BID/carafate QID  ::Ascites management: add spironolactone 100mg daily, reduce lasix 40mg for a 5:2 ratio dosing  ::Monitor renal function  ::Will need outpatient hepatologist- screening and maintenance

## 2023-06-01 NOTE — PROGRESS NOTE ADULT - PROBLEM SELECTOR PLAN 5
prior  mV repair now severe stenosis / moderate to severe MR , with pulmonary hypertension ,  right heart failure signs ,  repeat echo with preserved LVEF 6065%, moderate to severe MS,  Structural Heart recs noted

## 2023-06-01 NOTE — PROGRESS NOTE ADULT - ASSESSMENT
62 y/o M w/ PMH of DM2, PVD, gastroparesis, anemia, DM2, GERD, CAD s/p CABG, cirrhosis, MV repair, HTN, dyslipidemia, p/w abdominal pain. Patient states abdominal pain in epigastric area. Patient states that he had about 8 episodes of non-bloody emesis last night, and 2 episodes prior to arrival. Also c/o chills. Patient denies any diarrhea, CP, SOB, cough, melena, hematochezia, fever, cough, runny nose, sore throat. Here wbc ct 12, UA positive, imaging with Findings suggestive of CHF with worsening bilateral pleural effusions likely mild pulmonary edema seen at the lung bases. Suggestion of cirrhosis. Passive hepatic congestion. Increased small volume ascites. Diffuse bladder wall thickening.   Mild upper abdominal and retroperitoneal lymphadenopathy, similar to prior and likely reactive. Was given IV rocephin for possible UTI.     1. Cirrhosis with ascites. RLE s/p skin graft. R pleural effusion. CHF. PVD  - f/u urine cx, blood cx - no growth  - s/p rocephin 1gm daily #4 - discontinue   - s/p R chest tube placement - fluid transudative cx no growth  - s/p paracentesis fluid not c/w SBP; cx no growth   - completed 3 days azithromycin   - fu cbc  - monitor temps  - supportive care    2. other issues - care per medicine

## 2023-06-01 NOTE — PROGRESS NOTE ADULT - SUBJECTIVE AND OBJECTIVE BOX
Subjective: Pt in bed NAD. Pigtail removed without Issue     T(C): 36.7 (06-01-23 @ 08:06), Max: 37.2 (05-31-23 @ 20:45)  HR: 76 (06-01-23 @ 12:00) (76 - 81)  BP: 115/70 (06-01-23 @ 12:00) (115/70 - 135/73)  ABP: --  ABP(mean): --  RR: 18 (06-01-23 @ 12:00) (18 - 19)  SpO2: 99% (06-01-23 @ 12:00) (97% - 99%) RA   Wt(kg): --  CVP(mm Hg): --  CO: --  CI: --  PA: --                                              Tele: SR             06-01    141  |  109<H>  |  36<H>  ----------------------------<  147<H>  4.1   |  25  |  1.07    Ca    9.0      01 Jun 2023 06:52    TPro  7.2  /  Alb  2.7<L>  /  TBili  0.5  /  DBili  x   /  AST  14<L>  /  ALT  19  /  AlkPhos  233<H>  06-01                               8.9    9.96  )-----------( 334      ( 01 Jun 2023 06:52 )             29.7                 CAPILLARY BLOOD GLUCOSE      POCT Blood Glucose.: 209 mg/dL (01 Jun 2023 16:27)  POCT Blood Glucose.: 139 mg/dL (01 Jun 2023 11:43)  POCT Blood Glucose.: 153 mg/dL (01 Jun 2023 08:04)  POCT Blood Glucose.: 229 mg/dL (31 May 2023 21:43)           CXR: No PTX, no effusion         exam   Neuro:  Alert Awake NAD  Pulm: decreased b/l   CV: RRR S1 S2   Abd: soft   Extremities: + Left BKA, + Rt toe amputations with ACE wrap and splint         Assessment:  63yMale    with PAST MEDICAL & SURGICAL HISTORY:  HTN (hypertension)      DM (diabetes mellitus)      Anemia      GERD (gastroesophageal reflux disease)      S/P BKA (below knee amputation), left      CAD (coronary artery disease)      S/P CABG x 3      Status post amputation of leg            Plan:

## 2023-06-01 NOTE — CONSULT NOTE ADULT - CONSULT REASON
Wounds right foot
ascites  pyuria r/o infection
Right pleural effusion
abdominal pain
abdominal pain. HO CABG
Mitral Stenosis
SOB

## 2023-06-01 NOTE — PROGRESS NOTE ADULT - ASSESSMENT
63 year old man with type 2 DM, gastroparesis, HTN, HLD, CAD, hx of CABG, MV repair, peripheral vascular disease, hx of L BKA, R toe amputation, hepatic cirrhosis, chronic anemia, presented for further evaluation of epigastric abdominal discomfort, different than typical gastroparesis symptoms. Had associated nausea. No hematemesis, hematochezia or melena. On ROS he also reported dyspnea on exertion. No chest pain or tightness. No diaphoresis. In the ED, patient was found to have mild leukocytosis, stable Hgb, negative troponin, elevated BNP, CT findings of mod R, small L pleural effusion, ground glass opacities, interlobular septal thickening, hepatic cirrhosis, small ascites. Patient was given analgesics, antiemetics, ceftriaxone and furosemide and admitted to Medicine.     #Epigastric abdominal discomfort with SIRS, unable to rule out sepsis, present upon admission  -Hx of gastroparesis but feels different than his usual gastroparesis sx. SIRS in ED (, WBC 12.3), imaging w distended stomach w food and debris. Per GI convo w pt, has not had good response to motility agents in the past including reglan and erythromycin  -ascites fluid tapped, cell count not c/w SBP  -empiric carafate, PPI BID  -EGD 6/2 to eval for ulcer, inflammation    #Acute respiratory failure with hypoxia   -Multifactorial due to pleural effusion, possible pneumonia, possible CHF.  -now on RA    #Airspace changes on CT, cannot r/o pneumonia, cannot r/o Gram-negative organism, present upon admission  -Etiology unclear. Dedicated CT chest done. Pleural effusions, right greater than left, with septal thickening and ground glass opacities. Right middle and lower lobe opacities likely reflect atelectasis. Small volume mediastinal adenopathy, possibly reactive.  -CTX and Azithro completed    #Moderate R and small L pleural effusions  -Etiology unclear. Cannot r/o parapneumonic effusion, unable to r/o CHF with exacerbation, also could be related to his hepatic cirrhosis  -Received thoracentesis 5/30, with chest tube placement, 700cc drained initially  -chest tube in place, possibly for removal on 6/1  -lasix 40iv qd, aldactone added  -f/u thoracic recs    #Valvular disease- prior MV repair, now severe stenosis and mod to severe MR, pulm HTN, R sided CHF signs  -appreciate cards and structural input  -will need ALEX for further planning purposes    #CAD, hx of CABG  -No angina. Troponin negative. EKG NSR, rate WNL, nonspecific ST T changes.   - Continue aspirin, statin, metoprolol    #Microscopic hematuria  -No urinary complaints  -f/u as outpt    #Hepatic cirrhosis  -No encephalopathy. No known varices. Small ascites. Had paracentesis 5/30, clear yellow fluid, SAAG 0.8  -lasix 40mg iv qd (likely d/c on lasix 40mg po qd), aldactone 100mg qd started  -EGD as above for EV screening as well  -will need outpt f/u for cirrhosis, Dr. Anderson    #Anemia  -May be due to chronic disease, cirrhosis    #IDDM  -A1c 6.7  - Continue insulin     #BPH  - Continue Flomax    #Severe protein calorie malnutrition  -Appreciate dietician input  - Trend weight  - Added thiamine and MVI daily      #DVT ppx- SQH  #Code status: Full  #Dispo: currently on iv diuresis, chest tube, for EGD tmrw, need to clarify structural cardiology plan       63 year old man with type 2 DM, gastroparesis, HTN, HLD, CAD, hx of CABG, MV repair, peripheral vascular disease, hx of L BKA, R toe amputation, hepatic cirrhosis, chronic anemia, presented for further evaluation of epigastric abdominal discomfort, different than typical gastroparesis symptoms. Had associated nausea. No hematemesis, hematochezia or melena. On ROS he also reported dyspnea on exertion. No chest pain or tightness. No diaphoresis. In the ED, patient was found to have mild leukocytosis, stable Hgb, negative troponin, elevated BNP, CT findings of mod R, small L pleural effusion, ground glass opacities, interlobular septal thickening, hepatic cirrhosis, small ascites. Patient was given analgesics, antiemetics, ceftriaxone and furosemide and admitted to Medicine.     #Epigastric abdominal discomfort with SIRS, unable to rule out sepsis, present upon admission  -Hx of gastroparesis but feels different than his usual gastroparesis sx. SIRS in ED (, WBC 12.3), imaging w distended stomach w food and debris. Per GI convo w pt, has not had good response to motility agents in the past including reglan and erythromycin  -ascites fluid tapped, cell count not c/w SBP  -empiric carafate, PPI BID  -EGD 6/2 to eval for ulcer, inflammation    #Acute respiratory failure with hypoxia   -Multifactorial due to pleural effusion, possible pneumonia, possible CHF.  -now on RA    #Airspace changes on CT, cannot r/o pneumonia, cannot r/o Gram-negative organism, present upon admission  -Etiology unclear. Dedicated CT chest done. Pleural effusions, right greater than left, with septal thickening and ground glass opacities. Right middle and lower lobe opacities likely reflect atelectasis. Small volume mediastinal adenopathy, possibly reactive.  -CTX and Azithro completed    #Moderate R and small L pleural effusions  -Etiology unclear. Cannot r/o parapneumonic effusion, unable to r/o CHF with exacerbation, also could be related to his hepatic cirrhosis  -Received thoracentesis 5/30, with chest tube placement, 700cc drained initially  -chest tube in place, possibly for removal on 6/1  -lasix 40iv qd, aldactone added  -f/u thoracic recs    #Valvular disease- prior MV repair, now severe stenosis and mod to severe MR, pulm HTN, R sided CHF signs  -appreciate cards and structural input  -will need ALEX for further planning purposes, as outpt  -outpt f/u w Dr. Keshav Healy, cardiac surgery    #CAD, hx of CABG  -No angina. Troponin negative. EKG NSR, rate WNL, nonspecific ST T changes.   - Continue aspirin, statin, metoprolol    #Microscopic hematuria  -No urinary complaints  -f/u as outpt    #Hepatic cirrhosis  -No encephalopathy. No known varices. Small ascites. Had paracentesis 5/30, clear yellow fluid, SAAG 0.8  -lasix 40mg iv qd (likely d/c on lasix 40mg po qd), aldactone 100mg qd started  -EGD as above for EV screening as well  -will need outpt f/u for cirrhosis, Dr. Anderson    #Anemia  -May be due to chronic disease, cirrhosis    #IDDM  -A1c 6.7  - Continue insulin     #BPH  - Continue Flomax    #Severe protein calorie malnutrition  -Appreciate dietician input  - Trend weight  - Added thiamine and MVI daily      #DVT ppx- SQH  #Code status: Full  #Dispo: currently on iv diuresis, chest tube, for EGD tmrw, likely stay thru weekend for return to [facility] early this coming week, for outpt f/u with CT surgery, Keshav Healy

## 2023-06-01 NOTE — PROGRESS NOTE ADULT - PROBLEM SELECTOR PLAN 4
·  Problem: Pleural effusion.   ·  Recommendation: Lasix daily IVP. Monitor renal function, s/'p thoracentesis with chest tube - plan to dc today by CT Sx.  Echo - preserved EF 60-65%, moderate to severe MS< moderate TR, severe pHTN

## 2023-06-01 NOTE — CONSULT NOTE ADULT - ASSESSMENT
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 Danny Owens   Discussed diagnosis and treatment with patient  Wound flush with normal saline  Applied wet to dry dressings to right foot   Patient follow with outside podiatrist dr. jean for wound care and has close follow up with his wound care team.   Discussed importance of daily foot examinations and proper shoe gear and to importance of lower Fasting Blood Glucose levels.   Patient demonstrated verbal understanding of all interventions and tolerated interventions well without any complications.   Podiatry will follow while in house.

## 2023-06-01 NOTE — CONSULT NOTE ADULT - PROVIDER SPECIALTY LIST ADULT
Gastroenterology
Structural Heart
Infectious Disease
Podiatry
Thoracic Surgery
Cardiology
Pulmonology

## 2023-06-01 NOTE — PROGRESS NOTE ADULT - PROBLEM SELECTOR PLAN 2
·  Problem: CAD (coronary artery disease).   ·  Recommendation: Troponin negative, vascular congestion on CXR. continue lasix, s/p thoracentesis and chest tube

## 2023-06-01 NOTE — CONSULT NOTE ADULT - CONSULT REQUESTED DATE/TIME
29-May-2023 14:33
31-May-2023 15:53
01-Jun-2023 09:11
29-May-2023 07:34
29-May-2023 10:07
29-May-2023 11:18
29-May-2023 13:09

## 2023-06-02 NOTE — PROGRESS NOTE ADULT - PROBLEM SELECTOR PLAN 1
- Moderate to severe MS on TTE  - will plan for outpatient ALEX  - Case discussed with Dr. Healy at [facility], will arrange outpatient office visit  - Rate control to allow more LV filling time and improve forward flow

## 2023-06-02 NOTE — PROGRESS NOTE ADULT - PROBLEM SELECTOR PLAN 2
·  Problem: CAD (coronary artery disease).   ·  Recommendation: Troponin negative, vascular congestion on CXR. continue diuresis with lasix - switch to po and spironolactone Troponin negative, vascular congestion on CXR. continue diuresis with lasix - switch to po and spironolactone

## 2023-06-02 NOTE — PROGRESS NOTE ADULT - ASSESSMENT
63 year old man with type 2 DM, gastroparesis, HTN, HLD, CAD, hx of CABG, MV repair, peripheral vascular disease, hx of L BKA, R toe amputation, hepatic cirrhosis, chronic anemia, presented for further evaluation of epigastric abdominal discomfort, different than typical gastroparesis symptoms. Had associated nausea. No hematemesis, hematochezia or melena. On ROS he also reported dyspnea on exertion. No chest pain or tightness. No diaphoresis. In the ED, patient was found to have mild leukocytosis, stable Hgb, negative troponin, elevated BNP, CT findings of mod R, small L pleural effusion, ground glass opacities, interlobular septal thickening, hepatic cirrhosis, small ascites. Patient was given analgesics, antiemetics, ceftriaxone and furosemide and admitted to Medicine.     #Epigastric abdominal discomfort with SIRS, unable to rule out sepsis, present upon admission  -Hx of gastroparesis but feels different than his usual gastroparesis sx. SIRS in ED (, WBC 12.3), imaging w distended stomach w food and debris. Per GI convo w pt, has not had good response to motility agents in the past including reglan and erythromycin  -ascites fluid tapped, cell count not c/w SBP  -empiric carafate, PPI BID  -EGD today showed no varices however otherwise aborted as stomach w food, plan for CLD over weekend and attempt again Mon    #Acute respiratory failure with hypoxia   -Multifactorial due to pleural effusion, possible pneumonia, possible CHF.  -now on RA    #Airspace changes on CT, cannot r/o pneumonia, cannot r/o Gram-negative organism, present upon admission  -Etiology unclear. Dedicated CT chest done. Pleural effusions, right greater than left, with septal thickening and ground glass opacities. Right middle and lower lobe opacities likely reflect atelectasis. Small volume mediastinal adenopathy, possibly reactive.  -CTX and Azithro completed    #Moderate R and small L pleural effusions  -Etiology unclear. Cannot r/o parapneumonic effusion, unable to r/o CHF with exacerbation, also could be related to his hepatic cirrhosis  -Received thoracentesis 5/30, with chest tube placement, 700cc drained initially  -chest tube removed 6/2  -lasix 40iv qd, aldactone added  -f/u thoracic recs    #Valvular disease- prior MV repair, now severe stenosis and mod to severe MR, pulm HTN, R sided CHF signs  -appreciate cards and structural input  -will need ALEX for further planning purposes, as outpt  -outpt f/u w Dr. Keshav Healy, cardiac surgery    #CAD, hx of CABG  -No angina. Troponin negative. EKG NSR, rate WNL, nonspecific ST T changes.   - Continue aspirin, statin, metoprolol    #Microscopic hematuria  -No urinary complaints  -f/u as outpt    #Hepatic cirrhosis- 2/2 EtOH and R sided CHF  -No encephalopathy. No known varices. Small ascites. Had paracentesis 5/30, clear yellow fluid, SAAG 0.8  -lasix 40mg iv qd (likely d/c on lasix 40mg po qd), aldactone 100mg qd started  -EGD showed nov varices  -will need outpt f/u for cirrhosis, Dr. Anderson    #Anemia  -May be due to chronic disease, cirrhosis    #IDDM  -A1c 6.7  - Continue insulin (reduce lantus while on CLD over weekend)    #BPH  - Continue Flomax    #Severe protein calorie malnutrition  -Appreciate dietician input  - Trend weight  - Added thiamine and MVI daily      #DVT ppx- SQH  #Code status: Full  #Dispo: EGD Monday, stay thru weekend for return to [facility] early this coming week, for outpt f/u with CT surgery, Keshav Healy

## 2023-06-02 NOTE — PROGRESS NOTE ADULT - PROBLEM SELECTOR PLAN 1
·  Problem: HTN (hypertension).   ·  Recommendation: Controlled at this time. Continue amlodipine ·  Problem: HTN (hypertension).   ·  Recommendation: Controlled at this time. Continue amlodipine    pt. stable, [facility] following, will see prn Controlled at this time. Continue amlodipine    pt. stable, [facility] following, will see prn

## 2023-06-02 NOTE — PROGRESS NOTE ADULT - NS ATTEND OPT1 GEN_ALL_CORE
I independently performed the documented:
I independently performed the documented:
I attest my time as attending is greater than 50% of the total combined time spent on qualifying patient care activities by the PA/NP and attending.
I attest my time as attending is greater than 50% of the total combined time spent on qualifying patient care activities by the PA/NP and attending.
I independently performed the documented:
I independently performed the documented:

## 2023-06-02 NOTE — PROGRESS NOTE ADULT - PROBLEM SELECTOR PROBLEM 2
Acute on chronic diastolic congestive heart failure
CAD (coronary artery disease)

## 2023-06-02 NOTE — PROGRESS NOTE ADULT - NS ATTEND AMEND GEN_ALL_CORE FT
63 year old man with CHF, PVD, former ETOH with new diagnosis of cirrhosis, admitted with pain without findings on CT.     F/u MRI.   F/u paracentesis studies.   F/u thoracentesis studies.
63 year old man with cirrhosis, R heart failure, admitted with volume overload and abdominal pain.     For EGD as ongoing pain.
Plan of care discussed with NP. Agree with plan of care
as above
Patient seen and examined -- known to me from prior hospitalization; stable; eventual transfer for MV intervention.
agree w /above.

## 2023-06-02 NOTE — PROGRESS NOTE ADULT - NS ATTEND OPT1A GEN_ALL_CORE
History/Exam/Medical decision making
History

## 2023-06-02 NOTE — PROGRESS NOTE ADULT - PROBLEM SELECTOR PLAN 4
·  Problem: Pleural effusion.   ·  Recommendation: Lasix daily IVP. Monitor renal function, s/'p thoracentesis, pigtail, cont. diuretics to prevent recurrence,  Echo - preserved EF 60-65%, moderate to severe MS, moderate TR, severe pHTN Lasix daily IVP. Monitor renal function, s/'p thoracentesis, pigtail, cont. diuretics to prevent recurrence,  Echo - preserved EF 60-65%, moderate to severe MS, moderate TR, severe pHTN

## 2023-06-02 NOTE — PROGRESS NOTE ADULT - SUBJECTIVE AND OBJECTIVE BOX
REASON FOR VISIT: CAD, MV regurgitation    HPI:  62 y/o M w/ PMH of DM2, PVD, gastroparesis, anemia, DM2, GERD, CAD s/p CABG, cirrhosis, MV repair, HTN, dyslipidemia, p/w abdominal pain. Patient states abdominal pain started yesterday afternoon in epigastric area. Patient states that he had about 8 episodes of non-bloody emesis last night, and 2 episodes today prior to arrival. Also c/o chills. Patient denies any diarrhea, CP, SOB, cough, melena, hematochezia, fever, cough, runny nose, sore throat. Cardiology called to evaluate symptoms and risks. Pt denies exertional symptoms.     5/30/23: Denies cp.  Breathing improved.  Still with mild abdominal distention.  Tele: RSR  5/31/23: no cardiac complaints, Tele: NSR 70-80 in AM, off tele at present   6/1/23: no SOB, chest tube in place, off tele   6/2/23: feels better, will followup with [facility] outpatient     MEDICATIONS  (STANDING):  amLODIPine   Tablet 10 milliGRAM(s) Oral daily  aspirin enteric coated 81 milliGRAM(s) Oral daily  clopidogrel Tablet 75 milliGRAM(s) Oral daily  dextrose 5%. 1000 milliLiter(s) (50 mL/Hr) IV Continuous <Continuous>  dextrose 5%. 1000 milliLiter(s) (100 mL/Hr) IV Continuous <Continuous>  dextrose 50% Injectable 25 Gram(s) IV Push once  dextrose 50% Injectable 12.5 Gram(s) IV Push once  dextrose 50% Injectable 25 Gram(s) IV Push once  finasteride 5 milliGRAM(s) Oral daily  furosemide   Injectable 40 milliGRAM(s) IV Push daily  gabapentin 300 milliGRAM(s) Oral two times a day  gabapentin 600 milliGRAM(s) Oral at bedtime  glucagon  Injectable 1 milliGRAM(s) IntraMuscular once  heparin   Injectable 5000 Unit(s) SubCutaneous every 12 hours  insulin glargine Injectable (LANTUS) 16 Unit(s) SubCutaneous at bedtime  insulin lispro (ADMELOG) corrective regimen sliding scale   SubCutaneous at bedtime  insulin lispro (ADMELOG) corrective regimen sliding scale   SubCutaneous three times a day before meals  lactobacillus acidophilus 1 Tablet(s) Oral three times a day with meals  lidocaine   4% Patch 1 Patch Transdermal daily  lidocaine   4% Patch 1 Patch Transdermal daily  metoprolol tartrate 25 milliGRAM(s) Oral daily  multivitamin 1 Tablet(s) Oral daily  pantoprazole  Injectable 40 milliGRAM(s) IV Push two times a day  potassium chloride    Tablet ER 20 milliEquivalent(s) Oral daily  simvastatin 10 milliGRAM(s) Oral at bedtime  spironolactone 100 milliGRAM(s) Oral daily  sucralfate suspension 1 Gram(s) Oral four times a day  tamsulosin 0.4 milliGRAM(s) Oral at bedtime    MEDICATIONS  (PRN):  dextrose Oral Gel 15 Gram(s) Oral once PRN Blood Glucose LESS THAN 70 milliGRAM(s)/deciliter  dextrose Oral Gel 15 Gram(s) Oral once PRN Blood Glucose LESS THAN 70 milliGRAM(s)/deciliter  HYDROmorphone   Tablet 6 milliGRAM(s) Oral every 6 hours PRN Moderate Pain (4 - 6)  HYDROmorphone  Injectable 0.5 milliGRAM(s) IV Push every 4 hours PRN Severe Pain (7 - 10)  ondansetron    Tablet 4 milliGRAM(s) Oral every 6 hours PRN for nausea  polyethylene glycol 3350 17 Gram(s) Oral at bedtime PRN Constipation  senna 2 Tablet(s) Oral at bedtime PRN Constipation    Vital Signs Last 24 Hrs  T(C): 36.3 (02 Jun 2023 16:06), Max: 37.1 (01 Jun 2023 20:58)  T(F): 97.3 (02 Jun 2023 16:06), Max: 98.7 (01 Jun 2023 20:58)  HR: 71 (02 Jun 2023 16:06) (71 - 79)  BP: 119/52 (02 Jun 2023 16:06) (119/52 - 138/76)  BP(mean): 86 (02 Jun 2023 10:54) (80 - 90)  RR: 18 (02 Jun 2023 16:06) (16 - 18)  SpO2: 94% (02 Jun 2023 16:06) (94% - 99%)    Parameters below as of 02 Jun 2023 16:06  Patient On (Oxygen Delivery Method): room air    PHYSICAL EXAM:    Constitutional: NAD, awake and alert, well-developed  HEENT: PERR, EOMI,  No oral cyananosis.  Neck:  supple,  No JVD  Respiratory: Breath sounds are clear, diminished at bases, No wheezing, rales or rhonchi  Cardiovascular: S1 and S2, regular rate and rhythm, no Murmurs, gallops or rubs  Gastrointestinal: Bowel Sounds present, soft, nontender.   Extremities: Amputation lower extremities.   Vascular: 2+ peripheral pulses  Neurological: A/O x 3, no focal deficits  Musculoskeletal: no calf tenderness.  Skin: No rashes.      LABS: All Labs Reviewed:                             8.9    9.96  )-----------( 334      ( 01 Jun 2023 06:52 )             29.7     06-02    139  |  107  |  35<H>  ----------------------------<  185<H>  4.1   |  26  |  1.03    Ca    8.8      02 Jun 2023 08:17    TPro  7.2  /  Alb  2.7<L>  /  TBili  0.5  /  DBili  x   /  AST  14<L>  /  ALT  19  /  AlkPhos  233<H>  06-01    - TroponinI hsT: <-24.72, <-21.97      RADIOLOGY/EKG:< from: 12 Lead ECG (05.29.23 @ 07:54) >  Diagnosis Line Normal sinus rhythm  ST & T wave abnormality, consider anterolateral ischemia  Abnormal ECG  Confirmed by WILDER WEBSTER MD ([id number]) on 5/29/2023 9:10:39 AM    < end of copied text >    < from: TTE Echo Complete w/o Contrast w/ Doppler (05.31.23 @ 09:48) >     Impression     Summary     Mild concentric left ventricular hypertrophy is present.   Septal flattening is seen; this finding is consistent with right heart   pressure / volume overload.   Estimated left ventricular ejection fraction is 60-65 %.   Normal appearing left atrium.   The right atrium appears mildly dilated.   The right ventricle is mildly to moderately dilated with decreased   contractility.   Definity was used to better visualize the endocardial border.   A prominent moderator band and prominent trabeculations are noted.   No thrombus is visualized.   Mild aortic sclerosis is present with normal valvular opening.   Severe mitral annular calcification is present.   There is calcification of mitral valve leaflets. The leaflet opening is   restricted.   Probable moderate to severe mitral stenosis   Moderate (2+) tricuspid valve regurgitation is present.   Severe pulmonary hypertension.   No evidence of pericardial effusion.   The IVC is dilated with decreased respiratory variation.     Signature     ----------------------------------------------------------------   Electronically signed by Natanael Brooks MD(Interpreting   physician) on 05/31/2023 05:31 PM    < end of copied text >      -------------------------------------------------------------------------------------------------------  < from: ALEX Complete w/Spect and Color (12.22.22 @ 10:45) >   Impression     Summary     A transesophageal echocardiogram was performed and included probe   placement in the esophagus posterior to the heart by the interpreting   physician, real-time image acquisition and interpretation utilizing 2-D,   color flow Doppler, pulsed wave and/or continuous wave with spectral   display. The patient received IV sedation. The procedure was monitored   with automatic blood pressure monitoring, telemetry tracings, and pulse   oximetry. The patient tolerated the procedure well and there were no   complications.     The left ventricle is normal in size, wall thickness, wall motion and   contractility.   No thrombus seen in the left atrial appendage.   S/p mitral valve repair.   Severe mitral stenosis.   Moderate-severe, eccentric mitral regurgitation.   Mild to moderate tricuspid regurgitation.    < end of copied text >  -------------------------------------------------------------------------------------------------   REASON FOR VISIT: CAD, MV regurgitation    HPI:  62 y/o M w/ PMH of DM2, PVD, gastroparesis, anemia, DM2, GERD, CAD s/p CABG, cirrhosis, MV repair, HTN, dyslipidemia, p/w abdominal pain. Patient states abdominal pain started yesterday afternoon in epigastric area. Patient states that he had about 8 episodes of non-bloody emesis last night, and 2 episodes today prior to arrival. Also c/o chills. Patient denies any diarrhea, CP, SOB, cough, melena, hematochezia, fever, cough, runny nose, sore throat. Cardiology called to evaluate symptoms and risks. Pt denies exertional symptoms.     5/30/23: Denies cp.  Breathing improved.  Still with mild abdominal distention.  Tele: RSR  5/31/23: no cardiac complaints, Tele: NSR 70-80 in AM, off tele at present   6/1/23: no SOB, chest tube in place, off tele   6/2/23: feels better, will followup with [facility] outpatient, off tele     MEDICATIONS  (STANDING):  amLODIPine   Tablet 10 milliGRAM(s) Oral daily  aspirin enteric coated 81 milliGRAM(s) Oral daily  clopidogrel Tablet 75 milliGRAM(s) Oral daily  dextrose 5%. 1000 milliLiter(s) (50 mL/Hr) IV Continuous <Continuous>  dextrose 5%. 1000 milliLiter(s) (100 mL/Hr) IV Continuous <Continuous>  dextrose 50% Injectable 25 Gram(s) IV Push once  dextrose 50% Injectable 12.5 Gram(s) IV Push once  dextrose 50% Injectable 25 Gram(s) IV Push once  finasteride 5 milliGRAM(s) Oral daily  furosemide   Injectable 40 milliGRAM(s) IV Push daily  gabapentin 300 milliGRAM(s) Oral two times a day  gabapentin 600 milliGRAM(s) Oral at bedtime  glucagon  Injectable 1 milliGRAM(s) IntraMuscular once  heparin   Injectable 5000 Unit(s) SubCutaneous every 12 hours  insulin glargine Injectable (LANTUS) 16 Unit(s) SubCutaneous at bedtime  insulin lispro (ADMELOG) corrective regimen sliding scale   SubCutaneous at bedtime  insulin lispro (ADMELOG) corrective regimen sliding scale   SubCutaneous three times a day before meals  lactobacillus acidophilus 1 Tablet(s) Oral three times a day with meals  lidocaine   4% Patch 1 Patch Transdermal daily  lidocaine   4% Patch 1 Patch Transdermal daily  metoprolol tartrate 25 milliGRAM(s) Oral daily  multivitamin 1 Tablet(s) Oral daily  pantoprazole  Injectable 40 milliGRAM(s) IV Push two times a day  potassium chloride    Tablet ER 20 milliEquivalent(s) Oral daily  simvastatin 10 milliGRAM(s) Oral at bedtime  spironolactone 100 milliGRAM(s) Oral daily  sucralfate suspension 1 Gram(s) Oral four times a day  tamsulosin 0.4 milliGRAM(s) Oral at bedtime    MEDICATIONS  (PRN):  dextrose Oral Gel 15 Gram(s) Oral once PRN Blood Glucose LESS THAN 70 milliGRAM(s)/deciliter  dextrose Oral Gel 15 Gram(s) Oral once PRN Blood Glucose LESS THAN 70 milliGRAM(s)/deciliter  HYDROmorphone   Tablet 6 milliGRAM(s) Oral every 6 hours PRN Moderate Pain (4 - 6)  HYDROmorphone  Injectable 0.5 milliGRAM(s) IV Push every 4 hours PRN Severe Pain (7 - 10)  ondansetron    Tablet 4 milliGRAM(s) Oral every 6 hours PRN for nausea  polyethylene glycol 3350 17 Gram(s) Oral at bedtime PRN Constipation  senna 2 Tablet(s) Oral at bedtime PRN Constipation    Vital Signs Last 24 Hrs  T(C): 36.3 (02 Jun 2023 16:06), Max: 37.1 (01 Jun 2023 20:58)  T(F): 97.3 (02 Jun 2023 16:06), Max: 98.7 (01 Jun 2023 20:58)  HR: 71 (02 Jun 2023 16:06) (71 - 79)  BP: 119/52 (02 Jun 2023 16:06) (119/52 - 138/76)  BP(mean): 86 (02 Jun 2023 10:54) (80 - 90)  RR: 18 (02 Jun 2023 16:06) (16 - 18)  SpO2: 94% (02 Jun 2023 16:06) (94% - 99%)    Parameters below as of 02 Jun 2023 16:06  Patient On (Oxygen Delivery Method): room air    PHYSICAL EXAM:    Constitutional: NAD, awake and alert, well-developed  HEENT: PERR, EOMI,  No oral cyananosis.  Neck:  supple,  No JVD  Respiratory: Breath sounds are clear, diminished at bases, No wheezing, rales or rhonchi  Cardiovascular: S1 and S2, regular rate and rhythm, no Murmurs, gallops or rubs  Gastrointestinal: Bowel Sounds present, soft, nontender.   Extremities: Amputation lower extremities.   Vascular: 2+ peripheral pulses  Neurological: A/O x 3, no focal deficits  Musculoskeletal: no calf tenderness.  Skin: No rashes.      LABS: All Labs Reviewed:                             8.9    9.96  )-----------( 334      ( 01 Jun 2023 06:52 )             29.7     06-02    139  |  107  |  35<H>  ----------------------------<  185<H>  4.1   |  26  |  1.03    Ca    8.8      02 Jun 2023 08:17    TPro  7.2  /  Alb  2.7<L>  /  TBili  0.5  /  DBili  x   /  AST  14<L>  /  ALT  19  /  AlkPhos  233<H>  06-01    - TroponinI hsT: <-24.72, <-21.97      RADIOLOGY/EKG:< from: 12 Lead ECG (05.29.23 @ 07:54) >  Diagnosis Line Normal sinus rhythm  ST & T wave abnormality, consider anterolateral ischemia  Abnormal ECG  Confirmed by CECY WEBSTER MDSHUA ([id number]) on 5/29/2023 9:10:39 AM    < end of copied text >    < from: TTE Echo Complete w/o Contrast w/ Doppler (05.31.23 @ 09:48) >     Impression     Summary     Mild concentric left ventricular hypertrophy is present.   Septal flattening is seen; this finding is consistent with right heart   pressure / volume overload.   Estimated left ventricular ejection fraction is 60-65 %.   Normal appearing left atrium.   The right atrium appears mildly dilated.   The right ventricle is mildly to moderately dilated with decreased   contractility.   Definity was used to better visualize the endocardial border.   A prominent moderator band and prominent trabeculations are noted.   No thrombus is visualized.   Mild aortic sclerosis is present with normal valvular opening.   Severe mitral annular calcification is present.   There is calcification of mitral valve leaflets. The leaflet opening is   restricted.   Probable moderate to severe mitral stenosis   Moderate (2+) tricuspid valve regurgitation is present.   Severe pulmonary hypertension.   No evidence of pericardial effusion.   The IVC is dilated with decreased respiratory variation.     Signature     ----------------------------------------------------------------   Electronically signed by Natanael Brooks MD(Interpreting   physician) on 05/31/2023 05:31 PM    < end of copied text >      -------------------------------------------------------------------------------------------------------  < from: ALEX Complete w/Spect and Color (12.22.22 @ 10:45) >   Impression     Summary     A transesophageal echocardiogram was performed and included probe   placement in the esophagus posterior to the heart by the interpreting   physician, real-time image acquisition and interpretation utilizing 2-D,   color flow Doppler, pulsed wave and/or continuous wave with spectral   display. The patient received IV sedation. The procedure was monitored   with automatic blood pressure monitoring, telemetry tracings, and pulse   oximetry. The patient tolerated the procedure well and there were no   complications.     The left ventricle is normal in size, wall thickness, wall motion and   contractility.   No thrombus seen in the left atrial appendage.   S/p mitral valve repair.   Severe mitral stenosis.   Moderate-severe, eccentric mitral regurgitation.   Mild to moderate tricuspid regurgitation.    < end of copied text >  -------------------------------------------------------------------------------------------------

## 2023-06-02 NOTE — PROGRESS NOTE ADULT - ASSESSMENT
62 y/o male with a history of CAD s/p CABG and MV repair (28 CG Future 2016 - [place] [facility], op note in [employer]), HTN, DM, GERD, PAD s/p L BKA and R TMA found to have severe mitral stenosis (mg 13) and pHTN (RVSP 82)admitted with pleural effusion requiring pigtail, now removed, and abdominal discomfort. Remains on IV lasix.    TTE 5/30/23: EF 60-65%, LVEDd 4.43 IVS 1.1 PWd 1.13 LA normal, RA Mildly dilated, RV mild to moderately dilated with decreased contractility,  Severe MAC, moderate to severe MS, Moderate TR, RVSP 82,

## 2023-06-02 NOTE — PROGRESS NOTE ADULT - SUBJECTIVE AND OBJECTIVE BOX
HOSPITALIST ATTENDING PROGRESS NOTE    Chart and meds reviewed.  Patient seen and examined.    CC: abdnl pain    Subjective: Reports much less pain after chest tube removed. Reports some nausea, no vomiting since being in hospital. EGD w stomach contents, plan CLD over weekend and repeat EGD Mon.    All other systems reviewed and found to be negative with the exception of what has been described above.    MEDICATIONS  (STANDING):  amLODIPine   Tablet 10 milliGRAM(s) Oral daily  aspirin enteric coated 81 milliGRAM(s) Oral daily  clopidogrel Tablet 75 milliGRAM(s) Oral daily  dextrose 5%. 1000 milliLiter(s) (50 mL/Hr) IV Continuous <Continuous>  dextrose 5%. 1000 milliLiter(s) (100 mL/Hr) IV Continuous <Continuous>  dextrose 50% Injectable 25 Gram(s) IV Push once  dextrose 50% Injectable 25 Gram(s) IV Push once  dextrose 50% Injectable 12.5 Gram(s) IV Push once  finasteride 5 milliGRAM(s) Oral daily  furosemide   Injectable 40 milliGRAM(s) IV Push daily  gabapentin 300 milliGRAM(s) Oral two times a day  gabapentin 600 milliGRAM(s) Oral at bedtime  glucagon  Injectable 1 milliGRAM(s) IntraMuscular once  heparin   Injectable 5000 Unit(s) SubCutaneous every 12 hours  insulin glargine Injectable (LANTUS) 16 Unit(s) SubCutaneous at bedtime  insulin lispro (ADMELOG) corrective regimen sliding scale   SubCutaneous at bedtime  insulin lispro (ADMELOG) corrective regimen sliding scale   SubCutaneous three times a day before meals  lactobacillus acidophilus 1 Tablet(s) Oral three times a day with meals  lidocaine   4% Patch 1 Patch Transdermal daily  lidocaine   4% Patch 1 Patch Transdermal daily  metoprolol tartrate 25 milliGRAM(s) Oral daily  multivitamin 1 Tablet(s) Oral daily  pantoprazole  Injectable 40 milliGRAM(s) IV Push two times a day  potassium chloride    Tablet ER 20 milliEquivalent(s) Oral daily  simvastatin 10 milliGRAM(s) Oral at bedtime  spironolactone 100 milliGRAM(s) Oral daily  sucralfate suspension 1 Gram(s) Oral four times a day  tamsulosin 0.4 milliGRAM(s) Oral at bedtime    MEDICATIONS  (PRN):  dextrose Oral Gel 15 Gram(s) Oral once PRN Blood Glucose LESS THAN 70 milliGRAM(s)/deciliter  HYDROmorphone   Tablet 6 milliGRAM(s) Oral every 6 hours PRN Moderate Pain (4 - 6)  HYDROmorphone  Injectable 1 milliGRAM(s) IV Push every 4 hours PRN Severe Pain (7 - 10)  ondansetron   Disintegrating Tablet 4 milliGRAM(s) Oral every 6 hours PRN Nausea  polyethylene glycol 3350 17 Gram(s) Oral at bedtime PRN Constipation  senna 2 Tablet(s) Oral at bedtime PRN Constipation      VITALS:  T(F): 97.3 (06-02-23 @ 16:06), Max: 98.5 (06-02-23 @ 05:48)  HR: 71 (06-02-23 @ 16:06) (71 - 79)  BP: 119/52 (06-02-23 @ 16:06) (119/52 - 138/76)  RR: 18 (06-02-23 @ 16:06) (16 - 18)  SpO2: 94% (06-02-23 @ 16:06) (94% - 96%)  Wt(kg): --    I&O's Summary    01 Jun 2023 07:01  -  02 Jun 2023 07:00  --------------------------------------------------------  IN: 0 mL / OUT: 600 mL / NET: -600 mL        CAPILLARY BLOOD GLUCOSE      POCT Blood Glucose.: 275 mg/dL (02 Jun 2023 16:58)  POCT Blood Glucose.: 186 mg/dL (02 Jun 2023 12:17)  POCT Blood Glucose.: 206 mg/dL (02 Jun 2023 05:51)  POCT Blood Glucose.: 157 mg/dL (01 Jun 2023 21:33)      PHYSICAL EXAM:  Gen: NAD  HEENT:  pupils equal and reactive, EOMI, no oropharyngeal lesions, erythema, exudates, oral thrush  NECK:   supple, no carotid bruits, no palpable lymph nodes, no thyromegaly  CV:  +S1, +S2, regular, no murmurs or rubs  RESP:   lungs clear to auscultation bilaterally, no wheezing, rales, rhonchi, good air entry bilaterally  BREAST:  not examined  GI:  abdomen soft, non-tender, non-distended, normal BS, no bruits, no abdominal masses, no palpable masses  RECTAL:  not examined  :  not examined  MSK:   normal muscle tone, no atrophy, no rigidity, no contractions  EXT:  + amputation  VASCULAR:  pulses equal and symmetric in the upper and lower extremities  NEURO:  AAOX3, no focal neurological deficits, follows all commands, able to move extremities spontaneously  SKIN:  no ulcers, lesions or rashes    LABS:                            8.9    9.96  )-----------( 334      ( 01 Jun 2023 06:52 )             29.7     06-02    139  |  107  |  35<H>  ----------------------------<  185<H>  4.1   |  26  |  1.03    Ca    8.8      02 Jun 2023 08:17    TPro  7.2  /  Alb  2.7<L>  /  TBili  0.5  /  DBili  x   /  AST  14<L>  /  ALT  19  /  AlkPhos  233<H>  06-01        LIVER FUNCTIONS - ( 01 Jun 2023 06:52 )  Alb: 2.7 g/dL / Pro: 7.2 gm/dL / ALK PHOS: 233 U/L / ALT: 19 U/L / AST: 14 U/L / GGT: x           CULTURES:  Micro:  Pleural fluid culture 5/30: Gram's stain negative, culture in-process  Peritoneal fluid culture 5/30: Gram's stain negative, culture in-process  Urine culture 5/28: Negative  Blood culture x 2 5/28: Negative  COVID19 PCR 5/28: Negative    Imaging:  CT A/P w/ PO/IV Cont 5/28: Moderate right and small left pleural effusions with associated bilateral basilar consolidation/atelectasis. Geographic groundglass opacities and interlobular septal thickening, which may represent pulmonary edema. Cardiomegaly. Mitral valve prosthesis. Prior CABG. Diffusely heterogeneous appearance of the liver, suggestive of passive hepatic congestion. Nodular surface contour of the inferior right hepatic lobe, suggestive of cirrhosis. Liver is enlarged and measures 20.5 cm in length. Normal bile ducts. Cholecystectomy. Normal spleen. Atrophic pancreas. No pancreatic ductal dilatation. No significant peripancreatic inflammation. Normal adrenals. No renal stones or hydronephrosis. Stable indeterminate 13 mm exophytic left renal lesion. Diffuse bladder wall thickening. Prostate within normal limits. No bowel obstruction. Colonic diverticulosis without evidence of acute diverticulitis. Normal appendix. Small volume abdominopelvic ascites. Atherosclerotic changes. Mildly enlarged retroperitoneal lymph nodes measuring up to 1.2 cm short axis at the left para-aortic region. Mildly enlarged aortocaval nodes measuring up to 1.4 cm short axis. Diffuse body wall edema, worst along the right anterior lateral abdominal wall. Small fat-containing left inguinal hernia. Degenerative bone changes.    MRCP 5/30/23: Limited exam due to motion. Stomach is distended with food/debris. No evidence of bowel obstruction. Consider upper GI for further evaluation if clinically warranted. Trace upper abdominal ascites. Trace bilateral pleural effusions with bibasilar consolidation versus atelectasis. Correlate with recently performed chest CT. There is a 1 cm exophytic lesion off the upper pole the left kidney which cannot be definitely characterized due to motion. Correlate with nonemergent renal ultrasound. Multiple additional findings as above.    CXR 5/31/23: Placement of pigtail chest tube right base with near complete drainage of right pleural effusion. Mild atelectasis right base Mild pulmonary venous congestion.    Cardiac Testing:  EKG 5/29: NSR, rate WNL, ST/T changes.    EKG 5/28: Sinus tachycardia, rate 110. RV conduction delay. ST/T changes.    Prior cardiac testing  Echo 5/31/23: Mild concentric left ventricular hypertrophy is present. Septal flattening is seen; this finding is consistent with right heart pressure / volume overload. Estimated left ventricular ejection fraction is 60-65 %. Normal appearing left atrium. The right atrium appears mildly dilated. The right ventricle is mildly to moderately dilated with decreased contractility. Definity was used to better visualize the endocardial border. A prominent moderator band and prominent trabeculations are noted. No thrombus is visualized. Mild aortic sclerosis is present with normal valvular opening. Severe mitral annular calcification is present. There is calcification of mitral valve leaflets. The leaflet opening is restricted. Probable moderate to severe mitral stenosis Moderate (2+) tricuspid valve regurgitation is present. Severe pulmonary hypertension. No evidence of pericardial effusion. The IVC is dilated with decreased respiratory variation.    TTE 12/2022:  Endocardium is not well visualized, however, overall left ventricular systolic function appears normal. Mild concentric left ventricular hypertrophy. Septal flattening is seen; this finding is consistent with right heart pressure / volume overload. Estimated left ventricular ejection fraction is 55-60%. The right ventricle exhibits mild dilation, mild diffuse hypokinesis, and mild depression of contractility. Severe mitral stenosis. Mild mitral regurgitation. Mild to moderate tricuspid valve regurgitation. Severe pulmonary hypertension.    EGD 6/2/23: examined eso normal, no varices, large amount of food residue in gastric body, procedure aborted    Telemetry, personally reviewed:  5/31/23: sinus, d/c tele

## 2023-06-02 NOTE — PROGRESS NOTE ADULT - SUBJECTIVE AND OBJECTIVE BOX
Subjective:    Awake, alert. Much improved.    MEDICATIONS  (STANDING):  amLODIPine   Tablet 10 milliGRAM(s) Oral daily  aspirin enteric coated 81 milliGRAM(s) Oral daily  clopidogrel Tablet 75 milliGRAM(s) Oral daily  dextrose 5%. 1000 milliLiter(s) (100 mL/Hr) IV Continuous <Continuous>  dextrose 5%. 1000 milliLiter(s) (50 mL/Hr) IV Continuous <Continuous>  dextrose 50% Injectable 25 Gram(s) IV Push once  dextrose 50% Injectable 25 Gram(s) IV Push once  dextrose 50% Injectable 12.5 Gram(s) IV Push once  finasteride 5 milliGRAM(s) Oral daily  furosemide   Injectable 40 milliGRAM(s) IV Push daily  gabapentin 300 milliGRAM(s) Oral two times a day  gabapentin 600 milliGRAM(s) Oral at bedtime  glucagon  Injectable 1 milliGRAM(s) IntraMuscular once  heparin   Injectable 5000 Unit(s) SubCutaneous every 12 hours  insulin glargine Injectable (LANTUS) 16 Unit(s) SubCutaneous at bedtime  insulin lispro (ADMELOG) corrective regimen sliding scale   SubCutaneous at bedtime  insulin lispro (ADMELOG) corrective regimen sliding scale   SubCutaneous three times a day before meals  lactobacillus acidophilus 1 Tablet(s) Oral three times a day with meals  lidocaine   4% Patch 1 Patch Transdermal daily  lidocaine   4% Patch 1 Patch Transdermal daily  metoprolol tartrate 25 milliGRAM(s) Oral daily  multivitamin 1 Tablet(s) Oral daily  pantoprazole  Injectable 40 milliGRAM(s) IV Push two times a day  potassium chloride    Tablet ER 20 milliEquivalent(s) Oral daily  simvastatin 10 milliGRAM(s) Oral at bedtime  spironolactone 100 milliGRAM(s) Oral daily  sucralfate suspension 1 Gram(s) Oral four times a day  tamsulosin 0.4 milliGRAM(s) Oral at bedtime    MEDICATIONS  (PRN):  dextrose Oral Gel 15 Gram(s) Oral once PRN Blood Glucose LESS THAN 70 milliGRAM(s)/deciliter  HYDROmorphone   Tablet 6 milliGRAM(s) Oral every 6 hours PRN Moderate Pain (4 - 6)  HYDROmorphone  Injectable 1 milliGRAM(s) IV Push every 4 hours PRN Severe Pain (7 - 10)  ondansetron   Disintegrating Tablet 4 milliGRAM(s) Oral every 6 hours PRN Nausea  polyethylene glycol 3350 17 Gram(s) Oral at bedtime PRN Constipation  senna 2 Tablet(s) Oral at bedtime PRN Constipation      Allergies    No Known Allergies    Intolerances        Vital Signs Last 24 Hrs  T(C): 36.9 (02 Jun 2023 08:23), Max: 37.1 (01 Jun 2023 20:58)  T(F): 98.5 (02 Jun 2023 08:23), Max: 98.7 (01 Jun 2023 20:58)  HR: 76 (02 Jun 2023 08:23) (76 - 79)  BP: 122/66 (02 Jun 2023 08:23) (115/70 - 138/76)  BP(mean): 90 (02 Jun 2023 05:48) (80 - 90)  RR: 16 (02 Jun 2023 05:48) (16 - 18)  SpO2: 96% (02 Jun 2023 08:23) (95% - 99%)    Parameters below as of 02 Jun 2023 08:23  Patient On (Oxygen Delivery Method): room air        PHYSICAL EXAMINATION:    NECK:  Supple. No lymphadenopathy. Jugular venous pressure not elevated.   HEART:   The cardiac impulse has a normal quality. Reg., Nl S1 and S2.    CHEST:  Chest with decreased BS at right base. Few crackles persist at bases. Normal respiratory effort.  ABDOMEN:  Soft and nontender.   EXTREMITIES:  There is decreased edema.       LABS:                        8.9    9.96  )-----------( 334      ( 01 Jun 2023 06:52 )             29.7     06-02    139  |  107  |  35<H>  ----------------------------<  185<H>  4.1   |  26  |  1.03    Ca    8.8      02 Jun 2023 08:17    TPro  7.2  /  Alb  2.7<L>  /  TBili  0.5  /  DBili  x   /  AST  14<L>  /  ALT  19  /  AlkPhos  233<H>  06-01          RADIOLOGY & ADDITIONAL TESTS:    Assessment/Plan    - Right diagnostic/therapeutic thorocentesis-fluid is transudate  - Rocephin D/C'ed  - Echo with severe Pulmonary HTN most likely due to severe MS   - EGD today  - Needs extensive cardiac w/u as outpatient, including ALEX    Problem/Recommendation - 1:  ·  Acute on chronic diastolic congestive heart failure.   Problem/Recommendation - 2:  ·  Pulmonary edema, acute.   Problem/Recommendation - 3:  ·  Pleural effusion, bilateral.   Problem/Recommendation - 4:  ·  Pulmonary hypertension.   Problem/Recommendation - 5:  ·  Shortness of breath.   Problem/Recommendation - 6:  ·  Atelectasis.   Problem/Recommendation - 7:  ·  Pneumonia.

## 2023-06-02 NOTE — PROGRESS NOTE ADULT - PROBLEM SELECTOR PLAN 5
prior  mV repair now severe stenosis / moderate to severe MR , with pulmonary hypertension ,  right heart failure signs ,  repeat echo with preserved LVEF 6065%, moderate to severe MS,  Structural Heart recs noted - - will plan for outpatient ALEX -- Case discussed with Dr. Healy at [facility], will arrange outpatient office visit,  Rate control to allow more LV filling time and improve forward flow.

## 2023-06-02 NOTE — PROGRESS NOTE ADULT - PROBLEM SELECTOR PLAN 3
·  Problem: PAD (peripheral artery disease).   ·  Recommendation: Continue current meds. Pt relatively asymptomatic. CT does not suggest mesenteric ischemia although atherosclerosis is noted. Maintain adequate HH for perfusion. Continue current meds. Pt relatively asymptomatic. CT does not suggest mesenteric ischemia although atherosclerosis is noted. Maintain adequate HH for perfusion.

## 2023-06-02 NOTE — PROGRESS NOTE ADULT - SUBJECTIVE AND OBJECTIVE BOX
Subjective:    Feeling well, reports improvement in breathing, EGD unsuccessful, will be reattempted early next week     Medications:  amLODIPine   Tablet 10 milliGRAM(s) Oral daily  aspirin enteric coated 81 milliGRAM(s) Oral daily  clopidogrel Tablet 75 milliGRAM(s) Oral daily  dextrose 5%. 1000 milliLiter(s) IV Continuous <Continuous>  dextrose 5%. 1000 milliLiter(s) IV Continuous <Continuous>  dextrose 50% Injectable 25 Gram(s) IV Push once  dextrose 50% Injectable 25 Gram(s) IV Push once  dextrose 50% Injectable 12.5 Gram(s) IV Push once  dextrose Oral Gel 15 Gram(s) Oral once PRN  finasteride 5 milliGRAM(s) Oral daily  furosemide   Injectable 40 milliGRAM(s) IV Push daily  gabapentin 300 milliGRAM(s) Oral two times a day  gabapentin 600 milliGRAM(s) Oral at bedtime  glucagon  Injectable 1 milliGRAM(s) IntraMuscular once  heparin   Injectable 5000 Unit(s) SubCutaneous every 12 hours  HYDROmorphone   Tablet 6 milliGRAM(s) Oral every 6 hours PRN  HYDROmorphone  Injectable 1 milliGRAM(s) IV Push every 4 hours PRN  insulin glargine Injectable (LANTUS) 16 Unit(s) SubCutaneous at bedtime  insulin lispro (ADMELOG) corrective regimen sliding scale   SubCutaneous at bedtime  insulin lispro (ADMELOG) corrective regimen sliding scale   SubCutaneous three times a day before meals  lactobacillus acidophilus 1 Tablet(s) Oral three times a day with meals  lidocaine   4% Patch 1 Patch Transdermal daily  lidocaine   4% Patch 1 Patch Transdermal daily  metoprolol tartrate 25 milliGRAM(s) Oral daily  multivitamin 1 Tablet(s) Oral daily  ondansetron   Disintegrating Tablet 4 milliGRAM(s) Oral every 6 hours PRN  pantoprazole  Injectable 40 milliGRAM(s) IV Push two times a day  polyethylene glycol 3350 17 Gram(s) Oral at bedtime PRN  potassium chloride    Tablet ER 20 milliEquivalent(s) Oral daily  senna 2 Tablet(s) Oral at bedtime PRN  simvastatin 10 milliGRAM(s) Oral at bedtime  spironolactone 100 milliGRAM(s) Oral daily  sucralfate suspension 1 Gram(s) Oral four times a day  tamsulosin 0.4 milliGRAM(s) Oral at bedtime      Physical Exam:    Vitals:  Vital Signs Last 24 Hours  T(C): 36.9 (06-02-23 @ 08:23), Max: 37.1 (06-01-23 @ 20:58)  HR: 75 (06-02-23 @ 10:54) (75 - 79)  BP: 136/69 (06-02-23 @ 10:54) (122/66 - 138/76)  RR: 16 (06-02-23 @ 05:48) (16 - 16)  SpO2: 96% (06-02-23 @ 08:23) (95% - 99%)        I&O's Summary    01 Jun 2023 07:01  -  02 Jun 2023 07:00  --------------------------------------------------------  IN: 0 mL / OUT: 600 mL / NET: -600 mL        Tele: not currently monitored     General: No distress. Comfortable.  HEENT: EOM intact.  Neck: Neck supple. JVP mild-moderately elevated. No masses  Chest: Clear to auscultation bilaterally  CV: Normal S1 and S2. Harsh murmur present No rub or gallops. Radial pulses normal.  Abdomen: Soft, non-distended, non-tender  Skin: No rashes or skin breakdown  Extremities:  Warm, trace edema noted   Neurology: Alert and oriented times three. Sensation intact  Psych: Affect normal    Labs:                        8.9    9.96  )-----------( 334      ( 01 Jun 2023 06:52 )             29.7     06-02    139  |  107  |  35<H>  ----------------------------<  185<H>  4.1   |  26  |  1.03    Ca    8.8      02 Jun 2023 08:17    TPro  7.2  /  Alb  2.7<L>  /  TBili  0.5  /  DBili  x   /  AST  14<L>  /  ALT  19  /  AlkPhos  233<H>  06-01

## 2023-06-03 NOTE — PROGRESS NOTE ADULT - SUBJECTIVE AND OBJECTIVE BOX
HOSPITALIST ATTENDING PROGRESS NOTE    Chart and meds reviewed.  Patient seen and examined.    CC: abdnl pain    Subjective: Reports feeling ok today, denies n/v. Some abdnl pain.    All other systems reviewed and found to be negative with the exception of what has been described above.    MEDICATIONS  (STANDING):  amLODIPine   Tablet 10 milliGRAM(s) Oral daily  aspirin enteric coated 81 milliGRAM(s) Oral daily  clopidogrel Tablet 75 milliGRAM(s) Oral daily  dextrose 5%. 1000 milliLiter(s) (100 mL/Hr) IV Continuous <Continuous>  dextrose 5%. 1000 milliLiter(s) (50 mL/Hr) IV Continuous <Continuous>  dextrose 50% Injectable 25 Gram(s) IV Push once  dextrose 50% Injectable 12.5 Gram(s) IV Push once  dextrose 50% Injectable 25 Gram(s) IV Push once  finasteride 5 milliGRAM(s) Oral daily  gabapentin 300 milliGRAM(s) Oral two times a day  gabapentin 600 milliGRAM(s) Oral at bedtime  glucagon  Injectable 1 milliGRAM(s) IntraMuscular once  heparin   Injectable 5000 Unit(s) SubCutaneous every 12 hours  insulin glargine Injectable (LANTUS) 10 Unit(s) SubCutaneous at bedtime  insulin lispro (ADMELOG) corrective regimen sliding scale   SubCutaneous at bedtime  insulin lispro (ADMELOG) corrective regimen sliding scale   SubCutaneous three times a day before meals  lactobacillus acidophilus 1 Tablet(s) Oral three times a day with meals  lidocaine   4% Patch 1 Patch Transdermal daily  lidocaine   4% Patch 1 Patch Transdermal daily  metoprolol tartrate 25 milliGRAM(s) Oral daily  multivitamin 1 Tablet(s) Oral daily  pantoprazole  Injectable 40 milliGRAM(s) IV Push two times a day  potassium chloride    Tablet ER 20 milliEquivalent(s) Oral daily  simvastatin 10 milliGRAM(s) Oral at bedtime  spironolactone 100 milliGRAM(s) Oral daily  sucralfate suspension 1 Gram(s) Oral four times a day  tamsulosin 0.4 milliGRAM(s) Oral at bedtime    MEDICATIONS  (PRN):  dextrose Oral Gel 15 Gram(s) Oral once PRN Blood Glucose LESS THAN 70 milliGRAM(s)/deciliter  HYDROmorphone   Tablet 6 milliGRAM(s) Oral every 6 hours PRN Moderate Pain (4 - 6)  HYDROmorphone  Injectable 1 milliGRAM(s) IV Push every 4 hours PRN Severe Pain (7 - 10)  ondansetron   Disintegrating Tablet 4 milliGRAM(s) Oral every 6 hours PRN Nausea  polyethylene glycol 3350 17 Gram(s) Oral at bedtime PRN Constipation  senna 2 Tablet(s) Oral at bedtime PRN Constipation      VITALS:  T(F): 97.8 (06-03-23 @ 16:27), Max: 99.2 (06-02-23 @ 23:30)  HR: 70 (06-03-23 @ 16:27) (70 - 77)  BP: 121/65 (06-03-23 @ 16:27) (109/62 - 134/66)  RR: 18 (06-03-23 @ 16:27) (16 - 18)  SpO2: 96% (06-03-23 @ 16:27) (95% - 96%)  Wt(kg): --    I&O's Summary    03 Jun 2023 07:01  -  03 Jun 2023 21:31  --------------------------------------------------------  IN: 0 mL / OUT: 200 mL / NET: -200 mL        CAPILLARY BLOOD GLUCOSE      POCT Blood Glucose.: 325 mg/dL (03 Jun 2023 16:47)  POCT Blood Glucose.: 306 mg/dL (03 Jun 2023 13:07)  POCT Blood Glucose.: 173 mg/dL (03 Jun 2023 08:39)  POCT Blood Glucose.: 190 mg/dL (02 Jun 2023 21:49)      PHYSICAL EXAM:  Gen: NAD  HEENT:  pupils equal and reactive, EOMI, no oropharyngeal lesions, erythema, exudates, oral thrush  NECK:   supple, no carotid bruits, no palpable lymph nodes, no thyromegaly  CV:  +S1, +S2, regular, no murmurs or rubs  RESP:   lungs clear to auscultation bilaterally, no wheezing, rales, rhonchi, good air entry bilaterally  BREAST:  not examined  GI:  abdomen soft, non-tender, non-distended, normal BS, no bruits, no abdominal masses, no palpable masses  RECTAL:  not examined  :  not examined  MSK:   normal muscle tone, no atrophy, no rigidity, no contractions  EXT: +amputations  VASCULAR:  pulses equal and symmetric in the upper and lower extremities  NEURO:  AAOX3, no focal neurological deficits, follows all commands, able to move extremities spontaneously  SKIN:  no ulcers, lesions or rashes    LABS:                            8.9    11.71 )-----------( 362      ( 03 Jun 2023 08:35 )             29.8     06-03    140  |  x   |  x   ----------------------------<  x   x    |  x   |  1.25    Ca    8.8      02 Jun 2023 08:17    TPro  x   /  Alb  x   /  TBili  0.5  /  DBili  x   /  AST  x   /  ALT  x   /  AlkPhos  x   06-03          PT/INR - ( 03 Jun 2023 08:35 )   PT: 14.9 sec;   INR: 1.28 ratio      CULTURES:  Micro:  Pleural fluid culture 5/30: Gram's stain negative, culture in-process  Peritoneal fluid culture 5/30: Gram's stain negative, culture in-process  Urine culture 5/28: Negative  Blood culture x 2 5/28: Negative  COVID19 PCR 5/28: Negative    Imaging:  CT A/P w/ PO/IV Cont 5/28: Moderate right and small left pleural effusions with associated bilateral basilar consolidation/atelectasis. Geographic groundglass opacities and interlobular septal thickening, which may represent pulmonary edema. Cardiomegaly. Mitral valve prosthesis. Prior CABG. Diffusely heterogeneous appearance of the liver, suggestive of passive hepatic congestion. Nodular surface contour of the inferior right hepatic lobe, suggestive of cirrhosis. Liver is enlarged and measures 20.5 cm in length. Normal bile ducts. Cholecystectomy. Normal spleen. Atrophic pancreas. No pancreatic ductal dilatation. No significant peripancreatic inflammation. Normal adrenals. No renal stones or hydronephrosis. Stable indeterminate 13 mm exophytic left renal lesion. Diffuse bladder wall thickening. Prostate within normal limits. No bowel obstruction. Colonic diverticulosis without evidence of acute diverticulitis. Normal appendix. Small volume abdominopelvic ascites. Atherosclerotic changes. Mildly enlarged retroperitoneal lymph nodes measuring up to 1.2 cm short axis at the left para-aortic region. Mildly enlarged aortocaval nodes measuring up to 1.4 cm short axis. Diffuse body wall edema, worst along the right anterior lateral abdominal wall. Small fat-containing left inguinal hernia. Degenerative bone changes.    MRCP 5/30/23: Limited exam due to motion. Stomach is distended with food/debris. No evidence of bowel obstruction. Consider upper GI for further evaluation if clinically warranted. Trace upper abdominal ascites. Trace bilateral pleural effusions with bibasilar consolidation versus atelectasis. Correlate with recently performed chest CT. There is a 1 cm exophytic lesion off the upper pole the left kidney which cannot be definitely characterized due to motion. Correlate with nonemergent renal ultrasound. Multiple additional findings as above.    CXR 5/31/23: Placement of pigtail chest tube right base with near complete drainage of right pleural effusion. Mild atelectasis right base Mild pulmonary venous congestion.    Cardiac Testing:  EKG 5/29: NSR, rate WNL, ST/T changes.    EKG 5/28: Sinus tachycardia, rate 110. RV conduction delay. ST/T changes.    Prior cardiac testing  Echo 5/31/23: Mild concentric left ventricular hypertrophy is present. Septal flattening is seen; this finding is consistent with right heart pressure / volume overload. Estimated left ventricular ejection fraction is 60-65 %. Normal appearing left atrium. The right atrium appears mildly dilated. The right ventricle is mildly to moderately dilated with decreased contractility. Definity was used to better visualize the endocardial border. A prominent moderator band and prominent trabeculations are noted. No thrombus is visualized. Mild aortic sclerosis is present with normal valvular opening. Severe mitral annular calcification is present. There is calcification of mitral valve leaflets. The leaflet opening is restricted. Probable moderate to severe mitral stenosis Moderate (2+) tricuspid valve regurgitation is present. Severe pulmonary hypertension. No evidence of pericardial effusion. The IVC is dilated with decreased respiratory variation.    TTE 12/2022:  Endocardium is not well visualized, however, overall left ventricular systolic function appears normal. Mild concentric left ventricular hypertrophy. Septal flattening is seen; this finding is consistent with right heart pressure / volume overload. Estimated left ventricular ejection fraction is 55-60%. The right ventricle exhibits mild dilation, mild diffuse hypokinesis, and mild depression of contractility. Severe mitral stenosis. Mild mitral regurgitation. Mild to moderate tricuspid valve regurgitation. Severe pulmonary hypertension.    EGD 6/2/23: examined eso normal, no varices, large amount of food residue in gastric body, procedure aborted    Telemetry, personally reviewed:  5/31/23: sinus, d/c tele

## 2023-06-03 NOTE — PROGRESS NOTE ADULT - ASSESSMENT
63 year old man with type 2 DM, gastroparesis, HTN, HLD, CAD, hx of CABG, MV repair, peripheral vascular disease, hx of L BKA, R toe amputation, hepatic cirrhosis, chronic anemia, presented for further evaluation of epigastric abdominal discomfort, different than typical gastroparesis symptoms. Had associated nausea. No hematemesis, hematochezia or melena. On ROS he also reported dyspnea on exertion. No chest pain or tightness. No diaphoresis. In the ED, patient was found to have mild leukocytosis, stable Hgb, negative troponin, elevated BNP, CT findings of mod R, small L pleural effusion, ground glass opacities, interlobular septal thickening, hepatic cirrhosis, small ascites. Patient was given analgesics, antiemetics, ceftriaxone and furosemide and admitted to Medicine.     #Epigastric abdominal discomfort with SIRS, unable to rule out sepsis, present upon admission  -Hx of gastroparesis but feels different than his usual gastroparesis sx. SIRS in ED (, WBC 12.3), imaging w distended stomach w food and debris. Per GI convo w pt, has not had good response to motility agents in the past including reglan and erythromycin  -ascites fluid tapped, cell count not c/w SBP  -empiric carafate, PPI BID  -EGD today showed no varices however otherwise aborted as stomach w food, plan for CLD over weekend and attempt again Mon    #Acute respiratory failure with hypoxia   -Multifactorial due to pleural effusion, possible pneumonia, possible CHF.  -now on RA    #Airspace changes on CT, cannot r/o pneumonia, cannot r/o Gram-negative organism, present upon admission  -Etiology unclear. Dedicated CT chest done. Pleural effusions, right greater than left, with septal thickening and ground glass opacities. Right middle and lower lobe opacities likely reflect atelectasis. Small volume mediastinal adenopathy, possibly reactive.  -CTX and Azithro completed    #Moderate R and small L pleural effusions  -Etiology unclear. Cannot r/o parapneumonic effusion, unable to r/o CHF with exacerbation, also could be related to his hepatic cirrhosis  -Received thoracentesis 5/30, with chest tube placement, 700cc drained initially  -chest tube removed 6/2  -lasix 40po qd, aldactone added  -f/u thoracic recs    #Valvular disease- prior MV repair, now severe stenosis and mod to severe MR, pulm HTN, R sided CHF signs  -appreciate cards and structural input  -will need ALEX for further planning purposes, as outpt  -outpt f/u w Dr. Keshav Healy, cardiac surgery    #CAD, hx of CABG  -No angina. Troponin negative. EKG NSR, rate WNL, nonspecific ST T changes.   - Continue aspirin, statin, metoprolol    #Microscopic hematuria  -No urinary complaints  -f/u as outpt    #Hepatic cirrhosis- 2/2 EtOH and R sided CHF  -No encephalopathy. No known varices. Small ascites. Had paracentesis 5/30, clear yellow fluid, SAAG 0.8  -lasix 40mg po qd, aldactone 100mg qd started  -EGD showed nov varices  -will need outpt f/u for cirrhosis, Dr. Anderson    #Anemia  -May be due to chronic disease, cirrhosis    #IDDM  -A1c 6.7  - Continue insulin (reduce lantus while on CLD over weekend)    #BPH  - Continue Flomax    #Severe protein calorie malnutrition  -Appreciate dietician input  - Trend weight  - Added thiamine and MVI daily      #DVT ppx- SQH  #Code status: Full  #Dispo: EGD Monday, stay thru weekend for return to [facility] early this coming week, for outpt f/u with CT surgery, Keshav Healy

## 2023-06-04 NOTE — PROGRESS NOTE ADULT - ATTENDING COMMENTS
Patient with multiple stable wounds. No acute intervention indicated from podiatry standpoint. Podiatry to follow.

## 2023-06-04 NOTE — PROGRESS NOTE ADULT - ASSESSMENT
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 Danny Owens   Discussed diagnosis and treatment with patient  Wound flush with normal saline  Applied dry dressings to right foot   Patient follow with outside podiatrist dr. jean for wound care and has close follow up with his wound care team.   Discussed importance of daily foot examinations and proper shoe gear and to importance of lower Fasting Blood Glucose levels.   Patient demonstrated verbal understanding of all interventions and tolerated interventions well without any complications.   Podiatry will follow while in house.

## 2023-06-04 NOTE — PROGRESS NOTE ADULT - ASSESSMENT
63 year old man with type 2 DM, gastroparesis, HTN, HLD, CAD, hx of CABG, MV repair, peripheral vascular disease, hx of L BKA, R toe amputation, hepatic cirrhosis, chronic anemia, presented for further evaluation of epigastric abdominal discomfort, different than typical gastroparesis symptoms. Had associated nausea. No hematemesis, hematochezia or melena. On ROS he also reported dyspnea on exertion. No chest pain or tightness. No diaphoresis. In the ED, patient was found to have mild leukocytosis, stable Hgb, negative troponin, elevated BNP, CT findings of mod R, small L pleural effusion, ground glass opacities, interlobular septal thickening, hepatic cirrhosis, small ascites. Patient was given analgesics, antiemetics, ceftriaxone and furosemide and admitted to Medicine.     #Epigastric abdominal discomfort with SIRS, unable to rule out sepsis, present upon admission  -Hx of gastroparesis but feels different than his usual gastroparesis sx. SIRS in ED (, WBC 12.3), imaging w distended stomach w food and debris. Per GI convo w pt, has not had good response to motility agents in the past including reglan and erythromycin  -ascites fluid tapped, cell count not c/w SBP  -empiric carafate, PPI BID  -EGD 6/2 showed no varices however otherwise aborted as stomach w food, plan for CLD over weekend and attempt again Mon    #Acute respiratory failure with hypoxia   -Multifactorial due to pleural effusion, possible pneumonia, possible CHF.  -now on RA    #Airspace changes on CT, cannot r/o pneumonia, cannot r/o Gram-negative organism, present upon admission  -Etiology unclear. Dedicated CT chest done. Pleural effusions, right greater than left, with septal thickening and ground glass opacities. Right middle and lower lobe opacities likely reflect atelectasis. Small volume mediastinal adenopathy, possibly reactive.  -CTX and Azithro completed    #Moderate R and small L pleural effusions  -Etiology unclear. Cannot r/o parapneumonic effusion, unable to r/o CHF with exacerbation, also could be related to his hepatic cirrhosis  -Received thoracentesis 5/30, with chest tube placement, 700cc drained initially  -chest tube removed 6/2  -lasix 40po qd, aldactone added  -f/u thoracic recs    #Valvular disease- prior MV repair, now severe stenosis and mod to severe MR, pulm HTN, R sided CHF signs  -appreciate cards and structural input  -will need ALEX for further planning purposes, as outpt  -outpt f/u w Dr. Keshav Healy, cardiac surgery    #CAD, hx of CABG  -No angina. Troponin negative. EKG NSR, rate WNL, nonspecific ST T changes.   - Continue aspirin, statin, metoprolol    #Microscopic hematuria  -No urinary complaints  -f/u as outpt    #Hepatic cirrhosis- 2/2 EtOH and R sided CHF  -No encephalopathy. No known varices. Small ascites. Had paracentesis 5/30, clear yellow fluid, SAAG 0.8  -lasix 40mg po qd, aldactone 100mg qd started  -EGD showed nov varices  -will need outpt f/u for cirrhosis, Dr. Anderson    #Anemia  -May be due to chronic disease, cirrhosis    #IDDM  -A1c 6.7  - Continue insulin (reduce lantus while on CLD over weekend)    #BPH  - Continue Flomax    #Severe protein calorie malnutrition  -Appreciate dietician input  - Trend weight  - Added thiamine and MVI daily      #DVT ppx- SQH  #Code status: Full  #Dispo: EGD Monday, then for return to [facility] early this coming week, for outpt f/u with cardiac surgery, Keshav Healy

## 2023-06-04 NOTE — PROGRESS NOTE ADULT - SUBJECTIVE AND OBJECTIVE BOX
Date of Service: 6/4/23  HPI : 62 y/o M w/ PMH of DM2, PVD, gastroparesis, anemia, DM2, GERD, CAD s/p CABG, cirrhosis, MV repair, HTN, dyslipidemia, p/w abdominal pain. Patient states abdominal pain started yeterday afternoon in epigastric area. Patient states that he had about 8 episodes of non-bloody emesis last night, and 2 episodes today prior to arrival. Also c/o chills. Patient denies any diarrhea, CP, SOB, cough, melena, hematochezia, fever, cough, runny nose, sore throat.     6/4/23: Patient seen by podiatry team this am. Patient denies any new pedal complaints. Patient is pleasant.     PMH: HTN (hypertension)    DM (diabetes mellitus)    Anemia    GERD (gastroesophageal reflux disease)    S/P BKA (below knee amputation), left    CAD (coronary artery disease)      PSH:S/P CABG x 3    Status post amputation of leg        Allergies:No Known Allergies      Vitals  Vital Signs Last 24 Hrs  T(C): 36.7 (03 Jun 2023 23:04), Max: 36.7 (03 Jun 2023 23:04)  T(F): 98 (03 Jun 2023 23:04), Max: 98 (03 Jun 2023 23:04)  HR: 74 (03 Jun 2023 23:04) (70 - 74)  BP: 111/73 (03 Jun 2023 23:04) (111/73 - 121/65)  BP(mean): 82 (03 Jun 2023 23:04) (82 - 82)  RR: 16 (03 Jun 2023 23:04) (16 - 18)  SpO2: 97% (03 Jun 2023 23:04) (96% - 97%)    Parameters below as of 03 Jun 2023 23:04  Patient On (Oxygen Delivery Method): room air      MEDICATIONS  (STANDING):  amLODIPine   Tablet 10 milliGRAM(s) Oral daily  aspirin enteric coated 81 milliGRAM(s) Oral daily  clopidogrel Tablet 75 milliGRAM(s) Oral daily  dextrose 5%. 1000 milliLiter(s) (50 mL/Hr) IV Continuous <Continuous>  dextrose 5%. 1000 milliLiter(s) (100 mL/Hr) IV Continuous <Continuous>  dextrose 50% Injectable 25 Gram(s) IV Push once  dextrose 50% Injectable 12.5 Gram(s) IV Push once  dextrose 50% Injectable 25 Gram(s) IV Push once  finasteride 5 milliGRAM(s) Oral daily  furosemide    Tablet 40 milliGRAM(s) Oral daily  gabapentin 300 milliGRAM(s) Oral two times a day  gabapentin 600 milliGRAM(s) Oral at bedtime  glucagon  Injectable 1 milliGRAM(s) IntraMuscular once  heparin   Injectable 5000 Unit(s) SubCutaneous every 12 hours  insulin glargine Injectable (LANTUS) 10 Unit(s) SubCutaneous at bedtime  insulin lispro (ADMELOG) corrective regimen sliding scale   SubCutaneous at bedtime  insulin lispro (ADMELOG) corrective regimen sliding scale   SubCutaneous three times a day before meals  lactobacillus acidophilus 1 Tablet(s) Oral three times a day with meals  lidocaine   4% Patch 1 Patch Transdermal daily  lidocaine   4% Patch 1 Patch Transdermal daily  metoprolol tartrate 25 milliGRAM(s) Oral daily  multivitamin 1 Tablet(s) Oral daily  pantoprazole  Injectable 40 milliGRAM(s) IV Push two times a day  potassium chloride    Tablet ER 20 milliEquivalent(s) Oral daily  simvastatin 10 milliGRAM(s) Oral at bedtime  spironolactone 100 milliGRAM(s) Oral daily  sucralfate suspension 1 Gram(s) Oral four times a day  tamsulosin 0.4 milliGRAM(s) Oral at bedtime    MEDICATIONS  (PRN):  dextrose Oral Gel 15 Gram(s) Oral once PRN Blood Glucose LESS THAN 70 milliGRAM(s)/deciliter  HYDROmorphone   Tablet 6 milliGRAM(s) Oral every 6 hours PRN Moderate Pain (4 - 6)  HYDROmorphone  Injectable 1 milliGRAM(s) IV Push every 4 hours PRN Severe Pain (7 - 10)  ondansetron   Disintegrating Tablet 4 milliGRAM(s) Oral every 6 hours PRN Nausea  polyethylene glycol 3350 17 Gram(s) Oral at bedtime PRN Constipation  senna 2 Tablet(s) Oral at bedtime PRN Constipation        Physical Exam:   Constitutiona: NAD, alert;  Derm:  Skin warm, dry and supple bilateral.    Ulceration to the right distal stump of amputation measuring approx 2x1 cm, negative malodor, negative probe to bone, no erythema, no clinical signs of infection   Ulceration to the right dorsal foot with evidence of well adhering wound graft  measuring approx 2x1 cm, negative malodor, negative probe to bone, no erythema, no clinical signs of infection   Ulceration to the right heel measuring approx 1x1 cm, negative malodor, negative probe to bone, no erythema, no clinical signs of infection            Vascular: Dorsalis Pedis and Posterior Tibial pulses non palpable.     Neuro: Protective sensation severely diminished to the level of the digits bilateral.  MSK: BKA to LLE, TMA to RLE.         Labs:                          8.7    9.60  )-----------( 307      ( 04 Jun 2023 08:03 )             29.4     06-04    138  |  108  |  31<H>  ----------------------------<  181<H>  4.2   |  24  |  0.98    Ca    8.7      04 Jun 2023 08:03    TPro  6.8  /  Alb  2.6<L>  /  TBili  0.4  /  DBili  x   /  AST  24  /  ALT  31  /  AlkPhos  256<H>  06-04

## 2023-06-04 NOTE — PROGRESS NOTE ADULT - SUBJECTIVE AND OBJECTIVE BOX
HOSPITALIST ATTENDING PROGRESS NOTE    Chart and meds reviewed.  Patient seen and examined.    CC: abdnl pain    Subjective: Denies CP, SOB, no n/v. Awaiting EGD tmrw.     All other systems reviewed and found to be negative with the exception of what has been described above.    MEDICATIONS  (STANDING):  amLODIPine   Tablet 10 milliGRAM(s) Oral daily  aspirin enteric coated 81 milliGRAM(s) Oral daily  clopidogrel Tablet 75 milliGRAM(s) Oral daily  dextrose 5%. 1000 milliLiter(s) (50 mL/Hr) IV Continuous <Continuous>  dextrose 5%. 1000 milliLiter(s) (100 mL/Hr) IV Continuous <Continuous>  dextrose 50% Injectable 25 Gram(s) IV Push once  dextrose 50% Injectable 12.5 Gram(s) IV Push once  dextrose 50% Injectable 25 Gram(s) IV Push once  finasteride 5 milliGRAM(s) Oral daily  furosemide    Tablet 40 milliGRAM(s) Oral daily  gabapentin 300 milliGRAM(s) Oral two times a day  gabapentin 600 milliGRAM(s) Oral at bedtime  glucagon  Injectable 1 milliGRAM(s) IntraMuscular once  heparin   Injectable 5000 Unit(s) SubCutaneous every 12 hours  insulin glargine Injectable (LANTUS) 10 Unit(s) SubCutaneous at bedtime  insulin lispro (ADMELOG) corrective regimen sliding scale   SubCutaneous at bedtime  insulin lispro (ADMELOG) corrective regimen sliding scale   SubCutaneous three times a day before meals  insulin lispro Injectable (ADMELOG) 4 Unit(s) SubCutaneous once  lactobacillus acidophilus 1 Tablet(s) Oral three times a day with meals  lidocaine   4% Patch 1 Patch Transdermal daily  lidocaine   4% Patch 1 Patch Transdermal daily  metoprolol tartrate 25 milliGRAM(s) Oral daily  multivitamin 1 Tablet(s) Oral daily  pantoprazole  Injectable 40 milliGRAM(s) IV Push two times a day  potassium chloride    Tablet ER 20 milliEquivalent(s) Oral daily  simvastatin 10 milliGRAM(s) Oral at bedtime  spironolactone 100 milliGRAM(s) Oral daily  sucralfate suspension 1 Gram(s) Oral four times a day  tamsulosin 0.4 milliGRAM(s) Oral at bedtime    MEDICATIONS  (PRN):  dextrose Oral Gel 15 Gram(s) Oral once PRN Blood Glucose LESS THAN 70 milliGRAM(s)/deciliter  HYDROmorphone   Tablet 6 milliGRAM(s) Oral every 6 hours PRN Moderate Pain (4 - 6)  HYDROmorphone  Injectable 1 milliGRAM(s) IV Push every 4 hours PRN Severe Pain (7 - 10)  ondansetron   Disintegrating Tablet 4 milliGRAM(s) Oral every 6 hours PRN Nausea  polyethylene glycol 3350 17 Gram(s) Oral at bedtime PRN Constipation  senna 2 Tablet(s) Oral at bedtime PRN Constipation      VITALS:  T(F): 98 (06-03-23 @ 23:04), Max: 98 (06-03-23 @ 23:04)  HR: 74 (06-03-23 @ 23:04) (70 - 74)  BP: 111/73 (06-03-23 @ 23:04) (111/73 - 121/65)  RR: 16 (06-03-23 @ 23:04) (16 - 18)  SpO2: 97% (06-03-23 @ 23:04) (96% - 97%)  Wt(kg): --    I&O's Summary    03 Jun 2023 07:01  -  04 Jun 2023 07:00  --------------------------------------------------------  IN: 0 mL / OUT: 200 mL / NET: -200 mL        CAPILLARY BLOOD GLUCOSE      POCT Blood Glucose.: 370 mg/dL (04 Jun 2023 12:14)  POCT Blood Glucose.: 200 mg/dL (04 Jun 2023 07:48)  POCT Blood Glucose.: 250 mg/dL (03 Jun 2023 21:38)  POCT Blood Glucose.: 325 mg/dL (03 Jun 2023 16:47)      PHYSICAL EXAM:  Gen: NAD  HEENT:  pupils equal and reactive, EOMI, no oropharyngeal lesions, erythema, exudates, oral thrush  NECK:   supple, no carotid bruits, no palpable lymph nodes, no thyromegaly  CV:  +S1, +S2, regular, no murmurs or rubs  RESP:   lungs clear to auscultation bilaterally, no wheezing, rales, rhonchi, good air entry bilaterally  BREAST:  not examined  GI:  abdomen soft, non-tender, non-distended, normal BS, no bruits, no abdominal masses, no palpable masses  RECTAL:  not examined  :  not examined  MSK:   normal muscle tone, no atrophy, no rigidity, no contractions  EXT: amputation  VASCULAR:  pulses equal and symmetric in the upper and lower extremities  NEURO:  AAOX3, no focal neurological deficits, follows all commands, able to move extremities spontaneously  SKIN:  no ulcers, lesions or rashes    LABS:                            8.7    9.60  )-----------( 307      ( 04 Jun 2023 08:03 )             29.4     06-04    138  |  108  |  31<H>  ----------------------------<  181<H>  4.2   |  24  |  0.98    Ca    8.7      04 Jun 2023 08:03    TPro  6.8  /  Alb  2.6<L>  /  TBili  0.4  /  DBili  x   /  AST  24  /  ALT  31  /  AlkPhos  256<H>  06-04        LIVER FUNCTIONS - ( 04 Jun 2023 08:03 )  Alb: 2.6 g/dL / Pro: 6.8 gm/dL / ALK PHOS: 256 U/L / ALT: 31 U/L / AST: 24 U/L / GGT: x           PT/INR - ( 04 Jun 2023 08:03 )   PT: 14.0 sec;   INR: 1.20 ratio      CULTURES:  Micro:  Pleural fluid culture 5/30: Gram's stain negative, culture in-process  Peritoneal fluid culture 5/30: Gram's stain negative, culture in-process  Urine culture 5/28: Negative  Blood culture x 2 5/28: Negative  COVID19 PCR 5/28: Negative    Imaging:  CT A/P w/ PO/IV Cont 5/28: Moderate right and small left pleural effusions with associated bilateral basilar consolidation/atelectasis. Geographic groundglass opacities and interlobular septal thickening, which may represent pulmonary edema. Cardiomegaly. Mitral valve prosthesis. Prior CABG. Diffusely heterogeneous appearance of the liver, suggestive of passive hepatic congestion. Nodular surface contour of the inferior right hepatic lobe, suggestive of cirrhosis. Liver is enlarged and measures 20.5 cm in length. Normal bile ducts. Cholecystectomy. Normal spleen. Atrophic pancreas. No pancreatic ductal dilatation. No significant peripancreatic inflammation. Normal adrenals. No renal stones or hydronephrosis. Stable indeterminate 13 mm exophytic left renal lesion. Diffuse bladder wall thickening. Prostate within normal limits. No bowel obstruction. Colonic diverticulosis without evidence of acute diverticulitis. Normal appendix. Small volume abdominopelvic ascites. Atherosclerotic changes. Mildly enlarged retroperitoneal lymph nodes measuring up to 1.2 cm short axis at the left para-aortic region. Mildly enlarged aortocaval nodes measuring up to 1.4 cm short axis. Diffuse body wall edema, worst along the right anterior lateral abdominal wall. Small fat-containing left inguinal hernia. Degenerative bone changes.    MRCP 5/30/23: Limited exam due to motion. Stomach is distended with food/debris. No evidence of bowel obstruction. Consider upper GI for further evaluation if clinically warranted. Trace upper abdominal ascites. Trace bilateral pleural effusions with bibasilar consolidation versus atelectasis. Correlate with recently performed chest CT. There is a 1 cm exophytic lesion off the upper pole the left kidney which cannot be definitely characterized due to motion. Correlate with nonemergent renal ultrasound. Multiple additional findings as above.    CXR 5/31/23: Placement of pigtail chest tube right base with near complete drainage of right pleural effusion. Mild atelectasis right base Mild pulmonary venous congestion.    Cardiac Testing:  EKG 5/29: NSR, rate WNL, ST/T changes.    EKG 5/28: Sinus tachycardia, rate 110. RV conduction delay. ST/T changes.    Prior cardiac testing  Echo 5/31/23: Mild concentric left ventricular hypertrophy is present. Septal flattening is seen; this finding is consistent with right heart pressure / volume overload. Estimated left ventricular ejection fraction is 60-65 %. Normal appearing left atrium. The right atrium appears mildly dilated. The right ventricle is mildly to moderately dilated with decreased contractility. Definity was used to better visualize the endocardial border. A prominent moderator band and prominent trabeculations are noted. No thrombus is visualized. Mild aortic sclerosis is present with normal valvular opening. Severe mitral annular calcification is present. There is calcification of mitral valve leaflets. The leaflet opening is restricted. Probable moderate to severe mitral stenosis Moderate (2+) tricuspid valve regurgitation is present. Severe pulmonary hypertension. No evidence of pericardial effusion. The IVC is dilated with decreased respiratory variation.    TTE 12/2022:  Endocardium is not well visualized, however, overall left ventricular systolic function appears normal. Mild concentric left ventricular hypertrophy. Septal flattening is seen; this finding is consistent with right heart pressure / volume overload. Estimated left ventricular ejection fraction is 55-60%. The right ventricle exhibits mild dilation, mild diffuse hypokinesis, and mild depression of contractility. Severe mitral stenosis. Mild mitral regurgitation. Mild to moderate tricuspid valve regurgitation. Severe pulmonary hypertension.    EGD 6/2/23: examined eso normal, no varices, large amount of food residue in gastric body, procedure aborted    Telemetry, personally reviewed:  5/31/23: sinus, d/c tele

## 2023-06-05 NOTE — PROGRESS NOTE ADULT - ASSESSMENT
63 year old man with type 2 DM, gastroparesis, HTN, HLD, CAD, hx of CABG, MV repair, peripheral vascular disease, hx of L BKA, R toe amputation, hepatic cirrhosis, chronic anemia, presented for further evaluation of epigastric abdominal discomfort, different than typical gastroparesis symptoms. Had associated nausea. No hematemesis, hematochezia or melena. On ROS he also reported dyspnea on exertion. No chest pain or tightness. No diaphoresis. In the ED, patient was found to have mild leukocytosis, stable Hgb, negative troponin, elevated BNP, CT findings of mod R, small L pleural effusion, ground glass opacities, interlobular septal thickening, hepatic cirrhosis, small ascites. Patient was given analgesics, antiemetics, ceftriaxone and furosemide and admitted to Medicine.     #Epigastric abdominal discomfort with SIRS, unable to rule out sepsis, present upon admission  -Hx of gastroparesis but feels different than his usual gastroparesis sx. SIRS in ED (, WBC 12.3), imaging w distended stomach w food and debris. Per GI convo w pt, has not had good response to motility agents in the past including reglan and erythromycin  -ascites fluid tapped, cell count not c/w SBP  -empiric carafate, PPI BID  -EGD 6/2 showed no varices however otherwise aborted as stomach w food  -EGD 6/5 normal  -extensive w/u for source of pain neg, likely related to gastroparesis    #Acute respiratory failure with hypoxia   -Multifactorial due to pleural effusion, possible pneumonia, possible CHF.  -now on RA    #Airspace changes on CT, cannot r/o pneumonia, cannot r/o Gram-negative organism, present upon admission  -Etiology unclear. Dedicated CT chest done. Pleural effusions, right greater than left, with septal thickening and ground glass opacities. Right middle and lower lobe opacities likely reflect atelectasis. Small volume mediastinal adenopathy, possibly reactive.  -CTX and Azithro completed    #Moderate R and small L pleural effusions  -Etiology unclear. Cannot r/o parapneumonic effusion, unable to r/o CHF with exacerbation, also could be related to his hepatic cirrhosis  -Received thoracentesis 5/30, with chest tube placement, 700cc drained initially  -chest tube removed 6/2  -lasix 40po qd, aldactone added  -f/u thoracic recs    #Valvular disease- prior MV repair, now severe stenosis and mod to severe MR, pulm HTN, R sided CHF signs  -appreciate cards and structural input  -will need ALEX for further planning purposes, as outpt  -outpt f/u w Dr. Keshav Healy, cardiac surgery    #CAD, hx of CABG  -No angina. Troponin negative. EKG NSR, rate WNL, nonspecific ST T changes.   - Continue aspirin, statin, metoprolol    #Microscopic hematuria  -No urinary complaints  -f/u as outpt    #Hepatic cirrhosis- 2/2 EtOH and R sided CHF  -No encephalopathy. No known varices. Small ascites. Had paracentesis 5/30, clear yellow fluid, SAAG 0.8  -lasix 40mg po qd, aldactone 100mg qd started  -EGD showed nov varices  -will need outpt f/u for cirrhosis, Dr. Anderson    #Anemia  -May be due to chronic disease, cirrhosis    #IDDM  -A1c 6.7  - Continue insulin (reduce lantus while on CLD over weekend)    #BPH  - Continue Flomax    #Severe protein calorie malnutrition  -Appreciate dietician input  - Trend weight  - Added thiamine and MVI daily      #DVT ppx- SQH  #Code status: Full  #Dispo: s/p EGD, stable for return to [facility], for outpt f/u with cardiac surgery, Keshav Healy

## 2023-06-05 NOTE — PROGRESS NOTE ADULT - SUBJECTIVE AND OBJECTIVE BOX
HOSPITALIST ATTENDING PROGRESS NOTE    Chart and meds reviewed.  Patient seen and examined.    CC:  krunal pain    Subjective: Feeling  ok after EGD, no source of pain found. Agreeable to d/c planning.     All other systems reviewed and found to be negative with the exception of what has been described above.    MEDICATIONS  (STANDING):  amLODIPine   Tablet 10 milliGRAM(s) Oral daily  aspirin enteric coated 81 milliGRAM(s) Oral daily  budesonide 160 MICROgram(s)/formoterol 4.5 MICROgram(s) Inhaler 2 Puff(s) Inhalation two times a day  clopidogrel Tablet 75 milliGRAM(s) Oral daily  dextrose 5%. 1000 milliLiter(s) (50 mL/Hr) IV Continuous <Continuous>  dextrose 5%. 1000 milliLiter(s) (100 mL/Hr) IV Continuous <Continuous>  dextrose 50% Injectable 12.5 Gram(s) IV Push once  dextrose 50% Injectable 25 Gram(s) IV Push once  dextrose 50% Injectable 25 Gram(s) IV Push once  finasteride 5 milliGRAM(s) Oral daily  furosemide    Tablet 40 milliGRAM(s) Oral daily  gabapentin 300 milliGRAM(s) Oral two times a day  gabapentin 600 milliGRAM(s) Oral at bedtime  glucagon  Injectable 1 milliGRAM(s) IntraMuscular once  heparin   Injectable 5000 Unit(s) SubCutaneous every 12 hours  insulin glargine Injectable (LANTUS) 10 Unit(s) SubCutaneous at bedtime  insulin lispro (ADMELOG) corrective regimen sliding scale   SubCutaneous at bedtime  insulin lispro (ADMELOG) corrective regimen sliding scale   SubCutaneous three times a day before meals  lactobacillus acidophilus 1 Tablet(s) Oral three times a day with meals  lidocaine   4% Patch 1 Patch Transdermal daily  lidocaine   4% Patch 1 Patch Transdermal daily  metoprolol tartrate 25 milliGRAM(s) Oral daily  multivitamin 1 Tablet(s) Oral daily  pantoprazole  Injectable 40 milliGRAM(s) IV Push two times a day  potassium chloride    Tablet ER 20 milliEquivalent(s) Oral daily  simvastatin 10 milliGRAM(s) Oral at bedtime  spironolactone 100 milliGRAM(s) Oral daily  sucralfate suspension 1 Gram(s) Oral four times a day  tamsulosin 0.4 milliGRAM(s) Oral at bedtime  tiotropium 2.5 MICROgram(s) Inhaler 2 Puff(s) Inhalation daily    MEDICATIONS  (PRN):  dextrose Oral Gel 15 Gram(s) Oral once PRN Blood Glucose LESS THAN 70 milliGRAM(s)/deciliter  HYDROmorphone  Injectable 1 milliGRAM(s) IV Push every 4 hours PRN Severe Pain (7 - 10)  ondansetron   Disintegrating Tablet 4 milliGRAM(s) Oral every 6 hours PRN Nausea  polyethylene glycol 3350 17 Gram(s) Oral at bedtime PRN Constipation  senna 2 Tablet(s) Oral at bedtime PRN Constipation      VITALS:  T(F): 97.8 (06-05-23 @ 16:11), Max: 97.8 (06-05-23 @ 16:11)  HR: 74 (06-05-23 @ 16:11) (73 - 83)  BP: 128/65 (06-05-23 @ 16:11) (113/54 - 136/68)  RR: 18 (06-05-23 @ 16:11) (13 - 18)  SpO2: 98% (06-05-23 @ 16:11) (96% - 100%)  Wt(kg): --    I&O's Summary    04 Jun 2023 07:01  -  05 Jun 2023 07:00  --------------------------------------------------------  IN: 0 mL / OUT: 900 mL / NET: -900 mL    05 Jun 2023 07:01  -  05 Jun 2023 21:21  --------------------------------------------------------  IN: 100 mL / OUT: 0 mL / NET: 100 mL        CAPILLARY BLOOD GLUCOSE      POCT Blood Glucose.: 277 mg/dL (05 Jun 2023 21:14)  POCT Blood Glucose.: 313 mg/dL (05 Jun 2023 18:32)  POCT Blood Glucose.: 242 mg/dL (05 Jun 2023 16:21)  POCT Blood Glucose.: 149 mg/dL (05 Jun 2023 05:54)      PHYSICAL EXAM:  Gen: NAD  HEENT:  pupils equal and reactive, EOMI, no oropharyngeal lesions, erythema, exudates, oral thrush  NECK:   supple, no carotid bruits, no palpable lymph nodes, no thyromegaly  CV:  +S1, +S2, regular, no murmurs or rubs  RESP:   lungs clear to auscultation bilaterally, no wheezing, rales, rhonchi, good air entry bilaterally  BREAST:  not examined  GI:  abdomen soft, non-tender, non-distended, normal BS, no bruits, no abdominal masses, no palpable masses  RECTAL:  not examined  :  not examined  MSK:   normal muscle tone, no atrophy, no rigidity, no contractions  EXT: +amputation  VASCULAR:  pulses equal and symmetric in the upper and lower extremities  NEURO:  AAOX3, no focal neurological deficits, follows all commands, able to move extremities spontaneously  SKIN:  no ulcers, lesions or rashes    LABS:                            8.8    8.16  )-----------( 339      ( 05 Jun 2023 07:08 )             29.4     06-05    140  |  109<H>  |  24<H>  ----------------------------<  123<H>  4.3   |  25  |  0.89    Ca    9.1      05 Jun 2023 07:08    TPro  7.0  /  Alb  2.6<L>  /  TBili  0.5  /  DBili  x   /  AST  20  /  ALT  31  /  AlkPhos  267<H>  06-05        LIVER FUNCTIONS - ( 05 Jun 2023 07:08 )  Alb: 2.6 g/dL / Pro: 7.0 gm/dL / ALK PHOS: 267 U/L / ALT: 31 U/L / AST: 20 U/L / GGT: x           PT/INR - ( 05 Jun 2023 07:08 )   PT: 14.0 sec;   INR: 1.20 ratio      CULTURES:  Micro:  Pleural fluid culture 5/30: Gram's stain negative, culture in-process  Peritoneal fluid culture 5/30: Gram's stain negative, culture in-process  Urine culture 5/28: Negative  Blood culture x 2 5/28: Negative  COVID19 PCR 5/28: Negative    Imaging:  CT A/P w/ PO/IV Cont 5/28: Moderate right and small left pleural effusions with associated bilateral basilar consolidation/atelectasis. Geographic groundglass opacities and interlobular septal thickening, which may represent pulmonary edema. Cardiomegaly. Mitral valve prosthesis. Prior CABG. Diffusely heterogeneous appearance of the liver, suggestive of passive hepatic congestion. Nodular surface contour of the inferior right hepatic lobe, suggestive of cirrhosis. Liver is enlarged and measures 20.5 cm in length. Normal bile ducts. Cholecystectomy. Normal spleen. Atrophic pancreas. No pancreatic ductal dilatation. No significant peripancreatic inflammation. Normal adrenals. No renal stones or hydronephrosis. Stable indeterminate 13 mm exophytic left renal lesion. Diffuse bladder wall thickening. Prostate within normal limits. No bowel obstruction. Colonic diverticulosis without evidence of acute diverticulitis. Normal appendix. Small volume abdominopelvic ascites. Atherosclerotic changes. Mildly enlarged retroperitoneal lymph nodes measuring up to 1.2 cm short axis at the left para-aortic region. Mildly enlarged aortocaval nodes measuring up to 1.4 cm short axis. Diffuse body wall edema, worst along the right anterior lateral abdominal wall. Small fat-containing left inguinal hernia. Degenerative bone changes.    MRCP 5/30/23: Limited exam due to motion. Stomach is distended with food/debris. No evidence of bowel obstruction. Consider upper GI for further evaluation if clinically warranted. Trace upper abdominal ascites. Trace bilateral pleural effusions with bibasilar consolidation versus atelectasis. Correlate with recently performed chest CT. There is a 1 cm exophytic lesion off the upper pole the left kidney which cannot be definitely characterized due to motion. Correlate with nonemergent renal ultrasound. Multiple additional findings as above.    CXR 5/31/23: Placement of pigtail chest tube right base with near complete drainage of right pleural effusion. Mild atelectasis right base Mild pulmonary venous congestion.    Cardiac Testing:  EKG 5/29: NSR, rate WNL, ST/T changes.    EKG 5/28: Sinus tachycardia, rate 110. RV conduction delay. ST/T changes.    Prior cardiac testing  Echo 5/31/23: Mild concentric left ventricular hypertrophy is present. Septal flattening is seen; this finding is consistent with right heart pressure / volume overload. Estimated left ventricular ejection fraction is 60-65 %. Normal appearing left atrium. The right atrium appears mildly dilated. The right ventricle is mildly to moderately dilated with decreased contractility. Definity was used to better visualize the endocardial border. A prominent moderator band and prominent trabeculations are noted. No thrombus is visualized. Mild aortic sclerosis is present with normal valvular opening. Severe mitral annular calcification is present. There is calcification of mitral valve leaflets. The leaflet opening is restricted. Probable moderate to severe mitral stenosis Moderate (2+) tricuspid valve regurgitation is present. Severe pulmonary hypertension. No evidence of pericardial effusion. The IVC is dilated with decreased respiratory variation.    TTE 12/2022:  Endocardium is not well visualized, however, overall left ventricular systolic function appears normal. Mild concentric left ventricular hypertrophy. Septal flattening is seen; this finding is consistent with right heart pressure / volume overload. Estimated left ventricular ejection fraction is 55-60%. The right ventricle exhibits mild dilation, mild diffuse hypokinesis, and mild depression of contractility. Severe mitral stenosis. Mild mitral regurgitation. Mild to moderate tricuspid valve regurgitation. Severe pulmonary hypertension.    EGD 6/2/23: examined eso normal, no varices, large amount of food residue in gastric body, procedure aborted    EGD 6/5/23: unremarkable EGD, stomach bx'd, pain likely 2/2 gastroparesis  Telemetry, personally reviewed:  5/31/23: sinus, d/c tele

## 2023-06-05 NOTE — PROGRESS NOTE ADULT - NUTRITIONAL ASSESSMENT
This patient has been assessed with a concern for Malnutrition and has been determined to have a diagnosis/diagnoses of Severe protein-calorie malnutrition.    This patient is being managed with:   Diet Clear Liquid-  Entered: Jun 2 2023  9:11PM  
This patient has been assessed with a concern for Malnutrition and has been determined to have a diagnosis/diagnoses of Severe protein-calorie malnutrition.    This patient is being managed with:   Diet Consistent Carbohydrate w/Evening Snack-  Entered: Jun 2 2023 10:00AM  
This patient has been assessed with a concern for Malnutrition and has been determined to have a diagnosis/diagnoses of Severe protein-calorie malnutrition.    This patient is being managed with:   Diet Regular-  Consistent Carbohydrate {Evening Snack} (CSTCHOSN)  DASH/TLC {Sodium & Cholesterol Restricted} (DASH)  Entered: May 28 2023 11:04PM  
This patient has been assessed with a concern for Malnutrition and has been determined to have a diagnosis/diagnoses of Severe protein-calorie malnutrition.    This patient is being managed with:   Diet Consistent Carbohydrate w/Evening Snack-  Entered: Jun 5 2023  1:27PM  
This patient has been assessed with a concern for Malnutrition and has been determined to have a diagnosis/diagnoses of Severe protein-calorie malnutrition.    This patient is being managed with:   Diet NPO after Midnight-     NPO Start Date: 04-Jun-2023   NPO Start Time: 23:59  Entered: Jun 4 2023  9:48AM    Diet Clear Liquid-  Consistent Carbohydrate {Evening Snack} (CSTCHOSN)  Entered: Jun 4 2023  8:39AM    Diet NPO after Midnight-     NPO Start Date: 04-Jun-2023   NPO Start Time: 23:59  Entered: Jun 4 2023  8:18AM  
This patient has been assessed with a concern for Malnutrition and has been determined to have a diagnosis/diagnoses of Severe protein-calorie malnutrition.    This patient is being managed with:   Diet NPO after Midnight-     NPO Start Date: 04-Jun-2023   NPO Start Time: 23:59  Entered: Jun 4 2023  9:48AM    Diet Clear Liquid-  Consistent Carbohydrate {Evening Snack} (CSTCHOSN)  Entered: Jun 4 2023  8:39AM    Diet NPO after Midnight-     NPO Start Date: 04-Jun-2023   NPO Start Time: 23:59  Entered: Jun 4 2023  8:18AM  
This patient has been assessed with a concern for Malnutrition and has been determined to have a diagnosis/diagnoses of Severe protein-calorie malnutrition.    This patient is being managed with:   Diet Regular-  Consistent Carbohydrate {Evening Snack} (CSTCHOSN)  DASH/TLC {Sodium & Cholesterol Restricted} (DASH)  Entered: May 28 2023 11:04PM  
This patient has been assessed with a concern for Malnutrition and has been determined to have a diagnosis/diagnoses of Severe protein-calorie malnutrition.    This patient is being managed with:   Diet NPO after Midnight-     NPO Start Date: 01-Jun-2023   NPO Start Time: 23:59  Except Medications  Entered: Jun 1 2023  3:06PM    Diet NPO after Midnight-     NPO Start Date: 01-Jun-2023   NPO Start Time: 23:59  Except Medications  With Ice Chips/Sips of Water  Entered: Jun 1 2023 10:45AM    Diet Regular-  Consistent Carbohydrate {Evening Snack} (CSTCHOSN)  DASH/TLC {Sodium & Cholesterol Restricted} (DASH)  Entered: May 28 2023 11:04PM  
This patient has been assessed with a concern for Malnutrition and has been determined to have a diagnosis/diagnoses of Severe protein-calorie malnutrition.    This patient is being managed with:   Diet Regular-  Consistent Carbohydrate {Evening Snack} (CSTCHOSN)  DASH/TLC {Sodium & Cholesterol Restricted} (DASH)  Entered: May 28 2023 11:04PM  
This patient has been assessed with a concern for Malnutrition and has been determined to have a diagnosis/diagnoses of Severe protein-calorie malnutrition.    This patient is being managed with:   Diet Consistent Carbohydrate w/Evening Snack-  Entered: Jun 2 2023 10:00AM  
This patient has been assessed with a concern for Malnutrition and has been determined to have a diagnosis/diagnoses of Severe protein-calorie malnutrition.    This patient is being managed with:   Diet Regular-  Consistent Carbohydrate {Evening Snack} (CSTCHOSN)  DASH/TLC {Sodium & Cholesterol Restricted} (DASH)  Entered: May 28 2023 11:04PM

## 2023-06-05 NOTE — BRIEF OPERATIVE NOTE - OPERATION/FINDINGS
Unremarkable upper endoscopy.   Stomach biopsied.
No varices.   Stomach full of food. Procedure aborted.
yes

## 2023-06-05 NOTE — PROGRESS NOTE ADULT - SUBJECTIVE AND OBJECTIVE BOX
Subjective:    Awake, alert. Feels better overall. No dyspnea/sputum    MEDICATIONS  (STANDING):  amLODIPine   Tablet 10 milliGRAM(s) Oral daily  aspirin enteric coated 81 milliGRAM(s) Oral daily  clopidogrel Tablet 75 milliGRAM(s) Oral daily  dextrose 5%. 1000 milliLiter(s) (100 mL/Hr) IV Continuous <Continuous>  dextrose 5%. 1000 milliLiter(s) (50 mL/Hr) IV Continuous <Continuous>  dextrose 50% Injectable 25 Gram(s) IV Push once  dextrose 50% Injectable 25 Gram(s) IV Push once  dextrose 50% Injectable 12.5 Gram(s) IV Push once  finasteride 5 milliGRAM(s) Oral daily  furosemide    Tablet 40 milliGRAM(s) Oral daily  gabapentin 300 milliGRAM(s) Oral two times a day  gabapentin 600 milliGRAM(s) Oral at bedtime  glucagon  Injectable 1 milliGRAM(s) IntraMuscular once  heparin   Injectable 5000 Unit(s) SubCutaneous every 12 hours  insulin glargine Injectable (LANTUS) 10 Unit(s) SubCutaneous at bedtime  insulin lispro (ADMELOG) corrective regimen sliding scale   SubCutaneous at bedtime  insulin lispro (ADMELOG) corrective regimen sliding scale   SubCutaneous three times a day before meals  lactobacillus acidophilus 1 Tablet(s) Oral three times a day with meals  lidocaine   4% Patch 1 Patch Transdermal daily  lidocaine   4% Patch 1 Patch Transdermal daily  metoprolol tartrate 25 milliGRAM(s) Oral daily  multivitamin 1 Tablet(s) Oral daily  pantoprazole  Injectable 40 milliGRAM(s) IV Push two times a day  potassium chloride    Tablet ER 20 milliEquivalent(s) Oral daily  simvastatin 10 milliGRAM(s) Oral at bedtime  spironolactone 100 milliGRAM(s) Oral daily  sucralfate suspension 1 Gram(s) Oral four times a day  tamsulosin 0.4 milliGRAM(s) Oral at bedtime    MEDICATIONS  (PRN):  dextrose Oral Gel 15 Gram(s) Oral once PRN Blood Glucose LESS THAN 70 milliGRAM(s)/deciliter  HYDROmorphone  Injectable 1 milliGRAM(s) IV Push every 4 hours PRN Severe Pain (7 - 10)  ondansetron   Disintegrating Tablet 4 milliGRAM(s) Oral every 6 hours PRN Nausea  polyethylene glycol 3350 17 Gram(s) Oral at bedtime PRN Constipation  senna 2 Tablet(s) Oral at bedtime PRN Constipation      Allergies    No Known Allergies    Intolerances        Vital Signs Last 24 Hrs  T(C): 36.7 (04 Jun 2023 20:41), Max: 36.7 (04 Jun 2023 20:41)  T(F): 98 (04 Jun 2023 20:41), Max: 98 (04 Jun 2023 20:41)  HR: 73 (04 Jun 2023 20:41) (69 - 73)  BP: 135/61 (04 Jun 2023 20:41) (124/62 - 135/61)  BP(mean): 77 (04 Jun 2023 16:12) (77 - 77)  RR: 18 (04 Jun 2023 20:41) (17 - 18)  SpO2: 96% (04 Jun 2023 20:41) (96% - 99%)    Parameters below as of 04 Jun 2023 20:41  Patient On (Oxygen Delivery Method): room air        PHYSICAL EXAMINATION:    NECK:  Supple. No lymphadenopathy. Jugular venous pressure not elevated.   HEART:   The cardiac impulse has a normal quality. Reg., Nl S1 and S2.  CHEST:  Chest is clear to auscultation, min. crackles at right base. Upper zones clear. Normal respiratory effort.  ABDOMEN:  Soft and nontender.   EXTREMITIES:  There is 2+ edema.       LABS:                        8.8    8.16  )-----------( 339      ( 05 Jun 2023 07:08 )             29.4     06-05    140  |  109<H>  |  24<H>  ----------------------------<  123<H>  4.3   |  25  |  0.89    Ca    9.1      05 Jun 2023 07:08    TPro  7.0  /  Alb  2.6<L>  /  TBili  0.5  /  DBili  x   /  AST  20  /  ALT  31  /  AlkPhos  267<H>  06-05    PT/INR - ( 05 Jun 2023 07:08 )   PT: 14.0 sec;   INR: 1.20 ratio               RADIOLOGY & ADDITIONAL TESTS:< from: Xray Chest 1 View- PORTABLE-Urgent (Xray Chest 1 View- PORTABLE-Urgent .) (06.01.23 @ 17:31) >  ACC: [id number] EXAM:  XR CHEST PORTABLE URGENT 1V   ORDERED BY: MAXX CHRISTIE     PROCEDURE DATE:  06/01/2023          INTERPRETATION:  Clinical history: 63-year-old male, shortness of breath.    Portable view of the chest is compared to 12 hours prior.    FINDINGS: Right chest tube removed, small right apical pneumothorax,   unchanged.    FINDINGS: Normal cardiac silhouette with no consolidation, left effusion,   pneumothorax or acute osseous finding.    Mild pulmonary venous congestion/perihilar interstitial edema, improved    Post sternotomy/MVR.    IMPRESSION:    Right chest tube removed, small apical pneumothorax, unchanged.    Mild pulmonary venous congestion/perihilar interstitial edema, improved      VIVIAN MONET DO      Assessment/Plan    - Repeat CXR [id number] apical PTX  - Echo with severe Pulmonary HTN most likely due to severe MS   - Repeat EGD today  - Needs extensive cardiac w/u as outpatient, including ALEX  - Resume aerosols-Symbicort and Spiriva    Problem/Recommendation - 1:  ·  Acute on chronic diastolic congestive heart failure.   Problem/Recommendation - 2:  ·  Pulmonary edema, acute.   Problem/Recommendation - 3:  ·  Pleural effusion, bilateral.   Problem/Recommendation - 4:  ·  Pulmonary hypertension.   Problem/Recommendation - 5:  ·  Shortness of breath.   Problem/Recommendation - 6:  ·  Atelectasis.   Problem/Recommendation - 7:  ·  Pneumonia.

## 2023-06-05 NOTE — BRIEF OPERATIVE NOTE - COMMENTS
Pain likely due to gastroparesis.
Clear liquid diet all weekend.   EGD monday.   NPO sunday at midnight.   Repeat covid swab sunday.

## 2023-06-06 NOTE — DISCHARGE NOTE PROVIDER - NSDCCPCAREPLAN_GEN_ALL_CORE_FT
PRINCIPAL DISCHARGE DIAGNOSIS  Diagnosis: H/O mitral valve repair  Assessment and Plan of Treatment: Now with mitral valve stenosis. Follow up with Dr. Healy.      SECONDARY DISCHARGE DIAGNOSES  Diagnosis: Cirrhosis  Assessment and Plan of Treatment: Take lasix 40mg daily and spirinolatone 100mg daily. Follow up with Dr. Anderson of liver medicine (hepatology).

## 2023-06-06 NOTE — PROGRESS NOTE ADULT - PROVIDER SPECIALTY LIST ADULT
Cardiology
Hospitalist
Podiatry
Pulmonology
Gastroenterology
Pulmonology
Pulmonology
Structural Heart
Thoracic Surgery
Gastroenterology
Hospitalist
Hospitalist
Infectious Disease
Thoracic Surgery
Hospitalist
Hospitalist
Infectious Disease
Thoracic Surgery
Cardiology
Hospitalist
Cardiology
Cardiology

## 2023-06-06 NOTE — PROGRESS NOTE ADULT - SUBJECTIVE AND OBJECTIVE BOX
Subjective:    Awake, alert. No dyspnea at rest. No sputum.    MEDICATIONS  (STANDING):  amLODIPine   Tablet 10 milliGRAM(s) Oral daily  aspirin enteric coated 81 milliGRAM(s) Oral daily  budesonide 160 MICROgram(s)/formoterol 4.5 MICROgram(s) Inhaler 2 Puff(s) Inhalation two times a day  clopidogrel Tablet 75 milliGRAM(s) Oral daily  dextrose 5%. 1000 milliLiter(s) (50 mL/Hr) IV Continuous <Continuous>  dextrose 5%. 1000 milliLiter(s) (100 mL/Hr) IV Continuous <Continuous>  dextrose 50% Injectable 25 Gram(s) IV Push once  dextrose 50% Injectable 12.5 Gram(s) IV Push once  dextrose 50% Injectable 25 Gram(s) IV Push once  finasteride 5 milliGRAM(s) Oral daily  furosemide    Tablet 40 milliGRAM(s) Oral daily  gabapentin 300 milliGRAM(s) Oral two times a day  gabapentin 600 milliGRAM(s) Oral at bedtime  glucagon  Injectable 1 milliGRAM(s) IntraMuscular once  heparin   Injectable 5000 Unit(s) SubCutaneous every 12 hours  insulin glargine Injectable (LANTUS) 10 Unit(s) SubCutaneous at bedtime  insulin lispro (ADMELOG) corrective regimen sliding scale   SubCutaneous at bedtime  insulin lispro (ADMELOG) corrective regimen sliding scale   SubCutaneous three times a day before meals  lactobacillus acidophilus 1 Tablet(s) Oral three times a day with meals  lidocaine   4% Patch 1 Patch Transdermal daily  lidocaine   4% Patch 1 Patch Transdermal daily  metoprolol tartrate 25 milliGRAM(s) Oral daily  multivitamin 1 Tablet(s) Oral daily  pantoprazole  Injectable 40 milliGRAM(s) IV Push two times a day  potassium chloride    Tablet ER 20 milliEquivalent(s) Oral daily  simvastatin 10 milliGRAM(s) Oral at bedtime  spironolactone 100 milliGRAM(s) Oral daily  sucralfate suspension 1 Gram(s) Oral four times a day  tamsulosin 0.4 milliGRAM(s) Oral at bedtime  tiotropium 2.5 MICROgram(s) Inhaler 2 Puff(s) Inhalation daily    MEDICATIONS  (PRN):  dextrose Oral Gel 15 Gram(s) Oral once PRN Blood Glucose LESS THAN 70 milliGRAM(s)/deciliter  HYDROmorphone  Injectable 1 milliGRAM(s) IV Push every 4 hours PRN Severe Pain (7 - 10)  ondansetron   Disintegrating Tablet 4 milliGRAM(s) Oral every 6 hours PRN Nausea  polyethylene glycol 3350 17 Gram(s) Oral at bedtime PRN Constipation  senna 2 Tablet(s) Oral at bedtime PRN Constipation      Allergies    No Known Allergies    Intolerances        Vital Signs Last 24 Hrs  T(C): 36.7 (05 Jun 2023 20:00), Max: 36.7 (05 Jun 2023 20:00)  T(F): 98.1 (05 Jun 2023 20:00), Max: 98.1 (05 Jun 2023 20:00)  HR: 76 (05 Jun 2023 20:00) (73 - 83)  BP: 132/62 (05 Jun 2023 20:00) (113/54 - 136/68)  BP(mean): 79 (05 Jun 2023 20:00) (79 - 79)  RR: 18 (05 Jun 2023 20:00) (13 - 18)  SpO2: 96% (05 Jun 2023 20:00) (96% - 100%)    Parameters below as of 05 Jun 2023 20:00  Patient On (Oxygen Delivery Method): room air        PHYSICAL EXAMINATION:    NECK:  Supple. No lymphadenopathy. Jugular venous pressure not elevated.   HEART:   The cardiac impulse has a normal quality. Reg., Nl S1 and S2.   CHEST:  Chest with sl. decreased BS at right base w/ few crackles. Clear anteriorly.  Normal respiratory effort.  ABDOMEN:  Soft and nontender.   EXTREMITIES:  There is no edema.       LABS:                        8.8    8.16  )-----------( 339      ( 05 Jun 2023 07:08 )             29.4     06-05    140  |  109<H>  |  24<H>  ----------------------------<  123<H>  4.3   |  25  |  0.89    Ca    9.1      05 Jun 2023 07:08    TPro  7.0  /  Alb  2.6<L>  /  TBili  0.5  /  DBili  x   /  AST  20  /  ALT  31  /  AlkPhos  267<H>  06-05    PT/INR - ( 05 Jun 2023 07:08 )   PT: 14.0 sec;   INR: 1.20 ratio               RADIOLOGY & ADDITIONAL TESTS:< from: Xray Chest 1 View- PORTABLE-Routine (Xray Chest 1 View- PORTABLE-Routine .) (06.05.23 @ 11:15) >    ACC: [id number] EXAM:  XR CHEST PORTABLE ROUTINE 1V   ORDERED BY: DAJUAN DUKES     PROCEDURE DATE:  06/05/2023          INTERPRETATION:  Chest one view    HISTORY: Follow-up pneumothorax    COMPARISON STUDY: 6/1/2023    Frontal expiratory view of the chest shows the heart to be similar in   size. The lungs show small right effusion with trace right apical   pneumothorax and there is no evidence of left pleural effusion.    IMPRESSION:  Trace right pneumothorax.        ANN TEMPLE MD;      Assessment/Plan    - Repeat CXR from 6/5-noted above. Small right pleural effusion  - Echo with severe Pulmonary HTN most likely due to severe MS   - Repeat EGD unremarkable  - Needs extensive cardiac w/u as outpatient, including ALEX  - Resume aerosols-Symbicort and Spiriva  - I will sign off at this time. Please re-consult if further assistance needed.    Problem/Recommendation - 1:  ·  Acute on chronic diastolic congestive heart failure.   Problem/Recommendation - 2:  ·  Pulmonary edema, acute.   Problem/Recommendation - 3:  ·  Pleural effusion, bilateral.   Problem/Recommendation - 4:  ·  Pulmonary hypertension.   Problem/Recommendation - 5:  ·  Shortness of breath.   Problem/Recommendation - 6:  ·  Atelectasis.   Problem/Recommendation - 7:  ·  Pneumonia.

## 2023-06-06 NOTE — DISCHARGE NOTE NURSING/CASE MANAGEMENT/SOCIAL WORK - PATIENT PORTAL LINK FT
You can access the FollowMyHealth Patient Portal offered by [facility] by registering at the following website: [email or web address]. By joining [facility]’s FollowMyHealth portal, you will also be able to view your health information using other applications (apps) compatible with our system.

## 2023-06-06 NOTE — DISCHARGE NOTE PROVIDER - CARE PROVIDER_API CALL
Nahid Fierro)  Critical Care Medicine; HospicePalliative Medicine; Internal Medicine; Pulmonary Disease  [address] [place] [address]  Phone: [phone]  Fax: [phone]  Follow Up Time: 1 week    Keshav Healy  Thoracic and Cardiac Surgery  [address] [place] [id number]  Phone: [phone]  Fax: [phone]  Follow Up Time: 1 week    Ramila Anderson  Transplant Hepatology  [address] [facility], [address]  [place] [address]  Phone: [phone]  Fax: [phone]  Follow Up Time: 2 weeks

## 2023-06-06 NOTE — DISCHARGE NOTE NURSING/CASE MANAGEMENT/SOCIAL WORK - NSDCPEFALRISK_GEN_ALL_CORE
For information on Fall & Injury Prevention, visit: [email or web address] OR  [email or web address] OR  https://www.cdc.gov/steadi/patient.html

## 2023-06-06 NOTE — DISCHARGE NOTE NURSING/CASE MANAGEMENT/SOCIAL WORK - NSDCPEEMAIL_GEN_ALL_CORE
[facility] for Tobacco Control email [email or web address]

## 2023-06-06 NOTE — DISCHARGE NOTE PROVIDER - HOSPITAL COURSE
CC: abdnl pain  HPI and Hospital Course: 63 year old man with type 2 DM, gastroparesis, HTN, HLD, CAD, hx of CABG, MV repair, peripheral vascular disease, hx of L BKA, R toe amputation, hepatic cirrhosis, chronic anemia, presented for further evaluation of epigastric abdominal discomfort, different than typical gastroparesis symptoms. Had associated nausea. No hematemesis, hematochezia or melena. On ROS he also reported dyspnea on exertion. No chest pain or tightness. No diaphoresis. In the ED, patient was found to have mild leukocytosis, stable Hgb, negative troponin, elevated BNP, CT findings of mod R, small L pleural effusion, ground glass opacities, interlobular septal thickening, hepatic cirrhosis, small ascites. Patient was given analgesics, antiemetics, ceftriaxone and furosemide and admitted to Medicine.     #Epigastric abdominal discomfort with SIRS, unable to rule out sepsis, present upon admission  -Hx of gastroparesis but feels different than his usual gastroparesis sx. SIRS in ED (, WBC 12.3), imaging w distended stomach w food and debris. Per GI convo w pt, has not had good response to motility agents in the past including reglan and erythromycin  -ascites fluid tapped, cell count not c/w SBP  -empiric carafate, PPI BID  -EGD 6/2 showed no varices however otherwise aborted as stomach w food  -EGD 6/5 normal  -extensive w/u for source of pain neg, likely related to gastroparesis    #Acute respiratory failure with hypoxia   -Multifactorial due to pleural effusion, possible pneumonia, possible CHF.  -now on RA    #Airspace changes on CT, cannot r/o pneumonia, cannot r/o Gram-negative organism, present upon admission  -Etiology unclear. Dedicated CT chest done. Pleural effusions, right greater than left, with septal thickening and ground glass opacities. Right middle and lower lobe opacities likely reflect atelectasis. Small volume mediastinal adenopathy, possibly reactive.  -CTX and Azithro completed    #Moderate R and small L pleural effusions  -Etiology unclear. Cannot r/o parapneumonic effusion, unable to r/o CHF with exacerbation, also could be related to his hepatic cirrhosis  -Received thoracentesis 5/30, with chest tube placement, 700cc drained initially  -chest tube removed 6/2  -lasix 40po qd, aldactone added  -f/u thoracic recs    #Valvular disease- prior MV repair, now severe stenosis and mod to severe MR, pulm HTN, R sided CHF signs  -appreciate cards and structural input  -will need ALEX for further planning purposes, as outpt  -outpt f/u w Dr. Keshav Healy, cardiac surgery    #CAD, hx of CABG  -No angina. Troponin negative. EKG NSR, rate WNL, nonspecific ST T changes.   - Continue aspirin, statin, metoprolol    #Microscopic hematuria  -No urinary complaints  -f/u as outpt    #Hepatic cirrhosis- 2/2 EtOH and R sided CHF  -No encephalopathy. No known varices. Small ascites. Had paracentesis 5/30, clear yellow fluid, SAAG 0.8  -lasix 40mg po qd, aldactone 100mg qd started  -EGD showed nov varices  -will need outpt f/u for cirrhosis, Dr. Anderson    #Anemia  -May be due to chronic disease, cirrhosis    #IDDM  -A1c 6.7  - Continue insulin (reduce lantus while on CLD over weekend)    #BPH  - Continue Flomax    #Severe protein calorie malnutrition  -Appreciate dietician input  - Trend weight  - Added thiamine and MVI daily      #DVT ppx- SQH  #Code status: Full    For return to [facility]  I have spent 36 min on day of d/c coordinating care.

## 2023-06-06 NOTE — DISCHARGE NOTE PROVIDER - NSDCPNSUBOBJ_GEN_ALL_CORE
VITALS:  T(F): 97.7 (06-06-23 @ 08:51), Max: 98.1 (06-05-23 @ 20:00)  HR: 73 (06-06-23 @ 08:51) (73 - 83)  BP: 120/56 (06-06-23 @ 08:51) (120/56 - 136/68)  RR: 18 (06-06-23 @ 08:51) (13 - 18)  SpO2: 96% (06-06-23 @ 08:51) (96% - 100%)    PHYSICAL EXAM:  Gen: NAD  HEENT:  pupils equal and reactive, EOMI, no oropharyngeal lesions, erythema, exudates, oral thrush  NECK:   supple, no carotid bruits, no palpable lymph nodes, no thyromegaly  CV:  +S1, +S2, regular, no murmurs or rubs  RESP:   lungs clear to auscultation bilaterally, no wheezing, rales, rhonchi, good air entry bilaterally  BREAST:  not examined  GI:  abdomen soft, non-tender, non-distended, normal BS, no bruits, no abdominal masses, no palpable masses  RECTAL:  not examined  :  not examined  MSK:  +amputation  EXT:  no clubbing, no cyanosis, no edema, no calf pain, swelling or erythema  VASCULAR:  pulses equal and symmetric in the upper and lower extremities  NEURO:  AAOX3, no focal neurological deficits, follows all commands, able to move extremities spontaneously  SKIN:  no ulcers, lesions or rashes    Micro:  Pleural fluid culture 5/30: Gram's stain negative, culture in-process  Peritoneal fluid culture 5/30: Gram's stain negative, culture in-process  Urine culture 5/28: Negative  Blood culture x 2 5/28: Negative  COVID19 PCR 5/28: Negative    Imaging:  CT A/P w/ PO/IV Cont 5/28: Moderate right and small left pleural effusions with associated bilateral basilar consolidation/atelectasis. Geographic groundglass opacities and interlobular septal thickening, which may represent pulmonary edema. Cardiomegaly. Mitral valve prosthesis. Prior CABG. Diffusely heterogeneous appearance of the liver, suggestive of passive hepatic congestion. Nodular surface contour of the inferior right hepatic lobe, suggestive of cirrhosis. Liver is enlarged and measures 20.5 cm in length. Normal bile ducts. Cholecystectomy. Normal spleen. Atrophic pancreas. No pancreatic ductal dilatation. No significant peripancreatic inflammation. Normal adrenals. No renal stones or hydronephrosis. Stable indeterminate 13 mm exophytic left renal lesion. Diffuse bladder wall thickening. Prostate within normal limits. No bowel obstruction. Colonic diverticulosis without evidence of acute diverticulitis. Normal appendix. Small volume abdominopelvic ascites. Atherosclerotic changes. Mildly enlarged retroperitoneal lymph nodes measuring up to 1.2 cm short axis at the left para-aortic region. Mildly enlarged aortocaval nodes measuring up to 1.4 cm short axis. Diffuse body wall edema, worst along the right anterior lateral abdominal wall. Small fat-containing left inguinal hernia. Degenerative bone changes.    MRCP 5/30/23: Limited exam due to motion. Stomach is distended with food/debris. No evidence of bowel obstruction. Consider upper GI for further evaluation if clinically warranted. Trace upper abdominal ascites. Trace bilateral pleural effusions with bibasilar consolidation versus atelectasis. Correlate with recently performed chest CT. There is a 1 cm exophytic lesion off the upper pole the left kidney which cannot be definitely characterized due to motion. Correlate with nonemergent renal ultrasound. Multiple additional findings as above.    CXR 5/31/23: Placement of pigtail chest tube right base with near complete drainage of right pleural effusion. Mild atelectasis right base Mild pulmonary venous congestion.    Cardiac Testing:  EKG 5/29: NSR, rate WNL, ST/T changes.    EKG 5/28: Sinus tachycardia, rate 110. RV conduction delay. ST/T changes.    Prior cardiac testing  Echo 5/31/23: Mild concentric left ventricular hypertrophy is present. Septal flattening is seen; this finding is consistent with right heart pressure / volume overload. Estimated left ventricular ejection fraction is 60-65 %. Normal appearing left atrium. The right atrium appears mildly dilated. The right ventricle is mildly to moderately dilated with decreased contractility. Definity was used to better visualize the endocardial border. A prominent moderator band and prominent trabeculations are noted. No thrombus is visualized. Mild aortic sclerosis is present with normal valvular opening. Severe mitral annular calcification is present. There is calcification of mitral valve leaflets. The leaflet opening is restricted. Probable moderate to severe mitral stenosis Moderate (2+) tricuspid valve regurgitation is present. Severe pulmonary hypertension. No evidence of pericardial effusion. The IVC is dilated with decreased respiratory variation.    TTE 12/2022:  Endocardium is not well visualized, however, overall left ventricular systolic function appears normal. Mild concentric left ventricular hypertrophy. Septal flattening is seen; this finding is consistent with right heart pressure / volume overload. Estimated left ventricular ejection fraction is 55-60%. The right ventricle exhibits mild dilation, mild diffuse hypokinesis, and mild depression of contractility. Severe mitral stenosis. Mild mitral regurgitation. Mild to moderate tricuspid valve regurgitation. Severe pulmonary hypertension.    EGD 6/2/23: examined eso normal, no varices, large amount of food residue in gastric body, procedure aborted    EGD 6/5/23: unremarkable EGD, stomach bx'd, pain likely 2/2 gastroparesis  Telemetry, personally reviewed:  5/31/23: sinus, d/c tele

## 2023-06-06 NOTE — DISCHARGE NOTE PROVIDER - NSDCMRMEDTOKEN_GEN_ALL_CORE_FT
amLODIPine 10 mg oral tablet: 1 tab(s) orally once a day  Aspirin Enteric Coated 81 mg oral delayed release tablet: 1 tab(s) orally once a day  budesonide-formoterol 160 mcg-4.5 mcg/inh inhalation aerosol: 2 puff(s) inhaled 2 times a day  clopidogrel 75 mg oral tablet: 1 tab(s) orally once a day  docusate sodium 100 mg oral capsule: 2 cap(s) orally every 12 hours  finasteride 5 mg oral tablet: 1 tab(s) orally once a day (at bedtime)  furosemide 40 mg oral tablet: 1 tab(s) orally once a day  gabapentin 300 mg oral capsule: 1 cap(s) orally 2 times a day  gabapentin 600 mg oral tablet: 1 tab(s) orally once a day (at bedtime)  HYDROmorphone 4 mg oral tablet: 1.5 tab(s) orally every 6 hours as needed for  insulin glargine 100 units/mL subcutaneous solution: 10 unit(s) subcutaneous once a day (at bedtime)  insulin lispro 100 units/mL subcutaneous solution: subcutaneous 3 times a day (before meals) per sliding scale:  151-200 = 2 units  201-250 = 4 units  251-300 = 6 units  301-350 = 8 units  351-400 = 10 units  &gt;400 = call MD       lactobacillus acidophilus oral capsule: 1 cap(s) orally 3 times a day  lidocaine 5% topical film: Apply topically to affected area once a day  metFORMIN 500 mg oral tablet: 2 tab(s) orally 2 times a day  Metoprolol Tartrate 25 mg oral tablet: 1 tab(s) orally once a day  multivitamin: 1 tab(s) orally once a day  ondansetron 4 mg oral tablet: 1 tab(s) orally every 6 hours as needed for  nausea  pantoprazole 40 mg oral delayed release tablet: 1 tab(s) orally once a day  polyethylene glycol 3350 oral powder for reconstitution: 17 gram(s) orally once a day (at bedtime)  potassium chloride 20 mEq oral tablet, extended release: 1 tab(s) orally once a day  senna (sennosides) 8.6 mg oral tablet: 2 tab(s) orally once a day (at bedtime)  simvastatin 10 mg oral tablet: 1 tab(s) orally once a day (at bedtime)  spironolactone 25 mg oral tablet: 4 tab(s) orally once a day  sucralfate 1 g/10 mL oral suspension: 10 milliliter(s) orally 4 times a day  tamsulosin 0.4 mg oral capsule: 1 cap(s) orally once a day (at bedtime)  tiotropium 2.5 mcg/inh inhalation aerosol: 2 puff(s) inhaled once a day

## 2023-06-06 NOTE — DISCHARGE NOTE NURSING/CASE MANAGEMENT/SOCIAL WORK - NSDCPEWEB_GEN_ALL_CORE
[facility] for Tobacco Control website --- [email or web address] --- [email or web address]

## 2023-06-06 NOTE — DISCHARGE NOTE PROVIDER - DETAILS OF MALNUTRITION DIAGNOSIS/DIAGNOSES
This patient has been assessed with a concern for Malnutrition and was treated during this hospitalization for the following Nutrition diagnosis/diagnoses:     -  05/30/2023: Severe protein-calorie malnutrition

## 2023-06-06 NOTE — DISCHARGE NOTE PROVIDER - NSDCCAREPROVSEEN_GEN_ALL_CORE_FT
Simin Mar (ID)  Kelin Gonsales (hospital medicine)  Beto Garrido (structural heart disease)  Manjinder Hubbard (cardiology)  Bayron Lin (GI)  Kaz Soni (pulmonology)  Geraldo Amado (thoracic surgery)

## 2023-06-06 NOTE — DISCHARGE NOTE PROVIDER - PROVIDER TOKENS
PROVIDER:[TOKEN:[[id number]],FOLLOWUP:[1 week]],PROVIDER:[TOKEN:[[id number]],FOLLOWUP:[1 week]],PROVIDER:[TOKEN:[[id number]],FOLLOWUP:[2 weeks]]

## 2023-06-13 PROBLEM — F17.200 CURRENT EVERY DAY SMOKER: Status: ACTIVE | Noted: 2023-01-01

## 2023-06-13 NOTE — ADDENDUM
[FreeTextEntry1] : I, Yuli Funes NP, acted as scribe for CECELIA Manrique for this patient encounter.

## 2023-06-13 NOTE — PHYSICAL EXAM
[Scleral Icterus] : No Scleral Icterus [Spider Angioma] : No spider angioma(s) were observed [Abdominal  Ascites] : no ascites [Splenomegaly] : no splenomegaly [Non-Tender] : non-tender [Asterixis] : no asterixis observed [Jaundice] : No jaundice [Palmar Erythema] : no Palmar Erythema [Depression] : no depression [General Appearance - Alert] : alert [General Appearance - In No Acute Distress] : in no acute distress [Sclera] : the sclera and conjunctiva were normal [Outer Ear] : the ears and nose were normal in appearance [Oropharynx] : the oropharynx was normal [Neck Appearance] : the appearance of the neck was normal [Bowel Sounds] : normal bowel sounds [Abdomen Soft] : soft [Abdomen Tenderness] : non-tender [Abdomen Mass (___ Cm)] : no abdominal mass palpated [FreeTextEntry1] : wheelchair bound, L BKA, R foot toe amputatioin [Skin Color & Pigmentation] : normal skin color and pigmentation [] : no rash [No Focal Deficits] : no focal deficits [Oriented To Time, Place, And Person] : oriented to person, place, and time [Impaired Insight] : insight and judgment were intact [Affect] : the affect was normal

## 2023-06-13 NOTE — HISTORY OF PRESENT ILLNESS
[de-identified] : NIKHIL STANTON is a 63 year old male with a PMH significant for Cirrhosis, DM type 2, gastroparesis, CHF, HTN, HLD, CAD, hx of CABG (2016), MV repair, PVD, hx of L BKA, R toe amputation, chronic anemia.\par \par He presents today for evaluation of cirrhosis, establishment of care. Pt was first diagnosed w/cirrhosis during recent hospitalization in May 2023. Etiology secondary to EtOH and R sided heart failure. Last drink was 15 years ago. Pt currently resides at [facility] in [place], [place].\par \par He was recently hospitalized at [facility] 5/28-6/6/23 for epigastric pain likely secondary to gastroparesis. He had a CT C/A/P w/IVC (5/28/23) which was suggestive or cirrhosis and passive hepatic congestion along w/bilateral pleural effusions. During hospitalization, he had an EGD that was negative, no EVs. LVP on 5/30. He was discharged on Furosemide 40 mg daily and Spironolactone 100 mg daily.\par \par Since discharge, symptoms have improved. He is pending an appt w/cardiothoracic for valve repair. Denies abdominal pain or distension, jaundice, hematemesis, hematochezia, dark urine, confusion, unintentional weight loss or gain. He has 2 BMs/week. He is not currently on lactulose.

## 2023-06-13 NOTE — ASSESSMENT
[FreeTextEntry1] : NIKHIL STANTON is a 63 year old male with a PMH significant for Cirrhosis, DM type 2, gastroparesis, CHF, HTN, HLD, CAD, hx of CABG, MV repair, PVD, hx of L BKA, R toe amputation, chronic anemia and AUD.\par \par Cirrhosis\par -Etiology - likely CHF vs EMILIE/ORTEGA (last drink 15 years ago)\par -Disease progression of cirrhosis discussed, including but not limited to hepatocellular carcinoma, varices with or without bleeding, hepatic encephalopathy, ascites, liver failure and death.\par -Labs ordered to rule out competing etiologies of liver disease. \par \par HCC Screening\par -I have explained the need for imaging every 6 months to assess for HCC.\par -CT A/P w/IVC (5/28/23) - Suggestion of cirrhosis. Passive hepatic congestion. AFP ordered.\par \par Ascites\par -Serum Crt 0.89 at discharge which is pt's baseline. Continue Furosemide 40 mg daily and Spironolactone 100 mg daily.\par -Contact provider for increased abdominal distension\par \par Variceal Screening\par -Last EGD (6/5/23) no varices\par -if pt experiences hematemesis, advised to go to ER immediately\par \par Encephalopathy\par -notify provider if new onset confusion\par -titrate lactulose to 1-2 bms/day\par \par Transplant\par -I have explained that disease can progress to the point of requiring an evaluation for liver transplantation. \par -MELDNa - 8\par \par Diet\par -High Protein Low Fat Low Sugar Low Salt (up to 2G Na/day) Diet\par -No prolonged fasting\par -Mediterranean Diet info provided\par \par Health Maintenance\par -Can take up to 2G Tylenol per day. NO NSAIDs as these can lead to diuretic resistance and precipitate renal dysfunction in patients with advanced liver disease\par -Continue to abstain from alcohol and all illicit drugs\par -Avoid use of herbal and dietary supplements due to potential hepatotoxicity\par -Avoid eating any unpasteurized dairy products; avoid eating any raw or undercooked eggs, fish, poultry, or meat; and avoid eating raw/steamed oysters or other shellfish to avoid risk of infection.\par \par Follow Up\par -Follow up in 3 months

## 2023-06-20 PROBLEM — Z95.1 S/P CABG (CORONARY ARTERY BYPASS GRAFT): Status: ACTIVE | Noted: 2022-12-29

## 2023-06-20 PROBLEM — I25.10 CAD (CORONARY ARTERY DISEASE): Status: ACTIVE | Noted: 2022-12-29

## 2023-06-20 PROBLEM — K74.60 CIRRHOSIS: Status: ACTIVE | Noted: 2023-01-01

## 2023-06-20 PROBLEM — I05.0 MITRAL STENOSIS: Status: ACTIVE | Noted: 2022-12-29

## 2023-06-20 NOTE — PHYSICAL EXAM
[Apical Impulse] : the apical impulse was normal [Heart Rate And Rhythm] : heart rate was normal and rhythm regular [Heart Sounds] : normal S1 and S2 [General Appearance - Alert] : alert [Neck Appearance] : the appearance of the neck was normal [Jugular Venous Distention Increased] : there was no jugular-venous distention [] : no respiratory distress [Respiration, Rhythm And Depth] : normal respiratory rhythm and effort [Exaggerated Use Of Accessory Muscles For Inspiration] : no accessory muscle use [Auscultation Breath Sounds / Voice Sounds] : lungs were clear to auscultation bilaterally [FreeTextEntry1] : 2/6 systolic murmur at axilla [Bowel Sounds] : normal bowel sounds [Abdomen Soft] : soft [Abdomen Tenderness] : non-tender [Involuntary Movements] : no involuntary movements were seen [No Focal Deficits] : no focal deficits [Oriented To Time, Place, And Person] : oriented to person, place, and time [Impaired Insight] : insight and judgment were intact [Affect] : the affect was normal [Mood] : the mood was normal

## 2023-06-20 NOTE — CONSULT LETTER
[Dear  ___] : Dear  [unfilled], [Courtesy Letter:] : I had the pleasure of seeing your patient, [unfilled], in my office today. [Please see my note below.] : Please see my note below. [Sincerely,] : Sincerely, [FreeTextEntry2] : Dr. Wilson [FreeTextEntry3] : Keshav Healy MD\par Attending Surgeon\par Cardiovascular & Thoracic Surgery

## 2023-06-20 NOTE — END OF VISIT
[FreeTextEntry3] : Written by Victoriano Mata NP, acting as a scribe for Dr. Healy\par “The documentation recorded by the scribe accurately reflects the service I personally performed and the decisions made by me.” Signature Keshav Healy MD.\par

## 2023-06-20 NOTE — ASSESSMENT
[FreeTextEntry1] : I reviewed the cardiac imaging, medical records and reports with patient and discussed the case.  NYHA II-III.\par \par \par Plan:\par \par - ALEX to assess mitral valve disease\par - Will get hepatology clearance for ALEX\par - Follow up with plan post ALEX\par - Call with any questions or concerns

## 2023-06-20 NOTE — REVIEW OF SYSTEMS
[As noted in HPI] : as noted in HPI [Chest Pain] : no chest pain [Palpitations] : no palpitations [Lower Ext Edema] : lower extremity edema [Shortness Of Breath] : shortness of breath [SOB on Exertion] : shortness of breath during exertion [As Noted in HPI] : as noted in HPI [Negative] : Heme/Lymph

## 2023-06-20 NOTE — HISTORY OF PRESENT ILLNESS
[FreeTextEntry1] : Mr Roberts is a 63 year old male with a history of CAD s/p CABG X 3 lima/radial and MV repair (28 CG Future 2016 - Brayden Cooper, op note in [employer]), cirrhosis, chronic anemia, HTN, DM, GERD, PAD s/p L BKA (February 2022) and R TMA (1 month ago) found to have severe mitral stenosis (mg 13) and pHTN (RVSP 82). \par \par He was dx with cirrhosis in May 2023. Etiology secondary to EtOH and R sided heart failure. Last drink was 15 years ago. Pt [facility] resides at [facility] in [place], [place]. He was recently hospitalized at [facility] 5/28-6/6/23 for epigastric pain likely secondary to gastroparesis. He had a CT C/A/P w/IVC (5/28/23) which was suggestive or cirrhosis and passive hepatic congestion along w/bilateral pleural effusions. During hospitalization he had PTC for right effusion, EGD that was negative, and TTE 5/30/23: EF 60-65%, LVEDd 4.43 IVS 1.1 PWd 1.13 LA normal, RA Mildly dilated, RV mild to moderately dilated with decreased contractility,  Severe MAC, moderate to severe MS, Moderate TR, RVSP 82, \par \par Seen by structural heart at [facility] for MS, also seen and referred by Dr. Wilson.  Plan was for ALEX and outpt follow up today.  He was DC home on Lasix/aldactone. \par \par He presents today and reports overall he feels ok.  He is mainly wheelchair bound.  Hsa LLE prosthetic and does walk with walker at rehab stating,  "I walk around the gym a few times".  He reports with this, he gets SOB.  Also reports SOB with wheeling himself in the wheelchair.  Feels he can wheel himself 2 city blocks before he gets SOB.  He feels his SOB has worsened over the past year. Also reports some swelling to right leg that has improved sp being on lasix following his hospitalization. He is followed currently by Dr. Wallace who is the rehab physician, has not seen cardiologist since DC.  Only saw Dr Anderson for hepatology last week. Denies CP, back pain, palpitations, dizziness, fever or chills.  Eating and drinking with +BM. Has a brother who lives in [place]. Has chronic foot pain managed on Dilaudid.

## 2023-06-23 NOTE — ED PROVIDER NOTE - NSFOLLOWUPINSTRUCTIONS_ED_ALL_ED_FT
See primary care doctor or doctor at [facility] within 1-2 days.   Continue all meds as prescribed.      Acute Abdominal Pain    WHAT YOU NEED TO KNOW:    The cause of your abdominal pain may not be found. If a cause is found, treatment will depend on what the cause is.     DISCHARGE INSTRUCTIONS:    Return to the emergency department if:     You vomit blood or cannot stop vomiting.      You have blood in your bowel movement or it looks like tar.       You have bleeding from your rectum.       Your abdomen is larger than usual, more painful, and hard.       You have severe pain in your abdomen.       You stop passing gas and having bowel movements.       You feel weak, dizzy, or faint.    Contact your healthcare provider if:     You have a fever.      You have new signs and symptoms.      Your symptoms do not get better with treatment.       You have questions or concerns about your condition or care.    Medicines may be given to decrease pain, treat an infection, and manage your symptoms. Take your medicine as directed. Call your healthcare provider if you think your medicine is not helping or if you have side effects. Tell him if you are allergic to any medicine. Keep a list of the medicines, vitamins, and herbs you take. Include the amounts, and when and why you take them. Bring the list or the pill bottles to follow-up visits. Carry your medicine list with you in case of an emergency.    Manage your symptoms:     Apply heat on your abdomen for 20 to 30 minutes every 2 hours for as many days as directed. Heat helps decrease pain and muscle spasms.       Manage your stress. Stress may cause abdominal pain. Your healthcare provider may recommend relaxation techniques and deep breathing exercises to help decrease your stress. Your healthcare provider may recommend you talk to someone about your stress or anxiety, such as a counselor or a trusted friend. Get plenty of sleep and exercise regularly.       Limit or do not drink alcohol. Alcohol can make your abdominal pain worse. Ask your healthcare provider if it is safe for you to drink alcohol. Also ask how much is safe for you to drink.       Do not smoke. Nicotine and other chemicals in cigarettes can damage your esophagus and stomach. Ask your healthcare provider for information if you currently smoke and need help to quit. E-cigarettes or smokeless tobacco still contain nicotine. Talk to your healthcare provider before you use these products.     Make changes to the food you eat as directed: Do not eat foods that cause abdominal pain or other symptoms. Eat small meals more often.     Eat more high-fiber foods if you are constipated. High-fiber foods include fruits, vegetables, whole-grain foods, and legumes.       Do not eat foods that cause gas if you have bloating. Examples include broccoli, cabbage, and cauliflower. Do not drink soda or carbonated drinks, because these may also cause gas.       Do not eat foods or drinks that contain sorbitol or fructose if you have diarrhea and bloating. Some examples are fruit juices, candy, jelly, and sugar-free gum.       Do not eat high-fat foods, such as fried foods, cheeseburgers, hot dogs, and desserts.      Limit or do not drink caffeine. Caffeine may make symptoms, such as heart burn or nausea, worse.       Drink plenty of liquids to prevent dehydration from diarrhea or vomiting. Ask your healthcare provider how much liquid to drink each day and which liquids are best for you.     Follow up with your healthcare provider as directed: Write down your questions so you remember to ask them during your visits.

## 2023-06-23 NOTE — ED ADULT TRIAGE NOTE - CHIEF COMPLAINT QUOTE
Pt BIBA from [facility] c/o abdominal pain x2 days. Pt reports nausea. Pt denies vomiting, diarrhea, fevers. Pt a/0x4.

## 2023-06-23 NOTE — ED PROVIDER NOTE - OBJECTIVE STATEMENT
Pt. is a 62 yo M with PMHx of DM, HTN, CAD, GERD, left BKA, CABG, anemia, BPH, sepsis, cholecystectomy, presents with abdominal pain since yesterday with vomiting.  Pain is diffuse, severe rated 10/10 and intermittent.  Patient states last BM yesterday, denies any constipation or diarrhea.  Denies fever, chest pain or back pain.  Denies any fall or injury.  Urinating frequently.

## 2023-06-23 NOTE — ED PROVIDER NOTE - RESPIRATORY, MLM
Breath sounds clear and equal bilaterally. Cantharidin Pregnancy And Lactation Text: The use of this medication during pregnancy or lactation is not recommended as there is insufficient data.

## 2023-06-23 NOTE — ED PROVIDER NOTE - CLINICAL SUMMARY MEDICAL DECISION MAKING FREE TEXT BOX
Abdominal pain : labs, urine , CT to r/o obstruction, kidney stone, bowel inflammation or infection.

## 2023-06-23 NOTE — ED PROVIDER NOTE - PROGRESS NOTE DETAILS
JG:  Discussed all results with patient.  Pain much more controlled.  Repeat abdominal exam is without any tenderness.  Patient has known pleural effusion.  He states in the past they have had to drain the fluid from this lungs .  He denies any chest pain, back pain, fever, coughing, or SOB at this time.  Understands if he feels these symptoms he may need to get thoracentesis again. UA showed some RBCs, so discussed with patient possibility of passing a kidney stone.  No pain at this time.  Will send back to [facility] for outpatient follow up.

## 2023-06-23 NOTE — ED PROVIDER NOTE - CONSTITUTIONAL, MLM
Well appearing, awake, alert, oriented to person, place, time/situation; intermittently sitting upright and moaning in pain normal...

## 2023-06-23 NOTE — ED ADULT NURSE NOTE - NS PRO AD NO ADVANCE DIRECTIVE
Spoke with niece states Malia pt had intermittent vaginal spotting over a year now. Per PCP pt had frequent UTIs and was given abx. Malia states pt has been spotting again this week. Her vaginal area is pretty raw and painful to touch. Appt with u/s scheduled for 12/12. Location and time confirmed with pt's niece. U/s order placed in chart.     Yes

## 2023-06-23 NOTE — ED ADULT NURSE NOTE - NSFALLRISKINTERV_ED_ALL_ED
Assistance OOB with selected safe patient handling equipment if applicable/Assistance with ambulation/Communicate fall risk and risk factors to all staff, patient, and family/Provide patient with walking aids/Provide visual cue: yellow wristband, yellow gown, etc/Reinforce activity limits and safety measures with patient and family/Call bell, personal items and telephone in reach/Instruct patient to call for assistance before getting out of bed/chair/stretcher/Non-slip footwear applied when patient is off stretcher/[place] to call system/Physically safe environment - no spills, clutter or unnecessary equipment/Purposeful Proactive Rounding/Room/bathroom lighting operational, light cord in reach

## 2023-06-23 NOTE — ED PROVIDER NOTE - PATIENT PORTAL LINK FT
You can access the FollowMyHealth Patient Portal offered by [facility] by registering at the following website: [email or web address]. By joining [employer]’s FollowMyHealth portal, you will also be able to view your health information using other applications (apps) compatible with our system.

## 2023-06-23 NOTE — ED ADULT NURSE NOTE - OBJECTIVE STATEMENT
Pt is 62 y/o male BIBA c/c of abdominal pain and nausea onset "couple days ago". pt is from [place]. L BKA noted. hx of CAD, GERD, anemia, DM, HTN,

## 2023-06-23 NOTE — ED PROVIDER NOTE - CARE PROVIDER_API CALL
Nahid Fierro)  Critical Care Medicine; HospicePalliative Medicine; Internal Medicine; Pulmonary Disease  [address] [place] [address]  Phone: [phone]  Fax: [phone]  Follow Up Time:

## 2023-07-16 NOTE — PATIENT PROFILE ADULT - NSTOBACCOCESSATIONEDU1_GEN_A_NUR
797
Recognize danger situations.  For example, stress, drinking alcohol, urges to smoke, smoking cues, cigarette availability

## 2023-10-16 ENCOUNTER — OFFICE (OUTPATIENT)
Dept: URBAN - METROPOLITAN AREA CLINIC 103 | Facility: CLINIC | Age: 63
Setting detail: OPHTHALMOLOGY
End: 2023-10-16
Payer: MEDICARE

## 2023-10-16 VITALS — HEIGHT: 60 IN

## 2023-10-16 DIAGNOSIS — H43.13: ICD-10-CM

## 2023-10-16 PROCEDURE — 99203 OFFICE O/P NEW LOW 30 MIN: CPT | Performed by: REGISTERED NURSE

## 2023-10-16 PROCEDURE — 92250 FUNDUS PHOTOGRAPHY W/I&R: CPT | Performed by: REGISTERED NURSE

## 2023-10-16 ASSESSMENT — TONOMETRY
OD_IOP_MMHG: 12
OS_IOP_MMHG: 12

## 2023-10-16 ASSESSMENT — VISUAL ACUITY
OD_BCVA: 20/30
OS_BCVA: 20/40+2

## 2023-10-16 ASSESSMENT — REFRACTION_CURRENTRX
OS_VPRISM_DIRECTION: SV
OD_ADD: +1.75
OD_VPRISM_DIRECTION: SV
OS_OVR_VA: 20/
OD_OVR_VA: 20/
OS_ADD: +1.75

## 2023-10-16 ASSESSMENT — SPHEQUIV_DERIVED
OS_SPHEQUIV: -0.25
OD_SPHEQUIV: 0.75

## 2023-10-16 ASSESSMENT — CONFRONTATIONAL VISUAL FIELD TEST (CVF)
OD_FINDINGS: FULL
OS_FINDINGS: FULL

## 2023-10-16 ASSESSMENT — REFRACTION_AUTOREFRACTION
OD_SPHERE: +1.25
OS_CYLINDER: -1.00
OS_AXIS: 080
OD_CYLINDER: -1.00
OS_SPHERE: +0.25
OD_AXIS: 090

## 2023-10-16 ASSESSMENT — KERATOMETRY
OD_K1POWER_DIOPTERS: 43.00
OD_K2POWER_DIOPTERS: 43.25
OD_AXISANGLE_DEGREES: 017
OS_AXISANGLE_DEGREES: 129
OS_K2POWER_DIOPTERS: 44.00
OS_K1POWER_DIOPTERS: 43.75

## 2023-10-16 ASSESSMENT — AXIALLENGTH_DERIVED
OD_AL: 23.4384
OS_AL: 23.5516

## 2023-10-20 ENCOUNTER — OFFICE (OUTPATIENT)
Dept: URBAN - METROPOLITAN AREA CLINIC 63 | Facility: CLINIC | Age: 63
Setting detail: OPHTHALMOLOGY
End: 2023-10-20
Payer: MEDICARE

## 2023-10-20 DIAGNOSIS — H43.13: ICD-10-CM

## 2023-10-20 DIAGNOSIS — E11.3513: ICD-10-CM

## 2023-10-20 DIAGNOSIS — E11.3511: ICD-10-CM

## 2023-10-20 PROCEDURE — 67210 TREATMENT OF RETINAL LESION: CPT | Mod: RT | Performed by: SPECIALIST

## 2023-10-20 PROCEDURE — 92235 FLUORESCEIN ANGRPH MLTIFRAME: CPT | Performed by: SPECIALIST

## 2023-10-20 PROCEDURE — 92012 INTRM OPH EXAM EST PATIENT: CPT | Mod: 57 | Performed by: SPECIALIST

## 2023-10-20 PROCEDURE — 92134 CPTRZ OPH DX IMG PST SGM RTA: CPT | Performed by: SPECIALIST

## 2023-10-20 ASSESSMENT — AXIALLENGTH_DERIVED
OD_AL: 23.4384
OS_AL: 23.5516

## 2023-10-20 ASSESSMENT — VISUAL ACUITY
OS_BCVA: 20/50-1
OD_BCVA: 20/25

## 2023-10-20 ASSESSMENT — REFRACTION_AUTOREFRACTION
OS_SPHERE: +0.25
OD_CYLINDER: -1.00
OD_AXIS: 090
OS_CYLINDER: -1.00
OS_AXIS: 080
OD_SPHERE: +1.25

## 2023-10-20 ASSESSMENT — KERATOMETRY
OS_K2POWER_DIOPTERS: 44.00
OS_AXISANGLE_DEGREES: 129
OD_AXISANGLE_DEGREES: 017
OD_K2POWER_DIOPTERS: 43.25
OS_K1POWER_DIOPTERS: 43.75
OD_K1POWER_DIOPTERS: 43.00

## 2023-10-20 ASSESSMENT — TONOMETRY
OD_IOP_MMHG: 18
OS_IOP_MMHG: 17

## 2023-10-20 ASSESSMENT — CONFRONTATIONAL VISUAL FIELD TEST (CVF)
OS_FINDINGS: FULL
OD_FINDINGS: FULL

## 2023-10-20 ASSESSMENT — SPHEQUIV_DERIVED
OD_SPHEQUIV: 0.75
OS_SPHEQUIV: -0.25

## 2023-11-06 ENCOUNTER — OFFICE (OUTPATIENT)
Dept: URBAN - METROPOLITAN AREA CLINIC 94 | Facility: CLINIC | Age: 63
Setting detail: OPHTHALMOLOGY
End: 2023-11-06
Payer: MEDICARE

## 2023-11-06 DIAGNOSIS — E11.3512: ICD-10-CM

## 2023-11-06 DIAGNOSIS — E11.3513: ICD-10-CM

## 2023-11-06 PROCEDURE — 67028 INJECTION EYE DRUG: CPT | Mod: 79,LT | Performed by: SPECIALIST

## 2023-11-06 PROCEDURE — 92134 CPTRZ OPH DX IMG PST SGM RTA: CPT | Performed by: SPECIALIST

## 2023-11-06 ASSESSMENT — SPHEQUIV_DERIVED
OD_SPHEQUIV: 0.75
OS_SPHEQUIV: -0.25

## 2023-11-06 ASSESSMENT — REFRACTION_AUTOREFRACTION
OD_AXIS: 090
OS_CYLINDER: -1.00
OS_AXIS: 080
OD_SPHERE: +1.25
OS_SPHERE: +0.25
OD_CYLINDER: -1.00

## 2023-11-06 ASSESSMENT — CONFRONTATIONAL VISUAL FIELD TEST (CVF)
OS_FINDINGS: FULL
OD_FINDINGS: FULL

## 2023-11-22 NOTE — ED ADULT NURSE NOTE - NSFALLHARMRISKINTERV_ED_ALL_ED

## 2023-11-22 NOTE — ED ADULT TRIAGE NOTE - CHIEF COMPLAINT QUOTE
pt bibems from [facility] s/p coffee ground emesis today. HX of gastroparesis. Full code. Left BKA. 10/10 pain. /101. - allergies.

## 2023-11-22 NOTE — ED PROVIDER NOTE - PHYSICAL EXAMINATION
GEN: Patient awake alert NAD.   HEENT: normocephalic, atraumatic, EOMI, no scleral icterus, moist MM  CARDIAC: RRR, S1, S2, no murmur.   PULM: CTA B/L no wheeze, rhonchi, rales.   ABD: soft NT, ND, no rebound no guarding, no CVA tenderness.   MSK: Moving all extremities, no edema. 5/5 strength and full ROM in all extremities.     NEURO: A&Ox3, gait normal, no focal neurological deficits, CN 2-12 grossly intact  SKIN: warm, dry, no rash. GEN: Patient awake alert NAD.   HEENT: normocephalic, atraumatic, EOMI, no scleral icterus, moist MM  CARDIAC: RRR, S1, S2, no murmur.   PULM: CTA B/L no wheeze, rhonchi, rales.   ABD: soft ND, no rebound no guarding, no CVA tenderness. Diffuse abd tenderness.  MSK: Moving all extremities, no edema. 5/5 strength. Left BKA. Right foot amputation of all toes.   NEURO: A&Ox3, gait normal, no focal neurological deficits, CN 2-12 grossly intact  SKIN: warm, dry, no rash. GEN: Patient awake alert NAD.   HEENT: normocephalic, atraumatic, EOMI, no scleral icterus, moist MM  CARDIAC: RRR, S1, S2, no murmur.   PULM: CTA B/L no wheeze, rhonchi, rales.   ABD: soft ND, patient writhing in bed, diffuse abd tenderness on limited exam.   MSK: Moving all extremities, no edema. 5/5 strength. Left BKA. Right foot amputation of all toes.   NEURO: A&Ox3, gait normal, no focal neurological deficits, CN 2-12 grossly intact  SKIN: warm, dry, no rash.

## 2023-11-22 NOTE — ED ADULT NURSE NOTE - OBJECTIVE STATEMENT
62 y/o male presenting to ER with c/o upper abdominal pain since yesterday. endorsing reddish/brown bloody vomitus x5-6 times. endorsing nausea at this time. pt on clopidogrel. denies melena/hematochezia, last BM x2 days ago.

## 2023-11-22 NOTE — ED ADULT NURSE NOTE - NSFALLRISKFACTORS_ED_ALL_ED
amputee/Coagulation: Bleeding disorder either through use of anticoagulants or underlying clinical condition(s)

## 2023-11-22 NOTE — ED PROVIDER NOTE - OBJECTIVE STATEMENT
62 y/o male with a PMHx of anemia, CAD s/p CABG x3, DM, GERD, gastroparesis, HTN, s/p left BKA presents to the ED BIBA from [place] c/o n/v x2 days and abd pain since yesterday. Pt reports coffee ground emesis since yesterday. Denies fevers, chills, diarrhea. No other complaints at this time. 62 y/o male with a PMHx of anemia, CAD s/p CABG x3, DM, GERD, gastroparesis, HTN, s/p left BKA presents to the ED BIBA from [place] c/o n/v x2 days and abd pain since yesterday. Pt reports coffee ground emesis since yesterday. Denies fevers, chills, diarrhea. No other complaints at this time. no emesis in the ED.

## 2023-11-22 NOTE — ED PROVIDER NOTE - SCRIBE NAME
"Patient arrived at  , \"looking for her Subutex bridge.\"   staff contacted me to request an update. I asked them to have the patient relay to her why she is out early. The patient simply stated, \"I'm out as of yesterday,\" and no other details were given.    I asked them to direct the patient to discuss this with nursing directly as provider has requested more information.   " Jing Randolph

## 2023-11-22 NOTE — ED PROVIDER NOTE - CLINICAL SUMMARY MEDICAL DECISION MAKING FREE TEXT BOX
62 y/o male with a PMHx of anemia, CAD s/p CABG x3, DM, GERD, gastroparesis, HTN, s/p left BKA presents to the ED BIBA from [place] c/o n/v x2 days and abd pain since yesterday. Pt reports coffee ground emesis since yesterday. Differential diagnosis includes gastritis vs pancreatitis vs bowel obstruction vs colitis. Plan: labs, CT, pain meds, reassess. 62 y/o male with a PMHx of anemia, CAD s/p CABG x3, DM, GERD, gastroparesis, HTN, s/p left BKA presents to the ED BIBA from [place] c/o n/v x2 days and abd pain since yesterday. Pt reports coffee ground emesis since yesterday. Differential diagnosis includes gastritis vs pancreatitis vs bowel obstruction vs colitis. Plan: labs, occult, CT, pain meds, reassess.

## 2023-11-22 NOTE — ED PROVIDER NOTE - PROGRESS NOTE DETAILS
Maura Randolph for attending Dr. Armas: Rectal Exam: Lot [id number]. Expiration 4/30/24. Brown stool. Guaiac negative. QC passed. Brandi Armas MD, Attending  multiple US iv placed for labwork and CT  Pt requesting repeat dialudid before he will go to CT. Pt leaning over the edge of the stretcher and appears like he might fall out of stretcher whilst requesting dilaudid.

## 2023-11-23 NOTE — BRIEF OPERATIVE NOTE - NSICDXBRIEFPOSTOP_GEN_ALL_CORE_FT
RX PROGRESS NOTE: Warfarin Anticoagulation Monitoring Note  Dosing for Radu [place]    Warfarin prescribed for the indication of bioprosthetic AVR    Target INR is 2-3    Anticipated duration of therapy is 3 months    Patient to start warfarin on 10/20/2022  warfarin   Date/Time Dose   10/20/2022 1807  10/21/2022 5mg  5mg     Most Recent Lab Values:  INR (no units)   Date/Time Value   10/18/2022 1246  10/21/2022 0407 1.4  1     HGB (g/dL)   Date/Time Value   10/20/2022 0538  10/21/2022 0407 9.0 (L)  9.2     HCT (%)   Date/Time Value   10/20/2022 0538  10/21/2022 0407 28.2 (L)  29.2     PLT (K/mcL)   Date/Time Value   10/20/2022 0538  10/21/2022 0407 102 (L)  117     GOT/AST (Units/L)   Date/Time Value   10/18/2022 1246 19     GPT/ALT (Units/L)   Date/Time Value   10/18/2022 1246 13     TBili   Date/Time Value   10/18/2022 1246 0.4     Albumin (g/dL)   Date/Time Value   10/18/2022 1246 2.3 (L)     Serum creatinine: 7.37 mg/dL (H) 10/21/22 0407  Estimated creatinine clearance: 10.2 mL/min (A)  ESRD on nightly PD    Medical History:  Weight: Weight: 84.1 kg (10/21/22 0600)  Age: 71 year old   BMI-25.15    Assessment  Sensitivities  ESRD  Albumin-2.3  PLT-117  ALK Phos- 37  EBL ~ 1L He received 1400cc crystalloid, 500cc albumin, 1u RBC, 500 cc cell saver, and DDAVP    Diet  Renal diet, immunonutrition drink twice daily (between meals AM/PM)    Interactions  ASA- may increase risk of bleeding  APAP- >2/day may increase effects of warfarin  Cefazolin- may increase effects of warfarin  Glucagon (none given to date)- may increase effects of warfarin (appears to be significant only with high doses of glucagon)  Pravastatin- may increase effects of warfarin    Plan:  Warfarin day 2/90  POD 3, AVR with bioprosthetic  PPM placed about 6 years ago  Receiving pravastatin 20mg nightly  Receiving  D (spoke with CV NP Juno Leavitt 10/21)  APAP 650 Q4HPRN (has not used)  Spoke to RN, good appetite, ate 100% of meal.  Pacing  wires and chest tubes removed  Will continue warfarin 5mg     Pharmacist inpatient anticoagulation management will review/monitor patient daily and order warfarin dose/regimen based on protocol.    Thank you,  Keven Linda, PharmD candidate 2023  10/21/2022 1:42 PM   POST-OP DIAGNOSIS:  Acute appendicitis with localized peritonitis 23-Nov-2023 04:22:53  Parminder Ruiz

## 2023-11-23 NOTE — PATIENT PROFILE ADULT - FALL HARM RISK - HARM RISK INTERVENTIONS

## 2023-11-23 NOTE — PROVIDER CONTACT NOTE (OTHER) - ACTION/TREATMENT ORDERED:
PA informed. Hold overnight lantus. non urgent C/s vascular and podiatry on 11/23. PA informed. Hold overnight lantus. Will F/u with day team.

## 2023-11-23 NOTE — H&P ADULT - NSHPLABSRESULTS_GEN_ALL_CORE
11-23-23 @ 00:08    Vital Signs Last 24 Hrs  T(C): 36.8 (22 Nov 2023 20:46), Max: 36.8 (22 Nov 2023 20:46)  T(F): 98.2 (22 Nov 2023 20:46), Max: 98.2 (22 Nov 2023 20:46)  HR: 92 (22 Nov 2023 15:03) (92 - 92)  BP: 135/67 (22 Nov 2023 20:46) (135/67 - 151/101)  BP(mean): 86 (22 Nov 2023 20:46) (86 - 86)  RR: 20 (22 Nov 2023 20:46) (18 - 20)  SpO2: 99% (22 Nov 2023 20:46) (99% - 100%)    Parameters below as of 22 Nov 2023 20:46  Patient On (Oxygen Delivery Method): room air                              14.8   18.45 )-----------( 274      ( 22 Nov 2023 16:54 )             44.5       11-22    137  |  105  |  31<H>  ----------------------------<  193<H>  4.3   |  26  |  1.28    Ca    9.5      22 Nov 2023 16:54    TPro  8.5<H>  /  Alb  3.6  /  TBili  0.3  /  DBili  x   /  AST  15  /  ALT  31  /  AlkPhos  202<H>  11-22      LIVER FUNCTIONS - ( 22 Nov 2023 16:54 )  Alb: 3.6 g/dL / Pro: 8.5 gm/dL / ALK PHOS: 202 U/L / ALT: 31 U/L / AST: 15 U/L / GGT: x             MEDICATIONS  (STANDING):    MEDICATIONS  (PRN):      MEDICATIONS  (STANDING):      < from: CT Abdomen and Pelvis w/ IV Cont (11.22.23 @ 21:45) >        < end of copied text >

## 2023-11-23 NOTE — H&P ADULT - ASSESSMENT
64 yo male DM, smoker h/o CABG  with acute appendicitis, fecal impaction  -Will do bedside rectal disimpaction  -Will take him to Or for lap appendectomy  -IV Abx 62 yo male DM, smoker h/o CABG  with acute appendicitis, fecal impaction  -Will do bedside rectal disimpaction  -Will take him to OR for lap appendectomy. Risk/benefit of procedure explained to patient including but not limited to intrabdominal abscess, woun dinfection, bleeding, DVT, PE, MI. There's a chance his abdominal pain/emesis doesn't resolve after appendectomy since patient has had abd pain, emesis before and is attributed to gastroparesis.  -IV Abx

## 2023-11-23 NOTE — BRIEF OPERATIVE NOTE - NSICDXBRIEFPREOP_GEN_ALL_CORE_FT
PRE-OP DIAGNOSIS:  Acute appendicitis with localized peritonitis 23-Nov-2023 04:22:45  Parminder Ruiz

## 2023-11-23 NOTE — H&P ADULT - NSHPPHYSICALEXAM_GEN_ALL_CORE
.11-23-23 @ 00:07  VITAL SIGNS:  T(C): 36.8 (11-22-23 @ 20:46), Max: 36.8 (11-22-23 @ 20:46)  T(F): 98.2 (11-22-23 @ 20:46), Max: 98.2 (11-22-23 @ 20:46)  HR: 92 (11-22-23 @ 15:03) (92 - 92)  BP: 135/67 (11-22-23 @ 20:46) (135/67 - 151/101)  BP(mean): 86 (11-22-23 @ 20:46) (86 - 86)  RR: 20 (11-22-23 @ 20:46) (18 - 20)  SpO2: 99% (11-22-23 @ 20:46) (99% - 100%)  Wt(kg): --    PHYSICAL EXAM:    Constitutional: resting comfortably in bed; NAD  Eyes: PERRL, EOMI, anicteric sclera  ENT: no nasal discharge; uvula midline, no oropharyngeal erythema or exudates  Neck: supple; no JVD or thyromegaly  Respiratory: CTA B/L; no W/R/R, no retractions  Cardiac: +S1/S2; RRR; no murmurs  Gastrointestinal: obese, RLQ tenderness  Back: spine midline, no bony tenderness or step-offs; no CVAT B/L  Extremities: no clubbing or cyanosis; no peripheral edema Left BKA, RT foot ray amputation  Musculoskeletal: NROM x3; no joint swelling, tenderness or erythema  Dermatologic: skin warm, dry and intact; no rashes, wounds, or scars  Lymphatic: no submandibular or cervical LAD  Neurologic: AAOx3; CNII-XII grossly intact; no focal deficits  Psychiatric: affect and characteristics of appearance, verbalizations, behaviors are appropriate

## 2023-11-23 NOTE — H&P ADULT - HISTORY OF PRESENT ILLNESS
62 yo male h/o DM, CABG, left BKA, Rt foot ray amputation on plavix c/o abd pain x last 2 days, nausea, vomiting. No fever. CT Abd showed 8 mm appendix concerning for early appendicitis. WBC 18,000.  64 yo male h/o DM, CABG, left BKA, Rt foot ray amputation on plavix c/o abd pain x last 2 days, nausea, vomiting. No fever. CT Abd showed 8 mm appendix concerning for early appendicitis. WBC 18,000. Normal nuclear stress test 09/2022 62 yo male h/o DM, CABG, left BKA, Rt foot ray amputation on plavix,  gastroparesis, , hepatic cirrhosis, normal EGD 06/20223, c/o abd pain x last 2 days, nausea, vomiting. No fever. CT Abd showed 8 mm appendix concerning for early appendicitis. WBC 18,000. Normal nuclear stress test 09/2022 62 yo male h/o DM, CABG, left BKA, Rt foot TMA, on plavix,  gastroparesis, , hepatic cirrhosis, normal EGD 06/20223, c/o abd pain x last 2 days, nausea, vomiting. Patient with previous admission with abdominal pain , emesis,. No fever. CT Abd showed 8 mm appendix concerning for early appendicitis. WBC 18,000. Normal nuclear stress test 09/2022, chronic constipation. Normal INR

## 2023-11-23 NOTE — PATIENT PROFILE ADULT - STATED REASON FOR ADMISSION
patient having pain on his leg and foot , cellulitis of the right foot abdominal pain, nausea, vomitting

## 2023-11-23 NOTE — ED ADULT NURSE REASSESSMENT NOTE - NS ED NURSE REASSESS COMMENT FT1
received pt report at 0230 pt complaining of lower abdominal pain Dilaudid given and zosyn given. pt NPO right upper arm IV blood return and flushed 0315 pt to OR

## 2023-11-23 NOTE — H&P ADULT - NSHPREVIEWOFSYSTEMS_GEN_ALL_CORE
11-23-23 @ 00:06  REVIEW OF SYSTEMS:    CONSTITUTIONAL: No weakness, fevers or chills  EYES/ENT: No visual changes;  No vertigo or throat pain   NECK: No pain or stiffness  RESPIRATORY: No cough, wheezing, hemoptysis; No shortness of breath  CARDIOVASCULAR: No chest pain or palpitations  GASTROINTESTINAL:  abdominal  pain.  nausea, vomiting, no hematemesis;  constipation. No melena or hematochezia.  GENITOURINARY: No dysuria, frequency or hematuria  NEUROLOGICAL: No numbness or weakness  SKIN: No itching, burning, rashes, or lesions   All other review of systems is negative unless indicated above.

## 2023-11-23 NOTE — PROVIDER CONTACT NOTE (OTHER) - ASSESSMENT
Pt is A&O. Resting in bed with no c/o of pain or distress. BGMs ranging from  during the day. Odor noted at RLE with discoloration on the RLE. LLE redness also noted. Pt is A&O. Resting in bed with no c/o of pain or distress. BGMs ranging from  during the day. Odor noted at RLE with discoloration. LLE redness also noted.

## 2023-11-23 NOTE — PATIENT PROFILE ADULT - NSTOBACCOWITHDRW_GEN_A_CORE_SD
[facility] Medicine Services  INPATIENT PROGRESS NOTE    Length of Stay: 5  Date of Admission: 3/5/2018  Primary Care Physician: Jamaal Bravo MD    Subjective   Please note that all previous progress notes, medication changes, lab findings, Radiographical changes, and physical exam findings have been noted and updated as appropriate.    3/10/2018: Patient has no pain or complaints.  Did undergo further operative washout procedure stay.  Dr. Thompson states that the patient foot looks much better and that he is cleared from podiatry point view, but still needs at least 2-4 weeks of IV antibiotics, vancomycin, followed by oral antibiotics for another 2-4 weeks.  At this time or he with case management to get approval for IV antibiotics outpatient.  At this time patient has vancomycin every 8 hours, patient may require LTAC or acute rehabilitation.    3/9/2018: Patient has no complaints.  Dr. Thompson has recommended magic butt cream for moisturization of right foot.  No fever or chills, no nausea or vomiting.  Will have a follow-up OR procedure tomorrow with Dr. Thompson.    Chief Complaint/HPI:  This 56 year old male was admitted to hospitalist services secondary to left foot cellulitis.  Patient has also been evaluated and treated by Dr. Thompson of podiatry.  Patient has undergone irrigation and debridement of the left foot.  Patient currently growing coagulase-negative staph.  Though this strain is predictably sensitive to penicillin and clindamycin, per medical literature vancomycin is the preferred drug of choice for osteomyelitis for this group.  All other antibiotics have been de-escalated.  Patient has no pain.  He is scheduled to return to the operating suite within 48 hours for another washout and further treatment.    Review of Systems   Constitutional: Negative for chills and fever.   Respiratory: Negative for shortness of breath.    Cardiovascular: Negative for chest  pain.   Gastrointestinal: Negative for abdominal pain, nausea and vomiting.      All pertinent negatives and positives are as above. All other systems have been reviewed and are negative unless otherwise stated.     Objective    Temp:  [95.9 °F (35.5 °C)-98.4 °F (36.9 °C)] 96 °F (35.6 °C)  Heart Rate:  [65-76] 67  Resp:  [18] 18  BP: ()/(57-80) 111/72    Physical Exam   Constitutional: He is oriented to person, place, and time. He appears well-developed and well-nourished. No distress.   HENT:   Head: Normocephalic and atraumatic.   Cardiovascular: Normal rate and regular rhythm.    Pulmonary/Chest: Effort normal and breath sounds normal. No respiratory distress. He has no wheezes.   Abdominal: Soft. Bowel sounds are normal. He exhibits no distension. There is no tenderness.   Neurological: He is alert and oriented to person, place, and time.   Skin: Skin is warm and dry. He is not diaphoretic.   Psychiatric: He has a normal mood and affect. His behavior is normal.     Results Review:  I have reviewed the labs, radiology results, and diagnostic studies.    Laboratory Data:     Results from last 7 days  Lab Units 03/10/18  0559 03/09/18  0818 03/08/18  0505   SODIUM mmol/L 141 139 142   POTASSIUM mmol/L 4.3 4.6 4.4   CHLORIDE mmol/L 103 101 101   CO2 mmol/L 25.0 27.0 27.0   BUN mg/dL 20 21 22*   CREATININE mg/dL 0.91 0.86 0.91   GLUCOSE mg/dL 96 114* 92   CALCIUM mg/dL 8.6 9.1 8.8   BILIRUBIN mg/dL 0.2 0.3 0.4   ALK PHOS U/L 50 56 57   ALT (SGPT) U/L 129* 104* 47   AST (SGOT) U/L 87* 101* 50   ANION GAP mmol/L 13.0 11.0 14.0     Estimated Creatinine Clearance: 134.9 mL/min (by C-G formula based on SCr of 0.91 mg/dL).            Results from last 7 days  Lab Units 03/10/18  0559 03/09/18  0818 03/08/18  0505 03/07/18  0328 03/06/18  0727   WBC 10*3/mm3 5.96 5.08 5.28 4.68 4.66   HEMOGLOBIN g/dL 13.2* 13.7 13.7 13.2* 12.6*   HEMATOCRIT % 39.0 40.6 40.8 39.5 37.8*   PLATELETS 10*3/mm3 174 197 179 156 152            Culture Data:   No results found for: BLOODCX  No results found for: URINECX  No results found for: RESPCX  No results found for: WOUNDCX  No results found for: STOOLCX  No components found for: BODYFLD    Radiology Data:   Imaging Results (last 24 hours)     ** No results found for the last 24 hours. **          I have reviewed the patient current medications.     Assessment/Plan     Hospital Problem List     Type 2 diabetes mellitus with skin complication    Cellulitis of left foot    Overview Signed 3/5/2018  1:17 PM by Marco A Thompson DPM     Added automatically from request for surgery [id number]         Infected hardware in left leg    Overview Signed 3/5/2018  1:17 PM by Marco A Thompson DPM     Added automatically from request for surgery [id number]               Plan:  Continue vancomycin, per podiatry, continue as hospital course dictates.                This document has been electronically signed by BRYAN Ocampo on March 10, 2018 11:24 AM       depression

## 2023-11-23 NOTE — ED ADULT NURSE REASSESSMENT NOTE - NS ED NURSE REASSESS COMMENT FT1
Provider at bedside for manual disimpaction. Procedure tolerated well.  Pt is non complaint at times, concerns addressed, OR pending

## 2023-11-23 NOTE — CHART NOTE - NSCHARTNOTEFT_GEN_A_CORE
nurse called for concerning odor and discoloration from RLE  Pt refused to be examined, risks including infection and necrosis explained, pt continued to decline examination    Surgery  Lucila Gasca PA-C

## 2023-11-24 NOTE — PHYSICAL THERAPY INITIAL EVALUATION ADULT - ADDITIONAL COMMENTS
The pt reports that he independently donns and doffs his left LE prosthesis. The pt currently does not use any adaptive right LE shoe, despite having a hx of a TMA amputation on the right foot.

## 2023-11-24 NOTE — DISCHARGE NOTE PROVIDER - HOSPITAL COURSE
62 yo male h/o DM, CABG, RT BKA, Left TMA, chronic abd pain/ emesis on dilaudid 6mg PO q6, admitted with dilated appendix, underwent lap appendectomy, tolerating diet.

## 2023-11-24 NOTE — DISCHARGE NOTE PROVIDER - PROVIDER TOKENS
PROVIDER:[TOKEN:[[id number] PROVIDER:[TOKEN:[[id number]]],PROVIDER:[TOKEN:[[id number]]]

## 2023-11-24 NOTE — PROVIDER CONTACT NOTE (OTHER) - SITUATION
Pt BGM is currently 142. order for 30u lantus.
called hospitalist phone; spoke to Dr. Blackmon
Small closed dark discoloration noted to right foot stump. No drainage noted.

## 2023-11-24 NOTE — DISCHARGE NOTE PROVIDER - NSDCFUADDAPPT_GEN_ALL_CORE_FT
Follow up as needed   Gastroenterology follow up Follow up as needed poly Ruiz   Gastroenterology follow up for gastroparesis in 1-2 weeks    Follow up with PCP in 1-2 weeks

## 2023-11-24 NOTE — DISCHARGE NOTE PROVIDER - NPI NUMBER (FOR SYSADMIN USE ONLY) :
Pt independent from home alone ; uses no AD's still drives. Has family locally. PCP Dr. Cruz Jean Baptiste on [address] [facility] ([facility]) . Iv solumedrol. Pt not a current smoker. pt has no needs at discharge. Will need to monitor for poss home 02 needs. Pt has no DME preference ; pt will really try to work to attain room air.will follow. Joel Jay. [[id number]] [[id number]],[[id number]]

## 2023-11-24 NOTE — DISCHARGE NOTE PROVIDER - CARE PROVIDER_API CALL
Parminder Ruiz.  Surgery  [address] [place] [address]  Phone: [phone]  Fax: [phone]  Follow Up Time:    Parminder Ruiz.  Surgery  [address] [place] [address]  Phone: [phone]  Fax: [phone]  Follow Up Time:     Bayron Lin  Gastroenterology  [address] [facility] [facility] [place] [address]  Phone: [phone]  Fax: [phone]  Follow Up Time:

## 2023-11-24 NOTE — PHYSICAL THERAPY INITIAL EVALUATION ADULT - GENERAL OBSERVATIONS, REHAB EVAL
The pt was received on 2N, supine, initially resistant to PT due to pain limitation. Upon f/u, the pt denied pain and reported that he was moving at baseline at present but was lacking his left LE BKA prosthetic limb, which was left behind at the long term care facility that he resides in. The pt agreed to transfer in the bed and to attempt OOB to chair.

## 2023-11-24 NOTE — CONSULT NOTE ADULT - SUBJECTIVE AND OBJECTIVE BOX
CC- abd pain    HPI:  64yo/M with PMH CAD s/p CABG, MV repair. diabetes with diabetic gastroparesis, PVD s/p prior LT BKA and RT TMA, liver cirrhosis, HTN, hyperlipidemia, GERD, BPH presented with abd pain, nausea and vomiting. Imaging studies suggestive of acute appendicitis. Patient was admitted by surg team and underwent appendectomy. Hospitalist consulted for postop medical management    PMH- as above  PSH- CABG, MV repair, LT BKA, RT TMA  Soc hx- current smoker, ex-alcohol abuse. occasional marijuana  Fam hx- +diabetes    11/24/23- c/o diffuse chronic abd discomfort. no flatus yet postop.     Review of system- All 10 systems reviewed and is as per HPI otherwise negative.     T(C): 36.7 (11-24-23 @ 12:06), Max: 36.8 (11-23-23 @ 18:18)  HR: 73 (11-24-23 @ 12:06) (72 - 79)  BP: 107/77 (11-24-23 @ 12:06) (107/77 - 147/71)  RR: 18 (11-24-23 @ 12:06) (16 - 18)  SpO2: 93% (11-24-23 @ 12:06) (93% - 99%)  Wt(kg): --    LABS:                        13.2   16.05 )-----------( 255      ( 24 Nov 2023 11:05 )             40.4     11-24    141  |  108  |  25<H>  ----------------------------<  149<H>  4.6   |  28  |  1.47<H>    Ca    9.0      24 Nov 2023 11:05    TPro  7.6  /  Alb  3.2<L>  /  TBili  0.3  /  DBili  x   /  AST  22  /  ALT  26  /  AlkPhos  162<H>  11-24    PT/INR - ( 22 Nov 2023 16:54 )   PT: 10.7 sec;   INR: 0.95 ratio       PTT - ( 22 Nov 2023 16:54 )  PTT:35.2 sec    Urinalysis Basic - ( 24 Nov 2023 11:05 )  Color: x / Appearance: x / SG: x / pH: x  Gluc: 149 mg/dL / Ketone: x  / Bili: x / Urobili: x   Blood: x / Protein: x / Nitrite: x   Leuk Esterase: x / RBC: x / WBC x   Sq Epi: x / Non Sq Epi: x / Bacteria: x    CAPILLARY BLOOD GLUCOSE  POCT Blood Glucose.: 156 mg/dL (24 Nov 2023 11:32)  POCT Blood Glucose.: 121 mg/dL (24 Nov 2023 07:38)  POCT Blood Glucose.: 141 mg/dL (23 Nov 2023 22:00)  POCT Blood Glucose.: 163 mg/dL (23 Nov 2023 17:14)    RADIOLOGY & ADDITIONAL TESTS:      PHYSICAL EXAM:  GENERAL: NAD  HEAD:  Atraumatic, Normocephalic  EYES: EOMI, PERRLA, conjunctiva and sclera clear  HEENT: Moist mucous membranes  NECK: Supple, No JVD  NERVOUS SYSTEM:  Alert & Oriented X3, grossly non-focal  CHEST/LUNG: Clear to auscultation bilaterally; No rales, rhonchi, wheezing, or rubs  HEART: Regular rate and rhythm; No murmurs, rubs, or gallops  ABDOMEN: Soft, slightly distended, BS hypoactive  GENITOURINARY- Voiding, no palpable bladder  EXTREMITIES:  LT BKA, RT foot TMA  MUSCULOSKELTAL- No muscle tenderness, Muscle tone normal, No joint tenderness, no Joint swelling, Joint range of motion-normal  SKIN-no rash, no lesion  CNS- alert, oriented X3, non focal     MEDICATIONS  (STANDING):  amLODIPine   Tablet 10 milliGRAM(s) Oral daily  clopidogrel Tablet 75 milliGRAM(s) Oral daily  dextrose 5%. 1000 milliLiter(s) (50 mL/Hr) IV Continuous <Continuous>  dextrose 5%. 1000 milliLiter(s) (100 mL/Hr) IV Continuous <Continuous>  dextrose 50% Injectable 12.5 Gram(s) IV Push once  dextrose 50% Injectable 25 Gram(s) IV Push once  dextrose 50% Injectable 25 Gram(s) IV Push once  enoxaparin Injectable 40 milliGRAM(s) SubCutaneous every 24 hours  finasteride 5 milliGRAM(s) Oral daily  furosemide    Tablet 40 milliGRAM(s) Oral daily  gabapentin 300 milliGRAM(s) Oral two times a day  glucagon  Injectable 1 milliGRAM(s) IntraMuscular once  insulin glargine Injectable (LANTUS) 30 Unit(s) SubCutaneous at bedtime  insulin lispro (ADMELOG) corrective regimen sliding scale   SubCutaneous three times a day before meals  lactated ringers. 1000 milliLiter(s) (75 mL/Hr) IV Continuous <Continuous>  lactulose Syrup 15 Gram(s) Oral daily  metoclopramide 5 milliGRAM(s) Oral three times a day before meals  metoprolol tartrate 25 milliGRAM(s) Oral daily  saline laxative (FLEET) Rectal Enema 1 Enema Rectal daily  simvastatin 10 milliGRAM(s) Oral at bedtime  spironolactone 100 milliGRAM(s) Oral daily  tamsulosin 0.4 milliGRAM(s) Oral at bedtime  tiotropium 2.5 MICROgram(s) Inhaler 2 Puff(s) Inhalation daily    MEDICATIONS  (PRN):  dextrose Oral Gel 15 Gram(s) Oral once PRN Blood Glucose LESS THAN 70 milliGRAM(s)/deciliter  HYDROmorphone   Tablet 6 milliGRAM(s) Oral every 6 hours PRN Moderate Pain (4 - 6)  ibuprofen  Tablet. 600 milliGRAM(s) Oral every 6 hours PRN Mild Pain (1 - 3)  magnesium hydroxide Suspension 30 milliLiter(s) Oral three times a day PRN Constipation  ondansetron   Disintegrating Tablet 4 milliGRAM(s) Oral every 6 hours PRN Nausea    Assessment/Plan  #Acute appendicitis s/p appendectomy  Surgery following  Pain meds prn  Diet per surg team  Awaiting return of bowel function postop    #Chronic abd pain multifactorial  On Dilaudid 6mg PO prn    #Diabetes with diabetic gastroparesis  GI eval noted  Patient is questioning gastric pacemaker- to be f/u with DR Lin as outpatient  Start reglan TID  Lantus+ ISS    #Alcoholic liver cirrhosis  Cont spironolactone, lasix, lactulose    #CAD s/p CABG  On Plavix, statin    #BPH- voiding  Cont flomax    #PVD s/p LT BKA and RT TMA  Outpatient podiatry f/u    #DVT proph- Lovenox    #Dispo- thank you for consult, will follow. Likely back to [place] Sunday     CC- abd pain    HPI:  64yo/M with PMH CAD s/p CABG, MV repair. diabetes with diabetic gastroparesis, PVD s/p prior LT BKA and RT TMA, liver cirrhosis, HTN, hyperlipidemia, GERD, BPH presented with abd pain, nausea and vomiting. Imaging studies suggestive of acute appendicitis. Patient was admitted by surg team and underwent appendectomy. Hospitalist consulted for postop medical management    PMH- as above  PSH- CABG, MV repair, LT BKA, RT TMA  Soc hx- current smoker, ex-alcohol abuse. occasional marijuana  Fam hx- +diabetes    11/24/23- c/o diffuse chronic abd discomfort. no flatus yet postop.     Review of system- All 10 systems reviewed and is as per HPI otherwise negative.     T(C): 36.7 (11-24-23 @ 12:06), Max: 36.8 (11-23-23 @ 18:18)  HR: 73 (11-24-23 @ 12:06) (72 - 79)  BP: 107/77 (11-24-23 @ 12:06) (107/77 - 147/71)  RR: 18 (11-24-23 @ 12:06) (16 - 18)  SpO2: 93% (11-24-23 @ 12:06) (93% - 99%)  Wt(kg): --    LABS:                        13.2   16.05 )-----------( 255      ( 24 Nov 2023 11:05 )             40.4     11-24    141  |  108  |  25<H>  ----------------------------<  149<H>  4.6   |  28  |  1.47<H>    Ca    9.0      24 Nov 2023 11:05    TPro  7.6  /  Alb  3.2<L>  /  TBili  0.3  /  DBili  x   /  AST  22  /  ALT  26  /  AlkPhos  162<H>  11-24    PT/INR - ( 22 Nov 2023 16:54 )   PT: 10.7 sec;   INR: 0.95 ratio       PTT - ( 22 Nov 2023 16:54 )  PTT:35.2 sec    Urinalysis Basic - ( 24 Nov 2023 11:05 )  Color: x / Appearance: x / SG: x / pH: x  Gluc: 149 mg/dL / Ketone: x  / Bili: x / Urobili: x   Blood: x / Protein: x / Nitrite: x   Leuk Esterase: x / RBC: x / WBC x   Sq Epi: x / Non Sq Epi: x / Bacteria: x    CAPILLARY BLOOD GLUCOSE  POCT Blood Glucose.: 156 mg/dL (24 Nov 2023 11:32)  POCT Blood Glucose.: 121 mg/dL (24 Nov 2023 07:38)  POCT Blood Glucose.: 141 mg/dL (23 Nov 2023 22:00)  POCT Blood Glucose.: 163 mg/dL (23 Nov 2023 17:14)    RADIOLOGY & ADDITIONAL TESTS:      PHYSICAL EXAM:  GENERAL: NAD  HEAD:  Atraumatic, Normocephalic  EYES: EOMI, PERRLA, conjunctiva and sclera clear  HEENT: Moist mucous membranes  NECK: Supple, No JVD  NERVOUS SYSTEM:  Alert & Oriented X3, grossly non-focal  CHEST/LUNG: Clear to auscultation bilaterally; No rales, rhonchi, wheezing, or rubs  HEART: Regular rate and rhythm; No murmurs, rubs, or gallops  ABDOMEN: Soft, slightly distended, BS hypoactive  GENITOURINARY- Voiding, no palpable bladder  EXTREMITIES:  LT BKA, RT foot TMA  MUSCULOSKELTAL- No muscle tenderness, Muscle tone normal, No joint tenderness, no Joint swelling, Joint range of motion-normal  SKIN-no rash, no lesion  CNS- alert, oriented X3, non focal     MEDICATIONS  (STANDING):  amLODIPine   Tablet 10 milliGRAM(s) Oral daily  clopidogrel Tablet 75 milliGRAM(s) Oral daily  dextrose 5%. 1000 milliLiter(s) (50 mL/Hr) IV Continuous <Continuous>  dextrose 5%. 1000 milliLiter(s) (100 mL/Hr) IV Continuous <Continuous>  dextrose 50% Injectable 12.5 Gram(s) IV Push once  dextrose 50% Injectable 25 Gram(s) IV Push once  dextrose 50% Injectable 25 Gram(s) IV Push once  enoxaparin Injectable 40 milliGRAM(s) SubCutaneous every 24 hours  finasteride 5 milliGRAM(s) Oral daily  furosemide    Tablet 40 milliGRAM(s) Oral daily  gabapentin 300 milliGRAM(s) Oral two times a day  glucagon  Injectable 1 milliGRAM(s) IntraMuscular once  insulin glargine Injectable (LANTUS) 30 Unit(s) SubCutaneous at bedtime  insulin lispro (ADMELOG) corrective regimen sliding scale   SubCutaneous three times a day before meals  lactated ringers. 1000 milliLiter(s) (75 mL/Hr) IV Continuous <Continuous>  lactulose Syrup 15 Gram(s) Oral daily  metoclopramide 5 milliGRAM(s) Oral three times a day before meals  metoprolol tartrate 25 milliGRAM(s) Oral daily  saline laxative (FLEET) Rectal Enema 1 Enema Rectal daily  simvastatin 10 milliGRAM(s) Oral at bedtime  spironolactone 100 milliGRAM(s) Oral daily  tamsulosin 0.4 milliGRAM(s) Oral at bedtime  tiotropium 2.5 MICROgram(s) Inhaler 2 Puff(s) Inhalation daily    MEDICATIONS  (PRN):  dextrose Oral Gel 15 Gram(s) Oral once PRN Blood Glucose LESS THAN 70 milliGRAM(s)/deciliter  HYDROmorphone   Tablet 6 milliGRAM(s) Oral every 6 hours PRN Moderate Pain (4 - 6)  ibuprofen  Tablet. 600 milliGRAM(s) Oral every 6 hours PRN Mild Pain (1 - 3)  magnesium hydroxide Suspension 30 milliLiter(s) Oral three times a day PRN Constipation  ondansetron   Disintegrating Tablet 4 milliGRAM(s) Oral every 6 hours PRN Nausea    Assessment/Plan  #Acute appendicitis s/p appendectomy  Surgery following  Pain meds prn  Diet per surg team  Awaiting return of bowel function postop    #Mild PATRICK postop  Hold aldactone and lasix for now  Repeat BMP in AM and reassess when can restart    #Chronic abd pain multifactorial  On Dilaudid 6mg PO prn    #Diabetes with diabetic gastroparesis  GI eval noted  Patient is questioning gastric pacemaker- to be f/u with DR Lin as outpatient  Start reglan TID  Lantus+ ISS    #Alcoholic liver cirrhosis  Cont  lactulose  Resume spironolactone and lasix when renal function improves    #CAD s/p CABG  On Plavix, statin    #BPH- voiding  Cont flomax    #PVD s/p LT BKA and RT TMA  Outpatient podiatry f/u    #DVT proph- Lovenox    #Dispo- thank you for consult, will follow. Likely back to [place] Sunday

## 2023-11-24 NOTE — DISCHARGE NOTE PROVIDER - NSDCMRMEDTOKEN_GEN_ALL_CORE_FT
amLODIPine 10 mg oral tablet: 1 tab(s) orally once a day  Aspirin Enteric Coated 81 mg oral delayed release tablet: 1 tab(s) orally once a day  budesonide-formoterol 160 mcg-4.5 mcg/inh inhalation aerosol: 2 puff(s) inhaled 2 times a day  clopidogrel 75 mg oral tablet: 1 tab(s) orally once a day  docusate sodium 100 mg oral capsule: 2 cap(s) orally every 12 hours  finasteride 5 mg oral tablet: 1 tab(s) orally once a day (at bedtime)  furosemide 40 mg oral tablet: 1 tab(s) orally once a day  gabapentin 300 mg oral capsule: 1 cap(s) orally 2 times a day  gabapentin 600 mg oral tablet: 1 tab(s) orally once a day (at bedtime)  HYDROmorphone 4 mg oral tablet: 1.5 tab(s) orally every 6 hours as needed for  insulin glargine 100 units/mL subcutaneous solution: 10 unit(s) subcutaneous once a day (at bedtime)  insulin lispro 100 units/mL subcutaneous solution: subcutaneous 3 times a day (before meals) per sliding scale:  151-200 = 2 units  201-250 = 4 units  251-300 = 6 units  301-350 = 8 units  351-400 = 10 units  &gt;400 = call MD       lactobacillus acidophilus oral capsule: 1 cap(s) orally 3 times a day  lidocaine 5% topical film: Apply topically to affected area once a day  metFORMIN 500 mg oral tablet: 2 tab(s) orally 2 times a day  Metoprolol Tartrate 25 mg oral tablet: 1 tab(s) orally once a day  multivitamin: 1 tab(s) orally once a day  ondansetron 4 mg oral tablet: 1 tab(s) orally every 6 hours as needed for  nausea  pantoprazole 40 mg oral delayed release tablet: 1 tab(s) orally once a day  polyethylene glycol 3350 oral powder for reconstitution: 17 gram(s) orally once a day (at bedtime)  potassium chloride 20 mEq oral tablet, extended release: 1 tab(s) orally once a day  senna (sennosides) 8.6 mg oral tablet: 2 tab(s) orally once a day (at bedtime)  simvastatin 10 mg oral tablet: 1 tab(s) orally once a day (at bedtime)  spironolactone 25 mg oral tablet: 4 tab(s) orally once a day  sucralfate 1 g/10 mL oral suspension: 10 milliliter(s) orally 4 times a day  tamsulosin 0.4 mg oral capsule: 1 cap(s) orally once a day (at bedtime)  tiotropium 2.5 mcg/inh inhalation aerosol: 2 puff(s) inhaled once a day   amLODIPine 10 mg oral tablet: 1 tab(s) orally once a day  Aspirin Enteric Coated 81 mg oral delayed release tablet: 1 tab(s) orally once a day  bisacodyl 5 mg oral delayed release tablet: 2 tab(s) orally once a day  budesonide-formoterol 160 mcg-4.5 mcg/inh inhalation aerosol: 2 puff(s) inhaled 2 times a day  clopidogrel 75 mg oral tablet: 1 tab(s) orally once a day  docusate sodium 100 mg oral capsule: 3 cap(s) orally once a day (at bedtime)  famotidine 20 mg oral tablet: 2 tab(s) orally once a day (in the evening)  finasteride 5 mg oral tablet: 1 tab(s) orally once a day (at bedtime)  furosemide 40 mg oral tablet: 1 tab(s) orally once a day  gabapentin 300 mg oral capsule: 1 cap(s) orally 2 times a day  gabapentin 600 mg oral tablet: 1 tab(s) orally once a day (at bedtime)  HYDROmorphone 4 mg oral tablet: 1.5 tab(s) orally every 6 hours as needed for pain for 14 days  Incruse Ellipta 62.5 mcg/inh inhalation powder: 1 puff(s) inhaled once a day  insulin glargine 100 units/mL subcutaneous solution: 45 unit(s) subcutaneous once a day before dinner  insulin lispro 100 units/mL subcutaneous solution: 12 unit(s) subcutaneous 3 times a day (before meals)  insulin lispro 100 units/mL subcutaneous solution: subcutaneous 3 times a day (before meals) per sliding scale:  151-200 = 2 units  201-250 = 4 units  251-300 = 6 units  301-350 = 8 units  351-400 = 10 units  &gt;400 = call MD  ipratropium-albuterol 0.5 mg-2.5 mg/3 mL inhalation solution: 3 milliliter(s) by nebulizer every 4 hours as needed for  shortness of breath and/or wheezing  lactulose 10 g/15 mL oral syrup: 15 milliliter(s) orally once a day  lidocaine 5% topical film: Apply topically to affected area once a day , apply for 12 hours on and 12 hours off.  Apply to lower back  magnesium hydroxide 8% oral suspension: 30 milliliter(s) orally 3 times a day As needed Constipation  metFORMIN 500 mg oral tablet: 2 tab(s) orally 2 times a day  Metoprolol Tartrate 25 mg oral tablet: 1 tab(s) orally once a day  multivitamin: 1 tab(s) orally once a day  Mylanta Maximum Strength 400 mg-400 mg-40 mg/5 mL oral suspension: 30 milliliter(s) orally every 6 hours as needed for  indigestion  ondansetron 4 mg oral tablet: 1 tab(s) orally every 6 hours as needed for  nausea  polyethylene glycol 3350 oral powder for reconstitution: 17 gram(s) orally once a day (at bedtime)  potassium chloride 20 mEq oral tablet, extended release: 1 tab(s) orally once a day  senna (sennosides) 8.6 mg oral tablet: 2 tab(s) orally once a day (at bedtime)  simvastatin 10 mg oral tablet: 1 tab(s) orally once a day (at bedtime)  spironolactone 25 mg oral tablet: 4 tab(s) orally once a day  tamsulosin 0.4 mg oral capsule: 1 cap(s) orally once a day (at bedtime)  Tylenol 325 mg oral tablet: 2 tab(s) orally every 6 hours as needed for pain

## 2023-11-24 NOTE — PHYSICAL THERAPY INITIAL EVALUATION ADULT - PERTINENT HX OF CURRENT PROBLEM, REHAB EVAL
as per Critical Care Attending Note: 62 yo male h/o DM, CABG, left BKA, Rt foot TMA, on plavix,  gastroparesis, , hepatic cirrhosis, normal EGD 06/20223, c/o abd pain x last 2 days, nausea,   CT Abd showed 8 mm appendix concerning for early appendicitis. WBC 18,000. Normal nuclear stress test 09/2022, chronic constipation. Normal INR (23 Nov 2023 00:04)  went to OR last night and had laparscopic Appendectomy.

## 2023-11-24 NOTE — DISCHARGE NOTE PROVIDER - NSDCCPCAREPLAN_GEN_ALL_CORE_FT
PRINCIPAL DISCHARGE DIAGNOSIS  Diagnosis: Acute appendicitis  Assessment and Plan of Treatment:       SECONDARY DISCHARGE DIAGNOSES  Diagnosis: Chronic abdominal pain  Assessment and Plan of Treatment:

## 2023-11-24 NOTE — CONSULT NOTE ADULT - SUBJECTIVE AND OBJECTIVE BOX
HPI:  62 yo male h/o DM, CABG, left BKA, Rt foot TMA, on plavix,  gastroparesis,  hepatic cirrhosis, normal EGD 06/2023, c/o abd pain x last 2 days, nausea, vomiting. Patient with previous admission with abdominal pain , emesis,. No fever. CT Abd showed 8 mm appendix concerning for early appendicitis. WBC 18,000. Normal nuclear stress test 09/2022, chronic constipation.     GI consuled for management of gastroparesis. Patient states he had taken reglan previously but no consistently and has not been on any medications for some time. He has been seen by Dr. Lin in the past but has not followed up. Tolerating diet but has intermittent abdominal pain without vomiting.  He is POD #2 s/p lap appy.    PAST MEDICAL & SURGICAL HISTORY:  HTN (hypertension)      DM (diabetes mellitus)      Anemia      GERD (gastroesophageal reflux disease)      S/P BKA (below knee amputation), left      CAD (coronary artery disease)      S/P CABG x 3      Status post amputation of leg          Home Medications:  amLODIPine 10 mg oral tablet: 1 tab(s) orally once a day (24 Nov 2023 12:36)  Aspirin Enteric Coated 81 mg oral delayed release tablet: 1 tab(s) orally once a day (24 Nov 2023 12:36)  bisacodyl 5 mg oral delayed release tablet: 2 tab(s) orally once a day (24 Nov 2023 12:27)  budesonide-formoterol 160 mcg-4.5 mcg/inh inhalation aerosol: 2 puff(s) inhaled 2 times a day (24 Nov 2023 12:36)  clopidogrel 75 mg oral tablet: 1 tab(s) orally once a day (24 Nov 2023 12:36)  docusate sodium 100 mg oral capsule: 3 cap(s) orally once a day (at bedtime) (24 Nov 2023 12:27)  famotidine 20 mg oral tablet: 2 tab(s) orally once a day (in the evening) (24 Nov 2023 12:28)  finasteride 5 mg oral tablet: 1 tab(s) orally once a day (at bedtime) (24 Nov 2023 12:36)  furosemide 40 mg oral tablet: 1 tab(s) orally once a day (24 Nov 2023 12:36)  gabapentin 300 mg oral capsule: 1 cap(s) orally 2 times a day (24 Nov 2023 12:36)  gabapentin 600 mg oral tablet: 1 tab(s) orally once a day (at bedtime) (24 Nov 2023 12:36)  HYDROmorphone 4 mg oral tablet: 1.5 tab(s) orally every 6 hours as needed for pain for 14 days (24 Nov 2023 12:29)  Incruse Ellipta 62.5 mcg/inh inhalation powder: 1 puff(s) inhaled once a day (24 Nov 2023 12:30)  insulin glargine 100 units/mL subcutaneous solution: 45 unit(s) subcutaneous once a day before dinner (24 Nov 2023 12:26)  insulin lispro 100 units/mL subcutaneous solution: 12 unit(s) subcutaneous 3 times a day (before meals) (24 Nov 2023 12:30)  insulin lispro 100 units/mL subcutaneous solution: subcutaneous 3 times a day (before meals) per sliding scale:  151-200 = 2 units  201-250 = 4 units  251-300 = 6 units  301-350 = 8 units  351-400 = 10 units  &gt;400 = call MD (24 Nov 2023 12:31)  ipratropium-albuterol 0.5 mg-2.5 mg/3 mL inhalation solution: 3 milliliter(s) by nebulizer every 4 hours as needed for  shortness of breath and/or wheezing (24 Nov 2023 12:31)  lactulose 10 g/15 mL oral syrup: 15 milliliter(s) orally once a day (24 Nov 2023 12:32)  lidocaine 5% topical film: Apply topically to affected area once a day , apply for 12 hours on and 12 hours off.  Apply to lower back (24 Nov 2023 12:33)  metFORMIN 500 mg oral tablet: 2 tab(s) orally 2 times a day (24 Nov 2023 12:36)  Metoprolol Tartrate 25 mg oral tablet: 1 tab(s) orally once a day (24 Nov 2023 12:36)  multivitamin: 1 tab(s) orally once a day (24 Nov 2023 12:36)  Mylanta Maximum Strength 400 mg-400 mg-40 mg/5 mL oral suspension: 30 milliliter(s) orally every 6 hours as needed for  indigestion (24 Nov 2023 12:34)  ondansetron 4 mg oral tablet: 1 tab(s) orally every 6 hours as needed for  nausea (24 Nov 2023 12:36)  polyethylene glycol 3350 oral powder for reconstitution: 17 gram(s) orally once a day (at bedtime) (24 Nov 2023 12:36)  potassium chloride 20 mEq oral tablet, extended release: 1 tab(s) orally once a day (24 Nov 2023 12:36)  senna (sennosides) 8.6 mg oral tablet: 2 tab(s) orally once a day (at bedtime) (24 Nov 2023 12:36)  simvastatin 10 mg oral tablet: 1 tab(s) orally once a day (at bedtime) (24 Nov 2023 12:36)  spironolactone 25 mg oral tablet: 4 tab(s) orally once a day (24 Nov 2023 12:36)  tamsulosin 0.4 mg oral capsule: 1 cap(s) orally once a day (at bedtime) (24 Nov 2023 12:36)  Tylenol 325 mg oral tablet: 2 tab(s) orally every 6 hours as needed for pain (24 Nov 2023 12:36)      MEDICATIONS  (STANDING):  amLODIPine   Tablet 10 milliGRAM(s) Oral daily  clopidogrel Tablet 75 milliGRAM(s) Oral daily  dextrose 5%. 1000 milliLiter(s) (100 mL/Hr) IV Continuous <Continuous>  dextrose 5%. 1000 milliLiter(s) (50 mL/Hr) IV Continuous <Continuous>  dextrose 50% Injectable 25 Gram(s) IV Push once  dextrose 50% Injectable 12.5 Gram(s) IV Push once  dextrose 50% Injectable 25 Gram(s) IV Push once  enoxaparin Injectable 40 milliGRAM(s) SubCutaneous every 24 hours  finasteride 5 milliGRAM(s) Oral daily  furosemide    Tablet 40 milliGRAM(s) Oral daily  gabapentin 300 milliGRAM(s) Oral two times a day  glucagon  Injectable 1 milliGRAM(s) IntraMuscular once  insulin glargine Injectable (LANTUS) 30 Unit(s) SubCutaneous at bedtime  insulin lispro (ADMELOG) corrective regimen sliding scale   SubCutaneous three times a day before meals  lactated ringers. 1000 milliLiter(s) (75 mL/Hr) IV Continuous <Continuous>  lactulose Syrup 15 Gram(s) Oral daily  metoprolol tartrate 25 milliGRAM(s) Oral daily  saline laxative (FLEET) Rectal Enema 1 Enema Rectal daily  simvastatin 10 milliGRAM(s) Oral at bedtime  spironolactone 100 milliGRAM(s) Oral daily  tamsulosin 0.4 milliGRAM(s) Oral at bedtime  tiotropium 2.5 MICROgram(s) Inhaler 2 Puff(s) Inhalation daily    MEDICATIONS  (PRN):  dextrose Oral Gel 15 Gram(s) Oral once PRN Blood Glucose LESS THAN 70 milliGRAM(s)/deciliter  HYDROmorphone   Tablet 6 milliGRAM(s) Oral every 6 hours PRN Moderate Pain (4 - 6)  ibuprofen  Tablet. 600 milliGRAM(s) Oral every 6 hours PRN Mild Pain (1 - 3)  magnesium hydroxide Suspension 30 milliLiter(s) Oral three times a day PRN Constipation  ondansetron   Disintegrating Tablet 4 milliGRAM(s) Oral every 6 hours PRN Nausea      Allergies    No Known Allergies    Intolerances        SOCIAL HISTORY:    FAMILY HISTORY:      ROS  As above  Otherwise unremarkable    Vital Signs Last 24 Hrs  T(C): 36.7 (24 Nov 2023 12:06), Max: 37.6 (23 Nov 2023 16:06)  T(F): 98.1 (24 Nov 2023 12:06), Max: 99.6 (23 Nov 2023 16:06)  HR: 73 (24 Nov 2023 12:06) (72 - 79)  BP: 107/77 (24 Nov 2023 12:06) (107/77 - 147/71)  BP(mean): 84 (23 Nov 2023 17:00) (74 - 87)  RR: 18 (24 Nov 2023 12:06) (16 - 18)  SpO2: 93% (24 Nov 2023 12:06) (93% - 99%)    Parameters below as of 24 Nov 2023 12:06  Patient On (Oxygen Delivery Method): room air        Constitutional: NAD, well-developed  Respiratory: CTAB  Cardiovascular: S1 and S2, RRR  Gastrointestinal: BS+, soft, NT/ND  Extremities: No peripheral edema  Psychiatric: Normal mood, normal affect  Skin: No rashes    LABS:                        13.2   16.05 )-----------( 255      ( 24 Nov 2023 11:05 )             40.4     11-24    141  |  108  |  25<H>  ----------------------------<  149<H>  4.6   |  28  |  1.47<H>    Ca    9.0      24 Nov 2023 11:05    TPro  7.6  /  Alb  3.2<L>  /  TBili  0.3  /  DBili  x   /  AST  22  /  ALT  26  /  AlkPhos  162<H>  11-24    PT/INR - ( 22 Nov 2023 16:54 )   PT: 10.7 sec;   INR: 0.95 ratio         PTT - ( 22 Nov 2023 16:54 )  PTT:35.2 sec  LIVER FUNCTIONS - ( 24 Nov 2023 11:05 )  Alb: 3.2 g/dL / Pro: 7.6 gm/dL / ALK PHOS: 162 U/L / ALT: 26 U/L / AST: 22 U/L / GGT: x             RADIOLOGY & ADDITIONAL STUDIES: HPI:  64 yo male h/o DM, CABG, left BKA, Rt foot TMA, on plavix,  gastroparesis,  hepatic cirrhosis, normal EGD 06/2023, c/o abd pain x last 2 days, nausea, vomiting. Patient with previous admission with abdominal pain , emesis,. No fever. CT Abd showed 8 mm appendix concerning for early appendicitis. WBC 18,000. Normal nuclear stress test 09/2022, chronic constipation.     GI consuled for management of gastroparesis. Patient states he had taken reglan previously but no consistently and has not been on any medications for some time. He has been seen by Dr. Lin in the past but has not followed up. Tolerating diet but has intermittent abdominal pain without vomiting.  He is POD #2 s/p lap appy.    PAST MEDICAL & SURGICAL HISTORY:  HTN (hypertension)      DM (diabetes mellitus)      Anemia      GERD (gastroesophageal reflux disease)      S/P BKA (below knee amputation), left      CAD (coronary artery disease)      S/P CABG x 3      Status post amputation of leg          Home Medications:  amLODIPine 10 mg oral tablet: 1 tab(s) orally once a day (24 Nov 2023 12:36)  Aspirin Enteric Coated 81 mg oral delayed release tablet: 1 tab(s) orally once a day (24 Nov 2023 12:36)  bisacodyl 5 mg oral delayed release tablet: 2 tab(s) orally once a day (24 Nov 2023 12:27)  budesonide-formoterol 160 mcg-4.5 mcg/inh inhalation aerosol: 2 puff(s) inhaled 2 times a day (24 Nov 2023 12:36)  clopidogrel 75 mg oral tablet: 1 tab(s) orally once a day (24 Nov 2023 12:36)  docusate sodium 100 mg oral capsule: 3 cap(s) orally once a day (at bedtime) (24 Nov 2023 12:27)  famotidine 20 mg oral tablet: 2 tab(s) orally once a day (in the evening) (24 Nov 2023 12:28)  finasteride 5 mg oral tablet: 1 tab(s) orally once a day (at bedtime) (24 Nov 2023 12:36)  furosemide 40 mg oral tablet: 1 tab(s) orally once a day (24 Nov 2023 12:36)  gabapentin 300 mg oral capsule: 1 cap(s) orally 2 times a day (24 Nov 2023 12:36)  gabapentin 600 mg oral tablet: 1 tab(s) orally once a day (at bedtime) (24 Nov 2023 12:36)  HYDROmorphone 4 mg oral tablet: 1.5 tab(s) orally every 6 hours as needed for pain for 14 days (24 Nov 2023 12:29)  Incruse Ellipta 62.5 mcg/inh inhalation powder: 1 puff(s) inhaled once a day (24 Nov 2023 12:30)  insulin glargine 100 units/mL subcutaneous solution: 45 unit(s) subcutaneous once a day before dinner (24 Nov 2023 12:26)  insulin lispro 100 units/mL subcutaneous solution: 12 unit(s) subcutaneous 3 times a day (before meals) (24 Nov 2023 12:30)  insulin lispro 100 units/mL subcutaneous solution: subcutaneous 3 times a day (before meals) per sliding scale:  151-200 = 2 units  201-250 = 4 units  251-300 = 6 units  301-350 = 8 units  351-400 = 10 units  &gt;400 = call MD (24 Nov 2023 12:31)  ipratropium-albuterol 0.5 mg-2.5 mg/3 mL inhalation solution: 3 milliliter(s) by nebulizer every 4 hours as needed for  shortness of breath and/or wheezing (24 Nov 2023 12:31)  lactulose 10 g/15 mL oral syrup: 15 milliliter(s) orally once a day (24 Nov 2023 12:32)  lidocaine 5% topical film: Apply topically to affected area once a day , apply for 12 hours on and 12 hours off.  Apply to lower back (24 Nov 2023 12:33)  metFORMIN 500 mg oral tablet: 2 tab(s) orally 2 times a day (24 Nov 2023 12:36)  Metoprolol Tartrate 25 mg oral tablet: 1 tab(s) orally once a day (24 Nov 2023 12:36)  multivitamin: 1 tab(s) orally once a day (24 Nov 2023 12:36)  Mylanta Maximum Strength 400 mg-400 mg-40 mg/5 mL oral suspension: 30 milliliter(s) orally every 6 hours as needed for  indigestion (24 Nov 2023 12:34)  ondansetron 4 mg oral tablet: 1 tab(s) orally every 6 hours as needed for  nausea (24 Nov 2023 12:36)  polyethylene glycol 3350 oral powder for reconstitution: 17 gram(s) orally once a day (at bedtime) (24 Nov 2023 12:36)  potassium chloride 20 mEq oral tablet, extended release: 1 tab(s) orally once a day (24 Nov 2023 12:36)  senna (sennosides) 8.6 mg oral tablet: 2 tab(s) orally once a day (at bedtime) (24 Nov 2023 12:36)  simvastatin 10 mg oral tablet: 1 tab(s) orally once a day (at bedtime) (24 Nov 2023 12:36)  spironolactone 25 mg oral tablet: 4 tab(s) orally once a day (24 Nov 2023 12:36)  tamsulosin 0.4 mg oral capsule: 1 cap(s) orally once a day (at bedtime) (24 Nov 2023 12:36)  Tylenol 325 mg oral tablet: 2 tab(s) orally every 6 hours as needed for pain (24 Nov 2023 12:36)      MEDICATIONS  (STANDING):  amLODIPine   Tablet 10 milliGRAM(s) Oral daily  clopidogrel Tablet 75 milliGRAM(s) Oral daily  dextrose 5%. 1000 milliLiter(s) (100 mL/Hr) IV Continuous <Continuous>  dextrose 5%. 1000 milliLiter(s) (50 mL/Hr) IV Continuous <Continuous>  dextrose 50% Injectable 25 Gram(s) IV Push once  dextrose 50% Injectable 12.5 Gram(s) IV Push once  dextrose 50% Injectable 25 Gram(s) IV Push once  enoxaparin Injectable 40 milliGRAM(s) SubCutaneous every 24 hours  finasteride 5 milliGRAM(s) Oral daily  furosemide    Tablet 40 milliGRAM(s) Oral daily  gabapentin 300 milliGRAM(s) Oral two times a day  glucagon  Injectable 1 milliGRAM(s) IntraMuscular once  insulin glargine Injectable (LANTUS) 30 Unit(s) SubCutaneous at bedtime  insulin lispro (ADMELOG) corrective regimen sliding scale   SubCutaneous three times a day before meals  lactated ringers. 1000 milliLiter(s) (75 mL/Hr) IV Continuous <Continuous>  lactulose Syrup 15 Gram(s) Oral daily  metoprolol tartrate 25 milliGRAM(s) Oral daily  saline laxative (FLEET) Rectal Enema 1 Enema Rectal daily  simvastatin 10 milliGRAM(s) Oral at bedtime  spironolactone 100 milliGRAM(s) Oral daily  tamsulosin 0.4 milliGRAM(s) Oral at bedtime  tiotropium 2.5 MICROgram(s) Inhaler 2 Puff(s) Inhalation daily    MEDICATIONS  (PRN):  dextrose Oral Gel 15 Gram(s) Oral once PRN Blood Glucose LESS THAN 70 milliGRAM(s)/deciliter  HYDROmorphone   Tablet 6 milliGRAM(s) Oral every 6 hours PRN Moderate Pain (4 - 6)  ibuprofen  Tablet. 600 milliGRAM(s) Oral every 6 hours PRN Mild Pain (1 - 3)  magnesium hydroxide Suspension 30 milliLiter(s) Oral three times a day PRN Constipation  ondansetron   Disintegrating Tablet 4 milliGRAM(s) Oral every 6 hours PRN Nausea      Allergies    No Known Allergies    Intolerances        SOCIAL HISTORY:    FAMILY HISTORY:      ROS  As above  Otherwise unremarkable    Vital Signs Last 24 Hrs  T(C): 36.7 (24 Nov 2023 12:06), Max: 37.6 (23 Nov 2023 16:06)  T(F): 98.1 (24 Nov 2023 12:06), Max: 99.6 (23 Nov 2023 16:06)  HR: 73 (24 Nov 2023 12:06) (72 - 79)  BP: 107/77 (24 Nov 2023 12:06) (107/77 - 147/71)  BP(mean): 84 (23 Nov 2023 17:00) (74 - 87)  RR: 18 (24 Nov 2023 12:06) (16 - 18)  SpO2: 93% (24 Nov 2023 12:06) (93% - 99%)    Parameters below as of 24 Nov 2023 12:06  Patient On (Oxygen Delivery Method): room air        Constitutional: NAD  Respiratory: CTAB  Cardiovascular: S1 and S2, RRR  Gastrointestinal: BS+, soft, NT/ND  Extremities: L BKA  Psychiatric: Normal mood, normal affect  Skin: No rashes    LABS:                        13.2   16.05 )-----------( 255      ( 24 Nov 2023 11:05 )             40.4     11-24    141  |  108  |  25<H>  ----------------------------<  149<H>  4.6   |  28  |  1.47<H>    Ca    9.0      24 Nov 2023 11:05    TPro  7.6  /  Alb  3.2<L>  /  TBili  0.3  /  DBili  x   /  AST  22  /  ALT  26  /  AlkPhos  162<H>  11-24    PT/INR - ( 22 Nov 2023 16:54 )   PT: 10.7 sec;   INR: 0.95 ratio         PTT - ( 22 Nov 2023 16:54 )  PTT:35.2 sec  LIVER FUNCTIONS - ( 24 Nov 2023 11:05 )  Alb: 3.2 g/dL / Pro: 7.6 gm/dL / ALK PHOS: 162 U/L / ALT: 26 U/L / AST: 22 U/L / GGT: x             RADIOLOGY & ADDITIONAL STUDIES:

## 2023-11-24 NOTE — PHYSICAL THERAPY INITIAL EVALUATION ADULT - GAIT TRAINING, PT EVAL
with prosthesis on- The pt will be able to ambulate more than 100 feet independently using the least restrictive assistive device by time of discharge from acute care PT program

## 2023-11-25 NOTE — DISCHARGE NOTE NURSING/CASE MANAGEMENT/SOCIAL WORK - NSDCFUADDAPPT_GEN_ALL_CORE_FT
Follow up as needed poly Ruiz   Gastroenterology follow up for gastroparesis in 1-2 weeks    Follow up with PCP in 1-2 weeks

## 2023-11-25 NOTE — PROGRESS NOTE ADULT - ASSESSMENT
POD#2 s/p lap appy, chronic abd pain likely multifactorial (DM, gastroparesis, vasculopath, chronic mesenteric ischemia?, on chronic dilaudid, constipation)  -MOM q8hrs, enema  -OOB  -GI consult (patient wants to discuss about gastric pacemaker fo gastroparesis)  -Likely d/c to [facility] tomorrow
Patient with hx.    Diabetes Mellitus  PVD  gastroparesis  cirrhosis    Presented with Acute Appendicitis , s/p lap appy  hemodynamically stable    1. s/p Appy, diet as tolerated, got periop abx  2. DM was on 45 basilag insulin, will give 30 tonight with sliding scale not eating fully  3. aspirin, plavix for pad  4. oob to chair  5. lactulose for hx. hepatic encephalopahty    may transfer to med/surg bed
s/p lap appy  -D/c to [place]

## 2023-11-25 NOTE — DISCHARGE NOTE NURSING/CASE MANAGEMENT/SOCIAL WORK - PATIENT PORTAL LINK FT
You can access the FollowMyHealth Patient Portal offered by [facility] by registering at the following website: [email or web address]. By joining [facility]’s FollowMyHealth portal, you will also be able to view your health information using other applications (apps) compatible with our system.

## 2023-11-25 NOTE — CONSULT NOTE ADULT - SUBJECTIVE AND OBJECTIVE BOX
CC:  f/u consult , DM, post op day 2,  RLQ pain, limb pain       HPI: 62yo/M with PMH CAD s/p CABG, MV repair. diabetes with diabetic gastroparesis, PVD s/p prior LT BKA and RT TMA, liver cirrhosis, HTN, hyperlipidemia, GERD, BPH, chronic pain on Dilaudid, hospitalized on 11/22/23 for RLQ pain , had lab  appendectomy on 11/23/23, found to have appendicities with localized peritonitis,  seen by GI , started on Reglan  5mg tid for gastroparesis, bowel regime for constipation,  cleared by surgery to be discharged to [place] [facility].       PMH: as above    Meds: per reconciliation sheet, noted below  MEDICATIONS  (STANDING):  amLODIPine   Tablet 10 milliGRAM(s) Oral daily  clopidogrel Tablet 75 milliGRAM(s) Oral daily  dextrose 5%. 1000 milliLiter(s) (50 mL/Hr) IV Continuous <Continuous>  dextrose 5%. 1000 milliLiter(s) (100 mL/Hr) IV Continuous <Continuous>  dextrose 50% Injectable 25 Gram(s) IV Push once  dextrose 50% Injectable 12.5 Gram(s) IV Push once  dextrose 50% Injectable 25 Gram(s) IV Push once  enoxaparin Injectable 40 milliGRAM(s) SubCutaneous every 24 hours  finasteride 5 milliGRAM(s) Oral daily  gabapentin 300 milliGRAM(s) Oral two times a day  glucagon  Injectable 1 milliGRAM(s) IntraMuscular once  HYDROmorphone  Injectable 1 milliGRAM(s) IV Push once  insulin glargine Injectable (LANTUS) 30 Unit(s) SubCutaneous at bedtime  insulin lispro (ADMELOG) corrective regimen sliding scale   SubCutaneous three times a day before meals  lactated ringers. 1000 milliLiter(s) (75 mL/Hr) IV Continuous <Continuous>  lactulose Syrup 15 Gram(s) Oral daily  melatonin 3 milliGRAM(s) Oral at bedtime  metoclopramide 5 milliGRAM(s) Oral three times a day before meals  metoprolol tartrate 25 milliGRAM(s) Oral daily  saline laxative (FLEET) Rectal Enema 1 Enema Rectal daily  simvastatin 10 milliGRAM(s) Oral at bedtime  tamsulosin 0.4 milliGRAM(s) Oral at bedtime  tiotropium 2.5 MICROgram(s) Inhaler 2 Puff(s) Inhalation daily    MEDICATIONS  (PRN):  dextrose Oral Gel 15 Gram(s) Oral once PRN Blood Glucose LESS THAN 70 milliGRAM(s)/deciliter  HYDROmorphone   Tablet 6 milliGRAM(s) Oral every 6 hours PRN Moderate Pain (4 - 6)  ibuprofen  Tablet. 600 milliGRAM(s) Oral every 6 hours PRN Mild Pain (1 - 3)  magnesium hydroxide Suspension 30 milliLiter(s) Oral three times a day PRN Constipation  ondansetron   Disintegrating Tablet 4 milliGRAM(s) Oral every 6 hours PRN Nausea    Allergies    No Known Allergies    Intolerances      Social: no smoking, no alcohol, no illegal drugs; no recent travel, no exposure to TB  FAMILY HISTORY:    ROS: the patient denies fever, no chills, no HA, no dizziness, no sore throat, no blurry vision, no CP, no palpitations, no SOB, no cough, no abdominal pain, no diarrhea, no N/V, no dysuria, no leg pain, no claudication, no rash, no joint aches, no rectal pain or bleeding, no night sweats    Vital Signs Last 24 Hrs  T(C): 36.5 (25 Nov 2023 08:00), Max: 36.7 (24 Nov 2023 12:06)  T(F): 97.7 (25 Nov 2023 08:00), Max: 98.1 (24 Nov 2023 12:06)  HR: 73 (25 Nov 2023 08:00) (71 - 73)  BP: 138/73 (25 Nov 2023 08:00) (107/77 - 138/73)  BP(mean): 89 (24 Nov 2023 16:00) (89 - 89)  RR: 17 (25 Nov 2023 08:00) (17 - 18)  SpO2: 100% (25 Nov 2023 08:00) (93% - 100%)    Parameters below as of 25 Nov 2023 08:00  Patient On (Oxygen Delivery Method): room air        PE:    Constitutional:   HEENT:  EOMI, PERRLA  Neck: supple  Back: no tenderness  Respiratory: clear  Cardiovascular: S1S2 regular, no murmurs  Abdomen: soft, not tender, not distended, positive BS  Genitourinary: deferred  Rectal: deferred  Musculoskeletal: no muscle tenderness, no joint swelling or tenderness  Extremities: no pedal edema  Neurological: AxOx3, moving all extremities, no focal deficits  Skin: no rashes    Labs:                        12.4   10.69 )-----------( 222      ( 25 Nov 2023 07:50 )             37.9     11-25    138  |  107  |  31<H>  ----------------------------<  157<H>  4.5   |  24  |  1.57<H>    Ca    8.8      25 Nov 2023 07:50    TPro  7.6  /  Alb  3.2<L>  /  TBili  0.3  /  DBili  x   /  AST  22  /  ALT  26  /  AlkPhos  162<H>  11-24     LIVER FUNCTIONS - ( 24 Nov 2023 11:05 )  Alb: 3.2 g/dL / Pro: 7.6 gm/dL / ALK PHOS: 162 U/L / ALT: 26 U/L / AST: 22 U/L / GGT: x           Urinalysis Basic - ( 25 Nov 2023 07:50 )    Color: x / Appearance: x / SG: x / pH: x  Gluc: 157 mg/dL / Ketone: x  / Bili: x / Urobili: x   Blood: x / Protein: x / Nitrite: x   Leuk Esterase: x / RBC: x / WBC x   Sq Epi: x / Non Sq Epi: x / Bacteria: x

## 2023-11-25 NOTE — DISCHARGE NOTE NURSING/CASE MANAGEMENT/SOCIAL WORK - NSDCPEWEB_GEN_ALL_CORE
[facility] for Tobacco Control website --- [email or web address] website --- [email or web address]

## 2023-11-25 NOTE — DISCHARGE NOTE NURSING/CASE MANAGEMENT/SOCIAL WORK - NSDCPEEMAIL_GEN_ALL_CORE
[facility] for Tobacco Control email [email or web address] This document is complete and the patient is ready for discharge.

## 2023-11-25 NOTE — PROGRESS NOTE ADULT - SUBJECTIVE AND OBJECTIVE BOX
asked to see Patient by Dr. Ryan    Critical care consult     HPI:  62 yo male h/o DM, CABG, left BKA, Rt foot TMA, on plavix,  gastroparesis, , hepatic cirrhosis, normal EGD 06/20223, c/o abd pain x last 2 days, nausea,   CT Abd showed 8 mm appendix concerning for early appendicitis. WBC 18,000. Normal nuclear stress test 09/2022, chronic constipation. Normal INR (23 Nov 2023 00:04)  went to OR last night and had laparscopic Appendectomy.    PAST MEDICAL & SURGICAL HISTORY:  HTN (hypertension)  DM (diabetes mellitus)  GERD (gastroesophageal reflux disease)  S/P BKA (below knee amputation), left  CAD (coronary artery disease)  s/P CABG x 3     FAMILY HISTORY:    non contributory    Social Hx. - From [place]    MEDICATIONS  (STANDING):  amLODIPine   Tablet 10 milliGRAM(s) Oral daily  aspirin enteric coated 81 milliGRAM(s) Oral daily  dextrose 5%. 1000 milliLiter(s) (100 mL/Hr) IV Continuous <Continuous>  dextrose 5%. 1000 milliLiter(s) (50 mL/Hr) IV Continuous <Continuous>  dextrose 50% Injectable 25 Gram(s) IV Push once  dextrose 50% Injectable 12.5 Gram(s) IV Push once  dextrose 50% Injectable 25 Gram(s) IV Push once  enoxaparin Injectable 40 milliGRAM(s) SubCutaneous every 24 hours  finasteride 5 milliGRAM(s) Oral daily  furosemide    Tablet 40 milliGRAM(s) Oral daily  gabapentin 300 milliGRAM(s) Oral two times a day  glucagon  Injectable 1 milliGRAM(s) IntraMuscular once  insulin glargine Injectable (LANTUS) 30 Unit(s) SubCutaneous at bedtime  insulin lispro (ADMELOG) corrective regimen sliding scale   SubCutaneous three times a day before meals  lactated ringers. 1000 milliLiter(s) (75 mL/Hr) IV Continuous <Continuous>  lactulose Syrup 15 Gram(s) Oral daily  metoprolol tartrate 25 milliGRAM(s) Oral daily  saline laxative (FLEET) Rectal Enema 1 Enema Rectal daily  simvastatin 10 milliGRAM(s) Oral at bedtime  spironolactone 100 milliGRAM(s) Oral daily  tamsulosin 0.4 milliGRAM(s) Oral at bedtime  tiotropium 2.5 MICROgram(s) Inhaler 2 Puff(s) Inhalation daily    MEDICATIONS  (PRN):  dextrose Oral Gel 15 Gram(s) Oral once PRN Blood Glucose LESS THAN 70 milliGRAM(s)/deciliter  HYDROmorphone   Tablet 6 milliGRAM(s) Oral every 6 hours PRN Moderate Pain (4 - 6)  magnesium hydroxide Suspension 30 milliLiter(s) Oral three times a day PRN Constipation  ondansetron   Disintegrating Tablet 4 milliGRAM(s) Oral every 6 hours PRN Nausea    I&O's Summary    22 Nov 2023 07:01  -  23 Nov 2023 07:00  --------------------------------------------------------  IN: 650 mL / OUT: 100 mL / NET: 550 mL    REVIEW OF SYSTEMS:    CONSTITUTIONAL:   weak  HEENT: no visual problems   CARDIOVASCULAR:  No chest pain  RESPIRATORY:  No  shortness of breath,   GASTROINTESTINAL:  incisional pain, hx. of gastroparesis  GENITOURINARY:  No dysuria, frequency or urgency.  MUSCULOSKELETAL:  s/p right BKA  ENDOCRINE:  hx. diabetes, takes 45 units basilar at night  HEMATOLOGICAL:   on aspirin /plavix for pad      Vital Signs Last 24 Hrs  T(C): 36.8 (23 Nov 2023 06:11), Max: 36.8 (22 Nov 2023 20:46)  T(F): 98.3 (23 Nov 2023 06:11), Max: 98.3 (23 Nov 2023 06:11)  HR: 83 (23 Nov 2023 12:00) (79 - 92)  BP: 141/81 (23 Nov 2023 12:00) (109/75 - 171/90)  BP(mean): 98 (23 Nov 2023 12:00) (80 - 105)  RR: 11 (23 Nov 2023 12:00) (10 - 20)  SpO2: 96% (23 Nov 2023 12:00) (94% - 100%)    Parameters below as of 23 Nov 2023 10:00  Patient On (Oxygen Delivery Method): nasal cannula    O2 Concentration (%): 2  Daily Height in cm: 165.1 (22 Nov 2023 15:03)    Daily     PHYSICAL EXAM:    General:  acute distress.  Neuro: Alert and oriented to person, place, and time.   HEENT: No JVD,    Cardiovascular:  Regular rate and rhythm, with normal S1 and S2.    Lungs:  clear. no rales, no wheezing, .  Abdomen: wounds clean, incisional pain  Skin:  Warm, dry, well-perfused. No rashes or other lesions.   Extremities: s/p right bka     LABS:                        14.8   18.45 )-----------( 274      ( 22 Nov 2023 16:54 )             44.5     11-22    137  |  105  |  31<H>  ----------------------------<  193<H>  4.3   |  26  |  1.28    Ca    9.5      22 Nov 2023 16:54    TPro  8.5<H>  /  Alb  3.6  /  TBili  0.3  /  DBili  x   /  AST  15  /  ALT  31  /  AlkPhos  202<H>  11-22    PT/INR - ( 22 Nov 2023 16:54 )   PT: 10.7 sec;   INR: 0.95 ratio         PTT - ( 22 Nov 2023 16:54 )  PTT:35.2 sec  Urinalysis Basic - ( 23 Nov 2023 07:00 )    Color: Yellow / Appearance: Clear / SG: >1.030 / pH: x  Gluc: x / Ketone: Negative mg/dL  / Bili: Negative / Urobili: 1.0 mg/dL   Blood: x / Protein: 300 mg/dL / Nitrite: Negative   Leuk Esterase: Negative / RBC: 10 /HPF / WBC 0 /HPF   Sq Epi: x / Non Sq Epi: x / Bacteria: Negative /HPF        
s/p lap appy  Patient chronic abd pain on dilaudid 6 mg po q6, Patient refers having severe abd pain after surgery  Will give him 1 mg dialudid x1 and start dilaudid 6mg po q6h  Hospitalist consult  reg diet  Transfer to floor  Likely d/c to [place] tomorrow
POD#2 s/p lap appy, c/o abd pain, tolerating diet    Abd soft, appropriate tender  RT TMA looks oK          11-24-23 @ 10:53    Vital Signs Last 24 Hrs  T(C): 36.4 (24 Nov 2023 08:03), Max: 37.6 (23 Nov 2023 16:06)  T(F): 97.5 (24 Nov 2023 08:03), Max: 99.6 (23 Nov 2023 16:06)  HR: 79 (24 Nov 2023 08:03) (72 - 86)  BP: 147/71 (24 Nov 2023 08:03) (108/72 - 147/71)  BP(mean): 84 (23 Nov 2023 17:00) (74 - 98)  RR: 16 (24 Nov 2023 08:03) (11 - 18)  SpO2: 97% (24 Nov 2023 08:03) (93% - 99%)    Parameters below as of 24 Nov 2023 08:03  Patient On (Oxygen Delivery Method): room air                              14.8   18.45 )-----------( 274      ( 22 Nov 2023 16:54 )             44.5       11-22    137  |  105  |  31<H>  ----------------------------<  193<H>  4.3   |  26  |  1.28    Ca    9.5      22 Nov 2023 16:54    TPro  8.5<H>  /  Alb  3.6  /  TBili  0.3  /  DBili  x   /  AST  15  /  ALT  31  /  AlkPhos  202<H>  11-22      LIVER FUNCTIONS - ( 22 Nov 2023 16:54 )  Alb: 3.6 g/dL / Pro: 8.5 gm/dL / ALK PHOS: 202 U/L / ALT: 31 U/L / AST: 15 U/L / GGT: x             MEDICATIONS  (STANDING):  amLODIPine   Tablet 10 milliGRAM(s) Oral daily  clopidogrel Tablet 75 milliGRAM(s) Oral daily  dextrose 5%. 1000 milliLiter(s) (100 mL/Hr) IV Continuous <Continuous>  dextrose 5%. 1000 milliLiter(s) (50 mL/Hr) IV Continuous <Continuous>  dextrose 50% Injectable 25 Gram(s) IV Push once  dextrose 50% Injectable 25 Gram(s) IV Push once  dextrose 50% Injectable 12.5 Gram(s) IV Push once  enoxaparin Injectable 40 milliGRAM(s) SubCutaneous every 24 hours  finasteride 5 milliGRAM(s) Oral daily  furosemide    Tablet 40 milliGRAM(s) Oral daily  gabapentin 300 milliGRAM(s) Oral two times a day  glucagon  Injectable 1 milliGRAM(s) IntraMuscular once  insulin glargine Injectable (LANTUS) 30 Unit(s) SubCutaneous at bedtime  insulin lispro (ADMELOG) corrective regimen sliding scale   SubCutaneous three times a day before meals  lactated ringers. 1000 milliLiter(s) (75 mL/Hr) IV Continuous <Continuous>  lactulose Syrup 15 Gram(s) Oral daily  metoprolol tartrate 25 milliGRAM(s) Oral daily  saline laxative (FLEET) Rectal Enema 1 Enema Rectal daily  simvastatin 10 milliGRAM(s) Oral at bedtime  spironolactone 100 milliGRAM(s) Oral daily  tamsulosin 0.4 milliGRAM(s) Oral at bedtime  tiotropium 2.5 MICROgram(s) Inhaler 2 Puff(s) Inhalation daily    MEDICATIONS  (PRN):  dextrose Oral Gel 15 Gram(s) Oral once PRN Blood Glucose LESS THAN 70 milliGRAM(s)/deciliter  HYDROmorphone   Tablet 6 milliGRAM(s) Oral every 6 hours PRN Moderate Pain (4 - 6)  ibuprofen  Tablet. 600 milliGRAM(s) Oral every 6 hours PRN Mild Pain (1 - 3)  magnesium hydroxide Suspension 30 milliLiter(s) Oral three times a day PRN Constipation  ondansetron   Disintegrating Tablet 4 milliGRAM(s) Oral every 6 hours PRN Nausea      MEDICATIONS  (STANDING):  amLODIPine   Tablet 10 milliGRAM(s) Oral daily  clopidogrel Tablet 75 milliGRAM(s) Oral daily  dextrose 5%. 1000 milliLiter(s) (50 mL/Hr) IV Continuous <Continuous>  dextrose 5%. 1000 milliLiter(s) (100 mL/Hr) IV Continuous <Continuous>  dextrose 50% Injectable 12.5 Gram(s) IV Push once  dextrose 50% Injectable 25 Gram(s) IV Push once  dextrose 50% Injectable 25 Gram(s) IV Push once  enoxaparin Injectable 40 milliGRAM(s) SubCutaneous every 24 hours  finasteride 5 milliGRAM(s) Oral daily  furosemide    Tablet 40 milliGRAM(s) Oral daily  gabapentin 300 milliGRAM(s) Oral two times a day  glucagon  Injectable 1 milliGRAM(s) IntraMuscular once  insulin glargine Injectable (LANTUS) 30 Unit(s) SubCutaneous at bedtime  insulin lispro (ADMELOG) corrective regimen sliding scale   SubCutaneous three times a day before meals  lactated ringers. 1000 milliLiter(s) (75 mL/Hr) IV Continuous <Continuous>  lactulose Syrup 15 Gram(s) Oral daily  metoprolol tartrate 25 milliGRAM(s) Oral daily  saline laxative (FLEET) Rectal Enema 1 Enema Rectal daily  simvastatin 10 milliGRAM(s) Oral at bedtime  spironolactone 100 milliGRAM(s) Oral daily  tamsulosin 0.4 milliGRAM(s) Oral at bedtime  tiotropium 2.5 MICROgram(s) Inhaler 2 Puff(s) Inhalation daily      
s/p lap appy, feels better, NL WBC    Abd appropriate tender, dry wounds          11-25-23 @ 09:25    Vital Signs Last 24 Hrs  T(C): 36.5 (25 Nov 2023 08:00), Max: 36.7 (24 Nov 2023 12:06)  T(F): 97.7 (25 Nov 2023 08:00), Max: 98.1 (24 Nov 2023 12:06)  HR: 73 (25 Nov 2023 08:00) (71 - 73)  BP: 138/73 (25 Nov 2023 08:00) (107/77 - 138/73)  BP(mean): 89 (24 Nov 2023 16:00) (89 - 89)  RR: 17 (25 Nov 2023 08:00) (17 - 18)  SpO2: 100% (25 Nov 2023 08:00) (93% - 100%)    Parameters below as of 25 Nov 2023 08:00  Patient On (Oxygen Delivery Method): room air                              12.4   10.69 )-----------( 222      ( 25 Nov 2023 07:50 )             37.9       11-25    138  |  107  |  31<H>  ----------------------------<  157<H>  4.5   |  24  |  1.57<H>    Ca    8.8      25 Nov 2023 07:50    TPro  7.6  /  Alb  3.2<L>  /  TBili  0.3  /  DBili  x   /  AST  22  /  ALT  26  /  AlkPhos  162<H>  11-24      LIVER FUNCTIONS - ( 24 Nov 2023 11:05 )  Alb: 3.2 g/dL / Pro: 7.6 gm/dL / ALK PHOS: 162 U/L / ALT: 26 U/L / AST: 22 U/L / GGT: x             MEDICATIONS  (STANDING):  amLODIPine   Tablet 10 milliGRAM(s) Oral daily  clopidogrel Tablet 75 milliGRAM(s) Oral daily  dextrose 5%. 1000 milliLiter(s) (100 mL/Hr) IV Continuous <Continuous>  dextrose 5%. 1000 milliLiter(s) (50 mL/Hr) IV Continuous <Continuous>  dextrose 50% Injectable 25 Gram(s) IV Push once  dextrose 50% Injectable 12.5 Gram(s) IV Push once  dextrose 50% Injectable 25 Gram(s) IV Push once  enoxaparin Injectable 40 milliGRAM(s) SubCutaneous every 24 hours  finasteride 5 milliGRAM(s) Oral daily  gabapentin 300 milliGRAM(s) Oral two times a day  glucagon  Injectable 1 milliGRAM(s) IntraMuscular once  HYDROmorphone  Injectable 1 milliGRAM(s) IV Push once  insulin glargine Injectable (LANTUS) 30 Unit(s) SubCutaneous at bedtime  insulin lispro (ADMELOG) corrective regimen sliding scale   SubCutaneous three times a day before meals  lactated ringers. 1000 milliLiter(s) (75 mL/Hr) IV Continuous <Continuous>  lactulose Syrup 15 Gram(s) Oral daily  melatonin 3 milliGRAM(s) Oral at bedtime  metoclopramide 5 milliGRAM(s) Oral three times a day before meals  metoprolol tartrate 25 milliGRAM(s) Oral daily  saline laxative (FLEET) Rectal Enema 1 Enema Rectal daily  simvastatin 10 milliGRAM(s) Oral at bedtime  tamsulosin 0.4 milliGRAM(s) Oral at bedtime  tiotropium 2.5 MICROgram(s) Inhaler 2 Puff(s) Inhalation daily    MEDICATIONS  (PRN):  dextrose Oral Gel 15 Gram(s) Oral once PRN Blood Glucose LESS THAN 70 milliGRAM(s)/deciliter  HYDROmorphone   Tablet 6 milliGRAM(s) Oral every 6 hours PRN Moderate Pain (4 - 6)  ibuprofen  Tablet. 600 milliGRAM(s) Oral every 6 hours PRN Mild Pain (1 - 3)  magnesium hydroxide Suspension 30 milliLiter(s) Oral three times a day PRN Constipation  ondansetron   Disintegrating Tablet 4 milliGRAM(s) Oral every 6 hours PRN Nausea      MEDICATIONS  (STANDING):  amLODIPine   Tablet 10 milliGRAM(s) Oral daily  clopidogrel Tablet 75 milliGRAM(s) Oral daily  dextrose 5%. 1000 milliLiter(s) (50 mL/Hr) IV Continuous <Continuous>  dextrose 5%. 1000 milliLiter(s) (100 mL/Hr) IV Continuous <Continuous>  dextrose 50% Injectable 25 Gram(s) IV Push once  dextrose 50% Injectable 12.5 Gram(s) IV Push once  dextrose 50% Injectable 25 Gram(s) IV Push once  enoxaparin Injectable 40 milliGRAM(s) SubCutaneous every 24 hours  finasteride 5 milliGRAM(s) Oral daily  gabapentin 300 milliGRAM(s) Oral two times a day  glucagon  Injectable 1 milliGRAM(s) IntraMuscular once  HYDROmorphone  Injectable 1 milliGRAM(s) IV Push once  insulin glargine Injectable (LANTUS) 30 Unit(s) SubCutaneous at bedtime  insulin lispro (ADMELOG) corrective regimen sliding scale   SubCutaneous three times a day before meals  lactated ringers. 1000 milliLiter(s) (75 mL/Hr) IV Continuous <Continuous>  lactulose Syrup 15 Gram(s) Oral daily  melatonin 3 milliGRAM(s) Oral at bedtime  metoclopramide 5 milliGRAM(s) Oral three times a day before meals  metoprolol tartrate 25 milliGRAM(s) Oral daily  saline laxative (FLEET) Rectal Enema 1 Enema Rectal daily  simvastatin 10 milliGRAM(s) Oral at bedtime  tamsulosin 0.4 milliGRAM(s) Oral at bedtime  tiotropium 2.5 MICROgram(s) Inhaler 2 Puff(s) Inhalation daily

## 2023-12-17 NOTE — ED PROCEDURE NOTE - CPROC ED TIME OUT STATEMENT1
“Patient's name, , procedure and correct site were confirmed during the [place] Timeout.” “Patient's name, , procedure and correct site were confirmed during the [place] Timeout.”

## 2023-12-17 NOTE — ED ADULT NURSE NOTE - NSFALLUNIVINTERV_ED_ALL_ED
Bed/Stretcher in lowest position, wheels locked, appropriate side rails in place/Call bell, personal items and telephone in reach/Instruct patient to call for assistance before getting out of bed/chair/stretcher/Non-slip footwear applied when patient is off stretcher/[place] to call system/Physically safe environment - no spills, clutter or unnecessary equipment/Purposeful proactive rounding/Room/bathroom lighting operational, light cord in reach Bed/Stretcher in lowest position, wheels locked, appropriate side rails in place/Call bell, personal items and telephone in reach/Instruct patient to call for assistance before getting out of bed/chair/stretcher/Non-slip footwear applied when patient is off stretcher/[place] to call system/Physically safe environment - no spills, clutter or unnecessary equipment/Purposeful proactive rounding/Room/bathroom lighting operational, light cord in reach

## 2023-12-17 NOTE — ED PROVIDER NOTE - MUSCULOSKELETAL, MLM
Spine appears normal, range of motion is not limited, no muscle or joint tenderness Spine appears normal, range of motion is not limited, no muscle or joint tenderness. BKA LLE

## 2023-12-17 NOTE — ED PROCEDURE NOTE - CPROC ED ANATOMIC LOCATION1
upper arm Topical Metronidazole Pregnancy And Lactation Text: This medication is Pregnancy Category B and considered safe during pregnancy.  It is also considered safe to use while breastfeeding.

## 2023-12-17 NOTE — ED PROCEDURE NOTE - ATTENDING APP SHARED VISIT CONTRIBUTION OF CARE
Attending Contribution to Care: I, Araceli Hernandez, performed the initial face to face bedside interview with this patient regarding history of present illness, review of symptoms and relevant past medical, social and family history.  I completed an independent physical examination.  I was the initial provider who evaluated this patient. I have signed out the follow up of any pending tests (i.e. labs, radiological studies) to the ACP.  I have communicated the patient’s plan of care and disposition with the ACP.

## 2023-12-17 NOTE — ED PROVIDER NOTE - CLINICAL SUMMARY MEDICAL DECISION MAKING FREE TEXT BOX
63 year old male with PMHx of anemia, CAD s/p CABG x3, DM, GERD, gastroparesis, HTN, s/p left BKA; presents to the ED c/o abdominal pain x2 days, recent surgery. Labs, UA, CT rule out obstruction.

## 2023-12-17 NOTE — ED ADULT NURSE NOTE - TEMPLATE LIST FOR HEAD TO TOE ASSESSMENT
[facility] EMERGENCY DEPT 
[address] [facility] [address] 
[phone] Work/School Note Date: 12/5/2017 To Whom It May concern: 
 
Gabriela Lim was seen and treated today in the emergency room by the following provider(s): 
Attending Provider: Nicky Alfaro DO Physician Assistant: Flores Santana PA-C. Gabriela Lim may return to work on 12/8/17.  
 
Sincerely, 
 
 
 
 
Flores Santana PA-C 
 
 
 
 General

## 2023-12-17 NOTE — ED PROCEDURE NOTE - NS ED ATTENDING STATEMENT MOD
This was a shared visit with the RAGHU. I reviewed and verified the documentation. This was a shared visit with the RAGUH. I reviewed and verified the documentation.

## 2023-12-17 NOTE — ED ADULT TRIAGE NOTE - NS ED NURSE AMBULANCES
[facility]

## 2023-12-17 NOTE — ED PROVIDER NOTE - PROGRESS NOTE DETAILS
Ferdinand PAC: chart review: -Hx of gastroparesis but feels different than his usual gastroparesis sx. At that time per GI convo w pt, has not had good response to motility agents in the past including reglan and erythromycin. He has ascites at that time and the ascites fluid tapped, cell count not c/w SBP  -EGD 6/2 showed no varices however otherwise aborted as stomach w food  -EGD 6/5 normal  -extensive w/u for source of pain neg, likely related to gastroparesis Ferdinand PAC: pt refusing PO oxycodone. states he wants IV Dilaudid. discussed that IV Dilaudid will no help his constipation. states "this was a waste of time coming here" pt resting comfortably when provider not in the room. Ferdinand PAC: pt refusing PO oxycodone. states he wants IV Dilaudid. discussed that IV Dilaudid will no help his constipation. states "this was a waste of time coming here" Ferdinand PAC: pt still having abd pain and has not urinated since being in ED. will bladder scan to assess for urine retention

## 2023-12-17 NOTE — ED PROVIDER NOTE - OBJECTIVE STATEMENT
64 y/o male with PMHx of anemia, CAD s/p CABG x3, DM, GERD, gastroparesis, HTN, s/p left BKA presents to the ED from [place] c/o abdominal pain x2 days, patient is s/p appendectomy x2 weeks ago, procedure was done at  on 11/23 and patient was discharged on 11/25. Endorses nausea and vomiting this morning. Denies fever, urinary symptoms. 64 y/o male with PMHx of anemia, CAD s/p CABG x3, DM, GERD, gastroparesis, HTN, s/p left BKA presents to the ED from [place] c/o abdominal pain x2 days, patient is s/p appendectomy x2 weeks ago, procedure was done at  on 11/23 and patient was discharged on 11/25. Endorses nausea and vomiting this morning. Denies fever, urinary symptoms.

## 2023-12-18 NOTE — H&P ADULT - PROBLEM SELECTOR PLAN 1
2 days history of abdominal pain with nausea, vomiting and decrease PO intake   CT-Abd: No bowel obstruction or inflammation. Moderate amount of stool in the distal sigmoid colon.  - IVF   - Pain management   - Zofran   - PPI   - Liquid diet advance as tolerated   - Stool softener/enema  - Lactulose  - DVT prophylaxis

## 2023-12-18 NOTE — PATIENT PROFILE ADULT - FALL HARM RISK - HARM RISK INTERVENTIONS
Assistance with ambulation/Assistance OOB with selected safe patient handling equipment/Communicate Risk of Fall with Harm to all staff/Discuss with provider need for PT consult/Monitor gait and stability/Provide patient with walking aids - walker, cane, crutches/Reinforce activity limits and safety measures with patient and family/Tailored Fall Risk Interventions/Visual Cue: Yellow wristband and red socks/Bed in lowest position, wheels locked, appropriate side rails in place/Call bell, personal items and telephone in reach/Instruct patient to call for assistance before getting out of bed or chair/Non-slip footwear when patient is out of bed/[place] to call system/Physically safe environment - no spills, clutter or unnecessary equipment/Purposeful Proactive Rounding/Room/bathroom lighting operational, light cord in reach Assistance with ambulation/Assistance OOB with selected safe patient handling equipment/Communicate Risk of Fall with Harm to all staff/Discuss with provider need for PT consult/Monitor gait and stability/Provide patient with walking aids - walker, cane, crutches/Reinforce activity limits and safety measures with patient and family/Tailored Fall Risk Interventions/Visual Cue: Yellow wristband and red socks/Bed in lowest position, wheels locked, appropriate side rails in place/Call bell, personal items and telephone in reach/Instruct patient to call for assistance before getting out of bed or chair/Non-slip footwear when patient is out of bed/[place] to call system/Physically safe environment - no spills, clutter or unnecessary equipment/Purposeful Proactive Rounding/Room/bathroom lighting operational, light cord in reach

## 2023-12-18 NOTE — H&P ADULT - NSHPPHYSICALEXAM_GEN_ALL_CORE
GENERAL: NAD, comfortable at bedside   HEAD:  Atraumatic, Normocephalic  EYES: EOMI, PERRL, conjunctiva and sclera clear  NECK: Supple, No JVD  CHEST/LUNG: Clear to auscultation bilaterally; No wheezes, rales or rhonchi  HEART: Regular rate and rhythm; No murmurs, rubs, or gallops, (+)S1, S2  ABDOMEN: Soft, Decrease BS, Tender (central), no guarding   - [place] in place   EXTREMITIES: R-LE  2+ Peripheral Pulses, No clubbing, cyanosis, or edema. L-BKA  PSYCH: normal mood and affect  NEUROLOGY: AAOx3, non-focal  SKIN: No rashes or lesions

## 2023-12-18 NOTE — CONSULT NOTE ADULT - ASSESSMENT
63 year old male with multiple chronic medical issues inclusive of gastroparesis (GP) on chronic Dilaudid for GP and amputation neuropathic pain admitted with abdominal pain. Had EGD for similar pain 6/2023, unremarkable.    Imp: Abdominal pain with ?etiology  CT imaging benign with exception of fecal burden/constipation    Rec:  ::Abd sono with dopplers, as patient cirrhotic evaluate for ascites ?SBP, r/o PVT  ::Tap water enema may toggle with mineral oil enema  ::Bowel regimen  ::Standing Reglan  ::Chronic opioids--->would try Movantik   63 year old male with multiple chronic medical issues inclusive of gastroparesis (GP) on chronic Dilaudid for GP and amputation neuropathic pain admitted with abdominal pain. Had EGD for similar pain 6/2023, unremarkable.    Imp: Abdominal pain with ?etiology  CT imaging benign with exception of fecal burden/constipation    Rec:  ::Abd sono with dopplers, as patient cirrhotic evaluate for ascites ?SBP, r/o PVT  ::Tap water enema may toggle with mineral oil enema  ::Bowel regimen: miralax BID, dulcolax supp PM  ::Constipation 2/2 chronic opioids--->may try Movantik  ::Standing Reglan

## 2023-12-18 NOTE — CONSULT NOTE ADULT - NS ATTEND AMEND GEN_ALL_CORE FT
64yo male with chronic narcotic use, liver disease    gastroparesis, constipation  add movantik  check sono 62yo male with chronic narcotic use, liver disease    gastroparesis, constipation  add movantik  check sono

## 2023-12-18 NOTE — H&P ADULT - NSHPLABSRESULTS_GEN_ALL_CORE
13.7   17.00 )-----------( 322      ( 17 Dec 2023 20:01 )             41.6     137  |  105  |  33<H>  ----------------------------<  185<H>       4.8   |  29  |  1.36<H>    Ca    10.3<H>      17 Dec 2023 20:01    TPro  8.7<H>  /  Alb  3.5  /  TBili  0.3  /  DBili  x   /  AST  14<L>  /  ALT  28  /  AlkPhos  172<H>      Urinalysis Basic - ( 18 Dec 2023 03:18 )  Color: Yellow / Appearance: Clear / S.022 / pH: 7.5  Gluc: 100 mg/dL / Ketone: Negative mg/dL  / Bili: Negative / Urobili: 1.0 mg/dL   Blood: Non Hemolyzed Trace / Protein: >=1000 mg/dL / Nitrite: Negative   Leuk Esterase: Negative / RBC: 15 /HPF / WBC 0 /HPF   Sq Epi: x / Non Sq Epi: x / Bacteria: Negative /HPF      FINDINGS:  LOWER CHEST: Bibasilar interlobular septal thickening suggestive of interstitial pulmonary edema. Trace right pleural effusion. Bibasilar subsegmental atelectasis. Mitral valve replacement. Coronary artery calcifications. Median sternotomy. Mild bilateral gynecomastia.    LIVER: Within normal limits.  BILE DUCTS: Normal caliber.  GALLBLADDER: Cholecystectomy.  SPLEEN: Within normal limits.  PANCREAS: Within normal limits.  ADRENALS: Within normal limits.  KIDNEYS/URETERS: 1.0 cm exophytic left renal upper pole lesion, not significantly changed. No hydronephrosis. Nonspecific bilateral perinephric fat stranding.    BLADDER: Within normal limits.  REPRODUCTIVE ORGANS: Atrophic or absent prostate.    BOWEL: No bowel obstruction or inflammation. Pericecal surgical sutures likely due to a prior appendectomy. Moderate amount of stool in the distal sigmoid colon.  PERITONEUM: No ascites.  VESSELS: Within normal limits.  RETROPERITONEUM/LYMPH NODES: No lymphadenopathy.  ABDOMINAL WALL: Small fat-containing left inguinal hernia.  BONES: Degenerative changes of the spine, most prominent at L4-L5.    IMPRESSION:  No bowel obstruction or inflammation. Moderate amount of stool in the distal sigmoid colon.

## 2023-12-18 NOTE — H&P ADULT - ASSESSMENT
63 year old  male with PMH of anemia, CAD s/p CABG x3, DM, GERD, gastroparesis, HTN, left BKA presents to the ED from [place] for abdominal pain. Endorses 2 days of abdominal pain, associated with nausea and vomiting started this morning with inability to tolerate PO intake. S/P appendectomy 11/23, patient was admitted s/p surgery and was discharged on 11/25. Endorses pain is centrally located around the umbilical area, non-radiating and constant. Denies chest pain, palpitation, SOB, headache, dizziness, URI symptoms. In the ED had urinary retention, a Jurado was placed. Labs remarkable for WBC 17:00, afebrile. Vitals stable. Received 1L-NS, benadryl, Dilaudid 6mg PO & 1mg IV, Reglan.    63 year old  male with PMH of anemia, CAD s/p CABG x3, DM, GERD, gastroparesis, HTN, left BKA presents to the ED from [place] for abdominal pain. Endorses 2 days of abdominal pain, associated with nausea and vomiting started this morning with inability to tolerate PO intake. S/P appendectomy 11/23, patient was admitted s/p surgery and was discharged on 11/25. Endorses pain is centrally located around the umbilical area, non-radiating and constant. Denies chest pain, palpitation, SOB, headache, dizziness, URI symptoms. In the ED had urinary retention, a Jurado was placed. Labs remarkable for WBC 17:00, afebrile. Vitals stable. Received 1L-NS, benadryl, Dilaudid 6mg PO & 1mg IV, Reglan.

## 2023-12-18 NOTE — PATIENT PROFILE ADULT - HISTORY OF COVID-19 VACCINATION
RADIOLOGY PROCEDURE NOTE  Patient name: Jim Richard  MRN: [id number]  : 1953    Pre-procedure diagnosis: Ascites  Post-procedure diagnosis: Same    Procedure Date/Time: May 7, 2021  11:21 AM  Procedure: Paracentesis  Estimated blood loss: None  Specimen(s) collected with description: Ascites.  The patient tolerated the procedure well with no immediate complications.    See imaging dictation for procedural details and findings.    Provider name: Sebastien Pereira MD  Assistant(s):None     Vaccine status unknown

## 2023-12-18 NOTE — ED PROCEDURE NOTE - CPROC ED TIME OUT STATEMENT1
“Patient's name, , procedure and correct site were confirmed during the [place] Timeout.” “Patient's name, , procedure and correct site were confirmed during the [place] Timeout.”

## 2023-12-18 NOTE — PROGRESS NOTE ADULT - SUBJECTIVE AND OBJECTIVE BOX
Reason for Admission: Intractable Abdominal Pain    Patient is a 63 year old  male with PMH of anemia, CAD s/p CABG x3, DM, GERD, gastroparesis, HTN, left BKA presents to the ED from [place] for abdominal pain. Endorses 2 days of abdominal pain, associated with nausea and vomiting started this morning with inability to tolerate PO intake. S/P appendectomy 11/23, patient was admitted s/p surgery and was discharged on 11/25. Endorses pain is centrally located around the umbilical area, non-radiating and constant. Denies chest pain, palpitation, SOB, headache, dizziness, URI symptoms. In the ED had urinary retention, a Jurado was placed. Labs remarkable for WBC 17:00, afebrile. Vitals stable. Received 1L-NS, benadryl, Dilaudid 6mg PO & 1mg IV, Reglan.   CT-Abd: No bowel obstruction or inflammation. Moderate amount of stool in the distal sigmoid colon.    Medical progress: Patient is very deconditioned frail, chronically sick appearing asking for IV dilaudid  Complaints: abdominal pain  State of mind: wants IV Dilaudid for abdominal pain. Normal   Care discussed with GI    REVIEW OF SYSTEMS:  General: NAD, hemodynamically stable, + weakness  HEENT:  Eyes:  No visual loss  SKIN:  No rash or itching.  CARDIOVASCULAR:  No chest pain  RESPIRATORY:  No shortness of breath  GASTROINTESTINAL: Severe abdominal pain  NEUROLOGICAL:  No headache, dizziness, syncope, paralysis, ataxia, numbness or tingling in the extremities. No change in bowel or bladder control.  MUSCULOSKELETAL:  No muscle, back pain, joint pain or stiffness.  HEMATOLOGIC:  No anemia, bleeding or bruising.  LYMPHATICS:  No enlarged nodes. No history of splenectomy.  ENDOCRINOLOGIC:  No reports of sweating, cold or heat intolerance. No polyuria or polydipsia.  ALLERGIES:  No history of asthma, hives, eczema or rhinitis.    Physical Exam:   GENERAL APPEARANCE:  Deconditioned, frail  T(C): 36.4 (12-18-23 @ 08:35), Max: 36.9 (12-17-23 @ 19:00)  HR: 89 (12-18-23 @ 08:35) (70 - 99)  BP: 160/85 (12-18-23 @ 08:35) (135/72 - 186/83)  RR: 18 (12-18-23 @ 08:35) (16 - 19)  SpO2: 99% (12-18-23 @ 08:35) (96% - 99%)  HEENT:  Head is normocephalic    Skin:  thin, dry  NECK:  Supple without lymphadenopathy.   HEART:  Regular rate and rhythm  LUNGS:  Good ins/exp effort, no W/R/R/C  ABDOMEN:  Soft, nontender, nondistended with good bowel sounds heard  EXTREMITIES:  LE extremity amputations  NEUROLOGICAL:  Gross nonfocal       CBC Full  -  ( 18 Dec 2023 07:37 )  WBC Count : 13.03 K/uL  RBC Count : 4.15 M/uL  Hemoglobin : 13.3 g/dL  Hematocrit : 40.9 %  Platelet Count - Automated : 282 K/uL  Mean Cell Volume : 98.6 fl  Mean Cell Hemoglobin : 32.0 pg  Mean Cell Hemoglobin Concentration : 32.5 gm/dL  Auto Neutrophil # : x  Auto Lymphocyte # : x  Auto Monocyte # : x  Auto Eosinophil # : x  Auto Basophil # : x  Auto Neutrophil % : x  Auto Lymphocyte % : x  Auto Monocyte % : x  Auto Eosinophil % : x  Auto Basophil % : x      Urinalysis Basic - ( 18 Dec 2023 07:37 )    Color: x / Appearance: x / SG: x / pH: x  Gluc: 152 mg/dL / Ketone: x  / Bili: x / Urobili: x   Blood: x / Protein: x / Nitrite: x   Leuk Esterase: x / RBC: x / WBC x   Sq Epi: x / Non Sq Epi: x / Bacteria: x      12-18    141  |  108  |  26<H>  ----------------------------<  152<H>  4.5   |  29  |  1.12    Ca    9.5      18 Dec 2023 07:37    TPro  7.7  /  Alb  3.1<L>  /  TBili  0.3  /  DBili  x   /  AST  26  /  ALT  27  /  AlkPhos  152<H>  12-18      # Severe abdominal pain secondary to stool impaction vs gastroparesis   # Chronic abdominal pain  s/p lap appy (NOV), chronic abd pain likely multifactorial (DM, gastroparesis, vasculopath, chronic mesenteric ischemia?, on chronic dilaudid, constipation)  - 2 days history of abdominal pain with nausea, vomiting and decrease PO intake   CT-Abd: No bowel obstruction or inflammation. Moderate amount of stool in the distal sigmoid colon.  - Zofran   - PPI   - Liquid diet advance as tolerated   - Stool softener/enema  - Lactulose  - DVT prophylaxis    # Urinary retention.   - Urinary retention/ bladder distension   - 500 cc Urine removed   - Jurado in place     # CAD (coronary artery disease) - s/p CAGB   - DAPT  /  STATIN    # Hypertension.   ·  Plan: Continue home meds   - Amlodipine 10mg   - Metoprolol.    # Diabetes mellitus.   - ISS with hypoglycemia protocol   - Lantus 45U  - F/U A1C.       Reason for Admission: Intractable Abdominal Pain    Patient is a 63 year old  male with PMH of anemia, CAD s/p CABG x3, DM, GERD, gastroparesis, HTN, left BKA presents to the ED from [place] for abdominal pain. Endorses 2 days of abdominal pain, associated with nausea and vomiting started this morning with inability to tolerate PO intake. S/P appendectomy 11/23, patient was admitted s/p surgery and was discharged on 11/25. Endorses pain is centrally located around the umbilical area, non-radiating and constant. Denies chest pain, palpitation, SOB, headache, dizziness, URI symptoms. In the ED had urinary retention, a Jurado was placed. Labs remarkable for WBC 17:00, afebrile. Vitals stable. Received 1L-NS, benadryl, Dilaudid 6mg PO & 1mg IV, Reglan.   CT-Abd: No bowel obstruction or inflammation. Moderate amount of stool in the distal sigmoid colon.    Medical progress: Patient is very deconditioned frail, chronically sick appearing asking for IV dilaudid  Complaints: abdominal pain  State of mind: wants IV Dilaudid for abdominal pain. Normal   Care discussed with GI    REVIEW OF SYSTEMS:  General: NAD, hemodynamically stable, + weakness  HEENT:  Eyes:  No visual loss  SKIN:  No rash or itching.  CARDIOVASCULAR:  No chest pain  RESPIRATORY:  No shortness of breath  GASTROINTESTINAL: Severe abdominal pain  NEUROLOGICAL:  No headache, dizziness, syncope, paralysis, ataxia, numbness or tingling in the extremities. No change in bowel or bladder control.  MUSCULOSKELETAL:  No muscle, back pain, joint pain or stiffness.  HEMATOLOGIC:  No anemia, bleeding or bruising.  LYMPHATICS:  No enlarged nodes. No history of splenectomy.  ENDOCRINOLOGIC:  No reports of sweating, cold or heat intolerance. No polyuria or polydipsia.  ALLERGIES:  No history of asthma, hives, eczema or rhinitis.    Physical Exam:   GENERAL APPEARANCE:  Deconditioned, frail  T(C): 36.4 (12-18-23 @ 08:35), Max: 36.9 (12-17-23 @ 19:00)  HR: 89 (12-18-23 @ 08:35) (70 - 99)  BP: 160/85 (12-18-23 @ 08:35) (135/72 - 186/83)  RR: 18 (12-18-23 @ 08:35) (16 - 19)  SpO2: 99% (12-18-23 @ 08:35) (96% - 99%)  HEENT:  Head is normocephalic    Skin:  thin, dry  NECK:  Supple without lymphadenopathy.   HEART:  Regular rate and rhythm  LUNGS:  Good ins/exp effort, no W/R/R/C  ABDOMEN:  Soft, nontender, nondistended with good bowel sounds heard  EXTREMITIES:  LE extremity amputations  NEUROLOGICAL:  Gross nonfocal       CBC Full  -  ( 18 Dec 2023 07:37 )  WBC Count : 13.03 K/uL  RBC Count : 4.15 M/uL  Hemoglobin : 13.3 g/dL  Hematocrit : 40.9 %  Platelet Count - Automated : 282 K/uL  Mean Cell Volume : 98.6 fl  Mean Cell Hemoglobin : 32.0 pg  Mean Cell Hemoglobin Concentration : 32.5 gm/dL  Auto Neutrophil # : x  Auto Lymphocyte # : x  Auto Monocyte # : x  Auto Eosinophil # : x  Auto Basophil # : x  Auto Neutrophil % : x  Auto Lymphocyte % : x  Auto Monocyte % : x  Auto Eosinophil % : x  Auto Basophil % : x      Urinalysis Basic - ( 18 Dec 2023 07:37 )    Color: x / Appearance: x / SG: x / pH: x  Gluc: 152 mg/dL / Ketone: x  / Bili: x / Urobili: x   Blood: x / Protein: x / Nitrite: x   Leuk Esterase: x / RBC: x / WBC x   Sq Epi: x / Non Sq Epi: x / Bacteria: x      12-18    141  |  108  |  26<H>  ----------------------------<  152<H>  4.5   |  29  |  1.12    Ca    9.5      18 Dec 2023 07:37    TPro  7.7  /  Alb  3.1<L>  /  TBili  0.3  /  DBili  x   /  AST  26  /  ALT  27  /  AlkPhos  152<H>  12-18      # Severe abdominal pain secondary to stool impaction vs gastroparesis   # Chronic abdominal pain  s/p lap appy (NOV), chronic abd pain likely multifactorial (DM, gastroparesis, vasculopath, chronic mesenteric ischemia?, on chronic dilaudid, constipation)  - 2 days history of abdominal pain with nausea, vomiting and decrease PO intake   CT-Abd: No bowel obstruction or inflammation. Moderate amount of stool in the distal sigmoid colon.  - Zofran   - PPI   - Liquid diet advance as tolerated   - Stool softener/enema  - Lactulose  - DVT prophylaxis    # Urinary retention.   - Urinary retention/ bladder distension   - 500 cc Urine removed   - Jurado in place     # CAD (coronary artery disease) - s/p CAGB   - DAPT  /  STATIN    # Hypertension.   ·  Plan: Continue home meds   - Amlodipine 10mg   - Metoprolol.    # Diabetes mellitus.   - ISS with hypoglycemia protocol   - Lantus 45U  - F/U A1C.

## 2023-12-18 NOTE — H&P ADULT - PROBLEM SELECTOR PLAN 2
Urinary retention/ bladder distension   500cc Urine removed   Jurado in place   - Monitor Urine output

## 2023-12-18 NOTE — CONSULT NOTE ADULT - SUBJECTIVE AND OBJECTIVE BOX
Patient is a 63y old  Male who presents with a chief complaint abdominal pain    HPI:  This is a 63 year old  male with significant past medical history of ETOH liver cirrhosis, anemia, CAD s/p CABG x3, DM, GERD, gastroparesis, HTN, left BKA presenting to [facility] from [place] for abdominal pain unresponsive to Dilaudid Per patient has chronic epigastric pain related to gastroparesis. Pain usually controlled with Dilaudid. On daily Dilaudid for GP pain and right foot post-amputation pain, per patient. Over past 48 hours worsening epigastric pain not responsive to Dilaudid relayed as "like my gastroparesis pain but 10 out of 10" with associated nausea and nonbloody bilious vomiting. Patient reports constipation without bowel movement for "maybe a week." Also endorses abdominal distension. Recently hospitalized for appendicitis s/p appendectomy 11/23 discharged back to [place] 11/25. CT with increased stool burden sigmoid colon, otherwise unremarkable. Leukocytosis, afebrile.      PAST MEDICAL & SURGICAL HISTORY:  HTN (hypertension)    DM (diabetes mellitus)    Anemia    GERD (gastroesophageal reflux disease)    S/P BKA (below knee amputation), left    CAD (coronary artery disease)    S/P CABG x 3    Status post amputation of leg    MEDICATIONS  (STANDING):  albuterol    90 MICROgram(s) HFA Inhaler 2 Puff(s) Inhalation every 6 hours  amLODIPine   Tablet 10 milliGRAM(s) Oral daily  aspirin enteric coated 81 milliGRAM(s) Oral daily  bisacodyl 5 milliGRAM(s) Oral at bedtime  budesonide 160 MICROgram(s)/formoterol 4.5 MICROgram(s) Inhaler 2 Puff(s) Inhalation two times a day  clopidogrel Tablet 75 milliGRAM(s) Oral daily  dextrose 5%. 1000 milliLiter(s) (50 mL/Hr) IV Continuous <Continuous>  dextrose 50% Injectable 25 Gram(s) IV Push once  finasteride 5 milliGRAM(s) Oral daily  furosemide    Tablet 40 milliGRAM(s) Oral daily  gabapentin 300 milliGRAM(s) Oral two times a day  gabapentin 600 milliGRAM(s) Oral at bedtime  glucagon  Injectable 1 milliGRAM(s) IntraMuscular once  insulin lispro (ADMELOG) corrective regimen sliding scale   SubCutaneous three times a day before meals  lactulose Syrup 10 Gram(s) Oral daily  metoprolol tartrate 25 milliGRAM(s) Oral daily  multivitamin 1 Tablet(s) Oral daily  naloxegol 25 milliGRAM(s) Oral daily  pantoprazole  Injectable 40 milliGRAM(s) IV Push daily  senna 2 Tablet(s) Oral at bedtime  simvastatin 10 milliGRAM(s) Oral at bedtime  sodium chloride 0.9%. 1000 milliLiter(s) (100 mL/Hr) IV Continuous <Continuous>  spironolactone 100 milliGRAM(s) Oral daily  tamsulosin 0.4 milliGRAM(s) Oral at bedtime    MEDICATIONS  (PRN):  acetaminophen     Tablet .. 650 milliGRAM(s) Oral every 6 hours PRN Temp greater or equal to 38C (100.4F), Mild Pain (1 - 3)  aluminum hydroxide/magnesium hydroxide/simethicone Suspension 30 milliLiter(s) Oral every 4 hours PRN Dyspepsia  dextrose Oral Gel 15 Gram(s) Oral once PRN Blood Glucose LESS THAN 70 milliGRAM(s)/deciliter  HYDROmorphone  Injectable 1 milliGRAM(s) IV Push every 6 hours PRN Severe Pain (7 - 10)  HYDROmorphone  Injectable 0.5 milliGRAM(s) IV Push every 6 hours PRN Moderate Pain (4 - 6)  melatonin 3 milliGRAM(s) Oral at bedtime PRN Insomnia  ondansetron Injectable 4 milliGRAM(s) IV Push every 6 hours PRN Nausea and/or Vomiting  ondansetron Injectable 4 milliGRAM(s) IV Push every 8 hours PRN Nausea and/or Vomiting      Allergies    No Known Allergies    Intolerances        SOCIAL HISTORY:    FAMILY HISTORY:      REVIEW OF SYSTEMS:    CONSTITUTIONAL: No weakness, fevers or chills  EYES/ENT: No visual changes;  No vertigo or throat pain   NECK: No pain or stiffness  RESPIRATORY: No cough, wheezing, hemoptysis; No shortness of breath  CARDIOVASCULAR: No chest pain or palpitations  GASTROINTESTINAL: See HPI  GENITOURINARY: No dysuria, frequency or hematuria  NEUROLOGICAL: No numbness or weakness  SKIN: No itching, burning, rashes, or lesions   PSYCH: Normal mood and affect  All other review of systems is negative unless indicated above.    Vital Signs Last 24 Hrs  T(C): 36.4 (18 Dec 2023 08:35), Max: 36.9 (17 Dec 2023 19:00)  T(F): 97.6 (18 Dec 2023 08:35), Max: 98.4 (17 Dec 2023 19:00)  HR: 89 (18 Dec 2023 08:35) (70 - 99)  BP: 160/85 (18 Dec 2023 08:35) (135/72 - 186/83)  BP(mean): 95 (18 Dec 2023 08:00) (90 - 108)  RR: 18 (18 Dec 2023 08:35) (16 - 19)  SpO2: 99% (18 Dec 2023 08:35) (96% - 99%)    Parameters below as of 18 Dec 2023 08:35  Patient On (Oxygen Delivery Method): room air        PHYSICAL EXAM:    Constitutional: No acute distress,  non-toxic appearing  HEENT: masked, good phonation, not icteric  Neck: supple, no lymphadenopathy  Respiratory: clear to ascultation bilaterally, no wheezing  Cardiovascular: S1 and S2, regular rate and rhythm, no murmurs rubs or gallops  Gastrointestinal: soft, TTP diffusely, mildly distended, +bowel sounds, no rebound or guarding, no surgical scars, no drains  Extremities: No peripheral edema, no cyanosis or clubbing, left BKA, right toe amp  Vascular: 2+ peripheral pulses, no venous stasis  Neurological: A/O x 3, no focal deficits, no asterixis  Psychiatric: Normal mood, normal affect  Skin: No rashes, not jaundiced    LABS:                        13.3   13.03 )-----------( 282      ( 18 Dec 2023 07:37 )             40.9     12-18    141  |  108  |  26<H>  ----------------------------<  152<H>  4.5   |  29  |  1.12    Ca    9.5      18 Dec 2023 07:37    TPro  7.7  /  Alb  3.1<L>  /  TBili  0.3  /  DBili  x   /  AST  26  /  ALT  27  /  AlkPhos  152<H>  12-18      LIVER FUNCTIONS - ( 18 Dec 2023 07:37 )  Alb: 3.1 g/dL / Pro: 7.7 gm/dL / ALK PHOS: 152 U/L / ALT: 27 U/L / AST: 26 U/L / GGT: x             RADIOLOGY & ADDITIONAL STUDIES:  FINDINGS:  LOWER CHEST: Bibasilar interlobular septal thickening suggestive of   interstitial pulmonary edema. Trace right pleural effusion. Bibasilar   subsegmental atelectasis. Mitral valve replacement. Coronary artery   calcifications. Median sternotomy. Mild bilateral gynecomastia.    LIVER: Within normal limits.  BILE DUCTS: Normal caliber.  GALLBLADDER: Cholecystectomy.  SPLEEN: Within normal limits.  PANCREAS: Within normal limits.  ADRENALS: Within normal limits.  KIDNEYS/URETERS: 1.0 cm exophytic left renal upper pole lesion, not   significantly changed. No hydronephrosis. Nonspecific bilateral   perinephric fat stranding.    BLADDER: Within normal limits.  REPRODUCTIVE ORGANS: Atrophic or absent prostate.    BOWEL: No bowel obstruction or inflammation. Pericecal surgical sutures   likely due to a prior appendectomy. Moderate amount of stool in the   distal sigmoid colon.  PERITONEUM: No ascites.  VESSELS: Within normal limits.  RETROPERITONEUM/LYMPH NODES: No lymphadenopathy.  ABDOMINAL WALL: Small fat-containing left inguinal hernia.  BONES: Degenerative changes of the spine, most prominent at L4-L5.    IMPRESSION:  No bowel obstruction or inflammation. Moderate amount of stool in the   distal sigmoid colon. Patient is a 63y old  Male who presents with a chief complaint abdominal pain    HPI:  This is a 63 year old  male with significant past medical history of ETOH liver cirrhosis, anemia, CAD s/p CABG x3, DM, GERD, gastroparesis, HTN, left BKA presenting to [facility] from [place] for abdominal pain unresponsive to Dilaudid Per patient has chronic epigastric pain related to gastroparesis. Pain usually controlled with Dilaudid. On daily Dilaudid for GP pain and right foot post-amputation pain, per patient. Over past 48 hours worsening epigastric pain not responsive to Dilaudid relayed as "like my gastroparesis pain but 10 out of 10" with associated nausea and nonbloody bilious vomiting. Patient reports constipation without bowel movement for "maybe a week." Also endorses abdominal distension. Recently hospitalized for appendicitis s/p appendectomy 11/23 discharged back to [place] 11/25. CT with increased stool burden sigmoid colon, otherwise unremarkable. Leukocytosis, afebrile.      PAST MEDICAL & SURGICAL HISTORY:  HTN (hypertension)    DM (diabetes mellitus)    Anemia    GERD (gastroesophageal reflux disease)    S/P BKA (below knee amputation), left    CAD (coronary artery disease)    S/P CABG x 3    Status post amputation of leg    MEDICATIONS  (STANDING):  albuterol    90 MICROgram(s) HFA Inhaler 2 Puff(s) Inhalation every 6 hours  amLODIPine   Tablet 10 milliGRAM(s) Oral daily  aspirin enteric coated 81 milliGRAM(s) Oral daily  bisacodyl 5 milliGRAM(s) Oral at bedtime  budesonide 160 MICROgram(s)/formoterol 4.5 MICROgram(s) Inhaler 2 Puff(s) Inhalation two times a day  clopidogrel Tablet 75 milliGRAM(s) Oral daily  dextrose 5%. 1000 milliLiter(s) (50 mL/Hr) IV Continuous <Continuous>  dextrose 50% Injectable 25 Gram(s) IV Push once  finasteride 5 milliGRAM(s) Oral daily  furosemide    Tablet 40 milliGRAM(s) Oral daily  gabapentin 300 milliGRAM(s) Oral two times a day  gabapentin 600 milliGRAM(s) Oral at bedtime  glucagon  Injectable 1 milliGRAM(s) IntraMuscular once  insulin lispro (ADMELOG) corrective regimen sliding scale   SubCutaneous three times a day before meals  lactulose Syrup 10 Gram(s) Oral daily  metoprolol tartrate 25 milliGRAM(s) Oral daily  multivitamin 1 Tablet(s) Oral daily  naloxegol 25 milliGRAM(s) Oral daily  pantoprazole  Injectable 40 milliGRAM(s) IV Push daily  senna 2 Tablet(s) Oral at bedtime  simvastatin 10 milliGRAM(s) Oral at bedtime  sodium chloride 0.9%. 1000 milliLiter(s) (100 mL/Hr) IV Continuous <Continuous>  spironolactone 100 milliGRAM(s) Oral daily  tamsulosin 0.4 milliGRAM(s) Oral at bedtime    MEDICATIONS  (PRN):  acetaminophen     Tablet .. 650 milliGRAM(s) Oral every 6 hours PRN Temp greater or equal to 38C (100.4F), Mild Pain (1 - 3)  aluminum hydroxide/magnesium hydroxide/simethicone Suspension 30 milliLiter(s) Oral every 4 hours PRN Dyspepsia  dextrose Oral Gel 15 Gram(s) Oral once PRN Blood Glucose LESS THAN 70 milliGRAM(s)/deciliter  HYDROmorphone  Injectable 1 milliGRAM(s) IV Push every 6 hours PRN Severe Pain (7 - 10)  HYDROmorphone  Injectable 0.5 milliGRAM(s) IV Push every 6 hours PRN Moderate Pain (4 - 6)  melatonin 3 milliGRAM(s) Oral at bedtime PRN Insomnia  ondansetron Injectable 4 milliGRAM(s) IV Push every 6 hours PRN Nausea and/or Vomiting  ondansetron Injectable 4 milliGRAM(s) IV Push every 8 hours PRN Nausea and/or Vomiting      Allergies    No Known Allergies    Intolerances        SOCIAL HISTORY:    FAMILY HISTORY:      REVIEW OF SYSTEMS:    CONSTITUTIONAL: No weakness, fevers or chills  EYES/ENT: No visual changes;  No vertigo or throat pain   NECK: No pain or stiffness  RESPIRATORY: No cough, wheezing, hemoptysis; No shortness of breath  CARDIOVASCULAR: No chest pain or palpitations  GASTROINTESTINAL: See HPI  GENITOURINARY: No dysuria, frequency or hematuria  NEUROLOGICAL: No numbness or weakness  SKIN: No itching, burning, rashes, or lesions   PSYCH: Normal mood and affect  All other review of systems is negative unless indicated above.    Vital Signs Last 24 Hrs  T(C): 36.4 (18 Dec 2023 08:35), Max: 36.9 (17 Dec 2023 19:00)  T(F): 97.6 (18 Dec 2023 08:35), Max: 98.4 (17 Dec 2023 19:00)  HR: 89 (18 Dec 2023 08:35) (70 - 99)  BP: 160/85 (18 Dec 2023 08:35) (135/72 - 186/83)  BP(mean): 95 (18 Dec 2023 08:00) (90 - 108)  RR: 18 (18 Dec 2023 08:35) (16 - 19)  SpO2: 99% (18 Dec 2023 08:35) (96% - 99%)    Parameters below as of 18 Dec 2023 08:35  Patient On (Oxygen Delivery Method): room air        PHYSICAL EXAM:    Constitutional: No acute distress,  non-toxic appearing  HEENT: masked, good phonation, not icteric  Neck: supple, no lymphadenopathy  Respiratory: clear to ascultation bilaterally, no wheezing  Cardiovascular: S1 and S2, regular rate and rhythm, no murmurs rubs or gallops  Gastrointestinal: soft, TTP diffusely, mildly distended, +bowel sounds, no rebound or guarding, no surgical scars, no drains  Extremities: No peripheral edema, no cyanosis or clubbing, left BKA, right toe amp  Vascular: 2+ peripheral pulses, no venous stasis  Neurological: A/O x 3, no focal deficits, no asterixis  Psychiatric: Normal mood, normal affect  Skin: No rashes, not jaundiced    LABS:                        13.3   13.03 )-----------( 282      ( 18 Dec 2023 07:37 )             40.9     12-18    141  |  108  |  26<H>  ----------------------------<  152<H>  4.5   |  29  |  1.12    Ca    9.5      18 Dec 2023 07:37    TPro  7.7  /  Alb  3.1<L>  /  TBili  0.3  /  DBili  x   /  AST  26  /  ALT  27  /  AlkPhos  152<H>  12-18      LIVER FUNCTIONS - ( 18 Dec 2023 07:37 )  Alb: 3.1 g/dL / Pro: 7.7 gm/dL / ALK PHOS: 152 U/L / ALT: 27 U/L / AST: 26 U/L / GGT: x             RADIOLOGY & ADDITIONAL STUDIES:  FINDINGS:  LOWER CHEST: Bibasilar interlobular septal thickening suggestive of   interstitial pulmonary edema. Trace right pleural effusion. Bibasilar   subsegmental atelectasis. Mitral valve replacement. Coronary artery   calcifications. Median sternotomy. Mild bilateral gynecomastia.    LIVER: Within normal limits.  BILE DUCTS: Normal caliber.  GALLBLADDER: Cholecystectomy.  SPLEEN: Within normal limits.  PANCREAS: Within normal limits.  ADRENALS: Within normal limits.  KIDNEYS/URETERS: 1.0 cm exophytic left renal upper pole lesion, not   significantly changed. No hydronephrosis. Nonspecific bilateral   perinephric fat stranding.    BLADDER: Within normal limits.  REPRODUCTIVE ORGANS: Atrophic or absent prostate.    BOWEL: No bowel obstruction or inflammation. Pericecal surgical sutures   likely due to a prior appendectomy. Moderate amount of stool in the   distal sigmoid colon.  PERITONEUM: No ascites.  VESSELS: Within normal limits.  RETROPERITONEUM/LYMPH NODES: No lymphadenopathy.  ABDOMINAL WALL: Small fat-containing left inguinal hernia.  BONES: Degenerative changes of the spine, most prominent at L4-L5.    IMPRESSION:  No bowel obstruction or inflammation. Moderate amount of stool in the   distal sigmoid colon.

## 2023-12-18 NOTE — H&P ADULT - HISTORY OF PRESENT ILLNESS
63 year old  male with PMH of anemia, CAD s/p CABG x3, DM, GERD, gastroparesis, HTN, left BKA presents to the ED from [place] for abdominal pain. Endorses 2 days of abdominal pain, associated with nausea and vomiting started this morning with inability to tolerate PO intake. S/P appendectomy 11/23, patient was admitted s/p surgery and was discharged on 11/25. Endorses pain is centrally located around the umbilical area, non-radiating and constant. Denies chest pain, palpitation, SOB, headache, dizziness, URI symptoms. In the ED had urinary retention, a Jurado was placed. Labs remarkable for WBC 17:00, afebrile. Vitals stable. Received 1L-NS, benadryl, Dilaudid 6mg PO & 1mg IV, Reglan.   CT-Abd: No bowel obstruction or inflammation. Moderate amount of stool in the distal sigmoid colon.   63 year old  male with PMH of anemia, CAD s/p CABG x3, DM, GERD, gastroparesis, HTN, left BKA presents to the ED from [place] for abdominal pain. Endorses 2 days of abdominal pain, associated with nausea and vomiting started this morning with inability to tolerate PO intake. S/P appendectomy 11/23, patient was admitted s/p surgery and was discharged on 11/25. Endorses pain is centrally located around the umbilical area, non-radiating and constant. Denies chest pain, palpitation, SOB, headache, dizziness, URI symptoms. In the ED had urinary retention, a Jurado was placed. Labs remarkable for WBC 17:00, afebrile. Vitals stable. Received 1L-NS, benadryl, Dilaudid 6mg PO & 1mg IV, Reglan.   CT-Abd: No bowel obstruction or inflammation. Moderate amount of stool in the distal sigmoid colon.

## 2023-12-19 NOTE — PROGRESS NOTE ADULT - SUBJECTIVE AND OBJECTIVE BOX
Reason for Admission: Intractable Abdominal Pain    Patient is a 63 year old  male with PMH of anemia, CAD s/p CABG x3, DM, GERD, gastroparesis, HTN, left BKA presents to the ED from [place] for abdominal pain. Endorses 2 days of abdominal pain, associated with nausea and vomiting started this morning with inability to tolerate PO intake. S/P appendectomy 11/23, patient was admitted s/p surgery and was discharged on 11/25. Endorses pain is centrally located around the umbilical area, non-radiating and constant. Denies chest pain, palpitation, SOB, headache, dizziness, URI symptoms. In the ED had urinary retention, a Jurado was placed. Labs remarkable for WBC 17:00, afebrile. Vitals stable. Received 1L-NS, benadryl, Dilaudid 6mg PO & 1mg IV, Reglan.   CT-Abd: No bowel obstruction or inflammation. Moderate amount of stool in the distal sigmoid colon.    Medical progress: Still with abdominal pain. No BM, Patient is very deconditioned frail, chronically sick appearing asking for IV dilaudid. GI follow up.   Complaints: abdominal pain  State of mind: wants IV Dilaudid for abdominal pain. Normal     REVIEW OF SYSTEMS:  General: NAD, hemodynamically stable, + weakness  HEENT:  Eyes:  No visual loss  SKIN:  No rash or itching.  CARDIOVASCULAR:  No chest pain  RESPIRATORY:  No shortness of breath  GASTROINTESTINAL: Severe abdominal pain  NEUROLOGICAL:  No headache, dizziness, syncope, paralysis, ataxia, numbness or tingling in the extremities. No change in bowel or bladder control.  MUSCULOSKELETAL:  No muscle, back pain, joint pain or stiffness.  HEMATOLOGIC:  No anemia, bleeding or bruising.  LYMPHATICS:  No enlarged nodes. No history of splenectomy.  ENDOCRINOLOGIC:  No reports of sweating, cold or heat intolerance. No polyuria or polydipsia.  ALLERGIES:  No history of asthma, hives, eczema or rhinitis.    Physical Exam:   GENERAL APPEARANCE:  Deconditioned, frail  ICU Vital Signs Last 24 Hrs  T(C): 36.7 (19 Dec 2023 08:16), Max: 36.9 (18 Dec 2023 15:59)  T(F): 98.1 (19 Dec 2023 08:16), Max: 98.4 (18 Dec 2023 15:59)  HR: 86 (19 Dec 2023 08:16) (75 - 89)  BP: 135/79 (19 Dec 2023 08:16) (135/79 - 140/63)  RR: 18 (19 Dec 2023 08:16) (18 - 18)  SpO2: 95% (19 Dec 2023 08:16) (95% - 99%)  HEENT:  Head is normocephalic    Skin:  thin, dry  NECK:  Supple without lymphadenopathy.   HEART:  Regular rate and rhythm  LUNGS:  Good ins/exp effort, no W/R/R/C  ABDOMEN:  Soft, nontender, nondistended with good bowel sounds heard  EXTREMITIES:  LE extremity amputations  NEUROLOGICAL:  Gross nonfocal     Labs:   CBC Full  -  ( 19 Dec 2023 07:19 )  WBC Count : 15.92 K/uL  RBC Count : 3.56 M/uL  Hemoglobin : 11.5 g/dL  Hematocrit : 35.3 %  Platelet Count - Automated : 244 K/uL    Urinalysis Basic - ( 19 Dec 2023 07:19 )    Color: x / Appearance: x / SG: x / pH: x  Gluc: 174 mg/dL / Ketone: x  / Bili: x / Urobili: x   Blood: x / Protein: x / Nitrite: x   Leuk Esterase: x / RBC: x / WBC x   Sq Epi: x / Non Sq Epi: x / Bacteria: x    139  |  110<H>  |  17  ----------------------------<  174<H>  4.4   |  26  |  1.08    Ca    8.6      19 Dec 2023 07:19    TPro  7.7  /  Alb  3.1<L>  /  TBili  0.3  /  DBili  x   /  AST  26  /  ALT  27  /  AlkPhos  152<H>  12-18        # Severe abdominal pain secondary to stool impaction vs gastroparesis   # Chronic abdominal pain  s/p lap appy (NOV), chronic abd pain likely multifactorial (DM, gastroparesis, vasculopath, chronic mesenteric ischemia?, on chronic dilaudid, constipation)  - 2 days history of abdominal pain with nausea, vomiting and decrease PO intake   - CT-Abd: No bowel obstruction or inflammation. Moderate amount of stool in the distal sigmoid colon.  - Constipation 2/2 chronic opioids -->>may try Movantik  - Liquid diet advance as tolerated   - Stool softener/enema  - Lactulose  - GI evaluation    # Urinary retention.   - Urinary retention/ bladder distension   - 500 cc Urine removed   - Jurado in place     # CAD (coronary artery disease) - s/p CAGB   - DAPT  /  STATIN    # Hypertension.   ·  Plan: Continue home meds   - Amlodipine 10mg   - Metoprolol.    # Diabetes mellitus.   - ISS with hypoglycemia protocol   - Lantus 45U  - F/U A1C.       Reason for Admission: Intractable Abdominal Pain    Patient is a 63 year old  male with PMH of anemia, CAD s/p CABG x3, DM, GERD, gastroparesis, HTN, left BKA presents to the ED from [place] for abdominal pain. Endorses 2 days of abdominal pain, associated with nausea and vomiting started this morning with inability to tolerate PO intake. S/P appendectomy 11/23, patient was admitted s/p surgery and was discharged on 11/25. Endorses pain is centrally located around the umbilical area, non-radiating and constant. Denies chest pain, palpitation, SOB, headache, dizziness, URI symptoms. In the ED had urinary retention, a Jurado was placed. Labs remarkable for WBC 17:00, afebrile. Vitals stable. Received 1L-NS, benadryl, Dilaudid 6mg PO & 1mg IV, Reglan.   CT-Abd: No bowel obstruction or inflammation. Moderate amount of stool in the distal sigmoid colon.    Medical progress: Still with abdominal pain. No BM, Patient is very deconditioned frail, chronically sick appearing asking for IV dilaudid. GI follow up.   Complaints: abdominal pain  State of mind: wants IV Dilaudid for abdominal pain. Normal     REVIEW OF SYSTEMS:  General: NAD, hemodynamically stable, + weakness  HEENT:  Eyes:  No visual loss  SKIN:  No rash or itching.  CARDIOVASCULAR:  No chest pain  RESPIRATORY:  No shortness of breath  GASTROINTESTINAL: Severe abdominal pain  NEUROLOGICAL:  No headache, dizziness, syncope, paralysis, ataxia, numbness or tingling in the extremities. No change in bowel or bladder control.  MUSCULOSKELETAL:  No muscle, back pain, joint pain or stiffness.  HEMATOLOGIC:  No anemia, bleeding or bruising.  LYMPHATICS:  No enlarged nodes. No history of splenectomy.  ENDOCRINOLOGIC:  No reports of sweating, cold or heat intolerance. No polyuria or polydipsia.  ALLERGIES:  No history of asthma, hives, eczema or rhinitis.    Physical Exam:   GENERAL APPEARANCE:  Deconditioned, frail  ICU Vital Signs Last 24 Hrs  T(C): 36.7 (19 Dec 2023 08:16), Max: 36.9 (18 Dec 2023 15:59)  T(F): 98.1 (19 Dec 2023 08:16), Max: 98.4 (18 Dec 2023 15:59)  HR: 86 (19 Dec 2023 08:16) (75 - 89)  BP: 135/79 (19 Dec 2023 08:16) (135/79 - 140/63)  RR: 18 (19 Dec 2023 08:16) (18 - 18)  SpO2: 95% (19 Dec 2023 08:16) (95% - 99%)  HEENT:  Head is normocephalic    Skin:  thin, dry  NECK:  Supple without lymphadenopathy.   HEART:  Regular rate and rhythm  LUNGS:  Good ins/exp effort, no W/R/R/C  ABDOMEN:  Soft, nontender, nondistended with good bowel sounds heard  EXTREMITIES:  LE extremity amputations  NEUROLOGICAL:  Gross nonfocal     Labs:   CBC Full  -  ( 19 Dec 2023 07:19 )  WBC Count : 15.92 K/uL  RBC Count : 3.56 M/uL  Hemoglobin : 11.5 g/dL  Hematocrit : 35.3 %  Platelet Count - Automated : 244 K/uL    Urinalysis Basic - ( 19 Dec 2023 07:19 )    Color: x / Appearance: x / SG: x / pH: x  Gluc: 174 mg/dL / Ketone: x  / Bili: x / Urobili: x   Blood: x / Protein: x / Nitrite: x   Leuk Esterase: x / RBC: x / WBC x   Sq Epi: x / Non Sq Epi: x / Bacteria: x    139  |  110<H>  |  17  ----------------------------<  174<H>  4.4   |  26  |  1.08    Ca    8.6      19 Dec 2023 07:19    TPro  7.7  /  Alb  3.1<L>  /  TBili  0.3  /  DBili  x   /  AST  26  /  ALT  27  /  AlkPhos  152<H>  12-18        # Severe abdominal pain secondary to stool impaction vs gastroparesis   # Chronic abdominal pain  s/p lap appy (NOV), chronic abd pain likely multifactorial (DM, gastroparesis, vasculopath, chronic mesenteric ischemia?, on chronic dilaudid, constipation)  - 2 days history of abdominal pain with nausea, vomiting and decrease PO intake   - CT-Abd: No bowel obstruction or inflammation. Moderate amount of stool in the distal sigmoid colon.  - Constipation 2/2 chronic opioids -->>may try Movantik  - Liquid diet advance as tolerated   - Stool softener/enema  - Lactulose  - GI evaluation    # Urinary retention.   - Urinary retention/ bladder distension   - 500 cc Urine removed   - Jurado in place     # CAD (coronary artery disease) - s/p CAGB   - DAPT  /  STATIN    # Hypertension.   ·  Plan: Continue home meds   - Amlodipine 10mg   - Metoprolol.    # Diabetes mellitus.   - ISS with hypoglycemia protocol   - Lantus 45U  - F/U A1C.

## 2023-12-20 NOTE — PROGRESS NOTE ADULT - SUBJECTIVE AND OBJECTIVE BOX
Reason for Admission: Intractable Abdominal Pain    Patient is a 63 year old  male with PMH of anemia, CAD s/p CABG x3, DM, GERD, gastroparesis, HTN, left BKA presents to the ED from [place] for abdominal pain. Endorses 2 days of abdominal pain, associated with nausea and vomiting started this morning with inability to tolerate PO intake. S/P appendectomy 11/23, patient was admitted s/p surgery and was discharged on 11/25. Endorses pain is centrally located around the umbilical area, non-radiating and constant. Denies chest pain, palpitation, SOB, headache, dizziness, URI symptoms. In the ED had urinary retention, a Jurado was placed. Labs remarkable for WBC 17:00, afebrile. Vitals stable. Received 1L-NS, benadryl, Dilaudid 6mg PO & 1mg IV, Reglan.   CT-Abd: No bowel obstruction or inflammation. Moderate amount of stool in the distal sigmoid colon.    sub: pt seen in bed (was placing finger in his rectum). Moaning in distress. Stating he has epigastric pain. Asking for dilaudid ATC. States he is on 6mg po q6h - per I stop, he is on 4mg po q6h last filled 12/12/23. Is passing flatus. He is not sure whether he had a BM last night but states blankets were soiled last night. No active vomiting. When interviewed, he is longer moaning and speaking in full sentences. Vitals not c/w sepsis. Abd grossly soft.     REVIEW OF SYSTEMS:  General: NAD, hemodynamically stable, + weakness  HEENT:  Eyes:  No visual loss  SKIN:  No rash or itching.  CARDIOVASCULAR:  No chest pain  RESPIRATORY:  No shortness of breath  GASTROINTESTINAL: Severe abdominal pain  NEUROLOGICAL:  No headache, dizziness, syncope, paralysis, ataxia, numbness or tingling in the extremities. No change in bowel or bladder control.  MUSCULOSKELETAL:  No muscle, back pain, joint pain or stiffness.  HEMATOLOGIC:  No anemia, bleeding or bruising.  LYMPHATICS:  No enlarged nodes. No history of splenectomy.  ENDOCRINOLOGIC:  No reports of sweating, cold or heat intolerance. No polyuria or polydipsia.  ALLERGIES:  No history of asthma, hives, eczema or rhinitis.    Physical Exam:   GENERAL APPEARANCE:  Deconditioned, frail  ICU Vital Signs Last 24 Hrs  T(C): 36.9 (20 Dec 2023 08:15), Max: 37.1 (19 Dec 2023 21:42)  T(F): 98.5 (20 Dec 2023 08:15), Max: 98.7 (19 Dec 2023 21:42)  HR: 91 (20 Dec 2023 08:15) (76 - 91)  BP: 150/63 (20 Dec 2023 08:15) (131/58 - 150/63)  BP(mean): --  ABP: --  ABP(mean): --  RR: 18 (20 Dec 2023 08:15) (18 - 18)  SpO2: 97% (20 Dec 2023 08:15) (94% - 98%)    O2 Parameters below as of 20 Dec 2023 08:15  Patient On (Oxygen Delivery Method): room air          HEENT:  Head is normocephalic    Skin:  thin, dry  NECK:  Supple without lymphadenopathy.   HEART:  Regular rate and rhythm  LUNGS:  Good ins/exp effort, no W/R/R/C  ABDOMEN:  Soft, nontender, nondistended with good bowel sounds heard; no guarding no rebound tendernedd  EXTREMITIES:  LE extremity amputations  NEUROLOGICAL:  Gross nonfocal     Labs:   CBC Full  -  ( 19 Dec 2023 07:19 )  WBC Count : 15.92 K/uL  RBC Count : 3.56 M/uL  Hemoglobin : 11.5 g/dL  Hematocrit : 35.3 %  Platelet Count - Automated : 244 K/uL    Urinalysis Basic - ( 19 Dec 2023 07:19 )    Color: x / Appearance: x / SG: x / pH: x  Gluc: 174 mg/dL / Ketone: x  / Bili: x / Urobili: x   Blood: x / Protein: x / Nitrite: x   Leuk Esterase: x / RBC: x / WBC x   Sq Epi: x / Non Sq Epi: x / Bacteria: x    139  |  110<H>  |  17  ----------------------------<  174<H>  4.4   |  26  |  1.08    Ca    8.6      19 Dec 2023 07:19    TPro  7.7  /  Alb  3.1<L>  /  TBili  0.3  /  DBili  x   /  AST  26  /  ALT  27  /  AlkPhos  152<H>  12-18        # Severe abdominal pain secondary to stool impaction vs gastroparesis   # Chronic abdominal pain  s/p lap appy (NOV), chronic abd pain likely multifactorial (DM, gastroparesis, vasculopath, chronic mesenteric ischemia?, on chronic dilaudid, constipation)  - 2 days history of abdominal pain with nausea, vomiting and decrease PO intake   - CT-Abd: No bowel obstruction or inflammation. Moderate amount of stool in the distal sigmoid colon.  - Constipation 2/2 chronic opioids -->>give dose of methylnaltrexone 12mg sc x 1 (need to stop other laxatives for now)  - Liquid diet advance as tolerated   - Stool softener/enema  - Lactulose  - Sono: no e/o ascites, no portal vein thrombosis. + cirrhosis  - check lipase and lactate level  - Med seeking behavior?  - Confirmed pt on 4mg po q6h -> will put on equivalent 1.5 iv q6h for severe pain prn    # Urinary retention.   - Urinary retention/ bladder distension   - 500 cc Urine removed   - Jurado in place -> will need urology as o/p for urodynamic studies    # CAD (coronary artery disease) - s/p CAGB   - DAPT  /  STATIN    # Hypertension.   ·  Plan: Continue home meds   - Amlodipine 10mg   - Metoprolol.    # Diabetes mellitus.   - ISS with hypoglycemia protocol   - Lantus 45U  - F/U A1C.       Reason for Admission: Intractable Abdominal Pain    Patient is a 63 year old  male with PMH of anemia, CAD s/p CABG x3, DM, GERD, gastroparesis, HTN, left BKA presents to the ED from [place] for abdominal pain. Endorses 2 days of abdominal pain, associated with nausea and vomiting started this morning with inability to tolerate PO intake. S/P appendectomy 11/23, patient was admitted s/p surgery and was discharged on 11/25. Endorses pain is centrally located around the umbilical area, non-radiating and constant. Denies chest pain, palpitation, SOB, headache, dizziness, URI symptoms. In the ED had urinary retention, a Jurado was placed. Labs remarkable for WBC 17:00, afebrile. Vitals stable. Received 1L-NS, benadryl, Dilaudid 6mg PO & 1mg IV, Reglan.   CT-Abd: No bowel obstruction or inflammation. Moderate amount of stool in the distal sigmoid colon.    sub: pt seen in bed (was placing finger in his rectum). Moaning in distress. Stating he has epigastric pain. Asking for dilaudid ATC. States he is on 6mg po q6h - per I stop, he is on 4mg po q6h last filled 12/12/23. Is passing flatus. He is not sure whether he had a BM last night but states blankets were soiled last night. No active vomiting. When interviewed, he is longer moaning and speaking in full sentences. Vitals not c/w sepsis. Abd grossly soft.     REVIEW OF SYSTEMS:  General: NAD, hemodynamically stable, + weakness  HEENT:  Eyes:  No visual loss  SKIN:  No rash or itching.  CARDIOVASCULAR:  No chest pain  RESPIRATORY:  No shortness of breath  GASTROINTESTINAL: Severe abdominal pain  NEUROLOGICAL:  No headache, dizziness, syncope, paralysis, ataxia, numbness or tingling in the extremities. No change in bowel or bladder control.  MUSCULOSKELETAL:  No muscle, back pain, joint pain or stiffness.  HEMATOLOGIC:  No anemia, bleeding or bruising.  LYMPHATICS:  No enlarged nodes. No history of splenectomy.  ENDOCRINOLOGIC:  No reports of sweating, cold or heat intolerance. No polyuria or polydipsia.  ALLERGIES:  No history of asthma, hives, eczema or rhinitis.    Physical Exam:   GENERAL APPEARANCE:  Deconditioned, frail  ICU Vital Signs Last 24 Hrs  T(C): 36.9 (20 Dec 2023 08:15), Max: 37.1 (19 Dec 2023 21:42)  T(F): 98.5 (20 Dec 2023 08:15), Max: 98.7 (19 Dec 2023 21:42)  HR: 91 (20 Dec 2023 08:15) (76 - 91)  BP: 150/63 (20 Dec 2023 08:15) (131/58 - 150/63)  BP(mean): --  ABP: --  ABP(mean): --  RR: 18 (20 Dec 2023 08:15) (18 - 18)  SpO2: 97% (20 Dec 2023 08:15) (94% - 98%)    O2 Parameters below as of 20 Dec 2023 08:15  Patient On (Oxygen Delivery Method): room air          HEENT:  Head is normocephalic    Skin:  thin, dry  NECK:  Supple without lymphadenopathy.   HEART:  Regular rate and rhythm  LUNGS:  Good ins/exp effort, no W/R/R/C  ABDOMEN:  Soft, nontender, nondistended with good bowel sounds heard; no guarding no rebound tendernedd  EXTREMITIES:  LE extremity amputations  NEUROLOGICAL:  Gross nonfocal     Labs:   CBC Full  -  ( 19 Dec 2023 07:19 )  WBC Count : 15.92 K/uL  RBC Count : 3.56 M/uL  Hemoglobin : 11.5 g/dL  Hematocrit : 35.3 %  Platelet Count - Automated : 244 K/uL    Urinalysis Basic - ( 19 Dec 2023 07:19 )    Color: x / Appearance: x / SG: x / pH: x  Gluc: 174 mg/dL / Ketone: x  / Bili: x / Urobili: x   Blood: x / Protein: x / Nitrite: x   Leuk Esterase: x / RBC: x / WBC x   Sq Epi: x / Non Sq Epi: x / Bacteria: x    139  |  110<H>  |  17  ----------------------------<  174<H>  4.4   |  26  |  1.08    Ca    8.6      19 Dec 2023 07:19    TPro  7.7  /  Alb  3.1<L>  /  TBili  0.3  /  DBili  x   /  AST  26  /  ALT  27  /  AlkPhos  152<H>  12-18        # Severe abdominal pain secondary to stool impaction vs gastroparesis   # Chronic abdominal pain  s/p lap appy (NOV), chronic abd pain likely multifactorial (DM, gastroparesis, vasculopath, chronic mesenteric ischemia?, on chronic dilaudid, constipation)  - 2 days history of abdominal pain with nausea, vomiting and decrease PO intake   - CT-Abd: No bowel obstruction or inflammation. Moderate amount of stool in the distal sigmoid colon.  - Constipation 2/2 chronic opioids -->>give dose of methylnaltrexone 12mg sc x 1 (need to stop other laxatives for now)  - Liquid diet advance as tolerated   - Stool softener/enema  - Lactulose  - Sono: no e/o ascites, no portal vein thrombosis. + cirrhosis  - check lipase and lactate level  - Med seeking behavior?  - Confirmed pt on 4mg po q6h -> will put on equivalent 1.5 iv q6h for severe pain prn    # Urinary retention.   - Urinary retention/ bladder distension   - 500 cc Urine removed   - Jurado in place -> will need urology as o/p for urodynamic studies    # CAD (coronary artery disease) - s/p CAGB   - DAPT  /  STATIN    # Hypertension.   ·  Plan: Continue home meds   - Amlodipine 10mg   - Metoprolol.    # Diabetes mellitus.   - ISS with hypoglycemia protocol   - Lantus 45U  - F/U A1C.

## 2023-12-21 NOTE — PROGRESS NOTE ADULT - SUBJECTIVE AND OBJECTIVE BOX
Reason for Admission: Intractable Abdominal Pain    Patient is a 63 year old  male with PMH of anemia, CAD s/p CABG x3, DM, GERD, gastroparesis, HTN, left BKA presents to the ED from [place] for abdominal pain. Endorses 2 days of abdominal pain, associated with nausea and vomiting started this morning with inability to tolerate PO intake. S/P appendectomy 11/23, patient was admitted s/p surgery and was discharged on 11/25. Endorses pain is centrally located around the umbilical area, non-radiating and constant. Denies chest pain, palpitation, SOB, headache, dizziness, URI symptoms. In the ED had urinary retention, a Jurado was placed. Labs remarkable for WBC 17:00, afebrile. Vitals stable. Received 1L-NS, benadryl, Dilaudid 6mg PO & 1mg IV, Reglan.   CT-Abd: No bowel obstruction or inflammation. Moderate amount of stool in the distal sigmoid colon.    sub: pt seen in bed (was placing finger in his rectum). Moaning in distress. Stating he has epigastric pain. Asking for dilaudid ATC. States he is on 6mg po q6h - per I stop, he is on 4mg po q6h last filled 12/12/23. Is passing flatus. He is not sure whether he had a BM last night but states blankets were soiled last night. No active vomiting. When interviewed, he is longer moaning and speaking in full sentences. Vitals not c/w sepsis. Abd grossly soft.   12/21: pt has been refusing to take any of his regular meds including BB/BP meds and only asking for dilaudid. He is currently on the equivalent dose of his home dose of Dilaudid. No N/V. CT repeated and showed stool in rectal vault. ,         REVIEW OF SYSTEMS:  General: NAD, hemodynamically stable, + weakness  HEENT:  Eyes:  No visual loss  SKIN:  No rash or itching.  CARDIOVASCULAR:  No chest pain  RESPIRATORY:  No shortness of breath  GASTROINTESTINAL: Severe abdominal pain  NEUROLOGICAL:  No headache, dizziness, syncope, paralysis, ataxia, numbness or tingling in the extremities. No change in bowel or bladder control.  MUSCULOSKELETAL:  No muscle, back pain, joint pain or stiffness.  HEMATOLOGIC:  No anemia, bleeding or bruising.  LYMPHATICS:  No enlarged nodes. No history of splenectomy.  ENDOCRINOLOGIC:  No reports of sweating, cold or heat intolerance. No polyuria or polydipsia.  ALLERGIES:  No history of asthma, hives, eczema or rhinitis.    Physical Exam:   GENERAL APPEARANCE:  Deconditioned, frail  ICU Vital Signs Last 24 Hrs  T(C): 36.6 (21 Dec 2023 08:13), Max: 36.6 (20 Dec 2023 15:18)  T(F): 97.8 (21 Dec 2023 08:13), Max: 97.9 (20 Dec 2023 15:18)  HR: 110 (21 Dec 2023 08:13) (107 - 128)  BP: 158/78 (21 Dec 2023 08:13) (153/81 - 172/89)  BP(mean): 96 (20 Dec 2023 21:20) (96 - 96)  ABP: --  ABP(mean): --  RR: 18 (21 Dec 2023 08:13) (18 - 18)  SpO2: 94% (21 Dec 2023 08:13) (91% - 94%)    O2 Parameters below as of 21 Dec 2023 08:13  Patient On (Oxygen Delivery Method): room air              HEENT:  Head is normocephalic    Skin:  thin, dry  NECK:  Supple without lymphadenopathy.   HEART:  Regular rate and rhythm  LUNGS:  Good ins/exp effort, no W/R/R/C  ABDOMEN:  Soft, nontender, nondistended with good bowel sounds heard; no guarding no rebound tenderness  EXTREMITIES:  LE extremity amputations  NEUROLOGICAL:  Gross nonfocal     Labs:   CBC Full  -  ( 19 Dec 2023 07:19 )  WBC Count : 15.92 K/uL  RBC Count : 3.56 M/uL  Hemoglobin : 11.5 g/dL  Hematocrit : 35.3 %  Platelet Count - Automated : 244 K/uL    Urinalysis Basic - ( 19 Dec 2023 07:19 )    Color: x / Appearance: x / SG: x / pH: x  Gluc: 174 mg/dL / Ketone: x  / Bili: x / Urobili: x   Blood: x / Protein: x / Nitrite: x   Leuk Esterase: x / RBC: x / WBC x   Sq Epi: x / Non Sq Epi: x / Bacteria: x    139  |  110<H>  |  17  ----------------------------<  174<H>  4.4   |  26  |  1.08    Ca    8.6      19 Dec 2023 07:19    TPro  7.7  /  Alb  3.1<L>  /  TBili  0.3  /  DBili  x   /  AST  26  /  ALT  27  /  AlkPhos  152<H>  12-18        # Severe abdominal pain secondary to stool impaction vs gastroparesis   # Chronic abdominal pain  s/p lap appy (NOV), chronic abd pain likely multifactorial (DM, gastroparesis, vasculopath, chronic mesenteric ischemia?, on chronic dilaudid, constipation)  - 4 days history of abdominal pain; no Nausea or vomiting since his arrival  - CT-Abd: No bowel obstruction or inflammation. Moderate amount of stool in the distal sigmoid colon.  - REpeat CTAP: veronica in rectal vault, no stercoral colitis noted, no sbo, no ileus no eo inflammation  - Constipation 2/2 chronic opioids -->>s/p methylnaltrexone 12mg sc x 1 - pt states he did pass some amount of stool and passing flatus  - Liquid diet advance as tolerated   - Stool softener/enema  - Lactulose  - Moviprep today. IF not effective, will start alternating regimen of TW and mineral oil enema q3H several times until produces BM. D/W GI  - Sono: no e/o ascites, no portal vein thrombosis. + cirrhosis  - Lipase : wnl  - Lactate: 2.8, continue ivf. Check ua. Denies cough, lungs clear  - Confirmed pt on 4mg po q6h -> will put on equivalent 1.5 iv q6h for severe pain prn- educated on taper of opiates     # Urinary retention.   - Urinary retention/ bladder distension   - 500 cc Urine removed   - Jurado in place -> will need urology as o/p for urodynamic studies    # CAD (coronary artery disease) - s/p CAGB   - DAPT  /  STATIN    # Hypertension.   ·  Plan: Continue home meds   - Amlodipine 10mg   - Metoprolol - refusing po  - give 5 ivpb lopressor now    # Diabetes mellitus.   - ISS with hypoglycemia protocol   - Lantus 45U  - F/U A1C.       Reason for Admission: Intractable Abdominal Pain    Patient is a 63 year old  male with PMH of anemia, CAD s/p CABG x3, DM, GERD, gastroparesis, HTN, left BKA presents to the ED from [place] for abdominal pain. Endorses 2 days of abdominal pain, associated with nausea and vomiting started this morning with inability to tolerate PO intake. S/P appendectomy 11/23, patient was admitted s/p surgery and was discharged on 11/25. Endorses pain is centrally located around the umbilical area, non-radiating and constant. Denies chest pain, palpitation, SOB, headache, dizziness, URI symptoms. In the ED had urinary retention, a Jurado was placed. Labs remarkable for WBC 17:00, afebrile. Vitals stable. Received 1L-NS, benadryl, Dilaudid 6mg PO & 1mg IV, Reglan.   CT-Abd: No bowel obstruction or inflammation. Moderate amount of stool in the distal sigmoid colon.    sub: pt seen in bed (was placing finger in his rectum). Moaning in distress. Stating he has epigastric pain. Asking for dilaudid ATC. States he is on 6mg po q6h - per I stop, he is on 4mg po q6h last filled 12/12/23. Is passing flatus. He is not sure whether he had a BM last night but states blankets were soiled last night. No active vomiting. When interviewed, he is longer moaning and speaking in full sentences. Vitals not c/w sepsis. Abd grossly soft.   12/21: pt has been refusing to take any of his regular meds including BB/BP meds and only asking for dilaudid. He is currently on the equivalent dose of his home dose of Dilaudid. No N/V. CT repeated and showed stool in rectal vault. ,         REVIEW OF SYSTEMS:  General: NAD, hemodynamically stable, + weakness  HEENT:  Eyes:  No visual loss  SKIN:  No rash or itching.  CARDIOVASCULAR:  No chest pain  RESPIRATORY:  No shortness of breath  GASTROINTESTINAL: Severe abdominal pain  NEUROLOGICAL:  No headache, dizziness, syncope, paralysis, ataxia, numbness or tingling in the extremities. No change in bowel or bladder control.  MUSCULOSKELETAL:  No muscle, back pain, joint pain or stiffness.  HEMATOLOGIC:  No anemia, bleeding or bruising.  LYMPHATICS:  No enlarged nodes. No history of splenectomy.  ENDOCRINOLOGIC:  No reports of sweating, cold or heat intolerance. No polyuria or polydipsia.  ALLERGIES:  No history of asthma, hives, eczema or rhinitis.    Physical Exam:   GENERAL APPEARANCE:  Deconditioned, frail  ICU Vital Signs Last 24 Hrs  T(C): 36.6 (21 Dec 2023 08:13), Max: 36.6 (20 Dec 2023 15:18)  T(F): 97.8 (21 Dec 2023 08:13), Max: 97.9 (20 Dec 2023 15:18)  HR: 110 (21 Dec 2023 08:13) (107 - 128)  BP: 158/78 (21 Dec 2023 08:13) (153/81 - 172/89)  BP(mean): 96 (20 Dec 2023 21:20) (96 - 96)  ABP: --  ABP(mean): --  RR: 18 (21 Dec 2023 08:13) (18 - 18)  SpO2: 94% (21 Dec 2023 08:13) (91% - 94%)    O2 Parameters below as of 21 Dec 2023 08:13  Patient On (Oxygen Delivery Method): room air              HEENT:  Head is normocephalic    Skin:  thin, dry  NECK:  Supple without lymphadenopathy.   HEART:  Regular rate and rhythm  LUNGS:  Good ins/exp effort, no W/R/R/C  ABDOMEN:  Soft, nontender, nondistended with good bowel sounds heard; no guarding no rebound tenderness  EXTREMITIES:  LE extremity amputations  NEUROLOGICAL:  Gross nonfocal     Labs:   CBC Full  -  ( 19 Dec 2023 07:19 )  WBC Count : 15.92 K/uL  RBC Count : 3.56 M/uL  Hemoglobin : 11.5 g/dL  Hematocrit : 35.3 %  Platelet Count - Automated : 244 K/uL    Urinalysis Basic - ( 19 Dec 2023 07:19 )    Color: x / Appearance: x / SG: x / pH: x  Gluc: 174 mg/dL / Ketone: x  / Bili: x / Urobili: x   Blood: x / Protein: x / Nitrite: x   Leuk Esterase: x / RBC: x / WBC x   Sq Epi: x / Non Sq Epi: x / Bacteria: x    139  |  110<H>  |  17  ----------------------------<  174<H>  4.4   |  26  |  1.08    Ca    8.6      19 Dec 2023 07:19    TPro  7.7  /  Alb  3.1<L>  /  TBili  0.3  /  DBili  x   /  AST  26  /  ALT  27  /  AlkPhos  152<H>  12-18        # Severe abdominal pain secondary to stool impaction vs gastroparesis   # Chronic abdominal pain  s/p lap appy (NOV), chronic abd pain likely multifactorial (DM, gastroparesis, vasculopath, chronic mesenteric ischemia?, on chronic dilaudid, constipation)  - 4 days history of abdominal pain; no Nausea or vomiting since his arrival  - CT-Abd: No bowel obstruction or inflammation. Moderate amount of stool in the distal sigmoid colon.  - REpeat CTAP: veronica in rectal vault, no stercoral colitis noted, no sbo, no ileus no eo inflammation  - Constipation 2/2 chronic opioids -->>s/p methylnaltrexone 12mg sc x 1 - pt states he did pass some amount of stool and passing flatus  - Liquid diet advance as tolerated   - Stool softener/enema  - Lactulose  - Moviprep today. IF not effective, will start alternating regimen of TW and mineral oil enema q3H several times until produces BM. D/W GI  - Sono: no e/o ascites, no portal vein thrombosis. + cirrhosis  - Lipase : wnl  - Lactate: 2.8, continue ivf. Check ua. Denies cough, lungs clear  - Confirmed pt on 4mg po q6h -> will put on equivalent 1.5 iv q6h for severe pain prn- educated on taper of opiates     # Urinary retention.   - Urinary retention/ bladder distension   - 500 cc Urine removed   - Jurado in place -> will need urology as o/p for urodynamic studies    # CAD (coronary artery disease) - s/p CAGB   - DAPT  /  STATIN    # Hypertension.   ·  Plan: Continue home meds   - Amlodipine 10mg   - Metoprolol - refusing po  - give 5 ivpb lopressor now    # Diabetes mellitus.   - ISS with hypoglycemia protocol   - Lantus 45U  - F/U A1C.

## 2023-12-22 NOTE — PROGRESS NOTE ADULT - SUBJECTIVE AND OBJECTIVE BOX
Reason for Admission: Intractable Abdominal Pain    Patient is a 63 year old  male with PMH of anemia, CAD s/p CABG x3, DM, GERD, gastroparesis, HTN, left BKA presents to the ED from [place] for abdominal pain. Endorses 2 days of abdominal pain, associated with nausea and vomiting started this morning with inability to tolerate PO intake. S/P appendectomy 11/23, patient was admitted s/p surgery and was discharged on 11/25. Endorses pain is centrally located around the umbilical area, non-radiating and constant. Denies chest pain, palpitation, SOB, headache, dizziness, URI symptoms. In the ED had urinary retention, a Jurado was placed. Labs remarkable for WBC 17:00, afebrile. Vitals stable. Received 1L-NS, benadryl, Dilaudid 6mg PO & 1mg IV, Reglan.   CT-Abd: No bowel obstruction or inflammation. Moderate amount of stool in the distal sigmoid colon.    sub: pt seen in bed (was placing finger in his rectum). Moaning in distress. Stating he has epigastric pain. Asking for dilaudid ATC. States he is on 6mg po q6h - per I stop, he is on 4mg po q6h last filled 12/12/23. Is passing flatus. He is not sure whether he had a BM last night but states blankets were soiled last night. No active vomiting. When interviewed, he is longer moaning and speaking in full sentences. Vitals not c/w sepsis. Abd grossly soft.   12/21: pt has been refusing to take any of his regular meds including BB/BP meds and only asking for dilaudid. He is currently on the equivalent dose of his home dose of Dilaudid. No N/V. CT repeated and showed stool in rectal vault. ,   12/22/23: Still with abd pain.         REVIEW OF SYSTEMS:  General: NAD, hemodynamically stable, + weakness  HEENT:  Eyes:  No visual loss  SKIN:  No rash or itching.  CARDIOVASCULAR:  No chest pain  RESPIRATORY:  No shortness of breath  GASTROINTESTINAL: Severe abdominal pain  NEUROLOGICAL:  No headache, dizziness, syncope, paralysis, ataxia, numbness or tingling in the extremities. No change in bowel or bladder control.  MUSCULOSKELETAL:  No muscle, back pain, joint pain or stiffness.  HEMATOLOGIC:  No anemia, bleeding or bruising.  LYMPHATICS:  No enlarged nodes. No history of splenectomy.  ENDOCRINOLOGIC:  No reports of sweating, cold or heat intolerance. No polyuria or polydipsia.  ALLERGIES:  No history of asthma, hives, eczema or rhinitis.    Physical Exam:     Vital Signs Last 24 Hrs  T(C): 36.7 (22 Dec 2023 08:11), Max: 37 (22 Dec 2023 05:01)  T(F): 98.1 (22 Dec 2023 08:11), Max: 98.6 (22 Dec 2023 05:01)  HR: 120 (22 Dec 2023 08:11) (114 - 125)  BP: 136/82 (22 Dec 2023 08:11) (113/81 - 160/96)  BP(mean): --  RR: 20 (22 Dec 2023 08:11) (17 - 20)  SpO2: 90% (22 Dec 2023 08:11) (90% - 92%)    Parameters below as of 22 Dec 2023 08:11  Patient On (Oxygen Delivery Method): room air          HEENT:  Head is normocephalic    Skin:  thin, dry  NECK:  Supple without lymphadenopathy.   HEART:  Regular rate and rhythm  LUNGS:  Good ins/exp effort, no W/R/R/C  ABDOMEN:  Soft, nontender, nondistended with good bowel sounds heard; no guarding no rebound tenderness  EXTREMITIES:  LE extremity amputations  NEUROLOGICAL:  Gross nonfocal     Labs:   CBC Full  -  ( 19 Dec 2023 07:19 )  WBC Count : 15.92 K/uL  RBC Count : 3.56 M/uL  Hemoglobin : 11.5 g/dL  Hematocrit : 35.3 %  Platelet Count - Automated : 244 K/uL    Urinalysis Basic - ( 19 Dec 2023 07:19 )    Color: x / Appearance: x / SG: x / pH: x  Gluc: 174 mg/dL / Ketone: x  / Bili: x / Urobili: x   Blood: x / Protein: x / Nitrite: x   Leuk Esterase: x / RBC: x / WBC x   Sq Epi: x / Non Sq Epi: x / Bacteria: x    139  |  110<H>  |  17  ----------------------------<  174<H>  4.4   |  26  |  1.08    Ca    8.6      19 Dec 2023 07:19    TPro  7.7  /  Alb  3.1<L>  /  TBili  0.3  /  DBili  x   /  AST  26  /  ALT  27  /  AlkPhos  152<H>  12-18        # Severe abdominal pain secondary to stool impaction vs gastroparesis   # Chronic abdominal pain  s/p lap appy (NOV), chronic abd pain likely multifactorial (DM, gastroparesis, vasculopath, chronic mesenteric ischemia?, on chronic dilaudid, constipation)  - 4 days history of abdominal pain; no Nausea or vomiting since his arrival  - CT-Abd: No bowel obstruction or inflammation. Moderate amount of stool in the distal sigmoid colon.  - REpeat CTAP: veronica in rectal vault, no stercoral colitis noted, no sbo, no ileus no eo inflammation  - Constipation 2/2 chronic opioids -->>s/p methylnaltrexone 12mg sc x 1 - pt states he did pass some amount of stool and passing flatus  - Advance diet  -Stool softener/enema  - Lactulose  - Moviprep today. IF not effective, will start alternating regimen of TW and mineral oil enema q3H several times until produces BM. D/W GI  - Sono: no e/o ascites, no portal vein thrombosis. + cirrhosis  - Lipase : wnl  - Lactate: 2.8, continue ivf. Check ua. Denies cough, lungs clear  - Confirmed pt on 4mg po q6h -> will put on equivalent 1.5 iv q6h for severe pain prn- educated on taper of opiates     # Urinary retention.   - Urinary retention/ bladder distension   - 500 cc Urine removed   - Jurado in place -> will need urology as o/p for urodynamic studies    # CAD (coronary artery disease) - s/p CAGB   - DAPT  /  STATIN    # Hypertension.   ·  Plan: Continue home meds   - Amlodipine 10mg   - Metoprolol - refusing po  - give 5 ivpb lopressor now    # Diabetes mellitus.   - ISS with hypoglycemia protocol   - Lantus 45U  - F/U A1C.       Reason for Admission: Intractable Abdominal Pain    Patient is a 63 year old  male with PMH of anemia, CAD s/p CABG x3, DM, GERD, gastroparesis, HTN, left BKA presents to the ED from [place] for abdominal pain. Endorses 2 days of abdominal pain, associated with nausea and vomiting started this morning with inability to tolerate PO intake. S/P appendectomy 11/23, patient was admitted s/p surgery and was discharged on 11/25. Endorses pain is centrally located around the umbilical area, non-radiating and constant. Denies chest pain, palpitation, SOB, headache, dizziness, URI symptoms. In the ED had urinary retention, a Jurado was placed. Labs remarkable for WBC 17:00, afebrile. Vitals stable. Received 1L-NS, benadryl, Dilaudid 6mg PO & 1mg IV, Reglan.   CT-Abd: No bowel obstruction or inflammation. Moderate amount of stool in the distal sigmoid colon.    sub: pt seen in bed (was placing finger in his rectum). Moaning in distress. Stating he has epigastric pain. Asking for dilaudid ATC. States he is on 6mg po q6h - per I stop, he is on 4mg po q6h last filled 12/12/23. Is passing flatus. He is not sure whether he had a BM last night but states blankets were soiled last night. No active vomiting. When interviewed, he is longer moaning and speaking in full sentences. Vitals not c/w sepsis. Abd grossly soft.   12/21: pt has been refusing to take any of his regular meds including BB/BP meds and only asking for dilaudid. He is currently on the equivalent dose of his home dose of Dilaudid. No N/V. CT repeated and showed stool in rectal vault. ,   12/22/23: Still with abd pain.         REVIEW OF SYSTEMS:  General: NAD, hemodynamically stable, + weakness  HEENT:  Eyes:  No visual loss  SKIN:  No rash or itching.  CARDIOVASCULAR:  No chest pain  RESPIRATORY:  No shortness of breath  GASTROINTESTINAL: Severe abdominal pain  NEUROLOGICAL:  No headache, dizziness, syncope, paralysis, ataxia, numbness or tingling in the extremities. No change in bowel or bladder control.  MUSCULOSKELETAL:  No muscle, back pain, joint pain or stiffness.  HEMATOLOGIC:  No anemia, bleeding or bruising.  LYMPHATICS:  No enlarged nodes. No history of splenectomy.  ENDOCRINOLOGIC:  No reports of sweating, cold or heat intolerance. No polyuria or polydipsia.  ALLERGIES:  No history of asthma, hives, eczema or rhinitis.    Physical Exam:     Vital Signs Last 24 Hrs  T(C): 36.7 (22 Dec 2023 08:11), Max: 37 (22 Dec 2023 05:01)  T(F): 98.1 (22 Dec 2023 08:11), Max: 98.6 (22 Dec 2023 05:01)  HR: 120 (22 Dec 2023 08:11) (114 - 125)  BP: 136/82 (22 Dec 2023 08:11) (113/81 - 160/96)  BP(mean): --  RR: 20 (22 Dec 2023 08:11) (17 - 20)  SpO2: 90% (22 Dec 2023 08:11) (90% - 92%)    Parameters below as of 22 Dec 2023 08:11  Patient On (Oxygen Delivery Method): room air          HEENT:  Head is normocephalic    Skin:  thin, dry  NECK:  Supple without lymphadenopathy.   HEART:  Regular rate and rhythm  LUNGS:  Good ins/exp effort, no W/R/R/C  ABDOMEN:  Soft, nontender, nondistended with good bowel sounds heard; no guarding no rebound tenderness  EXTREMITIES:  LE extremity amputations  NEUROLOGICAL:  Gross nonfocal     Labs:   CBC Full  -  ( 19 Dec 2023 07:19 )  WBC Count : 15.92 K/uL  RBC Count : 3.56 M/uL  Hemoglobin : 11.5 g/dL  Hematocrit : 35.3 %  Platelet Count - Automated : 244 K/uL    Urinalysis Basic - ( 19 Dec 2023 07:19 )    Color: x / Appearance: x / SG: x / pH: x  Gluc: 174 mg/dL / Ketone: x  / Bili: x / Urobili: x   Blood: x / Protein: x / Nitrite: x   Leuk Esterase: x / RBC: x / WBC x   Sq Epi: x / Non Sq Epi: x / Bacteria: x    139  |  110<H>  |  17  ----------------------------<  174<H>  4.4   |  26  |  1.08    Ca    8.6      19 Dec 2023 07:19    TPro  7.7  /  Alb  3.1<L>  /  TBili  0.3  /  DBili  x   /  AST  26  /  ALT  27  /  AlkPhos  152<H>  12-18        # Severe abdominal pain secondary to stool impaction vs gastroparesis   # Chronic abdominal pain  s/p lap appy (NOV), chronic abd pain likely multifactorial (DM, gastroparesis, vasculopath, chronic mesenteric ischemia?, on chronic dilaudid, constipation)  - 4 days history of abdominal pain; no Nausea or vomiting since his arrival  - CT-Abd: No bowel obstruction or inflammation. Moderate amount of stool in the distal sigmoid colon.  - REpeat CTAP: veronica in rectal vault, no stercoral colitis noted, no sbo, no ileus no eo inflammation  - Constipation 2/2 chronic opioids -->>s/p methylnaltrexone 12mg sc x 1 - pt states he did pass some amount of stool and passing flatus  - Advance diet  -Stool softener/enema  - Lactulose  - Moviprep today. IF not effective, will start alternating regimen of TW and mineral oil enema q3H several times until produces BM. D/W GI  - Sono: no e/o ascites, no portal vein thrombosis. + cirrhosis  - Lipase : wnl  - Lactate: 2.8, continue ivf. Check ua. Denies cough, lungs clear  - Confirmed pt on 4mg po q6h -> will put on equivalent 1.5 iv q6h for severe pain prn- educated on taper of opiates     # Urinary retention.   - Urinary retention/ bladder distension   - 500 cc Urine removed   - Jurado in place -> will need urology as o/p for urodynamic studies    # CAD (coronary artery disease) - s/p CAGB   - DAPT  /  STATIN    # Hypertension.   ·  Plan: Continue home meds   - Amlodipine 10mg   - Metoprolol - refusing po  - give 5 ivpb lopressor now    # Diabetes mellitus.   - ISS with hypoglycemia protocol   - Lantus 45U  - F/U A1C.

## 2023-12-23 NOTE — PROGRESS NOTE ADULT - SUBJECTIVE AND OBJECTIVE BOX
Reason for Admission: Intractable Abdominal Pain    Patient is a 63 year old  male with PMH of anemia, CAD s/p CABG x3, DM, GERD, gastroparesis, HTN, left BKA presents to the ED from [place] for abdominal pain. Endorses 2 days of abdominal pain, associated with nausea and vomiting started this morning with inability to tolerate PO intake. S/P appendectomy 11/23, patient was admitted s/p surgery and was discharged on 11/25. Endorses pain is centrally located around the umbilical area, non-radiating and constant. Denies chest pain, palpitation, SOB, headache, dizziness, URI symptoms. In the ED had urinary retention, a Jurado was placed. Labs remarkable for WBC 17:00, afebrile. Vitals stable. Received 1L-NS, benadryl, Dilaudid 6mg PO & 1mg IV, Reglan.   CT-Abd: No bowel obstruction or inflammation. Moderate amount of stool in the distal sigmoid colon.    sub: pt seen in bed (was placing finger in his rectum). Moaning in distress. Stating he has epigastric pain. Asking for dilaudid ATC. States he is on 6mg po q6h - per I stop, he is on 4mg po q6h last filled 12/12/23. Is passing flatus. He is not sure whether he had a BM last night but states blankets were soiled last night. No active vomiting. When interviewed, he is longer moaning and speaking in full sentences. Vitals not c/w sepsis. Abd grossly soft.   12/21: pt has been refusing to take any of his regular meds including BB/BP meds and only asking for dilaudid. He is currently on the equivalent dose of his home dose of Dilaudid. No N/V. CT repeated and showed stool in rectal vault. ,   12/22/23: Still with abd pain.   12/23/23: Patient seen and examined. No new complaints. Still with abd pain requiring IV pain meds        REVIEW OF SYSTEMS:  General: NAD, hemodynamically stable, + weakness  HEENT:  Eyes:  No visual loss  SKIN:  No rash or itching.  CARDIOVASCULAR:  No chest pain  RESPIRATORY:  No shortness of breath  GASTROINTESTINAL: Severe abdominal pain  NEUROLOGICAL:  No headache, dizziness, syncope, paralysis, ataxia, numbness or tingling in the extremities. No change in bowel or bladder control.  MUSCULOSKELETAL:  No muscle, back pain, joint pain or stiffness.  HEMATOLOGIC:  No anemia, bleeding or bruising.  LYMPHATICS:  No enlarged nodes. No history of splenectomy.  ENDOCRINOLOGIC:  No reports of sweating, cold or heat intolerance. No polyuria or polydipsia.  ALLERGIES:  No history of asthma, hives, eczema or rhinitis.      Physical Exam:       Vital Signs Last 24 Hrs  T(C): 36.7 (23 Dec 2023 08:32), Max: 37.5 (22 Dec 2023 16:32)  T(F): 98 (23 Dec 2023 08:32), Max: 99.5 (22 Dec 2023 16:32)  HR: 103 (23 Dec 2023 08:32) (103 - 115)  BP: 143/86 (23 Dec 2023 08:32) (132/81 - 150/79)  BP(mean): --  RR: 18 (23 Dec 2023 08:32) (18 - 19)  SpO2: 94% (23 Dec 2023 08:32) (92% - 94%)    Parameters below as of 23 Dec 2023 08:32  Patient On (Oxygen Delivery Method): room air      Physical Exam:     HEENT:  Head is normocephalic    Skin:  thin, dry  NECK:  Supple without lymphadenopathy.   HEART:  Regular rate and rhythm  LUNGS:  Good ins/exp effort, no W/R/R/C  ABDOMEN:  Soft, nontender, nondistended with good bowel sounds heard; no guarding no rebound tenderness  EXTREMITIES:  LE extremity amputations  NEUROLOGICAL:  Gross nonfocal     Labs:                             11.6   11.98 )-----------( 237      ( 23 Dec 2023 07:49 )             35.1     23 Dec 2023 07:49    144    |  111    |  33     ----------------------------<  145    3.7     |  23     |  1.25     Ca    9.4        23 Dec 2023 07:49          CAPILLARY BLOOD GLUCOSE      POCT Blood Glucose.: 195 mg/dL (23 Dec 2023 11:07)  POCT Blood Glucose.: 152 mg/dL (23 Dec 2023 07:35)  POCT Blood Glucose.: 156 mg/dL (22 Dec 2023 16:57)        Urinalysis Basic - ( 23 Dec 2023 07:49 )    Color: x / Appearance: x / SG: x / pH: x  Gluc: 145 mg/dL / Ketone: x  / Bili: x / Urobili: x   Blood: x / Protein: x / Nitrite: x   Leuk Esterase: x / RBC: x / WBC x   Sq Epi: x / Non Sq Epi: x / Bacteria: x            # Severe abdominal pain secondary to stool impaction vs gastroparesis   # Chronic abdominal pain  s/p lap appy (NOV), chronic abd pain likely multifactorial (DM, gastroparesis, vasculopath, chronic mesenteric ischemia?, on chronic dilaudid, constipation)  - 4 days history of abdominal pain; no Nausea or vomiting since his arrival  - CT-Abd: No bowel obstruction or inflammation. Moderate amount of stool in the distal sigmoid colon.  - REpeat CTAP: veronica in rectal vault, no stercoral colitis noted, no sbo, no ileus no eo inflammation  - Constipation 2/2 chronic opioids -->>s/p methylnaltrexone 12mg sc x 1 - pt states he did pass some amount of stool and passing flatus  - Advance diet  -Stool softener/enema  - Lactulose  - Moviprep today. IF not effective, will start alternating regimen of TW and mineral oil enema q3H several times until produces BM. D/W GI  - Sono: no e/o ascites, no portal vein thrombosis. + cirrhosis  - Lipase : wnl  - Lactate: 2.8, continue ivf. Check ua. Denies cough, lungs clear  - Confirmed pt on 4mg po q6h -> will put on equivalent 1.5 iv q6h for severe pain prn- educated on taper of opiates     # Urinary retention.   - Urinary retention/ bladder distension   - 500 cc Urine removed   - Jurado in place -> will need urology as o/p for urodynamic studies    # CAD (coronary artery disease) - s/p CAGB   - DAPT  /  STATIN    # Hypertension.   ·  Plan: Continue home meds   - Amlodipine 10mg   - Metoprolol - refusing po  - give 5 ivpb lopressor now    # Diabetes mellitus.   - ISS with hypoglycemia protocol   - Lantus 45U  - F/U A1C.       Reason for Admission: Intractable Abdominal Pain    Patient is a 63 year old  male with PMH of anemia, CAD s/p CABG x3, DM, GERD, gastroparesis, HTN, left BKA presents to the ED from [place] for abdominal pain. Endorses 2 days of abdominal pain, associated with nausea and vomiting started this morning with inability to tolerate PO intake. S/P appendectomy 11/23, patient was admitted s/p surgery and was discharged on 11/25. Endorses pain is centrally located around the umbilical area, non-radiating and constant. Denies chest pain, palpitation, SOB, headache, dizziness, URI symptoms. In the ED had urinary retention, a Jurado was placed. Labs remarkable for WBC 17:00, afebrile. Vitals stable. Received 1L-NS, benadryl, Dilaudid 6mg PO & 1mg IV, Reglan.   CT-Abd: No bowel obstruction or inflammation. Moderate amount of stool in the distal sigmoid colon.    sub: pt seen in bed (was placing finger in his rectum). Moaning in distress. Stating he has epigastric pain. Asking for dilaudid ATC. States he is on 6mg po q6h - per I stop, he is on 4mg po q6h last filled 12/12/23. Is passing flatus. He is not sure whether he had a BM last night but states blankets were soiled last night. No active vomiting. When interviewed, he is longer moaning and speaking in full sentences. Vitals not c/w sepsis. Abd grossly soft.   12/21: pt has been refusing to take any of his regular meds including BB/BP meds and only asking for dilaudid. He is currently on the equivalent dose of his home dose of Dilaudid. No N/V. CT repeated and showed stool in rectal vault. ,   12/22/23: Still with abd pain.   12/23/23: Patient seen and examined. No new complaints. Still with abd pain requiring IV pain meds        REVIEW OF SYSTEMS:  General: NAD, hemodynamically stable, + weakness  HEENT:  Eyes:  No visual loss  SKIN:  No rash or itching.  CARDIOVASCULAR:  No chest pain  RESPIRATORY:  No shortness of breath  GASTROINTESTINAL: Severe abdominal pain  NEUROLOGICAL:  No headache, dizziness, syncope, paralysis, ataxia, numbness or tingling in the extremities. No change in bowel or bladder control.  MUSCULOSKELETAL:  No muscle, back pain, joint pain or stiffness.  HEMATOLOGIC:  No anemia, bleeding or bruising.  LYMPHATICS:  No enlarged nodes. No history of splenectomy.  ENDOCRINOLOGIC:  No reports of sweating, cold or heat intolerance. No polyuria or polydipsia.  ALLERGIES:  No history of asthma, hives, eczema or rhinitis.      Physical Exam:       Vital Signs Last 24 Hrs  T(C): 36.7 (23 Dec 2023 08:32), Max: 37.5 (22 Dec 2023 16:32)  T(F): 98 (23 Dec 2023 08:32), Max: 99.5 (22 Dec 2023 16:32)  HR: 103 (23 Dec 2023 08:32) (103 - 115)  BP: 143/86 (23 Dec 2023 08:32) (132/81 - 150/79)  BP(mean): --  RR: 18 (23 Dec 2023 08:32) (18 - 19)  SpO2: 94% (23 Dec 2023 08:32) (92% - 94%)    Parameters below as of 23 Dec 2023 08:32  Patient On (Oxygen Delivery Method): room air      Physical Exam:     HEENT:  Head is normocephalic    Skin:  thin, dry  NECK:  Supple without lymphadenopathy.   HEART:  Regular rate and rhythm  LUNGS:  Good ins/exp effort, no W/R/R/C  ABDOMEN:  Soft, nontender, nondistended with good bowel sounds heard; no guarding no rebound tenderness  EXTREMITIES:  LE extremity amputations  NEUROLOGICAL:  Gross nonfocal     Labs:                             11.6   11.98 )-----------( 237      ( 23 Dec 2023 07:49 )             35.1     23 Dec 2023 07:49    144    |  111    |  33     ----------------------------<  145    3.7     |  23     |  1.25     Ca    9.4        23 Dec 2023 07:49          CAPILLARY BLOOD GLUCOSE      POCT Blood Glucose.: 195 mg/dL (23 Dec 2023 11:07)  POCT Blood Glucose.: 152 mg/dL (23 Dec 2023 07:35)  POCT Blood Glucose.: 156 mg/dL (22 Dec 2023 16:57)        Urinalysis Basic - ( 23 Dec 2023 07:49 )    Color: x / Appearance: x / SG: x / pH: x  Gluc: 145 mg/dL / Ketone: x  / Bili: x / Urobili: x   Blood: x / Protein: x / Nitrite: x   Leuk Esterase: x / RBC: x / WBC x   Sq Epi: x / Non Sq Epi: x / Bacteria: x            # Severe abdominal pain secondary to stool impaction vs gastroparesis   # Chronic abdominal pain  s/p lap appy (NOV), chronic abd pain likely multifactorial (DM, gastroparesis, vasculopath, chronic mesenteric ischemia?, on chronic dilaudid, constipation)  - 4 days history of abdominal pain; no Nausea or vomiting since his arrival  - CT-Abd: No bowel obstruction or inflammation. Moderate amount of stool in the distal sigmoid colon.  - REpeat CTAP: veronica in rectal vault, no stercoral colitis noted, no sbo, no ileus no eo inflammation  - Constipation 2/2 chronic opioids -->>s/p methylnaltrexone 12mg sc x 1 - pt states he did pass some amount of stool and passing flatus  - Advance diet  -Stool softener/enema  - Lactulose  - Moviprep today. IF not effective, will start alternating regimen of TW and mineral oil enema q3H several times until produces BM. D/W GI  - Sono: no e/o ascites, no portal vein thrombosis. + cirrhosis  - Lipase : wnl  - Lactate: 2.8, continue ivf. Check ua. Denies cough, lungs clear  - Confirmed pt on 4mg po q6h -> will put on equivalent 1.5 iv q6h for severe pain prn- educated on taper of opiates     # Urinary retention.   - Urinary retention/ bladder distension   - 500 cc Urine removed   - Jurado in place -> will need urology as o/p for urodynamic studies    # CAD (coronary artery disease) - s/p CAGB   - DAPT  /  STATIN    # Hypertension.   ·  Plan: Continue home meds   - Amlodipine 10mg   - Metoprolol - refusing po  - give 5 ivpb lopressor now    # Diabetes mellitus.   - ISS with hypoglycemia protocol   - Lantus 45U  - F/U A1C.

## 2023-12-24 NOTE — PROGRESS NOTE ADULT - SUBJECTIVE AND OBJECTIVE BOX
Reason for Admission: Intractable Abdominal Pain    Patient is a 63 year old  male with PMH of anemia, CAD s/p CABG x3, DM, GERD, gastroparesis, HTN, left BKA presents to the ED from [place] for abdominal pain. Endorses 2 days of abdominal pain, associated with nausea and vomiting started this morning with inability to tolerate PO intake. S/P appendectomy 11/23, patient was admitted s/p surgery and was discharged on 11/25. Endorses pain is centrally located around the umbilical area, non-radiating and constant. Denies chest pain, palpitation, SOB, headache, dizziness, URI symptoms. In the ED had urinary retention, a Jurado was placed. Labs remarkable for WBC 17:00, afebrile. Vitals stable. Received 1L-NS, benadryl, Dilaudid 6mg PO & 1mg IV, Reglan.   CT-Abd: No bowel obstruction or inflammation. Moderate amount of stool in the distal sigmoid colon.    sub: pt seen in bed (was placing finger in his rectum). Moaning in distress. Stating he has epigastric pain. Asking for dilaudid ATC. States he is on 6mg po q6h - per I stop, he is on 4mg po q6h last filled 12/12/23. Is passing flatus. He is not sure whether he had a BM last night but states blankets were soiled last night. No active vomiting. When interviewed, he is longer moaning and speaking in full sentences. Vitals not c/w sepsis. Abd grossly soft.   12/21: pt has been refusing to take any of his regular meds including BB/BP meds and only asking for dilaudid. He is currently on the equivalent dose of his home dose of Dilaudid. No N/V. CT repeated and showed stool in rectal vault. ,   12/22/23: Still with abd pain.   12/23/23: Patient seen and examined. No new complaints. Still with abd pain requiring IV pain meds  12/24/23: Partial BM last night. Still with abd pain        REVIEW OF SYSTEMS:  General: NAD, hemodynamically stable, + weakness  HEENT:  Eyes:  No visual loss  SKIN:  No rash or itching.  CARDIOVASCULAR:  No chest pain  RESPIRATORY:  No shortness of breath  GASTROINTESTINAL: Severe abdominal pain  NEUROLOGICAL:  No headache, dizziness, syncope, paralysis, ataxia, numbness or tingling in the extremities. No change in bowel or bladder control.  MUSCULOSKELETAL:  No muscle, back pain, joint pain or stiffness.  HEMATOLOGIC:  No anemia, bleeding or bruising.  LYMPHATICS:  No enlarged nodes. No history of splenectomy.  ENDOCRINOLOGIC:  No reports of sweating, cold or heat intolerance. No polyuria or polydipsia.  ALLERGIES:  No history of asthma, hives, eczema or rhinitis.      Physical Exam:       Vital Signs Last 24 Hrs  T(C): 37.2 (24 Dec 2023 09:39), Max: 37.2 (24 Dec 2023 09:39)  T(F): 98.9 (24 Dec 2023 09:39), Max: 98.9 (24 Dec 2023 09:39)  HR: 98 (24 Dec 2023 09:39) (98 - 105)  BP: 142/66 (24 Dec 2023 09:39) (139/78 - 147/80)  BP(mean): --  RR: 17 (24 Dec 2023 09:39) (17 - 18)  SpO2: 93% (24 Dec 2023 09:39) (91% - 93%)    Parameters below as of 24 Dec 2023 09:39  Patient On (Oxygen Delivery Method): room air          Physical Exam:     HEENT:  Head is normocephalic    Skin:  thin, dry  NECK:  Supple without lymphadenopathy.   HEART:  Regular rate and rhythm  LUNGS:  Good ins/exp effort, no W/R/R/C  ABDOMEN:  Soft, nontender, nondistended with good bowel sounds heard; no guarding no rebound tenderness  EXTREMITIES:  LE extremity amputations  NEUROLOGICAL:  Gross nonfocal         Labs:                             11.6   11.98 )-----------( 237      ( 23 Dec 2023 07:49 )             35.1     23 Dec 2023 07:49    144    |  111    |  33     ----------------------------<  145    3.7     |  23     |  1.25     Ca    9.4        23 Dec 2023 07:49          CAPILLARY BLOOD GLUCOSE      POCT Blood Glucose.: 195 mg/dL (23 Dec 2023 11:07)  POCT Blood Glucose.: 152 mg/dL (23 Dec 2023 07:35)  POCT Blood Glucose.: 156 mg/dL (22 Dec 2023 16:57)        Urinalysis Basic - ( 23 Dec 2023 07:49 )    Color: x / Appearance: x / SG: x / pH: x  Gluc: 145 mg/dL / Ketone: x  / Bili: x / Urobili: x   Blood: x / Protein: x / Nitrite: x   Leuk Esterase: x / RBC: x / WBC x   Sq Epi: x / Non Sq Epi: x / Bacteria: x          MEDICATIONS  (STANDING):  albuterol    90 MICROgram(s) HFA Inhaler 2 Puff(s) Inhalation every 6 hours  amLODIPine   Tablet 10 milliGRAM(s) Oral daily  aspirin enteric coated 81 milliGRAM(s) Oral daily  budesonide 160 MICROgram(s)/formoterol 4.5 MICROgram(s) Inhaler 2 Puff(s) Inhalation two times a day  clopidogrel Tablet 75 milliGRAM(s) Oral daily  dextrose 5%. 1000 milliLiter(s) (50 mL/Hr) IV Continuous <Continuous>  dextrose 50% Injectable 25 Gram(s) IV Push once  finasteride 5 milliGRAM(s) Oral daily  furosemide    Tablet 40 milliGRAM(s) Oral daily  gabapentin 300 milliGRAM(s) Oral two times a day  gabapentin 600 milliGRAM(s) Oral at bedtime  glucagon  Injectable 1 milliGRAM(s) IntraMuscular once  insulin lispro (ADMELOG) corrective regimen sliding scale   SubCutaneous three times a day before meals  lactulose Syrup 10 Gram(s) Oral daily  metoprolol tartrate 25 milliGRAM(s) Oral daily  multivitamin 1 Tablet(s) Oral daily  naloxegol 25 milliGRAM(s) Oral daily  pantoprazole    Tablet 40 milliGRAM(s) Oral before breakfast  polyethylene glycol 3350 17 Gram(s) Oral every 12 hours  simvastatin 10 milliGRAM(s) Oral at bedtime  spironolactone 100 milliGRAM(s) Oral daily  tamsulosin 0.4 milliGRAM(s) Oral at bedtime    MEDICATIONS  (PRN):  acetaminophen     Tablet .. 650 milliGRAM(s) Oral every 6 hours PRN Temp greater or equal to 38C (100.4F), Mild Pain (1 - 3)  aluminum hydroxide/magnesium hydroxide/simethicone Suspension 30 milliLiter(s) Oral every 4 hours PRN Dyspepsia  dextrose Oral Gel 15 Gram(s) Oral once PRN Blood Glucose LESS THAN 70 milliGRAM(s)/deciliter  dicyclomine 20 milliGRAM(s) Oral once PRN abd pain  HYDROmorphone  Injectable 1.5 milliGRAM(s) IV Push every 4 hours PRN Severe Pain (7 - 10)  HYDROmorphone  Injectable 0.5 milliGRAM(s) IV Push every 6 hours PRN Moderate Pain (4 - 6)  magnesium hydroxide Suspension 30 milliLiter(s) Oral daily PRN Constipation  melatonin 3 milliGRAM(s) Oral at bedtime PRN Insomnia  ondansetron Injectable 4 milliGRAM(s) IV Push every 8 hours PRN Nausea and/or Vomiting  ondansetron Injectable 4 milliGRAM(s) IV Push every 6 hours PRN Nausea and/or Vomiting              # Severe abdominal pain secondary to stool impaction vs gastroparesis   # Chronic abdominal pain  s/p lap appy (NOV), chronic abd pain likely multifactorial (DM, gastroparesis, vasculopath, chronic mesenteric ischemia?, on chronic dilaudid, constipation)  - 4 days history of abdominal pain; no Nausea or vomiting since his arrival  - CT-Abd: No bowel obstruction or inflammation. Moderate amount of stool in the distal sigmoid colon.  - REpeat CTAP: veronica in rectal vault, no stercoral colitis noted, no sbo, no ileus no eo inflammation  - Constipation 2/2 chronic opioids -->>s/p methylnaltrexone 12mg sc x 1  - Advance diet  -Stool softener/enema  - Lactulose  - S/P Moviprep  -Continue movantic  - Sono: no e/o ascites, no portal vein thrombosis. + cirrhosis  - Lipase : wnl  - Lactate: 2.8, continue ivf. Check ua. Denies cough, lungs clear  - Confirmed pt on 4mg po q6h -> will put on equivalent 1.5 iv q6h for severe pain prn- educated on taper of opiates     # Urinary retention.   - Urinary retention/ bladder distension   - 500 cc Urine removed   - Jurado in place -> will need urology as o/p for urodynamic studies    # CAD (coronary artery disease) - s/p CAGB   - DAPT  /  STATIN    # Hypertension.   ·  Plan: Continue home meds   - Amlodipine 10mg   - Metoprolol - refusing po  - give 5 ivpb lopressor now    # Diabetes mellitus.   - ISS with hypoglycemia protocol   - Lantus 45U  - F/U A1C.    Disposition: Back NH possibly in 1 or 2 days time.        Reason for Admission: Intractable Abdominal Pain    Patient is a 63 year old  male with PMH of anemia, CAD s/p CABG x3, DM, GERD, gastroparesis, HTN, left BKA presents to the ED from [place] for abdominal pain. Endorses 2 days of abdominal pain, associated with nausea and vomiting started this morning with inability to tolerate PO intake. S/P appendectomy 11/23, patient was admitted s/p surgery and was discharged on 11/25. Endorses pain is centrally located around the umbilical area, non-radiating and constant. Denies chest pain, palpitation, SOB, headache, dizziness, URI symptoms. In the ED had urinary retention, a Jurado was placed. Labs remarkable for WBC 17:00, afebrile. Vitals stable. Received 1L-NS, benadryl, Dilaudid 6mg PO & 1mg IV, Reglan.   CT-Abd: No bowel obstruction or inflammation. Moderate amount of stool in the distal sigmoid colon.    sub: pt seen in bed (was placing finger in his rectum). Moaning in distress. Stating he has epigastric pain. Asking for dilaudid ATC. States he is on 6mg po q6h - per I stop, he is on 4mg po q6h last filled 12/12/23. Is passing flatus. He is not sure whether he had a BM last night but states blankets were soiled last night. No active vomiting. When interviewed, he is longer moaning and speaking in full sentences. Vitals not c/w sepsis. Abd grossly soft.   12/21: pt has been refusing to take any of his regular meds including BB/BP meds and only asking for dilaudid. He is currently on the equivalent dose of his home dose of Dilaudid. No N/V. CT repeated and showed stool in rectal vault. ,   12/22/23: Still with abd pain.   12/23/23: Patient seen and examined. No new complaints. Still with abd pain requiring IV pain meds  12/24/23: Partial BM last night. Still with abd pain        REVIEW OF SYSTEMS:  General: NAD, hemodynamically stable, + weakness  HEENT:  Eyes:  No visual loss  SKIN:  No rash or itching.  CARDIOVASCULAR:  No chest pain  RESPIRATORY:  No shortness of breath  GASTROINTESTINAL: Severe abdominal pain  NEUROLOGICAL:  No headache, dizziness, syncope, paralysis, ataxia, numbness or tingling in the extremities. No change in bowel or bladder control.  MUSCULOSKELETAL:  No muscle, back pain, joint pain or stiffness.  HEMATOLOGIC:  No anemia, bleeding or bruising.  LYMPHATICS:  No enlarged nodes. No history of splenectomy.  ENDOCRINOLOGIC:  No reports of sweating, cold or heat intolerance. No polyuria or polydipsia.  ALLERGIES:  No history of asthma, hives, eczema or rhinitis.      Physical Exam:       Vital Signs Last 24 Hrs  T(C): 37.2 (24 Dec 2023 09:39), Max: 37.2 (24 Dec 2023 09:39)  T(F): 98.9 (24 Dec 2023 09:39), Max: 98.9 (24 Dec 2023 09:39)  HR: 98 (24 Dec 2023 09:39) (98 - 105)  BP: 142/66 (24 Dec 2023 09:39) (139/78 - 147/80)  BP(mean): --  RR: 17 (24 Dec 2023 09:39) (17 - 18)  SpO2: 93% (24 Dec 2023 09:39) (91% - 93%)    Parameters below as of 24 Dec 2023 09:39  Patient On (Oxygen Delivery Method): room air          Physical Exam:     HEENT:  Head is normocephalic    Skin:  thin, dry  NECK:  Supple without lymphadenopathy.   HEART:  Regular rate and rhythm  LUNGS:  Good ins/exp effort, no W/R/R/C  ABDOMEN:  Soft, nontender, nondistended with good bowel sounds heard; no guarding no rebound tenderness  EXTREMITIES:  LE extremity amputations  NEUROLOGICAL:  Gross nonfocal         Labs:                             11.6   11.98 )-----------( 237      ( 23 Dec 2023 07:49 )             35.1     23 Dec 2023 07:49    144    |  111    |  33     ----------------------------<  145    3.7     |  23     |  1.25     Ca    9.4        23 Dec 2023 07:49          CAPILLARY BLOOD GLUCOSE      POCT Blood Glucose.: 195 mg/dL (23 Dec 2023 11:07)  POCT Blood Glucose.: 152 mg/dL (23 Dec 2023 07:35)  POCT Blood Glucose.: 156 mg/dL (22 Dec 2023 16:57)        Urinalysis Basic - ( 23 Dec 2023 07:49 )    Color: x / Appearance: x / SG: x / pH: x  Gluc: 145 mg/dL / Ketone: x  / Bili: x / Urobili: x   Blood: x / Protein: x / Nitrite: x   Leuk Esterase: x / RBC: x / WBC x   Sq Epi: x / Non Sq Epi: x / Bacteria: x          MEDICATIONS  (STANDING):  albuterol    90 MICROgram(s) HFA Inhaler 2 Puff(s) Inhalation every 6 hours  amLODIPine   Tablet 10 milliGRAM(s) Oral daily  aspirin enteric coated 81 milliGRAM(s) Oral daily  budesonide 160 MICROgram(s)/formoterol 4.5 MICROgram(s) Inhaler 2 Puff(s) Inhalation two times a day  clopidogrel Tablet 75 milliGRAM(s) Oral daily  dextrose 5%. 1000 milliLiter(s) (50 mL/Hr) IV Continuous <Continuous>  dextrose 50% Injectable 25 Gram(s) IV Push once  finasteride 5 milliGRAM(s) Oral daily  furosemide    Tablet 40 milliGRAM(s) Oral daily  gabapentin 300 milliGRAM(s) Oral two times a day  gabapentin 600 milliGRAM(s) Oral at bedtime  glucagon  Injectable 1 milliGRAM(s) IntraMuscular once  insulin lispro (ADMELOG) corrective regimen sliding scale   SubCutaneous three times a day before meals  lactulose Syrup 10 Gram(s) Oral daily  metoprolol tartrate 25 milliGRAM(s) Oral daily  multivitamin 1 Tablet(s) Oral daily  naloxegol 25 milliGRAM(s) Oral daily  pantoprazole    Tablet 40 milliGRAM(s) Oral before breakfast  polyethylene glycol 3350 17 Gram(s) Oral every 12 hours  simvastatin 10 milliGRAM(s) Oral at bedtime  spironolactone 100 milliGRAM(s) Oral daily  tamsulosin 0.4 milliGRAM(s) Oral at bedtime    MEDICATIONS  (PRN):  acetaminophen     Tablet .. 650 milliGRAM(s) Oral every 6 hours PRN Temp greater or equal to 38C (100.4F), Mild Pain (1 - 3)  aluminum hydroxide/magnesium hydroxide/simethicone Suspension 30 milliLiter(s) Oral every 4 hours PRN Dyspepsia  dextrose Oral Gel 15 Gram(s) Oral once PRN Blood Glucose LESS THAN 70 milliGRAM(s)/deciliter  dicyclomine 20 milliGRAM(s) Oral once PRN abd pain  HYDROmorphone  Injectable 1.5 milliGRAM(s) IV Push every 4 hours PRN Severe Pain (7 - 10)  HYDROmorphone  Injectable 0.5 milliGRAM(s) IV Push every 6 hours PRN Moderate Pain (4 - 6)  magnesium hydroxide Suspension 30 milliLiter(s) Oral daily PRN Constipation  melatonin 3 milliGRAM(s) Oral at bedtime PRN Insomnia  ondansetron Injectable 4 milliGRAM(s) IV Push every 8 hours PRN Nausea and/or Vomiting  ondansetron Injectable 4 milliGRAM(s) IV Push every 6 hours PRN Nausea and/or Vomiting              # Severe abdominal pain secondary to stool impaction vs gastroparesis   # Chronic abdominal pain  s/p lap appy (NOV), chronic abd pain likely multifactorial (DM, gastroparesis, vasculopath, chronic mesenteric ischemia?, on chronic dilaudid, constipation)  - 4 days history of abdominal pain; no Nausea or vomiting since his arrival  - CT-Abd: No bowel obstruction or inflammation. Moderate amount of stool in the distal sigmoid colon.  - REpeat CTAP: veronica in rectal vault, no stercoral colitis noted, no sbo, no ileus no eo inflammation  - Constipation 2/2 chronic opioids -->>s/p methylnaltrexone 12mg sc x 1  - Advance diet  -Stool softener/enema  - Lactulose  - S/P Moviprep  -Continue movantic  - Sono: no e/o ascites, no portal vein thrombosis. + cirrhosis  - Lipase : wnl  - Lactate: 2.8, continue ivf. Check ua. Denies cough, lungs clear  - Confirmed pt on 4mg po q6h -> will put on equivalent 1.5 iv q6h for severe pain prn- educated on taper of opiates     # Urinary retention.   - Urinary retention/ bladder distension   - 500 cc Urine removed   - Jurado in place -> will need urology as o/p for urodynamic studies    # CAD (coronary artery disease) - s/p CAGB   - DAPT  /  STATIN    # Hypertension.   ·  Plan: Continue home meds   - Amlodipine 10mg   - Metoprolol - refusing po  - give 5 ivpb lopressor now    # Diabetes mellitus.   - ISS with hypoglycemia protocol   - Lantus 45U  - F/U A1C.    Disposition: Back NH possibly in 1 or 2 days time.

## 2023-12-25 NOTE — DISCHARGE NOTE PROVIDER - HOSPITAL COURSE
Patient is a 63 year old  male with PMH of anemia, CAD s/p CABG x3, DM, GERD, gastroparesis, HTN, left BKA presents to the ED from [place] for abdominal pain. Endorses 2 days of abdominal pain, associated with nausea and vomiting started this morning with inability to tolerate PO intake. S/P appendectomy 11/23, patient was admitted s/p surgery and was discharged on 11/25. Endorses pain is centrally located around the umbilical area, non-radiating and constant. Denies chest pain, palpitation, SOB, headache, dizziness, URI symptoms. In the ED had urinary retention, a Jurado was placed. Labs remarkable for WBC 17:00, afebrile. Vitals stable. Received 1L-NS, benadryl, Dilaudid 6mg PO & 1mg IV, Reglan.   CT-Abd: No bowel obstruction or inflammation. Moderate amount of stool in the distal sigmoid colon.    sub: pt seen in bed (was placing finger in his rectum). Moaning in distress. Stating he has epigastric pain. Asking for dilaudid ATC. States he is on 6mg po q6h - per I stop, he is on 4mg po q6h last filled 12/12/23. Is passing flatus. He is not sure whether he had a BM last night but states blankets were soiled last night. No active vomiting. When interviewed, he is longer moaning and speaking in full sentences. Vitals not c/w sepsis. Abd grossly soft.   12/21: pt has been refusing to take any of his regular meds including BB/BP meds and only asking for dilaudid. He is currently on the equivalent dose of his home dose of Dilaudid. No N/V. CT repeated and showed stool in rectal vault. ,   12/22/23: Still with abd pain.   12/23/23: Patient seen and examined. No new complaints. Still with abd pain requiring IV pain meds  12/24/23: Partial BM last night. Still with abd pain.  12/25/23: Still with abd pain, moving bowel.       Physical Exam:     HEENT:  Head is normocephalic    Skin:  thin, dry  NECK:  Supple without lymphadenopathy.   HEART:  Regular rate and rhythm  LUNGS:  Good ins/exp effort, no W/R/R/C  ABDOMEN:  Soft, nontender, nondistended with good bowel sounds heard; no guarding no rebound tenderness  EXTREMITIES:  LE extremity amputations  NEUROLOGICAL:  Gross nonfocal         # Severe abdominal pain secondary to stool impaction vs gastroparesis   # Chronic abdominal pain  s/p lap appy (NOV), chronic abd pain likely multifactorial (DM, gastroparesis, vasculopath, chronic mesenteric ischemia?, on chronic dilaudid, constipation)  - 4 days history of abdominal pain; no Nausea or vomiting since his arrival  - CT-Abd: No bowel obstruction or inflammation. Moderate amount of stool in the distal sigmoid colon.  - REpeat CTAP: veronica in rectal vault, no stercoral colitis noted, no sbo, no ileus no eo inflammation  - Constipation 2/2 chronic opioids  - Advanced diet, tolerating  -Stool softener/enema  - Lactulose  - S/P Moviprep  -Continue movantic  - Sono: no e/o ascites, no portal vein thrombosis. + cirrhosis  - Lipase : wnl  - Lactate: 2.8, continue ivf. Check ua. Denies cough, lungs clear    # Urinary retention.   - Urinary retention/ bladder distension   - 500 cc Urine removed   - Jurado in place -> will need urology as o/p for urodynamic studies    # CAD (coronary artery disease) - s/p CAGB   - DAPT  /  STATIN    # Hypertension.   ·  Plan: Continue home meds   - Amlodipine 10mg   - Metoprolol - refusing po  - give 5 ivpb lopressor now    # Diabetes mellitus.   - ISS with hypoglycemia protocol   - Lantus 45U    D/C back to NH tomorrow                     Patient is a 63 year old  male with PMH of anemia, CAD s/p CABG x3, DM, GERD, gastroparesis, HTN, left BKA presents to the ED from [place] for abdominal pain. Endorses 2 days of abdominal pain, associated with nausea and vomiting started this morning with inability to tolerate PO intake. S/P appendectomy 11/23, patient was admitted s/p surgery and was discharged on 11/25. Endorses pain is centrally located around the umbilical area, non-radiating and constant. Denies chest pain, palpitation, SOB, headache, dizziness, URI symptoms. In the ED had urinary retention, a Jurado was placed. Labs remarkable for WBC 17:00, afebrile. Vitals stable. Received 1L-NS, benadryl, Dilaudid 6mg PO & 1mg IV, Reglan.   CT-Abd: No bowel obstruction or inflammation. Moderate amount of stool in the distal sigmoid colon.    sub: pt seen in bed (was placing finger in his rectum). Moaning in distress. Stating he has epigastric pain. Asking for dilaudid ATC. States he is on 6mg po q6h - per I stop, he is on 4mg po q6h last filled 12/12/23. Is passing flatus. He is not sure whether he had a BM last night but states blankets were soiled last night. No active vomiting. When interviewed, he is longer moaning and speaking in full sentences. Vitals not c/w sepsis. Abd grossly soft.   12/21: pt has been refusing to take any of his regular meds including BB/BP meds and only asking for dilaudid. He is currently on the equivalent dose of his home dose of Dilaudid. No N/V. CT repeated and showed stool in rectal vault. ,   12/22/23: Still with abd pain.   12/23/23: Patient seen and examined. No new complaints. Still with abd pain requiring IV pain meds  12/24/23: Partial BM last night. Still with abd pain.  12/25/23: Still with abd pain, moving bowel.       Physical Exam:     HEENT:  Head is normocephalic    Skin:  thin, dry  NECK:  Supple without lymphadenopathy.   HEART:  Regular rate and rhythm  LUNGS:  Good ins/exp effort, no W/R/R/C  ABDOMEN:  Soft, nontender, nondistended with good bowel sounds heard; no guarding no rebound tenderness  EXTREMITIES:  LE extremity amputations  NEUROLOGICAL:  Gross nonfocal         # Severe abdominal pain secondary to stool impaction vs gastroparesis   # Chronic abdominal pain  s/p lap appy (NOV), chronic abd pain likely multifactorial (DM, gastroparesis, vasculopath, chronic mesenteric ischemia?, on chronic dilaudid, constipation)  - 4 days history of abdominal pain; no Nausea or vomiting since his arrival  - CT-Abd: No bowel obstruction or inflammation. Moderate amount of stool in the distal sigmoid colon.  - REpeat CTAP: veronica in rectal vault, no stercoral colitis noted, no sbo, no ileus no eo inflammation  - Constipation 2/2 chronic opioids  - Advanced diet, tolerating  -Stool softener/enema  - Lactulose  - S/P Moviprep  -Continue movantic  - Sono: no e/o ascites, no portal vein thrombosis. + cirrhosis  - Lipase : wnl  - Lactate: 2.8, continue ivf. Check ua. Denies cough, lungs clear    # Urinary retention.   - Urinary retention/ bladder distension   - 500 cc Urine removed   - Jurado in place -> will need urology as o/p for urodynamic studies    # CAD (coronary artery disease) - s/p CAGB   - DAPT  /  STATIN    # Hypertension.   ·  Plan: Continue home meds   - Amlodipine 10mg   - Metoprolol - refusing po  - give 5 ivpb lopressor now    # Diabetes mellitus.   - ISS with hypoglycemia protocol   - Lantus 45U    D/C back to NH tomorrow                     Patient is a 63 year old  male with PMH of anemia, CAD s/p CABG x3, DM, GERD, gastroparesis, HTN, left BKA presents to the ED from [place] for abdominal pain. Endorses 2 days of abdominal pain, associated with nausea and vomiting started this morning with inability to tolerate PO intake. S/P appendectomy 11/23, patient was admitted s/p surgery and was discharged on 11/25. Endorses pain is centrally located around the umbilical area, non-radiating and constant. Denies chest pain, palpitation, SOB, headache, dizziness, URI symptoms. In the ED had urinary retention, a Jurado was placed. Labs remarkable for WBC 17:00, afebrile. Vitals stable. Received 1L-NS, benadryl, Dilaudid 6mg PO & 1mg IV, Reglan.   CT-Abd: No bowel obstruction or inflammation. Moderate amount of stool in the distal sigmoid colon.    sub: pt seen in bed (was placing finger in his rectum). Moaning in distress. Stating he has epigastric pain. Asking for dilaudid ATC. States he is on 6mg po q6h - per I stop, he is on 4mg po q6h last filled 12/12/23. Is passing flatus. He is not sure whether he had a BM last night but states blankets were soiled last night. No active vomiting. When interviewed, he is longer moaning and speaking in full sentences. Vitals not c/w sepsis. Abd grossly soft.   12/21: pt has been refusing to take any of his regular meds including BB/BP meds and only asking for dilaudid. He is currently on the equivalent dose of his home dose of Dilaudid. No N/V. CT repeated and showed stool in rectal vault. ,   12/22/23: Still with abd pain.   12/23/23: Patient seen and examined. No new complaints. Still with abd pain requiring IV pain meds  12/24/23: Partial BM last night. Still with abd pain.  12/25/23: Still with abd pain, moving bowel.       Physical Exam:     HEENT:  Head is normocephalic    Skin:  thin, dry  NECK:  Supple without lymphadenopathy.   HEART:  Regular rate and rhythm  LUNGS:  Good ins/exp effort, no W/R/R/C  ABDOMEN:  Soft, nontender, nondistended with good bowel sounds heard; no guarding no rebound tenderness  EXTREMITIES:  LE extremity amputations  NEUROLOGICAL:  Gross nonfocal     A/P:    # Acute on chronic abdominal pain: Multifactorial (DM2, gastroparesis, vasculopath, chronic mesenteric ischemia?, on chronic dilaudid, constipation)  - s/p lap appy (NOV)  - CT-Abd: No bowel obstruction or inflammation. Moderate amount of stool in the distal sigmoid colon.  - Repeat CTAP: stool in rectal vault, no stercoral colitis noted, no SBO, no ileus, no  inflammation  - Constipation 2/2 chronic opioids  - tolerating diet  - continue current bowel regimen   - Sono: no e/o ascites, no portal vein thrombosis. + cirrhosis  - Lipase : wnl    # Urinary retention.   - Urinary retention/ bladder distension   - 500 cc Urine removed   - Jurado in place -> will need urology as o/p for urodynamic studies    # CAD (coronary artery disease) - s/p CAGB   - DAPT  /  STATIN    # Hypertension.   - Continue home meds   - Amlodipine 10mg   - Metoprolol - refusing po  - give 5 ivpb lopressor now    # Diabetes mellitus.   - a1c 7  - c/w diabetic management as per med rec    Dispo;  - d/c to [facility] with out patient follow up                         Patient is a 63 year old  male with PMH of anemia, CAD s/p CABG x3, DM, GERD, gastroparesis, HTN, left BKA presents to the ED from [place] for abdominal pain. Endorses 2 days of abdominal pain, associated with nausea and vomiting started this morning with inability to tolerate PO intake. S/P appendectomy 11/23, patient was admitted s/p surgery and was discharged on 11/25. Endorses pain is centrally located around the umbilical area, non-radiating and constant. Denies chest pain, palpitation, SOB, headache, dizziness, URI symptoms. In the ED had urinary retention, a Jurado was placed. Labs remarkable for WBC 17:00, afebrile. Vitals stable. Received 1L-NS, benadryl, Dilaudid 6mg PO & 1mg IV, Reglan.   CT-Abd: No bowel obstruction or inflammation. Moderate amount of stool in the distal sigmoid colon.    sub: pt seen in bed (was placing finger in his rectum). Moaning in distress. Stating he has epigastric pain. Asking for dilaudid ATC. States he is on 6mg po q6h - per I stop, he is on 4mg po q6h last filled 12/12/23. Is passing flatus. He is not sure whether he had a BM last night but states blankets were soiled last night. No active vomiting. When interviewed, he is longer moaning and speaking in full sentences. Vitals not c/w sepsis. Abd grossly soft.   12/21: pt has been refusing to take any of his regular meds including BB/BP meds and only asking for dilaudid. He is currently on the equivalent dose of his home dose of Dilaudid. No N/V. CT repeated and showed stool in rectal vault. ,   12/22/23: Still with abd pain.   12/23/23: Patient seen and examined. No new complaints. Still with abd pain requiring IV pain meds  12/24/23: Partial BM last night. Still with abd pain.  12/25/23: Still with abd pain, moving bowel.       Physical Exam:     HEENT:  Head is normocephalic    Skin:  thin, dry  NECK:  Supple without lymphadenopathy.   HEART:  Regular rate and rhythm  LUNGS:  Good ins/exp effort, no W/R/R/C  ABDOMEN:  Soft, nontender, nondistended with good bowel sounds heard; no guarding no rebound tenderness  EXTREMITIES:  LE extremity amputations  NEUROLOGICAL:  Gross nonfocal     A/P:    # Acute on chronic abdominal pain: Multifactorial (DM2, gastroparesis, vasculopath, chronic mesenteric ischemia?, on chronic dilaudid, constipation)  - s/p lap appy (NOV)  - CT-Abd: No bowel obstruction or inflammation. Moderate amount of stool in the distal sigmoid colon.  - Repeat CTAP: stool in rectal vault, no stercoral colitis noted, no SBO, no ileus, no  inflammation  - Constipation 2/2 chronic opioids  - tolerating diet  - continue current bowel regimen   - Sono: no e/o ascites, no portal vein thrombosis. + cirrhosis  - Lipase : wnl    # Urinary retention.   - Urinary retention/ bladder distension   - 500 cc Urine removed   - Jurado in place -> will need urology as o/p for urodynamic studies    # CAD (coronary artery disease) - s/p CAGB   - DAPT  /  STATIN    # Hypertension.   - Continue home meds   - Amlodipine 10mg   - Metoprolol - refusing po  - give 5 ivpb lopressor now    # Diabetes mellitus.   - a1c 7  - c/w diabetic management as per med rec    Dispo;  - d/c to [facility] with out patient follow up

## 2023-12-25 NOTE — DISCHARGE NOTE PROVIDER - NSDCCPCAREPLAN_GEN_ALL_CORE_FT
PRINCIPAL DISCHARGE DIAGNOSIS  Diagnosis: Abdominal pain  Assessment and Plan of Treatment: Follow up with your PMD     PRINCIPAL DISCHARGE DIAGNOSIS  Diagnosis: Abdominal pain  Assessment and Plan of Treatment: Acute on chronic - due to opioid induced constipation  -continue bowel regimen for goal 1-2 BM per day.  adjust accordingly.

## 2023-12-25 NOTE — DISCHARGE NOTE PROVIDER - CARE PROVIDER_API CALL
PMD,   Phone: (   )    -  Fax: (   )    -  Follow Up Time:    PMD,   Phone: (   )    -  Fax: (   )    -  Follow Up Time:     Follow up with pcp 5-7 days,   Phone: (   )    -  Fax: (   )    -  Follow Up Time:

## 2023-12-25 NOTE — DISCHARGE NOTE PROVIDER - NSDCMRMEDTOKEN_GEN_ALL_CORE_FT
amLODIPine 10 mg oral tablet: 1 tab(s) orally once a day  Aspirin Enteric Coated 81 mg oral delayed release tablet: 1 tab(s) orally once a day  bisacodyl 5 mg oral delayed release tablet: 2 tab(s) orally once a day  budesonide-formoterol 160 mcg-4.5 mcg/inh inhalation aerosol: 2 puff(s) inhaled 2 times a day  clopidogrel 75 mg oral tablet: 1 tab(s) orally once a day  docusate sodium 100 mg oral capsule: 3 cap(s) orally once a day (at bedtime)  famotidine 20 mg oral tablet: 2 tab(s) orally once a day (in the evening)  finasteride 5 mg oral tablet: 1 tab(s) orally once a day (at bedtime)  furosemide 40 mg oral tablet: 1 tab(s) orally once a day  gabapentin 300 mg oral capsule: 1 cap(s) orally 2 times a day  gabapentin 600 mg oral tablet: 1 tab(s) orally once a day (at bedtime)  Incruse Ellipta 62.5 mcg/inh inhalation powder: 1 puff(s) inhaled once a day  insulin glargine 100 units/mL subcutaneous solution: 45 unit(s) subcutaneous once a day before dinner  insulin lispro 100 units/mL subcutaneous solution: 12 unit(s) subcutaneous 3 times a day (before meals)  insulin lispro 100 units/mL subcutaneous solution: subcutaneous 3 times a day (before meals) per sliding scale:  151-200 = 2 units  201-250 = 4 units  251-300 = 6 units  301-350 = 8 units  351-400 = 10 units  &gt;400 = call MD  ipratropium-albuterol 0.5 mg-2.5 mg/3 mL inhalation solution: 3 milliliter(s) by nebulizer every 4 hours as needed for  shortness of breath and/or wheezing  lactulose 10 g/15 mL oral syrup: 15 milliliter(s) orally once a day  lidocaine 5% topical film: Apply topically to affected area once a day , apply for 12 hours on and 12 hours off.  Apply to lower back  magnesium hydroxide 8% oral suspension: 30 milliliter(s) orally 3 times a day As needed Constipation  metFORMIN 500 mg oral tablet: 2 tab(s) orally 2 times a day  Metoprolol Tartrate 25 mg oral tablet: 1 tab(s) orally once a day  multivitamin: 1 tab(s) orally once a day  Mylanta Maximum Strength 400 mg-400 mg-40 mg/5 mL oral suspension: 30 milliliter(s) orally every 6 hours as needed for  indigestion  ondansetron 4 mg oral tablet: 1 tab(s) orally every 6 hours as needed for  nausea  polyethylene glycol 3350 oral powder for reconstitution: 17 gram(s) orally once a day (at bedtime)  potassium chloride 20 mEq oral tablet, extended release: 1 tab(s) orally once a day  senna (sennosides) 8.6 mg oral tablet: 2 tab(s) orally once a day (at bedtime)  simvastatin 10 mg oral tablet: 1 tab(s) orally once a day (at bedtime)  spironolactone 25 mg oral tablet: 4 tab(s) orally once a day  tamsulosin 0.4 mg oral capsule: 1 cap(s) orally once a day (at bedtime)  Tylenol 325 mg oral tablet: 2 tab(s) orally every 6 hours as needed for pain   amLODIPine 10 mg oral tablet: 1 tab(s) orally once a day  Aspirin Enteric Coated 81 mg oral delayed release tablet: 1 tab(s) orally once a day  bisacodyl 5 mg oral delayed release tablet: 2 tab(s) orally once a day  budesonide-formoterol 160 mcg-4.5 mcg/inh inhalation aerosol: 2 puff(s) inhaled 2 times a day  clopidogrel 75 mg oral tablet: 1 tab(s) orally once a day  docusate sodium 100 mg oral capsule: 3 cap(s) orally once a day (at bedtime)  famotidine 20 mg oral tablet: 2 tab(s) orally once a day (in the evening)  finasteride 5 mg oral tablet: 1 tab(s) orally once a day (at bedtime)  furosemide 40 mg oral tablet: 1 tab(s) orally once a day  Incruse Ellipta 62.5 mcg/inh inhalation powder: 1 puff(s) inhaled once a day  insulin glargine 100 units/mL subcutaneous solution: 45 unit(s) subcutaneous once a day before dinner  insulin lispro 100 units/mL subcutaneous solution: subcutaneous 3 times a day (before meals) per sliding scale:  151-200 = 2 units  201-250 = 4 units  251-300 = 6 units  301-350 = 8 units  351-400 = 10 units  &gt;400 = call MD  ipratropium-albuterol 0.5 mg-2.5 mg/3 mL inhalation solution: 3 milliliter(s) by nebulizer every 4 hours as needed for  shortness of breath and/or wheezing  lactulose 10 g/15 mL oral syrup: 15 milliliter(s) orally once a day  magnesium hydroxide 8% oral suspension: 30 milliliter(s) orally 3 times a day As needed Constipation  metFORMIN 500 mg oral tablet: 2 tab(s) orally 2 times a day  Metoprolol Tartrate 25 mg oral tablet: 1 tab(s) orally once a day  multivitamin: 1 tab(s) orally once a day  Mylanta Maximum Strength 400 mg-400 mg-40 mg/5 mL oral suspension: 30 milliliter(s) orally every 6 hours as needed for  indigestion  ondansetron 4 mg oral tablet: 1 tab(s) orally every 6 hours as needed for  nausea  polyethylene glycol 3350 oral powder for reconstitution: 17 gram(s) orally once a day (at bedtime)  potassium chloride 20 mEq oral tablet, extended release: 1 tab(s) orally once a day  senna (sennosides) 8.6 mg oral tablet: 2 tab(s) orally once a day (at bedtime)  simvastatin 10 mg oral tablet: 1 tab(s) orally once a day (at bedtime)  spironolactone 25 mg oral tablet: 4 tab(s) orally once a day  tamsulosin 0.4 mg oral capsule: 1 cap(s) orally once a day (at bedtime)  Tylenol 325 mg oral tablet: 2 tab(s) orally every 6 hours as needed for pain

## 2023-12-25 NOTE — PROGRESS NOTE ADULT - SUBJECTIVE AND OBJECTIVE BOX
Reason for Admission: Intractable Abdominal Pain    Patient is a 63 year old  male with PMH of anemia, CAD s/p CABG x3, DM, GERD, gastroparesis, HTN, left BKA presents to the ED from [place] for abdominal pain. Endorses 2 days of abdominal pain, associated with nausea and vomiting started this morning with inability to tolerate PO intake. S/P appendectomy 11/23, patient was admitted s/p surgery and was discharged on 11/25. Endorses pain is centrally located around the umbilical area, non-radiating and constant. Denies chest pain, palpitation, SOB, headache, dizziness, URI symptoms. In the ED had urinary retention, a Jurado was placed. Labs remarkable for WBC 17:00, afebrile. Vitals stable. Received 1L-NS, benadryl, Dilaudid 6mg PO & 1mg IV, Reglan.   CT-Abd: No bowel obstruction or inflammation. Moderate amount of stool in the distal sigmoid colon.    sub: pt seen in bed (was placing finger in his rectum). Moaning in distress. Stating he has epigastric pain. Asking for dilaudid ATC. States he is on 6mg po q6h - per I stop, he is on 4mg po q6h last filled 12/12/23. Is passing flatus. He is not sure whether he had a BM last night but states blankets were soiled last night. No active vomiting. When interviewed, he is longer moaning and speaking in full sentences. Vitals not c/w sepsis. Abd grossly soft.   12/21: pt has been refusing to take any of his regular meds including BB/BP meds and only asking for dilaudid. He is currently on the equivalent dose of his home dose of Dilaudid. No N/V. CT repeated and showed stool in rectal vault. ,   12/22/23: Still with abd pain.   12/23/23: Patient seen and examined. No new complaints. Still with abd pain requiring IV pain meds  12/24/23: Partial BM last night. Still with abd pain.  12/25/23: Still with abd pain, moving bowel.         REVIEW OF SYSTEMS:  General: NAD, hemodynamically stable, + weakness  HEENT:  Eyes:  No visual loss  SKIN:  No rash or itching.  CARDIOVASCULAR:  No chest pain  RESPIRATORY:  No shortness of breath  GASTROINTESTINAL: Severe abdominal pain  NEUROLOGICAL:  No headache, dizziness, syncope, paralysis, ataxia, numbness or tingling in the extremities. No change in bowel or bladder control.  MUSCULOSKELETAL:  No muscle, back pain, joint pain or stiffness.  HEMATOLOGIC:  No anemia, bleeding or bruising.  LYMPHATICS:  No enlarged nodes. No history of splenectomy.  ENDOCRINOLOGIC:  No reports of sweating, cold or heat intolerance. No polyuria or polydipsia.  ALLERGIES:  No history of asthma, hives, eczema or rhinitis.      Physical Exam:       Vital Signs Last 24 Hrs  T(C): 36.5 (25 Dec 2023 08:09), Max: 36.6 (24 Dec 2023 17:18)  T(F): 97.7 (25 Dec 2023 08:09), Max: 97.8 (24 Dec 2023 17:18)  HR: 92 (25 Dec 2023 08:09) (88 - 92)  BP: 140/85 (25 Dec 2023 08:09) (140/85 - 158/82)  BP(mean): --  RR: 18 (25 Dec 2023 08:09) (18 - 18)  SpO2: 98% (25 Dec 2023 08:09) (93% - 98%)    Parameters below as of 25 Dec 2023 08:09  Patient On (Oxygen Delivery Method): room air        Physical Exam:     HEENT:  Head is normocephalic    Skin:  thin, dry  NECK:  Supple without lymphadenopathy.   HEART:  Regular rate and rhythm  LUNGS:  Good ins/exp effort, no W/R/R/C  ABDOMEN:  Soft, nontender, nondistended with good bowel sounds heard; no guarding no rebound tenderness  EXTREMITIES:  LE extremity amputations  NEUROLOGICAL:  Gross nonfocal         Labs:           CAPILLARY BLOOD GLUCOSE      POCT Blood Glucose.: 212 mg/dL (25 Dec 2023 11:52)  POCT Blood Glucose.: 200 mg/dL (25 Dec 2023 07:42)  POCT Blood Glucose.: 184 mg/dL (24 Dec 2023 21:12)  POCT Blood Glucose.: 172 mg/dL (24 Dec 2023 17:54)          MEDICATIONS  (STANDING):  albuterol    90 MICROgram(s) HFA Inhaler 2 Puff(s) Inhalation every 6 hours  amLODIPine   Tablet 10 milliGRAM(s) Oral daily  aspirin enteric coated 81 milliGRAM(s) Oral daily  budesonide 160 MICROgram(s)/formoterol 4.5 MICROgram(s) Inhaler 2 Puff(s) Inhalation two times a day  clopidogrel Tablet 75 milliGRAM(s) Oral daily  dextrose 5%. 1000 milliLiter(s) (50 mL/Hr) IV Continuous <Continuous>  dextrose 50% Injectable 25 Gram(s) IV Push once  finasteride 5 milliGRAM(s) Oral daily  furosemide    Tablet 40 milliGRAM(s) Oral daily  gabapentin 300 milliGRAM(s) Oral two times a day  gabapentin 600 milliGRAM(s) Oral at bedtime  glucagon  Injectable 1 milliGRAM(s) IntraMuscular once  insulin lispro (ADMELOG) corrective regimen sliding scale   SubCutaneous three times a day before meals  lactulose Syrup 10 Gram(s) Oral daily  metoprolol tartrate 25 milliGRAM(s) Oral daily  multivitamin 1 Tablet(s) Oral daily  naloxegol 25 milliGRAM(s) Oral daily  pantoprazole    Tablet 40 milliGRAM(s) Oral before breakfast  polyethylene glycol 3350 17 Gram(s) Oral every 12 hours  simvastatin 10 milliGRAM(s) Oral at bedtime  spironolactone 100 milliGRAM(s) Oral daily  tamsulosin 0.4 milliGRAM(s) Oral at bedtime    MEDICATIONS  (PRN):  acetaminophen     Tablet .. 650 milliGRAM(s) Oral every 6 hours PRN Temp greater or equal to 38C (100.4F), Mild Pain (1 - 3)  aluminum hydroxide/magnesium hydroxide/simethicone Suspension 30 milliLiter(s) Oral every 4 hours PRN Dyspepsia  dextrose Oral Gel 15 Gram(s) Oral once PRN Blood Glucose LESS THAN 70 milliGRAM(s)/deciliter  dicyclomine 20 milliGRAM(s) Oral once PRN abd pain  HYDROmorphone  Injectable 1.5 milliGRAM(s) IV Push every 4 hours PRN Severe Pain (7 - 10)  HYDROmorphone  Injectable 0.5 milliGRAM(s) IV Push every 6 hours PRN Moderate Pain (4 - 6)  magnesium hydroxide Suspension 30 milliLiter(s) Oral daily PRN Constipation  melatonin 3 milliGRAM(s) Oral at bedtime PRN Insomnia  ondansetron Injectable 4 milliGRAM(s) IV Push every 8 hours PRN Nausea and/or Vomiting  ondansetron Injectable 4 milliGRAM(s) IV Push every 6 hours PRN Nausea and/or Vomiting              # Severe abdominal pain secondary to stool impaction vs gastroparesis   # Chronic abdominal pain  s/p lap appy (NOV), chronic abd pain likely multifactorial (DM, gastroparesis, vasculopath, chronic mesenteric ischemia?, on chronic dilaudid, constipation)  - 4 days history of abdominal pain; no Nausea or vomiting since his arrival  - CT-Abd: No bowel obstruction or inflammation. Moderate amount of stool in the distal sigmoid colon.  - REpeat CTAP: veronica in rectal vault, no stercoral colitis noted, no sbo, no ileus no eo inflammation  - Constipation 2/2 chronic opioids  - Advanced diet, tolerating  -Stool softener/enema  - Lactulose  - S/P Moviprep  -Continue movantic  - Sono: no e/o ascites, no portal vein thrombosis. + cirrhosis  - Lipase : wnl  - Lactate: 2.8, continue ivf. Check ua. Denies cough, lungs clear    # Urinary retention.   - Urinary retention/ bladder distension   - 500 cc Urine removed   - Jurado in place -> will need urology as o/p for urodynamic studies    # CAD (coronary artery disease) - s/p CAGB   - DAPT  /  STATIN    # Hypertension.   ·  Plan: Continue home meds   - Amlodipine 10mg   - Metoprolol - refusing po  - give 5 ivpb lopressor now    # Diabetes mellitus.   - ISS with hypoglycemia protocol   - Lantus 45U    Disposition: Back NH tomorrow       Reason for Admission: Intractable Abdominal Pain    Patient is a 63 year old  male with PMH of anemia, CAD s/p CABG x3, DM, GERD, gastroparesis, HTN, left BKA presents to the ED from [place] for abdominal pain. Endorses 2 days of abdominal pain, associated with nausea and vomiting started this morning with inability to tolerate PO intake. S/P appendectomy 11/23, patient was admitted s/p surgery and was discharged on 11/25. Endorses pain is centrally located around the umbilical area, non-radiating and constant. Denies chest pain, palpitation, SOB, headache, dizziness, URI symptoms. In the ED had urinary retention, a Jurado was placed. Labs remarkable for WBC 17:00, afebrile. Vitals stable. Received 1L-NS, benadryl, Dilaudid 6mg PO & 1mg IV, Reglan.   CT-Abd: No bowel obstruction or inflammation. Moderate amount of stool in the distal sigmoid colon.    sub: pt seen in bed (was placing finger in his rectum). Moaning in distress. Stating he has epigastric pain. Asking for dilaudid ATC. States he is on 6mg po q6h - per I stop, he is on 4mg po q6h last filled 12/12/23. Is passing flatus. He is not sure whether he had a BM last night but states blankets were soiled last night. No active vomiting. When interviewed, he is longer moaning and speaking in full sentences. Vitals not c/w sepsis. Abd grossly soft.   12/21: pt has been refusing to take any of his regular meds including BB/BP meds and only asking for dilaudid. He is currently on the equivalent dose of his home dose of Dilaudid. No N/V. CT repeated and showed stool in rectal vault. ,   12/22/23: Still with abd pain.   12/23/23: Patient seen and examined. No new complaints. Still with abd pain requiring IV pain meds  12/24/23: Partial BM last night. Still with abd pain.  12/25/23: Still with abd pain, moving bowel.         REVIEW OF SYSTEMS:  General: NAD, hemodynamically stable, + weakness  HEENT:  Eyes:  No visual loss  SKIN:  No rash or itching.  CARDIOVASCULAR:  No chest pain  RESPIRATORY:  No shortness of breath  GASTROINTESTINAL: Severe abdominal pain  NEUROLOGICAL:  No headache, dizziness, syncope, paralysis, ataxia, numbness or tingling in the extremities. No change in bowel or bladder control.  MUSCULOSKELETAL:  No muscle, back pain, joint pain or stiffness.  HEMATOLOGIC:  No anemia, bleeding or bruising.  LYMPHATICS:  No enlarged nodes. No history of splenectomy.  ENDOCRINOLOGIC:  No reports of sweating, cold or heat intolerance. No polyuria or polydipsia.  ALLERGIES:  No history of asthma, hives, eczema or rhinitis.      Physical Exam:       Vital Signs Last 24 Hrs  T(C): 36.5 (25 Dec 2023 08:09), Max: 36.6 (24 Dec 2023 17:18)  T(F): 97.7 (25 Dec 2023 08:09), Max: 97.8 (24 Dec 2023 17:18)  HR: 92 (25 Dec 2023 08:09) (88 - 92)  BP: 140/85 (25 Dec 2023 08:09) (140/85 - 158/82)  BP(mean): --  RR: 18 (25 Dec 2023 08:09) (18 - 18)  SpO2: 98% (25 Dec 2023 08:09) (93% - 98%)    Parameters below as of 25 Dec 2023 08:09  Patient On (Oxygen Delivery Method): room air        Physical Exam:     HEENT:  Head is normocephalic    Skin:  thin, dry  NECK:  Supple without lymphadenopathy.   HEART:  Regular rate and rhythm  LUNGS:  Good ins/exp effort, no W/R/R/C  ABDOMEN:  Soft, nontender, nondistended with good bowel sounds heard; no guarding no rebound tenderness  EXTREMITIES:  LE extremity amputations  NEUROLOGICAL:  Gross nonfocal         Labs:           CAPILLARY BLOOD GLUCOSE      POCT Blood Glucose.: 212 mg/dL (25 Dec 2023 11:52)  POCT Blood Glucose.: 200 mg/dL (25 Dec 2023 07:42)  POCT Blood Glucose.: 184 mg/dL (24 Dec 2023 21:12)  POCT Blood Glucose.: 172 mg/dL (24 Dec 2023 17:54)          MEDICATIONS  (STANDING):  albuterol    90 MICROgram(s) HFA Inhaler 2 Puff(s) Inhalation every 6 hours  amLODIPine   Tablet 10 milliGRAM(s) Oral daily  aspirin enteric coated 81 milliGRAM(s) Oral daily  budesonide 160 MICROgram(s)/formoterol 4.5 MICROgram(s) Inhaler 2 Puff(s) Inhalation two times a day  clopidogrel Tablet 75 milliGRAM(s) Oral daily  dextrose 5%. 1000 milliLiter(s) (50 mL/Hr) IV Continuous <Continuous>  dextrose 50% Injectable 25 Gram(s) IV Push once  finasteride 5 milliGRAM(s) Oral daily  furosemide    Tablet 40 milliGRAM(s) Oral daily  gabapentin 300 milliGRAM(s) Oral two times a day  gabapentin 600 milliGRAM(s) Oral at bedtime  glucagon  Injectable 1 milliGRAM(s) IntraMuscular once  insulin lispro (ADMELOG) corrective regimen sliding scale   SubCutaneous three times a day before meals  lactulose Syrup 10 Gram(s) Oral daily  metoprolol tartrate 25 milliGRAM(s) Oral daily  multivitamin 1 Tablet(s) Oral daily  naloxegol 25 milliGRAM(s) Oral daily  pantoprazole    Tablet 40 milliGRAM(s) Oral before breakfast  polyethylene glycol 3350 17 Gram(s) Oral every 12 hours  simvastatin 10 milliGRAM(s) Oral at bedtime  spironolactone 100 milliGRAM(s) Oral daily  tamsulosin 0.4 milliGRAM(s) Oral at bedtime    MEDICATIONS  (PRN):  acetaminophen     Tablet .. 650 milliGRAM(s) Oral every 6 hours PRN Temp greater or equal to 38C (100.4F), Mild Pain (1 - 3)  aluminum hydroxide/magnesium hydroxide/simethicone Suspension 30 milliLiter(s) Oral every 4 hours PRN Dyspepsia  dextrose Oral Gel 15 Gram(s) Oral once PRN Blood Glucose LESS THAN 70 milliGRAM(s)/deciliter  dicyclomine 20 milliGRAM(s) Oral once PRN abd pain  HYDROmorphone  Injectable 1.5 milliGRAM(s) IV Push every 4 hours PRN Severe Pain (7 - 10)  HYDROmorphone  Injectable 0.5 milliGRAM(s) IV Push every 6 hours PRN Moderate Pain (4 - 6)  magnesium hydroxide Suspension 30 milliLiter(s) Oral daily PRN Constipation  melatonin 3 milliGRAM(s) Oral at bedtime PRN Insomnia  ondansetron Injectable 4 milliGRAM(s) IV Push every 8 hours PRN Nausea and/or Vomiting  ondansetron Injectable 4 milliGRAM(s) IV Push every 6 hours PRN Nausea and/or Vomiting              # Severe abdominal pain secondary to stool impaction vs gastroparesis   # Chronic abdominal pain  s/p lap appy (NOV), chronic abd pain likely multifactorial (DM, gastroparesis, vasculopath, chronic mesenteric ischemia?, on chronic dilaudid, constipation)  - 4 days history of abdominal pain; no Nausea or vomiting since his arrival  - CT-Abd: No bowel obstruction or inflammation. Moderate amount of stool in the distal sigmoid colon.  - REpeat CTAP: veronica in rectal vault, no stercoral colitis noted, no sbo, no ileus no eo inflammation  - Constipation 2/2 chronic opioids  - Advanced diet, tolerating  -Stool softener/enema  - Lactulose  - S/P Moviprep  -Continue movantic  - Sono: no e/o ascites, no portal vein thrombosis. + cirrhosis  - Lipase : wnl  - Lactate: 2.8, continue ivf. Check ua. Denies cough, lungs clear    # Urinary retention.   - Urinary retention/ bladder distension   - 500 cc Urine removed   - Jurado in place -> will need urology as o/p for urodynamic studies    # CAD (coronary artery disease) - s/p CAGB   - DAPT  /  STATIN    # Hypertension.   ·  Plan: Continue home meds   - Amlodipine 10mg   - Metoprolol - refusing po  - give 5 ivpb lopressor now    # Diabetes mellitus.   - ISS with hypoglycemia protocol   - Lantus 45U    Disposition: Back NH tomorrow

## 2023-12-25 NOTE — DISCHARGE NOTE PROVIDER - PROVIDER TOKENS
FREE:[LAST:[PMD],PHONE:[(   )    -],FAX:[(   )    -]] FREE:[LAST:[PMD],PHONE:[(   )    -],FAX:[(   )    -]],FREE:[LAST:[Follow up with pcp 5-7 days],PHONE:[(   )    -],FAX:[(   )    -]]

## 2023-12-26 NOTE — DISCHARGE NOTE NURSING/CASE MANAGEMENT/SOCIAL WORK - PATIENT PORTAL LINK FT
You can access the FollowMyHealth Patient Portal offered by [facility] by registering at the following website: [email or web address]. By joining [facility]’s FollowMyHealth portal, you will also be able to view your health information using other applications (apps) compatible with our system. You can access the FollowMyHealth Patient Portal offered by [facility] by registering at the following website: [email or web address]. By joining [facility]’s FollowMyHealth portal, you will also be able to view your health information using other applications (apps) compatible with our system.

## 2023-12-26 NOTE — PROGRESS NOTE ADULT - SUBJECTIVE AND OBJECTIVE BOX
CC;  Intractable Abdominal Pain    S;  Lying in bed, awake, alert, comfortable appearing.  Pt states he has had multiple BMs.  Opioid dependent, asking for his pain medication prior to d/c.  Otherwise HD stable and ready for d/c to [facility].    REVIEW OF SYSTEMS: All other review of systems is negative unless indicated above.    Vital Signs Last 24 Hrs  T(C): 36.8 (26 Dec 2023 09:15), Max: 36.8 (26 Dec 2023 09:15)  T(F): 98.2 (26 Dec 2023 09:15), Max: 98.2 (26 Dec 2023 09:15)  HR: 88 (26 Dec 2023 09:15) (84 - 88)  BP: 153/81 (26 Dec 2023 09:15) (135/82 - 157/98)  BP(mean): --  RR: 18 (26 Dec 2023 09:15) (18 - 18)  SpO2: 96% (26 Dec 2023 09:15) (95% - 100%)    Parameters below as of 26 Dec 2023 09:15  Patient On (Oxygen Delivery Method): room air    PHYSICAL EXAM:    Constitutional: NAD, awake and alert  HEENT: PERR, EOMI, Normal Hearing, MMM  Neck: Soft and supple  Respiratory: Breath sounds are clear bilaterally, No wheezing, rales or rhonchi  Cardiovascular: S1 and S2, regular rate and rhythm, no Murmurs, gallops or rubs  Gastrointestinal: Bowel Sounds present, soft, nontender, nondistended, no guarding, no rebound  Extremities: No peripheral edema  Neurological: A/O x 3, no focal deficits in my limited exam      MEDICATIONS  (STANDING):  albuterol    90 MICROgram(s) HFA Inhaler 2 Puff(s) Inhalation every 6 hours  amLODIPine   Tablet 10 milliGRAM(s) Oral daily  aspirin enteric coated 81 milliGRAM(s) Oral daily  budesonide 160 MICROgram(s)/formoterol 4.5 MICROgram(s) Inhaler 2 Puff(s) Inhalation two times a day  clopidogrel Tablet 75 milliGRAM(s) Oral daily  dextrose 5%. 1000 milliLiter(s) (50 mL/Hr) IV Continuous <Continuous>  dextrose 50% Injectable 25 Gram(s) IV Push once  finasteride 5 milliGRAM(s) Oral daily  furosemide    Tablet 40 milliGRAM(s) Oral daily  gabapentin 300 milliGRAM(s) Oral two times a day  gabapentin 600 milliGRAM(s) Oral at bedtime  glucagon  Injectable 1 milliGRAM(s) IntraMuscular once  guaiFENesin ER 1200 milliGRAM(s) Oral every 12 hours  insulin lispro (ADMELOG) corrective regimen sliding scale   SubCutaneous three times a day before meals  lactulose Syrup 10 Gram(s) Oral daily  metoprolol tartrate 25 milliGRAM(s) Oral daily  multivitamin 1 Tablet(s) Oral daily  naloxegol 25 milliGRAM(s) Oral daily  pantoprazole    Tablet 40 milliGRAM(s) Oral before breakfast  polyethylene glycol 3350 17 Gram(s) Oral every 12 hours  simvastatin 10 milliGRAM(s) Oral at bedtime  spironolactone 100 milliGRAM(s) Oral daily  tamsulosin 0.4 milliGRAM(s) Oral at bedtime    MEDICATIONS  (PRN):  acetaminophen     Tablet .. 650 milliGRAM(s) Oral every 6 hours PRN Temp greater or equal to 38C (100.4F), Mild Pain (1 - 3)  aluminum hydroxide/magnesium hydroxide/simethicone Suspension 30 milliLiter(s) Oral every 4 hours PRN Dyspepsia  dextrose Oral Gel 15 Gram(s) Oral once PRN Blood Glucose LESS THAN 70 milliGRAM(s)/deciliter  dicyclomine 20 milliGRAM(s) Oral once PRN abd pain  HYDROmorphone  Injectable 1.5 milliGRAM(s) IV Push every 4 hours PRN Severe Pain (7 - 10)  magnesium hydroxide Suspension 30 milliLiter(s) Oral daily PRN Constipation  melatonin 3 milliGRAM(s) Oral at bedtime PRN Insomnia  ondansetron Injectable 4 milliGRAM(s) IV Push every 6 hours PRN Nausea and/or Vomiting  ondansetron Injectable 4 milliGRAM(s) IV Push every 8 hours PRN Nausea and/or Vomiting                  CAPILLARY BLOOD GLUCOSE      POCT Blood Glucose.: 247 mg/dL (26 Dec 2023 09:12)  POCT Blood Glucose.: 211 mg/dL (25 Dec 2023 17:36)        A/P:    # Acute on chronic abdominal pain: Multifactorial (DM2, gastroparesis, vasculopath, chronic mesenteric ischemia?, on chronic dilaudid, constipation)  - s/p lap appy (NOV)  - CT-Abd: No bowel obstruction or inflammation. Moderate amount of stool in the distal sigmoid colon.  - Repeat CTAP: stool in rectal vault, no stercoral colitis noted, no SBO, no ileus, no  inflammation  - Constipation 2/2 chronic opioids  - tolerating diet  - continue current bowel regimen   - Sono: no e/o ascites, no portal vein thrombosis. + cirrhosis  - Lipase : wnl    # Urinary retention.   - Urinary retention/ bladder distension   - 500 cc Urine removed   - Jurado in place -> will need urology as o/p for urodynamic studies    # CAD (coronary artery disease) - s/p CAGB   - DAPT  /  STATIN    # Hypertension.   - Continue home meds   - Amlodipine 10mg   - Metoprolol - refusing po  - give 5 ivpb lopressor now    # Diabetes mellitus.   - a1c 7  - c/w diabetic management as per med rec    Dispo;  - d/c to [facility] with out patient follow up                   CC;  Intractable Abdominal Pain    S;  Lying in bed, awake, alert, comfortable appearing.  Pt states he has had multiple BMs.  Opioid dependent, asking for his pain medication prior to d/c.  Otherwise HD stable and ready for d/c to [facility].    REVIEW OF SYSTEMS: All other review of systems is negative unless indicated above.    Vital Signs Last 24 Hrs  T(C): 36.8 (26 Dec 2023 09:15), Max: 36.8 (26 Dec 2023 09:15)  T(F): 98.2 (26 Dec 2023 09:15), Max: 98.2 (26 Dec 2023 09:15)  HR: 88 (26 Dec 2023 09:15) (84 - 88)  BP: 153/81 (26 Dec 2023 09:15) (135/82 - 157/98)  BP(mean): --  RR: 18 (26 Dec 2023 09:15) (18 - 18)  SpO2: 96% (26 Dec 2023 09:15) (95% - 100%)    Parameters below as of 26 Dec 2023 09:15  Patient On (Oxygen Delivery Method): room air    PHYSICAL EXAM:    Constitutional: NAD, awake and alert  HEENT: PERR, EOMI, Normal Hearing, MMM  Neck: Soft and supple  Respiratory: Breath sounds are clear bilaterally, No wheezing, rales or rhonchi  Cardiovascular: S1 and S2, regular rate and rhythm, no Murmurs, gallops or rubs  Gastrointestinal: Bowel Sounds present, soft, nontender, nondistended, no guarding, no rebound  Extremities: No peripheral edema  Neurological: A/O x 3, no focal deficits in my limited exam      MEDICATIONS  (STANDING):  albuterol    90 MICROgram(s) HFA Inhaler 2 Puff(s) Inhalation every 6 hours  amLODIPine   Tablet 10 milliGRAM(s) Oral daily  aspirin enteric coated 81 milliGRAM(s) Oral daily  budesonide 160 MICROgram(s)/formoterol 4.5 MICROgram(s) Inhaler 2 Puff(s) Inhalation two times a day  clopidogrel Tablet 75 milliGRAM(s) Oral daily  dextrose 5%. 1000 milliLiter(s) (50 mL/Hr) IV Continuous <Continuous>  dextrose 50% Injectable 25 Gram(s) IV Push once  finasteride 5 milliGRAM(s) Oral daily  furosemide    Tablet 40 milliGRAM(s) Oral daily  gabapentin 300 milliGRAM(s) Oral two times a day  gabapentin 600 milliGRAM(s) Oral at bedtime  glucagon  Injectable 1 milliGRAM(s) IntraMuscular once  guaiFENesin ER 1200 milliGRAM(s) Oral every 12 hours  insulin lispro (ADMELOG) corrective regimen sliding scale   SubCutaneous three times a day before meals  lactulose Syrup 10 Gram(s) Oral daily  metoprolol tartrate 25 milliGRAM(s) Oral daily  multivitamin 1 Tablet(s) Oral daily  naloxegol 25 milliGRAM(s) Oral daily  pantoprazole    Tablet 40 milliGRAM(s) Oral before breakfast  polyethylene glycol 3350 17 Gram(s) Oral every 12 hours  simvastatin 10 milliGRAM(s) Oral at bedtime  spironolactone 100 milliGRAM(s) Oral daily  tamsulosin 0.4 milliGRAM(s) Oral at bedtime    MEDICATIONS  (PRN):  acetaminophen     Tablet .. 650 milliGRAM(s) Oral every 6 hours PRN Temp greater or equal to 38C (100.4F), Mild Pain (1 - 3)  aluminum hydroxide/magnesium hydroxide/simethicone Suspension 30 milliLiter(s) Oral every 4 hours PRN Dyspepsia  dextrose Oral Gel 15 Gram(s) Oral once PRN Blood Glucose LESS THAN 70 milliGRAM(s)/deciliter  dicyclomine 20 milliGRAM(s) Oral once PRN abd pain  HYDROmorphone  Injectable 1.5 milliGRAM(s) IV Push every 4 hours PRN Severe Pain (7 - 10)  magnesium hydroxide Suspension 30 milliLiter(s) Oral daily PRN Constipation  melatonin 3 milliGRAM(s) Oral at bedtime PRN Insomnia  ondansetron Injectable 4 milliGRAM(s) IV Push every 6 hours PRN Nausea and/or Vomiting  ondansetron Injectable 4 milliGRAM(s) IV Push every 8 hours PRN Nausea and/or Vomiting                  CAPILLARY BLOOD GLUCOSE      POCT Blood Glucose.: 247 mg/dL (26 Dec 2023 09:12)  POCT Blood Glucose.: 211 mg/dL (25 Dec 2023 17:36)        A/P:    # Acute on chronic abdominal pain: Multifactorial (DM2, gastroparesis, vasculopath, chronic mesenteric ischemia?, on chronic dilaudid, constipation)  - s/p lap appy (NOV)  - CT-Abd: No bowel obstruction or inflammation. Moderate amount of stool in the distal sigmoid colon.  - Repeat CTAP: stool in rectal vault, no stercoral colitis noted, no SBO, no ileus, no  inflammation  - Constipation 2/2 chronic opioids  - tolerating diet  - continue current bowel regimen   - Sono: no e/o ascites, no portal vein thrombosis. + cirrhosis  - Lipase : wnl    # Urinary retention.   - Urinary retention/ bladder distension   - 500 cc Urine removed   - Jurado in place -> will need urology as o/p for urodynamic studies    # CAD (coronary artery disease) - s/p CAGB   - DAPT  /  STATIN    # Hypertension.   - Continue home meds   - Amlodipine 10mg   - Metoprolol - refusing po  - give 5 ivpb lopressor now    # Diabetes mellitus.   - a1c 7  - c/w diabetic management as per med rec    Dispo;  - d/c to [facility] with out patient follow up

## 2023-12-26 NOTE — PROGRESS NOTE ADULT - REASON FOR ADMISSION
Intractable Abdominal Pain

## 2023-12-26 NOTE — DISCHARGE NOTE NURSING/CASE MANAGEMENT/SOCIAL WORK - NSDCPEFALRISK_GEN_ALL_CORE
For information on Fall & Injury Prevention, visit: [email or web address] OR  [email or web address] OR  https://www.cdc.gov/steadi/patient.html For information on Fall & Injury Prevention, visit: [email or web address] OR  [email or web address] OR  https://www.cdc.gov/steadi/patient.html

## 2024-01-01 ENCOUNTER — EMERGENCY (EMERGENCY)
Facility: HOSPITAL | Age: 64
LOS: 0 days | End: 2024-02-09
Payer: MEDICARE

## 2024-01-01 VITALS — HEIGHT: 65 IN

## 2024-01-01 DIAGNOSIS — Z95.1 PRESENCE OF AORTOCORONARY BYPASS GRAFT: ICD-10-CM

## 2024-01-01 DIAGNOSIS — I46.9 CARDIAC ARREST, CAUSE UNSPECIFIED: ICD-10-CM

## 2024-01-01 DIAGNOSIS — Z89.619 ACQUIRED ABSENCE OF UNSPECIFIED LEG ABOVE KNEE: Chronic | ICD-10-CM

## 2024-01-01 DIAGNOSIS — Z79.82 LONG TERM (CURRENT) USE OF ASPIRIN: ICD-10-CM

## 2024-01-01 DIAGNOSIS — Z90.49 ACQUIRED ABSENCE OF OTHER SPECIFIED PARTS OF DIGESTIVE TRACT: ICD-10-CM

## 2024-01-01 DIAGNOSIS — D64.9 ANEMIA, UNSPECIFIED: ICD-10-CM

## 2024-01-01 DIAGNOSIS — K74.60 UNSPECIFIED CIRRHOSIS OF LIVER: ICD-10-CM

## 2024-01-01 DIAGNOSIS — K21.9 GASTRO-ESOPHAGEAL REFLUX DISEASE WITHOUT ESOPHAGITIS: ICD-10-CM

## 2024-01-01 DIAGNOSIS — I10 ESSENTIAL (PRIMARY) HYPERTENSION: ICD-10-CM

## 2024-01-01 DIAGNOSIS — K55.1 CHRONIC VASCULAR DISORDERS OF INTESTINE: ICD-10-CM

## 2024-01-01 DIAGNOSIS — Z79.4 LONG TERM (CURRENT) USE OF INSULIN: ICD-10-CM

## 2024-01-01 DIAGNOSIS — R33.9 RETENTION OF URINE, UNSPECIFIED: ICD-10-CM

## 2024-01-01 DIAGNOSIS — Z53.21 PROCEDURE AND TREATMENT NOT CARRIED OUT DUE TO PATIENT LEAVING PRIOR TO BEING SEEN BY HEALTH CARE PROVIDER: ICD-10-CM
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
## 2024-01-01 DIAGNOSIS — E11.43 TYPE 2 DIABETES MELLITUS WITH DIABETIC AUTONOMIC (POLY)NEUROPATHY: ICD-10-CM

## 2024-01-01 DIAGNOSIS — Z89.512 ACQUIRED ABSENCE OF LEFT LEG BELOW KNEE: ICD-10-CM

## 2024-01-01 DIAGNOSIS — I25.10 ATHEROSCLEROTIC HEART DISEASE OF NATIVE CORONARY ARTERY WITHOUT ANGINA PECTORIS: ICD-10-CM

## 2024-01-01 DIAGNOSIS — Z79.891 LONG TERM (CURRENT) USE OF OPIATE ANALGESIC: ICD-10-CM

## 2024-01-01 DIAGNOSIS — Z79.02 LONG TERM (CURRENT) USE OF ANTITHROMBOTICS/ANTIPLATELETS: ICD-10-CM

## 2024-01-01 DIAGNOSIS — T40.605A ADVERSE EFFECT OF UNSPECIFIED NARCOTICS, INITIAL ENCOUNTER: ICD-10-CM

## 2024-01-01 DIAGNOSIS — K31.84 GASTROPARESIS: ICD-10-CM

## 2024-01-01 DIAGNOSIS — K59.03 DRUG INDUCED CONSTIPATION: ICD-10-CM

## 2024-01-01 DIAGNOSIS — Z95.1 PRESENCE OF AORTOCORONARY BYPASS GRAFT: Chronic | ICD-10-CM

## 2024-01-01 PROCEDURE — 99281 EMR DPT VST MAYX REQ PHY/QHP: CPT

## 2024-01-01 PROCEDURE — L9991: CPT

## 2024-02-09 NOTE — ED ADULT TRIAGE NOTE - CHIEF COMPLAINT QUOTE
pt BIBEMS from [facility] facility. as per facility, at 0000 pt found slumped over in wheelchair, called supervisor at 0005, moved pt to bed, CPR initiated at 0005. AED placed on pt, CRP resumed, EMS was called 20 mins into CPR. EMS arrived, continued compressions for another 10 mins until medic arrived at facility. medic called medical control who advised EMS to stop compressions, unsafe to move pt with rigor mortis. EMS then attempted to bring pt back into facility and was told that pt should come to ED as they "did not have a bed available for pt or a morgue in the facility." as per [facility] [phone], . pt then transported to [facility], CRP not in progress, pt pulseless, not breathing. Dr. Mosquera [phone] pt PMD called and left message for call back.

## 2024-02-09 NOTE — ED ADULT NURSE REASSESSMENT NOTE - NS ED NURSE REASSESS COMMENT FT1
[place] medical director Raymundo Alvarez [phone]  pt brother ray, [phone] - facility called and left message.

## 2024-08-28 NOTE — PHARMACOTHERAPY INTERVENTION NOTE - COMMENTS
D: Patient states shivering has stopped. Is taking ice chips without difficulty, call light remains in place.   Medication reconciliation completed.  Reviewed Medication list and confirmed med allergies with list from Care Facility "[facility]"; confirmed with Dr. First Medjosi.
